# Patient Record
Sex: FEMALE | Race: BLACK OR AFRICAN AMERICAN | Employment: UNEMPLOYED | URBAN - METROPOLITAN AREA
[De-identification: names, ages, dates, MRNs, and addresses within clinical notes are randomized per-mention and may not be internally consistent; named-entity substitution may affect disease eponyms.]

---

## 2019-11-05 ENCOUNTER — HOME HEALTH ADMISSION (OUTPATIENT)
Dept: HOME HEALTH SERVICES | Facility: HOME HEALTH | Age: 84
End: 2019-11-05
Payer: MEDICARE

## 2019-11-06 ENCOUNTER — HOME CARE VISIT (OUTPATIENT)
Dept: SCHEDULING | Facility: HOME HEALTH | Age: 84
End: 2019-11-06
Payer: MEDICARE

## 2019-11-06 PROCEDURE — G0151 HHCP-SERV OF PT,EA 15 MIN: HCPCS

## 2019-11-06 PROCEDURE — 400013 HH SOC

## 2019-11-07 VITALS
HEART RATE: 70 BPM | OXYGEN SATURATION: 97 % | RESPIRATION RATE: 18 BRPM | TEMPERATURE: 98 F | SYSTOLIC BLOOD PRESSURE: 154 MMHG | DIASTOLIC BLOOD PRESSURE: 70 MMHG

## 2019-11-12 ENCOUNTER — HOME CARE VISIT (OUTPATIENT)
Dept: SCHEDULING | Facility: HOME HEALTH | Age: 84
End: 2019-11-12
Payer: MEDICARE

## 2019-11-12 PROCEDURE — G0151 HHCP-SERV OF PT,EA 15 MIN: HCPCS

## 2019-11-14 ENCOUNTER — HOME CARE VISIT (OUTPATIENT)
Dept: SCHEDULING | Facility: HOME HEALTH | Age: 84
End: 2019-11-14
Payer: MEDICARE

## 2019-11-14 PROCEDURE — G0151 HHCP-SERV OF PT,EA 15 MIN: HCPCS

## 2019-11-16 VITALS
DIASTOLIC BLOOD PRESSURE: 68 MMHG | OXYGEN SATURATION: 97 % | SYSTOLIC BLOOD PRESSURE: 142 MMHG | HEART RATE: 68 BPM | RESPIRATION RATE: 18 BRPM | TEMPERATURE: 97.8 F

## 2019-11-19 ENCOUNTER — HOME CARE VISIT (OUTPATIENT)
Dept: SCHEDULING | Facility: HOME HEALTH | Age: 84
End: 2019-11-19
Payer: MEDICARE

## 2019-11-19 VITALS
DIASTOLIC BLOOD PRESSURE: 78 MMHG | HEART RATE: 70 BPM | SYSTOLIC BLOOD PRESSURE: 138 MMHG | RESPIRATION RATE: 17 BRPM | TEMPERATURE: 97.8 F | OXYGEN SATURATION: 89 %

## 2019-11-19 PROCEDURE — G0151 HHCP-SERV OF PT,EA 15 MIN: HCPCS

## 2019-11-21 ENCOUNTER — HOME CARE VISIT (OUTPATIENT)
Dept: SCHEDULING | Facility: HOME HEALTH | Age: 84
End: 2019-11-21
Payer: MEDICARE

## 2019-11-21 VITALS
RESPIRATION RATE: 18 BRPM | TEMPERATURE: 98.2 F | OXYGEN SATURATION: 97 % | SYSTOLIC BLOOD PRESSURE: 140 MMHG | DIASTOLIC BLOOD PRESSURE: 70 MMHG | HEART RATE: 66 BPM

## 2019-11-21 PROCEDURE — G0151 HHCP-SERV OF PT,EA 15 MIN: HCPCS

## 2019-11-24 ENCOUNTER — HOME CARE VISIT (OUTPATIENT)
Dept: SCHEDULING | Facility: HOME HEALTH | Age: 84
End: 2019-11-24
Payer: MEDICARE

## 2019-11-24 PROCEDURE — G0151 HHCP-SERV OF PT,EA 15 MIN: HCPCS

## 2019-11-26 ENCOUNTER — HOME CARE VISIT (OUTPATIENT)
Dept: SCHEDULING | Facility: HOME HEALTH | Age: 84
End: 2019-11-26
Payer: MEDICARE

## 2019-11-26 VITALS — RESPIRATION RATE: 17 BRPM | HEART RATE: 82 BPM | TEMPERATURE: 97.7 F | OXYGEN SATURATION: 97 %

## 2019-11-26 VITALS
DIASTOLIC BLOOD PRESSURE: 76 MMHG | SYSTOLIC BLOOD PRESSURE: 140 MMHG | RESPIRATION RATE: 16 BRPM | HEART RATE: 89 BPM | TEMPERATURE: 98.4 F | OXYGEN SATURATION: 98 %

## 2019-11-26 PROCEDURE — G0151 HHCP-SERV OF PT,EA 15 MIN: HCPCS

## 2019-12-02 ENCOUNTER — HOME CARE VISIT (OUTPATIENT)
Dept: SCHEDULING | Facility: HOME HEALTH | Age: 84
End: 2019-12-02
Payer: MEDICARE

## 2019-12-02 VITALS
RESPIRATION RATE: 18 BRPM | SYSTOLIC BLOOD PRESSURE: 140 MMHG | TEMPERATURE: 98 F | DIASTOLIC BLOOD PRESSURE: 68 MMHG | HEART RATE: 83 BPM | OXYGEN SATURATION: 97 %

## 2019-12-02 PROCEDURE — G0151 HHCP-SERV OF PT,EA 15 MIN: HCPCS

## 2019-12-04 ENCOUNTER — HOME CARE VISIT (OUTPATIENT)
Dept: SCHEDULING | Facility: HOME HEALTH | Age: 84
End: 2019-12-04
Payer: MEDICARE

## 2019-12-04 VITALS
TEMPERATURE: 98.3 F | SYSTOLIC BLOOD PRESSURE: 136 MMHG | RESPIRATION RATE: 18 BRPM | HEART RATE: 80 BPM | DIASTOLIC BLOOD PRESSURE: 70 MMHG | OXYGEN SATURATION: 97 %

## 2019-12-04 PROCEDURE — G0151 HHCP-SERV OF PT,EA 15 MIN: HCPCS

## 2019-12-10 ENCOUNTER — HOME CARE VISIT (OUTPATIENT)
Dept: SCHEDULING | Facility: HOME HEALTH | Age: 84
End: 2019-12-10
Payer: MEDICARE

## 2019-12-10 VITALS
SYSTOLIC BLOOD PRESSURE: 122 MMHG | OXYGEN SATURATION: 97 % | RESPIRATION RATE: 18 BRPM | HEART RATE: 80 BPM | DIASTOLIC BLOOD PRESSURE: 70 MMHG | TEMPERATURE: 97.8 F

## 2019-12-10 PROCEDURE — G0151 HHCP-SERV OF PT,EA 15 MIN: HCPCS

## 2019-12-11 ENCOUNTER — HOME CARE VISIT (OUTPATIENT)
Dept: SCHEDULING | Facility: HOME HEALTH | Age: 84
End: 2019-12-11
Payer: MEDICARE

## 2019-12-11 PROCEDURE — G0151 HHCP-SERV OF PT,EA 15 MIN: HCPCS

## 2019-12-17 ENCOUNTER — HOME CARE VISIT (OUTPATIENT)
Dept: SCHEDULING | Facility: HOME HEALTH | Age: 84
End: 2019-12-17
Payer: MEDICARE

## 2019-12-17 VITALS
HEART RATE: 68 BPM | SYSTOLIC BLOOD PRESSURE: 136 MMHG | OXYGEN SATURATION: 97 % | RESPIRATION RATE: 18 BRPM | DIASTOLIC BLOOD PRESSURE: 74 MMHG | TEMPERATURE: 98.3 F

## 2019-12-17 PROCEDURE — G0151 HHCP-SERV OF PT,EA 15 MIN: HCPCS

## 2019-12-19 ENCOUNTER — HOME CARE VISIT (OUTPATIENT)
Dept: SCHEDULING | Facility: HOME HEALTH | Age: 84
End: 2019-12-19
Payer: MEDICARE

## 2019-12-19 PROCEDURE — G0151 HHCP-SERV OF PT,EA 15 MIN: HCPCS

## 2019-12-21 VITALS
TEMPERATURE: 98.1 F | SYSTOLIC BLOOD PRESSURE: 140 MMHG | RESPIRATION RATE: 16 BRPM | HEART RATE: 83 BPM | OXYGEN SATURATION: 98 % | DIASTOLIC BLOOD PRESSURE: 70 MMHG

## 2019-12-22 ENCOUNTER — HOME CARE VISIT (OUTPATIENT)
Dept: SCHEDULING | Facility: HOME HEALTH | Age: 84
End: 2019-12-22
Payer: MEDICARE

## 2019-12-22 VITALS
OXYGEN SATURATION: 98 % | TEMPERATURE: 97.9 F | DIASTOLIC BLOOD PRESSURE: 78 MMHG | RESPIRATION RATE: 18 BRPM | SYSTOLIC BLOOD PRESSURE: 138 MMHG | HEART RATE: 68 BPM

## 2019-12-22 PROCEDURE — G0151 HHCP-SERV OF PT,EA 15 MIN: HCPCS

## 2019-12-24 ENCOUNTER — HOME CARE VISIT (OUTPATIENT)
Dept: SCHEDULING | Facility: HOME HEALTH | Age: 84
End: 2019-12-24
Payer: MEDICARE

## 2019-12-24 PROCEDURE — G0151 HHCP-SERV OF PT,EA 15 MIN: HCPCS

## 2019-12-26 VITALS
RESPIRATION RATE: 17 BRPM | SYSTOLIC BLOOD PRESSURE: 138 MMHG | OXYGEN SATURATION: 97 % | HEART RATE: 82 BPM | TEMPERATURE: 98.6 F | DIASTOLIC BLOOD PRESSURE: 70 MMHG

## 2022-01-01 ENCOUNTER — ANESTHESIA EVENT (OUTPATIENT)
Dept: SURGERY | Age: 87
DRG: 853 | End: 2022-01-01
Payer: MEDICARE

## 2022-01-01 ENCOUNTER — HOSPICE ADMISSION (OUTPATIENT)
Dept: HOSPICE | Facility: HOSPICE | Age: 87
End: 2022-01-01

## 2022-01-01 ENCOUNTER — APPOINTMENT (OUTPATIENT)
Dept: NON INVASIVE DIAGNOSTICS | Age: 87
DRG: 871 | End: 2022-01-01
Attending: HOSPITALIST
Payer: MEDICARE

## 2022-01-01 ENCOUNTER — HOSPICE ADMISSION (OUTPATIENT)
Dept: HOSPICE | Facility: HOSPICE | Age: 87
End: 2022-01-01
Payer: MEDICARE

## 2022-01-01 ENCOUNTER — ANESTHESIA (OUTPATIENT)
Dept: SURGERY | Age: 87
DRG: 853 | End: 2022-01-01
Payer: MEDICARE

## 2022-01-01 ENCOUNTER — HOSPITAL ENCOUNTER (INPATIENT)
Age: 87
LOS: 14 days | Discharge: NURSING, REHAB OR LONG-TERM CARE | DRG: 853 | End: 2022-08-16
Attending: EMERGENCY MEDICINE | Admitting: STUDENT IN AN ORGANIZED HEALTH CARE EDUCATION/TRAINING PROGRAM
Payer: MEDICARE

## 2022-01-01 ENCOUNTER — TELEPHONE (OUTPATIENT)
Dept: INFECTIOUS DISEASES | Age: 87
End: 2022-01-01

## 2022-01-01 ENCOUNTER — HOSPITAL ENCOUNTER (INPATIENT)
Age: 87
LOS: 23 days | Discharge: NURSING, REHAB OR LONG-TERM CARE | DRG: 871 | End: 2022-06-15
Attending: STUDENT IN AN ORGANIZED HEALTH CARE EDUCATION/TRAINING PROGRAM | Admitting: HOSPITALIST
Payer: MEDICARE

## 2022-01-01 ENCOUNTER — APPOINTMENT (OUTPATIENT)
Dept: CT IMAGING | Age: 87
DRG: 871 | End: 2022-01-01
Attending: INTERNAL MEDICINE
Payer: MEDICARE

## 2022-01-01 ENCOUNTER — HOSPITAL ENCOUNTER (OUTPATIENT)
Dept: LAB | Age: 87
Discharge: HOME | End: 2022-08-26

## 2022-01-01 ENCOUNTER — APPOINTMENT (OUTPATIENT)
Dept: GENERAL RADIOLOGY | Age: 87
DRG: 871 | End: 2022-01-01
Attending: NURSE PRACTITIONER
Payer: MEDICARE

## 2022-01-01 ENCOUNTER — APPOINTMENT (OUTPATIENT)
Dept: ULTRASOUND IMAGING | Age: 87
DRG: 871 | End: 2022-01-01
Attending: NURSE PRACTITIONER
Payer: MEDICARE

## 2022-01-01 ENCOUNTER — HOSPITAL ENCOUNTER (INPATIENT)
Age: 87
LOS: 1 days | End: 2022-09-28
Attending: FAMILY MEDICINE | Admitting: FAMILY MEDICINE

## 2022-01-01 ENCOUNTER — APPOINTMENT (OUTPATIENT)
Dept: INTERVENTIONAL RADIOLOGY/VASCULAR | Age: 87
DRG: 871 | End: 2022-01-01
Attending: INTERNAL MEDICINE
Payer: MEDICARE

## 2022-01-01 ENCOUNTER — HOSPITAL ENCOUNTER (OUTPATIENT)
Age: 87
Setting detail: OBSERVATION
Discharge: HOSPICE | End: 2022-09-27
Attending: STUDENT IN AN ORGANIZED HEALTH CARE EDUCATION/TRAINING PROGRAM | Admitting: INTERNAL MEDICINE
Payer: MEDICARE

## 2022-01-01 ENCOUNTER — HOSPITAL ENCOUNTER (OUTPATIENT)
Dept: LAB | Age: 87
Discharge: HOME | End: 2022-09-14

## 2022-01-01 ENCOUNTER — APPOINTMENT (OUTPATIENT)
Dept: GENERAL RADIOLOGY | Age: 87
DRG: 871 | End: 2022-01-01
Attending: EMERGENCY MEDICINE
Payer: MEDICARE

## 2022-01-01 ENCOUNTER — HOSPITAL ENCOUNTER (INPATIENT)
Age: 87
LOS: 6 days | Discharge: HOSPICE | DRG: 871 | End: 2022-09-22
Attending: EMERGENCY MEDICINE | Admitting: INTERNAL MEDICINE
Payer: MEDICARE

## 2022-01-01 ENCOUNTER — APPOINTMENT (OUTPATIENT)
Dept: CT IMAGING | Age: 87
DRG: 871 | End: 2022-01-01
Attending: NURSE PRACTITIONER
Payer: MEDICARE

## 2022-01-01 ENCOUNTER — APPOINTMENT (OUTPATIENT)
Dept: NON INVASIVE DIAGNOSTICS | Age: 87
DRG: 871 | End: 2022-01-01
Attending: INTERNAL MEDICINE
Payer: MEDICARE

## 2022-01-01 ENCOUNTER — APPOINTMENT (OUTPATIENT)
Dept: ULTRASOUND IMAGING | Age: 87
DRG: 871 | End: 2022-01-01
Attending: INTERNAL MEDICINE
Payer: MEDICARE

## 2022-01-01 ENCOUNTER — APPOINTMENT (OUTPATIENT)
Dept: CT IMAGING | Age: 87
DRG: 871 | End: 2022-01-01
Attending: STUDENT IN AN ORGANIZED HEALTH CARE EDUCATION/TRAINING PROGRAM
Payer: MEDICARE

## 2022-01-01 ENCOUNTER — APPOINTMENT (OUTPATIENT)
Dept: ULTRASOUND IMAGING | Age: 87
DRG: 871 | End: 2022-01-01
Attending: HOSPITALIST
Payer: MEDICARE

## 2022-01-01 ENCOUNTER — APPOINTMENT (OUTPATIENT)
Dept: GENERAL RADIOLOGY | Age: 87
DRG: 871 | End: 2022-01-01
Attending: STUDENT IN AN ORGANIZED HEALTH CARE EDUCATION/TRAINING PROGRAM
Payer: MEDICARE

## 2022-01-01 ENCOUNTER — APPOINTMENT (OUTPATIENT)
Dept: CT IMAGING | Age: 87
DRG: 871 | End: 2022-01-01
Attending: EMERGENCY MEDICINE
Payer: MEDICARE

## 2022-01-01 ENCOUNTER — APPOINTMENT (OUTPATIENT)
Dept: GENERAL RADIOLOGY | Age: 87
DRG: 871 | End: 2022-01-01
Attending: HOSPITALIST
Payer: MEDICARE

## 2022-01-01 VITALS
DIASTOLIC BLOOD PRESSURE: 52 MMHG | WEIGHT: 150.3 LBS | SYSTOLIC BLOOD PRESSURE: 114 MMHG | RESPIRATION RATE: 18 BRPM | TEMPERATURE: 97.8 F | HEIGHT: 64 IN | BODY MASS INDEX: 25.66 KG/M2 | HEART RATE: 72 BPM | OXYGEN SATURATION: 100 %

## 2022-01-01 VITALS
RESPIRATION RATE: 15 BRPM | WEIGHT: 182.1 LBS | TEMPERATURE: 98 F | OXYGEN SATURATION: 100 % | SYSTOLIC BLOOD PRESSURE: 168 MMHG | DIASTOLIC BLOOD PRESSURE: 62 MMHG | HEART RATE: 73 BPM | HEIGHT: 66 IN | BODY MASS INDEX: 29.27 KG/M2

## 2022-01-01 VITALS
TEMPERATURE: 97.8 F | DIASTOLIC BLOOD PRESSURE: 57 MMHG | BODY MASS INDEX: 21.51 KG/M2 | SYSTOLIC BLOOD PRESSURE: 114 MMHG | RESPIRATION RATE: 15 BRPM | HEIGHT: 64 IN | WEIGHT: 126 LBS | HEART RATE: 90 BPM | OXYGEN SATURATION: 100 %

## 2022-01-01 VITALS
SYSTOLIC BLOOD PRESSURE: 68 MMHG | RESPIRATION RATE: 20 BRPM | OXYGEN SATURATION: 95 % | HEART RATE: 68 BPM | TEMPERATURE: 98.6 F | DIASTOLIC BLOOD PRESSURE: 32 MMHG

## 2022-01-01 VITALS — TEMPERATURE: 93.3 F | RESPIRATION RATE: 8 BRPM

## 2022-01-01 DIAGNOSIS — A41.9 SEPSIS WITHOUT ACUTE ORGAN DYSFUNCTION, DUE TO UNSPECIFIED ORGANISM (HCC): Primary | ICD-10-CM

## 2022-01-01 DIAGNOSIS — L98.423 SKIN ULCER OF SACRUM WITH NECROSIS OF MUSCLE (HCC): ICD-10-CM

## 2022-01-01 DIAGNOSIS — Z99.2 ESRD (END STAGE RENAL DISEASE) ON DIALYSIS (HCC): ICD-10-CM

## 2022-01-01 DIAGNOSIS — R52 NONVERBAL SIGNS OF PAIN: Primary | ICD-10-CM

## 2022-01-01 DIAGNOSIS — R00.1 SINUS BRADYCARDIA: ICD-10-CM

## 2022-01-01 DIAGNOSIS — B96.4 BACTEREMIA DUE TO PROTEUS SPECIES: ICD-10-CM

## 2022-01-01 DIAGNOSIS — R78.81 BACTEREMIA DUE TO PROTEUS SPECIES: ICD-10-CM

## 2022-01-01 DIAGNOSIS — R78.81 ENTEROCOCCAL BACTEREMIA: ICD-10-CM

## 2022-01-01 DIAGNOSIS — R45.1 RESTLESSNESS: ICD-10-CM

## 2022-01-01 DIAGNOSIS — G93.41 ACUTE METABOLIC ENCEPHALOPATHY: ICD-10-CM

## 2022-01-01 DIAGNOSIS — N18.6 END STAGE RENAL DISEASE (HCC): ICD-10-CM

## 2022-01-01 DIAGNOSIS — R65.20 SEVERE SEPSIS (HCC): ICD-10-CM

## 2022-01-01 DIAGNOSIS — M46.28 SACRAL OSTEOMYELITIS (HCC): ICD-10-CM

## 2022-01-01 DIAGNOSIS — E11.10 DIABETIC KETOACIDOSIS WITHOUT COMA ASSOCIATED WITH TYPE 2 DIABETES MELLITUS (HCC): ICD-10-CM

## 2022-01-01 DIAGNOSIS — B95.2 ENTEROCOCCAL BACTEREMIA: ICD-10-CM

## 2022-01-01 DIAGNOSIS — Z16.12 ESBL (EXTENDED SPECTRUM BETA-LACTAMASE) PRODUCING BACTERIA INFECTION: ICD-10-CM

## 2022-01-01 DIAGNOSIS — E87.6 HYPOKALEMIA: ICD-10-CM

## 2022-01-01 DIAGNOSIS — N18.6 ESRD (END STAGE RENAL DISEASE) ON DIALYSIS (HCC): ICD-10-CM

## 2022-01-01 DIAGNOSIS — A49.8 KLEBSIELLA PNEUMONIAE INFECTION: ICD-10-CM

## 2022-01-01 DIAGNOSIS — A41.9 SEVERE SEPSIS (HCC): ICD-10-CM

## 2022-01-01 DIAGNOSIS — Z71.89 ADVANCED CARE PLANNING/COUNSELING DISCUSSION: ICD-10-CM

## 2022-01-01 DIAGNOSIS — A41.9 SEPSIS, DUE TO UNSPECIFIED ORGANISM, UNSPECIFIED WHETHER ACUTE ORGAN DYSFUNCTION PRESENT (HCC): Primary | ICD-10-CM

## 2022-01-01 DIAGNOSIS — A41.9 POLYMICROBIAL SEPTICEMIA (HCC): ICD-10-CM

## 2022-01-01 DIAGNOSIS — N17.9 AKI (ACUTE KIDNEY INJURY) (HCC): ICD-10-CM

## 2022-01-01 DIAGNOSIS — E11.65 POORLY CONTROLLED DIABETES MELLITUS (HCC): ICD-10-CM

## 2022-01-01 DIAGNOSIS — A49.9 ESBL (EXTENDED SPECTRUM BETA-LACTAMASE) PRODUCING BACTERIA INFECTION: ICD-10-CM

## 2022-01-01 DIAGNOSIS — Z51.5 HOSPICE CARE: ICD-10-CM

## 2022-01-01 DIAGNOSIS — I05.9 ENDOCARDITIS OF MITRAL VALVE: ICD-10-CM

## 2022-01-01 DIAGNOSIS — R53.81 DEBILITY: ICD-10-CM

## 2022-01-01 DIAGNOSIS — A48.8 SERRATIA MARCESCENS INFECTION: ICD-10-CM

## 2022-01-01 DIAGNOSIS — L08.9 WOUND INFECTION: ICD-10-CM

## 2022-01-01 DIAGNOSIS — Z51.5 PALLIATIVE CARE BY SPECIALIST: ICD-10-CM

## 2022-01-01 DIAGNOSIS — E11.11 DIABETIC KETOACIDOSIS WITH COMA ASSOCIATED WITH TYPE 2 DIABETES MELLITUS (HCC): Primary | ICD-10-CM

## 2022-01-01 DIAGNOSIS — R63.0 POOR APPETITE: ICD-10-CM

## 2022-01-01 DIAGNOSIS — T14.8XXA WOUND INFECTION: ICD-10-CM

## 2022-01-01 LAB
ABO + RH BLD: NORMAL
ACC. NO. FROM MICRO ORDER, ACCP: ABNORMAL
ACINETOBACTER CALCOACETICUS-BAUMANII COMPLEX, ACBCX: NOT DETECTED
ADMINISTERED INITIALS, ADMINIT: NORMAL
ALBUMIN SERPL-MCNC: 1.2 G/DL (ref 3.5–5)
ALBUMIN SERPL-MCNC: 1.4 G/DL (ref 3.5–5)
ALBUMIN SERPL-MCNC: 1.5 G/DL (ref 3.5–5)
ALBUMIN SERPL-MCNC: 1.6 G/DL (ref 3.5–5)
ALBUMIN SERPL-MCNC: 1.6 G/DL (ref 3.5–5)
ALBUMIN SERPL-MCNC: 1.7 G/DL (ref 3.5–5)
ALBUMIN SERPL-MCNC: 1.7 G/DL (ref 3.5–5)
ALBUMIN SERPL-MCNC: 1.8 G/DL (ref 3.5–5)
ALBUMIN SERPL-MCNC: 1.8 G/DL (ref 3.5–5)
ALBUMIN SERPL-MCNC: 1.9 G/DL (ref 3.5–5)
ALBUMIN SERPL-MCNC: 2 G/DL (ref 3.5–5)
ALBUMIN SERPL-MCNC: 2 G/DL (ref 3.5–5)
ALBUMIN SERPL-MCNC: 2.1 G/DL (ref 3.5–5)
ALBUMIN SERPL-MCNC: 2.1 G/DL (ref 3.5–5)
ALBUMIN SERPL-MCNC: 2.4 G/DL (ref 3.5–5)
ALBUMIN SERPL-MCNC: 3.3 G/DL (ref 3.5–5)
ALBUMIN/GLOB SERPL: 0.2 {RATIO} (ref 1.1–2.2)
ALBUMIN/GLOB SERPL: 0.2 {RATIO} (ref 1.1–2.2)
ALBUMIN/GLOB SERPL: 0.3 {RATIO} (ref 1.1–2.2)
ALBUMIN/GLOB SERPL: 0.5 {RATIO} (ref 1.1–2.2)
ALBUMIN/GLOB SERPL: 0.6 {RATIO} (ref 1.1–2.2)
ALBUMIN/GLOB SERPL: 0.8 {RATIO} (ref 1.1–2.2)
ALBUMIN/GLOB SERPL: 0.8 {RATIO} (ref 1.1–2.2)
ALP SERPL-CCNC: 102 U/L (ref 45–117)
ALP SERPL-CCNC: 103 U/L (ref 45–117)
ALP SERPL-CCNC: 109 U/L (ref 45–117)
ALP SERPL-CCNC: 133 U/L (ref 45–117)
ALP SERPL-CCNC: 136 U/L (ref 45–117)
ALP SERPL-CCNC: 136 U/L (ref 45–117)
ALP SERPL-CCNC: 150 U/L (ref 45–117)
ALP SERPL-CCNC: 235 U/L (ref 45–117)
ALP SERPL-CCNC: 50 U/L (ref 45–117)
ALP SERPL-CCNC: 57 U/L (ref 45–117)
ALP SERPL-CCNC: 70 U/L (ref 45–117)
ALP SERPL-CCNC: 85 U/L (ref 45–117)
ALP SERPL-CCNC: 88 U/L (ref 45–117)
ALP SERPL-CCNC: 99 U/L (ref 45–117)
ALT SERPL-CCNC: 107 U/L (ref 12–78)
ALT SERPL-CCNC: 121 U/L (ref 12–78)
ALT SERPL-CCNC: 134 U/L (ref 12–78)
ALT SERPL-CCNC: 150 U/L (ref 12–78)
ALT SERPL-CCNC: 16 U/L (ref 12–78)
ALT SERPL-CCNC: 176 U/L (ref 12–78)
ALT SERPL-CCNC: 19 U/L (ref 12–78)
ALT SERPL-CCNC: 201 U/L (ref 12–78)
ALT SERPL-CCNC: 226 U/L (ref 12–78)
ALT SERPL-CCNC: 23 U/L (ref 12–78)
ALT SERPL-CCNC: 230 U/L (ref 12–78)
ALT SERPL-CCNC: 252 U/L (ref 12–78)
ALT SERPL-CCNC: 27 U/L (ref 12–78)
ALT SERPL-CCNC: 34 U/L (ref 12–78)
ANION GAP SERPL CALC-SCNC: 10 MMOL/L (ref 5–15)
ANION GAP SERPL CALC-SCNC: 11 MMOL/L (ref 5–15)
ANION GAP SERPL CALC-SCNC: 12 MMOL/L (ref 5–15)
ANION GAP SERPL CALC-SCNC: 14 MMOL/L (ref 5–15)
ANION GAP SERPL CALC-SCNC: 15 MMOL/L (ref 5–15)
ANION GAP SERPL CALC-SCNC: 16 MMOL/L (ref 5–15)
ANION GAP SERPL CALC-SCNC: 17 MMOL/L (ref 5–15)
ANION GAP SERPL CALC-SCNC: 19 MMOL/L (ref 5–15)
ANION GAP SERPL CALC-SCNC: 22 MMOL/L (ref 5–15)
ANION GAP SERPL CALC-SCNC: 30 MMOL/L (ref 5–15)
ANION GAP SERPL CALC-SCNC: 4 MMOL/L (ref 5–15)
ANION GAP SERPL CALC-SCNC: 4 MMOL/L (ref 5–15)
ANION GAP SERPL CALC-SCNC: 5 MMOL/L (ref 5–15)
ANION GAP SERPL CALC-SCNC: 6 MMOL/L (ref 5–15)
ANION GAP SERPL CALC-SCNC: 6 MMOL/L (ref 5–15)
ANION GAP SERPL CALC-SCNC: 7 MMOL/L (ref 5–15)
ANION GAP SERPL CALC-SCNC: 8 MMOL/L (ref 5–15)
ANION GAP SERPL CALC-SCNC: 9 MMOL/L (ref 5–15)
APPEARANCE UR: ABNORMAL
APTT PPP: 30.1 SEC (ref 22.1–31)
ARTERIAL PATENCY WRIST A: ABNORMAL
ARTERIAL PATENCY WRIST A: POSITIVE
ARTERIAL PATENCY WRIST A: POSITIVE
AST SERPL-CCNC: 133 U/L (ref 15–37)
AST SERPL-CCNC: 179 U/L (ref 15–37)
AST SERPL-CCNC: 33 U/L (ref 15–37)
AST SERPL-CCNC: 447 U/L (ref 15–37)
AST SERPL-CCNC: 47 U/L (ref 15–37)
AST SERPL-CCNC: 49 U/L (ref 15–37)
AST SERPL-CCNC: 55 U/L (ref 15–37)
AST SERPL-CCNC: 592 U/L (ref 15–37)
AST SERPL-CCNC: 595 U/L (ref 15–37)
AST SERPL-CCNC: 601 U/L (ref 15–37)
AST SERPL-CCNC: 62 U/L (ref 15–37)
AST SERPL-CCNC: 648 U/L (ref 15–37)
AST SERPL-CCNC: 75 U/L (ref 15–37)
AST SERPL-CCNC: 86 U/L (ref 15–37)
ATRIAL RATE: 100 BPM
ATRIAL RATE: 119 BPM
ATRIAL RATE: 125 BPM
ATRIAL RATE: 22 BPM
ATRIAL RATE: 52 BPM
ATRIAL RATE: 77 BPM
ATRIAL RATE: 85 BPM
B PERT DNA SPEC QL NAA+PROBE: NOT DETECTED
B-OH-BUTYR SERPL-SCNC: 3.01 MMOL/L
BACTERIA SPEC CULT: ABNORMAL
BACTERIA SPEC CULT: NORMAL
BACTERIA URNS QL MICRO: ABNORMAL /HPF
BACTEROIDES FRAGILIS, BFRA: NOT DETECTED
BASE DEFICIT BLD-SCNC: 1.9 MMOL/L
BASE DEFICIT BLD-SCNC: 13.4 MMOL/L
BASE DEFICIT BLD-SCNC: 18.6 MMOL/L
BASE DEFICIT BLD-SCNC: 2.8 MMOL/L
BASE DEFICIT BLD-SCNC: 2.9 MMOL/L
BASE DEFICIT BLD-SCNC: 2.9 MMOL/L
BASE DEFICIT BLD-SCNC: 23.1 MMOL/L
BASE DEFICIT BLD-SCNC: 3 MMOL/L
BASE DEFICIT BLD-SCNC: 3.1 MMOL/L
BASE DEFICIT BLD-SCNC: 3.3 MMOL/L
BASE DEFICIT BLD-SCNC: 3.7 MMOL/L
BASE DEFICIT BLD-SCNC: 3.7 MMOL/L
BASE DEFICIT BLD-SCNC: 4.9 MMOL/L
BASE DEFICIT BLD-SCNC: 5.9 MMOL/L
BASE DEFICIT BLDA-SCNC: 14 MMOL/L
BASOPHILS # BLD: 0 K/UL (ref 0–0.1)
BASOPHILS # BLD: 0.1 K/UL (ref 0–0.1)
BASOPHILS NFR BLD: 0 % (ref 0–1)
BASOPHILS NFR BLD: 1 % (ref 0–1)
BDY SITE: ABNORMAL
BILIRUB DIRECT SERPL-MCNC: 0.2 MG/DL (ref 0–0.2)
BILIRUB SERPL-MCNC: 0.3 MG/DL (ref 0.2–1)
BILIRUB SERPL-MCNC: 0.4 MG/DL (ref 0.2–1)
BILIRUB SERPL-MCNC: 0.5 MG/DL (ref 0.2–1)
BILIRUB SERPL-MCNC: 0.6 MG/DL (ref 0.2–1)
BILIRUB SERPL-MCNC: 0.7 MG/DL (ref 0.2–1)
BILIRUB SERPL-MCNC: 0.7 MG/DL (ref 0.2–1)
BILIRUB UR QL: NEGATIVE
BIOFIRE COMMENT, BCIDPF: ABNORMAL
BLD PROD TYP BPU: NORMAL
BLOOD GROUP ANTIBODIES SERPL: NORMAL
BNP SERPL-MCNC: 1588 PG/ML
BORDETELLA PARAPERTUSSIS PCR, BORPAR: NOT DETECTED
BPU ID: NORMAL
BUN SERPL-MCNC: 114 MG/DL (ref 6–20)
BUN SERPL-MCNC: 12 MG/DL (ref 6–20)
BUN SERPL-MCNC: 14 MG/DL (ref 6–20)
BUN SERPL-MCNC: 14 MG/DL (ref 6–20)
BUN SERPL-MCNC: 15 MG/DL (ref 6–20)
BUN SERPL-MCNC: 17 MG/DL (ref 6–20)
BUN SERPL-MCNC: 18 MG/DL (ref 6–20)
BUN SERPL-MCNC: 21 MG/DL (ref 6–20)
BUN SERPL-MCNC: 23 MG/DL (ref 6–20)
BUN SERPL-MCNC: 23 MG/DL (ref 6–20)
BUN SERPL-MCNC: 26 MG/DL (ref 6–20)
BUN SERPL-MCNC: 28 MG/DL (ref 6–20)
BUN SERPL-MCNC: 28 MG/DL (ref 6–20)
BUN SERPL-MCNC: 29 MG/DL (ref 6–20)
BUN SERPL-MCNC: 29 MG/DL (ref 6–20)
BUN SERPL-MCNC: 30 MG/DL (ref 6–20)
BUN SERPL-MCNC: 31 MG/DL (ref 6–20)
BUN SERPL-MCNC: 40 MG/DL (ref 6–20)
BUN SERPL-MCNC: 44 MG/DL (ref 6–20)
BUN SERPL-MCNC: 45 MG/DL (ref 6–20)
BUN SERPL-MCNC: 48 MG/DL (ref 6–20)
BUN SERPL-MCNC: 51 MG/DL (ref 6–20)
BUN SERPL-MCNC: 51 MG/DL (ref 6–20)
BUN SERPL-MCNC: 57 MG/DL (ref 6–20)
BUN SERPL-MCNC: 58 MG/DL (ref 6–20)
BUN SERPL-MCNC: 58 MG/DL (ref 6–20)
BUN SERPL-MCNC: 71 MG/DL (ref 6–20)
BUN SERPL-MCNC: 72 MG/DL (ref 6–20)
BUN SERPL-MCNC: 73 MG/DL (ref 6–20)
BUN SERPL-MCNC: 82 MG/DL (ref 6–20)
BUN SERPL-MCNC: 87 MG/DL (ref 6–20)
BUN SERPL-MCNC: 9 MG/DL (ref 6–20)
BUN SERPL-MCNC: 90 MG/DL (ref 6–20)
BUN SERPL-MCNC: 92 MG/DL (ref 6–20)
BUN SERPL-MCNC: 96 MG/DL (ref 6–20)
BUN SERPL-MCNC: 96 MG/DL (ref 6–20)
BUN SERPL-MCNC: 97 MG/DL (ref 6–20)
BUN/CREAT SERPL: 10 (ref 12–20)
BUN/CREAT SERPL: 10 (ref 12–20)
BUN/CREAT SERPL: 11 (ref 12–20)
BUN/CREAT SERPL: 12 (ref 12–20)
BUN/CREAT SERPL: 13 (ref 12–20)
BUN/CREAT SERPL: 14 (ref 12–20)
BUN/CREAT SERPL: 14 (ref 12–20)
BUN/CREAT SERPL: 15 (ref 12–20)
BUN/CREAT SERPL: 16 (ref 12–20)
BUN/CREAT SERPL: 16 (ref 12–20)
BUN/CREAT SERPL: 17 (ref 12–20)
BUN/CREAT SERPL: 18 (ref 12–20)
BUN/CREAT SERPL: 19 (ref 12–20)
BUN/CREAT SERPL: 3 (ref 12–20)
BUN/CREAT SERPL: 3 (ref 12–20)
BUN/CREAT SERPL: 4 (ref 12–20)
BUN/CREAT SERPL: 4 (ref 12–20)
BUN/CREAT SERPL: 6 (ref 12–20)
BUN/CREAT SERPL: 7 (ref 12–20)
BUN/CREAT SERPL: 8 (ref 12–20)
BUN/CREAT SERPL: 9 (ref 12–20)
C GLABRATA DNA VAG QL NAA+PROBE: NOT DETECTED
C PNEUM DNA SPEC QL NAA+PROBE: NOT DETECTED
CA-I BLD-MCNC: 0.81 MMOL/L (ref 1.12–1.32)
CA-I BLD-MCNC: 0.82 MMOL/L (ref 1.12–1.32)
CA-I BLD-MCNC: 0.83 MMOL/L (ref 1.12–1.32)
CA-I BLD-MCNC: 0.84 MMOL/L (ref 1.12–1.32)
CA-I BLD-MCNC: 0.84 MMOL/L (ref 1.12–1.32)
CA-I BLD-MCNC: 0.85 MMOL/L (ref 1.12–1.32)
CA-I BLD-MCNC: 0.87 MMOL/L (ref 1.12–1.32)
CA-I BLD-MCNC: 0.87 MMOL/L (ref 1.12–1.32)
CA-I BLD-MCNC: 0.88 MMOL/L (ref 1.12–1.32)
CA-I BLD-MCNC: 0.88 MMOL/L (ref 1.12–1.32)
CA-I BLD-MCNC: 0.9 MMOL/L (ref 1.12–1.32)
CA-I BLD-MCNC: 0.95 MMOL/L (ref 1.12–1.32)
CA-I BLD-SCNC: 0.89 MMOL/L (ref 1.12–1.32)
CA-I BLD-SCNC: 1.02 MMOL/L (ref 1.12–1.32)
CALCIUM SERPL-MCNC: 5.3 MG/DL (ref 8.5–10.1)
CALCIUM SERPL-MCNC: 6.5 MG/DL (ref 8.5–10.1)
CALCIUM SERPL-MCNC: 6.6 MG/DL (ref 8.5–10.1)
CALCIUM SERPL-MCNC: 6.6 MG/DL (ref 8.5–10.1)
CALCIUM SERPL-MCNC: 6.7 MG/DL (ref 8.5–10.1)
CALCIUM SERPL-MCNC: 6.8 MG/DL (ref 8.5–10.1)
CALCIUM SERPL-MCNC: 6.8 MG/DL (ref 8.5–10.1)
CALCIUM SERPL-MCNC: 6.9 MG/DL (ref 8.5–10.1)
CALCIUM SERPL-MCNC: 7 MG/DL (ref 8.5–10.1)
CALCIUM SERPL-MCNC: 7.1 MG/DL (ref 8.5–10.1)
CALCIUM SERPL-MCNC: 7.2 MG/DL (ref 8.5–10.1)
CALCIUM SERPL-MCNC: 7.3 MG/DL (ref 8.5–10.1)
CALCIUM SERPL-MCNC: 7.4 MG/DL (ref 8.5–10.1)
CALCIUM SERPL-MCNC: 7.5 MG/DL (ref 8.5–10.1)
CALCIUM SERPL-MCNC: 7.7 MG/DL (ref 8.5–10.1)
CALCIUM SERPL-MCNC: 7.7 MG/DL (ref 8.5–10.1)
CALCIUM SERPL-MCNC: 7.8 MG/DL (ref 8.5–10.1)
CALCIUM SERPL-MCNC: 7.8 MG/DL (ref 8.5–10.1)
CALCIUM SERPL-MCNC: 7.9 MG/DL (ref 8.5–10.1)
CALCIUM SERPL-MCNC: 8 MG/DL (ref 8.5–10.1)
CALCIUM SERPL-MCNC: 8.1 MG/DL (ref 8.5–10.1)
CALCIUM SERPL-MCNC: 8.4 MG/DL (ref 8.5–10.1)
CALCIUM SERPL-MCNC: 8.6 MG/DL (ref 8.5–10.1)
CALCULATED P AXIS, ECG09: 110 DEGREES
CALCULATED P AXIS, ECG09: 21 DEGREES
CALCULATED P AXIS, ECG09: 59 DEGREES
CALCULATED P AXIS, ECG09: 63 DEGREES
CALCULATED R AXIS, ECG10: -2 DEGREES
CALCULATED R AXIS, ECG10: 26 DEGREES
CALCULATED R AXIS, ECG10: 29 DEGREES
CALCULATED R AXIS, ECG10: 33 DEGREES
CALCULATED R AXIS, ECG10: 44 DEGREES
CALCULATED R AXIS, ECG10: 54 DEGREES
CALCULATED R AXIS, ECG10: 60 DEGREES
CALCULATED T AXIS, ECG11: -166 DEGREES
CALCULATED T AXIS, ECG11: 0 DEGREES
CALCULATED T AXIS, ECG11: 130 DEGREES
CALCULATED T AXIS, ECG11: 23 DEGREES
CALCULATED T AXIS, ECG11: 25 DEGREES
CALCULATED T AXIS, ECG11: 61 DEGREES
CALCULATED T AXIS, ECG11: 67 DEGREES
CANDIDA ALBICANS: NOT DETECTED
CANDIDA AURIS, CAAU: NOT DETECTED
CANDIDA KRUSEI, CKRP: NOT DETECTED
CANDIDA PARAPSILOSIS, CPAUP: NOT DETECTED
CANDIDA TROPICALIS, CTROP: NOT DETECTED
CC UR VC: ABNORMAL
CHLORIDE BLD-SCNC: 107 MMOL/L (ref 100–108)
CHLORIDE BLD-SCNC: 109 MMOL/L (ref 100–108)
CHLORIDE BLD-SCNC: 111 MMOL/L (ref 100–108)
CHLORIDE BLD-SCNC: 112 MMOL/L (ref 100–108)
CHLORIDE BLD-SCNC: 113 MMOL/L (ref 100–108)
CHLORIDE BLD-SCNC: 115 MMOL/L (ref 100–108)
CHLORIDE BLD-SCNC: 116 MMOL/L (ref 100–108)
CHLORIDE BLD-SCNC: 117 MMOL/L (ref 100–108)
CHLORIDE BLD-SCNC: 120 MMOL/L (ref 100–108)
CHLORIDE SERPL-SCNC: 100 MMOL/L (ref 97–108)
CHLORIDE SERPL-SCNC: 100 MMOL/L (ref 97–108)
CHLORIDE SERPL-SCNC: 101 MMOL/L (ref 97–108)
CHLORIDE SERPL-SCNC: 101 MMOL/L (ref 97–108)
CHLORIDE SERPL-SCNC: 102 MMOL/L (ref 97–108)
CHLORIDE SERPL-SCNC: 103 MMOL/L (ref 97–108)
CHLORIDE SERPL-SCNC: 104 MMOL/L (ref 97–108)
CHLORIDE SERPL-SCNC: 105 MMOL/L (ref 97–108)
CHLORIDE SERPL-SCNC: 106 MMOL/L (ref 97–108)
CHLORIDE SERPL-SCNC: 106 MMOL/L (ref 97–108)
CHLORIDE SERPL-SCNC: 107 MMOL/L (ref 97–108)
CHLORIDE SERPL-SCNC: 109 MMOL/L (ref 97–108)
CHLORIDE SERPL-SCNC: 109 MMOL/L (ref 97–108)
CHLORIDE SERPL-SCNC: 110 MMOL/L (ref 97–108)
CHLORIDE SERPL-SCNC: 112 MMOL/L (ref 97–108)
CHLORIDE SERPL-SCNC: 113 MMOL/L (ref 97–108)
CHLORIDE SERPL-SCNC: 114 MMOL/L (ref 97–108)
CHLORIDE SERPL-SCNC: 117 MMOL/L (ref 97–108)
CHLORIDE SERPL-SCNC: 119 MMOL/L (ref 97–108)
CHLORIDE SERPL-SCNC: 94 MMOL/L (ref 97–108)
CHLORIDE SERPL-SCNC: 98 MMOL/L (ref 97–108)
CHLORIDE SERPL-SCNC: 99 MMOL/L (ref 97–108)
CK SERPL-CCNC: 1824 U/L (ref 26–192)
CK SERPL-CCNC: 555 U/L (ref 26–192)
CK SERPL-CCNC: 9410 U/L (ref 26–192)
CK SERPL-CCNC: ABNORMAL U/L (ref 26–192)
CO2 BLD-SCNC: 11 MMOL/L (ref 19–24)
CO2 BLD-SCNC: 15 MMOL/L (ref 19–24)
CO2 BLD-SCNC: 21 MMOL/L (ref 19–24)
CO2 BLD-SCNC: 22 MMOL/L (ref 19–24)
CO2 BLD-SCNC: 22 MMOL/L (ref 19–24)
CO2 BLD-SCNC: 23 MMOL/L (ref 19–24)
CO2 BLD-SCNC: 24 MMOL/L (ref 19–24)
CO2 BLD-SCNC: 8 MMOL/L (ref 19–24)
CO2 SERPL-SCNC: 16 MMOL/L (ref 21–32)
CO2 SERPL-SCNC: 17 MMOL/L (ref 21–32)
CO2 SERPL-SCNC: 18 MMOL/L (ref 21–32)
CO2 SERPL-SCNC: 19 MMOL/L (ref 21–32)
CO2 SERPL-SCNC: 19 MMOL/L (ref 21–32)
CO2 SERPL-SCNC: 20 MMOL/L (ref 21–32)
CO2 SERPL-SCNC: 21 MMOL/L (ref 21–32)
CO2 SERPL-SCNC: 22 MMOL/L (ref 21–32)
CO2 SERPL-SCNC: 23 MMOL/L (ref 21–32)
CO2 SERPL-SCNC: 24 MMOL/L (ref 21–32)
CO2 SERPL-SCNC: 24 MMOL/L (ref 21–32)
CO2 SERPL-SCNC: 25 MMOL/L (ref 21–32)
CO2 SERPL-SCNC: 25 MMOL/L (ref 21–32)
CO2 SERPL-SCNC: 26 MMOL/L (ref 21–32)
CO2 SERPL-SCNC: 26 MMOL/L (ref 21–32)
CO2 SERPL-SCNC: 27 MMOL/L (ref 21–32)
CO2 SERPL-SCNC: 28 MMOL/L (ref 21–32)
CO2 SERPL-SCNC: 29 MMOL/L (ref 21–32)
CO2 SERPL-SCNC: 29 MMOL/L (ref 21–32)
CO2 SERPL-SCNC: 30 MMOL/L (ref 21–32)
CO2 SERPL-SCNC: 31 MMOL/L (ref 21–32)
CO2 SERPL-SCNC: 33 MMOL/L (ref 21–32)
CO2 SERPL-SCNC: 33 MMOL/L (ref 21–32)
CO2 SERPL-SCNC: 35 MMOL/L (ref 21–32)
CO2 SERPL-SCNC: 9 MMOL/L (ref 21–32)
COLOR UR: ABNORMAL
COMMENT, HOLDF: NORMAL
COVID-19 RAPID TEST, COVR: NOT DETECTED
CREAT SERPL-MCNC: 1.25 MG/DL (ref 0.55–1.02)
CREAT SERPL-MCNC: 1.41 MG/DL (ref 0.55–1.02)
CREAT SERPL-MCNC: 1.43 MG/DL (ref 0.55–1.02)
CREAT SERPL-MCNC: 1.8 MG/DL (ref 0.55–1.02)
CREAT SERPL-MCNC: 1.81 MG/DL (ref 0.55–1.02)
CREAT SERPL-MCNC: 1.95 MG/DL (ref 0.55–1.02)
CREAT SERPL-MCNC: 2.11 MG/DL (ref 0.55–1.02)
CREAT SERPL-MCNC: 2.12 MG/DL (ref 0.55–1.02)
CREAT SERPL-MCNC: 2.31 MG/DL (ref 0.55–1.02)
CREAT SERPL-MCNC: 2.34 MG/DL (ref 0.55–1.02)
CREAT SERPL-MCNC: 2.41 MG/DL (ref 0.55–1.02)
CREAT SERPL-MCNC: 2.58 MG/DL (ref 0.55–1.02)
CREAT SERPL-MCNC: 2.64 MG/DL (ref 0.55–1.02)
CREAT SERPL-MCNC: 2.81 MG/DL (ref 0.55–1.02)
CREAT SERPL-MCNC: 3.23 MG/DL (ref 0.55–1.02)
CREAT SERPL-MCNC: 3.25 MG/DL (ref 0.55–1.02)
CREAT SERPL-MCNC: 3.49 MG/DL (ref 0.55–1.02)
CREAT SERPL-MCNC: 3.89 MG/DL (ref 0.55–1.02)
CREAT SERPL-MCNC: 4.04 MG/DL (ref 0.55–1.02)
CREAT SERPL-MCNC: 4.1 MG/DL (ref 0.55–1.02)
CREAT SERPL-MCNC: 4.11 MG/DL (ref 0.55–1.02)
CREAT SERPL-MCNC: 4.94 MG/DL (ref 0.55–1.02)
CREAT SERPL-MCNC: 4.95 MG/DL (ref 0.55–1.02)
CREAT SERPL-MCNC: 4.96 MG/DL (ref 0.55–1.02)
CREAT SERPL-MCNC: 4.99 MG/DL (ref 0.55–1.02)
CREAT SERPL-MCNC: 5 MG/DL (ref 0.55–1.02)
CREAT SERPL-MCNC: 5.07 MG/DL (ref 0.55–1.02)
CREAT SERPL-MCNC: 5.16 MG/DL (ref 0.55–1.02)
CREAT SERPL-MCNC: 5.19 MG/DL (ref 0.55–1.02)
CREAT SERPL-MCNC: 5.34 MG/DL (ref 0.55–1.02)
CREAT SERPL-MCNC: 5.69 MG/DL (ref 0.55–1.02)
CREAT SERPL-MCNC: 5.69 MG/DL (ref 0.55–1.02)
CREAT SERPL-MCNC: 5.84 MG/DL (ref 0.55–1.02)
CREAT SERPL-MCNC: 5.97 MG/DL (ref 0.55–1.02)
CREAT SERPL-MCNC: 6.3 MG/DL (ref 0.55–1.02)
CREAT SERPL-MCNC: 6.77 MG/DL (ref 0.55–1.02)
CREAT SERPL-MCNC: 6.86 MG/DL (ref 0.55–1.02)
CREAT SERPL-MCNC: 7.45 MG/DL (ref 0.55–1.02)
CREAT SERPL-MCNC: 8.87 MG/DL (ref 0.55–1.02)
CREAT UR-MCNC: 3.1 MG/DL (ref 0.6–1.3)
CREAT UR-MCNC: 3.5 MG/DL (ref 0.6–1.3)
CREAT UR-MCNC: 3.7 MG/DL (ref 0.6–1.3)
CREAT UR-MCNC: 3.9 MG/DL (ref 0.6–1.3)
CREAT UR-MCNC: 4 MG/DL (ref 0.6–1.3)
CREAT UR-MCNC: 4.1 MG/DL (ref 0.6–1.3)
CREAT UR-MCNC: 4.2 MG/DL (ref 0.6–1.3)
CREAT UR-MCNC: 4.3 MG/DL (ref 0.6–1.3)
CREAT UR-MCNC: 4.6 MG/DL (ref 0.6–1.3)
CREAT UR-MCNC: 4.7 MG/DL (ref 0.6–1.3)
CREAT UR-MCNC: 5.1 MG/DL (ref 0.6–1.3)
CREAT UR-MCNC: 5.4 MG/DL (ref 0.6–1.3)
CREAT UR-MCNC: 59.9 MG/DL
CROSSMATCH RESULT,%XM: NORMAL
CRP SERPL HS-MCNC: >9.5 MG/L
CRP SERPL-MCNC: 11.9 MG/DL (ref 0–0.6)
CRP SERPL-MCNC: 15.2 MG/DL (ref 0–0.6)
CRYPTO NEOFORMANS/GATTII, CRYNEG: NOT DETECTED
CTX-M (ESBL RESISTANT GENE), CTX: DETECTED
CTX-M (ESBL RESISTANT GENE), CTX: DETECTED
CTX-M (ESBL RESISTANT GENE), CTX: NOT DETECTED
D50 ADMINISTERED, D50ADM: 0 ML
D50 ADMINISTERED, D50ADM: 13 ML
D50 ADMINISTERED, D50ADM: 15 ML
D50 ORDER, D50ORD: 0 ML
D50 ORDER, D50ORD: 13 ML
D50 ORDER, D50ORD: 15 ML
DATE LAST DOSE: ABNORMAL
DATE LAST DOSE: ABNORMAL
DIAGNOSIS, 93000: NORMAL
DIFFERENTIAL METHOD BLD: ABNORMAL
ECHO AO ASC DIAM: 2.1 CM
ECHO AO ASCENDING AORTA INDEX: 1.06 CM/M2
ECHO AV AREA PEAK VELOCITY: 1.4 CM2
ECHO AV AREA PEAK VELOCITY: 2.4 CM2
ECHO AV AREA VTI: 1.5 CM2
ECHO AV AREA VTI: 2.6 CM2
ECHO AV AREA/BSA PEAK VELOCITY: 0.7 CM2/M2
ECHO AV AREA/BSA PEAK VELOCITY: 1.5 CM2/M2
ECHO AV AREA/BSA VTI: 0.8 CM2/M2
ECHO AV AREA/BSA VTI: 1.6 CM2/M2
ECHO AV MEAN GRADIENT: 10 MMHG
ECHO AV MEAN GRADIENT: 7 MMHG
ECHO AV MEAN VELOCITY: 1.2 M/S
ECHO AV MEAN VELOCITY: 1.5 M/S
ECHO AV PEAK GRADIENT: 15 MMHG
ECHO AV PEAK GRADIENT: 17 MMHG
ECHO AV PEAK VELOCITY: 2 M/S
ECHO AV PEAK VELOCITY: 2.1 M/S
ECHO AV VELOCITY RATIO: 0.65
ECHO AV VELOCITY RATIO: 0.71
ECHO AV VTI: 35.9 CM
ECHO AV VTI: 43.4 CM
ECHO EST RA PRESSURE: 15 MMHG
ECHO LA DIAMETER INDEX: 1.82 CM/M2
ECHO LA DIAMETER INDEX: 1.93 CM/M2
ECHO LA DIAMETER: 3.1 CM
ECHO LA DIAMETER: 3.6 CM
ECHO LV E' LATERAL VELOCITY: 6 CM/S
ECHO LV E' SEPTAL VELOCITY: 5 CM/S
ECHO LV FRACTIONAL SHORTENING: 25 % (ref 28–44)
ECHO LV FRACTIONAL SHORTENING: 42 % (ref 28–44)
ECHO LV INTERNAL DIMENSION DIASTOLE INDEX: 1.82 CM/M2
ECHO LV INTERNAL DIMENSION DIASTOLE INDEX: 2.36 CM/M2
ECHO LV INTERNAL DIMENSION DIASTOLIC: 3.6 CM (ref 3.9–5.3)
ECHO LV INTERNAL DIMENSION DIASTOLIC: 3.8 CM (ref 3.9–5.3)
ECHO LV INTERNAL DIMENSION SYSTOLIC INDEX: 1.36 CM/M2
ECHO LV INTERNAL DIMENSION SYSTOLIC INDEX: 1.37 CM/M2
ECHO LV INTERNAL DIMENSION SYSTOLIC: 2.2 CM
ECHO LV INTERNAL DIMENSION SYSTOLIC: 2.7 CM
ECHO LV IVSD: 0.9 CM (ref 0.6–0.9)
ECHO LV IVSD: 1.7 CM (ref 0.6–0.9)
ECHO LV MASS 2D: 109 G (ref 67–162)
ECHO LV MASS 2D: 190.3 G (ref 67–162)
ECHO LV MASS INDEX 2D: 67.7 G/M2 (ref 43–95)
ECHO LV MASS INDEX 2D: 96.1 G/M2 (ref 43–95)
ECHO LV POSTERIOR WALL DIASTOLIC: 1 CM (ref 0.6–0.9)
ECHO LV POSTERIOR WALL DIASTOLIC: 1.2 CM (ref 0.6–0.9)
ECHO LV RELATIVE WALL THICKNESS RATIO: 0.53
ECHO LV RELATIVE WALL THICKNESS RATIO: 0.67
ECHO LVOT AREA: 2 CM2
ECHO LVOT AREA: 3.5 CM2
ECHO LVOT AV VTI INDEX: 0.7
ECHO LVOT AV VTI INDEX: 0.8
ECHO LVOT DIAM: 1.6 CM
ECHO LVOT DIAM: 2.1 CM
ECHO LVOT MEAN GRADIENT: 4 MMHG
ECHO LVOT MEAN GRADIENT: 4 MMHG
ECHO LVOT PEAK GRADIENT: 7 MMHG
ECHO LVOT PEAK GRADIENT: 9 MMHG
ECHO LVOT PEAK VELOCITY: 1.3 M/S
ECHO LVOT PEAK VELOCITY: 1.5 M/S
ECHO LVOT STROKE VOLUME INDEX: 30.8 ML/M2
ECHO LVOT STROKE VOLUME INDEX: 61.5 ML/M2
ECHO LVOT SV: 60.9 ML
ECHO LVOT SV: 99 ML
ECHO LVOT VTI: 28.6 CM
ECHO LVOT VTI: 30.3 CM
ECHO MV A VELOCITY: 1.21 M/S
ECHO MV AREA VTI: 3.3 CM2
ECHO MV E DECELERATION TIME (DT): 264.4 MS
ECHO MV E VELOCITY: 0.83 M/S
ECHO MV E/A RATIO: 0.69
ECHO MV E/E' LATERAL: 13.83
ECHO MV E/E' RATIO (AVERAGED): 15.22
ECHO MV E/E' SEPTAL: 16.6
ECHO MV LVOT VTI INDEX: 1.05
ECHO MV MAX VELOCITY: 1.5 M/S
ECHO MV MEAN GRADIENT: 2 MMHG
ECHO MV MEAN VELOCITY: 0.7 M/S
ECHO MV PEAK GRADIENT: 9 MMHG
ECHO MV VTI: 29.9 CM
ECHO PULMONARY ARTERY END DIASTOLIC PRESSURE: 7 MMHG
ECHO PV MAX VELOCITY: 1.2 M/S
ECHO PV MAX VELOCITY: 1.3 M/S
ECHO PV PEAK GRADIENT: 6 MMHG
ECHO PV PEAK GRADIENT: 7 MMHG
ECHO PV REGURGITANT MAX VELOCITY: 1.3 M/S
ECHO RIGHT VENTRICULAR SYSTOLIC PRESSURE (RVSP): 64 MMHG
ECHO RV INTERNAL DIMENSION: 4.2 CM
ECHO RV TAPSE: 2.1 CM (ref 1.7–?)
ECHO TV REGURGITANT MAX VELOCITY: 3 M/S
ECHO TV REGURGITANT MAX VELOCITY: 3.09 M/S
ECHO TV REGURGITANT MAX VELOCITY: 3.5 M/S
ECHO TV REGURGITANT PEAK GRADIENT: 36 MMHG
ECHO TV REGURGITANT PEAK GRADIENT: 38 MMHG
ECHO TV REGURGITANT PEAK GRADIENT: 49 MMHG
ENTEROBACTER CLOACAE COMPLEX, ECCP: NOT DETECTED
ENTEROBACTERALES SP. , ENBLS: DETECTED
ENTEROCOCCUS FAECALIS, ENFA: NOT DETECTED
ENTEROCOCCUS FAECIUM, ENFAM: NOT DETECTED
EOSINOPHIL # BLD: 0 K/UL (ref 0–0.4)
EOSINOPHIL # BLD: 0.1 K/UL (ref 0–0.4)
EOSINOPHIL # BLD: 0.2 K/UL (ref 0–0.4)
EOSINOPHIL # BLD: 0.2 K/UL (ref 0–0.4)
EOSINOPHIL NFR BLD: 0 % (ref 0–7)
EOSINOPHIL NFR BLD: 1 % (ref 0–7)
EOSINOPHIL NFR BLD: 2 % (ref 0–7)
EPITH CASTS URNS QL MICRO: ABNORMAL /LPF
ERYTHROCYTE [DISTWIDTH] IN BLOOD BY AUTOMATED COUNT: 15.8 % (ref 11.5–14.5)
ERYTHROCYTE [DISTWIDTH] IN BLOOD BY AUTOMATED COUNT: 15.8 % (ref 11.5–14.5)
ERYTHROCYTE [DISTWIDTH] IN BLOOD BY AUTOMATED COUNT: 15.9 % (ref 11.5–14.5)
ERYTHROCYTE [DISTWIDTH] IN BLOOD BY AUTOMATED COUNT: 16 % (ref 11.5–14.5)
ERYTHROCYTE [DISTWIDTH] IN BLOOD BY AUTOMATED COUNT: 16.1 % (ref 11.5–14.5)
ERYTHROCYTE [DISTWIDTH] IN BLOOD BY AUTOMATED COUNT: 16.1 % (ref 11.5–14.5)
ERYTHROCYTE [DISTWIDTH] IN BLOOD BY AUTOMATED COUNT: 16.3 % (ref 11.5–14.5)
ERYTHROCYTE [DISTWIDTH] IN BLOOD BY AUTOMATED COUNT: 16.4 % (ref 11.5–14.5)
ERYTHROCYTE [DISTWIDTH] IN BLOOD BY AUTOMATED COUNT: 16.4 % (ref 11.5–14.5)
ERYTHROCYTE [DISTWIDTH] IN BLOOD BY AUTOMATED COUNT: 16.6 % (ref 11.5–14.5)
ERYTHROCYTE [DISTWIDTH] IN BLOOD BY AUTOMATED COUNT: 16.7 % (ref 11.5–14.5)
ERYTHROCYTE [DISTWIDTH] IN BLOOD BY AUTOMATED COUNT: 16.8 % (ref 11.5–14.5)
ERYTHROCYTE [DISTWIDTH] IN BLOOD BY AUTOMATED COUNT: 17 % (ref 11.5–14.5)
ERYTHROCYTE [DISTWIDTH] IN BLOOD BY AUTOMATED COUNT: 17 % (ref 11.5–14.5)
ERYTHROCYTE [DISTWIDTH] IN BLOOD BY AUTOMATED COUNT: 17.1 % (ref 11.5–14.5)
ERYTHROCYTE [DISTWIDTH] IN BLOOD BY AUTOMATED COUNT: 17.2 % (ref 11.5–14.5)
ERYTHROCYTE [DISTWIDTH] IN BLOOD BY AUTOMATED COUNT: 17.2 % (ref 11.5–14.5)
ERYTHROCYTE [DISTWIDTH] IN BLOOD BY AUTOMATED COUNT: 17.5 % (ref 11.5–14.5)
ERYTHROCYTE [DISTWIDTH] IN BLOOD BY AUTOMATED COUNT: 18.1 % (ref 11.5–14.5)
ERYTHROCYTE [DISTWIDTH] IN BLOOD BY AUTOMATED COUNT: 18.3 % (ref 11.5–14.5)
ERYTHROCYTE [DISTWIDTH] IN BLOOD BY AUTOMATED COUNT: 21.6 % (ref 11.5–14.5)
ERYTHROCYTE [DISTWIDTH] IN BLOOD BY AUTOMATED COUNT: 21.9 % (ref 11.5–14.5)
ERYTHROCYTE [SEDIMENTATION RATE] IN BLOOD: 106 MM/HR (ref 0–30)
ERYTHROCYTE [SEDIMENTATION RATE] IN BLOOD: >140 MM/HR (ref 0–30)
ESCHERICHIA COLI: NOT DETECTED
EST. AVERAGE GLUCOSE BLD GHB EST-MCNC: 344 MG/DL
EST. AVERAGE GLUCOSE BLD GHB EST-MCNC: ABNORMAL MG/DL
FIBRINOGEN PPP-MCNC: 208 MG/DL (ref 200–475)
FLUAV H1 2009 PAND RNA SPEC QL NAA+PROBE: NOT DETECTED
FLUAV H1 RNA SPEC QL NAA+PROBE: NOT DETECTED
FLUAV H3 RNA SPEC QL NAA+PROBE: NOT DETECTED
FLUAV RNA SPEC QL NAA+PROBE: NOT DETECTED
FLUAV RNA SPEC QL NAA+PROBE: NOT DETECTED
FLUAV SUBTYP SPEC NAA+PROBE: NOT DETECTED
FLUBV RNA SPEC QL NAA+PROBE: NOT DETECTED
GAS FLOW.O2 O2 DELIVERY SYS: 4 L/MIN
GAS FLOW.O2 O2 DELIVERY SYS: ABNORMAL L/MIN
GAS FLOW.O2 O2 DELIVERY SYS: ABNORMAL L/MIN
GGT SERPL-CCNC: 28 U/L (ref 5–55)
GLOBULIN SER CALC-MCNC: 2.3 G/DL (ref 2–4)
GLOBULIN SER CALC-MCNC: 3.2 G/DL (ref 2–4)
GLOBULIN SER CALC-MCNC: 3.2 G/DL (ref 2–4)
GLOBULIN SER CALC-MCNC: 3.3 G/DL (ref 2–4)
GLOBULIN SER CALC-MCNC: 3.3 G/DL (ref 2–4)
GLOBULIN SER CALC-MCNC: 3.4 G/DL (ref 2–4)
GLOBULIN SER CALC-MCNC: 3.5 G/DL (ref 2–4)
GLOBULIN SER CALC-MCNC: 3.6 G/DL (ref 2–4)
GLOBULIN SER CALC-MCNC: 3.6 G/DL (ref 2–4)
GLOBULIN SER CALC-MCNC: 4.3 G/DL (ref 2–4)
GLOBULIN SER CALC-MCNC: 5.1 G/DL (ref 2–4)
GLOBULIN SER CALC-MCNC: 5.5 G/DL (ref 2–4)
GLOBULIN SER CALC-MCNC: 5.7 G/DL (ref 2–4)
GLSCOM COMMENTS: NORMAL
GLUCOSE BLD STRIP.AUTO-MCNC: 100 MG/DL (ref 65–117)
GLUCOSE BLD STRIP.AUTO-MCNC: 101 MG/DL (ref 65–117)
GLUCOSE BLD STRIP.AUTO-MCNC: 102 MG/DL (ref 74–106)
GLUCOSE BLD STRIP.AUTO-MCNC: 103 MG/DL (ref 65–117)
GLUCOSE BLD STRIP.AUTO-MCNC: 104 MG/DL (ref 65–117)
GLUCOSE BLD STRIP.AUTO-MCNC: 105 MG/DL (ref 65–117)
GLUCOSE BLD STRIP.AUTO-MCNC: 107 MG/DL (ref 65–117)
GLUCOSE BLD STRIP.AUTO-MCNC: 109 MG/DL (ref 65–117)
GLUCOSE BLD STRIP.AUTO-MCNC: 110 MG/DL (ref 65–117)
GLUCOSE BLD STRIP.AUTO-MCNC: 112 MG/DL (ref 65–117)
GLUCOSE BLD STRIP.AUTO-MCNC: 113 MG/DL (ref 65–117)
GLUCOSE BLD STRIP.AUTO-MCNC: 114 MG/DL (ref 65–117)
GLUCOSE BLD STRIP.AUTO-MCNC: 115 MG/DL (ref 65–117)
GLUCOSE BLD STRIP.AUTO-MCNC: 116 MG/DL (ref 65–117)
GLUCOSE BLD STRIP.AUTO-MCNC: 117 MG/DL (ref 65–117)
GLUCOSE BLD STRIP.AUTO-MCNC: 118 MG/DL (ref 65–117)
GLUCOSE BLD STRIP.AUTO-MCNC: 119 MG/DL (ref 65–117)
GLUCOSE BLD STRIP.AUTO-MCNC: 120 MG/DL (ref 65–117)
GLUCOSE BLD STRIP.AUTO-MCNC: 120 MG/DL (ref 65–117)
GLUCOSE BLD STRIP.AUTO-MCNC: 121 MG/DL (ref 65–117)
GLUCOSE BLD STRIP.AUTO-MCNC: 122 MG/DL (ref 65–117)
GLUCOSE BLD STRIP.AUTO-MCNC: 123 MG/DL (ref 65–117)
GLUCOSE BLD STRIP.AUTO-MCNC: 123 MG/DL (ref 65–117)
GLUCOSE BLD STRIP.AUTO-MCNC: 124 MG/DL (ref 65–117)
GLUCOSE BLD STRIP.AUTO-MCNC: 127 MG/DL (ref 65–117)
GLUCOSE BLD STRIP.AUTO-MCNC: 127 MG/DL (ref 65–117)
GLUCOSE BLD STRIP.AUTO-MCNC: 128 MG/DL (ref 65–117)
GLUCOSE BLD STRIP.AUTO-MCNC: 128 MG/DL (ref 65–117)
GLUCOSE BLD STRIP.AUTO-MCNC: 130 MG/DL (ref 65–117)
GLUCOSE BLD STRIP.AUTO-MCNC: 132 MG/DL (ref 65–117)
GLUCOSE BLD STRIP.AUTO-MCNC: 132 MG/DL (ref 65–117)
GLUCOSE BLD STRIP.AUTO-MCNC: 133 MG/DL (ref 65–117)
GLUCOSE BLD STRIP.AUTO-MCNC: 133 MG/DL (ref 65–117)
GLUCOSE BLD STRIP.AUTO-MCNC: 134 MG/DL (ref 65–117)
GLUCOSE BLD STRIP.AUTO-MCNC: 134 MG/DL (ref 65–117)
GLUCOSE BLD STRIP.AUTO-MCNC: 136 MG/DL (ref 65–117)
GLUCOSE BLD STRIP.AUTO-MCNC: 136 MG/DL (ref 65–117)
GLUCOSE BLD STRIP.AUTO-MCNC: 137 MG/DL (ref 65–117)
GLUCOSE BLD STRIP.AUTO-MCNC: 138 MG/DL (ref 65–117)
GLUCOSE BLD STRIP.AUTO-MCNC: 139 MG/DL (ref 65–117)
GLUCOSE BLD STRIP.AUTO-MCNC: 140 MG/DL (ref 65–117)
GLUCOSE BLD STRIP.AUTO-MCNC: 142 MG/DL (ref 65–117)
GLUCOSE BLD STRIP.AUTO-MCNC: 143 MG/DL (ref 65–117)
GLUCOSE BLD STRIP.AUTO-MCNC: 144 MG/DL (ref 74–106)
GLUCOSE BLD STRIP.AUTO-MCNC: 147 MG/DL (ref 65–117)
GLUCOSE BLD STRIP.AUTO-MCNC: 149 MG/DL (ref 65–117)
GLUCOSE BLD STRIP.AUTO-MCNC: 149 MG/DL (ref 65–117)
GLUCOSE BLD STRIP.AUTO-MCNC: 150 MG/DL (ref 65–117)
GLUCOSE BLD STRIP.AUTO-MCNC: 152 MG/DL (ref 65–117)
GLUCOSE BLD STRIP.AUTO-MCNC: 154 MG/DL (ref 65–117)
GLUCOSE BLD STRIP.AUTO-MCNC: 155 MG/DL (ref 65–117)
GLUCOSE BLD STRIP.AUTO-MCNC: 156 MG/DL (ref 65–117)
GLUCOSE BLD STRIP.AUTO-MCNC: 156 MG/DL (ref 65–117)
GLUCOSE BLD STRIP.AUTO-MCNC: 157 MG/DL (ref 65–117)
GLUCOSE BLD STRIP.AUTO-MCNC: 158 MG/DL (ref 65–117)
GLUCOSE BLD STRIP.AUTO-MCNC: 159 MG/DL (ref 65–117)
GLUCOSE BLD STRIP.AUTO-MCNC: 159 MG/DL (ref 65–117)
GLUCOSE BLD STRIP.AUTO-MCNC: 161 MG/DL (ref 65–117)
GLUCOSE BLD STRIP.AUTO-MCNC: 161 MG/DL (ref 65–117)
GLUCOSE BLD STRIP.AUTO-MCNC: 163 MG/DL (ref 65–117)
GLUCOSE BLD STRIP.AUTO-MCNC: 163 MG/DL (ref 65–117)
GLUCOSE BLD STRIP.AUTO-MCNC: 165 MG/DL (ref 65–117)
GLUCOSE BLD STRIP.AUTO-MCNC: 166 MG/DL (ref 65–117)
GLUCOSE BLD STRIP.AUTO-MCNC: 167 MG/DL (ref 65–117)
GLUCOSE BLD STRIP.AUTO-MCNC: 168 MG/DL (ref 65–117)
GLUCOSE BLD STRIP.AUTO-MCNC: 172 MG/DL (ref 65–117)
GLUCOSE BLD STRIP.AUTO-MCNC: 173 MG/DL (ref 65–117)
GLUCOSE BLD STRIP.AUTO-MCNC: 174 MG/DL (ref 65–117)
GLUCOSE BLD STRIP.AUTO-MCNC: 175 MG/DL (ref 65–117)
GLUCOSE BLD STRIP.AUTO-MCNC: 176 MG/DL (ref 65–117)
GLUCOSE BLD STRIP.AUTO-MCNC: 177 MG/DL (ref 65–117)
GLUCOSE BLD STRIP.AUTO-MCNC: 180 MG/DL (ref 65–117)
GLUCOSE BLD STRIP.AUTO-MCNC: 181 MG/DL (ref 65–117)
GLUCOSE BLD STRIP.AUTO-MCNC: 181 MG/DL (ref 65–117)
GLUCOSE BLD STRIP.AUTO-MCNC: 183 MG/DL (ref 65–117)
GLUCOSE BLD STRIP.AUTO-MCNC: 183 MG/DL (ref 65–117)
GLUCOSE BLD STRIP.AUTO-MCNC: 184 MG/DL (ref 65–117)
GLUCOSE BLD STRIP.AUTO-MCNC: 185 MG/DL (ref 65–117)
GLUCOSE BLD STRIP.AUTO-MCNC: 185 MG/DL (ref 65–117)
GLUCOSE BLD STRIP.AUTO-MCNC: 186 MG/DL (ref 65–117)
GLUCOSE BLD STRIP.AUTO-MCNC: 189 MG/DL (ref 65–117)
GLUCOSE BLD STRIP.AUTO-MCNC: 190 MG/DL (ref 65–117)
GLUCOSE BLD STRIP.AUTO-MCNC: 192 MG/DL (ref 65–117)
GLUCOSE BLD STRIP.AUTO-MCNC: 194 MG/DL (ref 65–117)
GLUCOSE BLD STRIP.AUTO-MCNC: 197 MG/DL (ref 65–117)
GLUCOSE BLD STRIP.AUTO-MCNC: 197 MG/DL (ref 65–117)
GLUCOSE BLD STRIP.AUTO-MCNC: 200 MG/DL (ref 65–117)
GLUCOSE BLD STRIP.AUTO-MCNC: 202 MG/DL (ref 65–117)
GLUCOSE BLD STRIP.AUTO-MCNC: 203 MG/DL (ref 65–117)
GLUCOSE BLD STRIP.AUTO-MCNC: 206 MG/DL (ref 65–117)
GLUCOSE BLD STRIP.AUTO-MCNC: 206 MG/DL (ref 65–117)
GLUCOSE BLD STRIP.AUTO-MCNC: 208 MG/DL (ref 65–117)
GLUCOSE BLD STRIP.AUTO-MCNC: 209 MG/DL (ref 65–117)
GLUCOSE BLD STRIP.AUTO-MCNC: 209 MG/DL (ref 65–117)
GLUCOSE BLD STRIP.AUTO-MCNC: 210 MG/DL (ref 65–117)
GLUCOSE BLD STRIP.AUTO-MCNC: 211 MG/DL (ref 65–117)
GLUCOSE BLD STRIP.AUTO-MCNC: 211 MG/DL (ref 65–117)
GLUCOSE BLD STRIP.AUTO-MCNC: 212 MG/DL (ref 74–106)
GLUCOSE BLD STRIP.AUTO-MCNC: 214 MG/DL (ref 65–117)
GLUCOSE BLD STRIP.AUTO-MCNC: 217 MG/DL (ref 65–117)
GLUCOSE BLD STRIP.AUTO-MCNC: 217 MG/DL (ref 65–117)
GLUCOSE BLD STRIP.AUTO-MCNC: 218 MG/DL (ref 65–117)
GLUCOSE BLD STRIP.AUTO-MCNC: 219 MG/DL (ref 65–117)
GLUCOSE BLD STRIP.AUTO-MCNC: 219 MG/DL (ref 65–117)
GLUCOSE BLD STRIP.AUTO-MCNC: 220 MG/DL (ref 65–117)
GLUCOSE BLD STRIP.AUTO-MCNC: 222 MG/DL (ref 65–117)
GLUCOSE BLD STRIP.AUTO-MCNC: 223 MG/DL (ref 65–117)
GLUCOSE BLD STRIP.AUTO-MCNC: 225 MG/DL (ref 65–117)
GLUCOSE BLD STRIP.AUTO-MCNC: 228 MG/DL (ref 65–117)
GLUCOSE BLD STRIP.AUTO-MCNC: 229 MG/DL (ref 65–117)
GLUCOSE BLD STRIP.AUTO-MCNC: 238 MG/DL (ref 65–117)
GLUCOSE BLD STRIP.AUTO-MCNC: 250 MG/DL (ref 65–117)
GLUCOSE BLD STRIP.AUTO-MCNC: 251 MG/DL (ref 65–117)
GLUCOSE BLD STRIP.AUTO-MCNC: 252 MG/DL (ref 65–117)
GLUCOSE BLD STRIP.AUTO-MCNC: 265 MG/DL (ref 65–117)
GLUCOSE BLD STRIP.AUTO-MCNC: 267 MG/DL (ref 65–117)
GLUCOSE BLD STRIP.AUTO-MCNC: 269 MG/DL (ref 65–117)
GLUCOSE BLD STRIP.AUTO-MCNC: 284 MG/DL (ref 65–117)
GLUCOSE BLD STRIP.AUTO-MCNC: 291 MG/DL (ref 65–117)
GLUCOSE BLD STRIP.AUTO-MCNC: 349 MG/DL (ref 65–117)
GLUCOSE BLD STRIP.AUTO-MCNC: 409 MG/DL (ref 65–117)
GLUCOSE BLD STRIP.AUTO-MCNC: 441 MG/DL (ref 65–117)
GLUCOSE BLD STRIP.AUTO-MCNC: 491 MG/DL (ref 65–117)
GLUCOSE BLD STRIP.AUTO-MCNC: 548 MG/DL (ref 65–117)
GLUCOSE BLD STRIP.AUTO-MCNC: 576 MG/DL (ref 65–117)
GLUCOSE BLD STRIP.AUTO-MCNC: 58 MG/DL (ref 65–117)
GLUCOSE BLD STRIP.AUTO-MCNC: 58 MG/DL (ref 65–117)
GLUCOSE BLD STRIP.AUTO-MCNC: 62 MG/DL (ref 65–117)
GLUCOSE BLD STRIP.AUTO-MCNC: 63 MG/DL (ref 74–106)
GLUCOSE BLD STRIP.AUTO-MCNC: 66 MG/DL (ref 65–117)
GLUCOSE BLD STRIP.AUTO-MCNC: 67 MG/DL (ref 65–117)
GLUCOSE BLD STRIP.AUTO-MCNC: 68 MG/DL (ref 74–106)
GLUCOSE BLD STRIP.AUTO-MCNC: 69 MG/DL (ref 65–117)
GLUCOSE BLD STRIP.AUTO-MCNC: 72 MG/DL (ref 74–106)
GLUCOSE BLD STRIP.AUTO-MCNC: 77 MG/DL (ref 65–117)
GLUCOSE BLD STRIP.AUTO-MCNC: 77 MG/DL (ref 65–117)
GLUCOSE BLD STRIP.AUTO-MCNC: 80 MG/DL (ref 74–106)
GLUCOSE BLD STRIP.AUTO-MCNC: 80 MG/DL (ref 74–106)
GLUCOSE BLD STRIP.AUTO-MCNC: 81 MG/DL (ref 65–117)
GLUCOSE BLD STRIP.AUTO-MCNC: 82 MG/DL (ref 65–117)
GLUCOSE BLD STRIP.AUTO-MCNC: 85 MG/DL (ref 65–117)
GLUCOSE BLD STRIP.AUTO-MCNC: 87 MG/DL (ref 65–117)
GLUCOSE BLD STRIP.AUTO-MCNC: 92 MG/DL (ref 65–117)
GLUCOSE BLD STRIP.AUTO-MCNC: 92 MG/DL (ref 65–117)
GLUCOSE BLD STRIP.AUTO-MCNC: 92 MG/DL (ref 74–106)
GLUCOSE BLD STRIP.AUTO-MCNC: 93 MG/DL (ref 65–117)
GLUCOSE BLD STRIP.AUTO-MCNC: 93 MG/DL (ref 65–117)
GLUCOSE BLD STRIP.AUTO-MCNC: 95 MG/DL (ref 65–117)
GLUCOSE BLD STRIP.AUTO-MCNC: 96 MG/DL (ref 65–117)
GLUCOSE BLD STRIP.AUTO-MCNC: 97 MG/DL (ref 65–117)
GLUCOSE BLD STRIP.AUTO-MCNC: >600 MG/DL (ref 65–117)
GLUCOSE BLD STRIP.AUTO-MCNC: >700 MG/DL (ref 74–106)
GLUCOSE BLD STRIP.AUTO-MCNC: NORMAL MG/DL (ref 65–117)
GLUCOSE BLD-MCNC: 187 MG/DL (ref 65–100)
GLUCOSE BLD-MCNC: >700 MG/DL (ref 65–100)
GLUCOSE SERPL-MCNC: 102 MG/DL (ref 65–100)
GLUCOSE SERPL-MCNC: 110 MG/DL (ref 65–100)
GLUCOSE SERPL-MCNC: 112 MG/DL (ref 65–100)
GLUCOSE SERPL-MCNC: 117 MG/DL (ref 65–100)
GLUCOSE SERPL-MCNC: 120 MG/DL (ref 65–100)
GLUCOSE SERPL-MCNC: 123 MG/DL (ref 65–100)
GLUCOSE SERPL-MCNC: 1259 MG/DL (ref 65–100)
GLUCOSE SERPL-MCNC: 126 MG/DL (ref 65–100)
GLUCOSE SERPL-MCNC: 126 MG/DL (ref 65–100)
GLUCOSE SERPL-MCNC: 129 MG/DL (ref 65–100)
GLUCOSE SERPL-MCNC: 138 MG/DL (ref 65–100)
GLUCOSE SERPL-MCNC: 148 MG/DL (ref 65–100)
GLUCOSE SERPL-MCNC: 151 MG/DL (ref 65–100)
GLUCOSE SERPL-MCNC: 156 MG/DL (ref 65–100)
GLUCOSE SERPL-MCNC: 173 MG/DL (ref 65–100)
GLUCOSE SERPL-MCNC: 174 MG/DL (ref 65–100)
GLUCOSE SERPL-MCNC: 179 MG/DL (ref 65–100)
GLUCOSE SERPL-MCNC: 187 MG/DL (ref 65–100)
GLUCOSE SERPL-MCNC: 205 MG/DL (ref 65–100)
GLUCOSE SERPL-MCNC: 206 MG/DL (ref 65–100)
GLUCOSE SERPL-MCNC: 221 MG/DL (ref 65–100)
GLUCOSE SERPL-MCNC: 228 MG/DL (ref 65–100)
GLUCOSE SERPL-MCNC: 232 MG/DL (ref 65–100)
GLUCOSE SERPL-MCNC: 234 MG/DL (ref 65–100)
GLUCOSE SERPL-MCNC: 248 MG/DL (ref 65–100)
GLUCOSE SERPL-MCNC: 250 MG/DL (ref 65–100)
GLUCOSE SERPL-MCNC: 262 MG/DL (ref 65–100)
GLUCOSE SERPL-MCNC: 302 MG/DL (ref 65–100)
GLUCOSE SERPL-MCNC: 315 MG/DL (ref 65–100)
GLUCOSE SERPL-MCNC: 574 MG/DL (ref 65–100)
GLUCOSE SERPL-MCNC: 71 MG/DL (ref 65–100)
GLUCOSE SERPL-MCNC: 732 MG/DL (ref 65–100)
GLUCOSE SERPL-MCNC: 78 MG/DL (ref 65–100)
GLUCOSE SERPL-MCNC: 89 MG/DL (ref 65–100)
GLUCOSE SERPL-MCNC: 951 MG/DL (ref 65–100)
GLUCOSE UR STRIP.AUTO-MCNC: >1000 MG/DL
GLUCOSE, GLC: 102 MG/DL
GLUCOSE, GLC: 154 MG/DL
GLUCOSE, GLC: 200 MG/DL
GLUCOSE, GLC: 238 MG/DL
GLUCOSE, GLC: 349 MG/DL
GLUCOSE, GLC: 409 MG/DL
GLUCOSE, GLC: 441 MG/DL
GLUCOSE, GLC: 491 MG/DL
GLUCOSE, GLC: 548 MG/DL
GLUCOSE, GLC: 576 MG/DL
GLUCOSE, GLC: 601 MG/DL
GLUCOSE, GLC: 63 MG/DL
GLUCOSE, GLC: 68 MG/DL
GLUCOSE, GLC: 72 MG/DL
GLUCOSE, GLC: 80 MG/DL
GLUCOSE, GLC: 80 MG/DL
GLUCOSE, GLC: 92 MG/DL
GRAM STN SPEC: NORMAL
HADV DNA SPEC QL NAA+PROBE: NOT DETECTED
HAEMOPHILUS INFLUENZAE, HMI: NOT DETECTED
HBA1C MFR BLD: 13.6 % (ref 4–5.6)
HBA1C MFR BLD: >14 % (ref 4–5.6)
HBV CORE AB SERPL QL IA: NEGATIVE
HBV SURFACE AB SER QL: NONREACTIVE
HBV SURFACE AB SER QL: NONREACTIVE
HBV SURFACE AB SER-ACNC: <3.1 MIU/ML
HBV SURFACE AB SER-ACNC: <3.1 MIU/ML
HBV SURFACE AG SER QL: <0.1 INDEX
HBV SURFACE AG SER QL: <0.1 INDEX
HBV SURFACE AG SER QL: NEGATIVE
HBV SURFACE AG SER QL: NEGATIVE
HCO3 BLD-SCNC: 19.7 MMOL/L (ref 22–26)
HCO3 BLD-SCNC: 19.7 MMOL/L (ref 22–26)
HCO3 BLDA-SCNC: 11 MMOL/L
HCO3 BLDA-SCNC: 13 MMOL/L (ref 22–26)
HCO3 BLDA-SCNC: 15 MMOL/L
HCO3 BLDA-SCNC: 20 MMOL/L
HCO3 BLDA-SCNC: 21 MMOL/L
HCO3 BLDA-SCNC: 22 MMOL/L
HCO3 BLDA-SCNC: 23 MMOL/L
HCO3 BLDA-SCNC: 24 MMOL/L
HCO3 BLDA-SCNC: 24 MMOL/L
HCO3 BLDA-SCNC: 7 MMOL/L
HCOV 229E RNA SPEC QL NAA+PROBE: NOT DETECTED
HCOV HKU1 RNA SPEC QL NAA+PROBE: NOT DETECTED
HCOV NL63 RNA SPEC QL NAA+PROBE: NOT DETECTED
HCOV OC43 RNA SPEC QL NAA+PROBE: NOT DETECTED
HCT VFR BLD AUTO: 20.8 % (ref 35–47)
HCT VFR BLD AUTO: 24.1 % (ref 35–47)
HCT VFR BLD AUTO: 25 % (ref 35–47)
HCT VFR BLD AUTO: 25.4 % (ref 35–47)
HCT VFR BLD AUTO: 26.9 % (ref 35–47)
HCT VFR BLD AUTO: 27.1 % (ref 35–47)
HCT VFR BLD AUTO: 27.1 % (ref 35–47)
HCT VFR BLD AUTO: 27.3 % (ref 35–47)
HCT VFR BLD AUTO: 27.4 % (ref 35–47)
HCT VFR BLD AUTO: 27.5 % (ref 35–47)
HCT VFR BLD AUTO: 27.5 % (ref 35–47)
HCT VFR BLD AUTO: 28 % (ref 35–47)
HCT VFR BLD AUTO: 28.4 % (ref 35–47)
HCT VFR BLD AUTO: 28.5 % (ref 35–47)
HCT VFR BLD AUTO: 28.6 % (ref 35–47)
HCT VFR BLD AUTO: 28.8 % (ref 35–47)
HCT VFR BLD AUTO: 28.8 % (ref 35–47)
HCT VFR BLD AUTO: 29.1 % (ref 35–47)
HCT VFR BLD AUTO: 29.2 % (ref 35–47)
HCT VFR BLD AUTO: 29.6 % (ref 35–47)
HCT VFR BLD AUTO: 29.6 % (ref 35–47)
HCT VFR BLD AUTO: 30 % (ref 35–47)
HCT VFR BLD AUTO: 30.4 % (ref 35–47)
HCT VFR BLD AUTO: 30.7 % (ref 35–47)
HCT VFR BLD AUTO: 30.7 % (ref 35–47)
HCT VFR BLD AUTO: 30.8 % (ref 35–47)
HCT VFR BLD AUTO: 31.6 % (ref 35–47)
HCT VFR BLD AUTO: 32.9 % (ref 35–47)
HCT VFR BLD AUTO: 33.3 % (ref 35–47)
HCT VFR BLD AUTO: 33.5 % (ref 35–47)
HCT VFR BLD AUTO: 34 % (ref 35–47)
HCT VFR BLD AUTO: 42.4 % (ref 35–47)
HEMOCCULT STL QL: POSITIVE
HGB BLD-MCNC: 10 G/DL (ref 11.5–16)
HGB BLD-MCNC: 10.1 G/DL (ref 11.5–16)
HGB BLD-MCNC: 10.3 G/DL (ref 11.5–16)
HGB BLD-MCNC: 10.5 G/DL (ref 11.5–16)
HGB BLD-MCNC: 12 G/DL (ref 11.5–16)
HGB BLD-MCNC: 6.7 G/DL (ref 11.5–16)
HGB BLD-MCNC: 7.5 G/DL (ref 11.5–16)
HGB BLD-MCNC: 7.7 G/DL (ref 11.5–16)
HGB BLD-MCNC: 8 G/DL (ref 11.5–16)
HGB BLD-MCNC: 8.2 G/DL (ref 11.5–16)
HGB BLD-MCNC: 8.5 G/DL (ref 11.5–16)
HGB BLD-MCNC: 8.6 G/DL (ref 11.5–16)
HGB BLD-MCNC: 8.8 G/DL (ref 11.5–16)
HGB BLD-MCNC: 9 G/DL (ref 11.5–16)
HGB BLD-MCNC: 9.1 G/DL (ref 11.5–16)
HGB BLD-MCNC: 9.1 G/DL (ref 11.5–16)
HGB BLD-MCNC: 9.2 G/DL (ref 11.5–16)
HGB BLD-MCNC: 9.3 G/DL (ref 11.5–16)
HGB BLD-MCNC: 9.3 G/DL (ref 11.5–16)
HGB BLD-MCNC: 9.4 G/DL (ref 11.5–16)
HGB BLD-MCNC: 9.5 G/DL (ref 11.5–16)
HGB BLD-MCNC: 9.6 G/DL (ref 11.5–16)
HGB BLD-MCNC: 9.6 G/DL (ref 11.5–16)
HGB BLD-MCNC: 9.7 G/DL (ref 11.5–16)
HGB BLD-MCNC: 9.8 G/DL (ref 11.5–16)
HGB UR QL STRIP: NEGATIVE
HIGH TARGET, HITG: 250 MG/DL
HISTORY CHECKED?,CKHIST: NORMAL
HMPV RNA SPEC QL NAA+PROBE: NOT DETECTED
HPIV1 RNA SPEC QL NAA+PROBE: NOT DETECTED
HPIV2 RNA SPEC QL NAA+PROBE: NOT DETECTED
HPIV3 RNA SPEC QL NAA+PROBE: NOT DETECTED
HPIV4 RNA SPEC QL NAA+PROBE: NOT DETECTED
IMM GRANULOCYTES # BLD AUTO: 0 K/UL (ref 0–0.04)
IMM GRANULOCYTES # BLD AUTO: 0.1 K/UL (ref 0–0.04)
IMM GRANULOCYTES # BLD AUTO: 0.2 K/UL (ref 0–0.04)
IMM GRANULOCYTES # BLD AUTO: 0.2 K/UL (ref 0–0.04)
IMM GRANULOCYTES # BLD AUTO: 0.3 K/UL (ref 0–0.04)
IMM GRANULOCYTES NFR BLD AUTO: 0 % (ref 0–0.5)
IMM GRANULOCYTES NFR BLD AUTO: 1 % (ref 0–0.5)
IMM GRANULOCYTES NFR BLD AUTO: 2 % (ref 0–0.5)
IMP (CARBAPENEMASE RESISTANT GENE), IMPC: NOT DETECTED
INR PPP: 1.1 (ref 0.9–1.1)
INR PPP: 1.2 (ref 0.9–1.1)
INSULIN ADMINSTERED, INSADM: 0 UNITS/HOUR
INSULIN ADMINSTERED, INSADM: 0.3 UNITS/HOUR
INSULIN ADMINSTERED, INSADM: 1.9 UNITS/HOUR
INSULIN ADMINSTERED, INSADM: 10.8 UNITS/HOUR
INSULIN ADMINSTERED, INSADM: 14.1 UNITS/HOUR
INSULIN ADMINSTERED, INSADM: 16.2 UNITS/HOUR
INSULIN ADMINSTERED, INSADM: 21 UNITS/HOUR
INSULIN ADMINSTERED, INSADM: 21.6 UNITS/HOUR
INSULIN ADMINSTERED, INSADM: 26.7 UNITS/HOUR
INSULIN ADMINSTERED, INSADM: 27.1 UNITS/HOUR
INSULIN ADMINSTERED, INSADM: 3.8 UNITS/HOUR
INSULIN ADMINSTERED, INSADM: 32.5 UNITS/HOUR
INSULIN ADMINSTERED, INSADM: 37.9 UNITS/HOUR
INSULIN ADMINSTERED, INSADM: 43.3 UNITS/HOUR
INSULIN ADMINSTERED, INSADM: 43.4 UNITS/HOUR
INSULIN ADMINSTERED, INSADM: 45 UNITS/HOUR
INSULIN ADMINSTERED, INSADM: 48.7 UNITS/HOUR
INSULIN ADMINSTERED, INSADM: 51.6 UNITS/HOUR
INSULIN ORDER, INSORD: 0 UNITS/HOUR
INSULIN ORDER, INSORD: 0.3 UNITS/HOUR
INSULIN ORDER, INSORD: 1.9 UNITS/HOUR
INSULIN ORDER, INSORD: 10.8 UNITS/HOUR
INSULIN ORDER, INSORD: 14.1 UNITS/HOUR
INSULIN ORDER, INSORD: 16.2 UNITS/HOUR
INSULIN ORDER, INSORD: 21 UNITS/HOUR
INSULIN ORDER, INSORD: 21.6 UNITS/HOUR
INSULIN ORDER, INSORD: 26.7 UNITS/HOUR
INSULIN ORDER, INSORD: 27.1 UNITS/HOUR
INSULIN ORDER, INSORD: 3.8 UNITS/HOUR
INSULIN ORDER, INSORD: 32.5 UNITS/HOUR
INSULIN ORDER, INSORD: 37.9 UNITS/HOUR
INSULIN ORDER, INSORD: 43.3 UNITS/HOUR
INSULIN ORDER, INSORD: 43.4 UNITS/HOUR
INSULIN ORDER, INSORD: 48.7 UNITS/HOUR
INSULIN ORDER, INSORD: 48.9 UNITS/HOUR
INSULIN ORDER, INSORD: 49.5 UNITS/HOUR
INSULIN ORDER, INSORD: 51.6 UNITS/HOUR
INSULIN ORDER, INSORD: 51.7 UNITS/HOUR
INSULIN ORDER, INSORD: 53.7 UNITS/HOUR
KETONES UR QL STRIP.AUTO: ABNORMAL MG/DL
KLEBSIELLA AEROGENES, KLAE: NOT DETECTED
KLEBSIELLA OXYTOCA: NOT DETECTED
KLEBSIELLA PNEUMONIAE GROUP, KPPG: DETECTED
KLEBSIELLA PNEUMONIAE GROUP, KPPG: NOT DETECTED
KLEBSIELLA PNEUMONIAE GROUP, KPPG: NOT DETECTED
KPC (CARBAPENEM RESISTANCE GENE): NOT DETECTED
LACTATE BLD-SCNC: 1.53 MMOL/L (ref 0.4–2)
LACTATE BLD-SCNC: 1.55 MMOL/L (ref 0.4–2)
LACTATE BLD-SCNC: 2.33 MMOL/L (ref 0.4–2)
LACTATE BLD-SCNC: 2.56 MMOL/L (ref 0.4–2)
LACTATE BLD-SCNC: 2.96 MMOL/L (ref 0.4–2)
LACTATE BLD-SCNC: 3.37 MMOL/L (ref 0.4–2)
LACTATE BLD-SCNC: 3.68 MMOL/L (ref 0.4–2)
LACTATE BLD-SCNC: 3.72 MMOL/L (ref 0.4–2)
LACTATE BLD-SCNC: 3.87 MMOL/L (ref 0.4–2)
LACTATE BLD-SCNC: 3.98 MMOL/L (ref 0.4–2)
LACTATE BLD-SCNC: 4.26 MMOL/L (ref 0.4–2)
LACTATE BLD-SCNC: 4.56 MMOL/L (ref 0.4–2)
LACTATE BLD-SCNC: 5.12 MMOL/L (ref 0.4–2)
LACTATE BLD-SCNC: 9.12 MMOL/L (ref 0.4–2)
LACTATE SERPL-SCNC: 10.4 MMOL/L (ref 0.4–2)
LEUKOCYTE ESTERASE UR QL STRIP.AUTO: ABNORMAL
LIPASE SERPL-CCNC: 1107 U/L (ref 73–393)
LIPASE SERPL-CCNC: 1387 U/L (ref 73–393)
LIPASE SERPL-CCNC: 1482 U/L (ref 73–393)
LIPASE SERPL-CCNC: 1602 U/L (ref 73–393)
LIPASE SERPL-CCNC: 1958 U/L (ref 73–393)
LIPASE SERPL-CCNC: 2499 U/L (ref 73–393)
LIPASE SERPL-CCNC: >3000 U/L (ref 73–393)
LISTERIA MONOCYTOGENES, LMONP: NOT DETECTED
LOW TARGET, LOT: 150 MG/DL
LYMPHOCYTES # BLD: 0 K/UL (ref 0.8–3.5)
LYMPHOCYTES # BLD: 0.2 K/UL (ref 0.8–3.5)
LYMPHOCYTES # BLD: 0.3 K/UL (ref 0.8–3.5)
LYMPHOCYTES # BLD: 0.3 K/UL (ref 0.8–3.5)
LYMPHOCYTES # BLD: 0.6 K/UL (ref 0.8–3.5)
LYMPHOCYTES # BLD: 0.7 K/UL (ref 0.8–3.5)
LYMPHOCYTES # BLD: 0.7 K/UL (ref 0.8–3.5)
LYMPHOCYTES # BLD: 0.8 K/UL (ref 0.8–3.5)
LYMPHOCYTES # BLD: 0.9 K/UL (ref 0.8–3.5)
LYMPHOCYTES # BLD: 1 K/UL (ref 0.8–3.5)
LYMPHOCYTES # BLD: 1.1 K/UL (ref 0.8–3.5)
LYMPHOCYTES # BLD: 1.3 K/UL (ref 0.8–3.5)
LYMPHOCYTES # BLD: 1.4 K/UL (ref 0.8–3.5)
LYMPHOCYTES # BLD: 1.5 K/UL (ref 0.8–3.5)
LYMPHOCYTES # BLD: 1.9 K/UL (ref 0.8–3.5)
LYMPHOCYTES NFR BLD: 0 % (ref 12–49)
LYMPHOCYTES NFR BLD: 11 % (ref 12–49)
LYMPHOCYTES NFR BLD: 15 % (ref 12–49)
LYMPHOCYTES NFR BLD: 18 % (ref 12–49)
LYMPHOCYTES NFR BLD: 2 % (ref 12–49)
LYMPHOCYTES NFR BLD: 4 % (ref 12–49)
LYMPHOCYTES NFR BLD: 5 % (ref 12–49)
LYMPHOCYTES NFR BLD: 6 % (ref 12–49)
LYMPHOCYTES NFR BLD: 7 % (ref 12–49)
M PNEUMO DNA SPEC QL NAA+PROBE: NOT DETECTED
MAGNESIUM SERPL-MCNC: 1.8 MG/DL (ref 1.6–2.4)
MAGNESIUM SERPL-MCNC: 1.9 MG/DL (ref 1.6–2.4)
MAGNESIUM SERPL-MCNC: 2 MG/DL (ref 1.6–2.4)
MAGNESIUM SERPL-MCNC: 2 MG/DL (ref 1.6–2.4)
MAGNESIUM SERPL-MCNC: 2.4 MG/DL (ref 1.6–2.4)
MAGNESIUM SERPL-MCNC: 2.5 MG/DL (ref 1.6–2.4)
MAGNESIUM SERPL-MCNC: 2.5 MG/DL (ref 1.6–2.4)
MAGNESIUM SERPL-MCNC: 2.6 MG/DL (ref 1.6–2.4)
MAGNESIUM SERPL-MCNC: 2.7 MG/DL (ref 1.6–2.4)
MAGNESIUM SERPL-MCNC: 2.8 MG/DL (ref 1.6–2.4)
MAGNESIUM SERPL-MCNC: 2.8 MG/DL (ref 1.6–2.4)
MAGNESIUM SERPL-MCNC: 3 MG/DL (ref 1.6–2.4)
MAGNESIUM SERPL-MCNC: 3 MG/DL (ref 1.6–2.4)
MAGNESIUM SERPL-MCNC: 3.3 MG/DL (ref 1.6–2.4)
MAGNESIUM SERPL-MCNC: 3.4 MG/DL (ref 1.6–2.4)
MAGNESIUM SERPL-MCNC: 3.7 MG/DL (ref 1.6–2.4)
MAGNESIUM SERPL-MCNC: 4.8 MG/DL (ref 1.6–2.4)
MCH RBC QN AUTO: 29.4 PG (ref 26–34)
MCH RBC QN AUTO: 29.4 PG (ref 26–34)
MCH RBC QN AUTO: 29.6 PG (ref 26–34)
MCH RBC QN AUTO: 29.7 PG (ref 26–34)
MCH RBC QN AUTO: 29.8 PG (ref 26–34)
MCH RBC QN AUTO: 30.1 PG (ref 26–34)
MCH RBC QN AUTO: 31 PG (ref 26–34)
MCH RBC QN AUTO: 31.3 PG (ref 26–34)
MCH RBC QN AUTO: 31.5 PG (ref 26–34)
MCH RBC QN AUTO: 31.6 PG (ref 26–34)
MCH RBC QN AUTO: 31.8 PG (ref 26–34)
MCH RBC QN AUTO: 31.9 PG (ref 26–34)
MCH RBC QN AUTO: 31.9 PG (ref 26–34)
MCH RBC QN AUTO: 32 PG (ref 26–34)
MCH RBC QN AUTO: 32 PG (ref 26–34)
MCH RBC QN AUTO: 32.1 PG (ref 26–34)
MCH RBC QN AUTO: 32.2 PG (ref 26–34)
MCH RBC QN AUTO: 32.2 PG (ref 26–34)
MCH RBC QN AUTO: 32.3 PG (ref 26–34)
MCH RBC QN AUTO: 32.4 PG (ref 26–34)
MCH RBC QN AUTO: 32.5 PG (ref 26–34)
MCH RBC QN AUTO: 32.5 PG (ref 26–34)
MCH RBC QN AUTO: 32.7 PG (ref 26–34)
MCH RBC QN AUTO: 32.8 PG (ref 26–34)
MCHC RBC AUTO-ENTMCNC: 28.3 G/DL (ref 30–36.5)
MCHC RBC AUTO-ENTMCNC: 30.3 G/DL (ref 30–36.5)
MCHC RBC AUTO-ENTMCNC: 30.3 G/DL (ref 30–36.5)
MCHC RBC AUTO-ENTMCNC: 30.6 G/DL (ref 30–36.5)
MCHC RBC AUTO-ENTMCNC: 30.9 G/DL (ref 30–36.5)
MCHC RBC AUTO-ENTMCNC: 31 G/DL (ref 30–36.5)
MCHC RBC AUTO-ENTMCNC: 31 G/DL (ref 30–36.5)
MCHC RBC AUTO-ENTMCNC: 31.1 G/DL (ref 30–36.5)
MCHC RBC AUTO-ENTMCNC: 31.3 G/DL (ref 30–36.5)
MCHC RBC AUTO-ENTMCNC: 31.4 G/DL (ref 30–36.5)
MCHC RBC AUTO-ENTMCNC: 31.5 G/DL (ref 30–36.5)
MCHC RBC AUTO-ENTMCNC: 32 G/DL (ref 30–36.5)
MCHC RBC AUTO-ENTMCNC: 32.2 G/DL (ref 30–36.5)
MCHC RBC AUTO-ENTMCNC: 32.2 G/DL (ref 30–36.5)
MCHC RBC AUTO-ENTMCNC: 32.3 G/DL (ref 30–36.5)
MCHC RBC AUTO-ENTMCNC: 32.5 G/DL (ref 30–36.5)
MCHC RBC AUTO-ENTMCNC: 32.5 G/DL (ref 30–36.5)
MCHC RBC AUTO-ENTMCNC: 32.6 G/DL (ref 30–36.5)
MCHC RBC AUTO-ENTMCNC: 32.7 G/DL (ref 30–36.5)
MCHC RBC AUTO-ENTMCNC: 32.8 G/DL (ref 30–36.5)
MCHC RBC AUTO-ENTMCNC: 32.9 G/DL (ref 30–36.5)
MCHC RBC AUTO-ENTMCNC: 33 G/DL (ref 30–36.5)
MCHC RBC AUTO-ENTMCNC: 33 G/DL (ref 30–36.5)
MCHC RBC AUTO-ENTMCNC: 33.2 G/DL (ref 30–36.5)
MCHC RBC AUTO-ENTMCNC: 33.3 G/DL (ref 30–36.5)
MCHC RBC AUTO-ENTMCNC: 33.5 G/DL (ref 30–36.5)
MCHC RBC AUTO-ENTMCNC: 33.7 G/DL (ref 30–36.5)
MCHC RBC AUTO-ENTMCNC: 33.8 G/DL (ref 30–36.5)
MCHC RBC AUTO-ENTMCNC: 35 G/DL (ref 30–36.5)
MCR-1 (COLISTIN RESISTANT GENE), MCR: NOT DETECTED
MCV RBC AUTO: 100.4 FL (ref 80–99)
MCV RBC AUTO: 100.7 FL (ref 80–99)
MCV RBC AUTO: 101.5 FL (ref 80–99)
MCV RBC AUTO: 101.5 FL (ref 80–99)
MCV RBC AUTO: 102 FL (ref 80–99)
MCV RBC AUTO: 102.4 FL (ref 80–99)
MCV RBC AUTO: 114 FL (ref 80–99)
MCV RBC AUTO: 92.9 FL (ref 80–99)
MCV RBC AUTO: 94.6 FL (ref 80–99)
MCV RBC AUTO: 94.7 FL (ref 80–99)
MCV RBC AUTO: 94.9 FL (ref 80–99)
MCV RBC AUTO: 95 FL (ref 80–99)
MCV RBC AUTO: 95.2 FL (ref 80–99)
MCV RBC AUTO: 95.5 FL (ref 80–99)
MCV RBC AUTO: 95.5 FL (ref 80–99)
MCV RBC AUTO: 95.6 FL (ref 80–99)
MCV RBC AUTO: 95.8 FL (ref 80–99)
MCV RBC AUTO: 96 FL (ref 80–99)
MCV RBC AUTO: 96.1 FL (ref 80–99)
MCV RBC AUTO: 96.2 FL (ref 80–99)
MCV RBC AUTO: 96.2 FL (ref 80–99)
MCV RBC AUTO: 96.6 FL (ref 80–99)
MCV RBC AUTO: 96.6 FL (ref 80–99)
MCV RBC AUTO: 97 FL (ref 80–99)
MCV RBC AUTO: 97.1 FL (ref 80–99)
MCV RBC AUTO: 97.2 FL (ref 80–99)
MCV RBC AUTO: 97.8 FL (ref 80–99)
MCV RBC AUTO: 97.9 FL (ref 80–99)
MCV RBC AUTO: 97.9 FL (ref 80–99)
MCV RBC AUTO: 98.3 FL (ref 80–99)
MCV RBC AUTO: 99.4 FL (ref 80–99)
METAMYELOCYTES NFR BLD MANUAL: 1 %
METAMYELOCYTES NFR BLD MANUAL: 3 %
MINUTES UNTIL NEXT BG, NBG: 15 MIN
MINUTES UNTIL NEXT BG, NBG: 15 MIN
MINUTES UNTIL NEXT BG, NBG: 60 MIN
MONOCYTES # BLD: 0 K/UL (ref 0–1)
MONOCYTES # BLD: 0.1 K/UL (ref 0–1)
MONOCYTES # BLD: 0.3 K/UL (ref 0–1)
MONOCYTES # BLD: 0.5 K/UL (ref 0–1)
MONOCYTES # BLD: 0.6 K/UL (ref 0–1)
MONOCYTES # BLD: 0.7 K/UL (ref 0–1)
MONOCYTES # BLD: 0.8 K/UL (ref 0–1)
MONOCYTES # BLD: 0.9 K/UL (ref 0–1)
MONOCYTES # BLD: 0.9 K/UL (ref 0–1)
MONOCYTES # BLD: 1 K/UL (ref 0–1)
MONOCYTES # BLD: 1 K/UL (ref 0–1)
MONOCYTES # BLD: 1.2 K/UL (ref 0–1)
MONOCYTES # BLD: 1.3 K/UL (ref 0–1)
MONOCYTES # BLD: 1.5 K/UL (ref 0–1)
MONOCYTES # BLD: 1.6 K/UL (ref 0–1)
MONOCYTES # BLD: 1.6 K/UL (ref 0–1)
MONOCYTES # BLD: 1.9 K/UL (ref 0–1)
MONOCYTES NFR BLD: 0 % (ref 5–13)
MONOCYTES NFR BLD: 1 % (ref 5–13)
MONOCYTES NFR BLD: 10 % (ref 5–13)
MONOCYTES NFR BLD: 10 % (ref 5–13)
MONOCYTES NFR BLD: 12 % (ref 5–13)
MONOCYTES NFR BLD: 16 % (ref 5–13)
MONOCYTES NFR BLD: 2 % (ref 5–13)
MONOCYTES NFR BLD: 3 % (ref 5–13)
MONOCYTES NFR BLD: 4 % (ref 5–13)
MONOCYTES NFR BLD: 5 % (ref 5–13)
MONOCYTES NFR BLD: 6 % (ref 5–13)
MONOCYTES NFR BLD: 6 % (ref 5–13)
MONOCYTES NFR BLD: 7 % (ref 5–13)
MONOCYTES NFR BLD: 8 % (ref 5–13)
MONOCYTES NFR BLD: 8 % (ref 5–13)
MONOCYTES NFR BLD: 9 % (ref 5–13)
MONOCYTES NFR BLD: 9 % (ref 5–13)
MULTIPLIER, MUL: 0
MULTIPLIER, MUL: 0
MULTIPLIER, MUL: 0.01
MULTIPLIER, MUL: 0.02
MULTIPLIER, MUL: 0.02
MULTIPLIER, MUL: 0.03
MULTIPLIER, MUL: 0.04
MULTIPLIER, MUL: 0.05
MULTIPLIER, MUL: 0.05
MULTIPLIER, MUL: 0.06
MULTIPLIER, MUL: 0.07
MULTIPLIER, MUL: 0.08
MULTIPLIER, MUL: 0.09
MULTIPLIER, MUL: 0.1
MULTIPLIER, MUL: 0.1
MULTIPLIER, MUL: 0.11
MULTIPLIER, MUL: 0.12
MULTIPLIER, MUL: 0.12
MULTIPLIER, MUL: 0.13
MULTIPLIER, MUL: 0.14
MULTIPLIER, MUL: 0.15
MYELOCYTES NFR BLD MANUAL: 1 %
MYELOCYTES NFR BLD MANUAL: 1 %
NDM (CARBAPENEMASE RESISTANT GENE), NDM: NOT DETECTED
NEISSERIA MENINGITIDIS, NMNI: NOT DETECTED
NEUTS BAND NFR BLD MANUAL: 1 %
NEUTS BAND NFR BLD MANUAL: 1 %
NEUTS BAND NFR BLD MANUAL: 10 %
NEUTS BAND NFR BLD MANUAL: 2 %
NEUTS BAND NFR BLD MANUAL: 3 %
NEUTS BAND NFR BLD MANUAL: 4 %
NEUTS BAND NFR BLD MANUAL: 4 %
NEUTS BAND NFR BLD MANUAL: 5 %
NEUTS BAND NFR BLD MANUAL: 6 %
NEUTS SEG # BLD: 10 K/UL (ref 1.8–8)
NEUTS SEG # BLD: 10.9 K/UL (ref 1.8–8)
NEUTS SEG # BLD: 11.3 K/UL (ref 1.8–8)
NEUTS SEG # BLD: 11.6 K/UL (ref 1.8–8)
NEUTS SEG # BLD: 11.6 K/UL (ref 1.8–8)
NEUTS SEG # BLD: 11.7 K/UL (ref 1.8–8)
NEUTS SEG # BLD: 12 K/UL (ref 1.8–8)
NEUTS SEG # BLD: 12.5 K/UL (ref 1.8–8)
NEUTS SEG # BLD: 12.7 K/UL (ref 1.8–8)
NEUTS SEG # BLD: 13.5 K/UL (ref 1.8–8)
NEUTS SEG # BLD: 14.8 K/UL (ref 1.8–8)
NEUTS SEG # BLD: 15.1 K/UL (ref 1.8–8)
NEUTS SEG # BLD: 15.3 K/UL (ref 1.8–8)
NEUTS SEG # BLD: 16.7 K/UL (ref 1.8–8)
NEUTS SEG # BLD: 7.3 K/UL (ref 1.8–8)
NEUTS SEG # BLD: 7.3 K/UL (ref 1.8–8)
NEUTS SEG # BLD: 7.7 K/UL (ref 1.8–8)
NEUTS SEG # BLD: 7.7 K/UL (ref 1.8–8)
NEUTS SEG # BLD: 9 K/UL (ref 1.8–8)
NEUTS SEG # BLD: 9.4 K/UL (ref 1.8–8)
NEUTS SEG # BLD: 9.5 K/UL (ref 1.8–8)
NEUTS SEG NFR BLD: 71 % (ref 32–75)
NEUTS SEG NFR BLD: 74 % (ref 32–75)
NEUTS SEG NFR BLD: 75 % (ref 32–75)
NEUTS SEG NFR BLD: 75 % (ref 32–75)
NEUTS SEG NFR BLD: 79 % (ref 32–75)
NEUTS SEG NFR BLD: 79 % (ref 32–75)
NEUTS SEG NFR BLD: 80 % (ref 32–75)
NEUTS SEG NFR BLD: 81 % (ref 32–75)
NEUTS SEG NFR BLD: 82 % (ref 32–75)
NEUTS SEG NFR BLD: 84 % (ref 32–75)
NEUTS SEG NFR BLD: 85 % (ref 32–75)
NEUTS SEG NFR BLD: 86 % (ref 32–75)
NEUTS SEG NFR BLD: 87 % (ref 32–75)
NEUTS SEG NFR BLD: 88 % (ref 32–75)
NEUTS SEG NFR BLD: 91 % (ref 32–75)
NEUTS SEG NFR BLD: 92 % (ref 32–75)
NEUTS SEG NFR BLD: 93 % (ref 32–75)
NEUTS SEG NFR BLD: 93 % (ref 32–75)
NEUTS SEG NFR BLD: 94 % (ref 32–75)
NITRITE UR QL STRIP.AUTO: NEGATIVE
NRBC # BLD: 0 K/UL (ref 0–0.01)
NRBC # BLD: 0.02 K/UL (ref 0–0.01)
NRBC # BLD: 0.03 K/UL (ref 0–0.01)
NRBC # BLD: 0.05 K/UL (ref 0–0.01)
NRBC # BLD: 0.06 K/UL (ref 0–0.01)
NRBC # BLD: 0.06 K/UL (ref 0–0.01)
NRBC # BLD: 0.07 K/UL (ref 0–0.01)
NRBC # BLD: 0.1 K/UL (ref 0–0.01)
NRBC # BLD: 0.11 K/UL (ref 0–0.01)
NRBC # BLD: 0.14 K/UL (ref 0–0.01)
NRBC # BLD: 0.22 K/UL (ref 0–0.01)
NRBC # BLD: 0.22 K/UL (ref 0–0.01)
NRBC # BLD: 0.25 K/UL (ref 0–0.01)
NRBC BLD-RTO: 0 PER 100 WBC
NRBC BLD-RTO: 0.2 PER 100 WBC
NRBC BLD-RTO: 0.2 PER 100 WBC
NRBC BLD-RTO: 0.3 PER 100 WBC
NRBC BLD-RTO: 0.4 PER 100 WBC
NRBC BLD-RTO: 0.4 PER 100 WBC
NRBC BLD-RTO: 0.5 PER 100 WBC
NRBC BLD-RTO: 0.6 PER 100 WBC
NRBC BLD-RTO: 0.7 PER 100 WBC
NRBC BLD-RTO: 0.8 PER 100 WBC
NRBC BLD-RTO: 1.1 PER 100 WBC
NRBC BLD-RTO: 1.7 PER 100 WBC
NRBC BLD-RTO: 1.9 PER 100 WBC
NRBC BLD-RTO: 2 PER 100 WBC
NRBC BLD-RTO: 2.4 PER 100 WBC
O2/TOTAL GAS SETTING VFR VENT: 4 %
O2/TOTAL GAS SETTING VFR VENT: 4 %
ORDER INITIALS, ORDINIT: NORMAL
OXA-48-LIKE (CARBAPENEMASE RESISTANT GENE), OXA48: NOT DETECTED
P-R INTERVAL, ECG05: 110 MS
P-R INTERVAL, ECG05: 156 MS
P-R INTERVAL, ECG05: 176 MS
P-R INTERVAL, ECG05: 98 MS
PCO2 BLD: 28 MMHG (ref 35–45)
PCO2 BLD: 30.1 MMHG (ref 35–45)
PCO2 BLDA: 34 MMHG (ref 35–45)
PCO2 BLDV: 30.6 MMHG (ref 41–51)
PCO2 BLDV: 36.1 MMHG (ref 41–51)
PCO2 BLDV: 38.2 MMHG (ref 41–51)
PCO2 BLDV: 40.3 MMHG (ref 41–51)
PCO2 BLDV: 40.9 MMHG (ref 41–51)
PCO2 BLDV: 41.4 MMHG (ref 41–51)
PCO2 BLDV: 42.3 MMHG (ref 41–51)
PCO2 BLDV: 42.9 MMHG (ref 41–51)
PCO2 BLDV: 43.7 MMHG (ref 41–51)
PCO2 BLDV: 44.5 MMHG (ref 41–51)
PCO2 BLDV: 46.4 MMHG (ref 41–51)
PCO2 BLDV: 46.7 MMHG (ref 41–51)
PH BLD: 7.42 [PH] (ref 7.35–7.45)
PH BLD: 7.46 [PH] (ref 7.35–7.45)
PH BLDA: 7.2 [PH] (ref 7.35–7.45)
PH BLDV: 6.99 [PH] (ref 7.32–7.42)
PH BLDV: 7.07 [PH] (ref 7.32–7.42)
PH BLDV: 7.16 [PH] (ref 7.32–7.42)
PH BLDV: 7.3 [PH] (ref 7.32–7.42)
PH BLDV: 7.31 [PH] (ref 7.32–7.42)
PH BLDV: 7.33 [PH] (ref 7.32–7.42)
PH BLDV: 7.35 [PH] (ref 7.32–7.42)
PH BLDV: 7.35 [PH] (ref 7.32–7.42)
PH BLDV: 7.37 [PH] (ref 7.32–7.42)
PH UR STRIP: 5 [PH] (ref 5–8)
PHOSPHATE SERPL-MCNC: 12.2 MG/DL (ref 2.6–4.7)
PHOSPHATE SERPL-MCNC: 2.2 MG/DL (ref 2.6–4.7)
PHOSPHATE SERPL-MCNC: 2.3 MG/DL (ref 2.6–4.7)
PHOSPHATE SERPL-MCNC: 2.6 MG/DL (ref 2.6–4.7)
PHOSPHATE SERPL-MCNC: 2.9 MG/DL (ref 2.6–4.7)
PHOSPHATE SERPL-MCNC: 3.1 MG/DL (ref 2.6–4.7)
PHOSPHATE SERPL-MCNC: 3.2 MG/DL (ref 2.6–4.7)
PHOSPHATE SERPL-MCNC: 3.2 MG/DL (ref 2.6–4.7)
PHOSPHATE SERPL-MCNC: 3.3 MG/DL (ref 2.6–4.7)
PHOSPHATE SERPL-MCNC: 3.3 MG/DL (ref 2.6–4.7)
PHOSPHATE SERPL-MCNC: 3.9 MG/DL (ref 2.6–4.7)
PHOSPHATE SERPL-MCNC: 4.3 MG/DL (ref 2.6–4.7)
PHOSPHATE SERPL-MCNC: 4.4 MG/DL (ref 2.6–4.7)
PHOSPHATE SERPL-MCNC: 6.1 MG/DL (ref 2.6–4.7)
PHOSPHATE SERPL-MCNC: 7.5 MG/DL (ref 2.6–4.7)
PLATELET # BLD AUTO: 109 K/UL (ref 150–400)
PLATELET # BLD AUTO: 111 K/UL (ref 150–400)
PLATELET # BLD AUTO: 125 K/UL (ref 150–400)
PLATELET # BLD AUTO: 142 K/UL (ref 150–400)
PLATELET # BLD AUTO: 145 K/UL (ref 150–400)
PLATELET # BLD AUTO: 153 K/UL (ref 150–400)
PLATELET # BLD AUTO: 161 K/UL (ref 150–400)
PLATELET # BLD AUTO: 164 K/UL (ref 150–400)
PLATELET # BLD AUTO: 173 K/UL (ref 150–400)
PLATELET # BLD AUTO: 174 K/UL (ref 150–400)
PLATELET # BLD AUTO: 180 K/UL (ref 150–400)
PLATELET # BLD AUTO: 199 K/UL (ref 150–400)
PLATELET # BLD AUTO: 216 K/UL (ref 150–400)
PLATELET # BLD AUTO: 233 K/UL (ref 150–400)
PLATELET # BLD AUTO: 242 K/UL (ref 150–400)
PLATELET # BLD AUTO: 243 K/UL (ref 150–400)
PLATELET # BLD AUTO: 253 K/UL (ref 150–400)
PLATELET # BLD AUTO: 253 K/UL (ref 150–400)
PLATELET # BLD AUTO: 268 K/UL (ref 150–400)
PLATELET # BLD AUTO: 62 K/UL (ref 150–400)
PLATELET # BLD AUTO: 63 K/UL (ref 150–400)
PLATELET # BLD AUTO: 68 K/UL (ref 150–400)
PLATELET # BLD AUTO: 69 K/UL (ref 150–400)
PLATELET # BLD AUTO: 72 K/UL (ref 150–400)
PLATELET # BLD AUTO: 72 K/UL (ref 150–400)
PLATELET # BLD AUTO: 73 K/UL (ref 150–400)
PLATELET # BLD AUTO: 75 K/UL (ref 150–400)
PLATELET # BLD AUTO: 76 K/UL (ref 150–400)
PLATELET # BLD AUTO: 78 K/UL (ref 150–400)
PLATELET # BLD AUTO: 88 K/UL (ref 150–400)
PLATELET # BLD AUTO: 93 K/UL (ref 150–400)
PMV BLD AUTO: 10.4 FL (ref 8.9–12.9)
PMV BLD AUTO: 10.5 FL (ref 8.9–12.9)
PMV BLD AUTO: 10.5 FL (ref 8.9–12.9)
PMV BLD AUTO: 10.6 FL (ref 8.9–12.9)
PMV BLD AUTO: 10.7 FL (ref 8.9–12.9)
PMV BLD AUTO: 10.8 FL (ref 8.9–12.9)
PMV BLD AUTO: 10.9 FL (ref 8.9–12.9)
PMV BLD AUTO: 10.9 FL (ref 8.9–12.9)
PMV BLD AUTO: 11.3 FL (ref 8.9–12.9)
PMV BLD AUTO: 11.5 FL (ref 8.9–12.9)
PMV BLD AUTO: 11.8 FL (ref 8.9–12.9)
PMV BLD AUTO: 11.9 FL (ref 8.9–12.9)
PMV BLD AUTO: 11.9 FL (ref 8.9–12.9)
PMV BLD AUTO: 12.1 FL (ref 8.9–12.9)
PMV BLD AUTO: 12.3 FL (ref 8.9–12.9)
PMV BLD AUTO: 12.5 FL (ref 8.9–12.9)
PMV BLD AUTO: 12.6 FL (ref 8.9–12.9)
PMV BLD AUTO: ABNORMAL FL (ref 8.9–12.9)
PO2 BLD: 52 MMHG (ref 80–100)
PO2 BLD: 73 MMHG (ref 80–100)
PO2 BLDA: 107 MMHG (ref 80–100)
PO2 BLDV: 33 MMHG (ref 25–40)
PO2 BLDV: 34 MMHG (ref 25–40)
PO2 BLDV: 37 MMHG (ref 25–40)
PO2 BLDV: 38 MMHG (ref 25–40)
PO2 BLDV: 38 MMHG (ref 25–40)
PO2 BLDV: 41 MMHG (ref 25–40)
PO2 BLDV: 44 MMHG (ref 25–40)
PO2 BLDV: 46 MMHG (ref 25–40)
PO2 BLDV: 47 MMHG (ref 25–40)
PO2 BLDV: 47 MMHG (ref 25–40)
POTASSIUM BLD-SCNC: 3.5 MMOL/L (ref 3.5–5.5)
POTASSIUM BLD-SCNC: 3.5 MMOL/L (ref 3.5–5.5)
POTASSIUM BLD-SCNC: 3.6 MMOL/L (ref 3.5–5.5)
POTASSIUM BLD-SCNC: 3.7 MMOL/L (ref 3.5–5.5)
POTASSIUM BLD-SCNC: 3.9 MMOL/L (ref 3.5–5.5)
POTASSIUM BLD-SCNC: 4 MMOL/L (ref 3.5–5.5)
POTASSIUM BLD-SCNC: 4.3 MMOL/L (ref 3.5–5.5)
POTASSIUM BLD-SCNC: 4.5 MMOL/L (ref 3.5–5.5)
POTASSIUM BLD-SCNC: 6.9 MMOL/L (ref 3.5–5.5)
POTASSIUM SERPL-SCNC: 2.9 MMOL/L (ref 3.5–5.1)
POTASSIUM SERPL-SCNC: 3.2 MMOL/L (ref 3.5–5.1)
POTASSIUM SERPL-SCNC: 3.3 MMOL/L (ref 3.5–5.1)
POTASSIUM SERPL-SCNC: 3.4 MMOL/L (ref 3.5–5.1)
POTASSIUM SERPL-SCNC: 3.5 MMOL/L (ref 3.5–5.1)
POTASSIUM SERPL-SCNC: 3.6 MMOL/L (ref 3.5–5.1)
POTASSIUM SERPL-SCNC: 3.7 MMOL/L (ref 3.5–5.1)
POTASSIUM SERPL-SCNC: 3.8 MMOL/L (ref 3.5–5.1)
POTASSIUM SERPL-SCNC: 3.9 MMOL/L (ref 3.5–5.1)
POTASSIUM SERPL-SCNC: 4 MMOL/L (ref 3.5–5.1)
POTASSIUM SERPL-SCNC: 4 MMOL/L (ref 3.5–5.1)
POTASSIUM SERPL-SCNC: 4.1 MMOL/L (ref 3.5–5.1)
POTASSIUM SERPL-SCNC: 4.2 MMOL/L (ref 3.5–5.1)
POTASSIUM SERPL-SCNC: 4.3 MMOL/L (ref 3.5–5.1)
POTASSIUM SERPL-SCNC: 4.4 MMOL/L (ref 3.5–5.1)
POTASSIUM SERPL-SCNC: 4.6 MMOL/L (ref 3.5–5.1)
POTASSIUM SERPL-SCNC: 4.7 MMOL/L (ref 3.5–5.1)
POTASSIUM SERPL-SCNC: 4.8 MMOL/L (ref 3.5–5.1)
POTASSIUM SERPL-SCNC: 5.8 MMOL/L (ref 3.5–5.1)
POTASSIUM SERPL-SCNC: 6.8 MMOL/L (ref 3.5–5.1)
PROCALCITONIN SERPL-MCNC: 1.18 NG/ML
PROCALCITONIN SERPL-MCNC: 1.29 NG/ML
PROCALCITONIN SERPL-MCNC: 4.45 NG/ML
PROCALCITONIN SERPL-MCNC: 5.61 NG/ML
PROCALCITONIN SERPL-MCNC: 52.27 NG/ML
PROT SERPL-MCNC: 3.7 G/DL (ref 6.4–8.2)
PROT SERPL-MCNC: 4.8 G/DL (ref 6.4–8.2)
PROT SERPL-MCNC: 4.9 G/DL (ref 6.4–8.2)
PROT SERPL-MCNC: 5 G/DL (ref 6.4–8.2)
PROT SERPL-MCNC: 5.1 G/DL (ref 6.4–8.2)
PROT SERPL-MCNC: 5.3 G/DL (ref 6.4–8.2)
PROT SERPL-MCNC: 5.3 G/DL (ref 6.4–8.2)
PROT SERPL-MCNC: 5.4 G/DL (ref 6.4–8.2)
PROT SERPL-MCNC: 5.6 G/DL (ref 6.4–8.2)
PROT SERPL-MCNC: 6.3 G/DL (ref 6.4–8.2)
PROT SERPL-MCNC: 7 G/DL (ref 6.4–8.2)
PROT SERPL-MCNC: 7.1 G/DL (ref 6.4–8.2)
PROT SERPL-MCNC: 7.6 G/DL (ref 6.4–8.2)
PROT UR STRIP-MCNC: NEGATIVE MG/DL
PROT UR-MCNC: 242 MG/DL (ref 0–11.9)
PROT/CREAT UR-RTO: 4
PROTEUS, PRP: DETECTED
PROTEUS, PRP: DETECTED
PROTEUS, PRP: NOT DETECTED
PROTHROMBIN TIME: 11.3 SEC (ref 9–11.1)
PROTHROMBIN TIME: 12.7 SEC (ref 9–11.1)
PSEUDOMONAS AERUGINOSA: NOT DETECTED
Q-T INTERVAL, ECG07: 322 MS
Q-T INTERVAL, ECG07: 406 MS
Q-T INTERVAL, ECG07: 448 MS
Q-T INTERVAL, ECG07: 458 MS
Q-T INTERVAL, ECG07: 498 MS
Q-T INTERVAL, ECG07: 552 MS
Q-T INTERVAL, ECG07: 728 MS
QRS DURATION, ECG06: 120 MS
QRS DURATION, ECG06: 122 MS
QRS DURATION, ECG06: 124 MS
QRS DURATION, ECG06: 128 MS
QRS DURATION, ECG06: 132 MS
QRS DURATION, ECG06: 156 MS
QRS DURATION, ECG06: 160 MS
QTC CALCULATION (BEZET), ECG08: 440 MS
QTC CALCULATION (BEZET), ECG08: 445 MS
QTC CALCULATION (BEZET), ECG08: 486 MS
QTC CALCULATION (BEZET), ECG08: 513 MS
QTC CALCULATION (BEZET), ECG08: 536 MS
QTC CALCULATION (BEZET), ECG08: 563 MS
QTC CALCULATION (BEZET), ECG08: 577 MS
RBC # BLD AUTO: 2.04 M/UL (ref 3.8–5.2)
RBC # BLD AUTO: 2.48 M/UL (ref 3.8–5.2)
RBC # BLD AUTO: 2.49 M/UL (ref 3.8–5.2)
RBC # BLD AUTO: 2.52 M/UL (ref 3.8–5.2)
RBC # BLD AUTO: 2.67 M/UL (ref 3.8–5.2)
RBC # BLD AUTO: 2.79 M/UL (ref 3.8–5.2)
RBC # BLD AUTO: 2.79 M/UL (ref 3.8–5.2)
RBC # BLD AUTO: 2.8 M/UL (ref 3.8–5.2)
RBC # BLD AUTO: 2.81 M/UL (ref 3.8–5.2)
RBC # BLD AUTO: 2.86 M/UL (ref 3.8–5.2)
RBC # BLD AUTO: 2.95 M/UL (ref 3.8–5.2)
RBC # BLD AUTO: 2.95 M/UL (ref 3.8–5.2)
RBC # BLD AUTO: 2.96 M/UL (ref 3.8–5.2)
RBC # BLD AUTO: 2.98 M/UL (ref 3.8–5.2)
RBC # BLD AUTO: 2.98 M/UL (ref 3.8–5.2)
RBC # BLD AUTO: 2.99 M/UL (ref 3.8–5.2)
RBC # BLD AUTO: 3.04 M/UL (ref 3.8–5.2)
RBC # BLD AUTO: 3.05 M/UL (ref 3.8–5.2)
RBC # BLD AUTO: 3.06 M/UL (ref 3.8–5.2)
RBC # BLD AUTO: 3.09 M/UL (ref 3.8–5.2)
RBC # BLD AUTO: 3.14 M/UL (ref 3.8–5.2)
RBC # BLD AUTO: 3.14 M/UL (ref 3.8–5.2)
RBC # BLD AUTO: 3.16 M/UL (ref 3.8–5.2)
RBC # BLD AUTO: 3.28 M/UL (ref 3.8–5.2)
RBC # BLD AUTO: 3.3 M/UL (ref 3.8–5.2)
RBC # BLD AUTO: 3.42 M/UL (ref 3.8–5.2)
RBC # BLD AUTO: 3.54 M/UL (ref 3.8–5.2)
RBC # BLD AUTO: 3.57 M/UL (ref 3.8–5.2)
RBC # BLD AUTO: 3.72 M/UL (ref 3.8–5.2)
RBC #/AREA URNS HPF: ABNORMAL /HPF (ref 0–5)
RBC MORPH BLD: ABNORMAL
REPORTED DOSE,DOSE: ABNORMAL UNITS
REPORTED DOSE,DOSE: ABNORMAL UNITS
REPORTED DOSE/TIME,TMG: ABNORMAL
REPORTED DOSE/TIME,TMG: ABNORMAL
RESISTANT GENE SPACE, REGENE: ABNORMAL
RSV RNA SPEC QL NAA+PROBE: NOT DETECTED
RV+EV RNA SPEC QL NAA+PROBE: NOT DETECTED
SALMONELLA, SALMO: NOT DETECTED
SAMPLES BEING HELD,HOLD: NORMAL
SAO2 % BLD: 88 % (ref 92–97)
SAO2 % BLD: 95.5 % (ref 92–97)
SAO2 % BLD: 97 % (ref 92–97)
SAO2% DEVICE SAO2% SENSOR NAME: ABNORMAL
SARS-COV-2 PCR, COVPCR: NOT DETECTED
SARS-COV-2, COV2: NOT DETECTED
SARS-COV-2, COV2: NOT DETECTED
SERRATIA MARCESCENS: DETECTED
SERRATIA MARCESCENS: NOT DETECTED
SERRATIA MARCESCENS: NOT DETECTED
SERVICE CMNT-IMP: 0
SERVICE CMNT-IMP: 0
SERVICE CMNT-IMP: ABNORMAL
SERVICE CMNT-IMP: NORMAL
SODIUM BLD-SCNC: 130 MMOL/L (ref 136–145)
SODIUM BLD-SCNC: 142 MMOL/L (ref 136–145)
SODIUM BLD-SCNC: 143 MMOL/L (ref 136–145)
SODIUM BLD-SCNC: 144 MMOL/L (ref 136–145)
SODIUM BLD-SCNC: 145 MMOL/L (ref 136–145)
SODIUM BLD-SCNC: 148 MMOL/L (ref 136–145)
SODIUM BLD-SCNC: 148 MMOL/L (ref 136–145)
SODIUM BLD-SCNC: 149 MMOL/L (ref 136–145)
SODIUM SERPL-SCNC: 132 MMOL/L (ref 136–145)
SODIUM SERPL-SCNC: 133 MMOL/L (ref 136–145)
SODIUM SERPL-SCNC: 134 MMOL/L (ref 136–145)
SODIUM SERPL-SCNC: 136 MMOL/L (ref 136–145)
SODIUM SERPL-SCNC: 137 MMOL/L (ref 136–145)
SODIUM SERPL-SCNC: 138 MMOL/L (ref 136–145)
SODIUM SERPL-SCNC: 139 MMOL/L (ref 136–145)
SODIUM SERPL-SCNC: 140 MMOL/L (ref 136–145)
SODIUM SERPL-SCNC: 141 MMOL/L (ref 136–145)
SODIUM SERPL-SCNC: 141 MMOL/L (ref 136–145)
SODIUM SERPL-SCNC: 142 MMOL/L (ref 136–145)
SODIUM SERPL-SCNC: 142 MMOL/L (ref 136–145)
SODIUM SERPL-SCNC: 143 MMOL/L (ref 136–145)
SODIUM SERPL-SCNC: 145 MMOL/L (ref 136–145)
SODIUM SERPL-SCNC: 146 MMOL/L (ref 136–145)
SODIUM SERPL-SCNC: 147 MMOL/L (ref 136–145)
SODIUM SERPL-SCNC: 148 MMOL/L (ref 136–145)
SODIUM SERPL-SCNC: 150 MMOL/L (ref 136–145)
SODIUM UR-SCNC: 37 MMOL/L
SOURCE, COVRS: NORMAL
SP GR UR REFRACTOMETRY: 1.02
SPECIMEN EXP DATE BLD: NORMAL
SPECIMEN SITE: ABNORMAL
SPECIMEN TYPE: ABNORMAL
SPECIMEN TYPE: ABNORMAL
STAPH EPIDERMIDIS, STEP: NOT DETECTED
STAPH LUGDUNENSIS, STALUG: NOT DETECTED
STAPHYLOCOCCUS AUREUS: NOT DETECTED
STAPHYLOCOCCUS, STAPP: NOT DETECTED
STATUS OF UNIT,%ST: NORMAL
STENO MALTOPHILIA, STMA: NOT DETECTED
STREPTOCOCCUS , STPSP: NOT DETECTED
STREPTOCOCCUS AGALACTIAE (GROUP B): NOT DETECTED
STREPTOCOCCUS PNEUMONIAE , SPNP: NOT DETECTED
STREPTOCOCCUS PYOGENES (GROUP A), SPYOP: NOT DETECTED
THERAPEUTIC RANGE,PTTT: NORMAL SECS (ref 58–77)
TRIGL SERPL-MCNC: 95 MG/DL (ref ?–150)
TROPONIN-HIGH SENSITIVITY: 22 NG/L (ref 0–51)
TROPONIN-HIGH SENSITIVITY: 31 NG/L (ref 0–51)
TROPONIN-HIGH SENSITIVITY: 319 NG/L (ref 0–51)
TROPONIN-HIGH SENSITIVITY: 343 NG/L (ref 0–51)
TROPONIN-HIGH SENSITIVITY: 377 NG/L (ref 0–51)
TROPONIN-HIGH SENSITIVITY: 87 NG/L (ref 0–51)
UNIT DIVISION, %UDIV: 0
UR CULT HOLD, URHOLD: NORMAL
UROBILINOGEN UR QL STRIP.AUTO: 0.2 EU/DL (ref 0.2–1)
VANCOMYCIN SERPL-MCNC: 14.1 UG/ML
VANCOMYCIN SERPL-MCNC: 16.7 UG/ML
VANCOMYCIN SERPL-MCNC: 18.3 UG/ML
VANCOMYCIN SERPL-MCNC: 27.5 UG/ML
VANCOMYCIN TROUGH SERPL-MCNC: 21.5 UG/ML (ref 5–10)
VANCOMYCIN TROUGH SERPL-MCNC: 23 UG/ML (ref 5–10)
VENTRICULAR RATE, ECG03: 100 BPM
VENTRICULAR RATE, ECG03: 105 BPM
VENTRICULAR RATE, ECG03: 137 BPM
VENTRICULAR RATE, ECG03: 22 BPM
VENTRICULAR RATE, ECG03: 52 BPM
VENTRICULAR RATE, ECG03: 57 BPM
VENTRICULAR RATE, ECG03: 77 BPM
VIM (CARBAPENEMASE RESISTANT GENE), VIM: NOT DETECTED
WBC # BLD AUTO: 10.5 K/UL (ref 3.6–11)
WBC # BLD AUTO: 10.7 K/UL (ref 3.6–11)
WBC # BLD AUTO: 10.9 K/UL (ref 3.6–11)
WBC # BLD AUTO: 11.9 K/UL (ref 3.6–11)
WBC # BLD AUTO: 12.2 K/UL (ref 3.6–11)
WBC # BLD AUTO: 12.5 K/UL (ref 3.6–11)
WBC # BLD AUTO: 12.5 K/UL (ref 3.6–11)
WBC # BLD AUTO: 12.6 K/UL (ref 3.6–11)
WBC # BLD AUTO: 12.7 K/UL (ref 3.6–11)
WBC # BLD AUTO: 12.8 K/UL (ref 3.6–11)
WBC # BLD AUTO: 13 K/UL (ref 3.6–11)
WBC # BLD AUTO: 13 K/UL (ref 3.6–11)
WBC # BLD AUTO: 13.2 K/UL (ref 3.6–11)
WBC # BLD AUTO: 13.3 K/UL (ref 3.6–11)
WBC # BLD AUTO: 14.2 K/UL (ref 3.6–11)
WBC # BLD AUTO: 15.5 K/UL (ref 3.6–11)
WBC # BLD AUTO: 15.9 K/UL (ref 3.6–11)
WBC # BLD AUTO: 16.1 K/UL (ref 3.6–11)
WBC # BLD AUTO: 16.2 K/UL (ref 3.6–11)
WBC # BLD AUTO: 18.3 K/UL (ref 3.6–11)
WBC # BLD AUTO: 18.9 K/UL (ref 3.6–11)
WBC # BLD AUTO: 6 K/UL (ref 3.6–11)
WBC # BLD AUTO: 6.2 K/UL (ref 3.6–11)
WBC # BLD AUTO: 6.2 K/UL (ref 3.6–11)
WBC # BLD AUTO: 6.7 K/UL (ref 3.6–11)
WBC # BLD AUTO: 7.8 K/UL (ref 3.6–11)
WBC # BLD AUTO: 9.1 K/UL (ref 3.6–11)
WBC # BLD AUTO: 9.4 K/UL (ref 3.6–11)
WBC # BLD AUTO: 9.5 K/UL (ref 3.6–11)
WBC # BLD AUTO: 9.7 K/UL (ref 3.6–11)
WBC # BLD AUTO: 9.9 K/UL (ref 3.6–11)
WBC MORPH BLD: ABNORMAL
WBC URNS QL MICRO: ABNORMAL /HPF (ref 0–4)
YEAST URNS QL MICRO: PRESENT

## 2022-01-01 PROCEDURE — 94760 N-INVAS EAR/PLS OXIMETRY 1: CPT

## 2022-01-01 PROCEDURE — 65270000046 HC RM TELEMETRY

## 2022-01-01 PROCEDURE — 74011250637 HC RX REV CODE- 250/637: Performed by: INTERNAL MEDICINE

## 2022-01-01 PROCEDURE — 74011636637 HC RX REV CODE- 636/637: Performed by: INTERNAL MEDICINE

## 2022-01-01 PROCEDURE — 90935 HEMODIALYSIS ONE EVALUATION: CPT

## 2022-01-01 PROCEDURE — 74011000258 HC RX REV CODE- 258: Performed by: HOSPITALIST

## 2022-01-01 PROCEDURE — 74011250637 HC RX REV CODE- 250/637: Performed by: STUDENT IN AN ORGANIZED HEALTH CARE EDUCATION/TRAINING PROGRAM

## 2022-01-01 PROCEDURE — 74011000258 HC RX REV CODE- 258: Performed by: STUDENT IN AN ORGANIZED HEALTH CARE EDUCATION/TRAINING PROGRAM

## 2022-01-01 PROCEDURE — 74011636637 HC RX REV CODE- 636/637: Performed by: STUDENT IN AN ORGANIZED HEALTH CARE EDUCATION/TRAINING PROGRAM

## 2022-01-01 PROCEDURE — 36415 COLL VENOUS BLD VENIPUNCTURE: CPT

## 2022-01-01 PROCEDURE — 77030003028 HC SUT VCRL J&J -A: Performed by: SURGERY

## 2022-01-01 PROCEDURE — 74011250636 HC RX REV CODE- 250/636: Performed by: INTERNAL MEDICINE

## 2022-01-01 PROCEDURE — 84100 ASSAY OF PHOSPHORUS: CPT

## 2022-01-01 PROCEDURE — 97530 THERAPEUTIC ACTIVITIES: CPT | Performed by: OCCUPATIONAL THERAPIST

## 2022-01-01 PROCEDURE — 80202 ASSAY OF VANCOMYCIN: CPT

## 2022-01-01 PROCEDURE — 81001 URINALYSIS AUTO W/SCOPE: CPT

## 2022-01-01 PROCEDURE — 87186 SC STD MICRODIL/AGAR DIL: CPT

## 2022-01-01 PROCEDURE — 74011000250 HC RX REV CODE- 250: Performed by: INTERNAL MEDICINE

## 2022-01-01 PROCEDURE — 99232 SBSQ HOSP IP/OBS MODERATE 35: CPT | Performed by: NURSE PRACTITIONER

## 2022-01-01 PROCEDURE — 82977 ASSAY OF GGT: CPT

## 2022-01-01 PROCEDURE — 82962 GLUCOSE BLOOD TEST: CPT

## 2022-01-01 PROCEDURE — 2709999900 HC NON-CHARGEABLE SUPPLY

## 2022-01-01 PROCEDURE — 74011636637 HC RX REV CODE- 636/637: Performed by: HOSPITALIST

## 2022-01-01 PROCEDURE — 74011250637 HC RX REV CODE- 250/637: Performed by: HOSPITALIST

## 2022-01-01 PROCEDURE — 87086 URINE CULTURE/COLONY COUNT: CPT

## 2022-01-01 PROCEDURE — 83690 ASSAY OF LIPASE: CPT

## 2022-01-01 PROCEDURE — 85025 COMPLETE CBC W/AUTO DIFF WBC: CPT

## 2022-01-01 PROCEDURE — 85730 THROMBOPLASTIN TIME PARTIAL: CPT

## 2022-01-01 PROCEDURE — 85652 RBC SED RATE AUTOMATED: CPT

## 2022-01-01 PROCEDURE — 02H633Z INSERTION OF INFUSION DEVICE INTO RIGHT ATRIUM, PERCUTANEOUS APPROACH: ICD-10-PCS | Performed by: STUDENT IN AN ORGANIZED HEALTH CARE EDUCATION/TRAINING PROGRAM

## 2022-01-01 PROCEDURE — 74011000250 HC RX REV CODE- 250: Performed by: STUDENT IN AN ORGANIZED HEALTH CARE EDUCATION/TRAINING PROGRAM

## 2022-01-01 PROCEDURE — 93306 TTE W/DOPPLER COMPLETE: CPT

## 2022-01-01 PROCEDURE — B246ZZ4 ULTRASONOGRAPHY OF RIGHT AND LEFT HEART, TRANSESOPHAGEAL: ICD-10-PCS | Performed by: INTERNAL MEDICINE

## 2022-01-01 PROCEDURE — 74011000258 HC RX REV CODE- 258: Performed by: INTERNAL MEDICINE

## 2022-01-01 PROCEDURE — 97110 THERAPEUTIC EXERCISES: CPT | Performed by: OCCUPATIONAL THERAPIST

## 2022-01-01 PROCEDURE — 97530 THERAPEUTIC ACTIVITIES: CPT

## 2022-01-01 PROCEDURE — 74011000250 HC RX REV CODE- 250: Performed by: HOSPITALIST

## 2022-01-01 PROCEDURE — 84484 ASSAY OF TROPONIN QUANT: CPT

## 2022-01-01 PROCEDURE — 74011000250 HC RX REV CODE- 250: Performed by: FAMILY MEDICINE

## 2022-01-01 PROCEDURE — 77010033678 HC OXYGEN DAILY

## 2022-01-01 PROCEDURE — 76210000016 HC OR PH I REC 1 TO 1.5 HR: Performed by: SURGERY

## 2022-01-01 PROCEDURE — C9113 INJ PANTOPRAZOLE SODIUM, VIA: HCPCS | Performed by: INTERNAL MEDICINE

## 2022-01-01 PROCEDURE — 90945 DIALYSIS ONE EVALUATION: CPT

## 2022-01-01 PROCEDURE — 83735 ASSAY OF MAGNESIUM: CPT

## 2022-01-01 PROCEDURE — 80053 COMPREHEN METABOLIC PANEL: CPT

## 2022-01-01 PROCEDURE — 87636 SARSCOV2 & INF A&B AMP PRB: CPT

## 2022-01-01 PROCEDURE — C1752 CATH,HEMODIALYSIS,SHORT-TERM: HCPCS

## 2022-01-01 PROCEDURE — 74011250636 HC RX REV CODE- 250/636: Performed by: STUDENT IN AN ORGANIZED HEALTH CARE EDUCATION/TRAINING PROGRAM

## 2022-01-01 PROCEDURE — 77030008771 HC TU NG SALEM SUMP -A: Performed by: ANESTHESIOLOGY

## 2022-01-01 PROCEDURE — 74011250636 HC RX REV CODE- 250/636: Performed by: NURSE ANESTHETIST, CERTIFIED REGISTERED

## 2022-01-01 PROCEDURE — 74011250636 HC RX REV CODE- 250/636: Performed by: NURSE PRACTITIONER

## 2022-01-01 PROCEDURE — 82010 KETONE BODYS QUAN: CPT

## 2022-01-01 PROCEDURE — 65610000006 HC RM INTENSIVE CARE

## 2022-01-01 PROCEDURE — 99233 SBSQ HOSP IP/OBS HIGH 50: CPT | Performed by: CLINICAL NURSE SPECIALIST

## 2022-01-01 PROCEDURE — 92610 EVALUATE SWALLOWING FUNCTION: CPT

## 2022-01-01 PROCEDURE — 96365 THER/PROPH/DIAG IV INF INIT: CPT

## 2022-01-01 PROCEDURE — 76010000131 HC OR TIME 2 TO 2.5 HR: Performed by: SURGERY

## 2022-01-01 PROCEDURE — 77030040718 HC DRSG HYDRGEL SKINTEGRITY MDII -A

## 2022-01-01 PROCEDURE — 77030041070 HC DRSG ALG MDII -A

## 2022-01-01 PROCEDURE — G0378 HOSPITAL OBSERVATION PER HR: HCPCS

## 2022-01-01 PROCEDURE — 85027 COMPLETE CBC AUTOMATED: CPT

## 2022-01-01 PROCEDURE — 74011250637 HC RX REV CODE- 250/637: Performed by: SURGERY

## 2022-01-01 PROCEDURE — 0656 HSPC GENERAL INPATIENT

## 2022-01-01 PROCEDURE — 74011000250 HC RX REV CODE- 250: Performed by: RADIOLOGY

## 2022-01-01 PROCEDURE — 74177 CT ABD & PELVIS W/CONTRAST: CPT

## 2022-01-01 PROCEDURE — 87340 HEPATITIS B SURFACE AG IA: CPT

## 2022-01-01 PROCEDURE — 74011250636 HC RX REV CODE- 250/636: Performed by: HOSPITALIST

## 2022-01-01 PROCEDURE — 85384 FIBRINOGEN ACTIVITY: CPT

## 2022-01-01 PROCEDURE — 99285 EMERGENCY DEPT VISIT HI MDM: CPT

## 2022-01-01 PROCEDURE — 51798 US URINE CAPACITY MEASURE: CPT

## 2022-01-01 PROCEDURE — 99000 SPECIMEN HANDLING OFFICE-LAB: CPT

## 2022-01-01 PROCEDURE — 82330 ASSAY OF CALCIUM: CPT

## 2022-01-01 PROCEDURE — 80069 RENAL FUNCTION PANEL: CPT

## 2022-01-01 PROCEDURE — P9045 ALBUMIN (HUMAN), 5%, 250 ML: HCPCS

## 2022-01-01 PROCEDURE — 99221 1ST HOSP IP/OBS SF/LOW 40: CPT | Performed by: SURGERY

## 2022-01-01 PROCEDURE — 75810000455 HC PLCMT CENT VENOUS CATH LVL 2 5182

## 2022-01-01 PROCEDURE — 87040 BLOOD CULTURE FOR BACTERIA: CPT

## 2022-01-01 PROCEDURE — 77030031139 HC SUT VCRL2 J&J -A

## 2022-01-01 PROCEDURE — 87077 CULTURE AEROBIC IDENTIFY: CPT

## 2022-01-01 PROCEDURE — 87176 TISSUE HOMOGENIZATION CULTR: CPT

## 2022-01-01 PROCEDURE — 5A1D70Z PERFORMANCE OF URINARY FILTRATION, INTERMITTENT, LESS THAN 6 HOURS PER DAY: ICD-10-PCS | Performed by: ORTHOPAEDIC SURGERY

## 2022-01-01 PROCEDURE — 77030002996 HC SUT SLK J&J -A

## 2022-01-01 PROCEDURE — 80048 BASIC METABOLIC PNL TOTAL CA: CPT

## 2022-01-01 PROCEDURE — 86140 C-REACTIVE PROTEIN: CPT

## 2022-01-01 PROCEDURE — 82550 ASSAY OF CK (CPK): CPT

## 2022-01-01 PROCEDURE — 82248 BILIRUBIN DIRECT: CPT

## 2022-01-01 PROCEDURE — 96375 TX/PRO/DX INJ NEW DRUG ADDON: CPT

## 2022-01-01 PROCEDURE — 77030031139 HC SUT VCRL2 J&J -A: Performed by: SURGERY

## 2022-01-01 PROCEDURE — 11043 DBRDMT MUSC&/FSCA 1ST 20/<: CPT | Performed by: SURGERY

## 2022-01-01 PROCEDURE — 76060000035 HC ANESTHESIA 2 TO 2.5 HR: Performed by: SURGERY

## 2022-01-01 PROCEDURE — 93970 EXTREMITY STUDY: CPT

## 2022-01-01 PROCEDURE — 82272 OCCULT BLD FECES 1-3 TESTS: CPT

## 2022-01-01 PROCEDURE — 74011000250 HC RX REV CODE- 250: Performed by: NURSE PRACTITIONER

## 2022-01-01 PROCEDURE — 84450 TRANSFERASE (AST) (SGOT): CPT

## 2022-01-01 PROCEDURE — 86706 HEP B SURFACE ANTIBODY: CPT

## 2022-01-01 PROCEDURE — 97535 SELF CARE MNGMENT TRAINING: CPT

## 2022-01-01 PROCEDURE — 93005 ELECTROCARDIOGRAM TRACING: CPT

## 2022-01-01 PROCEDURE — 87150 DNA/RNA AMPLIFIED PROBE: CPT

## 2022-01-01 PROCEDURE — 65270000029 HC RM PRIVATE

## 2022-01-01 PROCEDURE — 82947 ASSAY GLUCOSE BLOOD QUANT: CPT

## 2022-01-01 PROCEDURE — 84145 PROCALCITONIN (PCT): CPT

## 2022-01-01 PROCEDURE — 97161 PT EVAL LOW COMPLEX 20 MIN: CPT

## 2022-01-01 PROCEDURE — 99231 SBSQ HOSP IP/OBS SF/LOW 25: CPT

## 2022-01-01 PROCEDURE — 74011000250 HC RX REV CODE- 250: Performed by: NURSE ANESTHETIST, CERTIFIED REGISTERED

## 2022-01-01 PROCEDURE — 99356 PR PROLONGED SVC I/P OR OBS SETTING 1ST HOUR: CPT | Performed by: PHYSICAL MEDICINE & REHABILITATION

## 2022-01-01 PROCEDURE — 77030002933 HC SUT MCRYL J&J -A: Performed by: SURGERY

## 2022-01-01 PROCEDURE — 74011250636 HC RX REV CODE- 250/636

## 2022-01-01 PROCEDURE — C1892 INTRO/SHEATH,FIXED,PEEL-AWAY: HCPCS

## 2022-01-01 PROCEDURE — P9047 ALBUMIN (HUMAN), 25%, 50ML: HCPCS | Performed by: INTERNAL MEDICINE

## 2022-01-01 PROCEDURE — 02H633Z INSERTION OF INFUSION DEVICE INTO RIGHT ATRIUM, PERCUTANEOUS APPROACH: ICD-10-PCS | Performed by: RADIOLOGY

## 2022-01-01 PROCEDURE — 77030008608 HC TRCR ENDOSC SMTH AMR -B: Performed by: SURGERY

## 2022-01-01 PROCEDURE — 99232 SBSQ HOSP IP/OBS MODERATE 35: CPT | Performed by: CLINICAL NURSE SPECIALIST

## 2022-01-01 PROCEDURE — 87635 SARS-COV-2 COVID-19 AMP PRB: CPT

## 2022-01-01 PROCEDURE — 74011000258 HC RX REV CODE- 258: Performed by: EMERGENCY MEDICINE

## 2022-01-01 PROCEDURE — 74011250636 HC RX REV CODE- 250/636: Performed by: EMERGENCY MEDICINE

## 2022-01-01 PROCEDURE — 82803 BLOOD GASES ANY COMBINATION: CPT

## 2022-01-01 PROCEDURE — 86141 C-REACTIVE PROTEIN HS: CPT

## 2022-01-01 PROCEDURE — 74011000250 HC RX REV CODE- 250

## 2022-01-01 PROCEDURE — 0D1N4Z4 BYPASS SIGMOID COLON TO CUTANEOUS, PERCUTANEOUS ENDOSCOPIC APPROACH: ICD-10-PCS | Performed by: SURGERY

## 2022-01-01 PROCEDURE — 76770 US EXAM ABDO BACK WALL COMP: CPT

## 2022-01-01 PROCEDURE — 92526 ORAL FUNCTION THERAPY: CPT | Performed by: SPEECH-LANGUAGE PATHOLOGIST

## 2022-01-01 PROCEDURE — 71045 X-RAY EXAM CHEST 1 VIEW: CPT

## 2022-01-01 PROCEDURE — 94640 AIRWAY INHALATION TREATMENT: CPT

## 2022-01-01 PROCEDURE — 74011000250 HC RX REV CODE- 250: Performed by: SURGERY

## 2022-01-01 PROCEDURE — 77030013079 HC BLNKT BAIR HGGR 3M -A: Performed by: ANESTHESIOLOGY

## 2022-01-01 PROCEDURE — 77001 FLUOROGUIDE FOR VEIN DEVICE: CPT

## 2022-01-01 PROCEDURE — 87205 SMEAR GRAM STAIN: CPT

## 2022-01-01 PROCEDURE — 77030038554 HC DRSG WND THERAHONEY MDII -Z

## 2022-01-01 PROCEDURE — G0299 HHS/HOSPICE OF RN EA 15 MIN: HCPCS

## 2022-01-01 PROCEDURE — 0KDN0ZZ EXTRACTION OF RIGHT HIP MUSCLE, OPEN APPROACH: ICD-10-PCS | Performed by: SURGERY

## 2022-01-01 PROCEDURE — 77030040393 HC DRSG OPTIFOAM GENT MDII -B

## 2022-01-01 PROCEDURE — 99233 SBSQ HOSP IP/OBS HIGH 50: CPT | Performed by: INTERNAL MEDICINE

## 2022-01-01 PROCEDURE — 0202U NFCT DS 22 TRGT SARS-COV-2: CPT

## 2022-01-01 PROCEDURE — 92526 ORAL FUNCTION THERAPY: CPT

## 2022-01-01 PROCEDURE — 86923 COMPATIBILITY TEST ELECTRIC: CPT

## 2022-01-01 PROCEDURE — 96366 THER/PROPH/DIAG IV INF ADDON: CPT

## 2022-01-01 PROCEDURE — 85610 PROTHROMBIN TIME: CPT

## 2022-01-01 PROCEDURE — 74011000636 HC RX REV CODE- 636: Performed by: INTERNAL MEDICINE

## 2022-01-01 PROCEDURE — 76937 US GUIDE VASCULAR ACCESS: CPT

## 2022-01-01 PROCEDURE — 93312 ECHO TRANSESOPHAGEAL: CPT

## 2022-01-01 PROCEDURE — 74176 CT ABD & PELVIS W/O CONTRAST: CPT

## 2022-01-01 PROCEDURE — 99222 1ST HOSP IP/OBS MODERATE 55: CPT | Performed by: STUDENT IN AN ORGANIZED HEALTH CARE EDUCATION/TRAINING PROGRAM

## 2022-01-01 PROCEDURE — 44188 LAP COLOSTOMY: CPT | Performed by: SURGERY

## 2022-01-01 PROCEDURE — 83605 ASSAY OF LACTIC ACID: CPT

## 2022-01-01 PROCEDURE — 5A1D90Z PERFORMANCE OF URINARY FILTRATION, CONTINUOUS, GREATER THAN 18 HOURS PER DAY: ICD-10-PCS | Performed by: INTERNAL MEDICINE

## 2022-01-01 PROCEDURE — 99223 1ST HOSP IP/OBS HIGH 75: CPT | Performed by: FAMILY MEDICINE

## 2022-01-01 PROCEDURE — 74011000636 HC RX REV CODE- 636: Performed by: EMERGENCY MEDICINE

## 2022-01-01 PROCEDURE — C1750 CATH, HEMODIALYSIS,LONG-TERM: HCPCS

## 2022-01-01 PROCEDURE — 77030036732 HC RELD STPLR VASC J&J -F: Performed by: SURGERY

## 2022-01-01 PROCEDURE — 99223 1ST HOSP IP/OBS HIGH 75: CPT | Performed by: INTERNAL MEDICINE

## 2022-01-01 PROCEDURE — 74011250636 HC RX REV CODE- 250/636: Performed by: FAMILY MEDICINE

## 2022-01-01 PROCEDURE — 74011250637 HC RX REV CODE- 250/637: Performed by: NURSE PRACTITIONER

## 2022-01-01 PROCEDURE — 70450 CT HEAD/BRAIN W/O DYE: CPT

## 2022-01-01 PROCEDURE — 77030019908 HC STETH ESOPH SIMS -A: Performed by: ANESTHESIOLOGY

## 2022-01-01 PROCEDURE — 77030041076 HC DRSG AG OPTICELL MDII -A

## 2022-01-01 PROCEDURE — 74011000258 HC RX REV CODE- 258

## 2022-01-01 PROCEDURE — 83036 HEMOGLOBIN GLYCOSYLATED A1C: CPT

## 2022-01-01 PROCEDURE — 99233 SBSQ HOSP IP/OBS HIGH 50: CPT | Performed by: STUDENT IN AN ORGANIZED HEALTH CARE EDUCATION/TRAINING PROGRAM

## 2022-01-01 PROCEDURE — 96374 THER/PROPH/DIAG INJ IV PUSH: CPT

## 2022-01-01 PROCEDURE — 82247 BILIRUBIN TOTAL: CPT

## 2022-01-01 PROCEDURE — P9047 ALBUMIN (HUMAN), 25%, 50ML: HCPCS | Performed by: HOSPITALIST

## 2022-01-01 PROCEDURE — 36600 WITHDRAWAL OF ARTERIAL BLOOD: CPT

## 2022-01-01 PROCEDURE — 11046 DBRDMT MUSC&/FSCA EA ADDL: CPT | Performed by: SURGERY

## 2022-01-01 PROCEDURE — P9016 RBC LEUKOCYTES REDUCED: HCPCS

## 2022-01-01 PROCEDURE — 99222 1ST HOSP IP/OBS MODERATE 55: CPT | Performed by: NURSE PRACTITIONER

## 2022-01-01 PROCEDURE — 77030008684 HC TU ET CUF COVD -B: Performed by: ANESTHESIOLOGY

## 2022-01-01 PROCEDURE — 99233 SBSQ HOSP IP/OBS HIGH 50: CPT | Performed by: PHYSICAL MEDICINE & REHABILITATION

## 2022-01-01 PROCEDURE — 99152 MOD SED SAME PHYS/QHP 5/>YRS: CPT

## 2022-01-01 PROCEDURE — 86900 BLOOD TYPING SEROLOGIC ABO: CPT

## 2022-01-01 PROCEDURE — 0KDP0ZZ EXTRACTION OF LEFT HIP MUSCLE, OPEN APPROACH: ICD-10-PCS | Performed by: SURGERY

## 2022-01-01 PROCEDURE — 97110 THERAPEUTIC EXERCISES: CPT

## 2022-01-01 PROCEDURE — 71250 CT THORAX DX C-: CPT

## 2022-01-01 PROCEDURE — P9045 ALBUMIN (HUMAN), 5%, 250 ML: HCPCS | Performed by: NURSE ANESTHETIST, CERTIFIED REGISTERED

## 2022-01-01 PROCEDURE — 84156 ASSAY OF PROTEIN URINE: CPT

## 2022-01-01 PROCEDURE — 74011250636 HC RX REV CODE- 250/636: Performed by: RADIOLOGY

## 2022-01-01 PROCEDURE — 84460 ALANINE AMINO (ALT) (SGPT): CPT

## 2022-01-01 PROCEDURE — 02HV33Z INSERTION OF INFUSION DEVICE INTO SUPERIOR VENA CAVA, PERCUTANEOUS APPROACH: ICD-10-PCS | Performed by: STUDENT IN AN ORGANIZED HEALTH CARE EDUCATION/TRAINING PROGRAM

## 2022-01-01 PROCEDURE — 2709999900 HC NON-CHARGEABLE SUPPLY: Performed by: SURGERY

## 2022-01-01 PROCEDURE — 77030010507 HC ADH SKN DERMBND J&J -B

## 2022-01-01 PROCEDURE — 5A1D70Z PERFORMANCE OF URINARY FILTRATION, INTERMITTENT, LESS THAN 6 HOURS PER DAY: ICD-10-PCS | Performed by: INTERNAL MEDICINE

## 2022-01-01 PROCEDURE — 84478 ASSAY OF TRIGLYCERIDES: CPT

## 2022-01-01 PROCEDURE — 77030040392 HC DRSG OPTIFOAM MDII -A

## 2022-01-01 PROCEDURE — 77030026438 HC STYL ET INTUB CARD -A: Performed by: ANESTHESIOLOGY

## 2022-01-01 PROCEDURE — 85018 HEMOGLOBIN: CPT

## 2022-01-01 PROCEDURE — 96368 THER/DIAG CONCURRENT INF: CPT

## 2022-01-01 PROCEDURE — 84075 ASSAY ALKALINE PHOSPHATASE: CPT

## 2022-01-01 PROCEDURE — 96361 HYDRATE IV INFUSION ADD-ON: CPT

## 2022-01-01 PROCEDURE — 36430 TRANSFUSION BLD/BLD COMPNT: CPT

## 2022-01-01 PROCEDURE — 84300 ASSAY OF URINE SODIUM: CPT

## 2022-01-01 PROCEDURE — 99231 SBSQ HOSP IP/OBS SF/LOW 25: CPT | Performed by: NURSE PRACTITIONER

## 2022-01-01 PROCEDURE — 99221 1ST HOSP IP/OBS SF/LOW 40: CPT | Performed by: NURSE PRACTITIONER

## 2022-01-01 PROCEDURE — 84132 ASSAY OF SERUM POTASSIUM: CPT

## 2022-01-01 PROCEDURE — 87075 CULTR BACTERIA EXCEPT BLOOD: CPT

## 2022-01-01 PROCEDURE — 97167 OT EVAL HIGH COMPLEX 60 MIN: CPT

## 2022-01-01 PROCEDURE — 0JH63XZ INSERTION OF TUNNELED VASCULAR ACCESS DEVICE INTO CHEST SUBCUTANEOUS TISSUE AND FASCIA, PERCUTANEOUS APPROACH: ICD-10-PCS | Performed by: STUDENT IN AN ORGANIZED HEALTH CARE EDUCATION/TRAINING PROGRAM

## 2022-01-01 PROCEDURE — 83880 ASSAY OF NATRIURETIC PEPTIDE: CPT

## 2022-01-01 PROCEDURE — 30233N1 TRANSFUSION OF NONAUTOLOGOUS RED BLOOD CELLS INTO PERIPHERAL VEIN, PERCUTANEOUS APPROACH: ICD-10-PCS | Performed by: INTERNAL MEDICINE

## 2022-01-01 PROCEDURE — 86704 HEP B CORE ANTIBODY TOTAL: CPT

## 2022-01-01 PROCEDURE — 76705 ECHO EXAM OF ABDOMEN: CPT

## 2022-01-01 PROCEDURE — 77030011808 HC STPLR ENDOSCOPIC J&J -D: Performed by: SURGERY

## 2022-01-01 PROCEDURE — 74011250636 HC RX REV CODE- 250/636: Performed by: ANESTHESIOLOGY

## 2022-01-01 RX ORDER — CALCIUM GLUCONATE 94 MG/ML
INJECTION, SOLUTION INTRAVENOUS
Status: COMPLETED
Start: 2022-01-01 | End: 2022-01-01

## 2022-01-01 RX ORDER — PANTOPRAZOLE SODIUM 20 MG/1
40 TABLET, DELAYED RELEASE ORAL DAILY
Qty: 60 TABLET | Refills: 0 | Status: SHIPPED
Start: 2022-01-01 | End: 2022-01-01

## 2022-01-01 RX ORDER — DEXTROSE MONOHYDRATE AND SODIUM CHLORIDE 5; .45 G/100ML; G/100ML
150 INJECTION, SOLUTION INTRAVENOUS CONTINUOUS
Status: DISCONTINUED | OUTPATIENT
Start: 2022-01-01 | End: 2022-01-01

## 2022-01-01 RX ORDER — ROCURONIUM BROMIDE 10 MG/ML
INJECTION, SOLUTION INTRAVENOUS AS NEEDED
Status: DISCONTINUED | OUTPATIENT
Start: 2022-01-01 | End: 2022-01-01 | Stop reason: HOSPADM

## 2022-01-01 RX ORDER — VANCOMYCIN HYDROCHLORIDE 1 G/20ML
1 INJECTION, POWDER, LYOPHILIZED, FOR SOLUTION INTRAVENOUS
Qty: 12 EACH | Refills: 1 | Status: SHIPPED
Start: 2022-01-01 | End: 2022-01-01

## 2022-01-01 RX ORDER — INSULIN LISPRO 100 [IU]/ML
INJECTION, SOLUTION INTRAVENOUS; SUBCUTANEOUS EVERY 6 HOURS
Status: DISCONTINUED | OUTPATIENT
Start: 2022-01-01 | End: 2022-01-01 | Stop reason: HOSPADM

## 2022-01-01 RX ORDER — HEPARIN SODIUM 5000 [USP'U]/ML
5000 INJECTION, SOLUTION INTRAVENOUS; SUBCUTANEOUS EVERY 8 HOURS
Status: DISCONTINUED | OUTPATIENT
Start: 2022-01-01 | End: 2022-01-01 | Stop reason: HOSPADM

## 2022-01-01 RX ORDER — POLYETHYLENE GLYCOL 3350 17 G/17G
17 POWDER, FOR SOLUTION ORAL DAILY PRN
Status: DISCONTINUED | OUTPATIENT
Start: 2022-01-01 | End: 2022-01-01 | Stop reason: HOSPADM

## 2022-01-01 RX ORDER — CLONIDINE HYDROCHLORIDE 0.1 MG/1
0.1 TABLET ORAL 2 TIMES DAILY
Status: DISCONTINUED | OUTPATIENT
Start: 2022-01-01 | End: 2022-01-01

## 2022-01-01 RX ORDER — HYDROCORTISONE SODIUM SUCCINATE 100 MG/2ML
25 INJECTION, POWDER, FOR SOLUTION INTRAMUSCULAR; INTRAVENOUS DAILY
Status: DISCONTINUED | OUTPATIENT
Start: 2022-01-01 | End: 2022-01-01

## 2022-01-01 RX ORDER — MIDAZOLAM HYDROCHLORIDE 1 MG/ML
.5-2 INJECTION, SOLUTION INTRAMUSCULAR; INTRAVENOUS
Status: DISCONTINUED | OUTPATIENT
Start: 2022-01-01 | End: 2022-01-01

## 2022-01-01 RX ORDER — METOPROLOL TARTRATE 5 MG/5ML
2.5 INJECTION INTRAVENOUS
Status: DISCONTINUED | OUTPATIENT
Start: 2022-01-01 | End: 2022-01-01 | Stop reason: HOSPADM

## 2022-01-01 RX ORDER — NOREPINEPHRINE BITARTRATE/D5W 8 MG/250ML
.5-16 PLASTIC BAG, INJECTION (ML) INTRAVENOUS
Status: DISCONTINUED | OUTPATIENT
Start: 2022-01-01 | End: 2022-01-01

## 2022-01-01 RX ORDER — BUMETANIDE 0.25 MG/ML
2 INJECTION INTRAMUSCULAR; INTRAVENOUS 2 TIMES DAILY
Status: DISCONTINUED | OUTPATIENT
Start: 2022-01-01 | End: 2022-01-01

## 2022-01-01 RX ORDER — ALBUMIN HUMAN 250 G/1000ML
12.5 SOLUTION INTRAVENOUS ONCE
Status: COMPLETED | OUTPATIENT
Start: 2022-01-01 | End: 2022-01-01

## 2022-01-01 RX ORDER — POTASSIUM CHLORIDE 750 MG/1
40 TABLET, FILM COATED, EXTENDED RELEASE ORAL ONCE
Status: COMPLETED | OUTPATIENT
Start: 2022-01-01 | End: 2022-01-01

## 2022-01-01 RX ORDER — POTASSIUM CHLORIDE 7.45 MG/ML
10 INJECTION INTRAVENOUS
Status: COMPLETED | OUTPATIENT
Start: 2022-01-01 | End: 2022-01-01

## 2022-01-01 RX ORDER — MAGNESIUM SULFATE 100 %
4 CRYSTALS MISCELLANEOUS AS NEEDED
Status: DISCONTINUED | OUTPATIENT
Start: 2022-01-01 | End: 2022-01-01

## 2022-01-01 RX ORDER — ALBUMIN HUMAN 50 G/1000ML
SOLUTION INTRAVENOUS AS NEEDED
Status: DISCONTINUED | OUTPATIENT
Start: 2022-01-01 | End: 2022-01-01 | Stop reason: HOSPADM

## 2022-01-01 RX ORDER — POTASSIUM CHLORIDE 750 MG/1
20 TABLET, FILM COATED, EXTENDED RELEASE ORAL EVERY 6 HOURS
Status: COMPLETED | OUTPATIENT
Start: 2022-01-01 | End: 2022-01-01

## 2022-01-01 RX ORDER — FACIAL-BODY WIPES
10 EACH TOPICAL DAILY PRN
Status: DISCONTINUED | OUTPATIENT
Start: 2022-01-01 | End: 2022-01-01 | Stop reason: HOSPADM

## 2022-01-01 RX ORDER — MAGNESIUM SULFATE 100 %
4 CRYSTALS MISCELLANEOUS AS NEEDED
Status: DISCONTINUED | OUTPATIENT
Start: 2022-01-01 | End: 2022-01-01 | Stop reason: SDUPTHER

## 2022-01-01 RX ORDER — HEPARIN 100 UNIT/ML
500 SYRINGE INTRAVENOUS ONCE
Status: COMPLETED | OUTPATIENT
Start: 2022-01-01 | End: 2022-01-01

## 2022-01-01 RX ORDER — VANCOMYCIN/0.9 % SOD CHLORIDE 1.5G/250ML
1500 PLASTIC BAG, INJECTION (ML) INTRAVENOUS EVERY 24 HOURS
Status: DISCONTINUED | OUTPATIENT
Start: 2022-01-01 | End: 2022-01-01

## 2022-01-01 RX ORDER — SODIUM CHLORIDE 0.9 % (FLUSH) 0.9 %
5-40 SYRINGE (ML) INJECTION AS NEEDED
Status: DISCONTINUED | OUTPATIENT
Start: 2022-01-01 | End: 2022-01-01 | Stop reason: HOSPADM

## 2022-01-01 RX ORDER — SODIUM CHLORIDE, SODIUM LACTATE, POTASSIUM CHLORIDE, CALCIUM CHLORIDE 600; 310; 30; 20 MG/100ML; MG/100ML; MG/100ML; MG/100ML
75 INJECTION, SOLUTION INTRAVENOUS CONTINUOUS
Status: DISCONTINUED | OUTPATIENT
Start: 2022-01-01 | End: 2022-01-01 | Stop reason: HOSPADM

## 2022-01-01 RX ORDER — MIDODRINE HYDROCHLORIDE 5 MG/1
10 TABLET ORAL
Status: ACTIVE | OUTPATIENT
Start: 2022-01-01 | End: 2022-01-01

## 2022-01-01 RX ORDER — SODIUM CHLORIDE 9 MG/ML
50 INJECTION, SOLUTION INTRAVENOUS CONTINUOUS
Status: DISCONTINUED | OUTPATIENT
Start: 2022-01-01 | End: 2022-01-01 | Stop reason: HOSPADM

## 2022-01-01 RX ORDER — AMIODARONE HYDROCHLORIDE 200 MG/1
200 TABLET ORAL 2 TIMES DAILY
Qty: 60 TABLET | Refills: 1 | Status: SHIPPED
Start: 2022-01-01 | End: 2022-01-01

## 2022-01-01 RX ORDER — INSULIN LISPRO 100 [IU]/ML
INJECTION, SOLUTION INTRAVENOUS; SUBCUTANEOUS EVERY 6 HOURS
Status: DISCONTINUED | OUTPATIENT
Start: 2022-01-01 | End: 2022-01-01

## 2022-01-01 RX ORDER — MAGNESIUM SULFATE 100 %
4 CRYSTALS MISCELLANEOUS AS NEEDED
Status: DISCONTINUED | OUTPATIENT
Start: 2022-01-01 | End: 2022-01-01 | Stop reason: HOSPADM

## 2022-01-01 RX ORDER — SUCCINYLCHOLINE CHLORIDE 20 MG/ML
INJECTION INTRAMUSCULAR; INTRAVENOUS AS NEEDED
Status: DISCONTINUED | OUTPATIENT
Start: 2022-01-01 | End: 2022-01-01 | Stop reason: HOSPADM

## 2022-01-01 RX ORDER — ATROPINE SULFATE 1 MG/ML
1 INJECTION, SOLUTION INTRAVENOUS ONCE
Status: DISCONTINUED | OUTPATIENT
Start: 2022-01-01 | End: 2022-01-01 | Stop reason: SDUPTHER

## 2022-01-01 RX ORDER — MIDAZOLAM HYDROCHLORIDE 1 MG/ML
1 INJECTION, SOLUTION INTRAMUSCULAR; INTRAVENOUS AS NEEDED
Status: DISCONTINUED | OUTPATIENT
Start: 2022-01-01 | End: 2022-01-01 | Stop reason: HOSPADM

## 2022-01-01 RX ORDER — ETOMIDATE 2 MG/ML
INJECTION INTRAVENOUS AS NEEDED
Status: DISCONTINUED | OUTPATIENT
Start: 2022-01-01 | End: 2022-01-01 | Stop reason: HOSPADM

## 2022-01-01 RX ORDER — FLUCONAZOLE 2 MG/ML
200 INJECTION, SOLUTION INTRAVENOUS EVERY 24 HOURS
Status: DISCONTINUED | OUTPATIENT
Start: 2022-01-01 | End: 2022-01-01

## 2022-01-01 RX ORDER — AMIODARONE HYDROCHLORIDE 200 MG/1
200 TABLET ORAL DAILY
Qty: 60 TABLET | Refills: 1 | Status: ON HOLD
Start: 2022-01-01 | End: 2022-01-01 | Stop reason: SDUPTHER

## 2022-01-01 RX ORDER — VANCOMYCIN 2 GRAM/500 ML IN 0.9 % SODIUM CHLORIDE INTRAVENOUS
2000
Status: COMPLETED | OUTPATIENT
Start: 2022-01-01 | End: 2022-01-01

## 2022-01-01 RX ORDER — ONDANSETRON 4 MG/1
4 TABLET, ORALLY DISINTEGRATING ORAL
Status: DISCONTINUED | OUTPATIENT
Start: 2022-01-01 | End: 2022-01-01 | Stop reason: HOSPADM

## 2022-01-01 RX ORDER — ACETAMINOPHEN 325 MG/1
650 TABLET ORAL
Status: DISCONTINUED | OUTPATIENT
Start: 2022-01-01 | End: 2022-01-01 | Stop reason: HOSPADM

## 2022-01-01 RX ORDER — ATROPINE SULFATE 0.1 MG/ML
1 INJECTION INTRAVENOUS
Status: COMPLETED | OUTPATIENT
Start: 2022-01-01 | End: 2022-01-01

## 2022-01-01 RX ORDER — ALBUMIN HUMAN 50 G/1000ML
25 SOLUTION INTRAVENOUS ONCE
Status: COMPLETED | OUTPATIENT
Start: 2022-01-01 | End: 2022-01-01

## 2022-01-01 RX ORDER — HYDROMORPHONE HYDROCHLORIDE 1 MG/ML
0.5 INJECTION, SOLUTION INTRAMUSCULAR; INTRAVENOUS; SUBCUTANEOUS
Status: DISCONTINUED | OUTPATIENT
Start: 2022-01-01 | End: 2022-01-01

## 2022-01-01 RX ORDER — ASPIRIN 81 MG/1
81 TABLET ORAL DAILY
Status: DISCONTINUED | OUTPATIENT
Start: 2022-01-01 | End: 2022-01-01 | Stop reason: HOSPADM

## 2022-01-01 RX ORDER — DEXTROSE MONOHYDRATE 100 MG/ML
0-250 INJECTION, SOLUTION INTRAVENOUS AS NEEDED
Status: DISCONTINUED | OUTPATIENT
Start: 2022-01-01 | End: 2022-01-01

## 2022-01-01 RX ORDER — FENTANYL CITRATE 50 UG/ML
12.5-5 INJECTION, SOLUTION INTRAMUSCULAR; INTRAVENOUS
Status: DISCONTINUED | OUTPATIENT
Start: 2022-01-01 | End: 2022-01-01

## 2022-01-01 RX ORDER — OXYCODONE AND ACETAMINOPHEN 5; 325 MG/1; MG/1
1 TABLET ORAL AS NEEDED
Status: DISCONTINUED | OUTPATIENT
Start: 2022-01-01 | End: 2022-01-01 | Stop reason: HOSPADM

## 2022-01-01 RX ORDER — HYDRALAZINE HYDROCHLORIDE 20 MG/ML
20 INJECTION INTRAMUSCULAR; INTRAVENOUS
Status: DISCONTINUED | OUTPATIENT
Start: 2022-01-01 | End: 2022-01-01 | Stop reason: HOSPADM

## 2022-01-01 RX ORDER — ATORVASTATIN CALCIUM 20 MG/1
40 TABLET, FILM COATED ORAL DAILY
Qty: 30 TABLET | Refills: 1 | Status: SHIPPED
Start: 2022-01-01 | End: 2022-01-01

## 2022-01-01 RX ORDER — ALBUMIN HUMAN 250 G/1000ML
25 SOLUTION INTRAVENOUS
Status: DISCONTINUED | OUTPATIENT
Start: 2022-01-01 | End: 2022-01-01 | Stop reason: HOSPADM

## 2022-01-01 RX ORDER — NOREPINEPHRINE BITARTRATE/D5W 8 MG/250ML
PLASTIC BAG, INJECTION (ML) INTRAVENOUS
Status: DISCONTINUED
Start: 2022-01-01 | End: 2022-01-01

## 2022-01-01 RX ORDER — ATORVASTATIN CALCIUM 40 MG/1
40 TABLET, FILM COATED ORAL DAILY
Status: DISCONTINUED | OUTPATIENT
Start: 2022-01-01 | End: 2022-01-01 | Stop reason: HOSPADM

## 2022-01-01 RX ORDER — INSULIN LISPRO 100 [IU]/ML
INJECTION, SOLUTION INTRAVENOUS; SUBCUTANEOUS
Status: DISCONTINUED | OUTPATIENT
Start: 2022-01-01 | End: 2022-01-01 | Stop reason: HOSPADM

## 2022-01-01 RX ORDER — CLONIDINE HYDROCHLORIDE 0.1 MG/1
0.1 TABLET ORAL 2 TIMES DAILY
Qty: 15 TABLET | Refills: 0 | Status: SHIPPED
Start: 2022-01-01 | End: 2022-01-01

## 2022-01-01 RX ORDER — HYDROCORTISONE SODIUM SUCCINATE 100 MG/2ML
50 INJECTION, POWDER, FOR SOLUTION INTRAMUSCULAR; INTRAVENOUS EVERY 12 HOURS
Status: DISCONTINUED | OUTPATIENT
Start: 2022-01-01 | End: 2022-01-01

## 2022-01-01 RX ORDER — SODIUM CHLORIDE 0.9 % (FLUSH) 0.9 %
5-40 SYRINGE (ML) INJECTION EVERY 8 HOURS
Status: DISCONTINUED | OUTPATIENT
Start: 2022-01-01 | End: 2022-01-01 | Stop reason: HOSPADM

## 2022-01-01 RX ORDER — ATORVASTATIN CALCIUM 40 MG/1
40 TABLET, FILM COATED ORAL DAILY
Status: DISCONTINUED | OUTPATIENT
Start: 2022-01-01 | End: 2022-01-01

## 2022-01-01 RX ORDER — MORPHINE SULFATE 2 MG/ML
2 INJECTION, SOLUTION INTRAMUSCULAR; INTRAVENOUS
Status: DISCONTINUED | OUTPATIENT
Start: 2022-01-01 | End: 2022-01-01 | Stop reason: HOSPADM

## 2022-01-01 RX ORDER — LIDOCAINE HYDROCHLORIDE 20 MG/ML
10 INJECTION, SOLUTION INFILTRATION; PERINEURAL ONCE
Status: COMPLETED | OUTPATIENT
Start: 2022-01-01 | End: 2022-01-01

## 2022-01-01 RX ORDER — ACETAMINOPHEN 650 MG/1
650 SUPPOSITORY RECTAL
Status: DISCONTINUED | OUTPATIENT
Start: 2022-01-01 | End: 2022-01-01 | Stop reason: HOSPADM

## 2022-01-01 RX ORDER — DOPAMINE HYDROCHLORIDE 320 MG/100ML
0-20 INJECTION, SOLUTION INTRAVENOUS
Status: DISCONTINUED | OUTPATIENT
Start: 2022-01-01 | End: 2022-01-01

## 2022-01-01 RX ORDER — CEFDINIR 300 MG/1
300 CAPSULE ORAL 2 TIMES DAILY
Qty: 10 CAPSULE | Refills: 0 | Status: SHIPPED
Start: 2022-01-01 | End: 2022-01-01

## 2022-01-01 RX ORDER — DOPAMINE HYDROCHLORIDE 320 MG/100ML
INJECTION, SOLUTION INTRAVENOUS
Status: COMPLETED
Start: 2022-01-01 | End: 2022-01-01

## 2022-01-01 RX ORDER — ATROPINE SULFATE 0.1 MG/ML
INJECTION INTRAVENOUS
Status: COMPLETED
Start: 2022-01-01 | End: 2022-01-01

## 2022-01-01 RX ORDER — LIDOCAINE HYDROCHLORIDE 20 MG/ML
10 INJECTION, SOLUTION EPIDURAL; INFILTRATION; INTRACAUDAL; PERINEURAL ONCE
Status: COMPLETED | OUTPATIENT
Start: 2022-01-01 | End: 2022-01-01

## 2022-01-01 RX ORDER — MORPHINE SULFATE 2 MG/ML
1 INJECTION, SOLUTION INTRAMUSCULAR; INTRAVENOUS
Status: DISCONTINUED | OUTPATIENT
Start: 2022-01-01 | End: 2022-01-01 | Stop reason: HOSPADM

## 2022-01-01 RX ORDER — FAMOTIDINE 20 MG/1
20 TABLET, FILM COATED ORAL EVERY EVENING
Status: DISCONTINUED | OUTPATIENT
Start: 2022-01-01 | End: 2022-01-01

## 2022-01-01 RX ORDER — HEPARIN SODIUM 200 [USP'U]/100ML
400 INJECTION, SOLUTION INTRAVENOUS ONCE
Status: COMPLETED | OUTPATIENT
Start: 2022-01-01 | End: 2022-01-01

## 2022-01-01 RX ORDER — AMIODARONE HYDROCHLORIDE 200 MG/1
200 TABLET ORAL DAILY
Status: DISCONTINUED | OUTPATIENT
Start: 2022-01-01 | End: 2022-01-01

## 2022-01-01 RX ORDER — METRONIDAZOLE 250 MG/1
500 TABLET ORAL EVERY 12 HOURS
Status: COMPLETED | OUTPATIENT
Start: 2022-01-01 | End: 2022-01-01

## 2022-01-01 RX ORDER — AMOXICILLIN 250 MG
2 CAPSULE ORAL
Status: DISCONTINUED | OUTPATIENT
Start: 2022-01-01 | End: 2022-01-01 | Stop reason: HOSPADM

## 2022-01-01 RX ORDER — MIDODRINE HYDROCHLORIDE 5 MG/1
10 TABLET ORAL
Status: DISCONTINUED | OUTPATIENT
Start: 2022-01-01 | End: 2022-01-01 | Stop reason: HOSPADM

## 2022-01-01 RX ORDER — POTASSIUM CHLORIDE 20 MEQ/1
20 TABLET, EXTENDED RELEASE ORAL
Status: COMPLETED | OUTPATIENT
Start: 2022-01-01 | End: 2022-01-01

## 2022-01-01 RX ORDER — NOREPINEPHRINE BITARTRATE/D5W 8 MG/250ML
.5-5 PLASTIC BAG, INJECTION (ML) INTRAVENOUS
Status: DISCONTINUED | OUTPATIENT
Start: 2022-01-01 | End: 2022-01-01

## 2022-01-01 RX ORDER — ASPIRIN 81 MG/1
81 TABLET ORAL DAILY
Status: DISCONTINUED | OUTPATIENT
Start: 2022-01-01 | End: 2022-01-01

## 2022-01-01 RX ORDER — MIDAZOLAM HYDROCHLORIDE 1 MG/ML
5 INJECTION, SOLUTION INTRAMUSCULAR; INTRAVENOUS
Status: DISCONTINUED | OUTPATIENT
Start: 2022-01-01 | End: 2022-01-01

## 2022-01-01 RX ORDER — BUMETANIDE 0.25 MG/ML
2 INJECTION INTRAMUSCULAR; INTRAVENOUS 2 TIMES DAILY
Status: COMPLETED | OUTPATIENT
Start: 2022-01-01 | End: 2022-01-01

## 2022-01-01 RX ORDER — ONDANSETRON 2 MG/ML
4 INJECTION INTRAMUSCULAR; INTRAVENOUS
Status: DISCONTINUED | OUTPATIENT
Start: 2022-01-01 | End: 2022-01-01 | Stop reason: HOSPADM

## 2022-01-01 RX ORDER — FAMOTIDINE 20 MG/1
20 TABLET, FILM COATED ORAL DAILY
Status: DISCONTINUED | OUTPATIENT
Start: 2022-01-01 | End: 2022-01-01 | Stop reason: HOSPADM

## 2022-01-01 RX ORDER — AMIODARONE HYDROCHLORIDE 200 MG/1
200 TABLET ORAL 2 TIMES DAILY
Status: DISCONTINUED | OUTPATIENT
Start: 2022-01-01 | End: 2022-01-01

## 2022-01-01 RX ORDER — MORPHINE SULFATE 20 MG/ML
10 SOLUTION ORAL
Status: DISCONTINUED | OUTPATIENT
Start: 2022-01-01 | End: 2022-01-01 | Stop reason: HOSPADM

## 2022-01-01 RX ORDER — INSULIN GLARGINE 100 [IU]/ML
6 INJECTION, SOLUTION SUBCUTANEOUS
Status: DISCONTINUED | OUTPATIENT
Start: 2022-01-01 | End: 2022-01-01 | Stop reason: HOSPADM

## 2022-01-01 RX ORDER — LIDOCAINE HYDROCHLORIDE 20 MG/ML
15 SOLUTION OROPHARYNGEAL AS NEEDED
Status: DISCONTINUED | OUTPATIENT
Start: 2022-01-01 | End: 2022-01-01

## 2022-01-01 RX ORDER — CLONIDINE HYDROCHLORIDE 0.1 MG/1
0.1 TABLET ORAL 2 TIMES DAILY
Qty: 15 TABLET | Refills: 0 | Status: SHIPPED
Start: 2022-01-01 | End: 2022-01-01 | Stop reason: SDUPTHER

## 2022-01-01 RX ORDER — CLINDAMYCIN PHOSPHATE 600 MG/50ML
600 INJECTION INTRAVENOUS ONCE
Status: COMPLETED | OUTPATIENT
Start: 2022-01-01 | End: 2022-01-01

## 2022-01-01 RX ORDER — SODIUM CHLORIDE 450 MG/100ML
50 INJECTION, SOLUTION INTRAVENOUS CONTINUOUS
Status: DISPENSED | OUTPATIENT
Start: 2022-01-01 | End: 2022-01-01

## 2022-01-01 RX ORDER — KETOROLAC TROMETHAMINE 30 MG/ML
30 INJECTION, SOLUTION INTRAMUSCULAR; INTRAVENOUS
Status: DISCONTINUED | OUTPATIENT
Start: 2022-01-01 | End: 2022-01-01 | Stop reason: HOSPADM

## 2022-01-01 RX ORDER — BALSAM PERU/CASTOR OIL
OINTMENT (GRAM) TOPICAL 2 TIMES DAILY
Status: DISCONTINUED | OUTPATIENT
Start: 2022-01-01 | End: 2022-01-01 | Stop reason: HOSPADM

## 2022-01-01 RX ORDER — FLUCONAZOLE 2 MG/ML
400 INJECTION, SOLUTION INTRAVENOUS ONCE
Status: COMPLETED | OUTPATIENT
Start: 2022-01-01 | End: 2022-01-01

## 2022-01-01 RX ORDER — LIDOCAINE HYDROCHLORIDE 10 MG/ML
0.1 INJECTION, SOLUTION EPIDURAL; INFILTRATION; INTRACAUDAL; PERINEURAL AS NEEDED
Status: DISCONTINUED | OUTPATIENT
Start: 2022-01-01 | End: 2022-01-01 | Stop reason: HOSPADM

## 2022-01-01 RX ORDER — VANCOMYCIN 2 GRAM/500 ML IN 0.9 % SODIUM CHLORIDE INTRAVENOUS
2000 ONCE
Status: COMPLETED | OUTPATIENT
Start: 2022-01-01 | End: 2022-01-01

## 2022-01-01 RX ORDER — HYDROMORPHONE HYDROCHLORIDE 1 MG/ML
1 INJECTION, SOLUTION INTRAMUSCULAR; INTRAVENOUS; SUBCUTANEOUS
Status: DISCONTINUED | OUTPATIENT
Start: 2022-01-01 | End: 2022-01-01 | Stop reason: HOSPADM

## 2022-01-01 RX ORDER — FENTANYL CITRATE 50 UG/ML
25-100 INJECTION, SOLUTION INTRAMUSCULAR; INTRAVENOUS
Status: DISCONTINUED | OUTPATIENT
Start: 2022-01-01 | End: 2022-01-01

## 2022-01-01 RX ORDER — FAMOTIDINE 20 MG/1
TABLET, FILM COATED ORAL
COMMUNITY
Start: 2022-01-01 | End: 2022-01-01

## 2022-01-01 RX ORDER — AMLODIPINE BESYLATE 5 MG/1
10 TABLET ORAL DAILY
Status: DISCONTINUED | OUTPATIENT
Start: 2022-01-01 | End: 2022-01-01 | Stop reason: HOSPADM

## 2022-01-01 RX ORDER — LORAZEPAM 2 MG/ML
1 CONCENTRATE ORAL
Status: DISCONTINUED | OUTPATIENT
Start: 2022-01-01 | End: 2022-01-01 | Stop reason: HOSPADM

## 2022-01-01 RX ORDER — MIDODRINE HYDROCHLORIDE 5 MG/1
10 TABLET ORAL ONCE
Status: COMPLETED | OUTPATIENT
Start: 2022-01-01 | End: 2022-01-01

## 2022-01-01 RX ORDER — GLYCOPYRROLATE 0.2 MG/ML
0.2 INJECTION INTRAMUSCULAR; INTRAVENOUS
Status: DISCONTINUED | OUTPATIENT
Start: 2022-01-01 | End: 2022-01-01 | Stop reason: HOSPADM

## 2022-01-01 RX ORDER — MELATONIN
1000 DAILY
Status: DISCONTINUED | OUTPATIENT
Start: 2022-01-01 | End: 2022-01-01

## 2022-01-01 RX ORDER — VANCOMYCIN/0.9 % SOD CHLORIDE 1.5G/250ML
1500 PLASTIC BAG, INJECTION (ML) INTRAVENOUS ONCE
Status: COMPLETED | OUTPATIENT
Start: 2022-01-01 | End: 2022-01-01

## 2022-01-01 RX ORDER — INSULIN LISPRO 100 [IU]/ML
INJECTION, SOLUTION INTRAVENOUS; SUBCUTANEOUS
Status: DISCONTINUED | OUTPATIENT
Start: 2022-01-01 | End: 2022-01-01

## 2022-01-01 RX ORDER — HYDROCORTISONE SODIUM SUCCINATE 100 MG/2ML
50 INJECTION, POWDER, FOR SOLUTION INTRAMUSCULAR; INTRAVENOUS DAILY
Status: DISCONTINUED | OUTPATIENT
Start: 2022-01-01 | End: 2022-01-01

## 2022-01-01 RX ORDER — MIDODRINE HYDROCHLORIDE 5 MG/1
10 TABLET ORAL
Status: DISCONTINUED | OUTPATIENT
Start: 2022-01-01 | End: 2022-01-01

## 2022-01-01 RX ORDER — MIDAZOLAM HYDROCHLORIDE 1 MG/ML
2 INJECTION, SOLUTION INTRAMUSCULAR; INTRAVENOUS
Status: DISCONTINUED | OUTPATIENT
Start: 2022-01-01 | End: 2022-01-01 | Stop reason: HOSPADM

## 2022-01-01 RX ORDER — POTASSIUM CHLORIDE 750 MG/1
40 TABLET, FILM COATED, EXTENDED RELEASE ORAL
Status: COMPLETED | OUTPATIENT
Start: 2022-01-01 | End: 2022-01-01

## 2022-01-01 RX ORDER — CLONIDINE HYDROCHLORIDE 0.1 MG/1
0.1 TABLET ORAL 3 TIMES DAILY
Qty: 15 TABLET | Refills: 0 | Status: SHIPPED
Start: 2022-01-01 | End: 2022-01-01 | Stop reason: SDUPTHER

## 2022-01-01 RX ORDER — HEPARIN 100 UNIT/ML
300 SYRINGE INTRAVENOUS ONCE
Status: COMPLETED | OUTPATIENT
Start: 2022-01-01 | End: 2022-01-01

## 2022-01-01 RX ORDER — DEXTROSE MONOHYDRATE 100 MG/ML
0-250 INJECTION, SOLUTION INTRAVENOUS AS NEEDED
Status: DISCONTINUED | OUTPATIENT
Start: 2022-01-01 | End: 2022-01-01 | Stop reason: HOSPADM

## 2022-01-01 RX ORDER — SCOLOPAMINE TRANSDERMAL SYSTEM 1 MG/1
1 PATCH, EXTENDED RELEASE TRANSDERMAL
Status: DISCONTINUED | OUTPATIENT
Start: 2022-01-01 | End: 2022-01-01 | Stop reason: HOSPADM

## 2022-01-01 RX ORDER — BUPIVACAINE HYDROCHLORIDE 5 MG/ML
INJECTION, SOLUTION EPIDURAL; INTRACAUDAL AS NEEDED
Status: DISCONTINUED | OUTPATIENT
Start: 2022-01-01 | End: 2022-01-01 | Stop reason: HOSPADM

## 2022-01-01 RX ORDER — FUROSEMIDE 20 MG/1
TABLET ORAL
COMMUNITY
Start: 2022-01-01 | End: 2022-01-01

## 2022-01-01 RX ORDER — DEXAMETHASONE SODIUM PHOSPHATE 4 MG/ML
INJECTION, SOLUTION INTRA-ARTICULAR; INTRALESIONAL; INTRAMUSCULAR; INTRAVENOUS; SOFT TISSUE AS NEEDED
Status: DISCONTINUED | OUTPATIENT
Start: 2022-01-01 | End: 2022-01-01 | Stop reason: HOSPADM

## 2022-01-01 RX ORDER — ALBUTEROL SULFATE 0.83 MG/ML
10 SOLUTION RESPIRATORY (INHALATION)
Status: COMPLETED | OUTPATIENT
Start: 2022-01-01 | End: 2022-01-01

## 2022-01-01 RX ORDER — ACETAMINOPHEN 650 MG/1
650 SUPPOSITORY RECTAL
Status: DISCONTINUED | OUTPATIENT
Start: 2022-01-01 | End: 2022-01-01

## 2022-01-01 RX ORDER — AMIODARONE HYDROCHLORIDE 200 MG/1
400 TABLET ORAL DAILY
Status: DISCONTINUED | OUTPATIENT
Start: 2022-01-01 | End: 2022-01-01

## 2022-01-01 RX ORDER — MIDAZOLAM HYDROCHLORIDE 1 MG/ML
0.5 INJECTION, SOLUTION INTRAMUSCULAR; INTRAVENOUS
Status: DISCONTINUED | OUTPATIENT
Start: 2022-01-01 | End: 2022-01-01 | Stop reason: HOSPADM

## 2022-01-01 RX ORDER — CALCIUM GLUCONATE 20 MG/ML
1 INJECTION, SOLUTION INTRAVENOUS ONCE
Status: COMPLETED | OUTPATIENT
Start: 2022-01-01 | End: 2022-01-01

## 2022-01-01 RX ORDER — AMIODARONE HYDROCHLORIDE 200 MG/1
400 TABLET ORAL 2 TIMES DAILY
Status: DISCONTINUED | OUTPATIENT
Start: 2022-01-01 | End: 2022-01-01

## 2022-01-01 RX ORDER — HYDROCORTISONE SODIUM SUCCINATE 100 MG/2ML
50 INJECTION, POWDER, FOR SOLUTION INTRAMUSCULAR; INTRAVENOUS EVERY 6 HOURS
Status: DISCONTINUED | OUTPATIENT
Start: 2022-01-01 | End: 2022-01-01

## 2022-01-01 RX ORDER — CLONIDINE HYDROCHLORIDE 0.1 MG/1
0.1 TABLET ORAL 3 TIMES DAILY
Status: DISCONTINUED | OUTPATIENT
Start: 2022-01-01 | End: 2022-01-01

## 2022-01-01 RX ORDER — ONDANSETRON 2 MG/ML
INJECTION INTRAMUSCULAR; INTRAVENOUS AS NEEDED
Status: DISCONTINUED | OUTPATIENT
Start: 2022-01-01 | End: 2022-01-01 | Stop reason: HOSPADM

## 2022-01-01 RX ORDER — NOREPINEPHRINE BIT/0.9 % NACL 8 MG/250ML
.5-5 INFUSION BOTTLE (ML) INTRAVENOUS
Status: DISCONTINUED | OUTPATIENT
Start: 2022-01-01 | End: 2022-01-01

## 2022-01-01 RX ORDER — VANCOMYCIN 1.75 GRAM/500 ML IN 0.9 % SODIUM CHLORIDE INTRAVENOUS
1750
Status: DISCONTINUED | OUTPATIENT
Start: 2022-01-01 | End: 2022-01-01 | Stop reason: DRUGHIGH

## 2022-01-01 RX ORDER — ALBUMIN HUMAN 50 G/1000ML
SOLUTION INTRAVENOUS
Status: COMPLETED
Start: 2022-01-01 | End: 2022-01-01

## 2022-01-01 RX ORDER — BALSAM PERU/CASTOR OIL
OINTMENT (GRAM) TOPICAL 2 TIMES DAILY
Status: DISCONTINUED | OUTPATIENT
Start: 2022-01-01 | End: 2022-01-01

## 2022-01-01 RX ORDER — SODIUM BICARBONATE 1 MEQ/ML
SYRINGE (ML) INTRAVENOUS
Status: COMPLETED
Start: 2022-01-01 | End: 2022-01-01

## 2022-01-01 RX ORDER — SODIUM CHLORIDE 9 MG/ML
125 INJECTION, SOLUTION INTRAVENOUS CONTINUOUS
Status: DISCONTINUED | OUTPATIENT
Start: 2022-01-01 | End: 2022-01-01

## 2022-01-01 RX ORDER — ATROPINE SULFATE 0.1 MG/ML
1 INJECTION INTRAVENOUS ONCE
Status: COMPLETED | OUTPATIENT
Start: 2022-01-01 | End: 2022-01-01

## 2022-01-01 RX ORDER — AMIODARONE HYDROCHLORIDE 200 MG/1
400 TABLET ORAL DAILY
Status: DISCONTINUED | OUTPATIENT
Start: 2022-01-01 | End: 2022-01-01 | Stop reason: HOSPADM

## 2022-01-01 RX ORDER — MORPHINE SULFATE 2 MG/ML
1 INJECTION, SOLUTION INTRAMUSCULAR; INTRAVENOUS
Status: DISCONTINUED | OUTPATIENT
Start: 2022-01-01 | End: 2022-01-01

## 2022-01-01 RX ORDER — FENTANYL CITRATE 50 UG/ML
25 INJECTION, SOLUTION INTRAMUSCULAR; INTRAVENOUS ONCE
Status: COMPLETED | OUTPATIENT
Start: 2022-01-01 | End: 2022-01-01

## 2022-01-01 RX ORDER — FENTANYL CITRATE 50 UG/ML
25 INJECTION, SOLUTION INTRAMUSCULAR; INTRAVENOUS
Status: DISCONTINUED | OUTPATIENT
Start: 2022-01-01 | End: 2022-01-01 | Stop reason: HOSPADM

## 2022-01-01 RX ORDER — SODIUM CHLORIDE 9 MG/ML
250 INJECTION, SOLUTION INTRAVENOUS AS NEEDED
Status: DISCONTINUED | OUTPATIENT
Start: 2022-01-01 | End: 2022-01-01 | Stop reason: HOSPADM

## 2022-01-01 RX ORDER — ONDANSETRON 2 MG/ML
4 INJECTION INTRAMUSCULAR; INTRAVENOUS AS NEEDED
Status: DISCONTINUED | OUTPATIENT
Start: 2022-01-01 | End: 2022-01-01 | Stop reason: HOSPADM

## 2022-01-01 RX ORDER — SODIUM CHLORIDE 9 MG/ML
INJECTION, SOLUTION INTRAVENOUS
Status: DISCONTINUED | OUTPATIENT
Start: 2022-01-01 | End: 2022-01-01 | Stop reason: HOSPADM

## 2022-01-01 RX ORDER — CLONIDINE HYDROCHLORIDE 0.1 MG/1
0.1 TABLET ORAL 2 TIMES DAILY
Status: DISCONTINUED | OUTPATIENT
Start: 2022-01-01 | End: 2022-01-01 | Stop reason: HOSPADM

## 2022-01-01 RX ORDER — PROCHLORPERAZINE EDISYLATE 5 MG/ML
10 INJECTION INTRAMUSCULAR; INTRAVENOUS
Status: DISCONTINUED | OUTPATIENT
Start: 2022-01-01 | End: 2022-01-01 | Stop reason: HOSPADM

## 2022-01-01 RX ORDER — DEXTROSE MONOHYDRATE 100 MG/ML
0-250 INJECTION, SOLUTION INTRAVENOUS AS NEEDED
Status: DISCONTINUED | OUTPATIENT
Start: 2022-01-01 | End: 2022-01-01 | Stop reason: SDUPTHER

## 2022-01-01 RX ORDER — DEXTROSE MONOHYDRATE AND SODIUM CHLORIDE 5; .225 G/100ML; G/100ML
125 INJECTION, SOLUTION INTRAVENOUS CONTINUOUS
Status: DISCONTINUED | OUTPATIENT
Start: 2022-01-01 | End: 2022-01-01

## 2022-01-01 RX ORDER — MIDODRINE HYDROCHLORIDE 10 MG/1
10 TABLET ORAL
Qty: 12 TABLET | Refills: 0 | Status: SHIPPED
Start: 2022-01-01 | End: 2022-01-01

## 2022-01-01 RX ORDER — METRONIDAZOLE 500 MG/1
500 TABLET ORAL EVERY 12 HOURS
Qty: 10 TABLET | Refills: 0 | Status: SHIPPED
Start: 2022-01-01 | End: 2022-01-01

## 2022-01-01 RX ORDER — SODIUM CHLORIDE 450 MG/100ML
150 INJECTION, SOLUTION INTRAVENOUS CONTINUOUS
Status: DISCONTINUED | OUTPATIENT
Start: 2022-01-01 | End: 2022-01-01

## 2022-01-01 RX ORDER — AMIODARONE HYDROCHLORIDE 200 MG/1
200 TABLET ORAL 2 TIMES DAILY
Status: DISCONTINUED | OUTPATIENT
Start: 2022-01-01 | End: 2022-01-01 | Stop reason: HOSPADM

## 2022-01-01 RX ORDER — HEPARIN SODIUM 1000 [USP'U]/ML
10000 INJECTION, SOLUTION INTRAVENOUS; SUBCUTANEOUS ONCE
Status: COMPLETED | OUTPATIENT
Start: 2022-01-01 | End: 2022-01-01

## 2022-01-01 RX ORDER — PANTOPRAZOLE SODIUM 40 MG/1
40 TABLET, DELAYED RELEASE ORAL DAILY
Status: DISCONTINUED | OUTPATIENT
Start: 2022-01-01 | End: 2022-01-01

## 2022-01-01 RX ORDER — DEXTROSE MONOHYDRATE 50 MG/ML
50 INJECTION, SOLUTION INTRAVENOUS CONTINUOUS
Status: DISCONTINUED | OUTPATIENT
Start: 2022-01-01 | End: 2022-01-01

## 2022-01-01 RX ORDER — CALCIUM GLUCONATE 20 MG/ML
2 INJECTION, SOLUTION INTRAVENOUS ONCE
Status: COMPLETED | OUTPATIENT
Start: 2022-01-01 | End: 2022-01-01

## 2022-01-01 RX ORDER — DEXTROSE 50 % IN WATER (D50W) INTRAVENOUS SYRINGE
12.5-25 AS NEEDED
Status: DISCONTINUED | OUTPATIENT
Start: 2022-01-01 | End: 2022-01-01

## 2022-01-01 RX ORDER — SODIUM BICARBONATE 1 MEQ/ML
100 SYRINGE (ML) INTRAVENOUS ONCE
Status: COMPLETED | OUTPATIENT
Start: 2022-01-01 | End: 2022-01-01

## 2022-01-01 RX ORDER — PANTOPRAZOLE SODIUM 40 MG/1
40 TABLET, DELAYED RELEASE ORAL DAILY
Status: DISCONTINUED | OUTPATIENT
Start: 2022-01-01 | End: 2022-01-01 | Stop reason: HOSPADM

## 2022-01-01 RX ORDER — ACETAMINOPHEN 325 MG/1
650 TABLET ORAL
Status: DISCONTINUED | OUTPATIENT
Start: 2022-01-01 | End: 2022-01-01

## 2022-01-01 RX ORDER — POTASSIUM CHLORIDE 750 MG/1
20 TABLET, FILM COATED, EXTENDED RELEASE ORAL 2 TIMES DAILY
Status: COMPLETED | OUTPATIENT
Start: 2022-01-01 | End: 2022-01-01

## 2022-01-01 RX ORDER — AMIODARONE HYDROCHLORIDE 200 MG/1
200 TABLET ORAL DAILY
Status: DISCONTINUED | OUTPATIENT
Start: 2022-01-01 | End: 2022-01-01 | Stop reason: HOSPADM

## 2022-01-01 RX ORDER — LIDOCAINE HYDROCHLORIDE 20 MG/ML
INJECTION, SOLUTION EPIDURAL; INFILTRATION; INTRACAUDAL; PERINEURAL AS NEEDED
Status: DISCONTINUED | OUTPATIENT
Start: 2022-01-01 | End: 2022-01-01 | Stop reason: HOSPADM

## 2022-01-01 RX ORDER — FENTANYL CITRATE 50 UG/ML
INJECTION, SOLUTION INTRAMUSCULAR; INTRAVENOUS AS NEEDED
Status: DISCONTINUED | OUTPATIENT
Start: 2022-01-01 | End: 2022-01-01 | Stop reason: HOSPADM

## 2022-01-01 RX ORDER — HYDROCORTISONE SODIUM SUCCINATE 100 MG/2ML
50 INJECTION, POWDER, FOR SOLUTION INTRAMUSCULAR; INTRAVENOUS EVERY 8 HOURS
Status: DISCONTINUED | OUTPATIENT
Start: 2022-01-01 | End: 2022-01-01

## 2022-01-01 RX ORDER — HYDROMORPHONE HYDROCHLORIDE 1 MG/ML
0.2 INJECTION, SOLUTION INTRAMUSCULAR; INTRAVENOUS; SUBCUTANEOUS
Status: DISCONTINUED | OUTPATIENT
Start: 2022-01-01 | End: 2022-01-01 | Stop reason: HOSPADM

## 2022-01-01 RX ORDER — FENTANYL CITRATE 50 UG/ML
50 INJECTION, SOLUTION INTRAMUSCULAR; INTRAVENOUS AS NEEDED
Status: DISCONTINUED | OUTPATIENT
Start: 2022-01-01 | End: 2022-01-01 | Stop reason: HOSPADM

## 2022-01-01 RX ADMIN — HEPARIN SODIUM 1100 UNITS: 1000 INJECTION INTRAVENOUS; SUBCUTANEOUS at 13:22

## 2022-01-01 RX ADMIN — AMIODARONE HYDROCHLORIDE 400 MG: 200 TABLET ORAL at 08:49

## 2022-01-01 RX ADMIN — HEPARIN SODIUM 5000 UNITS: 5000 INJECTION INTRAVENOUS; SUBCUTANEOUS at 16:18

## 2022-01-01 RX ADMIN — CEFEPIME HYDROCHLORIDE 1 G: 1 INJECTION, POWDER, FOR SOLUTION INTRAMUSCULAR; INTRAVENOUS at 17:28

## 2022-01-01 RX ADMIN — MIDODRINE HYDROCHLORIDE 10 MG: 5 TABLET ORAL at 22:16

## 2022-01-01 RX ADMIN — CEFEPIME 1 G: 1 INJECTION, POWDER, FOR SOLUTION INTRAMUSCULAR; INTRAVENOUS at 11:01

## 2022-01-01 RX ADMIN — SODIUM CHLORIDE, PRESERVATIVE FREE 10 ML: 5 INJECTION INTRAVENOUS at 06:00

## 2022-01-01 RX ADMIN — CALCIUM CHLORIDE, MAGNESIUM CHLORIDE, DEXTROSE MONOHYDRATE, LACTIC ACID, SODIUM CHLORIDE, SODIUM BICARBONATE AND POTASSIUM CHLORIDE: 3.68; 3.05; 22; 5.4; 6.46; 3.09; .314 INJECTION INTRAVENOUS at 07:25

## 2022-01-01 RX ADMIN — AMIODARONE HYDROCHLORIDE 200 MG: 200 TABLET ORAL at 09:29

## 2022-01-01 RX ADMIN — Medication 10 UNITS: at 17:24

## 2022-01-01 RX ADMIN — FLUCONAZOLE 200 MG: 200 INJECTION, SOLUTION INTRAVENOUS at 19:42

## 2022-01-01 RX ADMIN — Medication 3 UNITS: at 23:27

## 2022-01-01 RX ADMIN — SODIUM CHLORIDE, PRESERVATIVE FREE 10 ML: 5 INJECTION INTRAVENOUS at 22:23

## 2022-01-01 RX ADMIN — SODIUM CHLORIDE, PRESERVATIVE FREE 10 ML: 5 INJECTION INTRAVENOUS at 05:33

## 2022-01-01 RX ADMIN — HEPARIN SODIUM 5000 UNITS: 5000 INJECTION INTRAVENOUS; SUBCUTANEOUS at 11:32

## 2022-01-01 RX ADMIN — VANCOMYCIN HYDROCHLORIDE 750 MG: 750 INJECTION, POWDER, LYOPHILIZED, FOR SOLUTION INTRAVENOUS at 13:58

## 2022-01-01 RX ADMIN — AMIODARONE HYDROCHLORIDE 200 MG: 200 TABLET ORAL at 18:03

## 2022-01-01 RX ADMIN — Medication 1 AMPULE: at 21:00

## 2022-01-01 RX ADMIN — ATORVASTATIN CALCIUM 40 MG: 40 TABLET, FILM COATED ORAL at 09:13

## 2022-01-01 RX ADMIN — HEPARIN SODIUM 5000 UNITS: 5000 INJECTION INTRAVENOUS; SUBCUTANEOUS at 00:24

## 2022-01-01 RX ADMIN — SODIUM CHLORIDE, PRESERVATIVE FREE 10 ML: 5 INJECTION INTRAVENOUS at 06:26

## 2022-01-01 RX ADMIN — SODIUM CHLORIDE, PRESERVATIVE FREE 10 ML: 5 INJECTION INTRAVENOUS at 02:48

## 2022-01-01 RX ADMIN — AMIODARONE HYDROCHLORIDE 400 MG: 200 TABLET ORAL at 09:47

## 2022-01-01 RX ADMIN — VANCOMYCIN HYDROCHLORIDE 1000 MG: 1 INJECTION, POWDER, LYOPHILIZED, FOR SOLUTION INTRAVENOUS at 20:19

## 2022-01-01 RX ADMIN — MIDODRINE HYDROCHLORIDE 10 MG: 5 TABLET ORAL at 22:08

## 2022-01-01 RX ADMIN — SODIUM CHLORIDE 1000 ML: 9 INJECTION, SOLUTION INTRAVENOUS at 08:33

## 2022-01-01 RX ADMIN — CLONIDINE HYDROCHLORIDE 0.1 MG: 0.1 TABLET ORAL at 17:35

## 2022-01-01 RX ADMIN — Medication 16 UNITS: at 18:17

## 2022-01-01 RX ADMIN — CASTOR OIL AND BALSAM, PERU: 788; 87 OINTMENT TOPICAL at 08:52

## 2022-01-01 RX ADMIN — SODIUM CHLORIDE, PRESERVATIVE FREE 20 MG: 5 INJECTION INTRAVENOUS at 13:17

## 2022-01-01 RX ADMIN — MEROPENEM 500 MG: 500 INJECTION, POWDER, FOR SOLUTION INTRAVENOUS at 18:02

## 2022-01-01 RX ADMIN — SODIUM CHLORIDE, PRESERVATIVE FREE 10 ML: 5 INJECTION INTRAVENOUS at 06:28

## 2022-01-01 RX ADMIN — SODIUM CHLORIDE, PRESERVATIVE FREE 20 MG: 5 INJECTION INTRAVENOUS at 16:16

## 2022-01-01 RX ADMIN — CASTOR OIL AND BALSAM, PERU: 788; 87 OINTMENT TOPICAL at 21:14

## 2022-01-01 RX ADMIN — Medication 1 AMPULE: at 22:20

## 2022-01-01 RX ADMIN — ALBUMIN (HUMAN) 12.5 G: 2.5 SOLUTION INTRAVENOUS at 13:09

## 2022-01-01 RX ADMIN — HEPARIN SODIUM 1900 UNITS: 1000 INJECTION INTRAVENOUS; SUBCUTANEOUS at 11:41

## 2022-01-01 RX ADMIN — METRONIDAZOLE 500 MG: 250 TABLET ORAL at 22:16

## 2022-01-01 RX ADMIN — BUMETANIDE 2 MG: 0.25 INJECTION INTRAMUSCULAR; INTRAVENOUS at 18:05

## 2022-01-01 RX ADMIN — Medication 1 AMPULE: at 10:16

## 2022-01-01 RX ADMIN — HEPARIN SODIUM 5000 UNITS: 5000 INJECTION INTRAVENOUS; SUBCUTANEOUS at 21:58

## 2022-01-01 RX ADMIN — MIDODRINE HYDROCHLORIDE 10 MG: 5 TABLET ORAL at 12:19

## 2022-01-01 RX ADMIN — ATORVASTATIN CALCIUM 40 MG: 40 TABLET, FILM COATED ORAL at 08:49

## 2022-01-01 RX ADMIN — SODIUM CHLORIDE, PRESERVATIVE FREE 10 ML: 5 INJECTION INTRAVENOUS at 12:07

## 2022-01-01 RX ADMIN — VASOPRESSIN 0.04 UNITS/MIN: 20 INJECTION PARENTERAL at 14:17

## 2022-01-01 RX ADMIN — ALTEPLASE 2 MG: 2.2 INJECTION, POWDER, LYOPHILIZED, FOR SOLUTION INTRAVENOUS at 10:09

## 2022-01-01 RX ADMIN — INSULIN GLARGINE 6 UNITS: 100 INJECTION, SOLUTION SUBCUTANEOUS at 23:30

## 2022-01-01 RX ADMIN — SODIUM CHLORIDE, PRESERVATIVE FREE 20 MG: 5 INJECTION INTRAVENOUS at 12:49

## 2022-01-01 RX ADMIN — CEFEPIME HYDROCHLORIDE 1 G: 1 INJECTION, POWDER, FOR SOLUTION INTRAMUSCULAR; INTRAVENOUS at 17:11

## 2022-01-01 RX ADMIN — Medication 16 UNITS: at 10:26

## 2022-01-01 RX ADMIN — Medication 1 AMPULE: at 14:36

## 2022-01-01 RX ADMIN — HYDROCORTISONE SODIUM SUCCINATE 50 MG: 100 INJECTION, POWDER, FOR SOLUTION INTRAMUSCULAR; INTRAVENOUS at 06:26

## 2022-01-01 RX ADMIN — VANCOMYCIN HYDROCHLORIDE 1500 MG: 10 INJECTION, POWDER, LYOPHILIZED, FOR SOLUTION INTRAVENOUS at 14:42

## 2022-01-01 RX ADMIN — FAMOTIDINE 20 MG: 20 TABLET ORAL at 09:11

## 2022-01-01 RX ADMIN — HYDROCORTISONE SODIUM SUCCINATE 50 MG: 100 INJECTION, POWDER, FOR SOLUTION INTRAMUSCULAR; INTRAVENOUS at 09:47

## 2022-01-01 RX ADMIN — INSULIN GLARGINE 6 UNITS: 100 INJECTION, SOLUTION SUBCUTANEOUS at 23:02

## 2022-01-01 RX ADMIN — NOREPINEPHRINE BITARTRATE 50 MCG/MIN: 1 INJECTION, SOLUTION, CONCENTRATE INTRAVENOUS at 20:24

## 2022-01-01 RX ADMIN — Medication 20 UNITS: at 09:52

## 2022-01-01 RX ADMIN — SODIUM CHLORIDE, PRESERVATIVE FREE 10 ML: 5 INJECTION INTRAVENOUS at 13:58

## 2022-01-01 RX ADMIN — METRONIDAZOLE 500 MG: 250 TABLET ORAL at 22:39

## 2022-01-01 RX ADMIN — ACETAMINOPHEN 650 MG: 325 TABLET ORAL at 00:32

## 2022-01-01 RX ADMIN — CASTOR OIL AND BALSAM, PERU: 788; 87 OINTMENT TOPICAL at 08:50

## 2022-01-01 RX ADMIN — SODIUM CHLORIDE, PRESERVATIVE FREE 300 UNITS: 5 INJECTION INTRAVENOUS at 15:30

## 2022-01-01 RX ADMIN — CEFEPIME 2 G: 2 INJECTION, POWDER, FOR SOLUTION INTRAVENOUS at 14:05

## 2022-01-01 RX ADMIN — AMIODARONE HYDROCHLORIDE 150 MG: 1.5 INJECTION, SOLUTION INTRAVENOUS at 20:21

## 2022-01-01 RX ADMIN — HYDROCORTISONE SODIUM SUCCINATE 50 MG: 100 INJECTION, POWDER, FOR SOLUTION INTRAMUSCULAR; INTRAVENOUS at 17:23

## 2022-01-01 RX ADMIN — ACETAMINOPHEN 650 MG: 325 TABLET ORAL at 11:17

## 2022-01-01 RX ADMIN — HEPARIN SODIUM 1400 UNITS: 1000 INJECTION INTRAVENOUS; SUBCUTANEOUS at 23:47

## 2022-01-01 RX ADMIN — Medication 1 AMPULE: at 22:13

## 2022-01-01 RX ADMIN — ASPIRIN 81 MG: 81 TABLET, COATED ORAL at 09:13

## 2022-01-01 RX ADMIN — CASTOR OIL AND BALSAM, PERU: 788; 87 OINTMENT TOPICAL at 10:01

## 2022-01-01 RX ADMIN — Medication 2 UNITS: at 13:01

## 2022-01-01 RX ADMIN — HEPARIN SODIUM 1100 UNITS: 1000 INJECTION INTRAVENOUS; SUBCUTANEOUS at 12:25

## 2022-01-01 RX ADMIN — HEPARIN SODIUM 1900 UNITS: 1000 INJECTION INTRAVENOUS; SUBCUTANEOUS at 11:03

## 2022-01-01 RX ADMIN — Medication 100 MEQ: at 11:15

## 2022-01-01 RX ADMIN — MIDODRINE HYDROCHLORIDE 10 MG: 5 TABLET ORAL at 21:18

## 2022-01-01 RX ADMIN — AMIODARONE HYDROCHLORIDE 400 MG: 200 TABLET ORAL at 09:56

## 2022-01-01 RX ADMIN — DEXTROSE AND SODIUM CHLORIDE 125 ML/HR: 5; 200 INJECTION, SOLUTION INTRAVENOUS at 06:29

## 2022-01-01 RX ADMIN — IOPAMIDOL 100 ML: 755 INJECTION, SOLUTION INTRAVENOUS at 10:21

## 2022-01-01 RX ADMIN — Medication 3 UNITS: at 17:30

## 2022-01-01 RX ADMIN — CALCIUM CHLORIDE, MAGNESIUM CHLORIDE, DEXTROSE MONOHYDRATE, LACTIC ACID, SODIUM CHLORIDE, SODIUM BICARBONATE AND POTASSIUM CHLORIDE: 3.68; 3.05; 22; 5.4; 6.46; 3.09; .314 INJECTION INTRAVENOUS at 01:58

## 2022-01-01 RX ADMIN — SODIUM CHLORIDE, PRESERVATIVE FREE 10 ML: 5 INJECTION INTRAVENOUS at 22:09

## 2022-01-01 RX ADMIN — HEPARIN SODIUM 5000 UNITS: 5000 INJECTION INTRAVENOUS; SUBCUTANEOUS at 15:29

## 2022-01-01 RX ADMIN — Medication 1 AMPULE: at 22:11

## 2022-01-01 RX ADMIN — SODIUM CHLORIDE, PRESERVATIVE FREE 10 ML: 5 INJECTION INTRAVENOUS at 05:15

## 2022-01-01 RX ADMIN — SODIUM CHLORIDE, PRESERVATIVE FREE 10 ML: 5 INJECTION INTRAVENOUS at 23:26

## 2022-01-01 RX ADMIN — AMIODARONE HYDROCHLORIDE 400 MG: 200 TABLET ORAL at 13:40

## 2022-01-01 RX ADMIN — FAMOTIDINE 20 MG: 20 TABLET ORAL at 10:05

## 2022-01-01 RX ADMIN — HEPARIN SODIUM 1400 UNITS: 1000 INJECTION INTRAVENOUS; SUBCUTANEOUS at 12:25

## 2022-01-01 RX ADMIN — Medication 2 UNITS: at 08:33

## 2022-01-01 RX ADMIN — SODIUM CHLORIDE 1000 ML: 9 INJECTION, SOLUTION INTRAVENOUS at 08:55

## 2022-01-01 RX ADMIN — CLONIDINE HYDROCHLORIDE 0.1 MG: 0.1 TABLET ORAL at 08:49

## 2022-01-01 RX ADMIN — AMIODARONE HYDROCHLORIDE 400 MG: 200 TABLET ORAL at 14:38

## 2022-01-01 RX ADMIN — ONDANSETRON 4 MG: 2 INJECTION INTRAMUSCULAR; INTRAVENOUS at 05:27

## 2022-01-01 RX ADMIN — Medication 0.5 MG/MIN: at 19:00

## 2022-01-01 RX ADMIN — DEXTROSE AND SODIUM CHLORIDE 150 ML/HR: 5; 450 INJECTION, SOLUTION INTRAVENOUS at 01:58

## 2022-01-01 RX ADMIN — HEPARIN SODIUM 5000 UNITS: 5000 INJECTION INTRAVENOUS; SUBCUTANEOUS at 10:10

## 2022-01-01 RX ADMIN — SODIUM CHLORIDE, PRESERVATIVE FREE 10 ML: 5 INJECTION INTRAVENOUS at 13:02

## 2022-01-01 RX ADMIN — MIDODRINE HYDROCHLORIDE 10 MG: 5 TABLET ORAL at 09:01

## 2022-01-01 RX ADMIN — SODIUM CHLORIDE, PRESERVATIVE FREE 20 MG: 5 INJECTION INTRAVENOUS at 12:23

## 2022-01-01 RX ADMIN — HYDROCORTISONE SODIUM SUCCINATE 50 MG: 100 INJECTION, POWDER, FOR SOLUTION INTRAMUSCULAR; INTRAVENOUS at 11:40

## 2022-01-01 RX ADMIN — AMIODARONE HYDROCHLORIDE 400 MG: 200 TABLET ORAL at 10:46

## 2022-01-01 RX ADMIN — INSULIN GLARGINE 6 UNITS: 100 INJECTION, SOLUTION SUBCUTANEOUS at 21:55

## 2022-01-01 RX ADMIN — HYDROCORTISONE SODIUM SUCCINATE 50 MG: 100 INJECTION, POWDER, FOR SOLUTION INTRAMUSCULAR; INTRAVENOUS at 05:38

## 2022-01-01 RX ADMIN — MORPHINE SULFATE 2 MG: 2 INJECTION, SOLUTION INTRAMUSCULAR; INTRAVENOUS at 11:37

## 2022-01-01 RX ADMIN — HEPARIN SODIUM 1900 UNITS: 1000 INJECTION INTRAVENOUS; SUBCUTANEOUS at 17:58

## 2022-01-01 RX ADMIN — SODIUM CHLORIDE 1000 ML: 9 INJECTION, SOLUTION INTRAVENOUS at 13:24

## 2022-01-01 RX ADMIN — FENTANYL CITRATE 25 MCG: 50 INJECTION, SOLUTION INTRAMUSCULAR; INTRAVENOUS at 01:57

## 2022-01-01 RX ADMIN — PHENYLEPHRINE HYDROCHLORIDE 40 MCG/MIN: 10 INJECTION INTRAVENOUS at 18:55

## 2022-01-01 RX ADMIN — SODIUM CHLORIDE, PRESERVATIVE FREE 10 ML: 5 INJECTION INTRAVENOUS at 00:51

## 2022-01-01 RX ADMIN — DOPAMINE HYDROCHLORIDE IN DEXTROSE 5 MCG/KG/MIN: 3.2 INJECTION, SOLUTION INTRAVENOUS at 08:40

## 2022-01-01 RX ADMIN — HEPARIN SODIUM 5000 UNITS: 5000 INJECTION INTRAVENOUS; SUBCUTANEOUS at 20:27

## 2022-01-01 RX ADMIN — SODIUM CHLORIDE 43.3 UNITS/HR: 9 INJECTION, SOLUTION INTRAVENOUS at 16:30

## 2022-01-01 RX ADMIN — HEPARIN SODIUM 1900 UNITS: 1000 INJECTION INTRAVENOUS; SUBCUTANEOUS at 13:51

## 2022-01-01 RX ADMIN — SODIUM CHLORIDE, PRESERVATIVE FREE 10 ML: 5 INJECTION INTRAVENOUS at 07:05

## 2022-01-01 RX ADMIN — Medication 5 UNITS: at 22:42

## 2022-01-01 RX ADMIN — SODIUM CHLORIDE, PRESERVATIVE FREE 10 ML: 5 INJECTION INTRAVENOUS at 14:00

## 2022-01-01 RX ADMIN — AMIODARONE HYDROCHLORIDE 200 MG: 200 TABLET ORAL at 13:01

## 2022-01-01 RX ADMIN — FAMOTIDINE 20 MG: 20 TABLET ORAL at 08:49

## 2022-01-01 RX ADMIN — AMIODARONE HYDROCHLORIDE 200 MG: 200 TABLET ORAL at 08:42

## 2022-01-01 RX ADMIN — FAMOTIDINE 20 MG: 20 TABLET ORAL at 08:36

## 2022-01-01 RX ADMIN — SODIUM CHLORIDE, POTASSIUM CHLORIDE, SODIUM LACTATE AND CALCIUM CHLORIDE 250 ML: 600; 310; 30; 20 INJECTION, SOLUTION INTRAVENOUS at 20:15

## 2022-01-01 RX ADMIN — CASTOR OIL AND BALSAM, PERU: 788; 87 OINTMENT TOPICAL at 20:07

## 2022-01-01 RX ADMIN — INSULIN GLARGINE 6 UNITS: 100 INJECTION, SOLUTION SUBCUTANEOUS at 22:39

## 2022-01-01 RX ADMIN — PHENYLEPHRINE HYDROCHLORIDE 100 MCG: 10 INJECTION INTRAVENOUS at 12:39

## 2022-01-01 RX ADMIN — DAKIN'S SOLUTION 0.125% (QUARTER STRENGTH): 0.12 SOLUTION at 08:44

## 2022-01-01 RX ADMIN — PIPERACILLIN AND TAZOBACTAM 3.38 G: 3; .375 INJECTION, POWDER, LYOPHILIZED, FOR SOLUTION INTRAVENOUS at 09:53

## 2022-01-01 RX ADMIN — Medication 1 AMPULE: at 20:08

## 2022-01-01 RX ADMIN — POTASSIUM CHLORIDE 20 MEQ: 750 TABLET, FILM COATED, EXTENDED RELEASE ORAL at 11:15

## 2022-01-01 RX ADMIN — CASTOR OIL AND BALSAM, PERU: 788; 87 OINTMENT TOPICAL at 21:00

## 2022-01-01 RX ADMIN — HYDROMORPHONE HYDROCHLORIDE 0.5 MG: 1 INJECTION, SOLUTION INTRAMUSCULAR; INTRAVENOUS; SUBCUTANEOUS at 14:53

## 2022-01-01 RX ADMIN — ASPIRIN 81 MG: 81 TABLET, COATED ORAL at 09:01

## 2022-01-01 RX ADMIN — Medication 2 UNITS: at 12:07

## 2022-01-01 RX ADMIN — BUMETANIDE 2 MG: 0.25 INJECTION INTRAMUSCULAR; INTRAVENOUS at 11:24

## 2022-01-01 RX ADMIN — HYDRALAZINE HYDROCHLORIDE 20 MG: 20 INJECTION INTRAMUSCULAR; INTRAVENOUS at 17:36

## 2022-01-01 RX ADMIN — HEPARIN SODIUM 5000 UNITS: 5000 INJECTION INTRAVENOUS; SUBCUTANEOUS at 10:04

## 2022-01-01 RX ADMIN — SODIUM CHLORIDE, PRESERVATIVE FREE 10 ML: 5 INJECTION INTRAVENOUS at 23:22

## 2022-01-01 RX ADMIN — PANTOPRAZOLE SODIUM 40 MG: 40 TABLET, DELAYED RELEASE ORAL at 09:00

## 2022-01-01 RX ADMIN — ACETAMINOPHEN 650 MG: 325 TABLET ORAL at 16:20

## 2022-01-01 RX ADMIN — Medication 1 AMPULE: at 10:57

## 2022-01-01 RX ADMIN — Medication 1000 UNITS: at 09:13

## 2022-01-01 RX ADMIN — CLONIDINE HYDROCHLORIDE 0.1 MG: 0.1 TABLET ORAL at 09:11

## 2022-01-01 RX ADMIN — SODIUM CHLORIDE, PRESERVATIVE FREE 10 ML: 5 INJECTION INTRAVENOUS at 06:02

## 2022-01-01 RX ADMIN — CASTOR OIL AND BALSAM, PERU: 788; 87 OINTMENT TOPICAL at 11:24

## 2022-01-01 RX ADMIN — Medication 1 AMPULE: at 13:41

## 2022-01-01 RX ADMIN — Medication 1 AMPULE: at 20:27

## 2022-01-01 RX ADMIN — NOREPINEPHRINE BITARTRATE 40 MCG/MIN: 1 INJECTION, SOLUTION, CONCENTRATE INTRAVENOUS at 14:15

## 2022-01-01 RX ADMIN — CALCIUM CHLORIDE, MAGNESIUM CHLORIDE, DEXTROSE MONOHYDRATE, LACTIC ACID, SODIUM CHLORIDE, SODIUM BICARBONATE AND POTASSIUM CHLORIDE: 3.68; 3.05; 22; 5.4; 6.46; 3.09; .314 INJECTION INTRAVENOUS at 15:45

## 2022-01-01 RX ADMIN — VASOPRESSIN 0.04 UNITS/MIN: 20 INJECTION PARENTERAL at 06:12

## 2022-01-01 RX ADMIN — ATROPINE SULFATE 1 MG: 0.1 INJECTION, SOLUTION ENDOTRACHEAL; INTRAMUSCULAR; INTRAVENOUS; SUBCUTANEOUS at 08:40

## 2022-01-01 RX ADMIN — AMIODARONE HYDROCHLORIDE 200 MG: 200 TABLET ORAL at 17:04

## 2022-01-01 RX ADMIN — CALCIUM CHLORIDE, MAGNESIUM CHLORIDE, DEXTROSE MONOHYDRATE, LACTIC ACID, SODIUM CHLORIDE, SODIUM BICARBONATE AND POTASSIUM CHLORIDE: 3.68; 3.05; 22; 5.4; 6.46; 3.09; .314 INJECTION INTRAVENOUS at 12:30

## 2022-01-01 RX ADMIN — ASPIRIN 81 MG: 81 TABLET, COATED ORAL at 09:32

## 2022-01-01 RX ADMIN — SODIUM CHLORIDE, PRESERVATIVE FREE 40 MG: 5 INJECTION INTRAVENOUS at 22:11

## 2022-01-01 RX ADMIN — ACETAMINOPHEN 650 MG: 325 TABLET ORAL at 18:38

## 2022-01-01 RX ADMIN — CALCIUM GLUCONATE 2 G: 20 INJECTION, SOLUTION INTRAVENOUS at 02:51

## 2022-01-01 RX ADMIN — Medication 2 UNITS: at 06:14

## 2022-01-01 RX ADMIN — HEPARIN SODIUM 5000 UNITS: 5000 INJECTION INTRAVENOUS; SUBCUTANEOUS at 16:20

## 2022-01-01 RX ADMIN — SODIUM CHLORIDE, PRESERVATIVE FREE 10 ML: 5 INJECTION INTRAVENOUS at 22:10

## 2022-01-01 RX ADMIN — PIPERACILLIN AND TAZOBACTAM 3.38 G: 3; .375 INJECTION, POWDER, LYOPHILIZED, FOR SOLUTION INTRAVENOUS at 21:24

## 2022-01-01 RX ADMIN — SODIUM CHLORIDE 50 ML/HR: 9 INJECTION, SOLUTION INTRAVENOUS at 11:38

## 2022-01-01 RX ADMIN — CALCIUM CHLORIDE, MAGNESIUM CHLORIDE, DEXTROSE MONOHYDRATE, LACTIC ACID, SODIUM CHLORIDE, SODIUM BICARBONATE AND POTASSIUM CHLORIDE: 3.68; 3.05; 22; 5.4; 6.46; 3.09; .314 INJECTION INTRAVENOUS at 16:37

## 2022-01-01 RX ADMIN — PIPERACILLIN AND TAZOBACTAM 3.38 G: 3; .375 INJECTION, POWDER, LYOPHILIZED, FOR SOLUTION INTRAVENOUS at 09:01

## 2022-01-01 RX ADMIN — SODIUM CHLORIDE, PRESERVATIVE FREE 10 ML: 5 INJECTION INTRAVENOUS at 06:25

## 2022-01-01 RX ADMIN — HYDROCORTISONE SODIUM SUCCINATE 50 MG: 100 INJECTION, POWDER, FOR SOLUTION INTRAMUSCULAR; INTRAVENOUS at 23:11

## 2022-01-01 RX ADMIN — SODIUM BICARBONATE 50 MEQ: 84 INJECTION, SOLUTION INTRAVENOUS at 08:08

## 2022-01-01 RX ADMIN — PIPERACILLIN AND TAZOBACTAM 3.38 G: 3; .375 INJECTION, POWDER, LYOPHILIZED, FOR SOLUTION INTRAVENOUS at 02:05

## 2022-01-01 RX ADMIN — VANCOMYCIN HYDROCHLORIDE 1500 MG: 10 INJECTION, POWDER, LYOPHILIZED, FOR SOLUTION INTRAVENOUS at 18:38

## 2022-01-01 RX ADMIN — SODIUM CHLORIDE, PRESERVATIVE FREE 10 ML: 5 INJECTION INTRAVENOUS at 23:34

## 2022-01-01 RX ADMIN — ATORVASTATIN CALCIUM 40 MG: 40 TABLET, FILM COATED ORAL at 09:30

## 2022-01-01 RX ADMIN — POTASSIUM CHLORIDE 40 MEQ: 750 TABLET, FILM COATED, EXTENDED RELEASE ORAL at 08:49

## 2022-01-01 RX ADMIN — INSULIN GLARGINE 6 UNITS: 100 INJECTION, SOLUTION SUBCUTANEOUS at 21:39

## 2022-01-01 RX ADMIN — HEPARIN SODIUM 5000 UNITS: 5000 INJECTION INTRAVENOUS; SUBCUTANEOUS at 16:54

## 2022-01-01 RX ADMIN — SUCCINYLCHOLINE CHLORIDE 140 MG: 20 INJECTION, SOLUTION INTRAMUSCULAR; INTRAVENOUS at 11:55

## 2022-01-01 RX ADMIN — CASTOR OIL AND BALSAM, PERU: 788; 87 OINTMENT TOPICAL at 21:24

## 2022-01-01 RX ADMIN — ALTEPLASE 2 MG: 2.2 INJECTION, POWDER, LYOPHILIZED, FOR SOLUTION INTRAVENOUS at 10:10

## 2022-01-01 RX ADMIN — SODIUM CHLORIDE 100 ML/HR: 4.5 INJECTION, SOLUTION INTRAVENOUS at 05:04

## 2022-01-01 RX ADMIN — PIPERACILLIN AND TAZOBACTAM 3.38 G: 3; .375 INJECTION, POWDER, LYOPHILIZED, FOR SOLUTION INTRAVENOUS at 09:10

## 2022-01-01 RX ADMIN — ATORVASTATIN CALCIUM 40 MG: 40 TABLET, FILM COATED ORAL at 09:01

## 2022-01-01 RX ADMIN — SODIUM CHLORIDE, PRESERVATIVE FREE 10 ML: 5 INJECTION INTRAVENOUS at 15:44

## 2022-01-01 RX ADMIN — CEFTRIAXONE SODIUM 2 G: 2 INJECTION, POWDER, FOR SOLUTION INTRAMUSCULAR; INTRAVENOUS at 11:16

## 2022-01-01 RX ADMIN — BUMETANIDE 2 MG: 0.25 INJECTION INTRAMUSCULAR; INTRAVENOUS at 09:32

## 2022-01-01 RX ADMIN — ATROPINE SULFATE 1 MG: 0.1 INJECTION, SOLUTION ENDOTRACHEAL; INTRAMUSCULAR; INTRAVENOUS; SUBCUTANEOUS at 08:08

## 2022-01-01 RX ADMIN — CEFEPIME 1 G: 1 INJECTION, POWDER, FOR SOLUTION INTRAMUSCULAR; INTRAVENOUS at 13:44

## 2022-01-01 RX ADMIN — PANTOPRAZOLE SODIUM 40 MG: 40 TABLET, DELAYED RELEASE ORAL at 08:16

## 2022-01-01 RX ADMIN — ATORVASTATIN CALCIUM 40 MG: 40 TABLET, FILM COATED ORAL at 09:53

## 2022-01-01 RX ADMIN — SUGAMMADEX 200 MG: 100 INJECTION, SOLUTION INTRAVENOUS at 13:52

## 2022-01-01 RX ADMIN — SODIUM CHLORIDE 500 ML: 9 INJECTION, SOLUTION INTRAVENOUS at 09:28

## 2022-01-01 RX ADMIN — CASTOR OIL AND BALSAM, PERU: 788; 87 OINTMENT TOPICAL at 20:42

## 2022-01-01 RX ADMIN — HEPARIN SODIUM 5000 UNITS: 5000 INJECTION INTRAVENOUS; SUBCUTANEOUS at 08:10

## 2022-01-01 RX ADMIN — CALCIUM CHLORIDE, MAGNESIUM CHLORIDE, DEXTROSE MONOHYDRATE, LACTIC ACID, SODIUM CHLORIDE, SODIUM BICARBONATE AND POTASSIUM CHLORIDE: 3.68; 3.05; 22; 5.4; 6.46; 3.09; .314 INJECTION INTRAVENOUS at 12:28

## 2022-01-01 RX ADMIN — SODIUM CHLORIDE, PRESERVATIVE FREE 10 ML: 5 INJECTION INTRAVENOUS at 14:33

## 2022-01-01 RX ADMIN — PIPERACILLIN AND TAZOBACTAM 3.38 G: 3; .375 INJECTION, POWDER, LYOPHILIZED, FOR SOLUTION INTRAVENOUS at 22:13

## 2022-01-01 RX ADMIN — ALBUMIN (HUMAN) 25 G: 12.5 INJECTION, SOLUTION INTRAVENOUS at 17:34

## 2022-01-01 RX ADMIN — BUMETANIDE 2 MG: 0.25 INJECTION INTRAMUSCULAR; INTRAVENOUS at 17:27

## 2022-01-01 RX ADMIN — SODIUM CHLORIDE, PRESERVATIVE FREE 10 ML: 5 INJECTION INTRAVENOUS at 23:11

## 2022-01-01 RX ADMIN — CALCIUM CHLORIDE, MAGNESIUM CHLORIDE, DEXTROSE MONOHYDRATE, LACTIC ACID, SODIUM CHLORIDE, SODIUM BICARBONATE AND POTASSIUM CHLORIDE: 3.68; 3.05; 22; 5.4; 6.46; 3.09; .314 INJECTION INTRAVENOUS at 07:52

## 2022-01-01 RX ADMIN — CEFEPIME HYDROCHLORIDE 1 G: 1 INJECTION, POWDER, FOR SOLUTION INTRAMUSCULAR; INTRAVENOUS at 17:56

## 2022-01-01 RX ADMIN — ETOMIDATE 14 MCG: 2 INJECTION, SOLUTION INTRAVENOUS at 11:53

## 2022-01-01 RX ADMIN — CLONIDINE HYDROCHLORIDE 0.1 MG: 0.1 TABLET ORAL at 11:19

## 2022-01-01 RX ADMIN — HEPARIN SODIUM 1900 UNITS: 1000 INJECTION INTRAVENOUS; SUBCUTANEOUS at 18:06

## 2022-01-01 RX ADMIN — FAMOTIDINE 20 MG: 20 TABLET ORAL at 10:09

## 2022-01-01 RX ADMIN — SODIUM CHLORIDE, PRESERVATIVE FREE 10 ML: 5 INJECTION INTRAVENOUS at 05:32

## 2022-01-01 RX ADMIN — ONDANSETRON 4 MG: 2 INJECTION INTRAMUSCULAR; INTRAVENOUS at 10:06

## 2022-01-01 RX ADMIN — MORPHINE SULFATE 10 MG: 10 SOLUTION ORAL at 05:46

## 2022-01-01 RX ADMIN — VANCOMYCIN HYDROCHLORIDE 2000 MG: 10 INJECTION, POWDER, LYOPHILIZED, FOR SOLUTION INTRAVENOUS at 09:21

## 2022-01-01 RX ADMIN — MIDODRINE HYDROCHLORIDE 10 MG: 5 TABLET ORAL at 12:30

## 2022-01-01 RX ADMIN — EPOETIN ALFA-EPBX 12000 UNITS: 10000 INJECTION, SOLUTION INTRAVENOUS; SUBCUTANEOUS at 20:39

## 2022-01-01 RX ADMIN — BUMETANIDE 2 MG: 0.25 INJECTION INTRAMUSCULAR; INTRAVENOUS at 11:50

## 2022-01-01 RX ADMIN — FAMOTIDINE 20 MG: 20 TABLET ORAL at 08:43

## 2022-01-01 RX ADMIN — SODIUM CHLORIDE, PRESERVATIVE FREE 20 MG: 5 INJECTION INTRAVENOUS at 13:26

## 2022-01-01 RX ADMIN — Medication 14 UNITS: at 09:10

## 2022-01-01 RX ADMIN — HEPARIN SODIUM 5000 UNITS: 5000 INJECTION INTRAVENOUS; SUBCUTANEOUS at 00:53

## 2022-01-01 RX ADMIN — SODIUM CHLORIDE, PRESERVATIVE FREE 10 ML: 5 INJECTION INTRAVENOUS at 13:45

## 2022-01-01 RX ADMIN — AMIODARONE HYDROCHLORIDE 200 MG: 200 TABLET ORAL at 09:01

## 2022-01-01 RX ADMIN — Medication 2 UNITS: at 02:09

## 2022-01-01 RX ADMIN — CEFEPIME 2 G: 2 INJECTION, POWDER, FOR SOLUTION INTRAVENOUS at 14:31

## 2022-01-01 RX ADMIN — FAMOTIDINE 20 MG: 20 TABLET ORAL at 13:01

## 2022-01-01 RX ADMIN — SODIUM CHLORIDE, PRESERVATIVE FREE 10 ML: 5 INJECTION INTRAVENOUS at 15:59

## 2022-01-01 RX ADMIN — SODIUM CHLORIDE, PRESERVATIVE FREE 10 ML: 5 INJECTION INTRAVENOUS at 21:31

## 2022-01-01 RX ADMIN — PHENYLEPHRINE HYDROCHLORIDE 100 MCG: 10 INJECTION INTRAVENOUS at 13:44

## 2022-01-01 RX ADMIN — METRONIDAZOLE 500 MG: 250 TABLET ORAL at 09:29

## 2022-01-01 RX ADMIN — SODIUM CHLORIDE, PRESERVATIVE FREE 10 ML: 5 INJECTION INTRAVENOUS at 21:19

## 2022-01-01 RX ADMIN — POTASSIUM CHLORIDE 10 MEQ: 7.46 INJECTION, SOLUTION INTRAVENOUS at 21:26

## 2022-01-01 RX ADMIN — AMIODARONE HYDROCHLORIDE 1 MG/MIN: 50 INJECTION, SOLUTION INTRAVENOUS at 20:58

## 2022-01-01 RX ADMIN — CEFEPIME HYDROCHLORIDE 1 G: 1 INJECTION, POWDER, FOR SOLUTION INTRAMUSCULAR; INTRAVENOUS at 18:20

## 2022-01-01 RX ADMIN — PANTOPRAZOLE SODIUM 40 MG: 40 TABLET, DELAYED RELEASE ORAL at 09:13

## 2022-01-01 RX ADMIN — POTASSIUM CHLORIDE 20 MEQ: 750 TABLET, FILM COATED, EXTENDED RELEASE ORAL at 02:00

## 2022-01-01 RX ADMIN — ASPIRIN 81 MG: 81 TABLET, COATED ORAL at 08:49

## 2022-01-01 RX ADMIN — SODIUM CHLORIDE, PRESERVATIVE FREE 10 ML: 5 INJECTION INTRAVENOUS at 21:15

## 2022-01-01 RX ADMIN — ASPIRIN 81 MG: 81 TABLET, COATED ORAL at 08:58

## 2022-01-01 RX ADMIN — Medication 3 UNITS: at 09:13

## 2022-01-01 RX ADMIN — CEFEPIME HYDROCHLORIDE 1 G: 1 INJECTION, POWDER, FOR SOLUTION INTRAMUSCULAR; INTRAVENOUS at 17:25

## 2022-01-01 RX ADMIN — SODIUM CHLORIDE 1000 ML: 9 INJECTION, SOLUTION INTRAVENOUS at 08:10

## 2022-01-01 RX ADMIN — PIPERACILLIN AND TAZOBACTAM 3.38 G: 3; .375 INJECTION, POWDER, LYOPHILIZED, FOR SOLUTION INTRAVENOUS at 22:49

## 2022-01-01 RX ADMIN — HYDROCORTISONE SODIUM SUCCINATE 50 MG: 100 INJECTION, POWDER, FOR SOLUTION INTRAMUSCULAR; INTRAVENOUS at 08:29

## 2022-01-01 RX ADMIN — FAMOTIDINE 20 MG: 20 TABLET ORAL at 09:02

## 2022-01-01 RX ADMIN — Medication 1 AMPULE: at 08:49

## 2022-01-01 RX ADMIN — Medication 10 UNITS: at 11:08

## 2022-01-01 RX ADMIN — SODIUM CHLORIDE 125 ML/HR: 9 INJECTION, SOLUTION INTRAVENOUS at 18:03

## 2022-01-01 RX ADMIN — SODIUM CHLORIDE, PRESERVATIVE FREE 10 ML: 5 INJECTION INTRAVENOUS at 17:12

## 2022-01-01 RX ADMIN — ATORVASTATIN CALCIUM 40 MG: 40 TABLET, FILM COATED ORAL at 12:45

## 2022-01-01 RX ADMIN — SODIUM CHLORIDE, PRESERVATIVE FREE 10 ML: 5 INJECTION INTRAVENOUS at 13:08

## 2022-01-01 RX ADMIN — LIDOCAINE HYDROCHLORIDE 100 MG: 20 INJECTION, SOLUTION EPIDURAL; INFILTRATION; INTRACAUDAL; PERINEURAL at 11:53

## 2022-01-01 RX ADMIN — POTASSIUM CHLORIDE 20 MEQ: 750 TABLET, FILM COATED, EXTENDED RELEASE ORAL at 06:02

## 2022-01-01 RX ADMIN — SODIUM CHLORIDE 1000 ML: 9 INJECTION, SOLUTION INTRAVENOUS at 21:23

## 2022-01-01 RX ADMIN — PANTOPRAZOLE SODIUM 40 MG: 40 TABLET, DELAYED RELEASE ORAL at 08:42

## 2022-01-01 RX ADMIN — SODIUM CHLORIDE, PRESERVATIVE FREE 10 ML: 5 INJECTION INTRAVENOUS at 05:54

## 2022-01-01 RX ADMIN — PHENYLEPHRINE HYDROCHLORIDE 40 MCG/MIN: 10 INJECTION INTRAVENOUS at 04:51

## 2022-01-01 RX ADMIN — AMIODARONE HYDROCHLORIDE 400 MG: 200 TABLET ORAL at 09:40

## 2022-01-01 RX ADMIN — METRONIDAZOLE 500 MG: 250 TABLET ORAL at 21:55

## 2022-01-01 RX ADMIN — Medication 20 UNITS: at 18:21

## 2022-01-01 RX ADMIN — CASTOR OIL AND BALSAM, PERU: 788; 87 OINTMENT TOPICAL at 09:32

## 2022-01-01 RX ADMIN — SODIUM CHLORIDE, PRESERVATIVE FREE 10 ML: 5 INJECTION INTRAVENOUS at 14:44

## 2022-01-01 RX ADMIN — SODIUM CHLORIDE, PRESERVATIVE FREE 10 ML: 5 INJECTION INTRAVENOUS at 23:05

## 2022-01-01 RX ADMIN — NOREPINEPHRINE BITARTRATE 40 MCG/MIN: 1 SOLUTION INTRAVENOUS at 14:17

## 2022-01-01 RX ADMIN — ASPIRIN 81 MG: 81 TABLET, COATED ORAL at 08:42

## 2022-01-01 RX ADMIN — PHENYLEPHRINE HYDROCHLORIDE 100 MCG: 10 INJECTION INTRAVENOUS at 12:31

## 2022-01-01 RX ADMIN — HEPARIN SODIUM 1100 UNITS: 1000 INJECTION INTRAVENOUS; SUBCUTANEOUS at 11:24

## 2022-01-01 RX ADMIN — AMIODARONE HYDROCHLORIDE 200 MG: 200 TABLET ORAL at 12:45

## 2022-01-01 RX ADMIN — CALCIUM CHLORIDE, MAGNESIUM CHLORIDE, DEXTROSE MONOHYDRATE, LACTIC ACID, SODIUM CHLORIDE, SODIUM BICARBONATE AND POTASSIUM CHLORIDE: 3.68; 3.05; 22; 5.4; 6.46; 3.09; .314 INJECTION INTRAVENOUS at 01:20

## 2022-01-01 RX ADMIN — SODIUM CHLORIDE, PRESERVATIVE FREE 10 ML: 5 INJECTION INTRAVENOUS at 06:01

## 2022-01-01 RX ADMIN — FAMOTIDINE 20 MG: 20 TABLET ORAL at 14:37

## 2022-01-01 RX ADMIN — MIDODRINE HYDROCHLORIDE 10 MG: 5 TABLET ORAL at 17:13

## 2022-01-01 RX ADMIN — FAMOTIDINE 20 MG: 20 TABLET ORAL at 08:58

## 2022-01-01 RX ADMIN — CLONIDINE HYDROCHLORIDE 0.1 MG: 0.1 TABLET ORAL at 22:39

## 2022-01-01 RX ADMIN — Medication 5 UNITS: at 10:02

## 2022-01-01 RX ADMIN — SODIUM CHLORIDE, PRESERVATIVE FREE 10 ML: 5 INJECTION INTRAVENOUS at 21:47

## 2022-01-01 RX ADMIN — Medication 2 UNITS: at 17:23

## 2022-01-01 RX ADMIN — HYDROCORTISONE SODIUM SUCCINATE 50 MG: 100 INJECTION, POWDER, FOR SOLUTION INTRAMUSCULAR; INTRAVENOUS at 06:16

## 2022-01-01 RX ADMIN — Medication 1 AMPULE: at 21:59

## 2022-01-01 RX ADMIN — HYDROCORTISONE SODIUM SUCCINATE 50 MG: 100 INJECTION, POWDER, FOR SOLUTION INTRAMUSCULAR; INTRAVENOUS at 15:22

## 2022-01-01 RX ADMIN — SODIUM CHLORIDE, PRESERVATIVE FREE 10 ML: 5 INJECTION INTRAVENOUS at 21:50

## 2022-01-01 RX ADMIN — CASTOR OIL AND BALSAM, PERU: 788; 87 OINTMENT TOPICAL at 22:13

## 2022-01-01 RX ADMIN — MEROPENEM 1 G: 1 INJECTION, POWDER, FOR SOLUTION INTRAVENOUS at 11:17

## 2022-01-01 RX ADMIN — VANCOMYCIN HYDROCHLORIDE 750 MG: 750 INJECTION, POWDER, LYOPHILIZED, FOR SOLUTION INTRAVENOUS at 14:41

## 2022-01-01 RX ADMIN — SODIUM CHLORIDE 500 ML: 900 INJECTION, SOLUTION INTRAVENOUS at 15:48

## 2022-01-01 RX ADMIN — Medication 1 AMPULE: at 21:49

## 2022-01-01 RX ADMIN — LIDOCAINE HYDROCHLORIDE 200 MG: 20 INJECTION, SOLUTION EPIDURAL; INFILTRATION; INTRACAUDAL; PERINEURAL at 14:30

## 2022-01-01 RX ADMIN — FAMOTIDINE 20 MG: 20 TABLET ORAL at 10:10

## 2022-01-01 RX ADMIN — HEPARIN SODIUM IN SODIUM CHLORIDE 800 UNITS: 200 INJECTION INTRAVENOUS at 14:42

## 2022-01-01 RX ADMIN — INSULIN GLARGINE 6 UNITS: 100 INJECTION, SOLUTION SUBCUTANEOUS at 22:22

## 2022-01-01 RX ADMIN — SODIUM CHLORIDE 100 ML/HR: 4.5 INJECTION, SOLUTION INTRAVENOUS at 18:12

## 2022-01-01 RX ADMIN — MIDODRINE HYDROCHLORIDE 10 MG: 5 TABLET ORAL at 12:27

## 2022-01-01 RX ADMIN — POTASSIUM PHOSPHATE, MONOBASIC AND POTASSIUM PHOSPHATE, DIBASIC: 224; 236 INJECTION, SOLUTION, CONCENTRATE INTRAVENOUS at 09:04

## 2022-01-01 RX ADMIN — CASTOR OIL AND BALSAM, PERU: 788; 87 OINTMENT TOPICAL at 22:27

## 2022-01-01 RX ADMIN — CASTOR OIL AND BALSAM, PERU: 788; 87 OINTMENT TOPICAL at 20:00

## 2022-01-01 RX ADMIN — SODIUM CHLORIDE, PRESERVATIVE FREE 10 ML: 5 INJECTION INTRAVENOUS at 16:08

## 2022-01-01 RX ADMIN — Medication 3 UNITS: at 06:03

## 2022-01-01 RX ADMIN — PIPERACILLIN AND TAZOBACTAM 3.38 G: 3; .375 INJECTION, POWDER, LYOPHILIZED, FOR SOLUTION INTRAVENOUS at 09:23

## 2022-01-01 RX ADMIN — MIDODRINE HYDROCHLORIDE 10 MG: 5 TABLET ORAL at 09:29

## 2022-01-01 RX ADMIN — ACETAMINOPHEN 650 MG: 325 TABLET ORAL at 12:05

## 2022-01-01 RX ADMIN — HEPARIN SODIUM 5000 UNITS: 5000 INJECTION INTRAVENOUS; SUBCUTANEOUS at 08:42

## 2022-01-01 RX ADMIN — Medication 5 UNITS: at 17:11

## 2022-01-01 RX ADMIN — Medication 20 UNITS: at 09:14

## 2022-01-01 RX ADMIN — MEROPENEM 1 G: 1 INJECTION, POWDER, FOR SOLUTION INTRAVENOUS at 12:10

## 2022-01-01 RX ADMIN — HEPARIN SODIUM 5000 UNITS: 5000 INJECTION INTRAVENOUS; SUBCUTANEOUS at 12:45

## 2022-01-01 RX ADMIN — NOREPINEPHRINE BITARTRATE 20 MCG/MIN: 1 INJECTION, SOLUTION, CONCENTRATE INTRAVENOUS at 08:58

## 2022-01-01 RX ADMIN — HYDROCORTISONE SODIUM SUCCINATE 50 MG: 100 INJECTION, POWDER, FOR SOLUTION INTRAMUSCULAR; INTRAVENOUS at 21:23

## 2022-01-01 RX ADMIN — HEPARIN SODIUM IN SODIUM CHLORIDE 800 UNITS: 200 INJECTION INTRAVENOUS at 15:42

## 2022-01-01 RX ADMIN — SODIUM CHLORIDE, PRESERVATIVE FREE 40 MG: 5 INJECTION INTRAVENOUS at 09:39

## 2022-01-01 RX ADMIN — Medication 0.5 MG/MIN: at 18:22

## 2022-01-01 RX ADMIN — SODIUM CHLORIDE, PRESERVATIVE FREE 10 ML: 5 INJECTION INTRAVENOUS at 23:02

## 2022-01-01 RX ADMIN — SODIUM CHLORIDE, PRESERVATIVE FREE 10 ML: 5 INJECTION INTRAVENOUS at 00:45

## 2022-01-01 RX ADMIN — Medication 3 UNITS: at 12:12

## 2022-01-01 RX ADMIN — SODIUM CHLORIDE 100 ML/HR: 4.5 INJECTION, SOLUTION INTRAVENOUS at 09:11

## 2022-01-01 RX ADMIN — Medication 2 UNITS: at 09:11

## 2022-01-01 RX ADMIN — Medication 1 AMPULE: at 22:10

## 2022-01-01 RX ADMIN — CALCIUM CHLORIDE, MAGNESIUM CHLORIDE, DEXTROSE MONOHYDRATE, LACTIC ACID, SODIUM CHLORIDE, SODIUM BICARBONATE AND POTASSIUM CHLORIDE: 3.68; 3.05; 22; 5.4; 6.46; 3.09; .314 INJECTION INTRAVENOUS at 12:26

## 2022-01-01 RX ADMIN — CEFEPIME 1 G: 1 INJECTION, POWDER, FOR SOLUTION INTRAMUSCULAR; INTRAVENOUS at 11:36

## 2022-01-01 RX ADMIN — SODIUM CHLORIDE, PRESERVATIVE FREE 20 MG: 5 INJECTION INTRAVENOUS at 13:05

## 2022-01-01 RX ADMIN — AMIODARONE HYDROCHLORIDE 400 MG: 200 TABLET ORAL at 10:10

## 2022-01-01 RX ADMIN — SODIUM CHLORIDE 48.7 UNITS/HR: 9 INJECTION, SOLUTION INTRAVENOUS at 18:54

## 2022-01-01 RX ADMIN — CEFEPIME HYDROCHLORIDE 1 G: 1 INJECTION, POWDER, FOR SOLUTION INTRAMUSCULAR; INTRAVENOUS at 18:58

## 2022-01-01 RX ADMIN — HEPARIN SODIUM 1400 UNITS: 1000 INJECTION INTRAVENOUS; SUBCUTANEOUS at 13:22

## 2022-01-01 RX ADMIN — PHENYLEPHRINE HYDROCHLORIDE 100 MCG: 10 INJECTION INTRAVENOUS at 13:18

## 2022-01-01 RX ADMIN — Medication 20 UNITS: at 17:56

## 2022-01-01 RX ADMIN — Medication 1 AMPULE: at 08:04

## 2022-01-01 RX ADMIN — SODIUM CHLORIDE, PRESERVATIVE FREE 20 MG: 5 INJECTION INTRAVENOUS at 12:05

## 2022-01-01 RX ADMIN — Medication 1 AMPULE: at 20:00

## 2022-01-01 RX ADMIN — ATROPINE SULFATE 1 MG: 0.1 INJECTION INTRAVENOUS at 08:40

## 2022-01-01 RX ADMIN — SODIUM CHLORIDE 45 UNITS/HR: 9 INJECTION, SOLUTION INTRAVENOUS at 20:59

## 2022-01-01 RX ADMIN — PANTOPRAZOLE SODIUM 40 MG: 40 TABLET, DELAYED RELEASE ORAL at 09:32

## 2022-01-01 RX ADMIN — AMIODARONE HYDROCHLORIDE 200 MG: 200 TABLET ORAL at 17:33

## 2022-01-01 RX ADMIN — HEPARIN SODIUM 5000 UNITS: 5000 INJECTION INTRAVENOUS; SUBCUTANEOUS at 16:19

## 2022-01-01 RX ADMIN — SODIUM CHLORIDE, PRESERVATIVE FREE 10 ML: 5 INJECTION INTRAVENOUS at 06:04

## 2022-01-01 RX ADMIN — HEPARIN SODIUM 5000 UNITS: 5000 INJECTION INTRAVENOUS; SUBCUTANEOUS at 05:50

## 2022-01-01 RX ADMIN — CALCIUM CHLORIDE, MAGNESIUM CHLORIDE, DEXTROSE MONOHYDRATE, LACTIC ACID, SODIUM CHLORIDE, SODIUM BICARBONATE AND POTASSIUM CHLORIDE: 3.68; 3.05; 22; 5.4; 6.46; 3.09; .314 INJECTION INTRAVENOUS at 12:03

## 2022-01-01 RX ADMIN — HEPARIN SODIUM 5000 UNITS: 5000 INJECTION INTRAVENOUS; SUBCUTANEOUS at 17:33

## 2022-01-01 RX ADMIN — FAMOTIDINE 20 MG: 20 TABLET ORAL at 09:12

## 2022-01-01 RX ADMIN — Medication 1 UNITS: at 22:57

## 2022-01-01 RX ADMIN — AMIODARONE HYDROCHLORIDE 400 MG: 200 TABLET ORAL at 17:35

## 2022-01-01 RX ADMIN — CASTOR OIL AND BALSAM, PERU: 788; 87 OINTMENT TOPICAL at 22:21

## 2022-01-01 RX ADMIN — Medication 1 AMPULE: at 20:43

## 2022-01-01 RX ADMIN — CASTOR OIL AND BALSAM, PERU: 788; 87 OINTMENT TOPICAL at 08:49

## 2022-01-01 RX ADMIN — GLYCOPYRROLATE 0.2 MG: 0.2 INJECTION, SOLUTION INTRAMUSCULAR; INTRAVENOUS at 20:07

## 2022-01-01 RX ADMIN — MIDODRINE HYDROCHLORIDE 10 MG: 5 TABLET ORAL at 23:01

## 2022-01-01 RX ADMIN — Medication 1 AMPULE: at 09:56

## 2022-01-01 RX ADMIN — HEPARIN SODIUM 5000 UNITS: 5000 INJECTION INTRAVENOUS; SUBCUTANEOUS at 09:30

## 2022-01-01 RX ADMIN — ASPIRIN 81 MG: 81 TABLET, COATED ORAL at 09:29

## 2022-01-01 RX ADMIN — Medication 3 UNITS: at 16:53

## 2022-01-01 RX ADMIN — Medication 1 AMPULE: at 21:12

## 2022-01-01 RX ADMIN — CASTOR OIL AND BALSAM, PERU: 788; 87 OINTMENT TOPICAL at 10:16

## 2022-01-01 RX ADMIN — CASTOR OIL AND BALSAM, PERU: 788; 87 OINTMENT TOPICAL at 10:50

## 2022-01-01 RX ADMIN — BUMETANIDE 2 MG: 0.25 INJECTION INTRAMUSCULAR; INTRAVENOUS at 18:57

## 2022-01-01 RX ADMIN — MEROPENEM 1 G: 1 INJECTION, POWDER, FOR SOLUTION INTRAVENOUS at 12:50

## 2022-01-01 RX ADMIN — AMIODARONE HYDROCHLORIDE 400 MG: 200 TABLET ORAL at 09:29

## 2022-01-01 RX ADMIN — SODIUM CHLORIDE, PRESERVATIVE FREE 10 ML: 5 INJECTION INTRAVENOUS at 06:10

## 2022-01-01 RX ADMIN — CASTOR OIL AND BALSAM, PERU: 788; 87 OINTMENT TOPICAL at 21:32

## 2022-01-01 RX ADMIN — CALCIUM CHLORIDE, MAGNESIUM CHLORIDE, DEXTROSE MONOHYDRATE, LACTIC ACID, SODIUM CHLORIDE, SODIUM BICARBONATE AND POTASSIUM CHLORIDE: 3.68; 3.05; 22; 5.4; 6.46; 3.09; .314 INJECTION INTRAVENOUS at 01:23

## 2022-01-01 RX ADMIN — FAMOTIDINE 20 MG: 20 TABLET ORAL at 12:45

## 2022-01-01 RX ADMIN — SODIUM CHLORIDE, PRESERVATIVE FREE 10 ML: 5 INJECTION INTRAVENOUS at 21:32

## 2022-01-01 RX ADMIN — CEFEPIME 1 G: 1 INJECTION, POWDER, FOR SOLUTION INTRAMUSCULAR; INTRAVENOUS at 15:46

## 2022-01-01 RX ADMIN — Medication 5 UNITS: at 22:11

## 2022-01-01 RX ADMIN — CALCIUM CHLORIDE, MAGNESIUM CHLORIDE, DEXTROSE MONOHYDRATE, LACTIC ACID, SODIUM CHLORIDE, SODIUM BICARBONATE AND POTASSIUM CHLORIDE: 3.68; 3.05; 22; 5.4; 6.46; 3.09; .314 INJECTION INTRAVENOUS at 06:32

## 2022-01-01 RX ADMIN — HYDROCORTISONE SODIUM SUCCINATE 25 MG: 100 INJECTION, POWDER, FOR SOLUTION INTRAMUSCULAR; INTRAVENOUS at 14:38

## 2022-01-01 RX ADMIN — METOPROLOL TARTRATE 2.5 MG: 5 INJECTION INTRAVENOUS at 11:38

## 2022-01-01 RX ADMIN — Medication 1 AMPULE: at 08:43

## 2022-01-01 RX ADMIN — SODIUM CHLORIDE, PRESERVATIVE FREE 20 MG: 5 INJECTION INTRAVENOUS at 12:07

## 2022-01-01 RX ADMIN — DEXTROSE MONOHYDRATE 50 ML/HR: 50 INJECTION, SOLUTION INTRAVENOUS at 10:33

## 2022-01-01 RX ADMIN — PIPERACILLIN AND TAZOBACTAM 3.38 G: 3; .375 INJECTION, POWDER, LYOPHILIZED, FOR SOLUTION INTRAVENOUS at 21:50

## 2022-01-01 RX ADMIN — ASPIRIN 81 MG: 81 TABLET, COATED ORAL at 08:16

## 2022-01-01 RX ADMIN — SODIUM CHLORIDE, PRESERVATIVE FREE 10 ML: 5 INJECTION INTRAVENOUS at 05:04

## 2022-01-01 RX ADMIN — ATORVASTATIN CALCIUM 40 MG: 40 TABLET, FILM COATED ORAL at 08:43

## 2022-01-01 RX ADMIN — Medication 2 UNITS: at 13:07

## 2022-01-01 RX ADMIN — CEFEPIME HYDROCHLORIDE 1 G: 1 INJECTION, POWDER, FOR SOLUTION INTRAMUSCULAR; INTRAVENOUS at 16:29

## 2022-01-01 RX ADMIN — Medication 2 UNITS: at 23:48

## 2022-01-01 RX ADMIN — FAMOTIDINE 20 MG: 20 TABLET ORAL at 09:45

## 2022-01-01 RX ADMIN — SODIUM CHLORIDE, PRESERVATIVE FREE 10 ML: 5 INJECTION INTRAVENOUS at 05:59

## 2022-01-01 RX ADMIN — ATORVASTATIN CALCIUM 40 MG: 40 TABLET, FILM COATED ORAL at 08:58

## 2022-01-01 RX ADMIN — HEPARIN SODIUM 5000 UNITS: 5000 INJECTION INTRAVENOUS; SUBCUTANEOUS at 23:41

## 2022-01-01 RX ADMIN — HEPARIN SODIUM 5000 UNITS: 5000 INJECTION INTRAVENOUS; SUBCUTANEOUS at 23:29

## 2022-01-01 RX ADMIN — HEPARIN SODIUM 5000 UNITS: 5000 INJECTION INTRAVENOUS; SUBCUTANEOUS at 01:23

## 2022-01-01 RX ADMIN — HYDROCORTISONE SODIUM SUCCINATE 50 MG: 100 INJECTION, POWDER, FOR SOLUTION INTRAMUSCULAR; INTRAVENOUS at 17:27

## 2022-01-01 RX ADMIN — MIDAZOLAM HYDROCHLORIDE 2 MG: 1 INJECTION, SOLUTION INTRAMUSCULAR; INTRAVENOUS at 15:30

## 2022-01-01 RX ADMIN — ATORVASTATIN CALCIUM 40 MG: 40 TABLET, FILM COATED ORAL at 10:04

## 2022-01-01 RX ADMIN — POTASSIUM CHLORIDE 20 MEQ: 750 TABLET, EXTENDED RELEASE ORAL at 17:32

## 2022-01-01 RX ADMIN — CASTOR OIL AND BALSAM, PERU: 788; 87 OINTMENT TOPICAL at 09:00

## 2022-01-01 RX ADMIN — SODIUM CHLORIDE, PRESERVATIVE FREE 10 ML: 5 INJECTION INTRAVENOUS at 21:11

## 2022-01-01 RX ADMIN — HYDROCORTISONE SODIUM SUCCINATE 50 MG: 100 INJECTION, POWDER, FOR SOLUTION INTRAMUSCULAR; INTRAVENOUS at 05:53

## 2022-01-01 RX ADMIN — MEROPENEM 500 MG: 500 INJECTION, POWDER, FOR SOLUTION INTRAVENOUS at 17:34

## 2022-01-01 RX ADMIN — PIPERACILLIN AND TAZOBACTAM 3.38 G: 3; .375 INJECTION, POWDER, LYOPHILIZED, FOR SOLUTION INTRAVENOUS at 17:05

## 2022-01-01 RX ADMIN — SODIUM CHLORIDE, PRESERVATIVE FREE 10 ML: 5 INJECTION INTRAVENOUS at 21:59

## 2022-01-01 RX ADMIN — Medication 2 UNITS: at 16:19

## 2022-01-01 RX ADMIN — BUMETANIDE 2 MG: 0.25 INJECTION INTRAMUSCULAR; INTRAVENOUS at 08:29

## 2022-01-01 RX ADMIN — METRONIDAZOLE 500 MG: 250 TABLET ORAL at 08:49

## 2022-01-01 RX ADMIN — CASTOR OIL AND BALSAM, PERU: 788; 87 OINTMENT TOPICAL at 08:38

## 2022-01-01 RX ADMIN — Medication 1 AMPULE: at 08:52

## 2022-01-01 RX ADMIN — AMIODARONE HYDROCHLORIDE 400 MG: 200 TABLET ORAL at 08:22

## 2022-01-01 RX ADMIN — SODIUM CHLORIDE, PRESERVATIVE FREE 10 ML: 5 INJECTION INTRAVENOUS at 06:15

## 2022-01-01 RX ADMIN — SODIUM CHLORIDE, PRESERVATIVE FREE 20 MG: 5 INJECTION INTRAVENOUS at 12:14

## 2022-01-01 RX ADMIN — NOREPINEPHRINE BITARTRATE 45 MCG/MIN: 1 SOLUTION INTRAVENOUS at 02:08

## 2022-01-01 RX ADMIN — Medication 20 UNITS: at 17:27

## 2022-01-01 RX ADMIN — FLUCONAZOLE 400 MG: 400 INJECTION, SOLUTION INTRAVENOUS at 21:18

## 2022-01-01 RX ADMIN — AMIODARONE HYDROCHLORIDE 400 MG: 200 TABLET ORAL at 10:09

## 2022-01-01 RX ADMIN — HEPARIN SODIUM 5000 UNITS: 5000 INJECTION INTRAVENOUS; SUBCUTANEOUS at 09:29

## 2022-01-01 RX ADMIN — CALCIUM CHLORIDE, MAGNESIUM CHLORIDE, DEXTROSE MONOHYDRATE, LACTIC ACID, SODIUM CHLORIDE, SODIUM BICARBONATE AND POTASSIUM CHLORIDE: 3.68; 3.05; 22; 5.4; 6.46; 3.09; .314 INJECTION INTRAVENOUS at 02:59

## 2022-01-01 RX ADMIN — DEXAMETHASONE SODIUM PHOSPHATE 4 MG: 4 INJECTION, SOLUTION INTRAMUSCULAR; INTRAVENOUS at 12:04

## 2022-01-01 RX ADMIN — ATORVASTATIN CALCIUM 40 MG: 40 TABLET, FILM COATED ORAL at 09:15

## 2022-01-01 RX ADMIN — MORPHINE SULFATE 10 MG: 10 SOLUTION ORAL at 11:09

## 2022-01-01 RX ADMIN — SODIUM CHLORIDE, PRESERVATIVE FREE 10 ML: 5 INJECTION INTRAVENOUS at 05:50

## 2022-01-01 RX ADMIN — CASTOR OIL AND BALSAM, PERU: 788; 87 OINTMENT TOPICAL at 12:08

## 2022-01-01 RX ADMIN — CASTOR OIL AND BALSAM, PERU: 788; 87 OINTMENT TOPICAL at 21:19

## 2022-01-01 RX ADMIN — SODIUM CHLORIDE, PRESERVATIVE FREE 20 MG: 5 INJECTION INTRAVENOUS at 12:02

## 2022-01-01 RX ADMIN — Medication 1 AMPULE: at 09:45

## 2022-01-01 RX ADMIN — HEPARIN SODIUM 5000 UNITS: 5000 INJECTION INTRAVENOUS; SUBCUTANEOUS at 23:19

## 2022-01-01 RX ADMIN — SODIUM CHLORIDE, PRESERVATIVE FREE 40 MG: 5 INJECTION INTRAVENOUS at 22:26

## 2022-01-01 RX ADMIN — HEPARIN SODIUM 1900 UNITS: 1000 INJECTION INTRAVENOUS; SUBCUTANEOUS at 18:09

## 2022-01-01 RX ADMIN — ROCURONIUM BROMIDE 10 MG: 10 INJECTION INTRAVENOUS at 12:21

## 2022-01-01 RX ADMIN — FENTANYL CITRATE 25 MCG: 50 INJECTION, SOLUTION INTRAMUSCULAR; INTRAVENOUS at 11:53

## 2022-01-01 RX ADMIN — MIDODRINE HYDROCHLORIDE 10 MG: 5 TABLET ORAL at 18:03

## 2022-01-01 RX ADMIN — SODIUM CHLORIDE, PRESERVATIVE FREE 10 ML: 5 INJECTION INTRAVENOUS at 17:58

## 2022-01-01 RX ADMIN — Medication 2 UNITS: at 12:27

## 2022-01-01 RX ADMIN — Medication 8 UNITS: at 21:49

## 2022-01-01 RX ADMIN — VANCOMYCIN HYDROCHLORIDE 1500 MG: 10 INJECTION, POWDER, LYOPHILIZED, FOR SOLUTION INTRAVENOUS at 15:40

## 2022-01-01 RX ADMIN — EPOETIN ALFA-EPBX 12000 UNITS: 10000 INJECTION, SOLUTION INTRAVENOUS; SUBCUTANEOUS at 20:26

## 2022-01-01 RX ADMIN — Medication 14 UNITS: at 08:36

## 2022-01-01 RX ADMIN — SODIUM CHLORIDE, POTASSIUM CHLORIDE, SODIUM LACTATE AND CALCIUM CHLORIDE 500 ML: 600; 310; 30; 20 INJECTION, SOLUTION INTRAVENOUS at 23:47

## 2022-01-01 RX ADMIN — HEPARIN SODIUM 1100 UNITS: 1000 INJECTION INTRAVENOUS; SUBCUTANEOUS at 23:47

## 2022-01-01 RX ADMIN — NOREPINEPHRINE BITARTRATE 4 MCG/MIN: 1 SOLUTION INTRAVENOUS at 16:30

## 2022-01-01 RX ADMIN — SODIUM CHLORIDE, PRESERVATIVE FREE 10 ML: 5 INJECTION INTRAVENOUS at 05:24

## 2022-01-01 RX ADMIN — Medication 8 UNITS: at 09:00

## 2022-01-01 RX ADMIN — CEFEPIME 1 G: 1 INJECTION, POWDER, FOR SOLUTION INTRAMUSCULAR; INTRAVENOUS at 15:38

## 2022-01-01 RX ADMIN — MIDODRINE HYDROCHLORIDE 10 MG: 5 TABLET ORAL at 11:15

## 2022-01-01 RX ADMIN — PHENYLEPHRINE HYDROCHLORIDE 100 MCG: 10 INJECTION INTRAVENOUS at 13:41

## 2022-01-01 RX ADMIN — Medication 0.5 MG/MIN: at 05:24

## 2022-01-01 RX ADMIN — SODIUM CHLORIDE, PRESERVATIVE FREE 10 ML: 5 INJECTION INTRAVENOUS at 21:26

## 2022-01-01 RX ADMIN — Medication 16 UNITS: at 09:19

## 2022-01-01 RX ADMIN — LIDOCAINE HYDROCHLORIDE 15 ML: 20 SOLUTION ORAL at 08:41

## 2022-01-01 RX ADMIN — SODIUM CHLORIDE, PRESERVATIVE FREE 20 MG: 5 INJECTION INTRAVENOUS at 13:23

## 2022-01-01 RX ADMIN — Medication 3 UNITS: at 22:41

## 2022-01-01 RX ADMIN — Medication 2 UNITS: at 13:17

## 2022-01-01 RX ADMIN — HEPARIN SODIUM 5000 UNITS: 5000 INJECTION INTRAVENOUS; SUBCUTANEOUS at 11:08

## 2022-01-01 RX ADMIN — HEPARIN SODIUM 5000 UNITS: 5000 INJECTION INTRAVENOUS; SUBCUTANEOUS at 17:21

## 2022-01-01 RX ADMIN — CASTOR OIL AND BALSAM, PERU: 788; 87 OINTMENT TOPICAL at 09:46

## 2022-01-01 RX ADMIN — Medication 9 UNITS: at 08:44

## 2022-01-01 RX ADMIN — Medication 1 AMPULE: at 08:27

## 2022-01-01 RX ADMIN — SODIUM CHLORIDE, PRESERVATIVE FREE 10 ML: 5 INJECTION INTRAVENOUS at 13:51

## 2022-01-01 RX ADMIN — Medication 16 UNITS: at 17:08

## 2022-01-01 RX ADMIN — AMIODARONE HYDROCHLORIDE 200 MG: 200 TABLET ORAL at 08:43

## 2022-01-01 RX ADMIN — SODIUM CHLORIDE, PRESERVATIVE FREE 10 ML: 5 INJECTION INTRAVENOUS at 07:03

## 2022-01-01 RX ADMIN — MIDODRINE HYDROCHLORIDE 10 MG: 5 TABLET ORAL at 09:31

## 2022-01-01 RX ADMIN — SODIUM CHLORIDE, PRESERVATIVE FREE 10 ML: 5 INJECTION INTRAVENOUS at 22:00

## 2022-01-01 RX ADMIN — CASTOR OIL AND BALSAM, PERU: 788; 87 OINTMENT TOPICAL at 20:39

## 2022-01-01 RX ADMIN — SODIUM CHLORIDE, PRESERVATIVE FREE 10 ML: 5 INJECTION INTRAVENOUS at 17:34

## 2022-01-01 RX ADMIN — PANTOPRAZOLE SODIUM 40 MG: 40 TABLET, DELAYED RELEASE ORAL at 09:53

## 2022-01-01 RX ADMIN — SODIUM CHLORIDE, PRESERVATIVE FREE 10 ML: 5 INJECTION INTRAVENOUS at 00:33

## 2022-01-01 RX ADMIN — HYDROCORTISONE SODIUM SUCCINATE 50 MG: 100 INJECTION, POWDER, FOR SOLUTION INTRAMUSCULAR; INTRAVENOUS at 10:36

## 2022-01-01 RX ADMIN — METOPROLOL TARTRATE 2.5 MG: 5 INJECTION INTRAVENOUS at 09:53

## 2022-01-01 RX ADMIN — CEFEPIME HYDROCHLORIDE 1 G: 1 INJECTION, POWDER, FOR SOLUTION INTRAMUSCULAR; INTRAVENOUS at 20:15

## 2022-01-01 RX ADMIN — SODIUM CHLORIDE, PRESERVATIVE FREE 20 MG: 5 INJECTION INTRAVENOUS at 12:43

## 2022-01-01 RX ADMIN — Medication 1 AMPULE: at 21:38

## 2022-01-01 RX ADMIN — EPOETIN ALFA-EPBX 12000 UNITS: 10000 INJECTION, SOLUTION INTRAVENOUS; SUBCUTANEOUS at 22:51

## 2022-01-01 RX ADMIN — CLONIDINE HYDROCHLORIDE 0.1 MG: 0.1 TABLET ORAL at 09:30

## 2022-01-01 RX ADMIN — PIPERACILLIN AND TAZOBACTAM 3.38 G: 3; .375 INJECTION, POWDER, FOR SOLUTION INTRAVENOUS at 11:15

## 2022-01-01 RX ADMIN — ASPIRIN 81 MG: 81 TABLET, COATED ORAL at 10:04

## 2022-01-01 RX ADMIN — FAMOTIDINE 20 MG: 20 TABLET ORAL at 09:29

## 2022-01-01 RX ADMIN — CASTOR OIL AND BALSAM, PERU: 788; 87 OINTMENT TOPICAL at 17:11

## 2022-01-01 RX ADMIN — SODIUM CHLORIDE, PRESERVATIVE FREE 10 ML: 5 INJECTION INTRAVENOUS at 06:14

## 2022-01-01 RX ADMIN — Medication 0.5 MG/MIN: at 16:26

## 2022-01-01 RX ADMIN — SODIUM CHLORIDE, PRESERVATIVE FREE 10 ML: 5 INJECTION INTRAVENOUS at 04:57

## 2022-01-01 RX ADMIN — SODIUM CHLORIDE, PRESERVATIVE FREE 10 ML: 5 INJECTION INTRAVENOUS at 13:00

## 2022-01-01 RX ADMIN — SODIUM CHLORIDE, PRESERVATIVE FREE 10 ML: 5 INJECTION INTRAVENOUS at 05:53

## 2022-01-01 RX ADMIN — HEPARIN SODIUM 1400 UNITS: 1000 INJECTION INTRAVENOUS; SUBCUTANEOUS at 11:23

## 2022-01-01 RX ADMIN — PHENYLEPHRINE HYDROCHLORIDE 30 MCG/MIN: 10 INJECTION INTRAVENOUS at 15:55

## 2022-01-01 RX ADMIN — CASTOR OIL AND BALSAM, PERU: 788; 87 OINTMENT TOPICAL at 12:51

## 2022-01-01 RX ADMIN — SODIUM CHLORIDE, PRESERVATIVE FREE 10 ML: 5 INJECTION INTRAVENOUS at 14:40

## 2022-01-01 RX ADMIN — HEPARIN SODIUM 5000 UNITS: 5000 INJECTION INTRAVENOUS; SUBCUTANEOUS at 08:50

## 2022-01-01 RX ADMIN — HYDROCORTISONE SODIUM SUCCINATE 50 MG: 100 INJECTION, POWDER, FOR SOLUTION INTRAMUSCULAR; INTRAVENOUS at 17:30

## 2022-01-01 RX ADMIN — Medication 8 UNITS: at 12:48

## 2022-01-01 RX ADMIN — METOPROLOL TARTRATE 2.5 MG: 5 INJECTION INTRAVENOUS at 23:58

## 2022-01-01 RX ADMIN — CASTOR OIL AND BALSAM, PERU: 788; 87 OINTMENT TOPICAL at 11:25

## 2022-01-01 RX ADMIN — HEPARIN SODIUM 5000 UNITS: 5000 INJECTION INTRAVENOUS; SUBCUTANEOUS at 23:30

## 2022-01-01 RX ADMIN — Medication 8 UNITS: at 10:48

## 2022-01-01 RX ADMIN — HEPARIN SODIUM 1600 UNITS: 1000 INJECTION INTRAVENOUS; SUBCUTANEOUS at 11:54

## 2022-01-01 RX ADMIN — Medication 16 UNITS: at 18:06

## 2022-01-01 RX ADMIN — CALCIUM CHLORIDE, MAGNESIUM CHLORIDE, DEXTROSE MONOHYDRATE, LACTIC ACID, SODIUM CHLORIDE, SODIUM BICARBONATE AND POTASSIUM CHLORIDE: 3.68; 3.05; 22; 5.4; 6.46; 3.09; .314 INJECTION INTRAVENOUS at 21:10

## 2022-01-01 RX ADMIN — Medication 1 AMPULE: at 21:18

## 2022-01-01 RX ADMIN — Medication 2 UNITS: at 08:48

## 2022-01-01 RX ADMIN — CALCIUM CHLORIDE, MAGNESIUM CHLORIDE, DEXTROSE MONOHYDRATE, LACTIC ACID, SODIUM CHLORIDE, SODIUM BICARBONATE AND POTASSIUM CHLORIDE: 3.68; 3.05; 22; 5.4; 6.46; 3.09; .314 INJECTION INTRAVENOUS at 07:54

## 2022-01-01 RX ADMIN — FAMOTIDINE 20 MG: 20 TABLET ORAL at 08:09

## 2022-01-01 RX ADMIN — HYDROCORTISONE SODIUM SUCCINATE 50 MG: 100 INJECTION, POWDER, FOR SOLUTION INTRAMUSCULAR; INTRAVENOUS at 23:52

## 2022-01-01 RX ADMIN — HEPARIN SODIUM 5000 UNITS: 5000 INJECTION INTRAVENOUS; SUBCUTANEOUS at 16:14

## 2022-01-01 RX ADMIN — SODIUM CHLORIDE: 0.9 INJECTION, SOLUTION INTRAVENOUS at 11:48

## 2022-01-01 RX ADMIN — HEPARIN SODIUM 5000 UNITS: 5000 INJECTION INTRAVENOUS; SUBCUTANEOUS at 23:08

## 2022-01-01 RX ADMIN — SODIUM CHLORIDE, PRESERVATIVE FREE 40 MG: 5 INJECTION INTRAVENOUS at 22:41

## 2022-01-01 RX ADMIN — POTASSIUM CHLORIDE 10 MEQ: 7.46 INJECTION, SOLUTION INTRAVENOUS at 22:11

## 2022-01-01 RX ADMIN — SODIUM CHLORIDE 26.7 UNITS/HR: 9 INJECTION, SOLUTION INTRAVENOUS at 01:18

## 2022-01-01 RX ADMIN — VASOPRESSIN 0.04 UNITS/MIN: 20 INJECTION PARENTERAL at 23:19

## 2022-01-01 RX ADMIN — MIDAZOLAM HYDROCHLORIDE 2 MG: 1 INJECTION, SOLUTION INTRAMUSCULAR; INTRAVENOUS at 14:53

## 2022-01-01 RX ADMIN — SODIUM CHLORIDE, PRESERVATIVE FREE 10 ML: 5 INJECTION INTRAVENOUS at 07:22

## 2022-01-01 RX ADMIN — MIDODRINE HYDROCHLORIDE 10 MG: 5 TABLET ORAL at 09:53

## 2022-01-01 RX ADMIN — AMIODARONE HYDROCHLORIDE 200 MG: 200 TABLET ORAL at 17:13

## 2022-01-01 RX ADMIN — HEPARIN SODIUM 5000 UNITS: 5000 INJECTION INTRAVENOUS; SUBCUTANEOUS at 08:16

## 2022-01-01 RX ADMIN — HYDROCORTISONE SODIUM SUCCINATE 50 MG: 100 INJECTION, POWDER, FOR SOLUTION INTRAMUSCULAR; INTRAVENOUS at 23:48

## 2022-01-01 RX ADMIN — DAKIN'S SOLUTION 0.125% (QUARTER STRENGTH): 0.12 SOLUTION at 09:05

## 2022-01-01 RX ADMIN — SODIUM CHLORIDE, PRESERVATIVE FREE 10 ML: 5 INJECTION INTRAVENOUS at 22:28

## 2022-01-01 RX ADMIN — AMIODARONE HYDROCHLORIDE 200 MG: 200 TABLET ORAL at 09:32

## 2022-01-01 RX ADMIN — Medication 1 AMPULE: at 21:21

## 2022-01-01 RX ADMIN — CASTOR OIL AND BALSAM, PERU: 788; 87 OINTMENT TOPICAL at 21:23

## 2022-01-01 RX ADMIN — ASPIRIN 81 MG: 81 TABLET, COATED ORAL at 08:15

## 2022-01-01 RX ADMIN — ASPIRIN 81 MG: 81 TABLET, COATED ORAL at 09:11

## 2022-01-01 RX ADMIN — PIPERACILLIN AND TAZOBACTAM 3.38 G: 3; .375 INJECTION, POWDER, FOR SOLUTION INTRAVENOUS at 23:01

## 2022-01-01 RX ADMIN — SODIUM CHLORIDE, PRESERVATIVE FREE 40 MG: 5 INJECTION INTRAVENOUS at 13:01

## 2022-01-01 RX ADMIN — Medication 5 UNITS: at 09:13

## 2022-01-01 RX ADMIN — ATORVASTATIN CALCIUM 40 MG: 40 TABLET, FILM COATED ORAL at 08:16

## 2022-01-01 RX ADMIN — HYDROCORTISONE SODIUM SUCCINATE 50 MG: 100 INJECTION, POWDER, FOR SOLUTION INTRAMUSCULAR; INTRAVENOUS at 20:00

## 2022-01-01 RX ADMIN — Medication 1 AMPULE: at 09:00

## 2022-01-01 RX ADMIN — DEXTROSE MONOHYDRATE 75 ML: 100 INJECTION, SOLUTION INTRAVENOUS at 06:37

## 2022-01-01 RX ADMIN — Medication 1 AMPULE: at 08:38

## 2022-01-01 RX ADMIN — HYDROCORTISONE SODIUM SUCCINATE 50 MG: 100 INJECTION, POWDER, FOR SOLUTION INTRAMUSCULAR; INTRAVENOUS at 13:50

## 2022-01-01 RX ADMIN — ATORVASTATIN CALCIUM 40 MG: 40 TABLET, FILM COATED ORAL at 09:11

## 2022-01-01 RX ADMIN — SODIUM BICARBONATE 100 MEQ: 84 INJECTION INTRAVENOUS at 11:15

## 2022-01-01 RX ADMIN — SODIUM CHLORIDE, PRESERVATIVE FREE 10 ML: 5 INJECTION INTRAVENOUS at 04:30

## 2022-01-01 RX ADMIN — HEPARIN SODIUM 5000 UNITS: 5000 INJECTION INTRAVENOUS; SUBCUTANEOUS at 17:11

## 2022-01-01 RX ADMIN — SODIUM CHLORIDE, PRESERVATIVE FREE 10 ML: 5 INJECTION INTRAVENOUS at 21:38

## 2022-01-01 RX ADMIN — CALCIUM CHLORIDE, MAGNESIUM CHLORIDE, DEXTROSE MONOHYDRATE, LACTIC ACID, SODIUM CHLORIDE, SODIUM BICARBONATE AND POTASSIUM CHLORIDE: 3.68; 3.05; 22; 5.4; 6.46; 3.09; .314 INJECTION INTRAVENOUS at 02:58

## 2022-01-01 RX ADMIN — SODIUM CHLORIDE, PRESERVATIVE FREE 300 UNITS: 5 INJECTION INTRAVENOUS at 14:46

## 2022-01-01 RX ADMIN — HEPARIN SODIUM 5000 UNITS: 5000 INJECTION INTRAVENOUS; SUBCUTANEOUS at 09:02

## 2022-01-01 RX ADMIN — SODIUM CHLORIDE, PRESERVATIVE FREE 10 ML: 5 INJECTION INTRAVENOUS at 17:23

## 2022-01-01 RX ADMIN — SODIUM CHLORIDE, PRESERVATIVE FREE 40 MG: 5 INJECTION INTRAVENOUS at 21:50

## 2022-01-01 RX ADMIN — ALBUTEROL SULFATE 10 MG: 2.5 SOLUTION RESPIRATORY (INHALATION) at 08:52

## 2022-01-01 RX ADMIN — SODIUM CHLORIDE, PRESERVATIVE FREE 40 MG: 5 INJECTION INTRAVENOUS at 09:44

## 2022-01-01 RX ADMIN — HEPARIN SODIUM 5000 UNITS: 5000 INJECTION INTRAVENOUS; SUBCUTANEOUS at 18:43

## 2022-01-01 RX ADMIN — CASTOR OIL AND BALSAM, PERU: 788; 87 OINTMENT TOPICAL at 09:15

## 2022-01-01 RX ADMIN — Medication 2 UNITS: at 10:10

## 2022-01-01 RX ADMIN — Medication 2 UNITS: at 16:18

## 2022-01-01 RX ADMIN — SODIUM CHLORIDE, PRESERVATIVE FREE 10 ML: 5 INJECTION INTRAVENOUS at 23:31

## 2022-01-01 RX ADMIN — METRONIDAZOLE 500 MG: 250 TABLET ORAL at 21:39

## 2022-01-01 RX ADMIN — MIDAZOLAM HYDROCHLORIDE 2 MG: 1 INJECTION, SOLUTION INTRAMUSCULAR; INTRAVENOUS at 20:07

## 2022-01-01 RX ADMIN — AMIODARONE HYDROCHLORIDE 200 MG: 200 TABLET ORAL at 09:13

## 2022-01-01 RX ADMIN — ATORVASTATIN CALCIUM 40 MG: 40 TABLET, FILM COATED ORAL at 09:32

## 2022-01-01 RX ADMIN — Medication 1 AMPULE: at 13:01

## 2022-01-01 RX ADMIN — FENTANYL CITRATE 25 MCG: 50 INJECTION, SOLUTION INTRAMUSCULAR; INTRAVENOUS at 14:59

## 2022-01-01 RX ADMIN — SODIUM CHLORIDE, PRESERVATIVE FREE 10 ML: 5 INJECTION INTRAVENOUS at 14:35

## 2022-01-01 RX ADMIN — FENTANYL CITRATE 50 MCG: 50 INJECTION, SOLUTION INTRAMUSCULAR; INTRAVENOUS at 14:34

## 2022-01-01 RX ADMIN — Medication 2 UNITS: at 09:12

## 2022-01-01 RX ADMIN — Medication 1 AMPULE: at 22:47

## 2022-01-01 RX ADMIN — DAKIN'S SOLUTION 0.125% (QUARTER STRENGTH): 0.12 SOLUTION at 08:17

## 2022-01-01 RX ADMIN — HYDROCORTISONE SODIUM SUCCINATE 50 MG: 100 INJECTION, POWDER, FOR SOLUTION INTRAMUSCULAR; INTRAVENOUS at 13:17

## 2022-01-01 RX ADMIN — Medication 14 UNITS: at 11:19

## 2022-01-01 RX ADMIN — ALBUMIN HUMAN 25 G: 50 SOLUTION INTRAVENOUS at 17:34

## 2022-01-01 RX ADMIN — CASTOR OIL AND BALSAM, PERU: 788; 87 OINTMENT TOPICAL at 20:27

## 2022-01-01 RX ADMIN — Medication 3 UNITS: at 11:50

## 2022-01-01 RX ADMIN — SODIUM CHLORIDE, PRESERVATIVE FREE 10 ML: 5 INJECTION INTRAVENOUS at 23:12

## 2022-01-01 RX ADMIN — MIDODRINE HYDROCHLORIDE 10 MG: 5 TABLET ORAL at 13:38

## 2022-01-01 RX ADMIN — MEROPENEM 500 MG: 500 INJECTION, POWDER, FOR SOLUTION INTRAVENOUS at 16:44

## 2022-01-01 RX ADMIN — ROCURONIUM BROMIDE 20 MG: 10 INJECTION INTRAVENOUS at 13:03

## 2022-01-01 RX ADMIN — CALCIUM CHLORIDE, MAGNESIUM CHLORIDE, DEXTROSE MONOHYDRATE, LACTIC ACID, SODIUM CHLORIDE, SODIUM BICARBONATE AND POTASSIUM CHLORIDE: 3.68; 3.05; 22; 5.4; 6.46; 3.09; .314 INJECTION INTRAVENOUS at 20:52

## 2022-01-01 RX ADMIN — HYDROCORTISONE SODIUM SUCCINATE 50 MG: 100 INJECTION, POWDER, FOR SOLUTION INTRAMUSCULAR; INTRAVENOUS at 12:02

## 2022-01-01 RX ADMIN — ATORVASTATIN CALCIUM 40 MG: 40 TABLET, FILM COATED ORAL at 08:42

## 2022-01-01 RX ADMIN — PIPERACILLIN AND TAZOBACTAM 3.38 G: 3; .375 INJECTION, POWDER, LYOPHILIZED, FOR SOLUTION INTRAVENOUS at 02:11

## 2022-01-01 RX ADMIN — PIPERACILLIN AND TAZOBACTAM 3.38 G: 3; .375 INJECTION, POWDER, LYOPHILIZED, FOR SOLUTION INTRAVENOUS at 12:05

## 2022-01-01 RX ADMIN — LIDOCAINE HYDROCHLORIDE 200 MG: 20 INJECTION, SOLUTION INFILTRATION; PERINEURAL at 14:46

## 2022-01-01 RX ADMIN — Medication 8 UNITS: at 20:26

## 2022-01-01 RX ADMIN — SODIUM CHLORIDE 4.5 G: 900 INJECTION INTRAVENOUS at 09:58

## 2022-01-01 RX ADMIN — ATORVASTATIN CALCIUM 40 MG: 40 TABLET, FILM COATED ORAL at 09:29

## 2022-01-01 RX ADMIN — Medication 2 UNITS: at 23:52

## 2022-01-01 RX ADMIN — AMIODARONE HYDROCHLORIDE 200 MG: 200 TABLET ORAL at 17:21

## 2022-01-01 RX ADMIN — SODIUM CHLORIDE, PRESERVATIVE FREE 10 ML: 5 INJECTION INTRAVENOUS at 21:00

## 2022-01-01 RX ADMIN — Medication 1 AMPULE: at 21:41

## 2022-01-01 RX ADMIN — FAMOTIDINE 20 MG: 20 TABLET ORAL at 09:30

## 2022-01-01 RX ADMIN — SODIUM CHLORIDE 1000 ML: 9 INJECTION, SOLUTION INTRAVENOUS at 10:53

## 2022-01-01 RX ADMIN — ATORVASTATIN CALCIUM 40 MG: 40 TABLET, FILM COATED ORAL at 09:02

## 2022-01-01 RX ADMIN — VANCOMYCIN HYDROCHLORIDE 1000 MG: 1 INJECTION, POWDER, LYOPHILIZED, FOR SOLUTION INTRAVENOUS at 14:56

## 2022-01-01 RX ADMIN — PIPERACILLIN AND TAZOBACTAM 3.38 G: 3; .375 INJECTION, POWDER, FOR SOLUTION INTRAVENOUS at 21:40

## 2022-01-01 RX ADMIN — POTASSIUM CHLORIDE 20 MEQ: 750 TABLET, EXTENDED RELEASE ORAL at 12:04

## 2022-01-01 RX ADMIN — Medication 2 UNITS: at 17:51

## 2022-01-01 RX ADMIN — VANCOMYCIN HYDROCHLORIDE 1000 MG: 1 INJECTION, POWDER, LYOPHILIZED, FOR SOLUTION INTRAVENOUS at 14:18

## 2022-01-01 RX ADMIN — INSULIN GLARGINE 6 UNITS: 100 INJECTION, SOLUTION SUBCUTANEOUS at 22:08

## 2022-01-01 RX ADMIN — POTASSIUM CHLORIDE 20 MEQ: 20 TABLET, EXTENDED RELEASE ORAL at 07:19

## 2022-01-01 RX ADMIN — SODIUM CHLORIDE, PRESERVATIVE FREE 10 ML: 5 INJECTION INTRAVENOUS at 13:27

## 2022-01-01 RX ADMIN — HYDROCORTISONE SODIUM SUCCINATE 50 MG: 100 INJECTION, POWDER, FOR SOLUTION INTRAMUSCULAR; INTRAVENOUS at 05:21

## 2022-01-01 RX ADMIN — CASTOR OIL AND BALSAM, PERU: 788; 87 OINTMENT TOPICAL at 08:23

## 2022-01-01 RX ADMIN — ASPIRIN 81 MG: 81 TABLET, COATED ORAL at 08:10

## 2022-01-01 RX ADMIN — CEFEPIME HYDROCHLORIDE 1 G: 1 INJECTION, POWDER, FOR SOLUTION INTRAMUSCULAR; INTRAVENOUS at 16:05

## 2022-01-01 RX ADMIN — Medication 14 UNITS: at 18:57

## 2022-01-01 RX ADMIN — METRONIDAZOLE 500 MG: 250 TABLET ORAL at 08:16

## 2022-01-01 RX ADMIN — CALCIUM CHLORIDE, MAGNESIUM CHLORIDE, DEXTROSE MONOHYDRATE, LACTIC ACID, SODIUM CHLORIDE, SODIUM BICARBONATE AND POTASSIUM CHLORIDE: 3.68; 3.05; 22; 5.4; 6.46; 3.09; .314 INJECTION INTRAVENOUS at 10:38

## 2022-01-01 RX ADMIN — CASTOR OIL AND BALSAM, PERU: 788; 87 OINTMENT TOPICAL at 08:44

## 2022-01-01 RX ADMIN — PIPERACILLIN AND TAZOBACTAM 3.38 G: 3; .375 INJECTION, POWDER, LYOPHILIZED, FOR SOLUTION INTRAVENOUS at 17:23

## 2022-01-01 RX ADMIN — INSULIN GLARGINE 6 UNITS: 100 INJECTION, SOLUTION SUBCUTANEOUS at 21:18

## 2022-01-01 RX ADMIN — SODIUM CHLORIDE, PRESERVATIVE FREE 10 ML: 5 INJECTION INTRAVENOUS at 01:34

## 2022-01-01 RX ADMIN — METRONIDAZOLE 500 MG: 250 TABLET ORAL at 10:09

## 2022-01-01 RX ADMIN — BUMETANIDE 2 MG: 0.25 INJECTION INTRAMUSCULAR; INTRAVENOUS at 17:11

## 2022-01-01 RX ADMIN — Medication 1 AMPULE: at 08:22

## 2022-01-01 RX ADMIN — SODIUM CHLORIDE, PRESERVATIVE FREE 10 ML: 5 INJECTION INTRAVENOUS at 05:51

## 2022-01-01 RX ADMIN — HEPARIN SODIUM 5000 UNITS: 5000 INJECTION INTRAVENOUS; SUBCUTANEOUS at 01:13

## 2022-01-01 RX ADMIN — SODIUM CHLORIDE, PRESERVATIVE FREE 10 ML: 5 INJECTION INTRAVENOUS at 22:16

## 2022-01-01 RX ADMIN — MORPHINE SULFATE 1 MG: 2 INJECTION, SOLUTION INTRAMUSCULAR; INTRAVENOUS at 20:52

## 2022-01-01 RX ADMIN — SODIUM CHLORIDE, PRESERVATIVE FREE 10 ML: 5 INJECTION INTRAVENOUS at 05:36

## 2022-01-01 RX ADMIN — Medication 1 AMPULE: at 22:12

## 2022-01-01 RX ADMIN — CEFEPIME 1 G: 1 INJECTION, POWDER, FOR SOLUTION INTRAMUSCULAR; INTRAVENOUS at 14:42

## 2022-01-01 RX ADMIN — HEPARIN SODIUM 5000 UNITS: 5000 INJECTION INTRAVENOUS; SUBCUTANEOUS at 00:38

## 2022-01-01 RX ADMIN — COLLAGENASE SANTYL: 250 OINTMENT TOPICAL at 12:30

## 2022-01-01 RX ADMIN — VANCOMYCIN HYDROCHLORIDE 1000 MG: 1 INJECTION, POWDER, LYOPHILIZED, FOR SOLUTION INTRAVENOUS at 15:58

## 2022-01-01 RX ADMIN — VANCOMYCIN HYDROCHLORIDE 1000 MG: 1 INJECTION, POWDER, LYOPHILIZED, FOR SOLUTION INTRAVENOUS at 16:50

## 2022-01-01 RX ADMIN — FENTANYL CITRATE 25 MCG: 50 INJECTION, SOLUTION INTRAMUSCULAR; INTRAVENOUS at 14:52

## 2022-01-01 RX ADMIN — GLUCAGON HYDROCHLORIDE 1 MG: 1 INJECTION, POWDER, FOR SOLUTION INTRAMUSCULAR; INTRAVENOUS; SUBCUTANEOUS at 10:30

## 2022-01-01 RX ADMIN — CASTOR OIL AND BALSAM, PERU: 788; 87 OINTMENT TOPICAL at 22:11

## 2022-01-01 RX ADMIN — CALCIUM GLUCONATE 1 G: 98 INJECTION, SOLUTION INTRAVENOUS at 08:08

## 2022-01-01 RX ADMIN — SODIUM CHLORIDE, PRESERVATIVE FREE 20 MG: 5 INJECTION INTRAVENOUS at 15:22

## 2022-01-01 RX ADMIN — AMIODARONE HYDROCHLORIDE 200 MG: 200 TABLET ORAL at 08:16

## 2022-01-01 RX ADMIN — SODIUM CHLORIDE, PRESERVATIVE FREE 10 ML: 5 INJECTION INTRAVENOUS at 06:57

## 2022-01-01 RX ADMIN — SODIUM CHLORIDE, PRESERVATIVE FREE 10 ML: 5 INJECTION INTRAVENOUS at 13:20

## 2022-01-01 RX ADMIN — Medication 1 AMPULE: at 22:27

## 2022-01-01 RX ADMIN — AMIODARONE HYDROCHLORIDE 400 MG: 200 TABLET ORAL at 09:45

## 2022-01-01 RX ADMIN — ATORVASTATIN CALCIUM 40 MG: 40 TABLET, FILM COATED ORAL at 08:10

## 2022-01-01 RX ADMIN — HYDROMORPHONE HYDROCHLORIDE 1 MG: 1 INJECTION, SOLUTION INTRAMUSCULAR; INTRAVENOUS; SUBCUTANEOUS at 20:07

## 2022-01-01 RX ADMIN — AMIODARONE HYDROCHLORIDE 400 MG: 200 TABLET ORAL at 18:11

## 2022-01-01 RX ADMIN — MIDODRINE HYDROCHLORIDE 10 MG: 5 TABLET ORAL at 17:03

## 2022-01-01 RX ADMIN — ONDANSETRON 4 MG: 2 INJECTION INTRAMUSCULAR; INTRAVENOUS at 01:17

## 2022-01-01 RX ADMIN — FENTANYL CITRATE 50 MCG: 50 INJECTION, SOLUTION INTRAMUSCULAR; INTRAVENOUS at 14:30

## 2022-01-01 RX ADMIN — SODIUM CHLORIDE, PRESERVATIVE FREE 40 MG: 5 INJECTION INTRAVENOUS at 22:10

## 2022-01-01 RX ADMIN — SODIUM CHLORIDE, PRESERVATIVE FREE 10 ML: 5 INJECTION INTRAVENOUS at 14:32

## 2022-01-01 RX ADMIN — MIDODRINE HYDROCHLORIDE 10 MG: 5 TABLET ORAL at 17:33

## 2022-01-01 RX ADMIN — HYDROCORTISONE SODIUM SUCCINATE 50 MG: 100 INJECTION, POWDER, FOR SOLUTION INTRAMUSCULAR; INTRAVENOUS at 11:50

## 2022-01-01 RX ADMIN — CALCIUM CHLORIDE, MAGNESIUM CHLORIDE, DEXTROSE MONOHYDRATE, LACTIC ACID, SODIUM CHLORIDE, SODIUM BICARBONATE AND POTASSIUM CHLORIDE: 3.68; 3.05; 22; 5.4; 6.46; 3.09; .314 INJECTION INTRAVENOUS at 10:37

## 2022-01-01 RX ADMIN — SODIUM CHLORIDE, PRESERVATIVE FREE 40 MG: 5 INJECTION INTRAVENOUS at 10:10

## 2022-01-01 RX ADMIN — CEFEPIME HYDROCHLORIDE 2 G: 2 INJECTION, POWDER, FOR SOLUTION INTRAVENOUS at 18:43

## 2022-01-01 RX ADMIN — METRONIDAZOLE 500 MG: 250 TABLET ORAL at 20:24

## 2022-01-01 RX ADMIN — SODIUM CHLORIDE 27.1 UNITS/HR: 9 INJECTION, SOLUTION INTRAVENOUS at 13:30

## 2022-01-01 RX ADMIN — HEPARIN SODIUM 5000 UNITS: 5000 INJECTION INTRAVENOUS; SUBCUTANEOUS at 23:31

## 2022-01-01 RX ADMIN — FAMOTIDINE 20 MG: 20 TABLET ORAL at 10:46

## 2022-01-01 RX ADMIN — Medication 1 UNITS: at 22:13

## 2022-01-01 RX ADMIN — CALCIUM CHLORIDE, MAGNESIUM CHLORIDE, DEXTROSE MONOHYDRATE, LACTIC ACID, SODIUM CHLORIDE, SODIUM BICARBONATE AND POTASSIUM CHLORIDE: 3.68; 3.05; 22; 5.4; 6.46; 3.09; .314 INJECTION INTRAVENOUS at 15:46

## 2022-01-01 RX ADMIN — NOREPINEPHRINE BITARTRATE 26 MCG/MIN: 1 INJECTION, SOLUTION, CONCENTRATE INTRAVENOUS at 13:57

## 2022-01-01 RX ADMIN — DAKIN'S SOLUTION 0.125% (QUARTER STRENGTH): 0.12 SOLUTION at 13:47

## 2022-01-01 RX ADMIN — CEFAZOLIN SODIUM 2 G: 1 INJECTION, POWDER, FOR SOLUTION INTRAMUSCULAR; INTRAVENOUS at 14:06

## 2022-01-01 RX ADMIN — SODIUM CHLORIDE, PRESERVATIVE FREE 10 ML: 5 INJECTION INTRAVENOUS at 05:55

## 2022-01-01 RX ADMIN — SODIUM CHLORIDE, PRESERVATIVE FREE 10 ML: 5 INJECTION INTRAVENOUS at 14:29

## 2022-01-01 RX ADMIN — Medication 5 UNITS: at 13:02

## 2022-01-01 RX ADMIN — PIPERACILLIN AND TAZOBACTAM 3.38 G: 3; .375 INJECTION, POWDER, LYOPHILIZED, FOR SOLUTION INTRAVENOUS at 03:00

## 2022-01-01 RX ADMIN — CASTOR OIL AND BALSAM, PERU: 788; 87 OINTMENT TOPICAL at 21:15

## 2022-01-01 RX ADMIN — Medication 1 AMPULE: at 21:24

## 2022-01-01 RX ADMIN — Medication 1 AMPULE: at 21:32

## 2022-01-01 RX ADMIN — Medication 2 UNITS: at 11:43

## 2022-01-01 RX ADMIN — CLONIDINE HYDROCHLORIDE 0.1 MG: 0.1 TABLET ORAL at 18:55

## 2022-01-01 RX ADMIN — Medication 3 UNITS: at 17:38

## 2022-01-01 RX ADMIN — Medication 1 UNITS: at 22:22

## 2022-01-01 RX ADMIN — Medication 0.5 MG/MIN: at 02:36

## 2022-01-01 RX ADMIN — SODIUM CHLORIDE, PRESERVATIVE FREE 10 ML: 5 INJECTION INTRAVENOUS at 05:00

## 2022-01-01 RX ADMIN — CASTOR OIL AND BALSAM, PERU: 788; 87 OINTMENT TOPICAL at 21:59

## 2022-01-01 RX ADMIN — HEPARIN SODIUM 5000 UNITS: 5000 INJECTION INTRAVENOUS; SUBCUTANEOUS at 23:01

## 2022-01-01 RX ADMIN — Medication 1 AMPULE: at 20:28

## 2022-01-01 RX ADMIN — HEPARIN SODIUM 5000 UNITS: 5000 INJECTION INTRAVENOUS; SUBCUTANEOUS at 13:11

## 2022-01-01 RX ADMIN — AMIODARONE HYDROCHLORIDE 200 MG: 200 TABLET ORAL at 16:46

## 2022-01-01 RX ADMIN — SODIUM CHLORIDE, PRESERVATIVE FREE 10 ML: 5 INJECTION INTRAVENOUS at 23:01

## 2022-01-01 RX ADMIN — MIDODRINE HYDROCHLORIDE 10 MG: 5 TABLET ORAL at 11:46

## 2022-01-01 RX ADMIN — HYDROCORTISONE SODIUM SUCCINATE 50 MG: 100 INJECTION, POWDER, FOR SOLUTION INTRAMUSCULAR; INTRAVENOUS at 17:55

## 2022-01-01 RX ADMIN — CASTOR OIL AND BALSAM, PERU: 788; 87 OINTMENT TOPICAL at 22:46

## 2022-01-01 RX ADMIN — CLONIDINE HYDROCHLORIDE 0.1 MG: 0.1 TABLET ORAL at 23:01

## 2022-01-01 RX ADMIN — AMIODARONE HYDROCHLORIDE 200 MG: 200 TABLET ORAL at 08:10

## 2022-01-01 RX ADMIN — SODIUM CHLORIDE 21.6 UNITS/HR: 9 INJECTION, SOLUTION INTRAVENOUS at 11:17

## 2022-01-01 RX ADMIN — CALCIUM CHLORIDE, MAGNESIUM CHLORIDE, DEXTROSE MONOHYDRATE, LACTIC ACID, SODIUM CHLORIDE, SODIUM BICARBONATE AND POTASSIUM CHLORIDE: 3.68; 3.05; 22; 5.4; 6.46; 3.09; .314 INJECTION INTRAVENOUS at 07:34

## 2022-01-01 RX ADMIN — AMIODARONE HYDROCHLORIDE 200 MG: 200 TABLET ORAL at 09:52

## 2022-01-01 RX ADMIN — CASTOR OIL AND BALSAM, PERU: 788; 87 OINTMENT TOPICAL at 13:02

## 2022-01-01 RX ADMIN — DEXTROSE MONOHYDRATE 65 ML: 100 INJECTION, SOLUTION INTRAVENOUS at 07:14

## 2022-01-01 RX ADMIN — HEPARIN SODIUM 1600 UNITS: 1000 INJECTION INTRAVENOUS; SUBCUTANEOUS at 11:52

## 2022-01-01 RX ADMIN — ALBUMIN (HUMAN) 25 G: 0.25 INJECTION, SOLUTION INTRAVENOUS at 17:02

## 2022-01-01 RX ADMIN — HYDROCORTISONE SODIUM SUCCINATE 50 MG: 100 INJECTION, POWDER, FOR SOLUTION INTRAMUSCULAR; INTRAVENOUS at 06:58

## 2022-01-01 RX ADMIN — PIPERACILLIN AND TAZOBACTAM 3.38 G: 3; .375 INJECTION, POWDER, LYOPHILIZED, FOR SOLUTION INTRAVENOUS at 09:14

## 2022-01-01 RX ADMIN — MIDODRINE HYDROCHLORIDE 10 MG: 5 TABLET ORAL at 08:42

## 2022-01-01 RX ADMIN — Medication 1 AMPULE: at 10:15

## 2022-01-01 RX ADMIN — MIDAZOLAM HYDROCHLORIDE 1 MG: 1 INJECTION, SOLUTION INTRAMUSCULAR; INTRAVENOUS at 08:43

## 2022-01-01 RX ADMIN — SODIUM CHLORIDE, PRESERVATIVE FREE 10 ML: 5 INJECTION INTRAVENOUS at 12:21

## 2022-01-01 RX ADMIN — MEROPENEM 1 G: 1 INJECTION, POWDER, FOR SOLUTION INTRAVENOUS at 10:15

## 2022-01-01 RX ADMIN — SODIUM CHLORIDE, PRESERVATIVE FREE 10 ML: 5 INJECTION INTRAVENOUS at 14:07

## 2022-01-01 RX ADMIN — FAMOTIDINE 20 MG: 20 TABLET ORAL at 08:16

## 2022-01-01 RX ADMIN — CASTOR OIL AND BALSAM, PERU: 788; 87 OINTMENT TOPICAL at 08:27

## 2022-01-01 RX ADMIN — HYDROMORPHONE HYDROCHLORIDE 1 MG: 1 INJECTION, SOLUTION INTRAMUSCULAR; INTRAVENOUS; SUBCUTANEOUS at 18:41

## 2022-01-01 RX ADMIN — COLLAGENASE SANTYL: 250 OINTMENT TOPICAL at 08:44

## 2022-01-01 RX ADMIN — Medication 2 UNITS: at 22:12

## 2022-01-01 RX ADMIN — ACETAMINOPHEN 650 MG: 325 TABLET ORAL at 15:30

## 2022-01-01 RX ADMIN — Medication 8 UNITS: at 20:38

## 2022-01-01 RX ADMIN — Medication 1 AMPULE: at 09:14

## 2022-01-01 RX ADMIN — Medication 1 AMPULE: at 09:32

## 2022-01-01 RX ADMIN — MIDODRINE HYDROCHLORIDE 10 MG: 5 TABLET ORAL at 08:12

## 2022-01-01 RX ADMIN — HEPARIN SODIUM 5000 UNITS: 5000 INJECTION INTRAVENOUS; SUBCUTANEOUS at 00:05

## 2022-01-01 RX ADMIN — CASTOR OIL AND BALSAM, PERU: 788; 87 OINTMENT TOPICAL at 21:47

## 2022-01-01 RX ADMIN — SODIUM CHLORIDE, PRESERVATIVE FREE 10 ML: 5 INJECTION INTRAVENOUS at 12:23

## 2022-01-01 RX ADMIN — ASPIRIN 81 MG: 81 TABLET, COATED ORAL at 09:53

## 2022-01-01 RX ADMIN — DAKIN'S SOLUTION 0.125% (QUARTER STRENGTH): 0.12 SOLUTION at 12:30

## 2022-01-01 RX ADMIN — Medication 10 UNITS: at 15:37

## 2022-01-01 RX ADMIN — SODIUM CHLORIDE, PRESERVATIVE FREE 10 ML: 5 INJECTION INTRAVENOUS at 13:17

## 2022-01-01 RX ADMIN — SODIUM CHLORIDE, PRESERVATIVE FREE 10 ML: 5 INJECTION INTRAVENOUS at 13:06

## 2022-01-01 RX ADMIN — Medication 5 UNITS: at 08:58

## 2022-01-01 RX ADMIN — Medication 14 UNITS: at 08:52

## 2022-01-01 RX ADMIN — ATORVASTATIN CALCIUM 40 MG: 40 TABLET, FILM COATED ORAL at 10:09

## 2022-01-01 RX ADMIN — SODIUM CHLORIDE, PRESERVATIVE FREE 10 ML: 5 INJECTION INTRAVENOUS at 07:00

## 2022-01-01 RX ADMIN — CLONIDINE HYDROCHLORIDE 0.1 MG: 0.1 TABLET ORAL at 10:09

## 2022-01-01 RX ADMIN — HYDROMORPHONE HYDROCHLORIDE 1 MG: 1 INJECTION, SOLUTION INTRAMUSCULAR; INTRAVENOUS; SUBCUTANEOUS at 15:30

## 2022-01-01 RX ADMIN — AMIODARONE HYDROCHLORIDE 400 MG: 200 TABLET ORAL at 08:37

## 2022-01-01 RX ADMIN — CLONIDINE HYDROCHLORIDE 0.1 MG: 0.1 TABLET ORAL at 22:08

## 2022-01-01 RX ADMIN — Medication 14 UNITS: at 17:12

## 2022-01-01 RX ADMIN — MEROPENEM 500 MG: 500 INJECTION, POWDER, FOR SOLUTION INTRAVENOUS at 17:22

## 2022-01-01 RX ADMIN — SODIUM CHLORIDE, PRESERVATIVE FREE 10 ML: 5 INJECTION INTRAVENOUS at 10:28

## 2022-01-01 RX ADMIN — ACETAMINOPHEN 650 MG: 325 TABLET ORAL at 12:36

## 2022-01-01 RX ADMIN — AMIODARONE HYDROCHLORIDE 200 MG: 200 TABLET ORAL at 10:04

## 2022-01-01 RX ADMIN — Medication 3 UNITS: at 11:19

## 2022-01-01 RX ADMIN — PIPERACILLIN AND TAZOBACTAM 3.38 G: 3; .375 INJECTION, POWDER, LYOPHILIZED, FOR SOLUTION INTRAVENOUS at 23:10

## 2022-01-01 RX ADMIN — CALCIUM CHLORIDE, MAGNESIUM CHLORIDE, DEXTROSE MONOHYDRATE, LACTIC ACID, SODIUM CHLORIDE, SODIUM BICARBONATE AND POTASSIUM CHLORIDE: 3.68; 3.05; 22; 5.4; 6.46; 3.09; .314 INJECTION INTRAVENOUS at 01:52

## 2022-01-01 RX ADMIN — Medication 1 AMPULE: at 13:00

## 2022-01-01 RX ADMIN — FAMOTIDINE 20 MG: 20 TABLET ORAL at 12:48

## 2022-01-01 RX ADMIN — MIDODRINE HYDROCHLORIDE 10 MG: 5 TABLET ORAL at 23:08

## 2022-01-01 RX ADMIN — FENTANYL CITRATE 25 MCG: 50 INJECTION, SOLUTION INTRAMUSCULAR; INTRAVENOUS at 13:02

## 2022-01-01 RX ADMIN — HEPARIN SODIUM 5000 UNITS: 5000 INJECTION INTRAVENOUS; SUBCUTANEOUS at 04:59

## 2022-01-01 RX ADMIN — CEFEPIME HYDROCHLORIDE 1 G: 1 INJECTION, POWDER, FOR SOLUTION INTRAMUSCULAR; INTRAVENOUS at 17:51

## 2022-01-01 RX ADMIN — METRONIDAZOLE 500 MG: 250 TABLET ORAL at 22:08

## 2022-01-01 RX ADMIN — Medication 1 AMPULE: at 11:16

## 2022-01-01 RX ADMIN — CLINDAMYCIN IN 5 PERCENT DEXTROSE 600 MG: 12 INJECTION, SOLUTION INTRAVENOUS at 21:31

## 2022-01-01 RX ADMIN — IOPAMIDOL 100 ML: 755 INJECTION, SOLUTION INTRAVENOUS at 15:15

## 2022-01-01 RX ADMIN — Medication 3 UNITS: at 23:46

## 2022-01-01 RX ADMIN — SODIUM CHLORIDE, PRESERVATIVE FREE 10 ML: 5 INJECTION INTRAVENOUS at 22:04

## 2022-01-01 RX ADMIN — Medication 1 AMPULE: at 09:57

## 2022-01-01 RX ADMIN — SODIUM CHLORIDE, PRESERVATIVE FREE 20 MG: 5 INJECTION INTRAVENOUS at 12:17

## 2022-01-01 RX ADMIN — FENTANYL CITRATE 25 MCG: 50 INJECTION, SOLUTION INTRAMUSCULAR; INTRAVENOUS at 14:47

## 2022-01-01 RX ADMIN — LIDOCAINE HYDROCHLORIDE 200 MG: 20 INJECTION, SOLUTION INFILTRATION; PERINEURAL at 15:30

## 2022-01-01 RX ADMIN — Medication 8 UNITS: at 09:40

## 2022-01-01 RX ADMIN — SODIUM CHLORIDE, PRESERVATIVE FREE 10 ML: 5 INJECTION INTRAVENOUS at 06:56

## 2022-01-01 RX ADMIN — SODIUM CHLORIDE, PRESERVATIVE FREE 10 ML: 5 INJECTION INTRAVENOUS at 05:40

## 2022-01-01 RX ADMIN — Medication 2 UNITS: at 17:32

## 2022-01-01 RX ADMIN — SODIUM CHLORIDE, PRESERVATIVE FREE 10 ML: 5 INJECTION INTRAVENOUS at 15:38

## 2022-01-01 RX ADMIN — DOPAMINE HYDROCHLORIDE 5 MCG/KG/MIN: 320 INJECTION, SOLUTION INTRAVENOUS at 08:40

## 2022-01-01 RX ADMIN — HEPARIN SODIUM 3200 UNITS: 1000 INJECTION, SOLUTION INTRAVENOUS; SUBCUTANEOUS at 14:30

## 2022-01-01 RX ADMIN — Medication 14 UNITS: at 18:29

## 2022-01-01 RX ADMIN — CASTOR OIL AND BALSAM, PERU: 788; 87 OINTMENT TOPICAL at 12:09

## 2022-01-01 RX ADMIN — SODIUM CHLORIDE 150 ML/HR: 4.5 INJECTION, SOLUTION INTRAVENOUS at 19:09

## 2022-01-01 RX ADMIN — AMIODARONE HYDROCHLORIDE 400 MG: 200 TABLET ORAL at 09:12

## 2022-01-01 RX ADMIN — CASTOR OIL AND BALSAM, PERU: 788; 87 OINTMENT TOPICAL at 17:04

## 2022-01-01 RX ADMIN — CASTOR OIL AND BALSAM, PERU: 788; 87 OINTMENT TOPICAL at 09:33

## 2022-01-01 RX ADMIN — CALCIUM CHLORIDE, MAGNESIUM CHLORIDE, DEXTROSE MONOHYDRATE, LACTIC ACID, SODIUM CHLORIDE, SODIUM BICARBONATE AND POTASSIUM CHLORIDE: 3.68; 3.05; 22; 5.4; 6.46; 3.09; .314 INJECTION INTRAVENOUS at 21:08

## 2022-01-01 RX ADMIN — VASOPRESSIN 0.04 UNITS/MIN: 20 INJECTION PARENTERAL at 20:59

## 2022-01-01 RX ADMIN — CASTOR OIL AND BALSAM, PERU: 788; 87 OINTMENT TOPICAL at 16:06

## 2022-01-01 RX ADMIN — FLUCONAZOLE 200 MG: 200 INJECTION, SOLUTION INTRAVENOUS at 20:05

## 2022-01-01 RX ADMIN — AMIODARONE HYDROCHLORIDE 400 MG: 200 TABLET ORAL at 12:48

## 2022-01-01 RX ADMIN — Medication 3 UNITS: at 18:57

## 2022-01-01 RX ADMIN — SODIUM CHLORIDE, PRESERVATIVE FREE 10 ML: 5 INJECTION INTRAVENOUS at 21:20

## 2022-01-01 RX ADMIN — MIDODRINE HYDROCHLORIDE 10 MG: 5 TABLET ORAL at 07:02

## 2022-01-01 RX ADMIN — SODIUM CHLORIDE, PRESERVATIVE FREE 10 ML: 5 INJECTION INTRAVENOUS at 22:13

## 2022-01-01 RX ADMIN — Medication 16 UNITS: at 17:48

## 2022-01-01 RX ADMIN — AMIODARONE HYDROCHLORIDE 200 MG: 200 TABLET ORAL at 09:02

## 2022-01-01 RX ADMIN — ASPIRIN 81 MG: 81 TABLET, COATED ORAL at 08:43

## 2022-01-01 RX ADMIN — HYDROCORTISONE SODIUM SUCCINATE 50 MG: 100 INJECTION, POWDER, FOR SOLUTION INTRAMUSCULAR; INTRAVENOUS at 11:32

## 2022-01-01 RX ADMIN — CALCIUM GLUCONATE 1000 MG: 20 INJECTION, SOLUTION INTRAVENOUS at 15:50

## 2022-01-01 RX ADMIN — HYDROCORTISONE SODIUM SUCCINATE 50 MG: 100 INJECTION, POWDER, FOR SOLUTION INTRAMUSCULAR; INTRAVENOUS at 00:35

## 2022-01-01 RX ADMIN — AMIODARONE HYDROCHLORIDE 200 MG: 200 TABLET ORAL at 08:58

## 2022-01-01 RX ADMIN — MEROPENEM 500 MG: 500 INJECTION, POWDER, FOR SOLUTION INTRAVENOUS at 17:52

## 2022-01-01 RX ADMIN — FAMOTIDINE 20 MG: 20 TABLET ORAL at 13:40

## 2022-01-01 RX ADMIN — MEROPENEM 500 MG: 500 INJECTION, POWDER, FOR SOLUTION INTRAVENOUS at 17:13

## 2022-01-01 RX ADMIN — COLLAGENASE SANTYL: 250 OINTMENT TOPICAL at 08:17

## 2022-01-01 RX ADMIN — ASPIRIN 81 MG: 81 TABLET, COATED ORAL at 12:45

## 2022-01-01 RX ADMIN — Medication 8 UNITS: at 22:13

## 2022-01-01 RX ADMIN — Medication 2 UNITS: at 23:01

## 2022-01-01 RX ADMIN — NOREPINEPHRINE BITARTRATE 50 MCG/MIN: 1 INJECTION, SOLUTION, CONCENTRATE INTRAVENOUS at 23:10

## 2022-01-01 RX ADMIN — HEPARIN SODIUM 5000 UNITS: 5000 INJECTION INTRAVENOUS; SUBCUTANEOUS at 17:18

## 2022-01-01 RX ADMIN — MIDODRINE HYDROCHLORIDE 10 MG: 5 TABLET ORAL at 17:21

## 2022-01-01 RX ADMIN — SODIUM CHLORIDE, PRESERVATIVE FREE 10 ML: 5 INJECTION INTRAVENOUS at 05:39

## 2022-01-01 RX ADMIN — CEFEPIME 1 G: 1 INJECTION, POWDER, FOR SOLUTION INTRAMUSCULAR; INTRAVENOUS at 10:10

## 2022-01-01 RX ADMIN — CALCIUM CHLORIDE, MAGNESIUM CHLORIDE, DEXTROSE MONOHYDRATE, LACTIC ACID, SODIUM CHLORIDE, SODIUM BICARBONATE AND POTASSIUM CHLORIDE: 3.68; 3.05; 22; 5.4; 6.46; 3.09; .314 INJECTION INTRAVENOUS at 21:09

## 2022-01-01 RX ADMIN — MEROPENEM 1 G: 1 INJECTION, POWDER, FOR SOLUTION INTRAVENOUS at 10:26

## 2022-01-01 RX ADMIN — FLUCONAZOLE 200 MG: 200 INJECTION, SOLUTION INTRAVENOUS at 20:45

## 2022-01-01 RX ADMIN — SODIUM CHLORIDE, PRESERVATIVE FREE 10 ML: 5 INJECTION INTRAVENOUS at 21:24

## 2022-01-01 RX ADMIN — HEPARIN SODIUM 5000 UNITS: 5000 INJECTION INTRAVENOUS; SUBCUTANEOUS at 20:07

## 2022-01-01 RX ADMIN — Medication 20 UNITS: at 09:31

## 2022-01-01 RX ADMIN — HEPARIN SODIUM 5000 UNITS: 5000 INJECTION INTRAVENOUS; SUBCUTANEOUS at 09:11

## 2022-01-01 RX ADMIN — HEPARIN SODIUM IN SODIUM CHLORIDE 200 UNITS: 200 INJECTION INTRAVENOUS at 14:56

## 2022-01-01 RX ADMIN — Medication 0.5 MG/MIN: at 07:07

## 2022-01-01 RX ADMIN — BUMETANIDE 2 MG: 0.25 INJECTION INTRAMUSCULAR; INTRAVENOUS at 09:14

## 2022-01-01 RX ADMIN — MIDODRINE HYDROCHLORIDE 10 MG: 5 TABLET ORAL at 16:43

## 2022-01-01 RX ADMIN — CALCIUM CHLORIDE, MAGNESIUM CHLORIDE, DEXTROSE MONOHYDRATE, LACTIC ACID, SODIUM CHLORIDE, SODIUM BICARBONATE AND POTASSIUM CHLORIDE: 3.68; 3.05; 22; 5.4; 6.46; 3.09; .314 INJECTION INTRAVENOUS at 03:00

## 2022-01-01 RX ADMIN — MIDAZOLAM HYDROCHLORIDE 2 MG: 1 INJECTION, SOLUTION INTRAMUSCULAR; INTRAVENOUS at 18:41

## 2022-01-01 RX ADMIN — ALBUMIN (HUMAN) 12.5 G: 0.25 INJECTION, SOLUTION INTRAVENOUS at 21:29

## 2022-01-01 RX ADMIN — METOPROLOL TARTRATE 2.5 MG: 5 INJECTION INTRAVENOUS at 05:47

## 2022-01-01 RX ADMIN — SODIUM CHLORIDE, PRESERVATIVE FREE 10 ML: 5 INJECTION INTRAVENOUS at 21:04

## 2022-01-01 RX ADMIN — HYDROCORTISONE SODIUM SUCCINATE 50 MG: 100 INJECTION, POWDER, FOR SOLUTION INTRAMUSCULAR; INTRAVENOUS at 10:27

## 2022-01-01 RX ADMIN — MORPHINE SULFATE 10 MG: 10 SOLUTION ORAL at 12:03

## 2022-01-01 RX ADMIN — CEFEPIME HYDROCHLORIDE 1 G: 1 INJECTION, POWDER, FOR SOLUTION INTRAMUSCULAR; INTRAVENOUS at 18:09

## 2022-01-01 RX ADMIN — Medication 3 AMPULE: at 11:38

## 2022-01-01 RX ADMIN — CASTOR OIL AND BALSAM, PERU: 788; 87 OINTMENT TOPICAL at 21:48

## 2022-01-01 RX ADMIN — ASPIRIN 81 MG: 81 TABLET, COATED ORAL at 09:02

## 2022-01-01 RX ADMIN — SODIUM CHLORIDE, PRESERVATIVE FREE 10 ML: 5 INJECTION INTRAVENOUS at 06:03

## 2022-01-01 RX ADMIN — Medication 1 AMPULE: at 10:00

## 2022-01-01 RX ADMIN — PIPERACILLIN AND TAZOBACTAM 3.38 G: 3; .375 INJECTION, POWDER, LYOPHILIZED, FOR SOLUTION INTRAVENOUS at 09:30

## 2022-01-01 RX ADMIN — SODIUM CHLORIDE, PRESERVATIVE FREE 10 ML: 5 INJECTION INTRAVENOUS at 13:24

## 2022-01-01 RX ADMIN — SODIUM CHLORIDE, PRESERVATIVE FREE 10 ML: 5 INJECTION INTRAVENOUS at 21:58

## 2022-01-01 RX ADMIN — Medication 1 AMPULE: at 12:05

## 2022-01-01 RX ADMIN — SODIUM CHLORIDE, PRESERVATIVE FREE 10 ML: 5 INJECTION INTRAVENOUS at 05:27

## 2022-01-01 RX ADMIN — VASOPRESSIN 0.03 UNITS/MIN: 20 INJECTION PARENTERAL at 13:31

## 2022-01-01 RX ADMIN — SODIUM CHLORIDE, PRESERVATIVE FREE 10 ML: 5 INJECTION INTRAVENOUS at 18:21

## 2022-01-01 RX ADMIN — MEROPENEM 500 MG: 500 INJECTION, POWDER, FOR SOLUTION INTRAVENOUS at 17:33

## 2022-01-01 RX ADMIN — METRONIDAZOLE 500 MG: 250 TABLET ORAL at 09:11

## 2022-01-01 RX ADMIN — HYDROCORTISONE SODIUM SUCCINATE 50 MG: 100 INJECTION, POWDER, FOR SOLUTION INTRAMUSCULAR; INTRAVENOUS at 17:12

## 2022-01-01 RX ADMIN — Medication 1 AMPULE: at 12:59

## 2022-01-01 RX ADMIN — HYDROCORTISONE SODIUM SUCCINATE 50 MG: 100 INJECTION, POWDER, FOR SOLUTION INTRAMUSCULAR; INTRAVENOUS at 23:26

## 2022-01-01 RX ADMIN — SODIUM CHLORIDE, PRESERVATIVE FREE 10 ML: 5 INJECTION INTRAVENOUS at 05:48

## 2022-01-01 RX ADMIN — SODIUM CHLORIDE, PRESERVATIVE FREE 10 ML: 5 INJECTION INTRAVENOUS at 06:16

## 2022-01-01 RX ADMIN — SODIUM CHLORIDE, PRESERVATIVE FREE 10 ML: 5 INJECTION INTRAVENOUS at 22:45

## 2022-01-01 RX ADMIN — Medication 3 UNITS: at 09:19

## 2022-01-01 RX ADMIN — CASTOR OIL AND BALSAM, PERU: 788; 87 OINTMENT TOPICAL at 21:36

## 2022-01-01 RX ADMIN — SODIUM CHLORIDE, PRESERVATIVE FREE 10 ML: 5 INJECTION INTRAVENOUS at 00:12

## 2022-01-01 RX ADMIN — HEPARIN SODIUM 5000 UNITS: 5000 INJECTION INTRAVENOUS; SUBCUTANEOUS at 23:17

## 2022-01-01 RX ADMIN — PIPERACILLIN AND TAZOBACTAM 3.38 G: 3; .375 INJECTION, POWDER, LYOPHILIZED, FOR SOLUTION INTRAVENOUS at 17:30

## 2022-01-01 RX ADMIN — SODIUM CHLORIDE, PRESERVATIVE FREE 40 MG: 5 INJECTION INTRAVENOUS at 12:00

## 2022-01-01 RX ADMIN — AMIODARONE HYDROCHLORIDE 200 MG: 200 TABLET ORAL at 08:14

## 2022-01-01 RX ADMIN — AMLODIPINE BESYLATE 10 MG: 5 TABLET ORAL at 13:01

## 2022-01-01 RX ADMIN — CLONIDINE HYDROCHLORIDE 0.1 MG: 0.1 TABLET ORAL at 19:34

## 2022-01-01 RX ADMIN — SODIUM CHLORIDE 45 UNITS/HR: 9 INJECTION, SOLUTION INTRAVENOUS at 23:02

## 2022-01-01 RX ADMIN — Medication 14 UNITS: at 17:30

## 2022-01-01 RX ADMIN — Medication 2 UNITS: at 08:35

## 2022-01-01 RX ADMIN — SODIUM CHLORIDE 10.8 UNITS/HR: 9 INJECTION, SOLUTION INTRAVENOUS at 08:54

## 2022-01-01 RX ADMIN — SODIUM CHLORIDE, PRESERVATIVE FREE 10 ML: 5 INJECTION INTRAVENOUS at 13:49

## 2022-01-01 RX ADMIN — CALCIUM CHLORIDE, MAGNESIUM CHLORIDE, DEXTROSE MONOHYDRATE, LACTIC ACID, SODIUM CHLORIDE, SODIUM BICARBONATE AND POTASSIUM CHLORIDE: 3.68; 3.05; 22; 5.4; 6.46; 3.09; .314 INJECTION INTRAVENOUS at 16:18

## 2022-01-01 RX ADMIN — HEPARIN SODIUM 1900 UNITS: 1000 INJECTION INTRAVENOUS; SUBCUTANEOUS at 11:02

## 2022-01-01 RX ADMIN — HYDROCORTISONE SODIUM SUCCINATE 25 MG: 100 INJECTION, POWDER, FOR SOLUTION INTRAMUSCULAR; INTRAVENOUS at 08:37

## 2022-01-01 RX ADMIN — Medication 16 UNITS: at 09:57

## 2022-01-01 RX ADMIN — SODIUM CHLORIDE, PRESERVATIVE FREE 10 ML: 5 INJECTION INTRAVENOUS at 13:46

## 2022-01-01 RX ADMIN — CASTOR OIL AND BALSAM, PERU: 788; 87 OINTMENT TOPICAL at 20:28

## 2022-01-01 RX ADMIN — POTASSIUM CHLORIDE 40 MEQ: 750 TABLET, EXTENDED RELEASE ORAL at 18:21

## 2022-01-01 RX ADMIN — CALCIUM GLUCONATE 2 G: 20 INJECTION, SOLUTION INTRAVENOUS at 06:29

## 2022-01-01 RX ADMIN — CASTOR OIL AND BALSAM, PERU: 788; 87 OINTMENT TOPICAL at 08:05

## 2022-01-01 RX ADMIN — METRONIDAZOLE 500 MG: 250 TABLET ORAL at 08:43

## 2022-01-01 RX ADMIN — EPOETIN ALFA-EPBX 12000 UNITS: 10000 INJECTION, SOLUTION INTRAVENOUS; SUBCUTANEOUS at 22:12

## 2022-01-01 RX ADMIN — DEXTROSE MONOHYDRATE 125 ML: 100 INJECTION, SOLUTION INTRAVENOUS at 06:00

## 2022-01-01 RX ADMIN — SODIUM CHLORIDE, PRESERVATIVE FREE 10 ML: 5 INJECTION INTRAVENOUS at 16:12

## 2022-01-01 RX ADMIN — BENZOCAINE, BUTAMBEN, AND TETRACAINE HYDROCHLORIDE 1 SPRAY: .028; .004; .004 AEROSOL, SPRAY TOPICAL at 08:41

## 2022-01-01 RX ADMIN — SODIUM CHLORIDE 1000 ML: 9 INJECTION, SOLUTION INTRAVENOUS at 17:05

## 2022-01-01 RX ADMIN — CASTOR OIL AND BALSAM, PERU: 788; 87 OINTMENT TOPICAL at 21:22

## 2022-01-01 RX ADMIN — INSULIN GLARGINE 6 UNITS: 100 INJECTION, SOLUTION SUBCUTANEOUS at 22:33

## 2022-01-01 RX ADMIN — VANCOMYCIN HYDROCHLORIDE 2000 MG: 10 INJECTION, POWDER, LYOPHILIZED, FOR SOLUTION INTRAVENOUS at 16:28

## 2022-01-01 RX ADMIN — SODIUM CHLORIDE, PRESERVATIVE FREE 10 ML: 5 INJECTION INTRAVENOUS at 22:44

## 2022-01-01 RX ADMIN — NOREPINEPHRINE BITARTRATE 50 MCG/MIN: 1 INJECTION, SOLUTION, CONCENTRATE INTRAVENOUS at 17:18

## 2022-01-01 RX ADMIN — Medication 8 UNITS: at 08:37

## 2022-01-01 RX ADMIN — FAMOTIDINE 20 MG: 20 TABLET ORAL at 09:40

## 2022-01-01 RX ADMIN — SODIUM CHLORIDE, PRESERVATIVE FREE 10 ML: 5 INJECTION INTRAVENOUS at 22:14

## 2022-01-01 RX ADMIN — HEPARIN SODIUM 5000 UNITS: 5000 INJECTION INTRAVENOUS; SUBCUTANEOUS at 17:35

## 2022-01-01 RX ADMIN — Medication 3 UNITS: at 08:15

## 2022-01-01 RX ADMIN — SODIUM CHLORIDE, PRESERVATIVE FREE 10 ML: 5 INJECTION INTRAVENOUS at 07:02

## 2022-01-01 RX ADMIN — CASTOR OIL AND BALSAM, PERU: 788; 87 OINTMENT TOPICAL at 22:16

## 2022-01-01 RX ADMIN — CEFEPIME HYDROCHLORIDE 1 G: 1 INJECTION, POWDER, FOR SOLUTION INTRAMUSCULAR; INTRAVENOUS at 17:02

## 2022-01-01 RX ADMIN — HYDROCORTISONE SODIUM SUCCINATE 50 MG: 100 INJECTION, POWDER, FOR SOLUTION INTRAMUSCULAR; INTRAVENOUS at 22:13

## 2022-01-01 RX ADMIN — HYDROCORTISONE SODIUM SUCCINATE 50 MG: 100 INJECTION, POWDER, FOR SOLUTION INTRAMUSCULAR; INTRAVENOUS at 11:07

## 2022-01-01 RX ADMIN — SODIUM CHLORIDE, PRESERVATIVE FREE 10 ML: 5 INJECTION INTRAVENOUS at 17:26

## 2022-01-01 RX ADMIN — SODIUM CHLORIDE, PRESERVATIVE FREE 10 ML: 5 INJECTION INTRAVENOUS at 14:59

## 2022-01-01 RX ADMIN — ONDANSETRON HYDROCHLORIDE 4 MG: 2 INJECTION, SOLUTION INTRAMUSCULAR; INTRAVENOUS at 13:52

## 2022-01-01 RX ADMIN — HEPARIN SODIUM 5000 UNITS: 5000 INJECTION INTRAVENOUS; SUBCUTANEOUS at 09:12

## 2022-01-01 RX ADMIN — FENTANYL CITRATE 25 MCG: 50 INJECTION, SOLUTION INTRAMUSCULAR; INTRAVENOUS at 08:43

## 2022-01-01 RX ADMIN — Medication 50 MEQ: at 08:08

## 2022-01-01 RX ADMIN — HYDROCORTISONE SODIUM SUCCINATE 50 MG: 100 INJECTION, POWDER, FOR SOLUTION INTRAMUSCULAR; INTRAVENOUS at 09:31

## 2022-01-01 RX ADMIN — ASPIRIN 81 MG: 81 TABLET, COATED ORAL at 10:09

## 2022-01-01 RX ADMIN — METRONIDAZOLE 500 MG: 250 TABLET ORAL at 21:18

## 2022-01-01 RX ADMIN — SODIUM CHLORIDE, PRESERVATIVE FREE 10 ML: 5 INJECTION INTRAVENOUS at 13:47

## 2022-01-01 RX ADMIN — HYDROCORTISONE SODIUM SUCCINATE 50 MG: 100 INJECTION, POWDER, FOR SOLUTION INTRAMUSCULAR; INTRAVENOUS at 06:14

## 2022-01-01 RX ADMIN — SODIUM CHLORIDE 1000 ML: 9 INJECTION, SOLUTION INTRAVENOUS at 22:21

## 2022-01-01 RX ADMIN — METRONIDAZOLE 500 MG: 250 TABLET ORAL at 09:30

## 2022-01-01 RX ADMIN — HYDROCORTISONE SODIUM SUCCINATE 50 MG: 100 INJECTION, POWDER, FOR SOLUTION INTRAMUSCULAR; INTRAVENOUS at 12:49

## 2022-01-01 RX ADMIN — EPOETIN ALFA-EPBX 12000 UNITS: 10000 INJECTION, SOLUTION INTRAVENOUS; SUBCUTANEOUS at 21:48

## 2022-01-01 RX ADMIN — MIDODRINE HYDROCHLORIDE 10 MG: 5 TABLET ORAL at 14:41

## 2022-05-23 PROBLEM — E11.10 DKA (DIABETIC KETOACIDOSIS) (HCC): Status: ACTIVE | Noted: 2022-01-01

## 2022-05-23 NOTE — ED PROVIDER NOTES
EMERGENCY DEPARTMENT HISTORY AND PHYSICAL EXAM          Date: 5/23/2022  Patient Name: Glenys Cheadle  Attending of Record: Juliana Rascon MD    History of Presenting Illness     No chief complaint on file. History Provided By: EMS and Patient's granddaughter    HPI: Glenys Cheadle is a 80 y.o. female, with history of hypertension and diabetes presents ED with altered mental status. Per EMS, the patient was last known well yesterday at noon, she was found today with profound altered mental status and confusion. EMS found her initial pulses to be in the 30s to 40s, she had thready central pulses and they started transcutaneous pacing with some improvement in her heart rate, intermittent capture. Family state that for the past few days she has had polydipsia and polyuria, she normally manages her own insulin, she is unsure if the patient has been able to administer her own insulin in the past few days. PCP: Kevin Ferrer MD    There are no other complaints, changes, or physical findings at this time.      Current Facility-Administered Medications   Medication Dose Route Frequency Provider Last Rate Last Admin    insulin regular (NOVOLIN R, HUMULIN R) 100 Units in 0.9% sodium chloride 100 mL infusion  0-50 Units/hr IntraVENous TITRATE Isidro Spear MD 43.3 mL/hr at 05/23/22 1630 43.3 Units/hr at 05/23/22 1630    DOPamine (INTROPIN) 800 mg in dextrose 5% 250 mL infusion  0-20 mcg/kg/min IntraVENous TITRATE Isidro Spear MD   Stopped at 05/23/22 1337    NOREPINephrine (LEVOPHED) 8 mg in 5% dextrose 250mL (32 mcg/mL) infusion  0.5-50 mcg/min IntraVENous TITRATE Amparo Fuentes MD 93.8 mL/hr at 05/23/22 1445 50 mcg/min at 05/23/22 1445    sodium chloride (NS) flush 5-40 mL  5-40 mL IntraVENous Q8H Amparo Fuentes MD   10 mL at 05/23/22 1712    sodium chloride (NS) flush 5-40 mL  5-40 mL IntraVENous PRN Amparo Fuentes MD        acetaminophen (TYLENOL) tablet 650 mg  650 mg Oral Q6H PRN Garrison Chavez MD        Or   Karina Reeves acetaminophen (TYLENOL) suppository 650 mg  650 mg Rectal Q6H PRN Garrison Chavez MD        polyethylene glycol (MIRALAX) packet 17 g  17 g Oral DAILY PRN Garrison Chavez MD        ondansetron (ZOFRAN ODT) tablet 4 mg  4 mg Oral Q8H PRN Garrison Chavez MD        Or    ondansetron [facility]) injection 4 mg  4 mg IntraVENous Q6H PRN Garrison Chavez MD        heparin (porcine) injection 5,000 Units  5,000 Units SubCUTAneous Q8H Garrison Chavez MD   5,000 Units at 05/23/22 1311    vasopressin (VASOSTRICT) 20 Units in 0.9% sodium chloride 100 mL infusion  0-0.04 Units/min IntraVENous CONTINUOUS Garrison Chavez MD 12 mL/hr at 05/23/22 1417 0.04 Units/min at 05/23/22 1417    balsam peru-castor oiL (VENELEX) ointment   Topical BID Garrison Chavez MD   Given at 05/23/22 1711    alcohol 62% (NOZIN) nasal  1 Ampule  1 Ampule Topical Q12H Garrison Chavez MD   1 Ampule at 05/23/22 1300    piperacillin-tazobactam (ZOSYN) 3.375 g in 0.9% sodium chloride (MBP/ADV) 100 mL MBP  3.375 g IntraVENous Q12H Garrison Chavez MD        insulin lispro (HUMALOG) injection   SubCUTAneous Davon Riley MD        glucose chewable tablet 16 g  4 Tablet Oral PRN Garrison Chavez MD        dextrose 10% infusion 0-250 mL  0-250 mL IntraVENous PRN Garrison Chavez MD        glucagon (GLUCAGEN) injection 1 mg  1 mg IntraMUSCular PRN Sueanne Rouge, MD        balsam peru-castor oiL (VENELEX) ointment   Topical BID Garrison Chavez MD   Given at 05/23/22 1711    famotidine (PF) (PEPCID) 20 mg in 0.9% sodium chloride 10 mL injection  20 mg IntraVENous Q24H Garrison Chavez MD   20 mg at 05/23/22 1616    Vancomycin - pharmacy to dose   Other Rx Dosing/Monitoring Garrison Chavez MD        PHENYLephrine (FAHAD-SYNEPHRINE) 30 mg in 0.9% sodium chloride 250 mL infusion   mcg/min IntraVENous TITRATE Garrison Chavez MD 15 mL/hr at 05/23/22 1555 30 mcg/min at 05/23/22 1555       Past History     Past Medical History:  Past Medical History:   Diagnosis Date    Diabetes [facility])        Past Surgical History:  History reviewed. No pertinent surgical history. Family History:  History reviewed. No pertinent family history. Social History:  Social History     Tobacco Use    Smoking status: Never Smoker    Smokeless tobacco: Never Used   Substance Use Topics    Alcohol use: Not Currently    Drug use: Not Currently       Allergies:  No Known Allergies      Review of Systems   Review of Systems   Unable to perform ROS: Mental status change       Physical Exam   Physical Exam  Constitutional:       Appearance: She is well-developed. She is toxic-appearing. Comments: Responsive to painful stimuli, fruity odor from breath   HENT:      Head: Normocephalic and atraumatic. Mouth/Throat:      Mouth: Mucous membranes are dry. Eyes:      Pupils: Pupils are equal, round, and reactive to light. Cardiovascular:      Rate and Rhythm: Regular rhythm. Bradycardia present. Heart sounds: Normal heart sounds. No murmur heard. Pulmonary:      Effort: No tachypnea or accessory muscle usage. Breath sounds: Normal breath sounds. Comments: Tachypneic  Chest:      Chest wall: No deformity. Abdominal:      Palpations: Abdomen is soft. Tenderness: There is no abdominal tenderness. There is no guarding or rebound. Musculoskeletal:      Cervical back: Neck supple. Comments: Trace bilateral lower extremity edema and venous stasis changes   Skin:     General: Skin is warm and dry.    Neurological:      Comments: Responsive to painful stimuli, groans incoherently, follows commands in left upper extremity         Diagnostic Study Results     Labs -     Recent Results (from the past 12 hour(s))   GLUCOSE, POC    Collection Time: 05/23/22  7:50 AM   Result Value Ref Range    Glucose (POC) >600 (HH) 65 - 117 mg/dL    Performed by Will MCLEOD)    EKG, 12 LEAD, INITIAL    Collection Time: 05/23/22  7:52 AM   Result Value Ref Range    Ventricular Rate 22 BPM    Atrial Rate 22 BPM    P-R Interval 156 ms    QRS Duration 160 ms    Q-T Interval 728 ms    QTC Calculation (Bezet) 440 ms    Calculated P Axis 59 degrees    Calculated R Axis -2 degrees    Calculated T Axis 0 degrees    Diagnosis       Marked sinus bradycardia  Right bundle branch block    No previous ECGs available  Confirmed by Ashlyn García ([id number]) on 5/23/2022 1:53:12 PM     BLOOD GAS,CHEM8,LACTIC ACID POC    Collection Time: 05/23/22  7:58 AM   Result Value Ref Range    Calcium, ionized (POC) 0.95 (L) 1.12 - 1.32 mmol/L    BICARBONATE 7 mmol/L    Base deficit (POC) 23.1 mmol/L    Sample source VENOUS BLOOD      CO2, POC 8 (LL) 19 - 24 MMOL/L    Sodium,  (L) 136 - 145 MMOL/L    Potassium, POC 6.9 (HH) 3.5 - 5.5 MMOL/L    Chloride,  100 - 108 MMOL/L    Glucose, POC >700 (HH) 74 - 106 MG/DL    Creatinine, POC 5.4 (H) 0.6 - 1.3 MG/DL    Lactic Acid (POC) 4.26 (HH) 0.40 - 2.00 mmol/L    Critical value read back MIN VENDITTI     pH, venous (POC) 6.99 (LL) 7.32 - 7.42      pCO2, venous (POC) 30.6 (L) 41 - 51 MMHG    pO2, venous (POC) 33 25 - 40 mmHg   HEMOGLOBIN A1C WITH EAG    Collection Time: 05/23/22  8:04 AM   Result Value Ref Range    Hemoglobin A1c 13.6 (H) 4.0 - 5.6 %    Est. average glucose 344 mg/dL   PHOSPHORUS    Collection Time: 05/23/22  8:04 AM   Result Value Ref Range    Phosphorus 12.2 (H) 2.6 - 4.7 MG/DL   CBC WITH AUTOMATED DIFF    Collection Time: 05/23/22  8:05 AM   Result Value Ref Range    WBC 10.9 3.6 - 11.0 K/uL    RBC 3.72 (L) 3.80 - 5.20 M/uL    HGB 12.0 11.5 - 16.0 g/dL    HCT 42.4 35.0 - 47.0 %    .0 (H) 80.0 - 99.0 FL    MCH 32.3 26.0 - 34.0 PG    MCHC 28.3 (L) 30.0 - 36.5 g/dL    RDW 16.7 (H) 11.5 - 14.5 %    PLATELET 590 (L) 429 - 400 K/uL    NRBC 0.5 (H) 0  WBC    ABSOLUTE NRBC 0.05 (H) 0.00 - 0.01 K/uL    NEUTROPHILS 87 (H) 32 - 75 %    LYMPHOCYTES 7 (L) 12 - 49 %    MONOCYTES 5 5 - 13 % EOSINOPHILS 0 0 - 7 %    BASOPHILS 0 0 - 1 %    IMMATURE GRANULOCYTES 1 (H) 0.0 - 0.5 %    ABS. NEUTROPHILS 9.5 (H) 1.8 - 8.0 K/UL    ABS. LYMPHOCYTES 0.8 0.8 - 3.5 K/UL    ABS. MONOCYTES 0.5 0.0 - 1.0 K/UL    ABS. EOSINOPHILS 0.0 0.0 - 0.4 K/UL    ABS. BASOPHILS 0.0 0.0 - 0.1 K/UL    ABS. IMM. GRANS. 0.1 (H) 0.00 - 0.04 K/UL    DF AUTOMATED      RBC COMMENTS MACROCYTOSIS  2+        RBC COMMENTS LORNA CELLS  PRESENT        RBC COMMENTS ANISOCYTOSIS  1+       PROTHROMBIN TIME + INR    Collection Time: 05/23/22  8:05 AM   Result Value Ref Range    INR 1.1 0.9 - 1.1      Prothrombin time 11.3 (H) 9.0 - 87.2 sec   METABOLIC PANEL, COMPREHENSIVE    Collection Time: 05/23/22  8:05 AM   Result Value Ref Range    Sodium 133 (L) 136 - 145 mmol/L    Potassium 6.8 (HH) 3.5 - 5.1 mmol/L    Chloride 94 (L) 97 - 108 mmol/L    CO2 9 (LL) 21 - 32 mmol/L    Anion gap 30 (H) 5 - 15 mmol/L    Glucose 1,259 (HH) 65 - 100 mg/dL     (H) 6 - 20 MG/DL    Creatinine 5.97 (H) 0.55 - 1.02 MG/DL    BUN/Creatinine ratio 19 12 - 20      GFR est AA 8 (L) >60 ml/min/1.73m2    GFR est non-AA 7 (L) >60 ml/min/1.73m2    Calcium 8.4 (L) 8.5 - 10.1 MG/DL    Bilirubin, total 0.6 0.2 - 1.0 MG/DL    ALT (SGPT) 34 12 - 78 U/L    AST (SGOT) 75 (H) 15 - 37 U/L    Alk.  phosphatase 88 45 - 117 U/L    Protein, total 7.6 6.4 - 8.2 g/dL    Albumin 3.3 (L) 3.5 - 5.0 g/dL    Globulin 4.3 (H) 2.0 - 4.0 g/dL    A-G Ratio 0.8 (L) 1.1 - 2.2     NT-PRO BNP    Collection Time: 05/23/22  8:05 AM   Result Value Ref Range    NT pro-BNP 1,588 (H) <450 PG/ML   TROPONIN-HIGH SENSITIVITY    Collection Time: 05/23/22  8:05 AM   Result Value Ref Range    Troponin-High Sensitivity 31 0 - 51 ng/L   LIPASE    Collection Time: 05/23/22  8:05 AM   Result Value Ref Range    Lipase >3,000 (H) 73 - 393 U/L   MAGNESIUM    Collection Time: 05/23/22  8:05 AM   Result Value Ref Range    Magnesium 4.8 (H) 1.6 - 2.4 mg/dL   SAMPLES BEING HELD    Collection Time: 05/23/22  8:05 AM Result Value Ref Range    SAMPLES BEING HELD GOLD,RED     COMMENT        Add-on orders for these samples will be processed based on acceptable specimen integrity and analyte stability, which may vary by analyte. GLUCOSE, POC    Collection Time: 05/23/22  8:52 AM   Result Value Ref Range    Glucose (POC) >600 (HH) 65 - 117 mg/dL    Performed by Yani MCLEOD)    GLUCOSTABILIZER    Collection Time: 05/23/22  8:54 AM   Result Value Ref Range    Glucose 601 mg/dL    Insulin order 10.8 units/hour    Insulin adminstered 10.8 units/hour    Multiplier 0.020     Low target 150 mg/dL    High target 250 mg/dL    D50 order 0.0 ml    D50 administered 0.00 ml    Minutes until next BG 60 min    Order initials CYH     Administered initials CYH     GLSCOM Comments     URINALYSIS W/ RFLX MICROSCOPIC    Collection Time: 05/23/22  9:33 AM   Result Value Ref Range    Color YELLOW/STRAW      Appearance CLOUDY (A) CLEAR      Specific gravity 1.024      pH (UA) 5.0 5.0 - 8.0      Protein Negative NEG mg/dL    Glucose >1,000 (A) NEG mg/dL    Ketone TRACE (A) NEG mg/dL    Bilirubin Negative NEG      Blood Negative NEG      Urobilinogen 0.2 0.2 - 1.0 EU/dL    Nitrites Negative NEG      Leukocyte Esterase SMALL (A) NEG      WBC 5-10 0 - 4 /hpf    RBC 5-10 0 - 5 /hpf    Epithelial cells MANY (A) FEW /lpf    Bacteria 1+ (A) NEG /hpf    Yeast PRESENT (A) NEG     URINE CULTURE HOLD SAMPLE    Collection Time: 05/23/22  9:33 AM    Specimen: Serum   Result Value Ref Range    Urine culture hold        Urine on hold in Microbiology dept for 2 days. If unpreserved urine is submitted, it cannot be used for addtional testing after 24 hours, recollection will be required.    GLUCOSE, POC    Collection Time: 05/23/22 10:03 AM   Result Value Ref Range    Glucose (POC) >600 (HH) 65 - 117 mg/dL    Performed by Yani Mar (MARCIA)    GLUCOSTABILIZER    Collection Time: 05/23/22 10:04 AM   Result Value Ref Range    Glucose 601 mg/dL    Insulin order 16.2 units/hour    Insulin adminstered 16.2 units/hour    Multiplier 0.030     Low target 150 mg/dL    High target 250 mg/dL    D50 order 0.0 ml    D50 administered 0.00 ml    Minutes until next BG 60 min    Order initials CYH     Administered initials PD     GLSCOM Comments     GLUCOSE, POC    Collection Time: 05/23/22 10:59 AM   Result Value Ref Range    Glucose (POC) >600 (HH) 65 - 117 mg/dL    Performed by Malathi Love RN    BLOOD GAS,CHEM8,LACTIC ACID POC    Collection Time: 05/23/22 11:05 AM   Result Value Ref Range    Calcium, ionized (POC) 0.81 (L) 1.12 - 1.32 mmol/L    BICARBONATE 11 mmol/L    Base deficit (POC) 18.6 mmol/L    Sample source VENOUS BLOOD      CO2, POC 11 (L) 19 - 24 MMOL/L    Sodium,  136 - 145 MMOL/L    Potassium, POC 4.5 3.5 - 5.5 MMOL/L    Chloride,  (H) 100 - 108 MMOL/L    Glucose, POC >700 (HH) 74 - 106 MG/DL    Creatinine, POC 4.7 (H) 0.6 - 1.3 MG/DL    Lactic Acid (POC) 3.72 (HH) 0.40 - 2.00 mmol/L    pH, venous (POC) 7.07 (LL) 7.32 - 7.42      pCO2, venous (POC) 36.1 (L) 41 - 51 MMHG    pO2, venous (POC) 46 (H) 25 - 40 mmHg   GLUCOSTABILIZER    Collection Time: 05/23/22 11:14 AM   Result Value Ref Range    Glucose 601 mg/dL    Insulin order 21.6 units/hour    Insulin adminstered 21.6 units/hour    Multiplier 0.040     Low target 150 mg/dL    High target 250 mg/dL    D50 order 0.0 ml    D50 administered 0.00 ml    Minutes until next BG 60 min    Order initials lw     Administered initials lw     GLSCOM Comments     MAGNESIUM    Collection Time: 05/23/22 12:01 PM   Result Value Ref Range    Magnesium 3.7 (H) 1.6 - 2.4 mg/dL   METABOLIC PANEL, BASIC    Collection Time: 05/23/22 12:01 PM   Result Value Ref Range    Sodium 148 (H) 136 - 145 mmol/L    Potassium 4.3 3.5 - 5.1 mmol/L    Chloride 109 (H) 97 - 108 mmol/L    CO2 17 (L) 21 - 32 mmol/L    Anion gap 22 (H) 5 - 15 mmol/L    Glucose 951 (HH) 65 - 100 mg/dL    BUN 96 (H) 6 - 20 MG/DL    Creatinine 4.96 (H) 0.55 - 1.02 MG/DL    BUN/Creatinine ratio 19 12 - 20      GFR est AA 10 (L) >60 ml/min/1.73m2    GFR est non-AA 8 (L) >60 ml/min/1.73m2    Calcium 6.7 (L) 8.5 - 10.1 MG/DL   EKG, 12 LEAD, SUBSEQUENT    Collection Time: 05/23/22 12:17 PM   Result Value Ref Range    Ventricular Rate 105 BPM    Atrial Rate 85 BPM    QRS Duration 132 ms    Q-T Interval 406 ms    QTC Calculation (Bezet) 536 ms    Calculated R Axis 26 degrees    Calculated T Axis 67 degrees    Diagnosis       Atrial fibrillation with rapid ventricular response with premature   ventricular or aberrantly conducted complexes  Right bundle branch block      Abnormal ECG    Confirmed by Soheila Giles ([id number]) on 5/23/2022 1:53:48 PM     GLUCOSE, POC    Collection Time: 05/23/22 12:27 PM   Result Value Ref Range    Glucose (POC) >600 (HH) 65 - 117 mg/dL    Performed by Alex Abdi RN    BLOOD GAS,CHEM8,LACTIC ACID POC    Collection Time: 05/23/22 12:39 PM   Result Value Ref Range    Calcium, ionized (POC) 0.84 (L) 1.12 - 1.32 mmol/L    BICARBONATE 15 mmol/L    Base deficit (POC) 13.4 mmol/L    Sample source VENOUS BLOOD      CO2, POC 15 (L) 19 - 24 MMOL/L    Sodium,  136 - 145 MMOL/L    Potassium, POC 4.0 3.5 - 5.5 MMOL/L    Chloride,  (H) 100 - 108 MMOL/L    Glucose, POC >700 (HH) 74 - 106 MG/DL    Creatinine, POC 5.1 (H) 0.6 - 1.3 MG/DL    Lactic Acid (POC) 3.87 (HH) 0.40 - 2.00 mmol/L    Critical value read back [facility]     pH, venous (POC) 7.16 (LL) 7.32 - 7.42      pCO2, venous (POC) 40.3 (L) 41 - 51 MMHG    pO2, venous (POC) 41 (H) 25 - 40 mmHg   GLUCOSTABILIZER    Collection Time: 05/23/22 12:41 PM   Result Value Ref Range    Glucose 601 mg/dL    Insulin order 27.1 units/hour    Insulin adminstered 27.1 units/hour    Multiplier 0.050     Low target 150 mg/dL    High target 250 mg/dL    D50 order 0.0 ml    D50 administered 0.00 ml    Minutes until next BG 60 min    Order initials lw     Administered initials lw     GLSCOM Comments GLUCOSE, POC    Collection Time: 05/23/22  2:15 PM   Result Value Ref Range    Glucose (POC) >700 (HH) 65 - 100 mg/dL    Performed by Ayan Petit RN    GLUCOSTABILIZER    Collection Time: 05/23/22  2:18 PM   Result Value Ref Range    Glucose 601 mg/dL    Insulin order 32.5 units/hour    Insulin adminstered 32.5 units/hour    Multiplier 0.060     Low target 150 mg/dL    High target 250 mg/dL    D50 order 0.0 ml    D50 administered 0.00 ml    Minutes until next BG 60 min    Order initials lw     Administered initials lw     GLSCOM Comments     BLOOD GAS, ARTERIAL    Collection Time: 05/23/22  2:59 PM   Result Value Ref Range    pH 7.20 (LL) 7.35 - 7.45      PCO2 34 (L) 35 - 45 mmHg    PO2 107 (H) 80 - 100 mmHg    O2 SAT 97 92 - 97 %    BICARBONATE 13 (L) 22 - 26 mmol/L    BASE DEFICIT 14.0 mmol/L    O2 METHOD NASAL CANNULA      O2 FLOW RATE 4.00 L/min    Sample source ARTERIAL      SITE RIGHT BRACHIAL      VICKIE'S TEST NOT APPLICABLE      Critical value read back Called to siddharth minor rn on 05/23/2022 at 15:03    GLUCOSE, POC    Collection Time: 05/23/22  3:26 PM   Result Value Ref Range    Glucose (POC) >600 (HH) 65 - 117 mg/dL    Performed by Ayan Petit RN    GLUCOSTABILIZER    Collection Time: 05/23/22  3:28 PM   Result Value Ref Range    Glucose 601 mg/dL    Insulin order 37.9 units/hour    Insulin adminstered 37.9 units/hour    Multiplier 0.070     Low target 150 mg/dL    High target 250 mg/dL    D50 order 0.0 ml    D50 administered 0.00 ml    Minutes until next BG 60 min    Order initials ER     Administered initials ER     GLSCOM Comments     GLUCOSE, POC    Collection Time: 05/23/22  4:27 PM   Result Value Ref Range    Glucose (POC) >600 (HH) 65 - 117 mg/dL    Performed by Todd Nance RN    GLUCOSTABILIZER    Collection Time: 05/23/22  4:28 PM   Result Value Ref Range    Glucose 601 mg/dL    Insulin order 43.3 units/hour    Insulin adminstered 43.3 units/hour    Multiplier 0.080     Low target 150 mg/dL    High target 250 mg/dL    D50 order 0.0 ml    D50 administered 0.00 ml    Minutes until next BG 60 min    Order initials lw     Administered initials lw     GLSCOM Comments         Radiologic Studies -   CT HEAD WO CONT   Final Result   No acute findings, atrophy. XR CHEST PORT   Final Result   Mild pulmonary edema      XR CHEST PORT    (Results Pending)     CT Results  (Last 48 hours)               05/23/22 1023  CT HEAD WO CONT Final result    Impression:  No acute findings, atrophy. Narrative:  EXAM: CT HEAD WO CONT       INDICATION: AMS       COMPARISON: None. CONTRAST: None. TECHNIQUE: Unenhanced CT of the head was performed using 5 mm images. Brain and   bone windows were generated. Coronal and sagittal reformats. CT dose reduction   was achieved through use of a standardized protocol tailored for this   examination and automatic exposure control for dose modulation. FINDINGS:   No extra-axial fluid collection, hemorrhage or shift. Atrophy mostly posterior   fossa. CXR Results  (Last 48 hours)               05/23/22 0916  XR CHEST PORT Final result    Impression:  Mild pulmonary edema       Narrative:  EXAM: XR CHEST PORT       INDICATION: Acute mental status change       COMPARISON: None. FINDINGS: A portable AP radiograph of the chest was obtained at 0945 hours   hours. The right IJ line terminates at the caval atrial junction. The patient is   on a cardiac monitor. There is mild pulmonary edema. The heart is mildly   enlarged. Medical Decision Making   I am the first provider for this patient. I reviewed the vital signs, available nursing notes, past medical history, past surgical history, family history and social history. Vital Signs-Reviewed the patient's vital signs.   Patient Vitals for the past 12 hrs:   Temp Pulse Resp BP SpO2   05/23/22 1600 (!) 93.9 °F (34.4 °C) 74 28 (!) 86/71 97 %   05/23/22 1555 -- -- -- (!) 81/49 --   05/23/22 1500 -- 82 26 (!) 104/56 97 %   05/23/22 1430 -- 82 24 (!) 91/56 96 %   05/23/22 1425 -- 82 18 (!) 92/42 98 %   05/23/22 1400 -- 92 24 (!) 74/44 92 %   05/23/22 1330 (!) 91.6 °F (33.1 °C) (!) 108 26 (!) 87/57 96 %   05/23/22 1315 -- (!) 104 22 (!) 94/51 94 %   05/23/22 1300 -- 90 28 (!) 89/54 93 %   05/23/22 1200 (!) 88.7 °F (31.5 °C) 100 18 96/72 (!) 89 %   05/23/22 1147 -- -- -- (!) 201/180 --   05/23/22 1122 -- (!) 110 -- 107/62 --   05/23/22 1110 -- 81 22 -- 96 %   05/23/22 1109 -- (!) 102 19 -- 97 %   05/23/22 1100 -- -- -- (!) 77/52 --   05/23/22 1045 -- 92 12 (!) 70/53 98 %   05/23/22 1000 -- 75 -- 94/69 99 %   05/23/22 0955 -- 78 -- 95/66 100 %   05/23/22 0918 -- 74 21 92/77 95 %   05/23/22 0908 -- (!) 57 -- (!) 85/41 99 %   05/23/22 0900 (!) 86.5 °F (30.3 °C) 68 24 (!) 89/46 98 %   05/23/22 0848 -- (!) 50 -- (!) 54/42 --   05/23/22 0840 -- (!) 44 -- (!) 49/38 --   05/23/22 0838 -- 60 -- (!) 49/38 --   05/23/22 0828 -- 77 -- (!) 41/29 99 %   05/23/22 0818 -- 66 -- 114/82 97 %   05/23/22 0808 -- 61 -- (!) 46/28 99 %       ECG Interpretation: Sinus bradycardia, rate 22, right bundle branch block, no ST changes, peaked T-waves    Records Reviewed: Nursing Notes and Old Medical Records    Provider Notes (Medical Decision Making):   DDx: DKA, electrolyte abnormalities, hyperkalemia, beta-blocker overdose, sepsis, UTI, pneumonia  79-year-old female with history of hypertension and diabetes who presents to the ED with altered mental status, EMS found her to be significantly bradycardic, thready central pulses, started performing transcutaneous pacing. On arrival, blood pressures are 60s over 40s, she is altered, able to follow command and left upper extremity, she blinks her eyes and moans to painful stimuli, protecting her airway, lungs clear to auscultation, she is profoundly bradycardic with heart rates in the 20s to 40s when the transcutaneous pacer was discontinued.   We attempted to restart transcutaneous pacing but had difficulty with capture despite escalating voltage to greater than 140 mA, concerned that this is likely due to profound metabolic abnormalities. Blood glucose read high, high concern for DKA, point-of-care lactate, Chem-8, VBG were notable for pH 6.99, potassium 6.9, we gave her 1 g calcium gluconate, 1 amp of bicarb, and started her on albuterol nebulizers. She was given total of 3 L normal saline for fluid resuscitation as she appears profoundly dehydrated. Placed on lita hugger for hypothermia. Gave patient insulin bolus and started drip. Obtaining broad labs, cultures, empirically treat for sepsis with vancomycin and Zosyn. Chest x-ray to evaluate for pneumonia, head CT to further evaluate in the setting of her altered mental status. Cardiology consulted for the profound bradycardia. Patient will need ICU admission. ED Course and Progress Notes:   Initial assessment performed. The patients presenting problems have been discussed, and they are in agreement with the care plan formulated and outlined with them. I have encouraged them to ask questions as they arise throughout their visit. ED Course as of 05/23/22 1715   Mon May 23, 2022   2355 Cardiology has been at bedside, started dopamine infusion, given another 1 mg of atropine due to developing worsening bradycardia again, thready pulses. I also spoke with the intensivist, they will come evaluate, recommending additional 2amps of bicarb.  [AA]   1007 Patient admitted to ICU [AA]      ED Course User Index  [AA] Usman Vásquez MD       Critical Care:  CRITICAL CARE NOTE :    9:59 AM  IMPENDING DETERIORATION -Cardiovascular, Metabolic and Renal  ASSOCIATED RISK FACTORS - Hypotension, Shock, Dysrhythmia, Metabolic changes and Dehydration  MANAGEMENT- Bedside Assessment and Supervision of Care  INTERPRETATION -  Xrays, CT Scan, Blood Gases, ECG and Blood Pressure  INTERVENTIONS - hemodynamic mngmt, transcutaneous pacing, vascular control and Metobolic interventions  CASE REVIEW - Hospitalist/Intensivist, Medical Sub-Specialist and Family  TREATMENT RESPONSE -Improved  PERFORMED BY - Resident and Physician    NOTES   :    I, Lakisha Saavedra, have spent 75 minutes of critical care time involved in lab review, consultations with specialist, family decision- making, bedside attention and documentation. This time excludes time spent in any separate billed procedures. During this entire length of time I was immediately available to the patient. Procedure Note - Central Line Placement:   9:20 AM  Performed by: Lakisha Saavedra MD    Immediately prior to the procedure, the patient was reevaluated and found suitable for the planned procedure and any planned medications. Immediately prior to the procedure a time out was called to verify the correct patient, procedure, equipment, staff, and marking as appropriate. Area was cleansed with Chlorprep and anesthetized with 2 mLs of 1% lidocaine. Prepped and draped in sterile fashion. Landmarks identified. 18G needle with triple lumen catheter was inserted into pt's Right Internal Jugular Vein with ultrasound guidance. Line sutured in place; sterile dressing applied. Position: Trendelenburg  Number of attempts: 2 (initial attempt into left IJ unsuccessful, successfully placed on right)  Estimated blood loss: mild  Ultrasound guidance was used for real-time guidance in which the vessel was patent. The procedure took 16-30 minutes, and pt tolerated well. Diagnosis     Clinical Impression:   1. Diabetic ketoacidosis with coma associated with type 2 diabetes mellitus ([address]    2. Sinus bradycardia    3. GLORIA (acute kidney injury) ([address]    4. Acute metabolic encephalopathy        Disposition:  Admit to ICU        Resident Signature:    Lakisha Saavedra MD  Emergency Medicine Resident, PGY-2

## 2022-05-23 NOTE — DIABETES MGMT
[facility] [facility]    CLINICAL NURSE SPECIALIST CONSULT     Initial Presentation   Glenys Cheadle is a 80 y.o. female admitted from the ER today 5/23/22 in DKA with profound bradycardia, after being found down by granddaughter. Granddaughter states that patient is independent in her activities of daily living, and has a routine that includes her diabetes self-care. He grandmother had reported that she wasn't feeling well and the granddaughter had made arrangements for a home visit. When she went into her room this morning to remind her of the visit, she found her down on the floor. EMS called. Of note, granddaughter states that she (herself) had been hospitalized for a month and returned home at the end of April. She surmises that her grandmother may not have been taking her insulin because there was more insulin in the refrigerator than there should have been. LAB:  WBC 10.9. AST 75. Lipase >3000. Troponin 31. NT pro-BNP 1588. Urinary glucose & ketone +. CT Head: No extra-axial fluid collection, hemorrhage or shift. Atrophy mostly posterior fossa. CXR: Mild pulmonary edema    HX:   Past Medical History:   Diagnosis Date    Diabetes ([facility]       INITIAL DX:   DKA (diabetic ketoacidosis) ([address] [E11.10]     Current Treatment     TX: IVF. Insulin infusion. Pressor support. Pepcid. Heparin. ABx. Consulted by Provider for advanced diabetes nursing assessment and care for:   [x] Transitioning off Vasquez Gist   [x] Inpatient management strategy  [] Home management assessment  [] Survival skill education    Hospital Course   Clinical progress has been complicated by need for ICU level of care. Diabetes History   Type 2 diabetes treated with insulin therapy  Admission BG 1259 with AG 30    Diabetes-related Medical History: Deferred    Diabetes Medication History  Key Antihyperglycemic Medications             glipiZIDE (GLUCOTROL) 10 mg tablet Take 10 mg by mouth daily. insulin lispro protamine/insulin lispro (HUMALOG MIX 75/25) 100 unit/mL (75-25) injection 48 Units by SubCUTAneous route daily. take 48 units in the morning    insulin lispro protamine/insulin lispro (HUMALOG MIX 75/25) 100 unit/mL (75-25) injection 32 Units by SubCUTAneous route every evening. take 32 units subcutaneously at night. Diabetes self-management practices: Per granddaughter  Eating pattern   [x] Not eating a carbohydrate-controlled mealplan  [x] Breakfast Cheerios with milk & banana. Coffee (may have had Cheese toast earlier)  [x] Sankc  Chips a Hoy cookies (while she is watching TV  [x] Dinner  With granddaughter   Physical activity pattern - Uses Rollator to move about the house   [x] Not employing a physical activity program to control BG  Monitoring pattern - Tracks on a tablet by bedside & takes to her provider   [x] Testing BGs   [x] Breakfast   Taking medications pattern  [x] Consistent administration  [x] Affordable  Overall evaluation:    [x] Last A1c located 9% (3/9/2021) [facility]    Subjective   Unconscious     Objective   Physical exam  General Obese female who is ill-appearing  Neuro  Responds to pain per nursing  Vital Signs   Visit Vitals  BP (!) 92/42   Pulse 82   Temp (!) 91.6 °F (33.1 °C)   Resp 18   Ht 5' 6\" (1.676 m)   Wt 89.2 kg (196 lb 10.4 oz)   SpO2 98%   BMI 31.74 kg/m²     Laboratory  Recent Labs     05/23/22  1201 05/23/22  0805   * 1,259*   AGAP 22* 30*   WBC  --  10.9   CREA 4.96* 5.97*   GFRNA 8* 7*   AST  --  75*   ALT  --  34     Factors impacting BG management  Factor Dose Comments   Nutrition:  NPO     Drugs:  Vasopressor load   Vaso & levo infusions   Affects insulin delivery   Infection Zosyn Q12 hrs Hypothermic. WBC normal   Other:   Kidney function  Liver function   GLORIA  AST 75      Blood glucose pattern      Significant diabetes-related events over the past 24-72 hours  Admitted in profound DKA with electrolyte disturbances.  BG 1259 with AG 30  On Glucostabilizer; receiving 27-32 units of insulin/hour since 12 noon. BG remains above 700    Assessment and Plan   Nursing Diagnosis Risk for unstable blood glucose pattern   Nursing Intervention Domain 6429 Decision-making Support   Nursing Interventions Examined current inpatient diabetes/blood glucose control   Explored factors facilitating and impeding inpatient management     Evaluation   This overweight AA female was admitted in DKA associated with bradycardia. Receiving significant amounts of insulin/hour via Glucostabilizer. Fluid resuscitation also needed in presence of elevated NT pro-BNP. Recommendations     [x] Glucostabilizer until BG <250mg/dl and AG closed X2    [x] Additional fluid (NS or LR) bolus    Billing Code(s)   [x] 98721 IP subsequent hospital care - 35 minutes     Before making these care recommendations, I personally reviewed the hospitalization record, including notes, laboratory & diagnostic data and current medications, and examined the patient at the bedside (circumstances permitting) before making care recommendations. More than fifty (50) percent of the time was spent in patient counseling and/or care coordination.   Total minutes: Pilar Snell, CNS  Diabetes Clinical Nurse Specialist  [facility]  Access via [address] [place] [address]

## 2022-05-23 NOTE — CONSULTS
NEPHROLOGY CONSULT NOTE     Patient: Franky Goodwin MRN: [id number]  PCP: Jose Watts MD   :     1934  Age:   80 y.o. Sex:  female      Referring physician: David Viera MD  Reason for consultation: 80 y.o. female with DKA (diabetic ketoacidosis) ([facility] [L04.00] complicated by GLORIA   Admission Date: 2022  7:44 AM  LOS: 0 days     DISCUSSION / PLAN :   Acute renal failure  - sCR on admission 5.97 mg/dl, now 5.1 mg/dl, unknown baseline  - per patient grand-daughterGanga patient does not have any history of renal disease  - Suspect secondary to ATN in the setting of shock, hypotension, DKA   - Hyperkalemic on presentation, 6.8, now 3.7 on recent check  - Decreased urinary output, 60-70 ml since 1030 this am-- given 1L bolus 1- hour ago, increase mIVF to 150 ml/hr- 1/2NS  - Continue to watch urinary output closely, maintain ramos  - Will recheck BMP at midnight, If no improvement in renal function or urinary output, will initiate CRRT  - Will place Evan now, line consent obtained from grand-daughter via phone and updated POC, for the potential of patient needing dialysis, all questions and concerns answered. - Will check renal ultrasound, urine chemistries, lytes  - Renally dose all medication, avoid nephrotoxins  - Trend renal indices     Septic Shock  UTI/Pneumonia  Hypotension  Hypothermic  - On empiric antibiotics, IVF, bear hugger  - on pressor support, continue, maintain MAP > 65  - on dopamine per card recs  - Avoid further episodes of hypotension   - Intubated on vent support    DKA  AGMA  Acute metabolic encephalopathy  - presenting bicarb 9, blood glucose > 1200, PH 6.9  - on insulin drip, mIVF-- slowly improving  - per [address] team    Hypertension, hx of  HLD         Subjective:   HPI: Franky Goodwin is a 80 y.o.  female who has a PMHx significant PMHx significant for DM, hypertension, HLD who was admitted to the hospital for DKA, ARF, acidosis.  Presented to the ED this morning after being found down at home by grand-daughter after patient endorsed feeling unwell for the past 4-days. On arrival to the ED patient was found to be hypotensive and bradycardic. She was fluid resuscitated and started on pressor support. Her lab studies revealed DKA, worsening renal failure,and hyperkalemia. She was admitted to the ICU for further treatment. - Serum studies on arrival- sCR 5.97 mg/dl, , potassium 6.8, bicarb 9, ph 6.9, glucose >12K, PO4 12.2, AG 30, she was subsequently started on insulin drip, given temp meds for hyperkalemia, and started on mIVF. - CT of the head showed no acute finding, atrophy, CXR with mild pulmonary edema  - Has received a total of 5L of IVF since arrival to ICU  - Urinary output has been 60-70 ml since 1030 this am, mIVF just changed to 1/2NS at 150 ml/hr, renal function unchanged, potassium and bicarb improving  - Cardiology consulted for hypotension and bradycardia, started dopamine, no need for pacemaker at this time   - Patient does not see an outpatient nephrologist, per chart review has baseline CKD3 (contributing history of DM, HTN, HLD)  - All information obtained from chart review, primary RN and patients grand-daughter Perez Hendrix- [phone])      Past Medical Hx:   Past Medical History:   Diagnosis Date    Diabetes [facility])         Past Surgical Hx:   History reviewed. No pertinent surgical history. Medications:  Prior to Admission medications    Medication Sig Start Date End Date Taking? Authorizing Provider   atorvastatin (LIPITOR) 80 mg tablet Take 80 mg by mouth daily. Provider, Historical   ramipril (ALTACE) 10 mg capsule Take 10 mg by mouth two (2) times a day. Provider, Historical   cloNIDine HCl (CATAPRES) 0.3 mg tablet Take 0.3 mg by mouth two (2) times a day. Provider, Historical   terazosin (HYTRIN) 10 mg capsule Take 10 mg by mouth daily.  take once a day at bedtime    Provider, Historical   glipiZIDE (GLUCOTROL) 10 mg tablet Take 10 mg by mouth daily. Provider, Historical   atenolol (TENORMIN) 50 mg tablet Take 50 mg by mouth daily. Provider, Historical   amLODIPine (NORVASC) 10 mg tablet Take 10 mg by mouth daily. Provider, Historical   aspirin delayed-release 81 mg tablet Take 81 mg by mouth daily. Provider, Historical   cholecalciferol (VITAMIN D3) (1000 Units /25 mcg) tablet Take 1,000 Units by mouth daily. Provider, Historical   insulin lispro protamine/insulin lispro (HUMALOG MIX 75/25) 100 unit/mL (75-25) injection 48 Units by SubCUTAneous route daily. take 48 units in the morning    Provider, Historical   insulin lispro protamine/insulin lispro (HUMALOG MIX 75/25) 100 unit/mL (75-25) injection 32 Units by SubCUTAneous route every evening. take 32 units subcutaneously at night. Provider, Historical       No Known Allergies    Social Hx:  reports that she has never smoked. She has never used smokeless tobacco. She reports previous alcohol use. She reports previous drug use. History reviewed. No pertinent family history. Review of Systems:  A twelve point review of system was performed today. Pertinent positives and negatives are mentioned in the HPI. The reminder of the ROS is negative and noncontributory. Objective:    Vitals:    Vitals:    05/23/22 1600 05/23/22 1700 05/23/22 1800 05/23/22 1846   BP: (!) 86/71 (!) 70/56 (!) 95/56 (!) 87/50   Pulse: 74 68 79    Resp: 28 28 (!) 32    Temp: (!) 93.9 °F (34.4 °C)      SpO2: 97% 94% 94%    Weight:       Height:         I&O's:  No intake/output data recorded. Visit Vitals  BP (!) 87/50   Pulse 79   Temp (!) 93.9 °F (34.4 °C)   Resp (!) 32   Ht 5' 6\" (1.676 m)   Wt 89.2 kg (196 lb 10.4 oz)   SpO2 94%   BMI 31.74 kg/m²       Physical Exam:  General: frail appearing  HEENT: Eyes are PERRL. Conjunctiva without pallor ,erythema.   Neck: Supple  Lungs : on vent support  CVS: RRR, minimal LE edema  Abdomen: Soft, Non tender, No hepatosplenomegaly, bowel sounds present  Extremities: No cyanosis, No clubbing  Skin: sacral decub  Neurologic: intubated   Psych: unable to assess    Laboratory Results:    Lab Results   Component Value Date    BUN 97 (H) 05/23/2022     (H) 05/23/2022    K 3.7 05/23/2022     (H) 05/23/2022    CO2 18 (L) 05/23/2022       Lab Results   Component Value Date    BUN 97 (H) 05/23/2022    BUN 96 (H) 05/23/2022     (H) 05/23/2022    K 3.7 05/23/2022    K 4.3 05/23/2022    K 6.8 (HH) 05/23/2022       Lab Results   Component Value Date    WBC 9.9 05/23/2022    RBC 3.28 (L) 05/23/2022    HGB 10.5 (L) 05/23/2022    HCT 33.3 (L) 05/23/2022    .5 (H) 05/23/2022    MCH 32.0 05/23/2022    RDW 15.9 (H) 05/23/2022     (L) 05/23/2022       Lab Results   Component Value Date    PHOS 7.5 (H) 05/23/2022       Urine dipstick:   Lab Results   Component Value Date/Time    Color YELLOW/STRAW 05/23/2022 09:33 AM    Appearance CLOUDY (A) 05/23/2022 09:33 AM    Specific gravity 1.024 05/23/2022 09:33 AM    pH (UA) 5.0 05/23/2022 09:33 AM    Protein Negative 05/23/2022 09:33 AM    Glucose >1,000 (A) 05/23/2022 09:33 AM    Ketone TRACE (A) 05/23/2022 09:33 AM    Bilirubin Negative 05/23/2022 09:33 AM    Urobilinogen 0.2 05/23/2022 09:33 AM    Nitrites Negative 05/23/2022 09:33 AM    Leukocyte Esterase SMALL (A) 05/23/2022 09:33 AM    Epithelial cells MANY (A) 05/23/2022 09:33 AM    Bacteria 1+ (A) 05/23/2022 09:33 AM    WBC 5-10 05/23/2022 09:33 AM    RBC 5-10 05/23/2022 09:33 AM       I have reviewed the following: All pertinent labs, microbiology data, radiology imaging for my assessment     ECG- Rev: Yes / No  Xray/CT/US/MRI REV: Yes/ No    Care Plan discussed with:  Patient grand-daughter, primary RN, nephrology attending Dr. Guzman Roberts reviewed. Medications list Personally Reviewed   [x]      Yes     []               No      Thank you for allowing us to participate in the care of this patient.    We will follow patient. Please dont hesitate to call with any questions    Michele Roberts NP  5/23/2022    [facility]  [address], [address]  Phone - [phone]  Fax - [phone]  [email or web address]

## 2022-05-23 NOTE — PROGRESS NOTES
0024- Pt being transferred from ED to CCU 2520. Verbal SBAR report given to Rosalee Jamison RN (receiving nurse) by Isaiah Rascon (ED nurse). AT approximately 1010- Arrived to ED. Patient is lethargic; not responding to verbal command or noxious stimuli at this time. Dopamine gtt infusing at 10mcg/kg/min; insulin gtt at 16.2 units/hr; Levophed at 25mcg/min. Transported patient from ED to CT, with cardiac monitoring. Pt tolerated scan well. 1030- Pt arrived to CCU 2520; tolerated transport well. Patient bathed & dual skin assessment complete. There is red/purple, un-blanchable discoloration to skin at the sacrum and bilateral buttocks; moisture related skin breakdown also noted. 1059- Glucometer blood sugar result \"HI\". Repeat glucose on epoc >700. Dr. Shahida Parks notified. Insulin gtt increased to 21.6 units/hr per glucostabilizer; ok per MD.     1115- 100 mEq sodium bicarb given per MD order. 1320- Rhythm change on cardiac monitor; EKG completed. Dr. Shahida Parks notified of results. New orders placed to D/C dopamine & start vasopressin to maintain MAP >65.   1337- Dopamine stopped per MD order/vasopressin gtt started. 1415- BP 87/57; new order placed to increase vasopressin to 0.04 units/min. 1555- Levophed gtt currently at 50mcg/min, vasopressin at 0.04 units/min. BP 81/49; Phenylephrine gtt started at 30mcg/min per MD order. 1600- BP 86/71 MAP 78. Urine output has been consistently low throughout shift. Nephrology consult placed per MD request.     0846- Nephrology NP at bedside. New order placed for jolene catheter placement. 1900- Bedside change of shift given to Coy Mc RN (oncoming nurse).

## 2022-05-23 NOTE — H&P
[facility] INITIAL ASSESSMENT. Name: America Duval   : 1934   MRN: [id number]   Date: 2022        No chief complaint on file. HPI:     Mr. Mikael Cee is 79yo AAF with h/o HTN, IDDM and HLD  Who lives with her grand daughter but still function and walks with walker. She was brought to ER today after being found unresponsive. I obtained all the history from patient's grand daughter as patient is confused and disoriented. Reportedly she was c/o feeling sick and lethergic for the last about 4 days and her grand daughter arranged appointment with PCP. Today she found her minimally responsive and called EMS. On presentation she was hypotensive and bradycardiac with HR in 30s and required vasopressors. Getting admitted to ICU for further care and monitoring. Prbolem list:     -septic shock.  -UTI. -? Pneumonia. -Symptomatic bradycardia. -Severe DKA. -Acute metabolic encephalopathy.  -Hyperkalemia.  -Acute renal failure. -H/o HTN.  -HLD. Assessment/Plan:     Septic shock.  -Sec to UTI/pneumonia. -Empiric vanc and zosyn. -F/u UCx and blood cultures. UTI/Pneumonia. -Empiric abx as above. DKA. -Unclear if patient was using insulin in the last few d  -Insulin drip per DKA protocol.  -Hold insulin gtt of K is less than 3.3  -Aggressive electrolytes replacement. -Monitor glucose Q1hr and BMP Q4hrs.  -Start D5 1/2 NS at once glucose drops below 200mg/dl.  -Transition to sub cut insulin once Ag<12 and bicarb >15. Acute renal failure.  -Likely ATN from shock.  -Closely monitor renal functions and UO. -Avoid nephrotoxins.  -May consult nephrology if does not start making urine in next couple of hrs after initial fluid resuscitation. Acute metabolic encephalopathy.  -Closely monitor mental status.  -manage metabolic issues as above. Symptomatic bradycardia.  -Likely precipitated by severe acidemia, also on BB at home. DVT prophylaxis. [address] SUP. Pepcid.   Code status. Full code. I had detailed discussion with patient's grand daughter (tejal, Katheryn [place]), explained that patient has poor prognosis overall. She wants to keep her full code and continue aggressive care. Consult palliative care. I personally spent 120 minutes of critical care time. This is time spent at this critically ill patient's bedside actively involved in patient care as well as the coordination of care and discussions with the patient's family. This does not include any procedural time which has been billed separately. Review of Systems:     Review of systems not obtained due to patient factors. Objective:   Vital Signs:  Visit Vitals  BP (!) 201/180   Pulse (!) 110   Temp (!) 86.5 °F (30.3 °C)   Resp 22   Ht 5' 6\" (1.676 m)   Wt 89.2 kg (196 lb 10.4 oz)   SpO2 96%   BMI 31.74 kg/m²    O2 Flow Rate (L/min): 4 l/min O2 Device: Nasal cannula Temp (24hrs), Av.5 °F (30.3 °C), Min:86.5 °F (30.3 °C), Max:86.5 °F (30.3 °C)           Intake/Output:     Intake/Output Summary (Last 24 hours) at 2022 1150  Last data filed at 2022 0957  Gross per 24 hour   Intake 4000 ml   Output --   Net 4000 ml       Physical Exam:  Physical Exam  Constitutional:       Comments: Altered mental status. HENT:      Head: Normocephalic and atraumatic. Mouth/Throat:      Mouth: Mucous membranes are moist.   Eyes:      Conjunctiva/sclera: Conjunctivae normal.   Cardiovascular:      Rate and Rhythm: Normal rate and regular rhythm. Pulmonary:      Effort: Pulmonary effort is normal.      Breath sounds: No stridor. No rhonchi. Abdominal:      General: Abdomen is flat. Palpations: Abdomen is soft. Musculoskeletal:      Cervical back: Neck supple. Skin:     General: Skin is warm. Past Medical History:      has a past medical history of Diabetes ([address] Past Surgical History:      has no past surgical history on file.     Home Medications:     Prior to Admission medications    Medication Sig Start Date End Date Taking? Authorizing Provider   atorvastatin (LIPITOR) 80 mg tablet Take 80 mg by mouth daily. Provider, Historical   ramipril (ALTACE) 10 mg capsule Take 10 mg by mouth two (2) times a day. Provider, Historical   cloNIDine HCl (CATAPRES) 0.3 mg tablet Take 0.3 mg by mouth two (2) times a day. Provider, Historical   terazosin (HYTRIN) 10 mg capsule Take 10 mg by mouth daily. take once a day at bedtime    Provider, Historical   glipiZIDE (GLUCOTROL) 10 mg tablet Take 10 mg by mouth daily. Provider, Historical   atenolol (TENORMIN) 50 mg tablet Take 50 mg by mouth daily. Provider, Historical   amLODIPine (NORVASC) 10 mg tablet Take 10 mg by mouth daily. Provider, Historical   aspirin delayed-release 81 mg tablet Take 81 mg by mouth daily. Provider, Historical   cholecalciferol (VITAMIN D3) (1000 Units /25 mcg) tablet Take 1,000 Units by mouth daily. Provider, Historical   insulin lispro protamine/insulin lispro (HUMALOG MIX 75/25) 100 unit/mL (75-25) injection 48 Units by SubCUTAneous route daily. take 48 units in the morning    Provider, Historical   insulin lispro protamine/insulin lispro (HUMALOG MIX 75/25) 100 unit/mL (75-25) injection 32 Units by SubCUTAneous route every evening. take 32 units subcutaneously at night. Provider, Historical       Allergies/Social/Family History:     No Known Allergies   Social History     Tobacco Use    Smoking status: Never Smoker    Smokeless tobacco: Never Used   Substance Use Topics    Alcohol use: Not Currently      History reviewed. No pertinent family history. LABS AND  DATA:   Personally reviewed      Peak airway pressure:      Minute ventilation:        CRITICAL CARE CONSULTANT NOTE  I had a face to face encounter with the patient, reviewed and interpreted patient data including clinical events, labs, images, vital signs, I/O's, and examined patient.   I have discussed the case and the plan and management of the patient's care with the consulting services, the bedside nurses and the respiratory therapist.      NOTE OF PERSONAL INVOLVEMENT IN CARE   This patient has a high probability of imminent, clinically significant deterioration, which requires the highest level of preparedness to intervene urgently. I participated in the decision-making and personally managed or directed the management of the following life and organ supporting interventions that required my frequent assessment to treat or prevent imminent deterioration.         Tenzin Garcia MD  Pulmonary and critical care  5/23/2022

## 2022-05-23 NOTE — REMOTE MONITORING
Spoke with primary RN Martha regarding 3 and 6 hour Sepsis bundle    Thank you,    Jing Jack 227 RN,Annamarie Bauer BSN, [address]  Callback # [phone]

## 2022-05-23 NOTE — CONSULTS
EP/ ARRHYTHMIA CONSULT requested due to profound sinus bradycardia    Patient ID:  Patient: Becka Gutierrez  MRN: [id number]  Age: 80 y.o.  : 1934    Date of  Admission: 2022  7:44 AM   PCP:  Eliu Butler MD    Assessment: 1. Profound sinus bradycardia 20-30's in the setting of DKA, hyperkalemia. 2. Hypotension likely due to dehydration/volume depletion, metabolic acidosis, bradycardia. 3. No evidence of acute ischemia or infarct by presenting ECG. 4. RBBB. 5. GLORIA atop CKD stage 3 at baseline. 6. Diabetes mellitus type 2 on chronic insulin. 7. Metabolic encephalopathy. 8. Full code. Plan:     1. Recommend dopamine for chronotropy/pressor. This was started just before the time of this note. Hold on transcutaneous pacing with HR's >60 bpm.  I don't think a temporary pacemaker is needed now. 2. She already has levophed going. May be able to use dopamine alone. 3. Agree with correcting electrolyte and acid-base disarray. 4. Agree with volume resuscitation. Gee in place (anuric). 5. Check echo. Supportive care. Discussed with ER MD, nursing. [x]       High complexity decision making was performed in this patient at high risk for decompensation with multiple organ involvement. Becka Gutierrez is a 80 y.o. female with a history of progressive weakness, fatigue, increasing urination and thirst.  Found with altered mental status by family. Brought in by EMS, transcutaneous pacing. Labs showed pH 6.99,  K 6.9--ER has treated this, reassessment pending. She cannot relay a history. Granddaughter present in the room. No past medical history on file. No past surgical history on file. Social History     Tobacco Use    Smoking status: Not on file    Smokeless tobacco: Not on file   Substance Use Topics    Alcohol use: Not on file        No family history on file.      No Known Allergies       Current Facility-Administered Medications Medication Dose Route Frequency    sodium bicarbonate 8.4 % (1 mEq/mL) injection        insulin regular (NOVOLIN R, HUMULIN R) 100 Units in 0.9% sodium chloride 100 mL infusion  0-50 Units/hr IntraVENous TITRATE    glucose chewable tablet 16 g  4 Tablet Oral PRN    dextrose 10% infusion 0-250 mL  0-250 mL IntraVENous PRN    glucagon (GLUCAGEN) injection 1 mg  1 mg IntraMUSCular PRN    DOPamine (INTROPIN) 800 mg in dextrose 5% 250 mL infusion  0-20 mcg/kg/min IntraVENous TITRATE    vancomycin (VANCOCIN) 2000 mg in  ml infusion  2,000 mg IntraVENous NOW    piperacillin-tazobactam (ZOSYN) 4.5 g in 0.9% sodium chloride (MBP/ADV) 100 mL MBP  4.5 g IntraVENous NOW    NOREPINephrine (LEVOPHED) 8 mg in 5% dextrose 250mL (32 mcg/mL) infusion  0.5-16 mcg/min IntraVENous TITRATE     Current Outpatient Medications   Medication Sig    atorvastatin (LIPITOR) 80 mg tablet Take 80 mg by mouth daily.  ramipril (ALTACE) 10 mg capsule Take 10 mg by mouth two (2) times a day.  cloNIDine HCl (CATAPRES) 0.3 mg tablet Take 0.3 mg by mouth two (2) times a day.  terazosin (HYTRIN) 10 mg capsule Take 10 mg by mouth daily. take once a day at bedtime    glipiZIDE (GLUCOTROL) 10 mg tablet Take 10 mg by mouth daily.  atenolol (TENORMIN) 50 mg tablet Take 50 mg by mouth daily.  amLODIPine (NORVASC) 10 mg tablet Take 10 mg by mouth daily.  aspirin delayed-release 81 mg tablet Take 81 mg by mouth daily.  cholecalciferol (VITAMIN D3) (1000 Units /25 mcg) tablet Take 1,000 Units by mouth daily.  insulin lispro protamine/insulin lispro (HUMALOG MIX 75/25) 100 unit/mL (75-25) injection 48 Units by SubCUTAneous route daily. take 48 units in the morning    insulin lispro protamine/insulin lispro (HUMALOG MIX 75/25) 100 unit/mL (75-25) injection 32 Units by SubCUTAneous route every evening. take 32 units subcutaneously at night. Review of Symptoms:  See HPI. She cannot give a history.        Objective: Physical Exam:  No data recorded. Patient Vitals for the past 8 hrs:   Pulse   05/23/22 0840 (!) 44   05/23/22 0808 61    No data found. Patient Vitals for the past 8 hrs:   BP   05/23/22 0840 (!) 49/38   05/23/22 0808 (!) 46/28          Intake/Output Summary (Last 24 hours) at 5/23/2022 0900  Last data filed at 5/23/2022 0840  Gross per 24 hour   Intake 2000 ml   Output --   Net 2000 ml     Latest BP 89/46 with HR 87 bpm.    Nondiaphoretic, lethargic elderly female. Supple, no palpable thyromegaly. No scleral icterus, mucous membranes DRY, conjuctivae pink, no xanthelasma. Unlabored but shallow respirations, clear to auscultation bilaterally anteriorly, symmetric air movement. Irregular bradycardic rhythm, no murmur, pericardial rub, knock, or gallop. No JVD or peripheral edema. Palpable femoral pulses. Abdomen, soft, nontender, nondistended. Extremities without cyanosis or clubbing.  +venostasis changes. Muscle tone and bulk normal for age. Skin cool and dry. No rashes or ulcers. Neuro grossly nonfocal.  No tremor. CARDIOGRAPHICS and STUDIES, I reviewed:    Telemetry:  Sinus bradycardia with PAC's currently. ECG:  Profound sinus bradycardia with normal MT interval, RBBB, QTc normal accounting for profound bradycardia. No acute ischemia or infarct. Echo:  PENDING. Labs:  No results for input(s): CPK, CKMB, CKNDX, TROIQ in the last 72 hours. No lab exists for component: CPKMB  No results found for: CHOL, CHOLX, CHLST, CHOLV, HDL, HDLP, LDL, LDLC, DLDLP, TGLX, TRIGL, TRIGP, CHHD, CHHDX  Recent Labs     05/23/22 0805   INR 1.1   PTP 11.3*      Recent Labs     05/23/22 0805   WBC 10.9   HGB 12.0   HCT 42.4   *     No results for input(s): AP, TBIL, TP, ALB, GLOB, GGT, AML, LPSE in the last 72 hours. No lab exists for component: SGOT, GPT, AMYP, HLPSE  No components found for: GLPOC  No results for input(s): PH, PCO2, PO2 in the last 72 hours.         Andrade Pérez, MD  5/23/2022

## 2022-05-23 NOTE — ED NOTES
TRANSITION OF CARE - SBAR OUT    Patient is being transferred to [facility] Critical Care 1, Room# [id number] [id number]. Report GIVEN TO Sina Dubois RN on Keri Flannery for routine progression of care. Report is consisted of the following information SBAR, Kardex and Recent Results. Patient transferred to receiving unit by: Graciela Santillan (RN or Tech Name)     Called outstanding consults: Yes  Collected routine labs: Yes     All current orders reviewed with accepting nurse: Yes    The following personal items will be sent with the patient during transfer to the floor:   All valuables:      Visual Aid: None                   CARDIAC MONITORING ORDERED: Yes     The following CURRENT information were reported to the receiving RN:    CODE STATUS: Full Code    NIH SCORE:    VICENTE SCREENING:      NEURO ASSESSMENT: Neuro  Neurologic State: Unresponsive (05/23/22 0947)  Cognition: No command following (05/23/22 0947)      RESTRAINTS IN USE: No      IS DOCUMENTATION COMPLETE: Yes      Vital Signs  Level of Consciousness: (!) Responds to Pain (2) (05/23/22 0900)  Temp: (!) 86.5 °F (30.3 °C) (05/23/22 0900)  Temp Source: Oral (05/23/22 0900)  Pulse (Heart Rate): 78 (05/23/22 0955)  Heart Rate Source: Monitor (05/23/22 0900)  Cardiac Rhythm: Sinus Kenton (05/23/22 0947)  Resp Rate: 21 (05/23/22 0918)  BP: 95/66 (05/23/22 0955)  MAP (Monitor): 76 (05/23/22 0955)  MAP (Calculated): 76 (05/23/22 0955)  BP 1 Location: Left arm (05/23/22 0900)  BP 1 Method: Automatic (05/23/22 0900)  BP Patient Position: Supine (05/23/22 0900)  MEWS Score: 5 (05/23/22 0900)         OXYGEN: Oxygen Therapy  O2 Device: Nasal cannula (05/23/22 0947)  O2 Flow Rate (L/min): 4 l/min (05/23/22 0900)    MORTEZA FALL RISK: Cleave Castillo Fall Risk  Mobility: Unable to ambulate or transfer (05/23/22 0946)  Mentation: Comatose/unresponsive (05/23/22 0946)  Medication: No anticonvulsants, sedatives(tranquilizers), psychotropics or hypnotics, hypoglycemics, narcotics, sleep aids, antihypertensives, laxatives, or diuretics (05/23/22 0946)  Elimination: Needs assistance with toileting (05/23/22 0946)  Prior Fall History: Unknown (05/23/22 0946)  Total Score: 1 (05/23/22 0946)  Standard Fall Precautions: Yes (05/23/22 0946)      WOUNDS: No      URINARY CATHETER: ramos    LINE ACCESS:   Triple Lumen 05/23/22 Right Internal jugular (Active)   Central Line Being Utilized Yes 05/23/22 0940   Site Assessment Clean, dry, & intact 05/23/22 0940   Infiltration Assessment 0 05/23/22 0940   Dressing Status Clean, dry, & intact 05/23/22 0940   Positive Blood Return (Medial Site) Yes 05/23/22 0940   Positive Blood Return (Lateral Site) Yes 05/23/22 0940   Positive Blood Return (Site #3) Yes 05/23/22 0940       Peripheral IV 35/51/01 Right Basilic (Active)       Peripheral IV 05/23/22 Left Antecubital (Active)   Site Assessment Clean, dry, & intact 05/23/22 0800   Phlebitis Assessment 0 05/23/22 0800   Infiltration Assessment 0 05/23/22 0800   Dressing Status Clean, dry, & intact 05/23/22 0800   Dressing Type Tape;Transparent 05/23/22 0800        Opportunity for questions and clarification were provided.   Rob Groves RN

## 2022-05-23 NOTE — ED NOTES
8670 - central line placement verified by Katelin Barragan MD;;    Moved NOEPI drip / DOPAMINE drop / NS carries fluids / vancomycin drip to central pain line;;

## 2022-05-23 NOTE — ED NOTES
Patient arrives via EMS from home. Patient arrives bradycardic and responsive to only painful stimuli. MD to bedside patient placed on monitor x4 and being externally paced. 7536- Dr. Jasmeet Olmstead to bedside at this time for evaluation. 3837- Resident and MD to bedside at this time to place central line.

## 2022-05-24 NOTE — PROGRESS NOTES
Bedside shift change report given to [facility] EDWARD (oncoming nurse) by LEX Chacko (offgoing nurse). Report included the following information SBAR.     0800 - Full assessment complete. Echo done at bedside. Insulin gtt restarted per glucostabilizer, BG 80.    0915 - Dr. Hubert Severe at bedside, given orders to do a factor of -100 until 1500.     0925 - Given orders from Dr. Justice Penn to titrate an gtt off first.     1030 - IDR's complete. Given orders to keep insulin gtt held, administer NPH and then stop dextrose in 2 hours. 1209 - Reassessment complete. Pt returned from CT. CRRT restarted. EKG obtained for bradycardia. 1600 - Reassessment complete. Q12h labs sent. 1630 - Failed swallow screen. Speech therapy consulted. 36 - Dr. Hanane Madrid aware of irregular HR, 2nd AV block with PAC's. Also aware of bradycardic episode this afternoon, HR 50's. Bedside shift change report given to Lee Palacio and Blaire JOSEPH (oncoming nurse) by Alissa Velez (offgoing nurse). Report included the following information SBAR.

## 2022-05-24 NOTE — DIALYSIS
CRRT / [phone]    Orders   Mode: CVVH NEW START @ 0300   Blood Flow Rate: 180 ml/min   Prismasol Dose: 25 ml/kg/hr x 89.2kg = 2230 rounded to 2300 ml/hr  PBP: 1150 ml/hr  Replacement 1: 580 ml/hr  Replacement 2: 570 ml/hr   Prismasol Concentrate: 4K / 2.5Ca   Blood Warmer Temp: 37*C   Net Fluid Removal: 0 ml/hr     Metrics   BP: 105/58   HR: 56   Access Pressure: -80   Filter Pressure: 146   Return Pressure: 66   TMP: 31   Pressure Drop: 40     Access   Type & Location: R femoral temp CVC, dressing C/D/I with bio-patch dated 05/24/22 - ok to use per intensivist NP. No signs of redness, drainage, or infection visualized. Each catheter limb disinfected for 60 seconds per limb with alcohol swabs. Caps removed, dialysis CVC hub scrubbed with Prevantics for 15 seconds, followed by a 5 second dry time per Hospital P&P. +asp/+flush x 2 ports. Labs   HBsAg (Antigen) / date: Unknown                 05/24/22                   HBsAb (Antibody) / date: Unknown  05/24/22   Source: Epic     Safety:   Time Out Done:   (Time) 0255   Consent obtained/signed: Verified   Education: Access/Site Care   Primary Nurse Rpt Pre: MARCIA Leong   Primary Nurse Rpt Post: MARCIA Leong     Comments / Plan:   Braulio Chaneying RN at bedside to initiate CRRT per MD orders. Patient, code status, labs, and orders verified. Consents on chart. Time out completed. HF-1000 filter set-up, primed w/ 1L NS, tested and running well. New auto-effluent cartridge set up as well. Lines visible and connections secure with blood warmer to return line at 37*C. Education, pre and post to primary RN.

## 2022-05-24 NOTE — PROGRESS NOTES
Nephrology Progress Note  [facility]     [facility]. [facility] [employer] [facility] [employer]  Phone - [phone]   Patient: Keri Flannery    YOB: 1934        Date- 5/24/2022   Admit Date: 5/23/2022  CC: Follow up for shine, hyperkalemia         IMPRESSION & PLAN:     hyperkalemia   shine   DKA   Metabolic encephalopathy   Hypernatremia   Bradycardia   Shock- sepsis   UTI   acidosis    PLAN-   Continue CRRT   Give NACL bolus - 500 ml   Factor positive for next 6 hours- 100 ml/hr   Check labs this evening   Continue vasopressor support   We will try to get old cr level   Hold ramipril, clonidine, atenolol, norvasc   D/w ICU nurse     Subjective: Interval History:   -  She is anuric  k better  Na and cr better  She is on 3 vasopressor support  Not following any command    Objective:   Vitals:    05/24/22 0815 05/24/22 0825 05/24/22 0830 05/24/22 0900   BP: (!) 140/98 (!) 140/62 (!) 144/60 (!) 149/70   Pulse: 86 84 84 89   Resp:  29 28 24   Temp:       SpO2:  98% 97% 99%   Weight:       Height:          05/23 0701 - 05/24 0700  In: 8738.9 [I.V.:8738.9]  Out: 746.6 [Urine:105]  Last 3 Recorded Weights in this Encounter    05/23/22 0836 05/23/22 0957 05/24/22 0727   Weight: 89.2 kg (196 lb 10.4 oz) 89.2 kg (196 lb 10.4 oz) 88.9 kg (196 lb)      Physical exam:    GEN: NAD, confused  NECK- central line,  no mass  RESP: No wheezing, Clear b/l  CVS: S1,S2  RRR  NEURO: Can't access due to patient's current condition   EXT: + Edema   PSYCH: Can't access due to patient's current condition       Chart reviewed. Pertinent Notes reviewed.      Data Review :  Recent Labs     05/24/22  0800 05/24/22  0321 05/24/22  0024 05/23/22  1952 05/23/22  1626    150* 148*   < > 147*   K 3.7 3.9 3.9   < > 3.7   * 117* 114*   < > 110*   CO2 22 21 20*   < > 18*   BUN 72* 87* 90*   < > 97*   CREA 4.11* 4.95* 5.34*   < > 5.19*   GLU 89 151* 315*   < > 732*   CA 6.6* 6.5* 6.6*   < > 6.8*   MG 2.8* 3. 0* 3.0*   < > 3.4*   PHOS 3.1 4.4  --   --  7.5*    < > = values in this interval not displayed. Recent Labs     05/24/22  0321 05/23/22  1626 05/23/22  0805   WBC 6.0 9.9 10.9   HGB 10.0* 10.5* 12.0   HCT 30.8* 33.3* 42.4   PLT 78* 111* 145*     No results for input(s): FE, TIBC, PSAT, FERR in the last 72 hours.    Medication list  reviewed  Current Facility-Administered Medications   Medication Dose Route Frequency    bicarbonate dialysis (PRISMASOL) BG K 4/Ca 2.5 5000 ml solution   Extracorporeal DIALYSIS CONTINUOUS    dextrose 5 % - 0.2% NaCl infusion  125 mL/hr IntraVENous CONTINUOUS    sodium chloride 0.9 % bolus infusion 500 mL  500 mL IntraVENous ONCE    insulin regular (NOVOLIN R, HUMULIN R) 100 Units in 0.9% sodium chloride 100 mL infusion  0-45 Units/hr IntraVENous TITRATE    sodium chloride (NS) flush 5-40 mL  5-40 mL IntraVENous Q8H    sodium chloride (NS) flush 5-40 mL  5-40 mL IntraVENous PRN    acetaminophen (TYLENOL) tablet 650 mg  650 mg Oral Q6H PRN    Or    acetaminophen (TYLENOL) suppository 650 mg  650 mg Rectal Q6H PRN    polyethylene glycol (MIRALAX) packet 17 g  17 g Oral DAILY PRN    ondansetron (ZOFRAN ODT) tablet 4 mg  4 mg Oral Q8H PRN    Or    ondansetron (ZOFRAN) injection 4 mg  4 mg IntraVENous Q6H PRN    heparin (porcine) injection 5,000 Units  5,000 Units SubCUTAneous Q8H    vasopressin (VASOSTRICT) 20 Units in 0.9% sodium chloride 100 mL infusion  0-0.04 Units/min IntraVENous CONTINUOUS    balsam peru-castor oiL (VENELEX) ointment   Topical BID    alcohol 62% (NOZIN) nasal  1 Ampule  1 Ampule Topical Q12H    piperacillin-tazobactam (ZOSYN) 3.375 g in 0.9% sodium chloride (MBP/ADV) 100 mL MBP  3.375 g IntraVENous Q12H    insulin lispro (HUMALOG) injection   SubCUTAneous TIDAC    glucose chewable tablet 16 g  4 Tablet Oral PRN    dextrose 10% infusion 0-250 mL  0-250 mL IntraVENous PRN    glucagon (GLUCAGEN) injection 1 mg  1 mg IntraMUSCular PRN    balsam peru-castor oiL (VENELEX) ointment   Topical BID    famotidine (PF) (PEPCID) 20 mg in 0.9% sodium chloride 10 mL injection  20 mg IntraVENous Q24H    Vancomycin - pharmacy to dose   Other Rx Dosing/Monitoring    PHENYLephrine (FAHAD-SYNEPHRINE) 30 mg in 0.9% sodium chloride 250 mL infusion   mcg/min IntraVENous TITRATE    NOREPINephrine (LEVOPHED) 32,000 mcg in 0.9% sodium chloride 250 mL (128 mcg/mL) infusion  0.5-50 mcg/min IntraVENous TITRATE          Roney Quezada, [address] [facility]  [facility] / [facility], [address]  [place], [address]  Phone - [phone]               Fax - [phone]

## 2022-05-24 NOTE — DIALYSIS
CRRT / [phone]    Orders   Mode: CVVH restarted @ 1210   Blood Flow Rate: 180 ml/min   Prismasol Dose: 25 ml/kg/hr x 89 kg = 2225, Rounded to 2200 ml/hr (PBP: 1100 ml/hr, Replacement: 1100 ml/hr)   Prismasol Concentrate: 4K/2.5Ca   Blood Warmer Temp: 37*C   Net Fluid Removal: 0 ml/hr (+100 ml/hr)     Metrics   BP: 135/82   HR: 58   Access Pressure: -49   Filter Pressure: 138   Return Pressure: 57   TMP: 33   Pressure Drop: 39     Access   Type & Location: R femoral CVC   Comments: Dressing CDI dated 5/24/22. +aspiration/+flush x2 ports                                       Labs   HBsAg (Antigen) / date: Negative 5/24/22         HBsAb (Antibody) / date: Susceptible 5/24/22   Source: [facility]     Safety:   Time Out Done:   (Time) 1130   Consent obtained/signed: Verified   Education: CVC infection prevention & control. Primary Nurse Rpt Pre: SABIHA Dennis RN   Primary Nurse Rpt Post: SABIHA Dennis RN     Comments / Plan:      Filter changed secondary to CT scan. All possible blood (186 ml) returned by dialysis RN. Consents, patient, code status, labs and orders verified. Old set discarded in red bio-hazard bag. New [id number] filter set-up, primed, tested and running well at this time. R femoral CVC, dressing CDI with bio-patch dated 5/24/22. No signs of redness, drainage, or infection visualized. Each catheter limb disinfected for 60 seconds per limb with alcohol swabs. Caps removed, dialysis CVC hub scrubbed with Prevantics for 15 seconds, followed by a 5 second dry time per Hospital P&P. +aspiration/+flush x 2 ports with no resistance. Lines visible and connections secure with blood warmer to return line at 37*C. Education & pre/post report given to primary RN.

## 2022-05-24 NOTE — CONSULTS
Palliative Medicine Consult  M[place]uel[place]: [phone] ([phone])    Patient Name: Jacobo Saldana  YOB: 1934    Date of Initial Consult: 5/23/22  Reason for Consult: Care Decisions  Requesting Provider: Angelique Ochoa MD  Primary Care Physician: Chris Patterson MD     SUMMARY:   Jacobo Saldana is a 80 y.o. with a past history of HTN, IDDM, and HLD, who was admitted on 5/23/2022 from home with a diagnosis of septic shock. She had been feeling sick and lethargic for about 4 days, and her granddaughter had called their PCP for an appointment, but when she doing her minimally responsive on 5/23 she called EMD.  On presentation to the ED she was found to be hypotensive and bradycardic- and was admitted to the ICU     Functional baseline:  Ms Bebe Landers is very independent at baseline. She moves around the house with a rollator, prefers to stay inside and never goes out. She makes simple meals for herself for breakfast and lunch, and her granddaughter cooks dinner. She does her own ADLs- bathing in the sink and dresses herself. Her granddaughter sets up a pill box, but she takes her own meds, and doses her own insulin. She spends a lot of time in her lift-recliner, getting up to get food or use the restroom. Unfortunately her granddaughter was away for a month as she herself was hospitalized. She had her sister checking in with her grandmother but as her sister was not used to her grandmothers medical issue, or routine, I wonder if this is when she developed her DTI's. Granddaughter was not aware of them as her grandmother bathes herself.     She also shared with me that she checked her grandmothers room last night, and her insulin was not being stored in the fridge, so there is a question of whether it has lost its effectiveness    5/24:  More awake today, interacting with family, but when not being engaged directly she falls asleep     PALLIATIVE DIAGNOSES:   1. Goals of care  2. Lethargy  3. Advanced Care Planning  4. Palliative     PLAN:   1. Met with Mrs Wilbert Silva- although she wasn't very interactive with me, along with her son and granddaughter Ana Laura Davis. Her sisters had just left for the day  2. They do understand LNOK, but Ms Wilbert Silva son shared with me that Ana Laura Davis is \"in charge\" of all medical decisions. I let them know that it would be important for Ms Wilbert Silva to put this in writing, in order to make that clear for future admissions- but in the meantime, as long as her son (only living child), her sisters, and Jena's siblings all defer to her, then she can be decision maker for this admission. 3. Will keep checking back to see if she can do an AMD with me  4. I did not discuss CODE status again, as Ana Laura Davis was so relieved that her grandmother made it through the last 24 hours- and she is actually doing much better than yesterday. Even so, I will be following closely to re-address if it becomes necessary  5. I showed her the patients DTI's on her heels- as she didn't know what to look for, and educated her some on what to expect in the future. She could use a sit down with the wound care nurses this admission for some education. I did try to set her up with some realistic expectations if these wounds opened up, but also a few tips to encourage her grandmother to move around and not allow pressure to always be in one spot  6. Initial consult note routed to primary continuity provider and/or primary health care team members  7. Communicated plan of care with: Palliative [facility] Team     GOALS OF CARE / TREATMENT PREFERENCES:     GOALS OF CARE:  Patient/Health Care Proxy Stated Goals: Prolong life    TREATMENT PREFERENCES:   Code Status: Full Code    Advance Care Planning:  [] The [facility] Interdisciplinary Team has updated the ACP Navigator with Health Care Decision Maker and Patient Capacity      No flowsheet data found.     Medical Interventions: Full interventions       Other:    As far as possible, the palliative care team has discussed with patient / health care proxy about goals of care / treatment preferences for patient. HISTORY:     History obtained from: chart, granddaughter    CHIEF COMPLAINT: none    HPI/SUBJECTIVE:    The patient is:   [] Verbal and participatory  [x] Non-participatory due to: awake but fell asleep shortly after my arrival    Clinical Pain Assessment (nonverbal scale for severity on nonverbal patients):   Clinical Pain Assessment  Severity: 0     Activity (Movement): Lying quietly, normal position    Duration: for how long has pt been experiencing pain (e.g., 2 days, 1 month, years)  Frequency: how often pain is an issue (e.g., several times per day, once every few days, constant)     FUNCTIONAL ASSESSMENT:     Palliative Performance Scale (PPS):  PPS: 30       PSYCHOSOCIAL/SPIRITUAL SCREENING:     Palliative IDT has assessed this patient for cultural preferences / practices and a referral made as appropriate to needs (Cultural Services, Patient Advocacy, Ethics, etc.)    Any spiritual / Zoroastrianism concerns:  [] Yes /  [x] No   If \"Yes\" to discuss with pastoral care during IDT     Does caregiver feel burdened by caring for their loved one:   [] Yes /  [x] No /  [] No Caregiver Present    If \"Yes\" to discuss with social work during IDT    Anticipatory grief assessment:   [x] Normal  / [] Maladaptive     If \"Maladaptive\" to discuss with social work during IDT    ESAS Anxiety: Anxiety: 0    ESAS Depression: Depression: 0        REVIEW OF SYSTEMS:     Positive and pertinent negative findings in ROS are noted above in HPI. The following systems were [x] reviewed / [] unable to be reviewed as noted in HPI  Other findings are noted below. Systems: constitutional, ears/nose/mouth/throat, respiratory, gastrointestinal, genitourinary, musculoskeletal, integumentary, neurologic, psychiatric, endocrine.  Positive findings noted below.  Modified ESAS Completed by: provider   Fatigue: 6     Depression: 0 Pain: 0   Anxiety: 0 Nausea: 0   Anorexia: 0 Dyspnea: 0           Stool Occurrence(s): 1        PHYSICAL EXAM:     From RN flowsheet:  Wt Readings from Last 3 Encounters:   05/24/22 196 lb (88.9 kg)     Blood pressure (!) 122/92, pulse 84, temperature 98.6 °F (37 °C), resp. rate 18, height 5' 6\" (1.676 m), weight 196 lb (88.9 kg), SpO2 100 %. Pain Scale 1: Adult Nonverbal Pain Scale                    Last bowel movement, if known:     Constitutional: elderly woman, doesn't appear particularly sick  Eyes: pupils equal, anicteric  ENMT: no nasal discharge, moist mucous membranes  Cardiovascular: regular rhythm, distal pulses intact  Respiratory: breathing not labored, symmetric  Gastrointestinal: soft non-tender, +bowel sounds  Musculoskeletal: no deformity, no tenderness to palpation  Skin: warm, dry  Neurologic: following commands, moving all extremities  Psychiatric: full affect, no hallucinations  Other:       HISTORY:     Active Problems:    DKA (diabetic ketoacidosis) ([address] (5/23/2022)      Past Medical History:   Diagnosis Date    Diabetes ([address]       History reviewed. No pertinent surgical history. History reviewed. No pertinent family history. History reviewed, no pertinent family history.   Social History     Tobacco Use    Smoking status: Never Smoker    Smokeless tobacco: Never Used   Substance Use Topics    Alcohol use: Not Currently     No Known Allergies   Current Facility-Administered Medications   Medication Dose Route Frequency    bicarbonate dialysis (PRISMASOL) BG K 4/Ca 2.5 5000 ml solution   Extracorporeal DIALYSIS CONTINUOUS    dextrose 5 % - 0.2% NaCl infusion  125 mL/hr IntraVENous CONTINUOUS    hydrocortisone Sod Succ (PF) (SOLU-CORTEF) injection 50 mg  50 mg IntraVENous Q6H    insulin NPH (NOVOLIN N, HUMULIN N) injection 10 Units  10 Units SubCUTAneous BID    dextrose 10% infusion 0-250 mL  0-250 mL IntraVENous PRN    insulin lispro (HUMALOG) injection   SubCUTAneous Q6H    glucose chewable tablet 16 g  4 Tablet Oral PRN    glucagon (GLUCAGEN) injection 1 mg  1 mg IntraMUSCular PRN    dextrose 10% infusion 0-250 mL  0-250 mL IntraVENous PRN    vancomycin (VANCOCIN) 1500 mg in  ml infusion  1,500 mg IntraVENous Q24H    piperacillin-tazobactam (ZOSYN) 3.375 g in 0.9% sodium chloride (MBP/ADV) 100 mL MBP  3.375 g IntraVENous Q8H    insulin regular (NOVOLIN R, HUMULIN R) 100 Units in 0.9% sodium chloride 100 mL infusion  0-45 Units/hr IntraVENous TITRATE    sodium chloride (NS) flush 5-40 mL  5-40 mL IntraVENous Q8H    sodium chloride (NS) flush 5-40 mL  5-40 mL IntraVENous PRN    acetaminophen (TYLENOL) tablet 650 mg  650 mg Oral Q6H PRN    Or    acetaminophen (TYLENOL) suppository 650 mg  650 mg Rectal Q6H PRN    polyethylene glycol (MIRALAX) packet 17 g  17 g Oral DAILY PRN    ondansetron (ZOFRAN ODT) tablet 4 mg  4 mg Oral Q8H PRN    Or    ondansetron (ZOFRAN) injection 4 mg  4 mg IntraVENous Q6H PRN    heparin (porcine) injection 5,000 Units  5,000 Units SubCUTAneous Q8H    vasopressin (VASOSTRICT) 20 Units in 0.9% sodium chloride 100 mL infusion  0-0.04 Units/min IntraVENous CONTINUOUS    balsam peru-castor oiL (VENELEX) ointment   Topical BID    alcohol 62% (NOZIN) nasal  1 Ampule  1 Ampule Topical Q12H    glucose chewable tablet 16 g  4 Tablet Oral PRN    glucagon (GLUCAGEN) injection 1 mg  1 mg IntraMUSCular PRN    balsam peru-castor oiL (VENELEX) ointment   Topical BID    famotidine (PF) (PEPCID) 20 mg in 0.9% sodium chloride 10 mL injection  20 mg IntraVENous Q24H    Vancomycin - pharmacy to dose   Other Rx Dosing/Monitoring    PHENYLephrine (FAHAD-SYNEPHRINE) 30 mg in 0.9% sodium chloride 250 mL infusion   mcg/min IntraVENous TITRATE    NOREPINephrine (LEVOPHED) 32,000 mcg in 0.9% sodium chloride 250 mL (128 mcg/mL) infusion  0.5-50 mcg/min IntraVENous TITRATE LAB AND IMAGING FINDINGS:     Lab Results   Component Value Date/Time    WBC 6.0 05/24/2022 03:21 AM    HGB 10.0 (L) 05/24/2022 03:21 AM    PLATELET 78 (L) 10/14/1455 03:21 AM     Lab Results   Component Value Date/Time    Sodium 145 05/24/2022 08:00 AM    Potassium 3.7 05/24/2022 08:00 AM    Chloride 113 (H) 05/24/2022 08:00 AM    CO2 22 05/24/2022 08:00 AM    BUN 72 (H) 05/24/2022 08:00 AM    Creatinine 4.11 (H) 05/24/2022 08:00 AM    Calcium 6.6 (L) 05/24/2022 08:00 AM    Magnesium 2.8 (H) 05/24/2022 08:00 AM    Phosphorus 3.1 05/24/2022 08:00 AM      Lab Results   Component Value Date/Time    Alk. phosphatase 57 05/24/2022 03:21 AM    Protein, total 5.6 (L) 05/23/2022 04:26 PM    Albumin 2.1 (L) 05/24/2022 03:21 AM    Globulin 3.2 05/23/2022 04:26 PM     Lab Results   Component Value Date/Time    INR 1.1 05/23/2022 08:05 AM    Prothrombin time 11.3 (H) 05/23/2022 08:05 AM      No results found for: IRON, FE, TIBC, IBCT, PSAT, FERR   Lab Results   Component Value Date/Time    pH 7.20 (LL) 05/23/2022 02:59 PM    PCO2 34 (L) 05/23/2022 02:59 PM    PO2 107 (H) 05/23/2022 02:59 PM     No components found for: [place]   Lab Results   Component Value Date/Time    CK 16,814 ([place]) 05/24/2022 03:21 AM                Total time: 50m  Counseling / coordination time, spent as noted above: 30m  > 50% counseling / coordination?: y    Prolonged service was provided for  []30 min   []75 min in face to face time in the presence of the patient, spent as noted above. Time Start:   Time End:   Note: this can only be billed with 87434 (initial) or 11684 (follow up). If multiple start / stop times, list each separately.

## 2022-05-24 NOTE — WOUND CARE
Wound care Nurse Consult: consult placed for POA wounds to sacrum/buttocks and left heel. Patient is a confused 79 y/o AAF admitted 5/23/22 for DKA and possibly sepsis from ? UTI & ?PNE. She is on x3 vasopressors and CRRT through a right groin access. Gee catheter in place  Very large soft to runny brown BM this am  Past Medical History:   Diagnosis Date    Diabetes [address] [address] [facility]      History reviewed. No pertinent surgical history. Hgb A1c=13.6 on 5/23/22  Glucose on admission =1,259    Patient is taken care of by [facility] at home and had been feeling ill 4 days prior to admission. She was found to be unresponsive and EMS called. Sacrum/buttocks: DTPI's to both buttocks and sacrum, sacrum opening up. Perianal skin denuded/raw    Left heel DTPI:       WOUND POA CONDITIONS    Wound Sacrum red/purple/non-blanchable area with skin breakdown and bleeding (Active)   Wound Image     05/24/22 0354   Wound Etiology Deep Tissue/Injury 05/24/22 1056   Dressing Status New dressing applied 05/24/22 1056   Cleansed Soap and water;Cleansed with saline 05/24/22 1056   Dressing/Treatment Pharmaceutical agent (see MAR); Alginate; Foam 05/24/22 1056   Wound Assessment Purple/maroon;Pink/red 05/24/22 1056   Drainage Amount Scant 05/24/22 1056   Drainage Description Serosanguinous 05/24/22 1056   Wound Odor None 05/24/22 1056   Kaur-Wound/Incision Assessment Denuded 05/24/22 1056   Edges Undefined edges 05/24/22 1056   Wound Thickness Description Full thickness 05/24/22 1056   Number of days: 1       Wound Heel Left DTI 05/23/22 (Active)   Wound Image   05/24/22 0451   Wound Etiology Deep Tissue/Injury 05/24/22 1056   Dressing Status Other (Comment) 05/24/22 0800   Cleansed Soap and water 05/24/22 0800   Dressing/Treatment Pharmaceutical agent (see MAR); Open to air 05/24/22 1056   Wound Length (cm) 3 cm 05/24/22 1056   Wound Width (cm) 3 cm 05/24/22 1056   Wound Surface Area (cm^2) 9 cm^2 05/24/22 1056   Wound Assessment Purple/maroon 05/24/22 1056   Drainage Amount None 05/24/22 1056   Wound Odor None 05/24/22 1056   Tavo-Wound/Incision Assessment Intact 05/24/22 1056   Edges Undefined edges 05/24/22 1056   Wound Thickness Description Full thickness 05/24/22 1056   Number of days: 1       Recommend:    Left heel and R&L buttocks: apply Venelex ointment to clean dry skin. Float heels    Sacrum: BID, cleanse open skin with NS moist 4x4. Apply No Sting skin barrier wipes to tavo-wound skin where foam dressing needs to stick, cover open wound with a square of Alginate (maxorb) and secure with a foam dressing. Specialty bed[facility] will follow patient while inpatient for these significant wounds as the evolve.

## 2022-05-24 NOTE — PROGRESS NOTES
1900: Received report from Cierra Arellano. Dr. Kristin Joe changed max insulin dose to 45 units/hr, insulin gtt was decreased to 45 units/hr per new orders see MAR.     2000: Discussed neuro status with Rk Callejas NP.     2100: Warming blanket cut off and removed. 0140: Spoke with Maite Valerio NP and Rk Callejas NP about critically high CK.     0203: Alysa at bedside for temporary HD catheter placement, time out performed. 0230: Dialysis nurse at beside to set up CRRT     0300: CRRT initiated. 2923: Insulin gtt stopped per protocol, see MAR.    0720: Report given to oncoming shift. Yulissa KENNEDY.

## 2022-05-24 NOTE — PROGRESS NOTES
Critical Care Progress Note  Neris Masterson MD          Date of Service:  2022  NAME:  Keri Flannery  :  1934  MRN:  [id number]      Subjective/Hospital course:      : Patient remains on 3 vasopressors. Mental status is the same as yesterday, minimally responsive. Protecting her air. Problem list:     -septic shock.  -UTI. -? Pneumonia. -Symptomatic bradycardia. -Severe DKA. -Acute metabolic encephalopathy.  -Hyperkalemia.  -Acute renal failure. -H/o HTN.  -HLD. Assessment/Plan:     Septic shock.  -Sec to UTI/pneumonia. -Empiric vanc and zosyn. -F/u UCx and blood cultures.     UTI/Pneumonia. -Empiric abx as above.     DKA. -Unclear if patient was using insulin in the last few d  -Insulin drip per DKA protocol.  -Hold insulin gtt of K is less than 3.3  -Aggressive electrolytes replacement. -Monitor glucose Q1hr and BMP Q4hrs.  -Start D5 1/2 NS at once glucose drops below 200mg/dl.  -Transition to sub cut insulin once Ag<12 and bicarb >15.     Acute renal failure.  -Likely ATN from shock.  -Closely monitor renal functions and UO. -Avoid nephrotoxins. -started on CRRT overnight. No factor for now.     Acute metabolic encephalopathy.  -Closely monitor mental status.  -manage metabolic issues as above.     Symptomatic bradycardia.  -Likely precipitated by severe acidemia, also on BB at home. Now improved, off of dopamine.      DVT prophylaxis. [address] SUP. Pepcid. Code status. Full code. I personally spent 80 minutes of critical care time. This is time spent at this critically ill patient's bedside actively involved in patient care as well as the coordination of care and discussions with the patient's family. This does not include any procedural time which has been billed separately. Review of Systems:   Review of systems not obtained due to patient factors.        Vital Signs:     Patient Vitals for the past 4 hrs:   BP Temp Pulse Resp SpO2 Height Weight 05/24/22 0930 99/87 -- 100 20 99 % -- --   05/24/22 0915 (!) 150/71 -- 92 25 100 % -- --   05/24/22 0900 (!) 149/70 -- 89 24 99 % -- --   05/24/22 0830 (!) 144/60 -- 84 28 97 % -- --   05/24/22 0825 (!) 140/62 -- 84 29 98 % -- --   05/24/22 0815 (!) 140/98 -- 86 -- -- -- --   05/24/22 0803 (!) 148/118 -- 85 16 100 % -- --   05/24/22 0800 -- -- 85 16 100 % -- --   05/24/22 0727 (!) 147/70 -- -- -- -- 5' 6\" (1.676 m) 88.9 kg (196 lb)   05/24/22 0708 (!) 147/70 98 °F (36.7 °C) 85 17 100 % -- --        Intake/Output Summary (Last 24 hours) at 5/24/2022 1020  Last data filed at 5/24/2022 0900  Gross per 24 hour   Intake 8426.47 ml   Output 1226.6 ml   Net 7199.87 ml        Physical Examination:    Physical Exam  Constitutional:       Comments: Altered mental status. HENT:      Head: Normocephalic and atraumatic. Mouth/Throat:      Mouth: Mucous membranes are moist.   Eyes:      Extraocular Movements: Extraocular movements intact. Conjunctiva/sclera: Conjunctivae normal.   Cardiovascular:      Rate and Rhythm: Normal rate and regular rhythm. Pulmonary:      Effort: Pulmonary effort is normal. No respiratory distress. Breath sounds: Normal breath sounds. Abdominal:      General: Abdomen is flat. There is no distension. Palpations: Abdomen is soft. Tenderness: There is no abdominal tenderness. There is no guarding. Musculoskeletal:      Cervical back: Normal range of motion and neck supple. Neurological:      Comments: Altered mental status. Labs:   Labs and imaging reviewed.       Medications:     Current Facility-Administered Medications   Medication Dose Route Frequency    bicarbonate dialysis (PRISMASOL) BG K 4/Ca 2.5 5000 ml solution   Extracorporeal DIALYSIS CONTINUOUS    dextrose 5 % - 0.2% NaCl infusion  125 mL/hr IntraVENous CONTINUOUS    hydrocortisone Sod Succ (PF) (SOLU-CORTEF) injection 50 mg  50 mg IntraVENous Q6H    insulin NPH (NOVOLIN N, HUMULIN N) injection 10 Units  10 Units SubCUTAneous BID    insulin regular (NOVOLIN R, HUMULIN R) 100 Units in 0.9% sodium chloride 100 mL infusion  0-45 Units/hr IntraVENous TITRATE    sodium chloride (NS) flush 5-40 mL  5-40 mL IntraVENous Q8H    sodium chloride (NS) flush 5-40 mL  5-40 mL IntraVENous PRN    acetaminophen (TYLENOL) tablet 650 mg  650 mg Oral Q6H PRN    Or    acetaminophen (TYLENOL) suppository 650 mg  650 mg Rectal Q6H PRN    polyethylene glycol (MIRALAX) packet 17 g  17 g Oral DAILY PRN    ondansetron (ZOFRAN ODT) tablet 4 mg  4 mg Oral Q8H PRN    Or    ondansetron (ZOFRAN) injection 4 mg  4 mg IntraVENous Q6H PRN    heparin (porcine) injection 5,000 Units  5,000 Units SubCUTAneous Q8H    vasopressin (VASOSTRICT) 20 Units in 0.9% sodium chloride 100 mL infusion  0-0.04 Units/min IntraVENous CONTINUOUS    balsam peru-castor oiL (VENELEX) ointment   Topical BID    alcohol 62% (NOZIN) nasal  1 Ampule  1 Ampule Topical Q12H    piperacillin-tazobactam (ZOSYN) 3.375 g in 0.9% sodium chloride (MBP/ADV) 100 mL MBP  3.375 g IntraVENous Q12H    insulin lispro (HUMALOG) injection   SubCUTAneous TIDAC    glucose chewable tablet 16 g  4 Tablet Oral PRN    dextrose 10% infusion 0-250 mL  0-250 mL IntraVENous PRN    glucagon (GLUCAGEN) injection 1 mg  1 mg IntraMUSCular PRN    balsam peru-castor oiL (VENELEX) ointment   Topical BID    famotidine (PF) (PEPCID) 20 mg in 0.9% sodium chloride 10 mL injection  20 mg IntraVENous Q24H    Vancomycin - pharmacy to dose   Other Rx Dosing/Monitoring    PHENYLephrine (FAHAD-SYNEPHRINE) 30 mg in 0.9% sodium chloride 250 mL infusion   mcg/min IntraVENous TITRATE    NOREPINephrine (LEVOPHED) 32,000 mcg in 0.9% sodium chloride 250 mL (128 mcg/mL) infusion  0.5-50 mcg/min IntraVENous TITRATE     ______________________________________________________________________  EXPECTED LENGTH OF STAY: 4d 19h  ACTUAL LENGTH OF STAY:          [address] An[address] Toro MD   Pulmonary/[facility] Critical [facility]  [phone]  5/24/2022

## 2022-05-24 NOTE — PROGRESS NOTES
Pharmacy Antimicrobial Kinetic Dosing    Indication for Antimicrobials: sepsis of unknown etiology     Current Regimen of Each Antimicrobial:  Vancomycin 2000 mg IV once (Start Date ; Day # 2)  Zosyn 3.375 g IV Q12H (Start Date ; Day #2)    Previous Antimicrobial Therapy:    Goal Level: AUC: 400-600 mg/hr/Liter/day    Date Dose & Interval Measured (mcg/mL) Predicted AUC/DUY                       Date & time of next level: pending    Dosing calculator used: CRRT protocol    Significant Positive Cultures:   NA    Conditions for Dosing Consideration: None    Labs:  Recent Labs     22  0800 22  0321 22  0024   CREA 4.11* 4.95* 5.34*   BUN 72* 87* 90*   PCT 1.29  --   --      Recent Labs     22  0321 22  1626 22  0805   WBC 6.0 9.9 10.9     Temp (24hrs), Av.4 °F (36.9 °C), Min:93.9 °F (34.4 °C), Max:100.4 °F (38 °C)    Creatinine Clearance (mL/min):   CrCl (Ideal Body Weight): 8.9   If actual weight < IBW: CrCl (Actual Body Weight) 13.3    Impression/Plan:   Vanc 2000 mg IV once then 1500 mg IV Q24H  Vanc trough after load, goal 15 mcg/mL  Zosyn adjusted to q8h for CRRT  Antimicrobial stop date TBD     Pharmacy will follow daily and adjust medications as appropriate for renal function and/or serum levels.     Thank you,  Joy Dominguez PHARMD    Vancomycin Dosing Document    Documents located on pharmacy Teams site: Clinical Practice -> Antimicrobial Stewardship -> Antibiotics_Vancomycin     Aminoglycoside Dosing Document    Documents located on pharmacy Teams site: Clinical Practice -> Antimicrobial Stewardship -> Antibiotics_Aminoglycosides

## 2022-05-24 NOTE — DIABETES MGMT
[facility] [facility]    CLINICAL NURSE SPECIALIST CONSULT     Initial Presentation   Rusty Oglesby is a 80 y.o. female admitted from the ER today 5/23/22 in DKA with profound bradycardia, after being found down by granddaughter. Granddaughter states that patient is independent in her activities of daily living, and has a routine that includes her diabetes self-care. He grandmother had reported that she wasn't feeling well and the granddaughter had made arrangements for a home visit. When she went into her room this morning to remind her of the visit, she found her down on the floor. EMS called. Of note, granddaughter states that she (herself) had been hospitalized for a month and returned home at the end of April. She surmises that her grandmother may not have been taking her insulin because there was more insulin in the refrigerator than there should have been. LAB:  WBC 10.9. AST 75. Lipase >3000. Troponin 31. NT pro-BNP 1588. Urinary glucose & ketone +. CT Head: No extra-axial fluid collection, hemorrhage or shift. Atrophy mostly posterior fossa. CXR: Mild pulmonary edema    HX:   Past Medical History:   Diagnosis Date    Diabetes ([facility] [address]       INITIAL DX:   DKA (diabetic ketoacidosis) ([facility] [address] [E11.10]     Current Treatment     TX: IVF. Insulin infusion. Pressor support. Pepcid. Heparin. ABx. Consulted by Provider for advanced diabetes nursing assessment and care for:   [x] Transitioning off Austine Ou   [x] Inpatient management strategy  [] Home management assessment  [] Survival skill education    Hospital Course   Clinical progress has been complicated by need for ICU level of care. 5/24/22 Alert but babbling; Precedex+. O2NC. Remains on three (3) pressors. CRRT+. Scheduled for CT chest & ABd wo contrast today. Plans to add steroids. Discussion of transition off Austine Ou made during IPR.     Diabetes History   Type 2 diabetes treated with insulin therapy  Admission BG 1259 with AG 30    Diabetes-related Medical History: Deferred    Diabetes Medication History  Key Antihyperglycemic Medications             glipiZIDE (GLUCOTROL) 10 mg tablet Take 10 mg by mouth daily. insulin lispro protamine/insulin lispro (HUMALOG MIX 75/25) 100 unit/mL (75-25) injection 48 Units by SubCUTAneous route daily. take 48 units in the morning    insulin lispro protamine/insulin lispro (HUMALOG MIX 75/25) 100 unit/mL (75-25) injection 32 Units by SubCUTAneous route every evening. take 32 units subcutaneously at night. Diabetes self-management practices: Per granddaughter  Eating pattern   [x] Not eating a carbohydrate-controlled mealplan  [x] Breakfast Cheerios with milk & banana.  Coffee (may have had Cheese toast earlier)  [x] Sankc  Chips a Hoy cookies (while she is watching TV  [x] Dinner  With granddaughter   Physical activity pattern - Uses Rollator to move about the house   [x] Not employing a physical activity program to control BG  Monitoring pattern - Tracks on a tablet by bedside & takes to her provider   [x] Testing BGs   [x] Breakfast   Taking medications pattern  [x] Consistent administration  [x] Affordable  Overall evaluation:    [x] Last A1c located 9% (3/9/2021) @[facility]    Subjective   Babbling     Objective   Physical exam  General Obese female who is in no acute distress  Neuro  Alert  Vital Signs   Visit Vitals  BP (!) 140/94   Pulse 83   Temp 98 °F (36.7 °C)   Resp 23   Ht 5' 6\" (1.676 m)   Wt 88.9 kg (196 lb)   SpO2 100%   BMI 31.64 kg/m²     Laboratory  Recent Labs     05/24/22  0800 05/24/22  0321 05/24/22  0024 05/23/22  1952 05/23/22  1626 05/23/22  1201 05/23/22  0805   GLU 89 151* 315*   < > 732*   < > 1,259*   AGAP 10 12 14   < > 19*   < > 30*   WBC  --  6.0  --   --  9.9  --  10.9   CREA 4.11* 4.95* 5.34*   < > 5.19*   < > 5.97*   GFRNA 10* 8* 8*   < > 8*   < > 7*   AST  --  595*  --   --  601*  --  75*   ALT  --  230*  --   --  252*  -- 34    < > = values in this interval not displayed. Factors impacting BG management  Factor Dose Comments   Nutrition:  NPO     Drugs:  Vasopressor load   Vaso, an & levo infusions   Affects insulin delivery   Infection Zosyn Q12 hrs Normothermic. WBC normal   Other:   Kidney function  Liver function   GLORIA  Enzymes elevated      Blood glucose pattern      Significant diabetes-related events over the past 24-72 hours  Admitted in profound DKA with electrolyte disturbances. BG 1259 with AG 30  On Glucostabilizer; received 27-32 units of insulin/hour initially  BG finally under 250mg/dl at 1am today after 5L of fluid in ICU  Significant GLORIA -On CRRT  Steroids to begin today    Assessment and Plan   Nursing Diagnosis Risk for unstable blood glucose pattern   Nursing Intervention Domain 5250 Decision-making Support   Nursing Interventions Examined current inpatient diabetes/blood glucose control   Explored factors facilitating and impeding inpatient management     Evaluation   This overweight AA female was admitted in DKA associated with bradycardia. Received significant amounts of insulin/hour via Glucostabilizer. Fluid resuscitation also needed in presence of elevated NT pro-BNP. BGs down under 250mg/dl at 1am this morning. AG closed X2 this morning at 8am. Plan to add steroids today discussed during IPR and transitioning off GS. In light of kidney failure and very little insulin used since 5am, recommended adding NPH insulin to override steroid and corrective insulin to determine true basal needs.     Recommendations     [x] NPH insulin 10 units twice daily    [x] Stop insulin & dextrose infusions in two (2) hours    [x] Use HIGH sensitivity corrective insulin     Billing Code(s)   [x] 51082 IP subsequent hospital care - 35 minutes     Before making these care recommendations, I personally reviewed the hospitalization record, including notes, laboratory & diagnostic data and current medications, and examined the patient at the bedside (circumstances permitting) before making care recommendations. More than fifty (50) percent of the time was spent in patient counseling and/or care coordination.   Total minutes: Pilar Snell, CNS  Diabetes Clinical Nurse Specialist  [facility]  Access via [employer]

## 2022-05-24 NOTE — PROGRESS NOTES
EP/ ARRHYTHMIA    Patient ID:  Patient: Kelsea Marquez  MRN: [id number]  Age: 80 y.o.  : 1934    Date of  Admission: 2022  7:44 AM   PCP:  Jose Cameron MD    Assessment: 1. Profound sinus bradycardia 20-30's in the setting of DKA, hyperkalemia, and OP beta-blocker. This has RESOLVED. 2. Paroxysmal atrial fibrillation. 3. 2nd degree AV block with single dropped PAC's. 4. Hypotension likely due to dehydration/volume depletion, metabolic acidosis, bradycardia. Improved with volume resuscitation, pressor. 5. No evidence of acute ischemia or infarct by presenting ECG. Echo with normal LV and RV systolic function. 6. RBBB. 7. GLORIA atop CKD stage 3 at baseline. Rhabdomyolysis. 8. Diabetes mellitus type 2 on chronic insulin complicated by DKA. 9. Metabolic encephalopathy. 10. Full code. Plan:     1. No longer needs dopamine. 2. Continues with pressor support for BP. 3. Agree with correcting electrolyte and acid-base disarray. She is on CVVH. 4. Echo final read pending--preliminarily, hyperdynamic LV and RV systolic function, no significant valve disease, no pericardial effusion. Discussed with nursing. [x]       High complexity decision making was performed in this patient at high risk for decompensation with multiple organ involvement. Kelsea Marquez is a 80 y.o. female with a history of progressive weakness, fatigue, increasing urination and thirst.  Found with altered mental status by family. Brought in by EMS, transcutaneous pacing. Labs showed pH 6.99,  K 6.9--ER has treated this, reassessment pending. She cannot relay a history. Granddaughter present in the room.     No Known Allergies       Current Facility-Administered Medications   Medication Dose Route Frequency    bicarbonate dialysis (PRISMASOL) BG K 4/Ca 2.5 5000 ml solution   Extracorporeal DIALYSIS CONTINUOUS    dextrose 5 % - 0.2% NaCl infusion  125 mL/hr IntraVENous CONTINUOUS  hydrocortisone Sod Succ (PF) (SOLU-CORTEF) injection 50 mg  50 mg IntraVENous Q6H    insulin NPH (NOVOLIN N, HUMULIN N) injection 10 Units  10 Units SubCUTAneous BID    dextrose 10% infusion 0-250 mL  0-250 mL IntraVENous PRN    insulin lispro (HUMALOG) injection   SubCUTAneous Q6H    glucose chewable tablet 16 g  4 Tablet Oral PRN    glucagon (GLUCAGEN) injection 1 mg  1 mg IntraMUSCular PRN    dextrose 10% infusion 0-250 mL  0-250 mL IntraVENous PRN    vancomycin (VANCOCIN) 1500 mg in  ml infusion  1,500 mg IntraVENous Q24H    piperacillin-tazobactam (ZOSYN) 3.375 g in 0.9% sodium chloride (MBP/ADV) 100 mL MBP  3.375 g IntraVENous Q8H    insulin regular (NOVOLIN R, HUMULIN R) 100 Units in 0.9% sodium chloride 100 mL infusion  0-45 Units/hr IntraVENous TITRATE    sodium chloride (NS) flush 5-40 mL  5-40 mL IntraVENous Q8H    sodium chloride (NS) flush 5-40 mL  5-40 mL IntraVENous PRN    acetaminophen (TYLENOL) tablet 650 mg  650 mg Oral Q6H PRN    Or    acetaminophen (TYLENOL) suppository 650 mg  650 mg Rectal Q6H PRN    polyethylene glycol (MIRALAX) packet 17 g  17 g Oral DAILY PRN    ondansetron (ZOFRAN ODT) tablet 4 mg  4 mg Oral Q8H PRN    Or    ondansetron (ZOFRAN) injection 4 mg  4 mg IntraVENous Q6H PRN    heparin (porcine) injection 5,000 Units  5,000 Units SubCUTAneous Q8H    vasopressin (VASOSTRICT) 20 Units in 0.9% sodium chloride 100 mL infusion  0-0.04 Units/min IntraVENous CONTINUOUS    balsam peru-castor oiL (VENELEX) ointment   Topical BID    alcohol 62% (NOZIN) nasal  1 Ampule  1 Ampule Topical Q12H    glucose chewable tablet 16 g  4 Tablet Oral PRN    glucagon (GLUCAGEN) injection 1 mg  1 mg IntraMUSCular PRN    balsam peru-castor oiL (VENELEX) ointment   Topical BID    famotidine (PF) (PEPCID) 20 mg in 0.9% sodium chloride 10 mL injection  20 mg IntraVENous Q24H    Vancomycin - pharmacy to dose   Other Rx Dosing/Monitoring    PHENYLephrine (FAHAD-SYNEPHRINE) 30 mg in 0.9% sodium chloride 250 mL infusion   mcg/min IntraVENous TITRATE    NOREPINephrine (LEVOPHED) 32,000 mcg in 0.9% sodium chloride 250 mL (128 mcg/mL) infusion  0.5-50 mcg/min IntraVENous TITRATE       Review of Symptoms:  See HPI. She cannot give a history. Objective:      Physical Exam:  Temp (24hrs), Av °F (37.2 °C), Min:98 °F (36.7 °C), Max:100.4 °F (38 °C)    Patient Vitals for the past 8 hrs:   Pulse   22 1715 71   22 1700 84   22 1645 87   22 1630 86   22 1615 65   22 1600 68   22 1500 84   22 1451 87   22 1416 84   22 1400 82   22 1346 85   22 1301 82   22 1209 (!) 58   22 1203 (!) 55   22 1130 84   22 1100 83   22 1000 85    Patient Vitals for the past 8 hrs:   Resp   22 1715 27   22 1700 26   22 1645 27   22 1630 27   22 1615 29   22 1600 30   22 1500 18   22 1451 26   22 1416 24   22 1400 24   22 1346 25   22 1301 26   22 1203 23   22 1130 17   22 1100 23   22 1000 13    Patient Vitals for the past 8 hrs:   BP   22 1715 (!) 145/65   22 1700 137/77   22 1645 (!) 161/74   22 1630 (!) 159/66   22 1615 (!) 162/61   22 1600 (!) 150/58   22 1500 (!) 122/92   22 1451 (!) 152/58   22 1416 133/63   22 1400 (!) 156/98   22 1346 (!) 150/67   22 1301 (!) 147/92   22 1209 135/82   22 1203 135/82   22 1130 135/80   22 1100 (!) 140/94   22 1000 (!) 123/92          Intake/Output Summary (Last 24 hours) at 2022 1735  Last data filed at 2022 1700  Gross per 24 hour   Intake 5955.11 ml   Output 1947.6 ml   Net 4007.51 ml     Latest BP 89/46 with HR 87 bpm.    Nondiaphoretic, lethargic elderly female. Supple, no palpable thyromegaly.   No scleral icterus, mucous membranes DRY, conjuctivae pink, no xanthelasma. Unlabored but shallow respirations, clear to auscultation bilaterally anteriorly, symmetric air movement. Irregular bradycardic rhythm, no murmur, pericardial rub, knock, or gallop. No JVD or peripheral edema. Palpable femoral pulses. Abdomen, soft, nontender, nondistended. Extremities without cyanosis or clubbing.  +venostasis changes. Muscle tone and bulk normal for age. Skin cool and dry. No rashes or ulcers. Neuro grossly nonfocal.  No tremor. CARDIOGRAPHICS and STUDIES, I reviewed:    Telemetry:  Sinus bradycardia with PAC's currently. ECG:  Profound sinus bradycardia with normal OR interval, RBBB, QTc normal accounting for profound bradycardia. No acute ischemia or infarct. Echo:  PENDING. Labs:  Recent Labs     05/24/22  0321 05/24/22  0024   CPK 16,814* 13,607*     No results found for: CHOL, CHOLX, CHLST, CHOLV, HDL, HDLP, LDL, LDLC, DLDLP, Eliza Clack, CHHD, CHHDX  Recent Labs     05/23/22  0805   INR 1.1   PTP 11.3*      Recent Labs     05/24/22  1552 05/24/22  0800 05/24/22  0321 05/23/22  1952 05/23/22  1626     142 145 150*   < > 147*   K 4.2  4.2 3.7 3.9   < > 3.7   *  110* 113* 117*   < > 110*   CO2 23 23 22 21   < > 18*   BUN 51*  51* 72* 87*   < > 97*   CREA 3.23*  3.25* 4.11* 4.95*   < > 5.19*   *  206* 89 151*   < > 732*   PHOS 2.9  2.9 3.1 4.4   < > 7.5*   CA 6.8*  6.7* 6.6* 6.5*   < > 6.8*   ALB 2.1*  --  2.1*  --  2.4*   WBC 13.0*  --  6.0  --  9.9   HGB 10.0*  --  10.0*  --  10.5*   HCT 30.7*  --  30.8*  --  33.3*   PLT 88*  --  78*  --  111*    < > = values in this interval not displayed. Recent Labs     05/24/22  1552 05/24/22  0321 05/23/22  1626 05/23/22  0805 05/23/22  0805   AP  --  57 70  --  88   TP  --   --  5.6*  --  7.6   ALB 2.1* 2.1* 2.4*   < > 3.3*   GLOB  --   --  3.2  --  4.3*   LPSE  --   --   --   --  >3,000*    < > = values in this interval not displayed.      No components found for: [address] [facility]     05/23/22  1459   PH 7.20*   PCO2 34*   PO2 107*           Estefany Nguyen MD  5/24/2022

## 2022-05-24 NOTE — PROCEDURES
Procedure Note - Temporary Non-Tunneled HD Catheter (Evan Catheter):   Performed by ALMITA Macias  Diagnosis: GLORIA, Rhabdo, DKA, metabolic acidosis  Insertion Date: 05/24/22  Time:2:20 AM   Obtained Consent? yes; informed from Nephro NP  Procedure Location:  ICU. Immediately prior to the procedure, the patient was reevaluated and found suitable for the planned procedure and any planned medications. Immediately prior to the procedure a time out was called to verify the correct patient, procedure, equipment, staff, and marking as appropriate. Central line Bundle:  Full sterile barrier precautions used. 7-Step Sterility Protocol followed. (cap, mask sterile gown, sterile gloves, large sterile sheet, hand hygiene, 2% chlorhexidine for cutaneous antisepsis)  5 mL 1% Lidocaine placed at insertion site. Patient positioned in Trendelenburg?no   The site was prepped with ChloraPrep and Sterile draping. Catheter inserted into a new site. Using Seldinger technique a Hemodialysis Catheter was placed in the Right, Femoral Vein via direct cannulation with 01 number of attempts for Dialysis. Ultrasound Guidance was utilized. There was good dark, non-pulsatile blood return in all ports. Femoral Site? yes. If Yes, reason femoral site was chosen: CVC in Rt IJ, unable to pass CVC in left IJ earlier today  Catheter secured. Biopatch/CHG bio-occlusive dressing in place? yes. The following complications were encountered: None. A follow-up chest x-ray was not ordered post procedure. The procedure was tolerated well.       ALMITA Macias   Critical Care Medicine  [facility]

## 2022-05-24 NOTE — PROGRESS NOTES
Overnight Nephrology Cross Cover  6p-6a    Reviewed patient recent BMP, renal function worsening with a CK of 13,607  Minimal urinary output, 30 ml since 7 pm last night despite fluid resuscitation  Will order CRRT, consent for line placement already obtained earlier last night   Spoke with patients grand-daughter again to update her on POC (Jessa ). She consents to the plan of dialysis  risks vs benefits discussed again, all questions and concerns answered.    Discussed with primary RN who will get [facility] NP to place line  Spoke with Erma Smith Acute RN and made them aware  Will continue to follow     Edith Wang, [facility]

## 2022-05-25 NOTE — PROGRESS NOTES
1900: Bedside shift change report given to Katarina (oncoming nurse) by Daljit Perea (offgoing nurse). Report included the following information SBAR, Kardex, Intake/Output, MAR and Recent Results. 2000: Shift assessment complete. Patient alert and oriented to self, following commands. Disoriented to place, time, and situation. See Flowsheets for more details. 2205: Q6 renal function labs drawn. 0000: Reassessment complete. Patient bathed. See Flowsheets for more details. 0015: Paroxysmal a fib with 3.5-4 second pauses prior to conversion back to sinus rhythm. Patient frequently converting back and forth. 0030: Specialty bed arrived. Patient to be moved onto it with next CVVH filter change. 0230: Patient had 6 second pause. Jarett Wood NP aware. Orders received for EKG to attempt to catch another pause and 1 x calcium gluconate. 0300: CVVH paused. All possible blood returned. Both ports of jolene catheter flushed using aseptic technique and had brisk blood return. Patient moved to specialty bed and heel boots applied. 0400: Reassessment complete. Labs drawn. Patient still having frequent pauses and remains asymptomatic. Jarett Wood NP aware. See Flowsheets for more details. 0405: CVVH restarted by dialysis RN. 0440: Platelets and CBC discussed with Jarett Wood NP. Per NP, give scheduled heparin. 3658: Critical lab results: calcium 5.3, CK 21,940. Jarett Wood NP notified. Orders received for ionized calcium and lipase. 4810: Ionized calcium resulted. Jarett Wood NP notified. Orders received for calcium gluconate to be run over 2 hours. 0586: Confirmed with lab that lipase can be run with held sample from earlier labs. 0700: Bedside shift change report given to Daljit Perea (oncoming nurse) by Katarina (offgoing nurse). Report included the following information SBAR, Kardex, Intake/Output, MAR and Recent Results.

## 2022-05-25 NOTE — PROGRESS NOTES
Critical Care Progress Note  Lloyd Mooney MD          Date of Service:  2022  NAME:  Josh Monk  :  1934  MRN:  [id number]      Subjective/Hospital course:      : Patient remains on 3 vasopressors. Mental status is the same as yesterday, minimally responsive. Protecting her air.   : Patient is more awake than yesteday. She is still on 10mcg/min of levophed and vasopressin. On CRRT with no factor. Problem list:     -septic shock.  -UTI. -? Pneumonia. -Symptomatic bradycardia. -Severe DKA. -Acute metabolic encephalopathy.  -Hyperkalemia.  -Acute renal failure. -H/o HTN.  -HLD. Assessment/Plan:     Septic shock.  -Sec to UTI/pneumonia. -Empiric vanc and zosyn. Can DC vanc. -GNR positive in blood and urine cultures.       UTI/Pneumonia. -Empiric abx as above.     DKA. -Unclear if patient was using insulin in the last few d  -Insulin drip per DKA protocol.  -Hold insulin gtt of K is less than 3.3  -Aggressive electrolytes replacement. -Monitor glucose Q1hr and BMP Q4hrs.  -Start D5 1/2 NS at once glucose drops below 200mg/dl.  -Transitioned to sub cut insulin.     Acute renal failure.  -Likely ATN from shock.  -Closely monitor renal functions and UO. -Avoid nephrotoxins. -started on CRRT overnight. No factor for now.     Acute metabolic encephalopathy.  -Closely monitor mental status.  -manage metabolic issues as above.     Symptomatic bradycardia.  -Likely precipitated by severe acidemia, also on BB at home. Again had long pauses overnight. Will discuss with cardiology.      DVT prophylaxis. [facility]. SUP. Pepcid. Code status. Full code. I personally spent 60 minutes of critical care time. This is time spent at this critically ill patient's bedside actively involved in patient care as well as the coordination of care and discussions with the patient's family. This does not include any procedural time which has been billed separately.       Review of Systems: Review of systems not obtained due to patient factors. Vital Signs:     Patient Vitals for the past 4 hrs:   BP Temp Pulse Resp SpO2   05/25/22 0930 (!) 148/66 -- 66 26 97 %   05/25/22 0915 (!) 146/55 -- 69 23 93 %   05/25/22 0900 (!) 152/64 -- 82 22 100 %   05/25/22 0815 (!) 145/67 -- 67 23 95 %   05/25/22 0800 135/75 -- 62 21 96 %   05/25/22 0744 -- -- -- -- 97 %   05/25/22 0730 116/89 -- 70 13 97 %   05/25/22 0715 (!) 160/72 99 °F (37.2 °C) 62 26 96 %   05/25/22 0700 (!) 147/99 -- 67 21 94 %   05/25/22 0600 (!) 143/55 -- 69 18 95 %        Intake/Output Summary (Last 24 hours) at 5/25/2022 0950  Last data filed at 5/25/2022 0900  Gross per 24 hour   Intake 2394.82 ml   Output 1631.8 ml   Net 763.02 ml        Physical Examination:    Physical Exam  Constitutional:       General: She is not in acute distress. Appearance: She is ill-appearing. Comments: Altered mental status. HENT:      Head: Normocephalic and atraumatic. Mouth/Throat:      Mouth: Mucous membranes are moist.   Eyes:      Extraocular Movements: Extraocular movements intact. Conjunctiva/sclera: Conjunctivae normal.   Cardiovascular:      Rate and Rhythm: Normal rate and regular rhythm. Pulmonary:      Effort: Pulmonary effort is normal. No respiratory distress. Breath sounds: Normal breath sounds. Abdominal:      General: Abdomen is flat. There is no distension. Palpations: Abdomen is soft. Tenderness: There is no abdominal tenderness. There is no guarding. Musculoskeletal:      Cervical back: Normal range of motion and neck supple. Neurological:      Mental Status: She is alert. Comments: Alert and awake. Labs:   Labs and imaging reviewed.       Medications:     Current Facility-Administered Medications   Medication Dose Route Frequency    0.45% sodium chloride infusion  100 mL/hr IntraVENous CONTINUOUS    potassium phosphate 20 mmol in 0.9% sodium chloride 250 mL infusion IntraVENous ONCE    insulin NPH (NOVOLIN N, HUMULIN N) injection 14 Units  14 Units SubCUTAneous BID    bicarbonate dialysis (PRISMASOL) BG K 4/Ca 2.5 5000 ml solution   Extracorporeal DIALYSIS CONTINUOUS    hydrocortisone Sod Succ (PF) (SOLU-CORTEF) injection 50 mg  50 mg IntraVENous Q6H    insulin lispro (HUMALOG) injection   SubCUTAneous Q6H    glucose chewable tablet 16 g  4 Tablet Oral PRN    glucagon (GLUCAGEN) injection 1 mg  1 mg IntraMUSCular PRN    dextrose 10% infusion 0-250 mL  0-250 mL IntraVENous PRN    vancomycin (VANCOCIN) 1500 mg in  ml infusion  1,500 mg IntraVENous Q24H    piperacillin-tazobactam (ZOSYN) 3.375 g in 0.9% sodium chloride (MBP/ADV) 100 mL MBP  3.375 g IntraVENous Q8H    sodium chloride (NS) flush 5-40 mL  5-40 mL IntraVENous Q8H    sodium chloride (NS) flush 5-40 mL  5-40 mL IntraVENous PRN    acetaminophen (TYLENOL) tablet 650 mg  650 mg Oral Q6H PRN    Or    acetaminophen (TYLENOL) suppository 650 mg  650 mg Rectal Q6H PRN    polyethylene glycol (MIRALAX) packet 17 g  17 g Oral DAILY PRN    ondansetron (ZOFRAN ODT) tablet 4 mg  4 mg Oral Q8H PRN    Or    ondansetron (ZOFRAN) injection 4 mg  4 mg IntraVENous Q6H PRN    heparin (porcine) injection 5,000 Units  5,000 Units SubCUTAneous Q8H    vasopressin (VASOSTRICT) 20 Units in 0.9% sodium chloride 100 mL infusion  0-0.04 Units/min IntraVENous CONTINUOUS    balsam peru-castor oiL (VENELEX) ointment   Topical BID    alcohol 62% (NOZIN) nasal  1 Ampule  1 Ampule Topical Q12H    balsam peru-castor oiL (VENELEX) ointment   Topical BID    famotidine (PF) (PEPCID) 20 mg in 0.9% sodium chloride 10 mL injection  20 mg IntraVENous Q24H    Vancomycin - pharmacy to dose   Other Rx Dosing/Monitoring    NOREPINephrine (LEVOPHED) 32,000 mcg in 0.9% sodium chloride 250 mL (128 mcg/mL) infusion  0.5-50 mcg/min IntraVENous TITRATE ______________________________________________________________________  Ingrid Borden LENGTH OF STAY: 4d 19h  ACTUAL LENGTH OF STAY:          Harriet Huff MD   Pulmonary/[facility]  [address]  [phone]  5/25/2022

## 2022-05-25 NOTE — PROGRESS NOTES
Palliative Medicine Consult  [place]: [phone] ([phone])    Patient Name: Jones Root  YOB: 1934    Date of Initial Consult: 5/23/22  Reason for Consult: Care Decisions  Requesting Provider: Karlie Garcia MD  Primary Care Physician: Bibiana Toribio MD     SUMMARY:   Jones Root is a 80 y.o. with a past history of HTN, IDDM, and HLD, who was admitted on 5/23/2022 from home with a diagnosis of septic shock. She had been feeling sick and lethargic for about 4 days, and her granddaughter had called their PCP for an appointment, but when she doing her minimally responsive on 5/23 she called EMD.  On presentation to the ED she was found to be hypotensive and bradycardic- and was admitted to the ICU     Functional baseline:  Ms Brii Santiago is very independent at baseline. She moves around the house with a rollator, prefers to stay inside and never goes out. She makes simple meals for herself for breakfast and lunch, and her granddaughter cooks dinner. She does her own ADLs- bathing in the sink and dresses herself. Her granddaughter sets up a pill box, but she takes her own meds, and doses her own insulin. She spends a lot of time in her lift-recliner, getting up to get food or use the restroom. Unfortunately her granddaughter was away for a month as she herself was hospitalized. She had her sister checking in with her grandmother but as her sister was not used to her grandmothers medical issue, or routine, I wonder if this is when she developed her DTI's. Granddaughter was not aware of them as her grandmother bathes herself.     She also shared with me that she checked her grandmothers room last night, and her insulin was not being stored in the fridge, so there is a question of whether it has lost its effectiveness    5/24:  More awake today, interacting with family, but when not being engaged directly she falls asleep  5/25:  More interactive with me today, but when not directly engaging she stops paying attention, but she stayed awake this entire visit     PALLIATIVE DIAGNOSES:   1. Goals of care  2. Lethargy  3. Advanced Care Planning  4. Palliative     PLAN:   1. Met with Mrs Arlene Gonzalez and her granddaughter again today- Ms Arlene Gonzalez is more awake and alert, and talked to me some, but didn't actually participate in the conversation unless asked a direct question  2. I talked with Mary Griffiths about her grandmother, and what we are seeing in her labs and the cardiac monitor. EP has been consulted, so more to come on that front  3. I encouraged her to think more about the DNR order- her grandmother had a very impressive pause last night, and she is quite frail, and still has a lot going on so is not \"out of the flores\" at all. Mary Griffiths knows that invasive interventions and procedures are not guaranteed given her age, frailty and comorbidity  4. Mary Griffiths is working through all of this, she told me she is thinking about it, and is also making some plans to get extra help at home because she knows that her grandmother will no longer be independent if she is able to leave the hospital  5. Will continue to follow for support, possibly to do an AMD if she is able, and will have our  follow for caregiver support as well  6. Initial consult note routed to primary continuity provider and/or primary health care team members  7.  Communicated plan of care with: Palliative [facility] Team     GOALS OF CARE / TREATMENT PREFERENCES:     GOALS OF CARE:  Patient/Health Care Proxy Stated Goals: Prolong life    TREATMENT PREFERENCES:   Code Status: Full Code    Advance Care Planning:  [] The [facility] Interdisciplinary Team has updated the ACP Navigator with Health Care Decision Maker and Patient Capacity      Advance Care Planning 5/25/2022   Confirm Advance Directive None       Medical Interventions: Full interventions       Other:    As far as possible, the palliative care team has discussed with patient / health care proxy about goals of care / treatment preferences for patient. HISTORY:     History obtained from: chart, granddaughter    CHIEF COMPLAINT: none    HPI/SUBJECTIVE:    The patient is:   [] Verbal and participatory  [x] Non-participatory due to: limited interaction, but better than yesterday    Clinical Pain Assessment (nonverbal scale for severity on nonverbal patients):   Clinical Pain Assessment  Severity: 0     Activity (Movement): Lying quietly, normal position    Duration: for how long has pt been experiencing pain (e.g., 2 days, 1 month, years)  Frequency: how often pain is an issue (e.g., several times per day, once every few days, constant)     FUNCTIONAL ASSESSMENT:     Palliative Performance Scale (PPS):  PPS: 30       PSYCHOSOCIAL/SPIRITUAL SCREENING:     Palliative IDT has assessed this patient for cultural preferences / practices and a referral made as appropriate to needs (Cultural Services, Patient Advocacy, Ethics, etc.)    Any spiritual / Advent concerns:  [] Yes /  [x] No   If \"Yes\" to discuss with pastoral care during IDT     Does caregiver feel burdened by caring for their loved one:   [] Yes /  [x] No /  [] No Caregiver Present    If \"Yes\" to discuss with social work during IDT    Anticipatory grief assessment:   [x] Normal  / [] Maladaptive     If \"Maladaptive\" to discuss with social work during IDT    ESAS Anxiety: Anxiety: 0    ESAS Depression: Depression: 0        REVIEW OF SYSTEMS:     Positive and pertinent negative findings in ROS are noted above in HPI. The following systems were [x] reviewed / [] unable to be reviewed as noted in HPI  Other findings are noted below. Systems: constitutional, ears/nose/mouth/throat, respiratory, gastrointestinal, genitourinary, musculoskeletal, integumentary, neurologic, psychiatric, endocrine. Positive findings noted below.   Modified ESAS Completed by: provider   Fatigue: 6     Depression: 0 Pain: 0   Anxiety: 0 Nausea: 0   Anorexia: 0 Dyspnea: 0           Stool Occurrence(s): 1        PHYSICAL EXAM:     From RN flowsheet:  Wt Readings from Last 3 Encounters:   05/24/22 196 lb (88.9 kg)     Blood pressure (!) 129/48, pulse 65, temperature 98.5 °F (36.9 °C), resp. rate 27, height 5' 6\" (1.676 m), weight 196 lb (88.9 kg), SpO2 97 %. Pain Scale 1: Numeric (0 - 10)  Pain Intensity 1: 0                 Last bowel movement, if known:     Constitutional: elderly woman, doesn't appear particularly sick, more interactive today  Eyes: pupils equal, anicteric  ENMT: no nasal discharge, moist mucous membranes  Cardiovascular: regular rhythm, distal pulses intact  Respiratory: breathing not labored, symmetric  Gastrointestinal: soft non-tender, +bowel sounds  Musculoskeletal: no deformity, no tenderness to palpation  Skin: warm, dry  Neurologic: following commands, moving all extremities  Other:       HISTORY:     Active Problems:    DKA (diabetic ketoacidosis) ([facility] (5/23/2022)      Goals of care, counseling/discussion ()      Lethargy ()      Advanced care planning/counseling discussion ()      Palliative care by specialist ()      Past Medical History:   Diagnosis Date    Diabetes ([facility]       History reviewed. No pertinent surgical history. History reviewed. No pertinent family history. History reviewed, no pertinent family history.   Social History     Tobacco Use    Smoking status: Never Smoker    Smokeless tobacco: Never Used   Substance Use Topics    Alcohol use: Not Currently     No Known Allergies   Current Facility-Administered Medications   Medication Dose Route Frequency    0.45% sodium chloride infusion  100 mL/hr IntraVENous CONTINUOUS    potassium phosphate 20 mmol in 0.9% sodium chloride 250 mL infusion   IntraVENous ONCE    insulin NPH (NOVOLIN N, HUMULIN N) injection 14 Units  14 Units SubCUTAneous BID    [START ON 5/26/2022] Vancomycin - trough before 1400 dose  1 Each Other ONCE    bicarbonate dialysis (PRISMASOL) BG K 4/Ca 2.5 5000 ml solution   Extracorporeal DIALYSIS CONTINUOUS    hydrocortisone Sod Succ (PF) (SOLU-CORTEF) injection 50 mg  50 mg IntraVENous Q6H    insulin lispro (HUMALOG) injection   SubCUTAneous Q6H    glucose chewable tablet 16 g  4 Tablet Oral PRN    glucagon (GLUCAGEN) injection 1 mg  1 mg IntraMUSCular PRN    dextrose 10% infusion 0-250 mL  0-250 mL IntraVENous PRN    vancomycin (VANCOCIN) 1500 mg in  ml infusion  1,500 mg IntraVENous Q24H    piperacillin-tazobactam (ZOSYN) 3.375 g in 0.9% sodium chloride (MBP/ADV) 100 mL MBP  3.375 g IntraVENous Q8H    sodium chloride (NS) flush 5-40 mL  5-40 mL IntraVENous Q8H    sodium chloride (NS) flush 5-40 mL  5-40 mL IntraVENous PRN    acetaminophen (TYLENOL) tablet 650 mg  650 mg Oral Q6H PRN    Or    acetaminophen (TYLENOL) suppository 650 mg  650 mg Rectal Q6H PRN    polyethylene glycol (MIRALAX) packet 17 g  17 g Oral DAILY PRN    ondansetron (ZOFRAN ODT) tablet 4 mg  4 mg Oral Q8H PRN    Or    ondansetron (ZOFRAN) injection 4 mg  4 mg IntraVENous Q6H PRN    heparin (porcine) injection 5,000 Units  5,000 Units SubCUTAneous Q8H    vasopressin (VASOSTRICT) 20 Units in 0.9% sodium chloride 100 mL infusion  0-0.04 Units/min IntraVENous CONTINUOUS    balsam peru-castor oiL (VENELEX) ointment   Topical BID    alcohol 62% (NOZIN) nasal  1 Ampule  1 Ampule Topical Q12H    balsam peru-castor oiL (VENELEX) ointment   Topical BID    famotidine (PF) (PEPCID) 20 mg in 0.9% sodium chloride 10 mL injection  20 mg IntraVENous Q24H    Vancomycin - pharmacy to dose   Other Rx Dosing/Monitoring    NOREPINephrine (LEVOPHED) 32,000 mcg in 0.9% sodium chloride 250 mL (128 mcg/mL) infusion  0.5-50 mcg/min IntraVENous TITRATE          LAB AND IMAGING FINDINGS:     Lab Results   Component Value Date/Time    WBC 11.9 (H) 05/25/2022 01:11 PM    HGB 9.1 (L) 05/25/2022 01:11 PM    PLATELET 72 (L) 83/99/2457 01:11 PM     Lab Results Component Value Date/Time    Sodium 138 05/25/2022 01:11 PM    Potassium 4.8 05/25/2022 01:11 PM    Chloride 107 05/25/2022 01:11 PM    CO2 22 05/25/2022 01:11 PM    BUN 30 (H) 05/25/2022 01:11 PM    Creatinine 2.31 (H) 05/25/2022 01:11 PM    Calcium 7.7 (L) 05/25/2022 01:11 PM    Magnesium 2.7 (H) 05/25/2022 01:11 PM    Phosphorus 3.9 05/25/2022 01:11 PM      Lab Results   Component Value Date/Time    Alk. phosphatase 50 05/25/2022 04:08 AM    Protein, total 3.7 (L) 05/25/2022 04:08 AM    Albumin 2.0 (L) 05/25/2022 01:11 PM    Globulin 2.3 05/25/2022 04:08 AM     Lab Results   Component Value Date/Time    INR 1.1 05/23/2022 08:05 AM    Prothrombin time 11.3 (H) 05/23/2022 08:05 AM      No results found for: IRON, FE, TIBC, IBCT, PSAT, FERR   Lab Results   Component Value Date/Time    pH 7.20 (LL) 05/23/2022 02:59 PM    PCO2 34 (L) 05/23/2022 02:59 PM    PO2 107 (H) 05/23/2022 02:59 PM     No components found for: [place]   Lab Results   Component Value Date/Time    CK 21,940 ([place]) 05/25/2022 04:08 AM                Total time: 25m  Counseling / coordination time, spent as noted above: 20m  > 50% counseling / coordination?: y    Prolonged service was provided for  []30 min   []75 min in face to face time in the presence of the patient, spent as noted above. Time Start:   Time End:   Note: this can only be billed with 57041 (initial) or 65223 (follow up). If multiple start / stop times, list each separately.

## 2022-05-25 NOTE — PROGRESS NOTES
Problem: Dysphagia (Adult)  Goal: *Acute Goals and Plan of Care (Insert Text)  Description: 5/25/2022  Speech path goals  1. Patient will tolerate full liquids with no overt s/s of aspiration. 2. Patient will tolerate diet upgrade with no overt s/s of aspiration. Outcome: Not Met   SPEECH LANGUAGE PATHOLOGY BEDSIDE SWALLOW EVALUATION  Patient: Brenda Bonilla (75 y.o. female)  Date: 5/25/2022  Primary Diagnosis: DKA (diabetic ketoacidosis) ([facility] [E11.10]        Precautions: aspiration. ASSESSMENT :  Based on the objective data described below, the patient presents with mild to mod oropharyngeal dysphagia. She accepted the spoon and straw well. Slow, discoordinated with manipulation and propulsion of purees noted. Good oral clearance. Pharyngeal phase is characterized by mild swallow delay and occasionally two swallows. Cannot rule out pharyngeal residue. No overt s/s of aspiration. Due to her confusion she is at risk to aspirate. She is on [facility] with good sats and RR. OX2. Her tongue is white but does not look like thrush. Please check this. Very few teeth which are in poor condition. No dentures. She is normally independent with basic needs but was found down. Now on CVVH. Full code. Patient will benefit from skilled intervention to address the above impairments. Patients rehabilitation potential is considered to be Fair     PLAN :  Recommendations and Planned Interventions:  Full liquids due to confusion   Will upgrade as possible. Frequency/Duration: Patient will be followed by speech-language pathology 3 times a week to address goals. Discharge Recommendations: To Be Determined     SUBJECTIVE:   Patient stated some hospital ... [facility]. OBJECTIVE:     Past Medical History:   Diagnosis Date    [facility]    History reviewed. No pertinent surgical history.   Prior Level of Function/Home Situation: lived with her daughter but independent      Diet prior to admission: reg/thins  Current Diet:  NPO   Cognitive and Communication Status:  Neurologic State: Alert  Orientation Level: Oriented to person,Oriented to place,Disoriented to situation,Disoriented to time  Cognition: Follows commands  Perception: Appears intact  Perseveration: No perseveration noted     Oral Assessment:  Oral Assessment  Labial: No impairment  Dentition: Natural;Limited;Poor (just a few teeth.)  Lingual: No impairment (coated white)  Mandible: No impairment  P.O. Trials:  Patient Position: upright in bed  Vocal quality prior to P.O.: No impairment  Consistency Presented: Thin liquid;Puree  How Presented: Self-fed/presented;Straw     Bolus Acceptance: No impairment  Bolus Formation/Control: Impaired  Type of Impairment: Delayed  Propulsion: Delayed (# of seconds); Discoordination  Oral Residue: None  Initiation of Swallow: Delayed (# of seconds)  Laryngeal Elevation: Functional  Aspiration Signs/Symptoms: None                Oral Phase Severity: Mild-moderate  Pharyngeal Phase Severity : Mild    NOMS:   The NOMS functional outcome measure was used to quantify this patient's level of swallowing impairment. Based on the NOMS, the patient was determined to be at level 4 for swallow function       NOMS Swallowing Levels:  Level 1 (CN): NPO  Level 2 (CM): NPO but takes consistency in therapy  Level 3 (CL): Takes less than 50% of nutrition p.o. and continues with nonoral feedings; and/or safe with mod cues; and/or max diet restriction  Level 4 (CK): Safe swallow but needs mod cues; and/or mod diet restriction; and/or still requires some nonoral feeding/supplements  Level 5 (CJ): Safe swallow with min diet restriction; and/or needs min cues  Level 6 (CI): Independent with p.o.; rare cues; usually self cues; may need to avoid some foods or needs extra time  Level 7 ([address] [place] [address]): Independent for all p.o.  MAG. (2003). National Outcomes Measurement System (NOMS): Adult Speech-Language Pathology User's Guide.        Pain:  Pain Scale 1: Numeric (0 - 10)  Pain Intensity 1: 0       After treatment:   Patient left in no apparent distress in bed    COMMUNICATION/EDUCATION:   Patient was educated regarding her deficit(s) of dysphagia but she will not comprehend or retain. The patient's plan of care including recommendations, planned interventions, and recommended diet changes were discussed with: Registered nurse. Patient is unable to participate in goal setting and plan of care.     Thank you for this referral.  MERE Maloney  Time Calculation: 15 mins

## 2022-05-25 NOTE — DIALYSIS
[facility] / [phone]         Orders   Mode: CVVH restarted @ 0405   Blood Flow Rate: 180 ml/min   Prismasol Dose: 25 ml/kg/hr x 89 kg = 2225ml/hr, Rounded to 2200 ml/hr (PBP: 1100 ml/hr, Replacement: 1100 ml/hr)   Prismasol Concentrate: 4K/2.5Ca   Blood Warmer Temp: 37*C   Net Fluid Removal: 0 ml/hr           Metrics   BP: 94   HR: 141/71   Access Pressure: -65   Filter Pressure: 165   Return Pressure: 83   TMP: 44   Pressure Drop: 41          Access   Type & Location: R femoral CVC   Comments: Dressing CDI dated 5/24/22. +aspiration/+flush x2 ports                                            Labs   HBsAg (Antigen) / date: Negative 5/24/22         HBsAb (Antibody) / date: Susceptible 5/24/22   Source: [facility]          Safety:   Time Out Done:   (Time) 0400   Consent obtained/signed: Verified   Education: CVC infection prevention & control. Primary Nurse Rpt Pre: HARDIK Rascon RN   Primary Nurse Rpt Post: HARDIK Rascon RN      Comments / Plan:   Filter changed secondary to transfer to specialty bed. All possible blood (186 ml) returned by primary RN. Consents, patient, code status, labs and orders verified. Old set discarded in red bio-hazard bag. New [id number] filter set-up, primed w/1L NS, tested & running well at this time. R femoral CVC, dressing CDI with bio-patch dated 5/24/22. No signs of redness, drainage, or infection visualized. Each catheter limb disinfected for 60 seconds per limb with alcohol swabs. Caps removed, dialysis CVC hub scrubbed with Prevantics for 15 seconds, followed by a 5 second dry time per Hospital P&P. +aspiration/+flush x 2 ports with no resistance. Lines visible and connections secure with blood warmer to return line at 37*C. Education & pre/post report given to primary RN.     Education with primary RN regarding keeping access visible/uncovered at all times.

## 2022-05-25 NOTE — PROGRESS NOTES
Spiritual Care Assessment/Progress Note  [facility]      NAME: Michelle Davis      MRN: [id number]  AGE: 80 y.o. SEX: female  Anglican Affiliation: [place]   Language: English     5/25/2022     Total Time (in minutes): 12     Spiritual Assessment begun in [facility] CRITICAL CARE 3 through conversation with:         []Patient        [] Family    [] Friend(s)        Reason for Consult: Palliative Care, Initial/Spiritual Assessment     Spiritual beliefs: (Please include comment if needed)     [] Identifies with a greta tradition:         [] Supported by a greta community:            [] Claims no spiritual orientation:           [] Seeking spiritual identity:                [] Adheres to an individual form of spirituality:           [x] Not able to assess:                           Identified resources for coping:      [] Prayer                               [] Music                  [] Guided Imagery     [] Family/friends                 [] Pet visits     [] Devotional reading                         [x] Unknown     [] Other:                                          Interventions offered during this visit: (See comments for more details)    Patient Interventions: Initial/Spiritual assessment, Critical care           Plan of Care:     [x] Support spiritual and/or cultural needs    [] Support AMD and/or advance care planning process      [] Support grieving process   [] Coordinate Rites and/or Rituals    [] Coordination with community clergy   [] No spiritual needs identified at this time   [] Detailed Plan of Care below (See Comments)  [] Make referral to Music Therapy  [] Make referral to Pet Therapy     [] Make referral to Addiction services  [] Make referral to [facility]  [] Make referral to Spiritual Care Partner  [] No future visits requested        [x] Contact Spiritual Care for further referrals     Comments:  Reviewed chart prior to visit on CCU for spiritual assessment.   No family/friends present. Patient appeared to be sleeping soundly. Unable to assess spiritual needs or concerns at this time.    available upon referral by nurse or by family request.       CATHIE Alvarez, [address], Staff   [facility] Paging Service  [phone])

## 2022-05-25 NOTE — PROGRESS NOTES
EP/ ARRHYTHMIA    Patient ID:  Patient: Franky Goodwin  MRN: [id number]  Age: 80 y.o.  : 1934    Date of  Admission: 2022  7:44 AM   PCP:  Jose Watts MD    Assessment: 1. Profound sinus bradycardia 20-30's in the setting of DKA, hyperkalemia, and OP beta-blocker. This has resolved. 2. Paroxysmal atrial fibrillation with post-conversion pauses up to 6 seconds. 3. 2nd degree AV block with single dropped PAC's, RBBB with normal CT in sinus rhythm. 4. Hypotension likely due to dehydration/volume depletion, metabolic acidosis, bradycardia. Improved with volume resuscitation, pressor. 5. No evidence of acute ischemia or infarct by presenting ECG. Echo with normal LV and RV systolic function. 6. GLORIA atop CKD stage 3 at baseline. Rhabdomyolysis. 7. Diabetes mellitus type 2 on chronic insulin complicated by DKA. 8. Elevated lipase possibly due to DKA rather than acute pancreatitis. 9. Metabolic encephalopathy, improving. 10. Anemia. 11. Thrombocytopenia. 12. Full code. Plan:     1. I'd tolerate the post-conversion pauses while she's in bed. No risk of injury. 2. She's on DVT prophylaxis at this time. I'd avoid FULL anticoagulation if possible given her anemia and thrombocytopenia as long as Afib episodes are brief, self-limited. 3. Agree with correcting electrolyte and acid-base disarray. She is on CVVH. Discussed with nursing. [x]       High complexity decision making was performed in this patient at high risk for decompensation with multiple organ involvement. Franky Goodwin is a 80 y.o. female with a history of progressive weakness, fatigue, increasing urination and thirst.  Found with altered mental status by family. Brought in by EMS, transcutaneous pacing. Labs showed pH 6.99,  K 6.9--ER has treated this, reassessment pending. She could not relay a history. Granddaughter present in the room at the time. Admitted to ICU.   Started on pressors, CVVH. Adjustments made to her regimen thereafter. Currently denies chest pain, dizziness, palpitations. She is not SOB at rest.    Echo:  Left Ventricle Normal left ventricular systolic function with a visually estimated EF of 55 - 60%. Left ventricle size is normal. Increased wall thickness. Normal wall motion. Indeterminate diastolic function. Left Atrium Left atrium size is normal.   Right Ventricle Right ventricle is mildly dilated. Normal systolic function. Right Atrium Right atrium size is normal.   Aortic Valve Valve structure is normal. Calcified cusp. Trace regurgitation. No stenosis. Mitral Valve Thickened leaflet. Trace regurgitation. No stenosis noted. Tricuspid Valve Valve structure is normal. Mild to moderate regurgitation. No stenosis noted. Pulmonic Valve Valve structure is normal. Trace regurgitation. No stenosis noted. Aorta Normal sized aorta. Pericardium Trivial pericardial effusion present. No indication of cardiac tamponade.      No Known Allergies       Current Facility-Administered Medications   Medication Dose Route Frequency    0.45% sodium chloride infusion  100 mL/hr IntraVENous CONTINUOUS    insulin NPH (NOVOLIN N, HUMULIN N) injection 14 Units  14 Units SubCUTAneous BID    [START ON 5/26/2022] Vancomycin - trough before 1400 dose  1 Each Other ONCE    bicarbonate dialysis (PRISMASOL) BG K 4/Ca 2.5 5000 ml solution   Extracorporeal DIALYSIS CONTINUOUS    hydrocortisone Sod Succ (PF) (SOLU-CORTEF) injection 50 mg  50 mg IntraVENous Q6H    insulin lispro (HUMALOG) injection   SubCUTAneous Q6H    glucose chewable tablet 16 g  4 Tablet Oral PRN    glucagon (GLUCAGEN) injection 1 mg  1 mg IntraMUSCular PRN    dextrose 10% infusion 0-250 mL  0-250 mL IntraVENous PRN    vancomycin (VANCOCIN) 1500 mg in  ml infusion  1,500 mg IntraVENous Q24H    piperacillin-tazobactam (ZOSYN) 3.375 g in 0.9% sodium chloride (MBP/ADV) 100 mL MBP  3.375 g IntraVENous Q8H    sodium chloride (NS) flush 5-40 mL  5-40 mL IntraVENous Q8H    sodium chloride (NS) flush 5-40 mL  5-40 mL IntraVENous PRN    acetaminophen (TYLENOL) tablet 650 mg  650 mg Oral Q6H PRN    Or    acetaminophen (TYLENOL) suppository 650 mg  650 mg Rectal Q6H PRN    polyethylene glycol (MIRALAX) packet 17 g  17 g Oral DAILY PRN    ondansetron (ZOFRAN ODT) tablet 4 mg  4 mg Oral Q8H PRN    Or    ondansetron (ZOFRAN) injection 4 mg  4 mg IntraVENous Q6H PRN    heparin (porcine) injection 5,000 Units  5,000 Units SubCUTAneous Q8H    vasopressin (VASOSTRICT) 20 Units in 0.9% sodium chloride 100 mL infusion  0-0.04 Units/min IntraVENous CONTINUOUS    balsam peru-castor oiL (VENELEX) ointment   Topical BID    alcohol 62% (NOZIN) nasal  1 Ampule  1 Ampule Topical Q12H    balsam peru-castor oiL (VENELEX) ointment   Topical BID    famotidine (PF) (PEPCID) 20 mg in 0.9% sodium chloride 10 mL injection  20 mg IntraVENous Q24H    Vancomycin - pharmacy to dose   Other Rx Dosing/Monitoring    NOREPINephrine (LEVOPHED) 32,000 mcg in 0.9% sodium chloride 250 mL (128 mcg/mL) infusion  0.5-50 mcg/min IntraVENous TITRATE       Review of Symptoms:  See HPI. She cannot give a history.        Objective:      Physical Exam:  Temp (24hrs), Av.4 °F (36.9 °C), Min:97.4 °F (36.3 °C), Max:99 °F (37.2 °C)    Patient Vitals for the past 8 hrs:   Pulse   22 1800 (!) 59   22 1700 62   22 1601 67   22 1500 65   22 1400 65   22 1330 65   22 1300 61   22 1215 93   22 1200 91   22 1130 (!) 104   22 1100 95   22 1015 65    Patient Vitals for the past 8 hrs:   Resp   22 1800 20   22 1700 23   22 1601 25   22 1500 29   22 1400 27   22 1330 19   22 1300 23   22 1215 28   22 1200 23   22 1130 24   22 1100 27   22 1015 26    Patient Vitals for the past 8 hrs:   BP   22 1800 121/61   05/25/22 1700 117/64   05/25/22 1601 (!) 119/55   05/25/22 1500 (!) 124/57   05/25/22 1400 (!) 129/48   05/25/22 1330 133/63   05/25/22 1300 (!) 97/42   05/25/22 1215 (!) 133/57   05/25/22 1200 (!) 81/50   05/25/22 1130 110/62   05/25/22 1100 (!) 107/50   05/25/22 1015 (!) 130/51          Intake/Output Summary (Last 24 hours) at 5/25/2022 1806  Last data filed at 5/25/2022 1700  Gross per 24 hour   Intake 1684.43 ml   Output 1651.8 ml   Net 32.63 ml       Nondiaphoretic, elderly female. No scleral icterus, mucous membranes moist, conjuctivae pink, no xanthelasma. Unlabored but shallow respirations, clear to auscultation bilaterally anteriorly, symmetric air movement. REGULAR rhythm, no murmur, pericardial rub, knock, or gallop. No JVD or peripheral edema. Abdomen, soft, nontender, nondistended. Extremities without cyanosis or clubbing.  +venostasis changes. Muscle tone and bulk normal for age. Skin warm and dry. No rashes or ulcers. Neuro grossly nonfocal.  No tremor. CARDIOGRAPHICS and STUDIES, I reviewed:    Telemetry:  Sinus rhythm 60's currently. Earlier has paroxysmal Afib and a post-conversion pause of 6 seconds at 3:21 am.    ECG in ER:  Profound sinus bradycardia with normal MA interval, RBBB, QTc normal accounting for profound bradycardia. No acute ischemia or infarct. Follow-up ECG 5/25 shows sinus rhythm with normal MA, RBBB with normal axis. There are documented post-conversion pauses from Afib.        Labs:  Recent Labs     05/25/22  1311 05/25/22  0408 05/24/22  1552   CPK 22,325* 21,940* 15,690*     No results found for: CHOL, CHOLX, CHLST, CHOLV, HDL, HDLP, LDL, LDLC, DLDLP, Prema Jenny, CHHD, CHHDX  Recent Labs     05/23/22  0805   INR 1.1   PTP 11.3*      Recent Labs     05/25/22  1311 05/25/22  0408 05/24/22  2211 05/24/22  1552 05/24/22  1552    146* 140   < > 141  142   K 4.8 3.5 4.7   < > 4.2  4.2    119* 109*   < > 110*  110*   CO2 22 16* 22   < > 23 23   BUN 30* 28* 40*   < > 51*  51*   CREA 2.31* 1.81* 2.81*   < > 3.23*  3.25*   * 173* 221*   < > 205*  206*   PHOS 3.9 2.3* 3.2   < > 2.9  2.9   CA 7.7* 5.3* 6.9*   < > 6.8*  6.7*   ALB 2.0* 1.4* 2.0*   < > 2.1*   WBC 11.9* 13.3*  --   --  13.0*   HGB 9.1* 9.4*  --   --  10.0*   HCT 28.0* 29.1*  --   --  30.7*   PLT 72* 73*  --   --  88*    < > = values in this interval not displayed. Recent Labs     05/25/22  1311 05/25/22  0408 05/24/22 2211 05/24/22  1552 05/24/22  0321 05/23/22  1626 05/23/22  1626 05/23/22  0805 05/23/22  0805   AP  --  50  --   --  57  --  70   < > 88   TP  --  3.7*  --   --   --   --  5.6*  --  7.6   ALB 2.0* 1.4* 2.0*   < > 2.1*   < > 2.4*   < > 3.3*   GLOB  --  2.3  --   --   --   --  3.2  --  4.3*   LPSE  --  1,958*  --   --   --   --   --   --  >3,000*    < > = values in this interval not displayed.      No components found for: [employer]     05/23/22  1459   PH 7.20*   PCO2 34*   PO2 107*           Estefany Nguyen MD  5/25/2022

## 2022-05-25 NOTE — PROGRESS NOTES
Physician Progress Note      Avinash Mcarthur  CSN #:                  [id number]  :                       1934  ADMIT DATE:       2022 7:44 AM  DISCH DATE:  RESPONDING  PROVIDER #:        Araceli Salgado MD          QUERY TEXT:    Patient admitted with Sepsis. Per Wound care , noted to also have Deep tissue injury pressure ulcers. If possible, please document in progress notes and discharge summary the location, present on admission status and stage of the pressure ulcer: The medical record reflects the following:  Risk Factors: 79 y/o female admitted for septic shock. found minimally responsive by family member, on arrival to ED found hypotensive and bradycardia. Hx:   HTN, IDDM, and HLD    Clinical Indicators:   WC PN  Wound POA Conditions  Wound Sacrum red/purple/non-blanchable area with skin breakdown and bleeding (Active)  Wound Etiology Deep Tissue/Injury 22 1056    Wound Heel Left DTI 22 (Active)  Wound Etiology Deep Tissue/Injury 22 1056    Treatment:    Left heel and R&L buttocks: apply Venelex ointment to clean dry skin. Float heels    Sacrum: BID, cleanse open skin with NS moist 4x4. Apply No Sting skin barrier wipes to tavo-wound skin where foam dressing needs to stick, cover open wound with a square of Alginate (maxorb) and secure with a foam dressing.     Specialty bed: Envella air fluidized bed rental from [facility]    Stage 1:  Non-blanchable erythema of intact skin  Stage 2:  Abrasion, Blister, Partial-thickness skin loss, with exposed dermis  Stage 3:  Full-thickness skin loss with damage or necrosis of subcutaneous tissue  Stage 4:  Full-thickness skin & soft tissue loss through to underlying muscle, tendon or bone  Unstageable: Obscured full-thickness skin & tissue loss  Options provided:  -- Deep tissue injury pressure ulcer of Sacrum and left Heel present on admission  -- Deep tissue injury pressure ulcer of Sacrum and left Heel not present on admission  -- Other - I will add my own diagnosis  -- Disagree - Not applicable / Not valid  -- Disagree - Clinically unable to determine / Unknown  -- Refer to Clinical Documentation Reviewer    PROVIDER RESPONSE TEXT:    This patient has a deep tissue injury pressure ulcer of the Sacrum and left Heel which was present on admission.     Query created by: Amparo Coates on 5/24/2022 3:40 PM      Electronically signed by:  Suraj King MD 5/25/2022 7:22 AM

## 2022-05-25 NOTE — PROGRESS NOTES
Nephrology Progress Note  Pb Zamora     www. [facility]  Phone - [phone]   Patient: Perry Albarado    YOB: 1934        Date- 5/25/2022   Admit Date: 5/23/2022  CC: Follow up for gloria, hyperkalemia         IMPRESSION & PLAN:    GLORIA   Hyperkalemia - resolved   DKA   Metabolic encephalopathy   Hypernatremia   Bradycardia   Shock- sepsis   UTI   acidosis    PLAN-   Continue CRRT   Factor positive for next 6 hours- 100 ml/hr   Restart ivf- HYPOTONIC IVF   Continue vasopressor support   Follow ck level   Hold ramipril, clonidine, atenolol, norvasc   She will need new jolene cath in IJ- she has femoral jolene since admission.  D/w ICU nurse     Subjective: Interval History:   -  She is anuric  k low  Phos low  Off ivf  Ck high > 21k  Na increased  Now more acidotic- recent lactic acid normal  Off insulin gtt      Objective:   Vitals:    05/25/22 0715 05/25/22 0730 05/25/22 0744 05/25/22 0800   BP: (!) 160/72 116/89  135/75   Pulse: 62 70  62   Resp: 26 13  21   Temp: 99 °F (37.2 °C)      SpO2: 96% 97% 97% 96%   Weight:       Height:          05/24 0701 - 05/25 0700  In: 6082.4 [I.V.:6082.4]  Out: 2003.8 [Urine:95]  Last 3 Recorded Weights in this Encounter    05/23/22 0836 05/23/22 0957 05/24/22 0727   Weight: 89.2 kg (196 lb 10.4 oz) 89.2 kg (196 lb 10.4 oz) 88.9 kg (196 lb)      Physical exam:    GEN:NAD, confused  NECK- central line,  no mass  RESP: No wheezing,clear b/l  CVS: S1,S2  RRR  NEURO: Can't access due to patient's current condition   EXT: + Edema   ; HERNANDEZ +  PSYCH: Can't access due to patient's current condition       Chart reviewed. Pertinent Notes reviewed.      Data Review :  Recent Labs     05/25/22  0408 05/24/22  2211 05/24/22  1552 05/24/22  0800 05/24/22  0800   * 140 141  142   < > 145   K 3.5 4.7 4.2  4.2   < > 3.7   * 109* 110*  110*   < > 113*   CO2 16* 22 23  23   < > 22   BUN 28* 40* 51*  51* < > 72*   CREA 1.81* 2.81* 3.23*  3.25*   < > 4.11*   * 221* 205*  206*   < > 89   CA 5.3* 6.9* 6.8*  6.7*   < > 6.6*   MG 1.9  --  2.6*  --  2.8*   PHOS 2.3* 3.2 2.9  2.9   < > 3.1    < > = values in this interval not displayed. Recent Labs     05/25/22  0408 05/24/22  1552 05/24/22  0321   WBC 13.3* 13.0* 6.0   HGB 9.4* 10.0* 10.0*   HCT 29.1* 30.7* 30.8*   PLT 73* 88* 78*     No results for input(s): FE, TIBC, PSAT, FERR in the last 72 hours.    Medication list  reviewed  Current Facility-Administered Medications   Medication Dose Route Frequency    calcium gluconate 2 g/100 mL sodium chloride (ISO-OSM)  2 g IntraVENous ONCE    0.45% sodium chloride infusion  100 mL/hr IntraVENous CONTINUOUS    potassium phosphate 20 mmol in 0.9% sodium chloride 250 mL infusion   IntraVENous ONCE    bicarbonate dialysis (PRISMASOL) BG K 4/Ca 2.5 5000 ml solution   Extracorporeal DIALYSIS CONTINUOUS    hydrocortisone Sod Succ (PF) (SOLU-CORTEF) injection 50 mg  50 mg IntraVENous Q6H    insulin NPH (NOVOLIN N, HUMULIN N) injection 10 Units  10 Units SubCUTAneous BID    insulin lispro (HUMALOG) injection   SubCUTAneous Q6H    glucose chewable tablet 16 g  4 Tablet Oral PRN    glucagon (GLUCAGEN) injection 1 mg  1 mg IntraMUSCular PRN    dextrose 10% infusion 0-250 mL  0-250 mL IntraVENous PRN    vancomycin (VANCOCIN) 1500 mg in  ml infusion  1,500 mg IntraVENous Q24H    piperacillin-tazobactam (ZOSYN) 3.375 g in 0.9% sodium chloride (MBP/ADV) 100 mL MBP  3.375 g IntraVENous Q8H    sodium chloride (NS) flush 5-40 mL  5-40 mL IntraVENous Q8H    sodium chloride (NS) flush 5-40 mL  5-40 mL IntraVENous PRN    acetaminophen (TYLENOL) tablet 650 mg  650 mg Oral Q6H PRN    Or    acetaminophen (TYLENOL) suppository 650 mg  650 mg Rectal Q6H PRN    polyethylene glycol (MIRALAX) packet 17 g  17 g Oral DAILY PRN    ondansetron (ZOFRAN ODT) tablet 4 mg  4 mg Oral Q8H PRN    Or    ondansetron (ZOFRAN) injection 4 mg  4 mg IntraVENous Q6H PRN    heparin (porcine) injection 5,000 Units  5,000 Units SubCUTAneous Q8H    vasopressin (VASOSTRICT) 20 Units in 0.9% sodium chloride 100 mL infusion  0-0.04 Units/min IntraVENous CONTINUOUS    balsam peru-castor oiL (VENELEX) ointment   Topical BID    alcohol 62% (NOZIN) nasal  1 Ampule  1 Ampule Topical Q12H    balsam peru-castor oiL (VENELEX) ointment   Topical BID    famotidine (PF) (PEPCID) 20 mg in 0.9% sodium chloride 10 mL injection  20 mg IntraVENous Q24H    Vancomycin - pharmacy to dose   Other Rx Dosing/Monitoring    NOREPINephrine (LEVOPHED) 32,000 mcg in 0.9% sodium chloride 250 mL (128 mcg/mL) infusion  0.5-50 mcg/min IntraVENous TITRATE          Elfida Plough, [address] [facility]  [facility] / [facility] [address]  [place], [address]  Phone - [phone]               Fax - [phone]

## 2022-05-25 NOTE — PROGRESS NOTES
Problem: Breathing Pattern - Ineffective  Goal: *Absence of hypoxia  Outcome: Progressing Towards Goal     Problem: Patient Education: Go to Patient Education Activity  Goal: Patient/Family Education  Outcome: Progressing Towards Goal     Problem: Pressure Injury - Risk of  Goal: *Prevention of pressure injury  Description: Document Markos Scale and appropriate interventions in the flowsheet. Outcome: Progressing Towards Goal  Note: Pressure Injury Interventions:  Sensory Interventions: Assess changes in LOC,Assess need for specialty bed,Check visual cues for pain,Discuss PT/OT consult with provider,Float heels,Keep linens dry and wrinkle-free,Maintain/enhance activity level,Minimize linen layers,Pressure redistribution bed/mattress (bed type),Turn and reposition approx. every two hours (pillows and wedges if needed)    Moisture Interventions: Absorbent underpads,Apply protective barrier, creams and emollients,Assess need for specialty bed,Check for incontinence Q2 hours and as needed,Internal/External urinary devices,Maintain skin hydration (lotion/cream),Minimize layers,Moisture barrier,Offer toileting Q_hr    Activity Interventions: Assess need for specialty bed,Pressure redistribution bed/mattress(bed type),PT/OT evaluation    Mobility Interventions: Assess need for specialty bed,Float heels,HOB 30 degrees or less,Pressure redistribution bed/mattress (bed type),PT/OT evaluation,Turn and reposition approx.  every two hours(pillow and wedges)    Nutrition Interventions: Document food/fluid/supplement intake,Discuss nutritional consult with provider    Friction and Shear Interventions: Apply protective barrier, creams and emollients,Feet elevated on foot rest,HOB 30 degrees or less,Lift sheet,Lift team/patient mobility team,Minimize layers                Problem: Patient Education: Go to Patient Education Activity  Goal: Patient/Family Education  Outcome: Progressing Towards Goal     Problem: Diabetes Self-Management  Goal: *Disease process and treatment process  Description: Define diabetes and identify own type of diabetes; list 3 options for treating diabetes. Outcome: Progressing Towards Goal  Goal: *Incorporating nutritional management into lifestyle  Description: Describe effect of type, amount and timing of food on blood glucose; list 3 methods for planning meals. Outcome: Progressing Towards Goal  Goal: *Incorporating physical activity into lifestyle  Description: State effect of exercise on blood glucose levels. Outcome: Progressing Towards Goal  Goal: *Developing strategies to promote health/change behavior  Description: Define the ABC's of diabetes; identify appropriate screenings, schedule and personal plan for screenings. Outcome: Progressing Towards Goal  Goal: *Using medications safely  Description: State effect of diabetes medications on diabetes; name diabetes medication taking, action and side effects. Outcome: Progressing Towards Goal  Goal: *Monitoring blood glucose, interpreting and using results  Description: Identify recommended blood glucose targets  and personal targets. Outcome: Progressing Towards Goal  Goal: *Prevention, detection, treatment of acute complications  Description: List symptoms of hyper- and hypoglycemia; describe how to treat low blood sugar and actions for lowering  high blood glucose level. Outcome: Progressing Towards Goal  Goal: *Prevention, detection and treatment of chronic complications  Description: Define the natural course of diabetes and describe the relationship of blood glucose levels to long term complications of diabetes.   Outcome: Progressing Towards Goal  Goal: *Developing strategies to address psychosocial issues  Description: Describe feelings about living with diabetes; identify support needed and support network  Outcome: Progressing Towards Goal     Problem: Patient Education: Go to Patient Education Activity  Goal: Patient/Family Education  Outcome: Progressing Towards Goal     Problem: Patient Education: Go to Patient Education Activity  Goal: Patient/Family Education  Outcome: Progressing Towards Goal     Problem: DKA: Day 2  Goal: Activity/Safety  Outcome: Progressing Towards Goal  Goal: Consults, if ordered  Outcome: Progressing Towards Goal  Goal: Diagnostic Test/Procedures  Outcome: Progressing Towards Goal  Goal: Nutrition/Diet  Outcome: Progressing Towards Goal  Goal: Discharge Planning  Outcome: Progressing Towards Goal  Goal: Medications  Outcome: Progressing Towards Goal  Goal: Respiratory  Outcome: Progressing Towards Goal  Goal: Treatments/Interventions/Procedures  Outcome: Progressing Towards Goal  Goal: Psychosocial  Outcome: Progressing Towards Goal  Goal: *Acidosis resolved  Outcome: Progressing Towards Goal  Goal: *Tolerating diet  Outcome: Progressing Towards Goal  Goal: *Demonstrates progressive activity  Outcome: Progressing Towards Goal  Goal: *Blood glucose 80 to 180 mg/dl  Outcome: Progressing Towards Goal     Problem: Hypotension  Goal: *Blood pressure within specified parameters  Outcome: Progressing Towards Goal  Goal: *Fluid volume balance  Outcome: Progressing Towards Goal  Goal: *Labs within defined limits  Outcome: Progressing Towards Goal     Problem: Patient Education: Go to Patient Education Activity  Goal: Patient/Family Education  Outcome: Progressing Towards Goal     Problem: Delirium Treatment  Goal: *Level of consciousness restored to baseline  Outcome: Progressing Towards Goal  Goal: *Level of environmental perceptions restored to baseline  Outcome: Progressing Towards Goal  Goal: *Sensory perception restored to baseline  Outcome: Progressing Towards Goal  Goal: *Emotional stability restored to baseline  Outcome: Progressing Towards Goal  Goal: *Functional assessment restored to baseline  Outcome: Progressing Towards Goal  Goal: *Absence of falls  Outcome: Progressing Towards Goal  Goal: *Will remain free of delirium, CAM Score negative  Outcome: Progressing Towards Goal  Goal: *Cognitive status will be restored to baseline  Outcome: Progressing Towards Goal  Goal: Interventions  Outcome: Progressing Towards Goal     Problem: Patient Education: Go to Patient Education Activity  Goal: Patient/Family Education  Outcome: Progressing Towards Goal     Problem: Aspiration - Risk of  Goal: *Absence of aspiration  Outcome: Progressing Towards Goal     Problem: Patient Education: Go to Patient Education Activity  Goal: Patient/Family Education  Outcome: Progressing Towards Goal

## 2022-05-25 NOTE — INTERDISCIPLINARY ROUNDS
Interdisciplinary team rounds were held 5/25/2022 with the following team members:Care Management, Diabetes Treatment Specialist, Nursing, Nutrition, Pharmacy, Physician and Clinical Coordinator. Plan of care discussed. See clinical pathway and/or care plan for interventions and desired outcomes. Goals of the Day: Continue to wean Levophed as BP improves.

## 2022-05-25 NOTE — DIABETES MGMT
[address] [facility]    CLINICAL NURSE SPECIALIST CONSULT     Initial Presentation   Becka Gutierrez is a 80 y.o. female admitted from the ER today 5/23/22 in DKA with profound bradycardia, after being found down by granddaughter. Granddaughter states that patient is independent in her activities of daily living, and has a routine that includes her diabetes self-care. He grandmother had reported that she wasn't feeling well and the granddaughter had made arrangements for a home visit. When she went into her room this morning to remind her of the visit, she found her down on the floor. EMS called. Of note, granddaughter states that she (herself) had been hospitalized for a month and returned home at the end of April. She surmises that her grandmother may not have been taking her insulin because there was more insulin in the refrigerator than there should have been. LAB:  WBC 10.9. AST 75. Lipase >3000. Troponin 31. NT pro-BNP 1588. Urinary glucose & ketone +. CT Head: No extra-axial fluid collection, hemorrhage or shift. Atrophy mostly posterior fossa. CXR: Mild pulmonary edema    HX:   Past Medical History:   Diagnosis Date    Diabetes ([facility]       INITIAL DX:   DKA (diabetic ketoacidosis) ([facility] [E11.10]     Current Treatment     TX: Insulin. Pressor support. Pepcid. Heparin. ABx. Steroids    Consulted by Provider for advanced diabetes nursing assessment and care for:   [x] Transitioning off Corky Bunting   [x] Inpatient management strategy  [] Home management assessment  [] Survival skill education    Hospital Course   Clinical progress has been complicated by need for ICU level of care. 5/24/22 Alert but babbling; Precedex+. O2NC. Remains on three (3) pressors. CRRT+. Scheduled for CT chest & ABd wo contrast today. Plans to add steroids. Discussion of transition off Corky Bunting made during IPR.  5/25/22 Alert. Conversing in understandable language. O2NC. Afib.  Remains on two (2) pressors. Skin per wound care/nursing. CRRT+    Diabetes History   Type 2 diabetes treated with insulin therapy  Admission BG 1259 with AG 30    Diabetes-related Medical History: Deferred    Diabetes Medication History  Key Antihyperglycemic Medications             glipiZIDE (GLUCOTROL) 10 mg tablet Take 10 mg by mouth daily. insulin lispro protamine/insulin lispro (HUMALOG MIX 75/25) 100 unit/mL (75-25) injection 48 Units by SubCUTAneous route daily. take 48 units in the morning    insulin lispro protamine/insulin lispro (HUMALOG MIX 75/25) 100 unit/mL (75-25) injection 32 Units by SubCUTAneous route every evening. take 32 units subcutaneously at night. Diabetes self-management practices: Per granddaughter  Eating pattern   [x] Not eating a carbohydrate-controlled mealplan  [x] Breakfast Cheerios with milk & banana. Coffee (may have had Cheese toast earlier)  [x] Sankc  Chips a Hoy cookies (while she is watching TV  [x] Dinner  With granddaughter   Physical activity pattern - Uses Rollator to move about the house   [x] Not employing a physical activity program to control BG  Monitoring pattern - Tracks on a tablet by bedside & takes to her provider   [x] Testing BGs   [x] Breakfast   Taking medications pattern  [x] Consistent administration  [x] Affordable  Overall evaluation:    [x] Last A1c located 9% (3/9/2021) [facility]    Subjective   \"My daughter . \"     Objective   Physical exam  General Obese female who is in no acute distress  Neuro  Alert and talking  Vital Signs   Visit Vitals  BP (!) 145/67   Pulse 67   Temp 99 °F (37.2 °C)   Resp 23   Ht 5' 6\" (1.676 m)   Wt 88.9 kg (196 lb)   SpO2 95%   BMI 31.64 kg/m²     Laboratory  Recent Labs     22  0408 22  2211 22  1552 22  0800 22  0321 22  1952 22  1626   * 221* 205*  206*   < > 151*   < > 732*   AGAP 11 9 8  9   < > 12   < > 19*   WBC 13.3*  --  13.0*  --  6.0   < > 9.9   CREA 1.81* 2.81* 3.23*  3.25*   < > 4.95*   < > 5.19*   GFRNA 26* 16* 14*  13*   < > 8*   < > 8*   *  --   --   --  595*  --  601*   *  --   --   --  230*  --  252*    < > = values in this interval not displayed. Factors impacting BG management  Factor Dose Comments   Nutrition:  NPO     Drugs:  Vasopressor load  Steroids   Vaso & levo infusions  HC 50mg Q6 hrs   Affects insulin delivery  Impairs insulin action   Infection Zosyn Q8 hrs Low grade fever. WBC elevated   Other:   Kidney function  Liver function   GLORIA  Enzymes elevated   CRRT+     Blood glucose pattern      Significant diabetes-related events over the past 24-72 hours  Admitted in profound DKA with electrolyte disturbances. BG 1259 with AG 30  On Glucostabilizer; received 27-32 units of insulin/hour initially  BG finally under 250mg/dl at 1am today after 5L of fluid in ICU  Significant GLORIA - on CRRT  Steroids started 5/24/22 - BGs higher than target    Assessment and Plan   Nursing Diagnosis Risk for unstable blood glucose pattern   Nursing Intervention Domain 5250 Decision-making Support   Nursing Interventions Examined current inpatient diabetes/blood glucose control   Explored factors facilitating and impeding inpatient management     Evaluation   This overweight AA female was admitted in DKA associated with bradycardia. Received significant amounts of insulin/hour via Glucostabilizer. Fluid resuscitation also needed in presence of elevated NT pro-BNP. BGs down under 250mg/dl at 1am 5/24/22, and AG closed at 8am. Transitioned off GS, and steroids added 5/24/22. BGs sloan into the low 200s. NPH insulin started to override steroids in presence of kidney dysfunction.       Recommend increasing NPH insulin     Recommendations     [x] Increase NPH insulin 14 units twice daily    [x] Use HIGH sensitivity corrective insulin     Billing Code(s)   [x] 16092 IP subsequent hospital care - 35 minutes     Before making these care recommendations, I personally reviewed the hospitalization record, including notes, laboratory & diagnostic data and current medications, and examined the patient at the bedside (circumstances permitting) before making care recommendations. More than fifty (50) percent of the time was spent in patient counseling and/or care coordination.   Total minutes: Pilar Snell CNS  Diabetes Clinical Nurse Specialist  [facility]  Access via [address] [place] [address]

## 2022-05-25 NOTE — PROGRESS NOTES
Transition of Care Plan:    RUR:16    ARSLAN: TBD  Palliative is following  Ulises is interested in applying for Medicaid in case she needs LTC or Caregivers at home. - Referral sent to [employer] to screen     Disposition: Plan Home   -Therapy to screen her when appropriate to help with disposition. Follow up appointments: PCP; Michell Hathaway. DME needed: tbd  Transportation at Discharge: [place] or GD Spouse will transport if she is able to go in car. Keys or means to access home:     Ulises   IM Medicare Letter: to be delivered prior to DC. 1st IM Letter given 22  Is patient a BCPI-A Bundle:        no   If yes, was Bundle Letter given?:    Is patient a  and connected with the [facility]? No   If yes, was [employer] transfer form completed and [facility] notified? Caregiver Contact: galeSantanamahamedbro Tyreseelor: [phone]  GD Spouse: Aziza Hu: [phone]  Discharge Caregiver contacted prior to discharge? Yes reviewed plan with Ulises over the phone. Care Conference needed?: to be determined. Reason for Admission:   Pt was admitted on 22 d/t DKA                     RUR Score:     16             PCP: First and Last name:   Christi Acevedo MD     Name of Practice[place] [id number]   Are you a current patient: Yes/No: Yes   Approximate date of last visit:     Can you participate in a virtual visit if needed: Yes    Do you (patient/family) have any concerns for transition/discharge? No                 Plan for utilizing home health:   tbd    Current Advanced Directive/Advance Care Plan:  Full Code      Healthcare Decision Maker: : Debby Bachelor: [phone]  GD Spouse: Aziza Hu: [phone]    Eval:   Pt is alert and oriented. Lives with Granddaughter, Han Gerardo and  Her spouse in two story home with ramp to enter. Han Gerardo recently got out of the hospital after a 30 day hospitalization.   Spouse and Sister tried to take care of her during that time. DME: Rollator, WC, Lift Chair Recliner, shower chair. Recently Pt has needed assistance with sponge bath, dressing and wearing diapers d/t accidents. No HH or rehab experience. [facility]  Pt has prescription drug coverage. CM will continue to monitor discharge plan. Care Management Interventions  PCP Verified by CM: Yes  Palliative Care Criteria Met (RRAT>21 & CHF Dx)?: No  Mode of Transport at Discharge:  Other (see comment)  Transition of Care Consult (CM Consult): Discharge Planning  MyChart Signup: No  Discharge Durable Medical Equipment: No  Physical Therapy Consult: No  Occupational Therapy Consult: No  Speech Therapy Consult: Yes  Support Systems: Other Family Member(s)  Confirm Follow Up Transport: Family  Freedom of Choice List was Provided with Basic Dialogue that Supports the Patient's Individualized Plan of Care/Goals, Treatment Preferences and Shares the Quality Data Associated with the Providers?: No  Discharge Location  Patient Expects to be Discharged to[de-identified] Home with family assistance     Amador Limon 211

## 2022-05-25 NOTE — PROGRESS NOTES
Physician Progress Note      Kosta Plunkett  CSN #:                  [id number]  :                       1934  ADMIT DATE:       2022 7:44 AM  DISCH DATE:  RESPONDING  PROVIDER #:        Marc Dunaway MD          QUERY TEXT:    Pt admitted with Sepsis. Noted documentation of Rhabdomyolysis on 22 CV PN by ordered Cardiology consultant. If possible, please document in progress notes and discharge summary:    The medical record reflects the following:  Risk Factors: 81 y/o female admitted for septic shock. found minimally responsive by family member, on arrival to ED found hypotensive and bradycardia. Hx: HTN, IDDM, and HLD  ? Clinical Indicators:  22 CV PN (GUALBERTO Rooney) : GLORIA atop CKD stage 3 at baseline. Rhabdomyolysis    2022 15:52 CK: 15,690 ([facility])  2022 04:08 CK: 21,940 ([facility])      Treatment: [facility] ICU, intensivist consult, Cardiology consult, Nephrology consult, CRRT, Labs, CK levels, IVF, Palliative care consult  Options provided:  -- Rhabdomyolysis confirmed present on admission  -- Rhabdomyolysis confirmed not present on admission  -- Rhabdomyolysis ruled out  -- Other - I will add my own diagnosis  -- Disagree - Not applicable / Not valid  -- Disagree - Clinically unable to determine / Unknown  -- Refer to Clinical Documentation Reviewer    PROVIDER RESPONSE TEXT:    The diagnosis of Rhabdomyolysis was confirmed as present on admission.     Query created by: Arden Mayer on 2022 8:59 AM      Electronically signed by:  Marc Dunaway MD 2022 9:50 AM

## 2022-05-25 NOTE — PROGRESS NOTES
Comprehensive Nutrition Assessment    Type and Reason for Visit: Initial,Positive nutrition screen    Nutrition Recommendations/Plan:   1. Continue diet per SLP  2. RD to add Nepro TID     Malnutrition Assessment:  Malnutrition Status: At risk for malnutrition (specify) (05/25/22 1338)      Nutrition Assessment:  Pt admitted with DKA. PMH: HTN, DM. Chart reviewed, case discussed during CCU rounds. Pt lying in bed awake and alert. No family in the room to speak with. Pt is on CVVH. Levo at 12. SLP cleared her for full liquids this morning. Prior to this pt was in shock (on multiple pressors) and inappropriate for PO.  LFT's elevated and lipase 1958, no pancreatitis on imaging and no c/o abdominal pain. K 3.5 today (was 4.7 yesterday). Phos 2.3, being repleted. Pt denies wt loss. When asked about recent poor appetite she reports 'not really'. Full liquid lunch tray in the room, pt wants to be set up to eat. RN working on this for her. Offered pt supplements given high protein needs, she reports milkshake may 'mess up my stomach'. Asked her if she has any issues with dairy/lactose, she stated 'I don't know'. As Nepro is appropriate for lactose intolerance and no dairy allergy is listed will add TID for her to try. Palliative is following. Wt Readings from Last 10 Encounters:   05/24/22 88.9 kg (196 lb)          Nutrition Related Findings:    Meds: pepcid, solucortef, humalog, insulin NPH, zosyn, Kphos, vancomycin, [email or web address]; Drips: levo. Edema: +2 pitting-BLE.     BM 5/25   Wound Type: Deep tissue injury (sacrum, heels)    Current Nutrition Intake & Therapies:  Average Meal Intake: 1-25%  Average Supplement Intake: None ordered  ADULT DIET Full Liquid; 3 carb choices (45 gm/meal)  ADULT ORAL NUTRITION SUPPLEMENT Breakfast, Lunch, Dinner; Renal Supplement    Anthropometric Measures:  Height: 5' 6\" (167.6 cm)  Ideal Body Weight (IBW): 130 lbs (59 kg)     Current Body Wt:  88.9 kg (195 lb 15.8 oz), 150.8 % IBW. Bed scale  Current BMI (kg/m2): 31.6                          BMI Category: Obese class 1 (BMI 30.0-34. 9)    Estimated Daily Nutrient Needs:  Energy Requirements Based On: Formula  Weight Used for Energy Requirements: Current  Energy (kcal/day): MSJ 1750 (1336 x 1.3)  Weight Used for Protein Requirements: Current  Protein (g/day): 89g (1gPro/kg)  Method Used for Fluid Requirements: Standard renal  Fluid (ml/day): 1200mL    Nutrition Diagnosis:   · Inadequate protein-energy intake related to other (specify) (poor appetite) as evidenced by intake 0-25%      Nutrition Interventions:   Food and/or Nutrient Delivery: Continue current diet,Start oral nutrition supplement  Nutrition Education/Counseling: No recommendations at this time  Coordination of Nutrition Care: Continue to monitor while inpatient,Interdisciplinary rounds       Goals:     Goals: PO intake 50% or greater,by next RD assessment,within 2 days (in 2-4 days)       Nutrition Monitoring and Evaluation:   Behavioral-Environmental Outcomes: None identified  Food/Nutrient Intake Outcomes: Food and nutrient intake,Diet advancement/tolerance,Supplement intake  Physical Signs/Symptoms Outcomes: Biochemical data,Nutrition focused physical findings,Skin,Weight,Fluid status or edema,Hemodynamic status    Discharge Planning:     Too soon to determine    Kathy Eller RD, [facility]  Contact: ext [phone]

## 2022-05-25 NOTE — PROGRESS NOTES
Bedside shift change report given to [facility] R (oncoming nurse) by Joelyn Cranker (offgoing nurse). Report included the following information SBAR.     0800 - Full assessment complete. [facility]-ER - Dr. Nicolasa Baker, nephrology, aware of labs overnight. Will order maintenance IVF and factor of -100 until 1500. OK with Q12h lab checks. 0900 - Dr. Ai Vickers assessed pt. Aware of frequent pauses, RN to notify cardiology. MD also aware of BLE swelling, L > R. Legs tender to touch. Will obtain [address] at bedside. Cardiology paged. 1000 - Updated Dr. Jeromy Rivera over the phone regarding pauses, a fib. Cardiology to see patient this afternoon. 1015 - IDR's completed. MD aware of elevated lipase, pt not complaining of any abdominal pain. Will continue to monitor. OK to eat if pt is cleared by speech. 1200 - Reassessment complete. Changes documented. 1230 - Pt more drowsy this afternoon, arousable to voice, and increased vasopressor use. Notified Dr. Ai Vickers. Given orders for stat ABG. Will draw Q12h labs early. 1330 - Pt more alert and fed lunch. 18 - Dr. Ai Vickers aware of ABG results. Given orders to keep NPO for now and repeat ABG in 2 hours. 1430 - Updated patient's granddaughter at bedside. 200 - Dr. Ai Vickers aware of all lab results, including CK 22,325 and Ca 7.7. Given orders for 1g calcium gluconate. 1555 - RT at bedside to obtain ABG and ionized calcium    1600 - Reassessment complete. No urine output in the last 6 hours, bladder scan showed only 12 mL.     1625- Notified Dr. Ai Vickers of ABG and ionized calcium results. Bedside shift change report given to Joelyn Cranker and Blaire JOSEPH (oncoming nurse) by Jose Cameron (offgoing nurse). Report included the following information SBAR.

## 2022-05-26 NOTE — PROGRESS NOTES
0700- Shift report received from BIMAL Joseph RN and HARDIK Burgos RN Report included the following information SBAR, Kardex, ED Summary, Procedure Summary, Intake/Output, MAR, and Recent Results    0800- Shift assessment complete, pt resting comfortably in bed. Alert and oriented to self, place, and situation but disoriented to time. Currently Normal Sinus Rhythm with stable blood pressure with Levophed infusing at 6 mcg/min. Remains on CRRT 4k with no factor. R Fem Evan intact. R triple lumen IJ intact. 1/2 NS infusing at 100 mL/hr. See MAR and Flowsheets for more details. 1015- Speech therapist at bedside for swallow eval. Cleared for puree/thin liquid. 1030- Interdisciplinary rounds held at bedside, new orders noted to D/C vancomycin. 1055- Spoke to Dr. Micah Fisher regarding dialysis, new orders noted for Austen Mcleod to stop CRRT today. Will consult IR for new Evan catheter placement. Verbal orders to remove R groin Evan catheter once new Evan is placed and confirmed. Will begin HD treatment tomorrow. 1200- Reassessment complete, pt resting comfortably in bed. No changes noted from previous assessment, see Flowsheets for more details. Puree diet tolerated well. 1420- IR at bedside for evan catheter placement. 1430- [facility] at bedside disconnecting CRRT.     1600- Reassessment complete, pt resting comfortably in bed. See MAR and Flowsheets for more details. 1800- R fem Evan removed, pressure held for 7 minutes. Will continue to closely monitor.      1900- Shift report given to Nova Mejia RN Report included the following information SBAR, Kardex, ED Summary, Procedure Summary, Intake/Output, MAR, and Recent Results

## 2022-05-26 NOTE — PROGRESS NOTES
Critical Care Progress Note  Neris Masterson MD          Date of Service:  2022  NAME:  Keri Flannery  :  1934  MRN:  [id number]      Subjective/Hospital course:      : Patient remains on 3 vasopressors. Mental status is the same as yesterday, minimally responsive. Protecting her air.   : Patient is more awake than yesteday. She is still on 10mcg/min of levophed and vasopressin. On CRRT with no factor. : Patient is still on 6mcg/min of levophed. Confused and disoriented. On CRRT with no factor. Problem list:     -septic shock.  -UTI. -? Pneumonia. -Symptomatic bradycardia. -Severe DKA. -Acute metabolic encephalopathy.  -Hyperkalemia.  -Acute renal failure. -H/o HTN.  -HLD. Assessment/Plan:     Septic shock.  -Sec to UTI/pneumonia. -Empiric vanc and zosyn. Can DC vanc. -GNR positive in blood and urine cultures. Will DC vanc.       UTI/Pneumonia. -Empiric abx as above.     DKA. -Likely patient was not taking insulin at home as her A1c is >14  -Transitioned to sub cut insulin.     Elevated lipase.  -Non contrast CT is neg for changes of acute pancreatitis. -Lipase improving.  -Restart oral feeding if ok with speech therapy. Acute renal failure.  -Likely ATN from shock.  -Closely monitor renal functions and UO. -Avoid nephrotoxins.  -No CRRT.     Acute metabolic encephalopathy.  -Closely monitor mental status.  -manage metabolic issues as above.     Symptomatic bradycardia.  -Likely precipitated by severe acidemia, also on BB at home.   -Again had long pauses on , EP following.       DVT prophylaxis. [address] SUP. Pepcid. Code status. Full code. I personally spent 60 minutes of critical care time. This is time spent at this critically ill patient's bedside actively involved in patient care as well as the coordination of care and discussions with the patient's family.   This does not include any procedural time which has been billed separately. Review of Systems:   Review of systems not obtained due to patient factors. Vital Signs:     Patient Vitals for the past 4 hrs:   BP Temp Pulse Resp SpO2 Weight   05/26/22 1000 (!) 120/53 -- 62 29 98 % --   05/26/22 0900 118/63 -- 68 26 96 % --   05/26/22 0800 117/60 97.9 °F (36.6 °C) 66 23 96 % --   05/26/22 0700 131/64 -- 64 27 97 % --   05/26/22 0631 -- -- -- -- -- 82.6 kg (182 lb 1.6 oz)        Intake/Output Summary (Last 24 hours) at 5/26/2022 1002  Last data filed at 5/26/2022 0800  Gross per 24 hour   Intake 3055.92 ml   Output 2899 ml   Net 156.92 ml        Physical Examination:    Physical Exam  Constitutional:       General: She is not in acute distress. Appearance: She is ill-appearing. Comments: Altered mental status. HENT:      Head: Normocephalic and atraumatic. Mouth/Throat:      Mouth: Mucous membranes are moist.   Eyes:      Extraocular Movements: Extraocular movements intact. Conjunctiva/sclera: Conjunctivae normal.   Cardiovascular:      Rate and Rhythm: Normal rate and regular rhythm. Pulmonary:      Effort: Pulmonary effort is normal. No respiratory distress. Breath sounds: Normal breath sounds. Abdominal:      General: Abdomen is flat. There is no distension. Palpations: Abdomen is soft. Tenderness: There is no abdominal tenderness. There is no guarding. Musculoskeletal:      Cervical back: Normal range of motion and neck supple. Neurological:      Mental Status: She is alert. Comments: Alert and awake. Labs:   Labs and imaging reviewed.       Medications:     Current Facility-Administered Medications   Medication Dose Route Frequency    0.45% sodium chloride infusion  100 mL/hr IntraVENous CONTINUOUS    insulin NPH (NOVOLIN N, HUMULIN N) injection 14 Units  14 Units SubCUTAneous BID    Vancomycin - trough before 1400 dose  1 Each Other ONCE    bicarbonate dialysis (PRISMASOL) BG K 4/Ca 2.5 5000 ml solution Extracorporeal DIALYSIS CONTINUOUS    hydrocortisone Sod Succ (PF) (SOLU-CORTEF) injection 50 mg  50 mg IntraVENous Q6H    insulin lispro (HUMALOG) injection   SubCUTAneous Q6H    glucose chewable tablet 16 g  4 Tablet Oral PRN    glucagon (GLUCAGEN) injection 1 mg  1 mg IntraMUSCular PRN    dextrose 10% infusion 0-250 mL  0-250 mL IntraVENous PRN    vancomycin (VANCOCIN) 1500 mg in  ml infusion  1,500 mg IntraVENous Q24H    piperacillin-tazobactam (ZOSYN) 3.375 g in 0.9% sodium chloride (MBP/ADV) 100 mL MBP  3.375 g IntraVENous Q8H    sodium chloride (NS) flush 5-40 mL  5-40 mL IntraVENous Q8H    sodium chloride (NS) flush 5-40 mL  5-40 mL IntraVENous PRN    acetaminophen (TYLENOL) tablet 650 mg  650 mg Oral Q6H PRN    Or    acetaminophen (TYLENOL) suppository 650 mg  650 mg Rectal Q6H PRN    polyethylene glycol (MIRALAX) packet 17 g  17 g Oral DAILY PRN    ondansetron (ZOFRAN ODT) tablet 4 mg  4 mg Oral Q8H PRN    Or    ondansetron (ZOFRAN) injection 4 mg  4 mg IntraVENous Q6H PRN    [Held by provider] heparin (porcine) injection 5,000 Units  5,000 Units SubCUTAneous Q8H    vasopressin (VASOSTRICT) 20 Units in 0.9% sodium chloride 100 mL infusion  0-0.04 Units/min IntraVENous CONTINUOUS    balsam peru-castor oiL (VENELEX) ointment   Topical BID    alcohol 62% (NOZIN) nasal  1 Ampule  1 Ampule Topical Q12H    balsam peru-castor oiL (VENELEX) ointment   Topical BID    famotidine (PF) (PEPCID) 20 mg in 0.9% sodium chloride 10 mL injection  20 mg IntraVENous Q24H    Vancomycin - pharmacy to dose   Other Rx Dosing/Monitoring    NOREPINephrine (LEVOPHED) 32,000 mcg in 0.9% sodium chloride 250 mL (128 mcg/mL) infusion  0.5-50 mcg/min IntraVENous TITRATE     ______________________________________________________________________  EXPECTED LENGTH OF STAY: 4d 19h  ACTUAL LENGTH OF STAY:          3                 Clare Chaney MD   Pulmonary/CCM  [facility]  [phone]  5/26/2022

## 2022-05-26 NOTE — PROGRESS NOTES
EP/ ARRHYTHMIA    Patient ID:  Patient: Kevin Rubio  MRN: [id number]  Age: 80 y.o.  : 1934    Date of  Admission: 2022  7:44 AM   PCP:  Av Richmond MD    Assessment: 1. Profound sinus bradycardia 20-30's in the setting of DKA, hyperkalemia, and OP beta-blocker. This has resolved. 2. Paroxysmal atrial fibrillation with post-conversion pauses up to 6 seconds. 3. 2nd degree AV block with single dropped PAC's, RBBB with normal WV in sinus rhythm. 4. Hypotension likely due to dehydration/volume depletion, metabolic acidosis, bradycardia. Improved with volume resuscitation, pressor. 5. No evidence of acute ischemia or infarct by presenting ECG. Echo with normal LV and RV systolic function. 6. GLORIA atop CKD stage 3 at baseline. Rhabdomyolysis. 7. Diabetes mellitus type 2 on chronic insulin complicated by DKA. 8. Elevated lipase possibly due to DKA rather than acute pancreatitis. 9. Metabolic encephalopathy, improving. 10. Anemia. 11. Thrombocytopenia. 12. Full code. Plan:     1. I'd tolerate the post-conversion pauses while she's in bed. No risk of injury. 2. She's on DVT prophylaxis at this time. I'd avoid FULL anticoagulation if possible given her anemia and thrombocytopenia as long as Afib episodes are brief, self-limited. 3. Agree with correcting electrolyte and acid-base disarray. She was on CVVH, will change to HD. 4. Weaning levophed. Discussed with nursing. [x]       High complexity decision making was performed in this patient at high risk for decompensation with multiple organ involvement. Kevin Rubio is a 80 y.o. female with a history of progressive weakness, fatigue, increasing urination and thirst.  Found with altered mental status by family. Brought in by EMS, transcutaneous pacing. Labs showed pH 6.99,  K 6.9--ER has treated this, reassessment pending. She could not relay a history.   Granddaughter present in the room at the time. Admitted to ICU. Started on pressors, CVVH. Adjustments made to her regimen thereafter. Currently denies chest pain, dizziness, palpitations. She is not SOB at rest.    Echo:  Left Ventricle Normal left ventricular systolic function with a visually estimated EF of 55 - 60%. Left ventricle size is normal. Increased wall thickness. Normal wall motion. Indeterminate diastolic function. Left Atrium Left atrium size is normal.   Right Ventricle Right ventricle is mildly dilated. Normal systolic function. Right Atrium Right atrium size is normal.   Aortic Valve Valve structure is normal. Calcified cusp. Trace regurgitation. No stenosis. Mitral Valve Thickened leaflet. Trace regurgitation. No stenosis noted. Tricuspid Valve Valve structure is normal. Mild to moderate regurgitation. No stenosis noted. Pulmonic Valve Valve structure is normal. Trace regurgitation. No stenosis noted. Aorta Normal sized aorta. Pericardium Trivial pericardial effusion present. No indication of cardiac tamponade.      No Known Allergies       Current Facility-Administered Medications   Medication Dose Route Frequency    0.45% sodium chloride infusion  50 mL/hr IntraVENous CONTINUOUS    insulin NPH (NOVOLIN N, HUMULIN N) injection 14 Units  14 Units SubCUTAneous BID    hydrocortisone Sod Succ (PF) (SOLU-CORTEF) injection 50 mg  50 mg IntraVENous Q6H    insulin lispro (HUMALOG) injection   SubCUTAneous Q6H    glucose chewable tablet 16 g  4 Tablet Oral PRN    glucagon (GLUCAGEN) injection 1 mg  1 mg IntraMUSCular PRN    dextrose 10% infusion 0-250 mL  0-250 mL IntraVENous PRN    piperacillin-tazobactam (ZOSYN) 3.375 g in 0.9% sodium chloride (MBP/ADV) 100 mL MBP  3.375 g IntraVENous Q8H    sodium chloride (NS) flush 5-40 mL  5-40 mL IntraVENous Q8H    sodium chloride (NS) flush 5-40 mL  5-40 mL IntraVENous PRN    acetaminophen (TYLENOL) tablet 650 mg  650 mg Oral Q6H PRN    Or    acetaminophen (TYLENOL) suppository 650 mg  650 mg Rectal Q6H PRN    polyethylene glycol (MIRALAX) packet 17 g  17 g Oral DAILY PRN    ondansetron (ZOFRAN ODT) tablet 4 mg  4 mg Oral Q8H PRN    Or    ondansetron (ZOFRAN) injection 4 mg  4 mg IntraVENous Q6H PRN    [Held by provider] heparin (porcine) injection 5,000 Units  5,000 Units SubCUTAneous Q8H    vasopressin (VASOSTRICT) 20 Units in 0.9% sodium chloride 100 mL infusion  0-0.04 Units/min IntraVENous CONTINUOUS    balsam peru-castor oiL (VENELEX) ointment   Topical BID    alcohol 62% (NOZIN) nasal  1 Ampule  1 Ampule Topical Q12H    balsam peru-castor oiL (VENELEX) ointment   Topical BID    famotidine (PF) (PEPCID) 20 mg in 0.9% sodium chloride 10 mL injection  20 mg IntraVENous Q24H    NOREPINephrine (LEVOPHED) 32,000 mcg in 0.9% sodium chloride 250 mL (128 mcg/mL) infusion  0.5-50 mcg/min IntraVENous TITRATE       Review of Symptoms:  See HPI. She cannot give a history.        Objective:      Physical Exam:  Temp (24hrs), Av.9 °F (36.6 °C), Min:97.6 °F (36.4 °C), Max:98.3 °F (36.8 °C)    Patient Vitals for the past 8 hrs:   Pulse   22 1630 77   22 1600 71   22 1530 79   22 1515 69   22 1500 68   22 1446 68   22 1430 74   22 1400 70   22 1330 67   22 1300 67   22 1230 69   22 1200 75   22 1100 60   22 1000 62    Patient Vitals for the past 8 hrs:   Resp   22 1630 25   22 1600 26   22 1530 26   22 1515 28   22 1500 24   22 1430 27   22 1400 24   22 1330 25   22 1300 21   22 1230 27   22 1200 26   22 1100 19   22 1000 29    Patient Vitals for the past 8 hrs:   BP   22 1630 (!) 144/65   22 1600 (!) 154/129   22 1530 133/68   22 1515 (!) 141/74   22 1500 130/65   22 1446 (!) 130/50   22 1430 138/61   22 1400 (!) 126/51   22 1330 (!) 123/59   22 1300 (!) 135/53   05/26/22 1230 (!) 147/52   05/26/22 1200 134/64   05/26/22 1100 120/60   05/26/22 1000 (!) 120/53          Intake/Output Summary (Last 24 hours) at 5/26/2022 1718  Last data filed at 5/26/2022 1700  Gross per 24 hour   Intake 2643.65 ml   Output 2555 ml   Net 88.65 ml       Nondiaphoretic, elderly female. No scleral icterus, mucous membranes moist, conjuctivae pink, no xanthelasma. Unlabored but shallow respirations, clear to auscultation bilaterally anteriorly, symmetric air movement. REGULAR rhythm, no murmur, pericardial rub, knock, or gallop. No JVD or peripheral edema. Abdomen, soft, nontender, nondistended. Extremities without cyanosis or clubbing.  +venostasis changes. Muscle tone and bulk normal for age. Skin warm and dry. No rashes or ulcers. Neuro grossly nonfocal.  No tremor. CARDIOGRAPHICS and STUDIES, I reviewed:    Telemetry:  Sinus rhythm 60's currently. Earlier has paroxysmal Afib and a post-conversion pause of 6 seconds at 3:21 am.    ECG in ER:  Profound sinus bradycardia with normal HI interval, RBBB, QTc normal accounting for profound bradycardia. No acute ischemia or infarct. Follow-up ECG 5/25 shows sinus rhythm with normal HI, RBBB with normal axis. There are documented post-conversion pauses from Afib. Labs:  Recent Labs     05/26/22  0206 05/25/22  1311 05/25/22  0408   CPK 21,025* 22,325* 21,940*     Lab Results   Component Value Date/Time    Triglyceride 95 05/26/2022 08:30 AM     No results for input(s): INR, PTP, APTT, INREXT, INREXT in the last 72 hours.    Recent Labs     05/26/22  0206 05/25/22  1311 05/25/22  0408    138 146*   K 4.2 4.8 3.5    107 119*   CO2 23 22 16*   BUN 23* 30* 28*   CREA 1.95* 2.31* 1.81*   * 187* 173*   PHOS 2.6 3.9 2.3*   CA 7.5* 7.7* 5.3*   ALB 1.9* 2.0* 1.4*   WBC 9.1 11.9* 13.3*   HGB 8.8* 9.1* 9.4*   HCT 27.3* 28.0* 29.1*   PLT 68* 72* 73*     Recent Labs     05/26/22  0206 05/25/22  1313 05/25/22  0408 05/24/22  1552 05/24/22  0321   *  --  50  --  57   TP 5.3*  --  3.7*  --   --    ALB 1.9* 2.0* 1.4*   < > 2.1*   GLOB 3.4  --  2.3  --   --    LPSE 1,107*  --  1,958*  --   --     < > = values in this interval not displayed. No components found for: GLPOC  No results for input(s): PH, PCO2, PO2 in the last 72 hours.         Miki Bailey MD  5/26/2022

## 2022-05-26 NOTE — WOUND CARE
Wound care Nurse spoke with Palliative care NP, Duane Clarity, this am and asked that I have a discussion with patients ramonita about patient's current skin condition.  nurse introduced self and services to patient's Kalie Bryant, and main caretaker, Julio Joseph. Julio Joseph explained that she has had some health issues lately and was in the hospital. Her Uncle was watching out for her grandmother who was pretty independent with her ADL's and spent the majority of the day in her left recliner chair.  nurse explained that when an elderly person gets very sick it does not take long for \"sores\" to occur in places that have pressure - like her Grandmothers buttocks/sacrum and heel. Unfortunately it does not take long to get these sores but takes a tremendous amount of care and time to try and heal these sores. The \"sores\" will begin to look worse before they start to get any better and because of her age and lack of mobility that healing the buttock/sacrum wounds may not happen and patients incontinence will be a hindrance to healing. Julio Joseph questions were: how can we get a bed like this at home and will nursing come to the house for the wounds? Explained that a \"specialty bed\" like she is on is for hospital use and getting a hospital bed at home may be doable, but a specialty bed at home is a process and not measly accomplished in the home. But we can start the process here. Berto  nurse will make visits but would not be available for total care and ADL's. [facility] will teach wound care to her and arrange supplies, but will not be available to change dressing daily.  nurse explained that she may never regain her level of function prior to this admission and to be ready for that. I do not believe Julio Joseph is grasping the level of care her Grandmother will need when she is ready from discharge from hospital. I did not show her the pictures of her Grandmothers buttocks/sacrum but eventually I will need to.     Anuradha Herrmann Hernán Chavira

## 2022-05-26 NOTE — PROGRESS NOTES
Problem: Dysphagia (Adult)  Goal: *Acute Goals and Plan of Care (Insert Text)  Description: 5/25/2022  Speech path goals  1. Patient will tolerate full liquids with no overt s/s of aspiration. 2. Patient will tolerate diet upgrade with no overt s/s of aspiration. Upgraded to Puree/Thin Liquid 5/26/2022  Outcome: Progressing Towards Goal     SPEECH LANGUAGE PATHOLOGY DYSPHAGIA TREATMENT  Patient: Bossman Garg (16 y.o. female)  Date: 5/26/2022  Diagnosis: DKA (diabetic ketoacidosis) ([facility] [place] [address] [E11.10] <principal problem not specified>       Precautions:       ASSESSMENT:  Patient continues to be followed by SLP and initial visit on 5/25 revealed mild-moderate oropharyngeal dysphagia and Full Liquid diet was recommended. Patient undergoing HD at time of SLP visit on this date; RN cleared visit. Patient continues with delayed oral manipulation/mastication, leading to suspected delayed propulsion and suspected delayed swallow initiation. Mild oral residue observed following solid trial. No overt signs of aspiration observed with any consistency tried. At this juncture, recommend Puree/Thin Liquids with 1:1 Assistance and general aspiration precautions. Suspect oral phase to mirror mentation. Pharyngeal phase assessed to be grossly functional, especially given patient's advanced age. RN educated re: SLP recommendations. PLAN:  Recommendations and Planned Interventions:  -- Puree/Thin Liquids  -- Medication Crushed in Puree  -- 1:1 Assistance with PO  -- Sitting Upright for all PO  -- Small Bites/Sips  -- Strict Oral Care    Patient continues to benefit from skilled intervention to address the above impairments. Continue treatment per established plan of care. Discharge Recommendations: To Be Determined     SUBJECTIVE:   Patient stated i'm good\" when asked if she wanted more water. Suspect patient's intake to be limited.     OBJECTIVE:   Cognitive and Communication Status:  Neurologic State: Alert,Confused  Orientation Level: Oriented to person,Oriented to place,Disoriented to time (Assessed via yes/no questions)  Cognition: Decreased attention/concentration,Follows commands  Perception: Appears intact  Perseveration: No perseveration noted  Safety/Judgement: Decreased awareness of need for assistance    Dysphagia Treatment:  Oral Assessment:  Oral Assessment  Labial: No impairment  Dentition: Limited;Natural    P.O. Trials:  Patient Position: Upright in bed  Vocal quality prior to P.O.: No impairment  Consistency Presented: Ice chips; Thin liquid;Puree; Solid  How Presented: Other (comment); Self-fed/presented;Straw;Successive swallows;Spoon (Benefited from hand-over-hand assistance)     Bolus Acceptance: No impairment  Bolus Formation/Control: Impaired  Type of Impairment: Delayed;Mastication  Propulsion: Delayed (# of seconds)  Oral Residue: Less than 10% of bolus; Lingual (With solid trial)  Initiation of Swallow: Delayed (# of seconds)  Laryngeal Elevation: Functional  Aspiration Signs/Symptoms: None                Oral Phase Severity: Mild-moderate  Pharyngeal Phase Severity : Other (comment) (WFL)    Pain:  Pain Scale 1: Numeric (0 - 10)  Pain Intensity 1: 0       After treatment:   Patient left in no apparent distress in bed, Call bell within reach and Nursing notified    COMMUNICATION/EDUCATION:   Patient was educated regarding role of SLP. She demonstrated guarded understanding given acute confusion. The patient's plan of care including recommendations, planned interventions, and recommended diet changes were discussed with: Registered nurse.      MERE Pinto  Time Calculation: 14 mins

## 2022-05-26 NOTE — DIABETES MGMT
[facility]    CLINICAL NURSE SPECIALIST CONSULT     Initial Presentation   Jacobo Saldana is a 80 y.o. female admitted from the ER today 5/23/22 in DKA with profound bradycardia, after being found down by granddaughter. Granddaughter states that patient is independent in her activities of daily living, and has a routine that includes her diabetes self-care. He grandmother had reported that she wasn't feeling well and the granddaughter had made arrangements for a home visit. When she went into her room this morning to remind her of the visit, she found her down on the floor. EMS called. Of note, granddaughter states that she (herself) had been hospitalized for a month and returned home at the end of April. She surmises that her grandmother may not have been taking her insulin because there was more insulin in the refrigerator than there should have been. LAB:  WBC 10.9. AST 75. Lipase >3000. Troponin 31. NT pro-BNP 1588. Urinary glucose & ketone +. CT Head: No extra-axial fluid collection, hemorrhage or shift. Atrophy mostly posterior fossa. CXR: Mild pulmonary edema    HX:   Past Medical History:   Diagnosis Date    Diabetes ([facility] [address]       INITIAL DX:   DKA (diabetic ketoacidosis) ([facility] [address] [E11.10]     Current Treatment     TX: Insulin. Pressor support. Pepcid. Heparin. ABx. Steroids    Consulted by Provider for advanced diabetes nursing assessment and care for:   [x] Transitioning off Juluis Salon   [x] Inpatient management strategy  [] Home management assessment  [] Survival skill education    Hospital Course   Clinical progress has been complicated by need for ICU level of care. 5/24/22 Alert but babbling; Precedex+. O2NC. Remains on three (3) pressors. CRRT+. Scheduled for CT chest & ABd wo contrast today. Plans to add steroids. Discussion of transition off Juluis Salon made during IPR.  5/25/22 Alert. Conversing in understandable language. O2NC. Afib.  Remains on two (2) pressors. Skin per wound care/nursing. CRRT+  5/26/22 Alert & oriented to person. Off O2. NPO except for supplements. CRRT+. Diabetes History   Type 2 diabetes treated with insulin therapy  Admission BG 1259 with AG 30    Diabetes-related Medical History: Deferred    Diabetes Medication History  Key Antihyperglycemic Medications             glipiZIDE (GLUCOTROL) 10 mg tablet Take 10 mg by mouth daily. insulin lispro protamine/insulin lispro (HUMALOG MIX 75/25) 100 unit/mL (75-25) injection 48 Units by SubCUTAneous route daily. take 48 units in the morning    insulin lispro protamine/insulin lispro (HUMALOG MIX 75/25) 100 unit/mL (75-25) injection 32 Units by SubCUTAneous route every evening. take 32 units subcutaneously at night. Diabetes self-management practices: Per granddaughter  Eating pattern   [x] Not eating a carbohydrate-controlled mealplan  [x] Breakfast Cheerios with milk & banana. Coffee (may have had Cheese toast earlier)  [x] Sankc  Chips a Hoy cookies (while she is watching TV  [x] Dinner  With granddaughter   Physical activity pattern - Uses Rollator to move about the house   [x] Not employing a physical activity program to control BG  Monitoring pattern - Tracks on a tablet by bedside & takes to her provider   [x] Testing BGs   [x] Breakfast   Taking medications pattern  [x] Consistent administration  [x] Affordable  Overall evaluation:    [x] [facility]    Subjective   \"Good morning. \"     Objective   Physical exam  General Obese female in no acute distress  Neuro  Alert and talking  Vital Signs   Visit Vitals  /63   Pulse 68   Temp 97.9 °F (36.6 °C)   Resp 26   Ht 5' 6\" (1.676 m)   Wt 82.6 kg (182 lb 1.6 oz)   SpO2 96%   BMI 29.39 kg/m²     Laboratory  Recent Labs     05/26/22  0206 05/25/22  1311 05/25/22  0408 05/24/22  0800 05/24/22  0321   * 187* 173*   < > 151*   AGAP 9 9 11   < > 12   WBC 9.1 11.9* 13.3*   < > 6.0   CREA 1.95* 2.31* 1.81*   < > 4.95*   GFRNA 24* 20* 26*   < > 8*   *  --  592*  --  595*   *  --  176*  --  230*    < > = values in this interval not displayed. Factors impacting BG management  Factor Dose Comments   Nutrition:  NPO except for supplements     Drugs:  Vasopressor load  Steroids   Levo infusion  HC 50mg Q6 hrs   Affects insulin delivery  Impairs insulin action   Infection Zosyn Q8 hrs Afebrile. WBC normalized   Other:   Kidney function  Liver function   GLORIA  Enzymes remain elevated   CRRT+     Blood glucose pattern      Significant diabetes-related events over the past 24-72 hours  Admitted in profound DKA with electrolyte disturbances. BG 1259 with AG 30  On Glucostabilizer; received 27-32 units of insulin/hour initially  BG finally under 250mg/dl at 1am today after 5L of fluid in ICU  Significant GLORIA - on CRRT  Steroids started 5/24/22 - BGs higher than target  NPH dose adjustment with BGs in 120-150s    Assessment and Plan   Nursing Diagnosis Risk for unstable blood glucose pattern   Nursing Intervention Domain 5250 Decision-making Support   Nursing Interventions Examined current inpatient diabetes/blood glucose control   Explored factors facilitating and impeding inpatient management     Evaluation   This overweight AA female was admitted in DKA associated with bradycardia. Received significant amounts of insulin/hour via Glucostabilizer. Fluid resuscitation also needed in presence of elevated NT pro-BNP. BGs down under 250mg/dl at 1am 5/24/22, and AG closed at 8am. Transitioned off GS, and steroids added 5/24/22. BGs lsoan into the low 200s. NPH insulin started to override steroids in presence of kidney dysfunction with small increase in dose. BGs now in target.       Recommendations     [x] Continue NPH insulin 14 units twice daily    [x] Use HIGH sensitivity corrective insulin     Billing Code(s)   [x] 63003 IP subsequent hospital care - 25 minutes     Before making these care recommendations, I personally reviewed the hospitalization record, including notes, laboratory & diagnostic data and current medications, and examined the patient at the bedside (circumstances permitting) before making care recommendations. More than fifty (50) percent of the time was spent in patient counseling and/or care coordination.   Total minutes: [address], CNS  Diabetes Clinical Nurse Specialist  [facility]  Access via [employer]

## 2022-05-26 NOTE — DIALYSIS
CRRT / [phone]           Orders   Mode: CVVH restarted @ 0010   Blood Flow Rate: 180 ml/min    Prismasol Dose: 25 ml/kg/hr  PBP: 1,100 ml/hr  Replacement: 1,100 ml/hr   Prismasol Concentrate: 4K / 2.5Ca   Blood Warmer Temp: 37*C   Net Fluid Removal: 0 ml/hr            Metrics   BP: 115/51   HR: 67   Access Pressure: -52   Filter Pressure: 158   Return Pressure: 83   TMP: 38   Pressure Drop: 39            Access   Type & Location: Right femoral temp. Lucy Fees C/D/I, dated 5/24/22. Each catheter limb disinfected for 60 seconds per limb with alcohol swabs and hubs scrubbed with Prevantics for 15 seconds, followed by 5 second dry time per Hospital P&P. +asp/+flush x 2 ports.            Labs   HBsAg (Antigen) / date: Negative 5/24/22   HBsAb (Antibody) / date: Susceptible 5/24/22   Source: Epic            Safety:   Time Out Done:    0000   Consent obtained/signed: Verified   Education: CVC care / Infection prevention   Primary Nurse Rpt: BIMAL Delong RN      Comments / Plan:       Patient, code status, labs, and orders verified. Time out complete. In at bedside to assess & change filter due to visible clotting in filter, returned all possible blood 186 mls. Old set discarded in biohazard bin. New HF-1000 set-up, primed 1L NS, tested & running well at this time. Lines visible and connections secure with thermax blood warmer set at 37*C. Education & pre/post report to primary RN.

## 2022-05-26 NOTE — PROGRESS NOTES
Called pt.'s granddaughter, Ethel Magallanes, to obtain consent for Evan placement per Dr. Jaden ledesma.

## 2022-05-26 NOTE — PROGRESS NOTES
Nephrology Progress Note  Pb Zamora     [email or web address]. [email or web address]  Phone - [phone]   Patient: Claribel Mendoza    YOB: 1934        Date- 5/26/2022   Admit Date: 5/23/2022  CC: Follow up for GLORIA , hyperkalemia         IMPRESSION & PLAN:    gloria - ATN +/- progression of ckd   ckd 3 b- cr 1.3 in march 2021   Hyperkalemia - resolved   DKA   Metabolic encephalopathy   Hypernatremia   Bradycardia   Shock- sepsis   UTI   acidosis    PLAN-   STOP  CRRT    Lasix 100 mg iv now   LOWER ivf rate to 50 cc/hr   Plan for hd tomorrow   Continue vasopressor support   Follow ck level   Hold ramipril, clonidine, atenolol, norvasc   She will need new jolene cath in IJ- she has femoral jolene since admission.  D/w ICU nurse     Subjective: Interval History:   -  She is anuric  On CRRT  She Is more awake  Acidosis improved  She is on minimal vasopressor support      Objective:   Vitals:    05/26/22 0700 05/26/22 0800 05/26/22 0900 05/26/22 1000   BP: 131/64 117/60 118/63 (!) 120/53   Pulse: 64 66 68 62   Resp: 27 23 26 29   Temp:  97.9 °F (36.6 °C)     SpO2: 97% 96% 96% 98%   Weight:       Height:          05/25 0701 - 05/26 0700  In: 2967.1 [P.O.:60; I.V.:2907.1]  Out: 2932 [Urine:20]  Last 3 Recorded Weights in this Encounter    05/23/22 0957 05/24/22 0727 05/26/22 0631   Weight: 89.2 kg (196 lb 10.4 oz) 88.9 kg (196 lb) 82.6 kg (182 lb 1.6 oz)      Physical exam:    GEN:  NAD  NECK:  Supple, no thyromegaly  RESP: Clear  b/l, no  wheezing,   CVS: RRR,S1,S2   NEURO: non focal  EXT: + Edema   ; HERNANDEZ +  PSYCH: Can't access due to patient's current condition       Chart reviewed. Pertinent Notes reviewed.      Data Review :  Recent Labs     05/26/22  0206 05/25/22  1311 05/25/22  0408    138 146*   K 4.2 4.8 3.5    107 119*   CO2 23 22 16*   BUN 23* 30* 28*   CREA 1.95* 2.31* 1.81*   * 187* 173*   CA 7.5* 7.7* 5.3*   MG 2.5* 2.7* 1.9   PHOS 2.6 3.9 2.3*     Recent Labs     05/26/22  0206 05/25/22  1311 05/25/22  0408   WBC 9.1 11.9* 13.3*   HGB 8.8* 9.1* 9.4*   HCT 27.3* 28.0* 29.1*   PLT 68* 72* 73*     No results for input(s): FE, TIBC, PSAT, FERR in the last 72 hours.    Medication list  reviewed  Current Facility-Administered Medications   Medication Dose Route Frequency    0.45% sodium chloride infusion  50 mL/hr IntraVENous CONTINUOUS    insulin NPH (NOVOLIN N, HUMULIN N) injection 14 Units  14 Units SubCUTAneous BID    hydrocortisone Sod Succ (PF) (SOLU-CORTEF) injection 50 mg  50 mg IntraVENous Q6H    insulin lispro (HUMALOG) injection   SubCUTAneous Q6H    glucose chewable tablet 16 g  4 Tablet Oral PRN    glucagon (GLUCAGEN) injection 1 mg  1 mg IntraMUSCular PRN    dextrose 10% infusion 0-250 mL  0-250 mL IntraVENous PRN    piperacillin-tazobactam (ZOSYN) 3.375 g in 0.9% sodium chloride (MBP/ADV) 100 mL MBP  3.375 g IntraVENous Q8H    sodium chloride (NS) flush 5-40 mL  5-40 mL IntraVENous Q8H    sodium chloride (NS) flush 5-40 mL  5-40 mL IntraVENous PRN    acetaminophen (TYLENOL) tablet 650 mg  650 mg Oral Q6H PRN    Or    acetaminophen (TYLENOL) suppository 650 mg  650 mg Rectal Q6H PRN    polyethylene glycol (MIRALAX) packet 17 g  17 g Oral DAILY PRN    ondansetron (ZOFRAN ODT) tablet 4 mg  4 mg Oral Q8H PRN    Or    ondansetron (ZOFRAN) injection 4 mg  4 mg IntraVENous Q6H PRN    [Held by provider] heparin (porcine) injection 5,000 Units  5,000 Units SubCUTAneous Q8H    vasopressin (VASOSTRICT) 20 Units in 0.9% sodium chloride 100 mL infusion  0-0.04 Units/min IntraVENous CONTINUOUS    balsam peru-castor oiL (VENELEX) ointment   Topical BID    alcohol 62% (NOZIN) nasal  1 Ampule  1 Ampule Topical Q12H    balsam peru-castor oiL (VENELEX) ointment   Topical BID    famotidine (PF) (PEPCID) 20 mg in 0.9% sodium chloride 10 mL injection  20 mg IntraVENous Q24H    NOREPINephrine (LEVOPHED) 32,000 mcg in 0.9% sodium chloride 250 mL (128 mcg/mL) infusion  0.5-50 mcg/min IntraVENous TITRATE          Elfida Plough, [id number] [facility]  [facility] / [facility]  Andrzej OrtizScotland Memorial Hospitalcarl [address]  [place], [address]  Phone - [phone]               Fax - [phone]

## 2022-05-26 NOTE — PROGRESS NOTES
Fluconazole added  CVVH stopped (documented in dialysis note; RN confirmed it is staying off)  Will adjust fluconazole to 400 mg x 1 dose, then 200 mg IV daily for CrCl < 50 ml/min    LISA CristobalD

## 2022-05-26 NOTE — DIALYSIS
CRRT / [phone]    Orders   Mode: CVVH STOPPED     Metrics   BP: 130/50   HR: 68     Access   Type & Location: R femoral CVC   Comments: Dressing CDI dated 5/24/22. Labs   HBsAg (Antigen) / date: Negative 5/24/22   HBsAb (Antibody) / date: Susceptible 5/24/22   Source: [facility]     Safety:   Time Out Done:   (Time) 1420   Consent obtained/signed: Verified   Education: CVC infection prevention & control. Primary Nurse Rpt Pre: LISBET Maria RN   Primary Nurse Rpt Post: LISBET Maria RN     Comments / Plan:      CRRT discontinued per MD orders, pt transitioning to HD tomorrow. New LIJ non-tunneled CVC being placed by IR team. All possible blood (186 ml) returned by dialysis RN. Both lines flushed with normal saline & capped per policy & procedure. Old [id number] filter & effluent cartridge discarded in red biohazard bag. Machine externally disinfected per policy & procedure and returned to supply closet. Pre/post report given to primary RN.

## 2022-05-26 NOTE — PROGRESS NOTES
Problem: Breathing Pattern - Ineffective  Goal: *Absence of hypoxia  Outcome: Progressing Towards Goal     Problem: Patient Education: Go to Patient Education Activity  Goal: Patient/Family Education  Outcome: Progressing Towards Goal     Problem: Pressure Injury - Risk of  Goal: *Prevention of pressure injury  Description: Document Markos Scale and appropriate interventions in the flowsheet. Outcome: Progressing Towards Goal  Note: Pressure Injury Interventions:  Sensory Interventions: Assess changes in LOC,Assess need for specialty bed,Avoid rigorous massage over bony prominences,Check visual cues for pain,Discuss PT/OT consult with provider,Float heels,Keep linens dry and wrinkle-free,Maintain/enhance activity level,Minimize linen layers,Monitor skin under medical devices,Pressure redistribution bed/mattress (bed type),Turn and reposition approx. every two hours (pillows and wedges if needed)    Moisture Interventions: Absorbent underpads,Apply protective barrier, creams and emollients,Assess need for specialty bed,Check for incontinence Q2 hours and as needed,Internal/External fecal devices,Maintain skin hydration (lotion/cream),Minimize layers,Moisture barrier,Offer toileting Q_hr    Activity Interventions: Assess need for specialty bed,Pressure redistribution bed/mattress(bed type),PT/OT evaluation    Mobility Interventions: Assess need for specialty bed,Float heels,HOB 30 degrees or less,Pressure redistribution bed/mattress (bed type),PT/OT evaluation,Turn and reposition approx.  every two hours(pillow and wedges)    Nutrition Interventions: Document food/fluid/supplement intake,Discuss nutritional consult with provider    Friction and Shear Interventions: Apply protective barrier, creams and emollients,Feet elevated on foot rest,Foam dressings/transparent film/skin sealants,HOB 30 degrees or less,Lift sheet,Lift team/patient mobility team,Minimize layers                Problem: Patient Education: Go to Patient Education Activity  Goal: Patient/Family Education  Outcome: Progressing Towards Goal     Problem: Diabetes Self-Management  Goal: *Disease process and treatment process  Description: Define diabetes and identify own type of diabetes; list 3 options for treating diabetes. Outcome: Progressing Towards Goal  Goal: *Using medications safely  Description: State effect of diabetes medications on diabetes; name diabetes medication taking, action and side effects. Outcome: Progressing Towards Goal  Goal: *Monitoring blood glucose, interpreting and using results  Description: Identify recommended blood glucose targets  and personal targets. Outcome: Progressing Towards Goal  Goal: *Prevention, detection, treatment of acute complications  Description: List symptoms of hyper- and hypoglycemia; describe how to treat low blood sugar and actions for lowering  high blood glucose level. Outcome: Progressing Towards Goal  Goal: *Prevention, detection and treatment of chronic complications  Description: Define the natural course of diabetes and describe the relationship of blood glucose levels to long term complications of diabetes.   Outcome: Progressing Towards Goal  Goal: *Developing strategies to address psychosocial issues  Description: Describe feelings about living with diabetes; identify support needed and support network  Outcome: Progressing Towards Goal     Problem: Patient Education: Go to Patient Education Activity  Goal: Patient/Family Education  Outcome: Progressing Towards Goal     Problem: Patient Education: Go to Patient Education Activity  Goal: Patient/Family Education  Outcome: Progressing Towards Goal     Problem: DKA: Day 1  Goal: Off Pathway (Use only if patient is Off Pathway)  Outcome: Progressing Towards Goal     Problem: DKA: Day 2  Goal: Off Pathway (Use only if patient is Off Pathway)  Outcome: Progressing Towards Goal     Problem: DKA: Day 3  Goal: Off Pathway (Use only if patient is Off Pathway)  Outcome: Progressing Towards Goal     Problem: Hypotension  Goal: *Blood pressure within specified parameters  Outcome: Progressing Towards Goal  Goal: *Fluid volume balance  Outcome: Progressing Towards Goal  Goal: *Labs within defined limits  Outcome: Progressing Towards Goal     Problem: Patient Education: Go to Patient Education Activity  Goal: Patient/Family Education  Outcome: Progressing Towards Goal     Problem: Delirium Treatment  Goal: *Level of consciousness restored to baseline  Outcome: Progressing Towards Goal  Goal: *Level of environmental perceptions restored to baseline  Outcome: Progressing Towards Goal  Goal: *Sensory perception restored to baseline  Outcome: Progressing Towards Goal  Goal: *Emotional stability restored to baseline  Outcome: Progressing Towards Goal  Goal: *Functional assessment restored to baseline  Outcome: Progressing Towards Goal  Goal: *Absence of falls  Outcome: Progressing Towards Goal  Goal: *Will remain free of delirium, CAM Score negative  Outcome: Progressing Towards Goal  Goal: *Cognitive status will be restored to baseline  Outcome: Progressing Towards Goal  Goal: Interventions  Outcome: Progressing Towards Goal     Problem: Patient Education: Go to Patient Education Activity  Goal: Patient/Family Education  Outcome: Progressing Towards Goal     Problem: Infection - Risk of, Central Venous Catheter-Associated Bloodstream Infection  Goal: *Absence of infection signs and symptoms  Outcome: Progressing Towards Goal     Problem: Patient Education: Go to Patient Education Activity  Goal: Patient/Family Education  Outcome: Progressing Towards Goal     Problem: Infection - Risk of, Urinary Catheter-Associated Urinary Tract Infection  Goal: *Absence of infection signs and symptoms  Outcome: Progressing Towards Goal     Problem: Patient Education: Go to Patient Education Activity  Goal: Patient/Family Education  Outcome: Progressing Towards Goal     Problem: Falls - Risk of  Goal: *Absence of Falls  Description: Document Robina Bakeret Fall Risk and appropriate interventions in the flowsheet.   Outcome: Progressing Towards Goal  Note: Fall Risk Interventions:       Mentation Interventions: Adequate sleep, hydration, pain control,Bed/chair exit alarm,Door open when patient unattended,Increase mobility,More frequent rounding,Reorient patient,Room close to nurse's station,Toileting rounds,Update white board    Medication Interventions: Assess postural VS orthostatic hypotension,Bed/chair exit alarm,Evaluate medications/consider consulting pharmacy,Patient to call before getting OOB,Teach patient to arise slowly    Elimination Interventions: Bed/chair exit alarm,Call light in reach,Patient to call for help with toileting needs,Stay With Me (per policy),Toilet paper/wipes in reach,Toileting schedule/hourly rounds    History of Falls Interventions: Bed/chair exit alarm,Door open when patient unattended,Investigate reason for fall,Room close to nurse's station         Problem: Patient Education: Go to Patient Education Activity  Goal: Patient/Family Education  Outcome: Progressing Towards Goal

## 2022-05-27 NOTE — PROGRESS NOTES
Critical Care Progress Note  Sanju Rivero MD          Date of Service:  2022  NAME:  Vani Lincoln  :  1934  MRN:  [id number]      Subjective/Hospital course:      : Patient remains on 3 vasopressors. Mental status is the same as yesterday, minimally responsive. Protecting her air.   : Patient is more awake than yesteday. She is still on 10mcg/min of levophed and vasopressin. On CRRT with no factor. : Patient is still on 6mcg/min of levophed. Confused and disoriented. On CRRT with no factor. : Patient looks significantly better than yesterday. She is alert and oriented. On HD, off pressors. No acute events overnight. Problem list:     -septic shock.  -UTI. -? Pneumonia. -Symptomatic bradycardia. -Severe DKA. -Acute metabolic encephalopathy.  -Hyperkalemia.  -Acute renal failure. -H/o HTN.  -HLD. Assessment/Plan:     Septic shock.  -Sec to UTI/pneumonia.  -Continue zosyn. F/u S/S on cultures. -GNR positive in blood and urine cultures. vanc was DCed on .       UTI/Pneumonia.  -Abx as above.     DKA. -Likely patient was not taking insulin at home as her A1c is >14  -Transitioned to sub cut insulin.     Elevated lipase.  -Non contrast CT is neg for changes of acute pancreatitis. -Lipase improving.  -Restart oral feeding if ok with speech therapy. Acute renal failure.  -Likely ATN from shock.  -Closely monitor renal functions and UO. -Avoid nephrotoxins. -CRRT stopped on , today is first HD .     Acute metabolic encephalopathy.  -Improving. Closely monitor mental status.  -manage metabolic issues as above.     Symptomatic bradycardia.  -Likely precipitated by severe acidemia, also on BB at home.   -Again had long pauses on , EP following.       DVT prophylaxis. [facility]. SUP. Pepcid. Code status. Full code. I personally spent --- minutes of critical care time.   This is time spent at this critically ill patient's bedside actively involved in patient care as well as the coordination of care and discussions with the patient's family. This does not include any procedural time which has been billed separately. Review of Systems:   Review of systems not obtained due to patient factors. Vital Signs:     Patient Vitals for the past 4 hrs:   BP Temp Pulse Resp SpO2   05/27/22 0800 138/73 98.4 °F (36.9 °C) 97 21 97 %   05/27/22 0700 (!) 146/80 -- 69 22 96 %   05/27/22 0600 125/79 -- 98 23 97 %   05/27/22 0500 (!) 145/73 -- 73 20 96 %        Intake/Output Summary (Last 24 hours) at 5/27/2022 0850  Last data filed at 5/27/2022 0800  Gross per 24 hour   Intake 1694.41 ml   Output 667 ml   Net 1027.41 ml        Physical Examination:    Physical Exam  Constitutional:       General: She is not in acute distress. Appearance: She is ill-appearing. Comments: Altered mental status. HENT:      Head: Normocephalic and atraumatic. Mouth/Throat:      Mouth: Mucous membranes are moist.   Eyes:      Extraocular Movements: Extraocular movements intact. Conjunctiva/sclera: Conjunctivae normal.   Cardiovascular:      Rate and Rhythm: Normal rate and regular rhythm. Pulmonary:      Effort: Pulmonary effort is normal. No respiratory distress. Breath sounds: Normal breath sounds. Abdominal:      General: Abdomen is flat. There is no distension. Palpations: Abdomen is soft. Tenderness: There is no abdominal tenderness. There is no guarding. Musculoskeletal:      Cervical back: Normal range of motion and neck supple. Neurological:      Mental Status: She is alert. Comments: Alert and awake. Labs:   Labs and imaging reviewed.       Medications:     Current Facility-Administered Medications   Medication Dose Route Frequency    albumin human 25% (BUMINATE) solution 25 g  25 g IntraVENous DIALYSIS PRN    fluconazole (DIFLUCAN) 200mg/100 mL IVPB (premix)  200 mg IntraVENous Q24H    0.45% sodium chloride infusion  50 mL/hr IntraVENous CONTINUOUS    insulin NPH (NOVOLIN N, HUMULIN N) injection 14 Units  14 Units SubCUTAneous BID    hydrocortisone Sod Succ (PF) (SOLU-CORTEF) injection 50 mg  50 mg IntraVENous Q6H    insulin lispro (HUMALOG) injection   SubCUTAneous Q6H    glucose chewable tablet 16 g  4 Tablet Oral PRN    glucagon (GLUCAGEN) injection 1 mg  1 mg IntraMUSCular PRN    dextrose 10% infusion 0-250 mL  0-250 mL IntraVENous PRN    piperacillin-tazobactam (ZOSYN) 3.375 g in 0.9% sodium chloride (MBP/ADV) 100 mL MBP  3.375 g IntraVENous Q8H    sodium chloride (NS) flush 5-40 mL  5-40 mL IntraVENous Q8H    sodium chloride (NS) flush 5-40 mL  5-40 mL IntraVENous PRN    acetaminophen (TYLENOL) tablet 650 mg  650 mg Oral Q6H PRN    Or    acetaminophen (TYLENOL) suppository 650 mg  650 mg Rectal Q6H PRN    polyethylene glycol (MIRALAX) packet 17 g  17 g Oral DAILY PRN    ondansetron (ZOFRAN ODT) tablet 4 mg  4 mg Oral Q8H PRN    Or    ondansetron (ZOFRAN) injection 4 mg  4 mg IntraVENous Q6H PRN    [Held by provider] heparin (porcine) injection 5,000 Units  5,000 Units SubCUTAneous Q8H    balsam peru-castor oiL (VENELEX) ointment   Topical BID    alcohol 62% (NOZIN) nasal  1 Ampule  1 Ampule Topical Q12H    balsam peru-castor oiL (VENELEX) ointment   Topical BID    famotidine (PF) (PEPCID) 20 mg in 0.9% sodium chloride 10 mL injection  20 mg IntraVENous Q24H     ______________________________________________________________________  EXPECTED LENGTH OF STAY: 4d 19h  ACTUAL LENGTH OF STAY:          Lan Blackwood 68, MD   Pulmonary/CCM  [address]  [phone]  5/27/2022

## 2022-05-27 NOTE — PROGRESS NOTES
EP/ ARRHYTHMIA    Patient ID:  Patient: Keri Flannery  MRN: [id number]  Age: 80 y.o.  : 1934    Date of  Admission: 2022  7:44 AM   PCP:  Gladis Elias MD    Assessment: 1. Profound sinus bradycardia 20-30's in the setting of DKA, hyperkalemia, and OP beta-blocker. This has resolved. 2. Paroxysmal atrial fibrillation with post-conversion pauses up to 6 seconds. 3. 2nd degree AV block with single dropped PAC's, RBBB with normal DC in sinus rhythm. 4. Hypotension likely due to dehydration/volume depletion, metabolic acidosis, bradycardia. Improved with volume resuscitation, pressor. 5. No evidence of acute ischemia or infarct by presenting ECG. Echo with normal LV and RV systolic function. 6. GLORIA atop CKD stage 3 at baseline. Rhabdomyolysis. 7. Diabetes mellitus type 2 on chronic insulin complicated by DKA. 8. Elevated lipase possibly due to DKA rather than acute pancreatitis. 9. Metabolic encephalopathy, improving. 10. Anemia. 11. Thrombocytopenia. 12. Full code. Plan:     1. I'd tolerate the post-conversion pauses while she's in bed. No risk of injury. 2. She's on DVT prophylaxis at this time. I'd avoid FULL anticoagulation if possible given her anemia and thrombocytopenia as long as Afib episodes are brief, self-limited. 3. Agree with correcting electrolyte and acid-base disarray. She was on CVVH, will change to HD. 4. Off pressor. Discussed with nursing. [x]       High complexity decision making was performed in this patient at high risk for decompensation with multiple organ involvement. Keri Flannery is a 80 y.o. female with a history of progressive weakness, fatigue, increasing urination and thirst.  Found with altered mental status by family. Brought in by EMS, transcutaneous pacing. Labs showed pH 6.99,  K 6.9--ER has treated this, reassessment pending. She could not relay a history.   Granddaughter present in the room at the time.    Admitted to ICU. Started on pressors, CVVH. Adjustments made to her regimen thereafter. Currently denies chest pain, dizziness, palpitations. She is not SOB at rest.    Echo:  Left Ventricle Normal left ventricular systolic function with a visually estimated EF of 55 - 60%. Left ventricle size is normal. Increased wall thickness. Normal wall motion. Indeterminate diastolic function. Left Atrium Left atrium size is normal.   Right Ventricle Right ventricle is mildly dilated. Normal systolic function. Right Atrium Right atrium size is normal.   Aortic Valve Valve structure is normal. Calcified cusp. Trace regurgitation. No stenosis. Mitral Valve Thickened leaflet. Trace regurgitation. No stenosis noted. Tricuspid Valve Valve structure is normal. Mild to moderate regurgitation. No stenosis noted. Pulmonic Valve Valve structure is normal. Trace regurgitation. No stenosis noted. Aorta Normal sized aorta. Pericardium Trivial pericardial effusion present. No indication of cardiac tamponade.      No Known Allergies       Current Facility-Administered Medications   Medication Dose Route Frequency    albumin human 25% (BUMINATE) solution 25 g  25 g IntraVENous DIALYSIS PRN    insulin NPH (NOVOLIN N, HUMULIN N) injection 20 Units  20 Units SubCUTAneous BID    metoprolol (LOPRESSOR) injection 2.5 mg  2.5 mg IntraVENous Q6H PRN    bumetanide (BUMEX) injection 2 mg  2 mg IntraVENous BID    insulin lispro (HUMALOG) injection   SubCUTAneous AC&HS    piperacillin-tazobactam (ZOSYN) 3.375 g in 0.9% sodium chloride (MBP/ADV) 100 mL MBP  3.375 g IntraVENous Q12H    fluconazole (DIFLUCAN) 200mg/100 mL IVPB (premix)  200 mg IntraVENous Q24H    hydrocortisone Sod Succ (PF) (SOLU-CORTEF) injection 50 mg  50 mg IntraVENous Q6H    glucose chewable tablet 16 g  4 Tablet Oral PRN    glucagon (GLUCAGEN) injection 1 mg  1 mg IntraMUSCular PRN    dextrose 10% infusion 0-250 mL  0-250 mL IntraVENous PRN    sodium chloride (NS) flush 5-40 mL  5-40 mL IntraVENous Q8H    sodium chloride (NS) flush 5-40 mL  5-40 mL IntraVENous PRN    acetaminophen (TYLENOL) tablet 650 mg  650 mg Oral Q6H PRN    Or    acetaminophen (TYLENOL) suppository 650 mg  650 mg Rectal Q6H PRN    polyethylene glycol (MIRALAX) packet 17 g  17 g Oral DAILY PRN    ondansetron (ZOFRAN ODT) tablet 4 mg  4 mg Oral Q8H PRN    Or    ondansetron (ZOFRAN) injection 4 mg  4 mg IntraVENous Q6H PRN    [Held by provider] heparin (porcine) injection 5,000 Units  5,000 Units SubCUTAneous Q8H    balsam peru-castor oiL (VENELEX) ointment   Topical BID    alcohol 62% (NOZIN) nasal  1 Ampule  1 Ampule Topical Q12H    balsam peru-castor oiL (VENELEX) ointment   Topical BID    famotidine (PF) (PEPCID) 20 mg in 0.9% sodium chloride 10 mL injection  20 mg IntraVENous Q24H       Review of Symptoms:  See HPI. She cannot give a history.        Objective:      Physical Exam:  Temp (24hrs), Av.1 °F (36.7 °C), Min:97.8 °F (36.6 °C), Max:98.4 °F (36.9 °C)    Patient Vitals for the past 8 hrs:   Pulse   22 1727 78   22 1700 85   22 1600 82   22 1500 67   22 1400 72   22 1300 74   22 1200 74   22 1150 80   22 1145 68   22 1130 62   22 1115 64   22 1100 69    Patient Vitals for the past 8 hrs:   Resp   22 1700 20   22 1600 20   22 1500 19   22 1400 20   22 1300 25   22 1200 23   22 1145 21   22 1130 20   22 1115 15   22 1100 21    Patient Vitals for the past 8 hrs:   BP   22 1727 (!) 145/55   22 1700 129/69   22 1600 109/83   22 1500 (!) 129/52   22 1400 (!) 128/93   22 1300 (!) 147/131   22 1200 (!) 151/60   22 1150 (!) 156/68   22 1145 (!) 156/68   22 1130 (!) 140/73   22 1115 135/67   22 1100 (!) 166/78          Intake/Output Summary (Last 24 hours) at 5/27/2022 1852  Last data filed at 5/27/2022 1600  Gross per 24 hour   Intake 850.18 ml   Output 2007 ml   Net -1156.82 ml       Nondiaphoretic, elderly female. No scleral icterus, mucous membranes moist, conjuctivae pink, no xanthelasma. Unlabored but shallow respirations, clear to auscultation bilaterally anteriorly, symmetric air movement. REGULAR rhythm, no murmur, pericardial rub, knock, or gallop. No JVD or peripheral edema. Abdomen, soft, nontender, nondistended. Extremities without cyanosis or clubbing.  +venostasis changes. Muscle tone and bulk normal for age. Skin warm and dry. No rashes or ulcers. Neuro grossly nonfocal.  No tremor. CARDIOGRAPHICS and STUDIES, I reviewed:    Telemetry:  Sinus rhythm 60's currently. Earlier has paroxysmal Afib and a post-conversion pause of 6 seconds at 3:21 am.    ECG in ER:  Profound sinus bradycardia with normal TX interval, RBBB, QTc normal accounting for profound bradycardia. No acute ischemia or infarct. Follow-up ECG 5/25 shows sinus rhythm with normal TX, RBBB with normal axis. There are documented post-conversion pauses from Afib. Labs:  Recent Labs     05/27/22  0308 05/26/22  0206 05/25/22  1311   CPK 9,410* 21,025* 22,325*     Lab Results   Component Value Date/Time    Triglyceride 95 05/26/2022 08:30 AM     No results for input(s): INR, PTP, APTT, INREXT, INREXT in the last 72 hours.    Recent Labs     05/27/22  0308 05/26/22  0206 05/25/22  1311    139 138   K 4.4 4.2 4.8    107 107   CO2 23 23 22   BUN 31* 23* 30*   CREA 2.64* 1.95* 2.31*   * 126* 187*   PHOS 3.3 2.6 3.9   CA 7.2* 7.5* 7.7*   ALB 1.8* 1.9* 2.0*   WBC 12.5* 9.1 11.9*   HGB 9.0* 8.8* 9.1*   HCT 27.5* 27.3* 28.0*   PLT 69* 68* 72*     Recent Labs     05/27/22  0308 05/26/22  0206 05/25/22  1311 05/25/22  0408 05/25/22  0408    136*  --   --  50   TP 5.0* 5.3*  --   --  3.7*   ALB 1.8* 1.9* 2.0*   < > 1.4*   GLOB 3.2 3.4  --   --  2.3   LPSE  -- 1,107*  --   --  1,958*    < > = values in this interval not displayed. No components found for: GLPOC  No results for input(s): PH, PCO2, PO2 in the last 72 hours.         Araceli Hamilton MD  5/27/2022

## 2022-05-27 NOTE — PROGRESS NOTES
Nephrology Progress Note  [facility]     [email or web address]  Phone - [phone]   Patient: Kim Allen    YOB: 1934        Date- 5/27/2022   Admit Date: 5/23/2022  CC: Follow up for GLORIA , hyperkalemia         IMPRESSION & PLAN:    GLORIA sec to  ATN  V/s progression of CKD   CKD stage 3 b- cr 1.3 in march 2021   Hyperkalemia - resolved   DKA   Metabolic encephalopathy   Hypernatremia   Bradycardia   Shock- sepsis   UTI   acidosis    PLAN-   CRRT stopped on 5/26/2022   Started on conventional HD today, plan for titration up to 2-1/2 L.  Will reassess for HD needs for tomorrow.  We will start on Bumex 2 mg IV twice daily.  Continue vasopressor support   D/w ICU  team     Subjective: Interval History:   -  She is anuric  -Seen on hemodialysis today. -Much better blood pressure, off pressors. Objective:   Vitals:    05/27/22 1015 05/27/22 1030 05/27/22 1045 05/27/22 1100   BP: 136/88 (!) 148/75 (!) 156/78 (!) 166/78   Pulse: (!) 104 67 70 69   Resp: 22 21 19 21   Temp:       SpO2: 96% 97% 92% 98%   Weight:       Height:          05/26 0701 - 05/27 0700  In: 1797.3 [P.O.:130; I.V.:1667.3]  Out: 802   Last 3 Recorded Weights in this Encounter    05/23/22 0957 05/24/22 0727 05/26/22 0631   Weight: 89.2 kg (196 lb 10.4 oz) 88.9 kg (196 lb) 82.6 kg (182 lb 1.6 oz)      Physical exam:    GEN:  NAD  NECK:  Supple, no thyromegaly  RESP: Clear  b/l, no  wheezing,   CVS: RRR,S1,S2   NEURO: non focal  EXT: + Edema   ; HERNANDEZ +  PSYCH: Can't access due to patient's current condition       Chart reviewed. Pertinent Notes reviewed.      Data Review :  Recent Labs     05/27/22  0308 05/26/22  0206 05/25/22  1311    139 138   K 4.4 4.2 4.8    107 107   CO2 23 23 22   BUN 31* 23* 30*   CREA 2.64* 1.95* 2.31*   * 126* 187*   CA 7.2* 7.5* 7.7*   MG 2.8* 2.5* 2.7*   PHOS 3.3 2.6 3.9     Recent Labs     05/27/22  0308 05/26/22  0206 05/25/22  1311   WBC 12.5* 9.1 11.9*   HGB 9.0* 8.8* 9.1*   HCT 27.5* 27.3* 28.0*   PLT 69* 68* 72*     No results for input(s): FE, TIBC, PSAT, FERR in the last 72 hours.    Medication list  reviewed  Current Facility-Administered Medications   Medication Dose Route Frequency    albumin human 25% (BUMINATE) solution 25 g  25 g IntraVENous DIALYSIS PRN    insulin NPH (NOVOLIN N, HUMULIN N) injection 20 Units  20 Units SubCUTAneous BID    metoprolol (LOPRESSOR) injection 2.5 mg  2.5 mg IntraVENous Q6H PRN    fluconazole (DIFLUCAN) 200mg/100 mL IVPB (premix)  200 mg IntraVENous Q24H    hydrocortisone Sod Succ (PF) (SOLU-CORTEF) injection 50 mg  50 mg IntraVENous Q6H    insulin lispro (HUMALOG) injection   SubCUTAneous Q6H    glucose chewable tablet 16 g  4 Tablet Oral PRN    glucagon (GLUCAGEN) injection 1 mg  1 mg IntraMUSCular PRN    dextrose 10% infusion 0-250 mL  0-250 mL IntraVENous PRN    piperacillin-tazobactam (ZOSYN) 3.375 g in 0.9% sodium chloride (MBP/ADV) 100 mL MBP  3.375 g IntraVENous Q8H    sodium chloride (NS) flush 5-40 mL  5-40 mL IntraVENous Q8H    sodium chloride (NS) flush 5-40 mL  5-40 mL IntraVENous PRN    acetaminophen (TYLENOL) tablet 650 mg  650 mg Oral Q6H PRN    Or    acetaminophen (TYLENOL) suppository 650 mg  650 mg Rectal Q6H PRN    polyethylene glycol (MIRALAX) packet 17 g  17 g Oral DAILY PRN    ondansetron (ZOFRAN ODT) tablet 4 mg  4 mg Oral Q8H PRN    Or    ondansetron (ZOFRAN) injection 4 mg  4 mg IntraVENous Q6H PRN    [Held by provider] heparin (porcine) injection 5,000 Units  5,000 Units SubCUTAneous Q8H    balsam peru-castor oiL (VENELEX) ointment   Topical BID    alcohol 62% (NOZIN) nasal  1 Ampule  1 Ampule Topical Q12H    balsam peru-castor oiL (VENELEX) ointment   Topical BID    famotidine (PF) (PEPCID) 20 mg in 0.9% sodium chloride 10 mL injection  20 mg IntraVENous Q24H          Flora Lopez, [id number] [facility] Alyssa Joseph [address], [address]areth [address]  [place], [address]  Phone - [phone]               Fax - [phone]

## 2022-05-27 NOTE — PROGRESS NOTES
2000  Bedside and Verbal shift change report given to Betsy Huntley (oncoming nurse) by Doug Montano RN (offgoing nurse). Report included the following information SBAR, Kardex, ED Summary, Intake/Output, MAR, Recent Results and Cardiac Rhythm SR w/PAC and PVC. Patient able to communicate needs. .  Denies pain at this time. Assessment initiated. 2130  Assessment as per flow. Patient resting comfortably/VSS. 0040  No change in assessment other than patient had a very large dark green mucous BM. Dressing to sacrum changed. Still no urine output. 0430  VSS. Labs pending. 4286  Labs per flow. UOP for the night 5 ml. COvered BG of 251 with three units of Lispro.

## 2022-05-27 NOTE — PROGRESS NOTES
Palliative Medicine  [phone]    Checked by a few times, but no family at bedside and Mrs Vanessa Yan was sleeping well  She needs an AMD, but I do not want to do one without family present, and I am not 100% sure she is with it enough to do one yet    Will check back next week to complete one    Saniya Check, NP

## 2022-05-27 NOTE — DIABETES MGMT
[facility]    CLINICAL NURSE SPECIALIST CONSULT     Initial Presentation   Micaela Arora is a 80 y.o. female admitted from the ER today 5/23/22 in DKA with profound bradycardia, after being found down by granddaughter. Granddaughter states that patient is independent in her activities of daily living, and has a routine that includes her diabetes self-care. He grandmother had reported that she wasn't feeling well and the granddaughter had made arrangements for a home visit. When she went into her room this morning to remind her of the visit, she found her down on the floor. EMS called. Of note, granddaughter states that she (herself) had been hospitalized for a month and returned home at the end of April. She surmises that her grandmother may not have been taking her insulin because there was more insulin in the refrigerator than there should have been. LAB:  WBC 10.9. AST 75. Lipase >3000. Troponin 31. NT pro-BNP 1588. Urinary glucose & ketone +. CT Head: No extra-axial fluid collection, hemorrhage or shift. Atrophy mostly posterior fossa. CXR: Mild pulmonary edema    HX:   Past Medical History:   Diagnosis Date    Diabetes ([facility]       INITIAL DX:   DKA (diabetic ketoacidosis) ([facility] [E11.10]     Current Treatment     TX: Insulin. Pepcid. ABx. Steroids    Consulted by Provider for advanced diabetes nursing assessment and care for:   [x] Transitioning off Towana Constable   [x] Inpatient management strategy  [] Home management assessment  [] Survival skill education    Hospital Course   Clinical progress has been complicated by need for ICU level of care. 5/24/22 Alert but babbling; Precedex+. O2NC. Remains on three (3) pressors. CRRT+. Scheduled for CT chest & ABd wo contrast today. Plans to add steroids. Discussion of transition off Towana Constable made during IPR.  5/25/22 Alert. Conversing in understandable language. O2NC. Afib. Remains on two (2) pressors.  Skin per wound care/nursing. CRRT+  5/26/22 Alert & oriented to person. Off O2. NPO except for supplements. CRRT+. 5/27/22 Alert & oriented. Answering questions clearly. Dialysis now+. Diabetes History   Type 2 diabetes treated with insulin therapy  Admission BG 1259 with AG 30    Diabetes-related Medical History: Deferred    Diabetes Medication History  Key Antihyperglycemic Medications             glipiZIDE (GLUCOTROL) 10 mg tablet Take 10 mg by mouth daily. insulin lispro protamine/insulin lispro (HUMALOG MIX 75/25) 100 unit/mL (75-25) injection 48 Units by SubCUTAneous route daily. take 48 units in the morning    insulin lispro protamine/insulin lispro (HUMALOG MIX 75/25) 100 unit/mL (75-25) injection 32 Units by SubCUTAneous route every evening. take 32 units subcutaneously at night. Diabetes self-management practices: Per granddaughter  Eating pattern   [x] Not eating a carbohydrate-controlled mealplan  [x] Breakfast Cheerios with milk & banana. Coffee (may have had Cheese toast earlier)  [x] Sankc  Chips a Hoy cookies (while she is watching TV  [x] Dinner  With granddaughter   Physical activity pattern - Uses [employer] to move about the house   [x] Not employing a physical activity program to control BG  Monitoring pattern - Tracks on a tablet by bedside & takes to her provider   [x] Testing BGs   [x] Breakfast   Taking medications pattern  [x] Consistent administration  [x] Affordable  Overall evaluation:    [x] Last A1c located 9% (3/9/2021) @Pending sale to [facility]    Subjective   \"Yes, I slept last night. \"     Objective   Physical exam  General Obese female in no acute distress  Neuro  Alert and talking  Vital Signs   Visit Vitals  BP (!) 151/88   Pulse (!) 104   Temp 98.4 °F (36.9 °C)   Resp 22   Ht 5' 6\" (1.676 m)   Wt 82.6 kg (182 lb 1.6 oz)   SpO2 95%   BMI 29.39 kg/m²     Laboratory  Recent Labs     05/27/22  0308 05/26/22  0830 05/26/22  0206 05/25/22  1311 05/25/22  0408 05/25/22  0408   *  -- 126* 187*   < > 173*   AGAP 10  --  9 9   < > 11   TRIGL  --  95  --   --   --   --    WBC 12.5*  --  9.1 11.9*   < > 13.3*   CREA 2.64*  --  1.95* 2.31*   < > 1.81*   GFRNA 17*  --  24* 20*   < > 26*   *  --  648*  --   --  592*   *  --  226*  --   --  176*    < > = values in this interval not displayed. Factors impacting BG management  Factor Dose Comments   Nutrition:  Dysphagia meals with supplements    Began mid-day 5/26/22   Drugs:  Steroids   HC 50mg Q6 hrs   Impairs insulin action   Infection Diflucan Q24 hrs  Zosyn Q8 hrs Afebrile. WBCs sloan   Other:   Kidney function  Liver function   GLORIA  Enzymes remain elevated   Dialysis+     Blood glucose pattern      Significant diabetes-related events over the past 24-72 hours  Admitted in profound DKA with electrolyte disturbances. BG 1259 with AG 30  On Glucostabilizer; received 27-32 units of insulin/hour initially  BG finally under 250mg/dl at 1am 5/24/22 after 5L of fluid in ICU  Significant GLORIA - Requiring dialysis  Steroids started 5/24/22 - BGs higher than target  NPH dose adjustment with BGs in 120-150s until she began eating 5/26/22    Assessment and Plan   Nursing Diagnosis Risk for unstable blood glucose pattern   Nursing Intervention Domain 8795 Decision-making Support   Nursing Interventions Examined current inpatient diabetes/blood glucose control   Explored factors facilitating and impeding inpatient management     Evaluation   This overweight AA female was admitted in DKA associated with bradycardia. Received significant amounts of insulin/hour via Glucostabilizer. Fluid resuscitation also needed in presence of elevated NT pro-BNP. BGs down under 250mg/dl at 1am 5/24/22, and AG closed at 8am. Transitioned off GS, and steroids added 5/24/22. BGs sloan into the low 200s. NPH insulin started to override steroids in presence of kidney dysfunction with small increase in dose. BGs in target until she began eating yesterday 5/26/22.     This woman uses combo insulin at home, which is not available in-house so we can tailor dosing to changing circumstances. Current insulin dose is at half her usual basal insulin dose, but her kidney function has deteriorated in light of her presenting situation, e.g., DKA. Recommend increasing basal dose incrementally to establish basal dose in light of GLORIA/CKD. Recommendations     [x] Increase NPH insulin 20 units twice daily (approx 65% of basal dose)    [x] Use HIGH sensitivity corrective insulin     Billing Code(s)   [x] 28154 IP subsequent hospital care - 25 minutes     Before making these care recommendations, I personally reviewed the hospitalization record, including notes, laboratory & diagnostic data and current medications, and examined the patient at the bedside (circumstances permitting) before making care recommendations. More than fifty (50) percent of the time was spent in patient counseling and/or care coordination.   Total minutes: [address], CNS  Diabetes Clinical Nurse Specialist  [facility]  Access via [employer]

## 2022-05-27 NOTE — PROGRESS NOTES
Comprehensive Nutrition Assessment    Type and Reason for Visit: Reassess    Nutrition Recommendations/Plan:   1. Continue diet and supplements as ordered     Malnutrition Assessment:  Malnutrition Status: At risk for malnutrition (specify) (05/25/22 1338)        Nutrition Assessment:  Chart reviewed, case discussed during CCU rounds. Pt sleeping soundly on HD at time of attempted visit, did not wake her. No family in the room to speak with or trays to observe. PO intake appears to be improving per RN flowsheet's. Diet upgraded by SLP.  K 4.4 and phos 3.3. BG remains in the 200's, diabetes team is following and making recommendations. BM noted yesterday. Patient Vitals for the past 168 hrs:   % Diet Eaten   05/26/22 1812 26 - 50%   05/26/22 1330 1 - 25%   05/25/22 1348 26 - 50%     Patient Vitals for the past 168 hrs:   Supplement intake %   05/26/22 1330 51 - 75%          Nutrition Related Findings:    Meds: pepcid, solucortef, humalog, insulin NPH, zosyn, Kphos, vancomycin. Edema: +1 pitting-BLE. BM 5/26 Wound Type: Deep tissue injury (sacrum, heels)    Current Nutrition Intake & Therapies:  Average Meal Intake: 26-50%  Average Supplement Intake: 51-75%  ADULT ORAL NUTRITION SUPPLEMENT Breakfast, Lunch, Dinner; Renal Supplement  ADULT DIET Dysphagia - Pureed; 3 carb choices (45 gm/meal)    Anthropometric Measures:  Height: 5' 6\" (167.6 cm)  Ideal Body Weight (IBW): 130 lbs (59 kg)     Current Body Wt:  82.6 kg (182 lb 1.6 oz), 150.8 % IBW. Bed scale  Current BMI (kg/m2): 29.4                          BMI Category: Overweight (BMI 25.0-29. 9)    Estimated Daily Nutrient Needs:  Energy Requirements Based On: Formula  Weight Used for Energy Requirements: Current  Energy (kcal/day): MSJ 1750 (1336 x 1.3)  Weight Used for Protein Requirements: Current  Protein (g/day): 89g (1gPro/kg)  Method Used for Fluid Requirements: Standard renal  Fluid (ml/day): 1200mL    Nutrition Diagnosis:   · Inadequate protein-energy intake related to other (specify) (poor appetite) as evidenced by intake 0-25%  Previous dx resolving.      Nutrition Interventions:   Food and/or Nutrient Delivery: Continue current diet,Continue oral nutrition supplement  Nutrition Education/Counseling: No recommendations at this time  Coordination of Nutrition Care: Continue to monitor while inpatient,Interdisciplinary rounds       Goals:  Previous Goal Met: Progressing toward goal(s)  Goals: PO intake 75% or greater,by next RD assessment (in 3-5 days)       Nutrition Monitoring and Evaluation:   Behavioral-Environmental Outcomes: None identified  Food/Nutrient Intake Outcomes: Food and nutrient intake,Supplement intake  Physical Signs/Symptoms Outcomes: Biochemical data,Nutrition focused physical findings,Skin,Weight,Fluid status or edema,Hemodynamic status    Discharge Planning:    Continue oral nutrition supplement,Continue current diet    Bernadette Camarena RD, CNSC  Contact: ext [phone]

## 2022-05-27 NOTE — PROGRESS NOTES
0700- Shift report received from MARCIA Gonzalez Report included the following information SBAR, Kardex, ED Summary, Procedure Summary, Intake/Output, MAR, and Recent Results. 80- [facility] at bedside for HD treatment. 0800- Shift assessment complete, pt resting comfortably in bed. Alert and oriented to self and place but disoriented to time and situation. Currently Normal Sinus with ectopy. R IJ intact, normal saline KVO infusing. See Flowsheets for more details. 4570- Pt appears to be having more frequent PAC's, Dr. Bajwa How aware. New orders noted for 2.5 mg Metoprolol. 6966- PRN Metoprolol given. 1030- Interdisciplinary rounds held at bedside, new orders noted. 1200- Reassessment complete, pt resting comfortably in bed, see Flowsheets for more details. 1230- No urine output over the last 24 hrs. Repeated bladder scanned pt, no urine noted on scan. 1600- Reassessment complete, pt resting comfortably in bed. See Flowsheets for more details. Granddaughter at bedside, updated on pt condition.      1900- Shift report given to MARCIA Gonzalez Report included the following information SBAR, Kardex, ED Summary, Procedure Summary, Intake/Output, MAR, and Recent Results

## 2022-05-27 NOTE — PROGRESS NOTES
Speech pathology note  Reviewed chart and discussed case with RN who reported good tolerance of puree/thin liquid diet but limited intake. Patient currently on dialysis and remains confused, therefore is unlikely to be appropriate for diet liberalization. SLP will follow up next week as mental status improves. Thank you.

## 2022-05-28 NOTE — PROGRESS NOTES
Report recd from offgoing RN. Skin assessment completed during handoff. 1133 Afib noted on monitor rate from 108-127 's. Will monitor pat. Per report PRN Metop available. 1146 2.5 mg Metop given SIVP for afib rate 120's, Post IV push pat .    1209 Pat in SR w multifocal PVC's. No other changes in pat exam.      1810 Pat currently in afib rate of 112-121 . Will monitor pat.

## 2022-05-28 NOTE — PROGRESS NOTES
[address]  YOB: 1934          Assessment & Plan:   HD dependent GLORIA due to ATN  CKD 3b  DKA  Sepsis  UTI    Rec:  No acute indication HD  HD Monday and MWF  Monitor for recovery. Minimal UOP. Will stop Bumex       Subjective:   CC: f/u GLORIA  HPI: HD yest daphne well. Minimal UOP. Comfortable on RA.    ROS: no sob/n/v  Current Facility-Administered Medications   Medication Dose Route Frequency    albumin human 25% (BUMINATE) solution 25 g  25 g IntraVENous DIALYSIS PRN    insulin NPH (NOVOLIN N, HUMULIN N) injection 20 Units  20 Units SubCUTAneous BID    metoprolol (LOPRESSOR) injection 2.5 mg  2.5 mg IntraVENous Q6H PRN    bumetanide (BUMEX) injection 2 mg  2 mg IntraVENous BID    insulin lispro (HUMALOG) injection   SubCUTAneous AC&HS    piperacillin-tazobactam (ZOSYN) 3.375 g in 0.9% sodium chloride (MBP/ADV) 100 mL MBP  3.375 g IntraVENous Q12H    fluconazole (DIFLUCAN) 200mg/100 mL IVPB (premix)  200 mg IntraVENous Q24H    hydrocortisone Sod Succ (PF) (SOLU-CORTEF) injection 50 mg  50 mg IntraVENous Q6H    glucose chewable tablet 16 g  4 Tablet Oral PRN    glucagon (GLUCAGEN) injection 1 mg  1 mg IntraMUSCular PRN    dextrose 10% infusion 0-250 mL  0-250 mL IntraVENous PRN    sodium chloride (NS) flush 5-40 mL  5-40 mL IntraVENous Q8H    sodium chloride (NS) flush 5-40 mL  5-40 mL IntraVENous PRN    acetaminophen (TYLENOL) tablet 650 mg  650 mg Oral Q6H PRN    Or    acetaminophen (TYLENOL) suppository 650 mg  650 mg Rectal Q6H PRN    polyethylene glycol (MIRALAX) packet 17 g  17 g Oral DAILY PRN    ondansetron (ZOFRAN ODT) tablet 4 mg  4 mg Oral Q8H PRN    Or    ondansetron (ZOFRAN) injection 4 mg  4 mg IntraVENous Q6H PRN    [Held by provider] heparin (porcine) injection 5,000 Units  5,000 Units SubCUTAneous Q8H    balsam peru-castor oiL (VENELEX) ointment   Topical BID    alcohol 62% (NOZIN) nasal  1 Ampule  1 Ampule Topical Q12H    balsam peru-castor oiL (VENELEX) ointment   Topical BID    famotidine (PF) (PEPCID) 20 mg in 0.9% sodium chloride 10 mL injection  20 mg IntraVENous Q24H          Objective:     Vitals:  Blood pressure (!) 155/49, pulse 74, temperature 97.9 °F (36.6 °C), resp. rate 21, height 5' 6\" (1.676 m), weight 82.6 kg (182 lb 1.6 oz), SpO2 95 %. Temp (24hrs), Av °F (36.7 °C), Min:97.8 °F (36.6 °C), Max:98.5 °F (36.9 °C)      Intake and Output:  No intake/output data recorded.  1901 -  0700  In: 800 [P.O.:50; I.V.:750]  Out:  [Urine:3]    Physical Exam:               GENERAL ASSESSMENT: NAD  NECK: RIJ HD cath   CHEST: CTA  HEART: S1S2  ABDOMEN: Soft,NT  EXTREMITY: min EDEMA        ECG/rhythm:    Data Review      No results for input(s): TNIPOC in the last 72 hours. No lab exists for component: ITNL   Recent Labs     22  0308 22  0206 22  1311   CPK 9,410* 21,025* 22,325*     Recent Labs     22  0449 22  0308 22  0206 22  1311 22  1311   NA  --  138 139  --  138   K  --  4.4 4.2  --  4.8   CL  --  105 107  --  107   CO2  --  23 23  --  22   BUN  --  31* 23*  --  30*   CREA  --  2.64* 1.95*  --  2.31*   GLU  --  232* 126*  --  187*   PHOS 2.9 3.3 2.6   < > 3.9   MG 2.4 2.8* 2.5*   < > 2.7*   CA  --  7.2* 7.5*  --  7.7*   ALB  --  1.8* 1.9*  --  2.0*   WBC 9.4 12.5* 9.1   < > 11.9*   HGB 9.4* 9.0* 8.8*   < > 9.1*   HCT 28.5* 27.5* 27.3*   < > 28.0*   PLT 62* 69* 68*   < > 72*    < > = values in this interval not displayed. No results for input(s): INR, PTP, APTT, INREXT in the last 72 hours.   Needs: urine analysis, urine sodium, protein and creatinine  Lab Results   Component Value Date/Time    Sodium,urine random 37 2022 07:45 PM    Creatinine, urine 59.90 2022 07:45 PM         : Javier Marina MD  2022        [facility]:  [email or web address]  Leonel Brown office:  [address] [place] [address], [address] [place] [address],[address]  [place], [address] [facility]  Phone: [phone]  Fax :     [phone]    [place] office:  [address] [facility]  [place]  Phone - [phone]  Fax - [phone]

## 2022-05-28 NOTE — PROGRESS NOTES
Pt went in to Afib with HR in 110's at start of shift. Metoprolol was pulled and before it could be administered, pt had converted back to SR 70's. Pt rested comfortably the rest of the night.

## 2022-05-28 NOTE — PROGRESS NOTES
Critical Care Progress Note  Santino Majano MD          Date of Service:  2022  NAME:  Javier Daly  :  1934  MRN:  [id number]      Subjective/Hospital course:      : Patient remains on 3 vasopressors. Mental status is the same as yesterday, minimally responsive. Protecting her air.   : Patient is more awake than yesteday. She is still on 10mcg/min of levophed and vasopressin. On CRRT with no factor. : Patient is still on 6mcg/min of levophed. Confused and disoriented. On CRRT with no factor. : Patient looks significantly better than yesterday. She is alert and oriented. On HD, off pressors. No acute events overnight.   : Patient reports feeling better than yesterday. She is still confused and disoriented but following commands and appropriate responses. Problem list:     -septic shock.  -UTI. -? Pneumonia. -Symptomatic bradycardia. -Severe DKA. -Acute metabolic encephalopathy.  -Hyperkalemia.  -Acute renal failure. -H/o HTN.  -HLD. Assessment/Plan:     Septic shock.  -Sec to UTI/pneumonia.  -Continue zosyn. F/u S/S on cultures. -GNR positive in blood and urine cultures. vanc was DCed on .       UTI/Pneumonia.  -Abx as above.     DKA. -Likely patient was not taking insulin at home as her A1c is >14  -Transitioned to sub cut insulin.     Elevated lipase.  -Non contrast CT is neg for changes of acute pancreatitis. -Lipase improving.  -Restart oral feeding if ok with speech therapy. Acute renal failure.  -Likely ATN from shock.  -Closely monitor renal functions and UO. -Avoid nephrotoxins. -CRRT stopped on ,  first HD .     Acute metabolic encephalopathy.  -Improving. Closely monitor mental status.  -manage metabolic issues as above.     Symptomatic bradycardia.  -Likely precipitated by severe acidemia, also on BB at home.   -Again had long pauses on , EP following.       DVT prophylaxis. [address]. SUP. Pepcid. Code status.  Full code.      Transfer out of ICU. I personally spent --- minutes of critical care time. This is time spent at this critically ill patient's bedside actively involved in patient care as well as the coordination of care and discussions with the patient's family. This does not include any procedural time which has been billed separately. Review of Systems:   Review of systems not obtained due to patient factors. Vital Signs:     Patient Vitals for the past 4 hrs:   BP Temp Pulse Resp SpO2   05/28/22 1300 (!) 154/106 -- 76 16 93 %   05/28/22 1246 -- 98 °F (36.7 °C) -- -- --   05/28/22 1152 (!) 151/137 -- (!) 116 18 95 %        Intake/Output Summary (Last 24 hours) at 5/28/2022 1539  Last data filed at 5/28/2022 0955  Gross per 24 hour   Intake 150 ml   Output 3 ml   Net 147 ml        Physical Examination:    Physical Exam  Constitutional:       General: She is not in acute distress. Appearance: She is ill-appearing. Comments: Altered mental status. HENT:      Head: Normocephalic and atraumatic. Mouth/Throat:      Mouth: Mucous membranes are moist.   Eyes:      Extraocular Movements: Extraocular movements intact. Conjunctiva/sclera: Conjunctivae normal.   Cardiovascular:      Rate and Rhythm: Normal rate and regular rhythm. Pulmonary:      Effort: Pulmonary effort is normal. No respiratory distress. Breath sounds: Normal breath sounds. Abdominal:      General: Abdomen is flat. There is no distension. Palpations: Abdomen is soft. Tenderness: There is no abdominal tenderness. There is no guarding. Musculoskeletal:      Cervical back: Normal range of motion and neck supple. Neurological:      Mental Status: She is alert. Comments: Alert and awake. Labs:   Labs and imaging reviewed.       Medications:     Current Facility-Administered Medications   Medication Dose Route Frequency    albumin human 25% (BUMINATE) solution 25 g  25 g IntraVENous DIALYSIS PRN    insulin NPH (NOVOLIN N, HUMULIN N) injection 20 Units  20 Units SubCUTAneous BID    metoprolol (LOPRESSOR) injection 2.5 mg  2.5 mg IntraVENous Q6H PRN    insulin lispro (HUMALOG) injection   SubCUTAneous AC&HS    piperacillin-tazobactam (ZOSYN) 3.375 g in 0.9% sodium chloride (MBP/ADV) 100 mL MBP  3.375 g IntraVENous Q12H    fluconazole (DIFLUCAN) 200mg/100 mL IVPB (premix)  200 mg IntraVENous Q24H    hydrocortisone Sod Succ (PF) (SOLU-CORTEF) injection 50 mg  50 mg IntraVENous Q6H    glucose chewable tablet 16 g  4 Tablet Oral PRN    glucagon (GLUCAGEN) injection 1 mg  1 mg IntraMUSCular PRN    dextrose 10% infusion 0-250 mL  0-250 mL IntraVENous PRN    sodium chloride (NS) flush 5-40 mL  5-40 mL IntraVENous Q8H    sodium chloride (NS) flush 5-40 mL  5-40 mL IntraVENous PRN    acetaminophen (TYLENOL) tablet 650 mg  650 mg Oral Q6H PRN    Or    acetaminophen (TYLENOL) suppository 650 mg  650 mg Rectal Q6H PRN    polyethylene glycol (MIRALAX) packet 17 g  17 g Oral DAILY PRN    ondansetron (ZOFRAN ODT) tablet 4 mg  4 mg Oral Q8H PRN    Or    ondansetron (ZOFRAN) injection 4 mg  4 mg IntraVENous Q6H PRN    [Held by provider] heparin (porcine) injection 5,000 Units  5,000 Units SubCUTAneous Q8H    balsam peru-castor oiL (VENELEX) ointment   Topical BID    alcohol 62% (NOZIN) nasal  1 Ampule  1 Ampule Topical Q12H    balsam peru-castor oiL (VENELEX) ointment   Topical BID    famotidine (PF) (PEPCID) 20 mg in 0.9% sodium chloride 10 mL injection  20 mg IntraVENous Q24H     ______________________________________________________________________  EXPECTED LENGTH OF STAY: 4d 19h  ACTUAL LENGTH OF STAY:          Andrzej B 1711, MD   Pulmonary/CCM  [address]  [phone]  5/28/2022

## 2022-05-28 NOTE — PROGRESS NOTES
Hospitalist Progress Note    NAME: Anisha Hadley   :  1934   MRN:  [id number]     Admit date: 2022    Today's date: 22    PCP: Naresh Soto MD      Anticipated discharge date:    Barriers:      Assessment / Plan:    Septic shock POA initial BP 46.34, temp 86.5, RR 42, GLORIA, lactate 4.26, pressor requirements  Klebsiella Bacteremia with UTI POA  POA  Serratia marcescens bacteremia  and 2022 POA  - Admit UA 5-10 WBC, 1+ bacteria  - Admit BC with klebsiella and Serratia   Sensitive to zosyn, ceftriaxone  - Admit urine culture + for klebsiella  - Repeat BC  with recurrent sepsis + for Serratia  -CT abdomen/pelvis without IV contrast  with no hydronephrosis   No intraabdominal pathology  - Chest CT with bilateral effusions, no ASD  - Continue zosyn --> consider transition to ceftriaxone in Am  - weaned off pressors  - Check repeat Blood culture    Acute kidney injury POA BUN/creat 114 and 5.97  Rhabdomyolysis CPK peak 22, 325   -Last baseline creat 1.28 at [facility] on 3/9/2021  -Followed by renal  -Started on CRRT till , now on HD  -No hydronephrosis  -Likely ATN with hypotension, sepsis  -Serial labs  -Await renal recovery    DKA POA BS 1259, HgBa1c > 14.0, AG > 20, pH 6.99  - Not sure if DKA present, acidosis may have been due to sepsis, GLORIA   Urine with trace ketones, but B-hydroxybutyrate levels in blood elevated  - Likely patient was not taking insulin at home as her A1c is >14  - Insulin gtt -->NPH 20 units BID plus SSI  - BS yesterday 251, 140, 119, 154           Today       186, 156, 147      Elevated lipase.  -Non contrast CT is neg for changes of acute pancreatitis. -Lipase improving.  -Restart oral feeding if ok with speech therapy.     Acute metabolic encephalopathy POA  - Multifactorial with septic shock, low BP, GLORIA, elevated BS > 1200  - Head CT with atrophy, no acute events  -Improving.  Closely monitor mental status.  -manage metabolic issues as above.     Symptomatic bradycardia.  -Likely precipitated by severe acidemia, also on BB at home.   -Again had long pauses on 5/25, EP following. Overweight POA Body mass index is 29.39 kg/m². DVT prophylaxis. [facility]. SUP. Pepcid. Code status. Full code. Subjective:     Chief Complaint / Reason for Physician Visit    Discussed with RN events overnight. Transferring out of ICU 5/28    Review of Systems:  Symptom Y/N Comments  Symptom Y/N Comments   Fever/Chills    Chest Pain     Poor Appetite    Edema     Cough    Abdominal Pain     Sputum    Joint Pain     SOB/BEE    Headache     Nausea/vomit    Tolerating PT/OT     Diarrhea    Tolerating Diet     Constipation    Other       Could NOT obtain due to:      Objective:     VITALS:   Last 24hrs VS reviewed since prior progress note.  Most recent are:  Patient Vitals for the past 24 hrs:   Temp Pulse Resp BP SpO2   05/28/22 1800 -- (!) 125 19 (!) 155/81 --   05/28/22 1700 -- 66 20 (!) 147/68 --   05/28/22 1600 -- 72 19 134/64 --   05/28/22 1513 -- 68 20 (!) 149/64 95 %   05/28/22 1400 -- 74 18 (!) 143/81 94 %   05/28/22 1300 -- 76 16 (!) 154/106 93 %   05/28/22 1246 98 °F (36.7 °C) -- -- -- --   05/28/22 1152 -- (!) 116 18 (!) 151/137 95 %   05/28/22 1138 -- (!) 127 -- -- --   05/28/22 1117 -- 76 25 (!) 147/63 92 %   05/28/22 1052 -- 76 25 (!) 154/57 94 %   05/28/22 1017 -- 74 19 (!) 155/64 94 %   05/28/22 0917 -- 87 18 (!) 162/64 95 %   05/28/22 0825 97.9 °F (36.6 °C) -- -- -- --   05/28/22 0817 -- 75 18 (!) 150/55 94 %   05/28/22 0717 -- 75 20 (!) 154/56 95 %   05/28/22 0700 -- 74 21 (!) 155/49 95 %   05/28/22 0617 -- 72 20 (!) 160/66 94 %   05/28/22 0517 -- 71 23 (!) 163/113 93 %   05/28/22 0417 -- 65 16 (!) 159/67 96 %   05/28/22 0400 97.8 °F (36.6 °C) -- -- -- --   05/28/22 0317 -- 69 19 (!) 157/64 96 %   05/28/22 0217 -- 76 17 135/63 97 %   05/28/22 0117 -- 60 17 (!) 146/67 96 %   05/28/22 0000 98.5 °F (36.9 °C) -- -- -- --   05/27/22 2300 -- 68 20 (!) 142/69 97 %   05/27/22 2200 -- 70 19 (!) 143/62 97 %   05/27/22 2100 -- 73 17 (!) 141/59 96 %   05/27/22 2000 97.8 °F (36.6 °C) 78 18 (!) 150/84 96 %       Intake/Output Summary (Last 24 hours) at 5/28/2022 1941  Last data filed at 5/28/2022 1806  Gross per 24 hour   Intake 370 ml   Output 3 ml   Net 367 ml        Wt Readings from Last 12 Encounters:   05/26/22 82.6 kg (182 lb 1.6 oz)       PHYSICAL EXAM:    I had a face to face encounter and independently examined this patient on 05/28/22 as outlined below:        Reviewed most current lab test results and cultures  YES  Reviewed most current radiology test results   YES  Review and summation of old records today    NO  Reviewed patient's current orders and MAR    YES  PMH/ reviewed - no change compared to H&P  ________________________________________________________________________  Care Plan discussed with:    Comments   Patient     Family      RN     Care Manager     Consultant                        Multidiciplinary team rounds were held today with , nursing, pharmacist and clinical coordinator. Patient's plan of care was discussed; medications were reviewed and discharge planning was addressed. ________________________________________________________________________      Comments   >50% of visit spent in counseling and coordination of care     ________________________________________________________________________  Dayne Holguin MD     Procedures: see electronic medical records for all procedures/Xrays and details which were not copied into this note but were reviewed prior to creation of Plan. LABS:  I reviewed today's most current labs and imaging studies.   Pertinent labs include:  Recent Labs     05/28/22 0449 05/27/22 0308 05/26/22  0206   WBC 9.4 12.5* 9.1   HGB 9.4* 9.0* 8.8*   HCT 28.5* 27.5* 27.3*   PLT 62* 69* 68*     Recent Labs     05/28/22 0449 05/27/22 0308 05/26/22  0206   NA  --  138 139   K  --  4.4 4.2   CL  --  105 107   CO2  --  23 23   GLU  --  232* 126*   BUN  --  31* 23*   CREA  --  2.64* 1.95*   CA  --  7.2* 7.5*   MG 2.4 2.8* 2.5*   PHOS 2.9 3.3 2.6   ALB  --  1.8* 1.9*   TBILI  --  0.7 0.5   ALT  --  201* 226*     General: WD, WN. Alert, cooperative, no acute distress    EENT:  PERRL. Anicteric sclerae. MMM  Neck:  No meningismus, no thyromegaly  Resp:  CTA bilaterally, no wheezing or rales. No accessory muscle use  CV:  Regular  rhythm,  No edema  GI:  Soft, Non distended, Non tender. +Bowel sounds, no rebound  LN:  No cervical or inguinal CHINO  Neurologic:  Alert and oriented X 3, normal speech, non focal motor exam  Psych:   Good insight. Not anxious nor agitated  Skin:  No rashes.   No jaundice

## 2022-05-28 NOTE — PROGRESS NOTES
Progress Note      5/28/2022 10:04 AM  NAME: Susan Cervantes   MRN:  [id number]   Admit Diagnosis: [facility] [E11.10]      PCP:  Carolyn Acevedo MD     Assessment: 1. Profound sinus bradycardia 20-30's in the setting of DKA, hyperkalemia, and OP beta-blocker. This has resolved. 2. Paroxysmal atrial fibrillation with post-conversion pauses up to 6 seconds. 3. 2nd degree AV block with single dropped PAC's, RBBB with normal LA in sinus rhythm. 4. Hypotension likely due to dehydration/volume depletion, metabolic acidosis, bradycardia. Improved with volume resuscitation, pressor. 5. No evidence of acute ischemia or infarct by presenting ECG. Echo with normal LV and RV systolic function. 6. GLORIA atop CKD stage 3 at baseline. Rhabdomyolysis. 7. Diabetes mellitus type 2 on chronic insulin complicated by DKA. 8. Elevated lipase possibly due to DKA rather than acute pancreatitis. 9. Metabolic encephalopathy, improving. 10. Anemia. 11. Thrombocytopenia. 12. Full code.     Plan:      1. Remains in NSR with PACs and PVCs, no Afib or pauses since last night (5/27)   2. She's on DVT prophylaxis at this time. I'd avoid FULL anticoagulation if possible given her anemia and thrombocytopenia as long as Afib episodes are brief, self-limited. 3. HD per renal   4. Off pressor. Discussed with nursing. Cont to monitor on tele             Subjective:     HPI:     Reports not feeling well   But no CP or SOB   No afib or pause since last night     Objective:      Physical Exam:    Last 24hrs VS reviewed since prior progress note.  Most recent are:    Visit Vitals  BP (!) 155/49   Pulse 74   Temp 97.9 °F (36.6 °C)   Resp 21   Ht 5' 6\" (1.676 m)   Wt 82.6 kg (182 lb 1.6 oz)   SpO2 95%   BMI 29.39 kg/m²       Intake/Output Summary (Last 24 hours) at 5/28/2022 1004  Last data filed at 5/28/2022 0955  Gross per 24 hour   Intake 300 ml   Output 2010 ml   Net -1710 ml           General: Alert and awake, no distress  Neck: Supple   Respiratory: No respiratory distress, clear anteriorly   Cardiovascular: Regular rate rhythm, S1S2   Abdomen: soft, non tender   Neuro: moves all extremities   Skin: warm and dry   Extremity:  warm to touch      Data Review    Telemetry: normal sinus rhythm          Lab Data Personally Reviewed:    Recent Labs     05/28/22 0449 05/27/22 0308   WBC 9.4 12.5*   HGB 9.4* 9.0*   HCT 28.5* 27.5*   PLT 62* 69*     No results for input(s): INR, PTP, APTT, INREXT in the last 72 hours. Recent Labs     05/28/22 0449 05/27/22 0308 05/26/22  0206 05/25/22  1311 05/25/22  1311   NA  --  138 139  --  138   K  --  4.4 4.2  --  4.8   CL  --  105 107  --  107   CO2  --  23 23  --  22   BUN  --  31* 23*  --  30*   CREA  --  2.64* 1.95*  --  2.31*   GLU  --  232* 126*  --  187*   CA  --  7.2* 7.5*  --  7.7*   MG 2.4 2.8* 2.5*   < > 2.7*    < > = values in this interval not displayed. Recent Labs     05/27/22 0308 05/26/22  0206 05/25/22  1311   CPK 9,410* 21,025* 22,325*     Lab Results   Component Value Date/Time    Triglyceride 95 05/26/2022 08:30 AM       Recent Labs     05/27/22 0308 05/26/22  0206 05/25/22  1311    136*  --    TP 5.0* 5.3*  --    ALB 1.8* 1.9* 2.0*   GLOB 3.2 3.4  --    LPSE  --  1,107*  --      No results for input(s): PH, PCO2, PO2 in the last 72 hours.     Medications Personally Reviewed:    Current Facility-Administered Medications   Medication Dose Route Frequency    albumin human 25% (BUMINATE) solution 25 g  25 g IntraVENous DIALYSIS PRN    insulin NPH (NOVOLIN N, HUMULIN N) injection 20 Units  20 Units SubCUTAneous BID    metoprolol (LOPRESSOR) injection 2.5 mg  2.5 mg IntraVENous Q6H PRN    insulin lispro (HUMALOG) injection   SubCUTAneous AC&HS    piperacillin-tazobactam (ZOSYN) 3.375 g in 0.9% sodium chloride (MBP/ADV) 100 mL MBP  3.375 g IntraVENous Q12H    fluconazole (DIFLUCAN) 200mg/100 mL IVPB (premix)  200 mg IntraVENous Q24H    hydrocortisone Sod Succ (PF) (SOLU-CORTEF) injection 50 mg  50 mg IntraVENous Q6H    glucose chewable tablet 16 g  4 Tablet Oral PRN    glucagon (GLUCAGEN) injection 1 mg  1 mg IntraMUSCular PRN    dextrose 10% infusion 0-250 mL  0-250 mL IntraVENous PRN    sodium chloride (NS) flush 5-40 mL  5-40 mL IntraVENous Q8H    sodium chloride (NS) flush 5-40 mL  5-40 mL IntraVENous PRN    acetaminophen (TYLENOL) tablet 650 mg  650 mg Oral Q6H PRN    Or    acetaminophen (TYLENOL) suppository 650 mg  650 mg Rectal Q6H PRN    polyethylene glycol (MIRALAX) packet 17 g  17 g Oral DAILY PRN    ondansetron (ZOFRAN ODT) tablet 4 mg  4 mg Oral Q8H PRN    Or    ondansetron (ZOFRAN) injection 4 mg  4 mg IntraVENous Q6H PRN    [Held by provider] heparin (porcine) injection 5,000 Units  5,000 Units SubCUTAneous Q8H    balsam peru-castor oiL (VENELEX) ointment   Topical BID    alcohol 62% (NOZIN) nasal  1 Ampule  1 Ampule Topical Q12H    balsam peru-castor oiL (VENELEX) ointment   Topical BID    famotidine (PF) (PEPCID) 20 mg in 0.9% sodium chloride 10 mL injection  20 mg IntraVENous Q24H              Wilbert Abdi MD

## 2022-05-29 NOTE — PROGRESS NOTES
Hospitalist Progress Note    NAME: Tom Dick   :  1934   MRN:  [id number]     Admit date: 2022    Today's date: 22    PCP: Charissa Randall MD      Anticipated discharge date:    Barriers:      Assessment / Plan:    Septic shock POA initial BP 46.34, temp 86.5, RR 42, GLORIA, lactate 4.26, pressor requirements  Klebsiella Bacteremia with UTI POA  POA  Serratia marcescens bacteremia  and 2022 POA  - Admit UA 5-10 WBC, 1+ bacteria  - Admit BC with klebsiella and Serratia   Sensitive to zosyn, ceftriaxone  - Admit urine culture + for klebsiella  - Repeat BC  with recurrent sepsis + for Serratia  -CT abdomen/pelvis without IV contrast  with no hydronephrosis   No intraabdominal pathology  - Chest CT with bilateral effusions, no ASD  - Continue zosyn --> consider transition to ceftriaxone in Am  - weaned off pressors  - Check repeat Blood culture  - unusual she has a second bacteremia during the admit  - With the elevated lipase, Epigastric and RUQ pain, ? Biliary process or ascending cholangitis   CT with normal GB and ducts  - Check GGT and RUQ US in AM    Acute kidney injury POA BUN/creat 114 and 5.97  Rhabdomyolysis CPK peak 22, 325   -Last baseline creat 1.28 at [facility] on 3/9/2021  -Followed by renal  -Started on CRRT till , now on HD  -No hydronephrosis  -Likely ATN with hypotension, sepsis  - Oligouric < 50 cc urine output yesterday  -Serial labs  -Await renal recovery    DKA POA BS 1259, HgBa1c > 14.0, AG > 20, pH 6.99  - Not sure if DKA present, acidosis may have been due to sepsis, GLORIA   Urine with trace ketones, but B-hydroxybutyrate levels in blood elevated  - Likely patient was not taking insulin at home as her A1c is >14  - Insulin gtt -->NPH 20 units BID plus SSI  - BS yesterday 186, 156, 147, 157   Today    139, 159           Elevated lipase.  -Non contrast CT is neg for changes of acute pancreatitis.   -Lipase improving to 1107, now increased to 2499  -Restart oral feeding if ok with speech therapy.     Acute metabolic encephalopathy POA  - Multifactorial with septic shock, low BP, GLORIA, elevated BS > 1200  - Head CT with atrophy, no acute events  -Improving. Closely monitor mental status.  -manage metabolic issues as above.     Symptomatic bradycardia.  -Likely precipitated by severe acidemia, also on BB at home.   -Again had long pauses on 5/25, EP following. Overweight POA Body mass index is 29.39 kg/m². DVT prophylaxis. [facility] & snf. SUP. Pepcid. Code status. Full code. Subjective:     Chief Complaint / Reason for Physician Visit    Discussed with RN events overnight. Alert, talking, denies complaints  Now out of ICU  No N/V or abdominal pain or diarrhea  No Cp or SOB    Review of Systems:  Symptom Y/N Comments  Symptom Y/N Comments   Fever/Chills n   Chest Pain n    Poor Appetite    Edema     Cough n   Abdominal Pain n    Sputum    Joint Pain     SOB/BEE n   Headache     Nausea/vomit n   Tolerating PT/OT     Diarrhea n   Tolerating Diet y    Constipation    Other       Could NOT obtain due to:      Objective:     VITALS:   Last 24hrs VS reviewed since prior progress note.  Most recent are:  Patient Vitals for the past 24 hrs:   Temp Pulse Resp BP SpO2   05/29/22 1155 98 °F (36.7 °C) 74 18 135/72 100 %   05/29/22 0805 97.8 °F (36.6 °C) 73 18 (!) 147/50 100 %   05/29/22 0254 97.7 °F (36.5 °C) 66 16 (!) 152/63 96 %   05/29/22 0100 -- (!) 128 22 (!) 143/110 95 %   05/29/22 0000 97.8 °F (36.6 °C) 69 19 134/68 95 %   05/28/22 2300 -- (!) 115 19 (!) 163/72 95 %   05/28/22 2200 -- 69 17 (!) 145/61 94 %   05/28/22 2146 -- 73 19 -- 95 %   05/28/22 2100 -- 73 17 (!) 158/74 97 %   05/28/22 2046 -- 76 19 -- 94 %   05/28/22 2000 98.5 °F (36.9 °C) 76 16 (!) 143/65 95 %   05/28/22 1946 -- (!) 125 -- -- --   05/28/22 1800 -- (!) 125 19 (!) 155/81 --   05/28/22 1700 -- 66 20 (!) 147/68 --   05/28/22 1600 -- 72 19 134/64 --       Intake/Output Summary (Last 24 hours) at 5/29/2022 1559  Last data filed at 5/29/2022 0000  Gross per 24 hour   Intake 220 ml   Output 10 ml   Net 210 ml        Wt Readings from Last 12 Encounters:   05/26/22 82.6 kg (182 lb 1.6 oz)       PHYSICAL EXAM:    I had a face to face encounter and independently examined this patient on 05/29/22 as outlined below:    General: WD, WN. Alert, cooperative, no acute distress    EENT:  PERRL. Anicteric sclerae. MMM  Resp:  CTA bilaterally, no wheezing or rales. No accessory muscle use  CV:  Regular  rhythm,  No edema  GI:  Soft, Non distended, Tender RUQ and epigastric region. +Bowel sounds, no rebound  LN:  No cervical or inguinal CHINO  Neurologic:  Alert, normal speech, non focal motor exam  Psych:   Not anxious nor agitated  Skin:  No rashes. No jaundice      Reviewed most current lab test results and cultures  YES  Reviewed most current radiology test results   YES  Review and summation of old records today    NO  Reviewed patient's current orders and MAR    YES  PMH/ reviewed - no change compared to H&P  ________________________________________________________________________  Care Plan discussed with:    Comments   Patient x    Family      RN x    Care Manager     Consultant                        Multidiciplinary team rounds were held today with , nursing, pharmacist and clinical coordinator. Patient's plan of care was discussed; medications were reviewed and discharge planning was addressed. ________________________________________________________________________      Comments   >50% of visit spent in counseling and coordination of care     ________________________________________________________________________  Jesse Haddad MD     Procedures: see electronic medical records for all procedures/Xrays and details which were not copied into this note but were reviewed prior to creation of Plan.       LABS:  I reviewed today's most current labs and imaging studies.   Pertinent labs include:  Recent Labs     05/29/22 0459 05/28/22 0449 05/27/22 0308   WBC 15.5* 9.4 12.5*   HGB 9.5* 9.4* 9.0*   HCT 28.8* 28.5* 27.5*   PLT 63* 62* 69*     Recent Labs     05/29/22 0459 05/28/22 0449 05/27/22 0308     --  138   K 3.2*  --  4.4     --  105   CO2 29  --  23   *  --  232*   BUN 45*  --  31*   CREA 4.04*  --  2.64*   CA 7.2*  --  7.2*   MG  --  2.4 2.8*   PHOS  --  2.9 3.3   ALB 1.6*  --  1.8*   TBILI 0.4  --  0.7   *  --  201*   INR 1.2*  --   --

## 2022-05-29 NOTE — PROGRESS NOTES
Progress Note      5/29/2022 10:04 AM  NAME: Jacobo Saldana   MRN:  [id number]   Ad[facility] [[facility]      PCP:  Chris Patterson MD     Assessment: 1. Profound sinus bradycardia 20-30's in the setting of DKA, hyperkalemia, and OP beta-blocker. This has resolved. 2. Paroxysmal atrial fibrillation with post-conversion pauses up to 6 seconds. 3. 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 4. Hypotension likely due to dehydration/volume depletion, metabolic acidosis, bradycardia. Improved with volume resuscitation, pressor. 5. No evidence of acute ischemia or infarct by presenting ECG. Echo with normal LV and RV systolic function. 6. GLORIA atop CKD stage 3 at baseline. Rhabdomyolysis. 7. Diabetes mellitus type 2 on chronic insulin complicated by DKA. 8. Elevated lipase possibly due to DKA rather than acute pancreatitis. 9. Metabolic encephalopathy, improving. 10. Anemia. 11. Thrombocytopenia. 12. Full code.     Plan:      1. Remains in NSR with PACs and PVCs, no Afib or pauses since last night (5/27)   2. She's on DVT prophylaxis at this time. I'd avoid FULL anticoagulation if possible given her anemia and thrombocytopenia as long as Afib episodes are brief, self-limited. 3. HD per renal         Cont to monitor on tele             Subjective:     HPI:     Sleeping   Out of ICU     No afib or pause      Objective:      Physical Exam:    Last 24hrs VS reviewed since prior progress note.  Most recent are:    Visit Vitals  BP (!) 151/59 (BP 1 Location: Left upper arm, BP Patient Position: At rest)   Pulse (!) 57   Temp 98 °F (36.7 °C)   Resp 20   Ht 5' 6\" (1.676 m)   Wt 82.6 kg (182 lb 1.6 oz)   SpO2 97%   BMI 29.39 kg/m²       Intake/Output Summary (Last 24 hours) at 5/29/2022 1831  Last data filed at 5/29/2022 0000  Gross per 24 hour   Intake 0 ml   Output 10 ml   Net -10 ml           General: Alert and awake, no distress  Neck: Supple   Respiratory: No respiratory distress, clear anteriorly   Cardiovascular: Regular rate rhythm, S1S2   Abdomen: soft, non tender   Neuro: moves all extremities   Skin: warm and dry   Extremity:  warm to touch      Data Review    Telemetry: normal sinus rhythm          Lab Data Personally Reviewed:    Recent Labs     05/29/22 0459 05/28/22 0449   WBC 15.5* 9.4   HGB 9.5* 9.4*   HCT 28.8* 28.5*   PLT 63* 62*     Recent Labs     05/29/22 0459   INR 1.2*   PTP 12.7*   APTT 30.1      Recent Labs     05/29/22 0459 05/28/22 0449 05/27/22 0308     --  138   K 3.2*  --  4.4     --  105   CO2 29  --  23   BUN 45*  --  31*   CREA 4.04*  --  2.64*   *  --  232*   CA 7.2*  --  7.2*   MG  --  2.4 2.8*     Recent Labs     05/29/22 0459 05/27/22 0308   CPK 1,824* 9,410*     Lab Results   Component Value Date/Time    Triglyceride 95 05/26/2022 08:30 AM       Recent Labs     05/29/22 0459 05/27/22 0308    102   TP 5.1* 5.0*   ALB 1.6* 1.8*   GLOB 3.5 3.2   LPSE 2,499*  --      No results for input(s): PH, PCO2, PO2 in the last 72 hours.     Medications Personally Reviewed:    Current Facility-Administered Medications   Medication Dose Route Frequency    hydrocortisone Sod Succ (PF) (SOLU-CORTEF) injection 50 mg  50 mg IntraVENous Q8H    albumin human 25% (BUMINATE) solution 25 g  25 g IntraVENous DIALYSIS PRN    insulin NPH (NOVOLIN N, HUMULIN N) injection 20 Units  20 Units SubCUTAneous BID    metoprolol (LOPRESSOR) injection 2.5 mg  2.5 mg IntraVENous Q6H PRN    insulin lispro (HUMALOG) injection   SubCUTAneous AC&HS    piperacillin-tazobactam (ZOSYN) 3.375 g in 0.9% sodium chloride (MBP/ADV) 100 mL MBP  3.375 g IntraVENous Q12H    fluconazole (DIFLUCAN) 200mg/100 mL IVPB (premix)  200 mg IntraVENous Q24H    glucose chewable tablet 16 g  4 Tablet Oral PRN    glucagon (GLUCAGEN) injection 1 mg  1 mg IntraMUSCular PRN    dextrose 10% infusion 0-250 mL  0-250 mL IntraVENous PRN    sodium chloride (NS) flush 5-40 mL  5-40 mL IntraVENous Q8H    sodium chloride (NS) flush 5-40 mL  5-40 mL IntraVENous PRN    acetaminophen (TYLENOL) tablet 650 mg  650 mg Oral Q6H PRN    Or    acetaminophen (TYLENOL) suppository 650 mg  650 mg Rectal Q6H PRN    polyethylene glycol (MIRALAX) packet 17 g  17 g Oral DAILY PRN    ondansetron (ZOFRAN ODT) tablet 4 mg  4 mg Oral Q8H PRN    Or    ondansetron (ZOFRAN) injection 4 mg  4 mg IntraVENous Q6H PRN    [Held by provider] heparin (porcine) injection 5,000 Units  5,000 Units SubCUTAneous Q8H    balsam peru-castor oiL (VENELEX) ointment   Topical BID    alcohol 62% (NOZIN) nasal  1 Ampule  1 Ampule Topical Q12H    balsam peru-castor oiL (VENELEX) ointment   Topical BID    famotidine (PF) (PEPCID) 20 mg in 0.9% sodium chloride 10 mL injection  20 mg IntraVENous Q24H              Mo Rainey MD

## 2022-05-30 NOTE — PROGRESS NOTES
Progress Note      5/30/2022 10:04 AM  NAME: Ambrocio Joaquin   MRN:  [id number]   Ad[facility]      PCP:  Jeffry Posada MD     Assessment: 1. Profound sinus bradycardia 20-30's in the setting of DKA, hyperkalemia, and OP beta-blocker. This has resolved. 2. Paroxysmal atrial fibrillation with post-conversion pauses up to 6 seconds. 3. 2nd degree AV block with single dropped PAC's, RBBB with normal IL in sinus rhythm. 4. Hypotension likely due to dehydration/volume depletion, metabolic acidosis, bradycardia. Improved with volume resuscitation, pressor. 5. No evidence of acute ischemia or infarct by presenting ECG. Echo with normal LV and RV systolic function. 6. GLORIA atop CKD stage 3 at baseline. Rhabdomyolysis. 7. Diabetes mellitus type 2 on chronic insulin complicated by DKA. 8. Elevated lipase possibly due to DKA rather than acute pancreatitis. 9. Metabolic encephalopathy, improving. 10. Anemia. 11. Thrombocytopenia. 12. Full code.     Plan:      1. Remains in NSR with PACs and PVCs, no Afib or pauses since last night (5/27)   2. Avoiding FULL anticoagulation if possible given her anemia and thrombocytopenia as long as Afib episodes are brief, self-limited. 3. HD per renal         Cont to monitor on tele             Subjective:     HPI:     Okay from cardiac standpoint . No CP or SOB  No afib or pause      Objective:      Physical Exam:    Last 24hrs VS reviewed since prior progress note.  Most recent are:    Visit Vitals  /78 (BP 1 Location: Left upper arm, BP Patient Position: At rest)   Pulse 74   Temp 98.6 °F (37 °C)   Resp 17   Ht 5' 6\" (1.676 m)   Wt 82.6 kg (182 lb 1.6 oz)   SpO2 98%   BMI 29.39 kg/m²       Intake/Output Summary (Last 24 hours) at 5/30/2022 1754  Last data filed at 5/30/2022 1536  Gross per 24 hour   Intake --   Output 32 ml   Net -32 ml           General: Alert and awake, no distress  Neck: Supple Respiratory: No respiratory distress, clear anteriorly   Cardiovascular: Regular rate rhythm, S1S2   Abdomen: soft, non tender   Neuro: moves all extremities   Skin: warm and dry   Extremity:  warm to touch      Data Review    Telemetry: normal sinus rhythm          Lab Data Personally Reviewed:    Recent Labs     05/30/22 0338 05/29/22 0459   WBC 15.9* 15.5*   HGB 9.5* 9.5*   HCT 28.6* 28.8*   PLT 76* 63*     Recent Labs     05/29/22 0459   INR 1.2*   PTP 12.7*   APTT 30.1      Recent Labs     05/30/22 0338 05/29/22 0459 05/28/22 0449    141  --    K 2.9* 3.2*  --     104  --    CO2 28 29  --    BUN 57* 45*  --    CREA 5.07* 4.04*  --    GLU 71 151*  --    CA 7.3* 7.2*  --    MG  --   --  2.4     Recent Labs     05/29/22 0459   CPK 1,824*     Lab Results   Component Value Date/Time    Triglyceride 95 05/26/2022 08:30 AM       Recent Labs     05/30/22 0338 05/29/22 0459   AP 85 103   TP 4.8* 5.1*   ALB 1.5* 1.6*   GLOB 3.3 3.5   GGT 28  --    LPSE 1,602* 2,499*     No results for input(s): PH, PCO2, PO2 in the last 72 hours.     Medications Personally Reviewed:    Current Facility-Administered Medications   Medication Dose Route Frequency    heparin (porcine) 1,000 unit/mL injection 1,400 Units  1,400 Units InterCATHeter DIALYSIS PRN    And    heparin (porcine) 1,000 unit/mL injection 1,100 Units  1,100 Units InterCATHeter DIALYSIS PRN    insulin NPH (NOVOLIN N, HUMULIN N) injection 14 Units  14 Units SubCUTAneous BID    hydrALAZINE (APRESOLINE) 20 mg/mL injection 20 mg  20 mg IntraVENous Q6H PRN    [START ON 5/31/2022] cefTRIAXone (ROCEPHIN) 2 g in 0.9% sodium chloride (MBP/ADV) 50 mL MBP  2 g IntraVENous Q24H    hydrocortisone Sod Succ (PF) (SOLU-CORTEF) injection 50 mg  50 mg IntraVENous Q8H    albumin human 25% (BUMINATE) solution 25 g  25 g IntraVENous DIALYSIS PRN    metoprolol (LOPRESSOR) injection 2.5 mg  2.5 mg IntraVENous Q6H PRN    insulin lispro (HUMALOG) injection SubCUTAneous AC&HS    piperacillin-tazobactam (ZOSYN) 3.375 g in 0.9% sodium chloride (MBP/ADV) 100 mL MBP  3.375 g IntraVENous Q12H    glucose chewable tablet 16 g  4 Tablet Oral PRN    glucagon (GLUCAGEN) injection 1 mg  1 mg IntraMUSCular PRN    dextrose 10% infusion 0-250 mL  0-250 mL IntraVENous PRN    sodium chloride (NS) flush 5-40 mL  5-40 mL IntraVENous Q8H    sodium chloride (NS) flush 5-40 mL  5-40 mL IntraVENous PRN    acetaminophen (TYLENOL) tablet 650 mg  650 mg Oral Q6H PRN    Or    acetaminophen (TYLENOL) suppository 650 mg  650 mg Rectal Q6H PRN    polyethylene glycol (MIRALAX) packet 17 g  17 g Oral DAILY PRN    ondansetron (ZOFRAN ODT) tablet 4 mg  4 mg Oral Q8H PRN    Or    ondansetron (ZOFRAN) injection 4 mg  4 mg IntraVENous Q6H PRN    [Held by provider] heparin (porcine) injection 5,000 Units  5,000 Units SubCUTAneous Q8H    balsam peru-castor oiL (VENELEX) ointment   Topical BID    alcohol 62% (NOZIN) nasal  1 Ampule  1 Ampule Topical Q12H    balsam peru-castor oiL (VENELEX) ointment   Topical BID    famotidine (PF) (PEPCID) 20 mg in 0.9% sodium chloride 10 mL injection  20 mg IntraVENous Q24H              Davina Braxton MD

## 2022-05-30 NOTE — PROGRESS NOTES
End of Shift Note    Bedside shift change report given to Sayra Vieyra RN (oncoming nurse) by Bhupinder Taveras LPN (offgoing nurse). Report included the following information SBAR, Kardex and MAR    Shift worked:  7a-7p     Shift summary and any significant changes:    Went to dialysis today where they found dialysis port is no longer working. Order placed for new port, but IR not here today so will most likely be done tomorrow. Patients blood sugar was 67 in AM, gave OJ and it came up to 97. Poor appetite. Concerns for physician to address: none   Zone phone for oncoming shift:   [phone]       Activity:  Activity Level: Bed Rest  Number times ambulated in hallways past shift: 0  Number of times OOB to chair past shift: 0    Cardiac:   Cardiac Monitoring: Yes      Cardiac Rhythm: Sinus Rhythm    Access:   Current line(s): PICC     Genitourinary:   Urinary status: ramos    Respiratory:   O2 Device: None (Room air)  Chronic home O2 use?: NO  Incentive spirometer at bedside: NO       GI:  Last Bowel Movement Date: 05/28/22  Current diet:  ADULT ORAL NUTRITION SUPPLEMENT Breakfast, Lunch, Dinner; Renal Supplement  ADULT DIET Clear Liquid  Passing flatus: YES  Tolerating current diet: YES       Pain Management:   Patient states pain is manageable on current regimen: YES    Skin:  Markos Score: 12  Interventions: turn team, speciality bed, float heels, increase time out of bed and nutritional support     Patient Safety:  Fall Score:  Total Score: 4  Interventions: bed/chair alarm and gripper socks  High Fall Risk: Yes    Length of Stay:  Expected LOS: 4d 19h  Actual LOS: [facility], [facility]

## 2022-05-30 NOTE — PROGRESS NOTES
Blood sugar taken when patient came back from dialysis was 67. Gave patient orange juice to bring sugar up. Rechecked and sugar is now 97. Will continue to monitor.

## 2022-05-30 NOTE — PROGRESS NOTES
Hospitalist Progress Note    NAME: Jacobo Saldana   :  1934   MRN:  [id number]     Admit date: 2022    Today's date: 22    PCP: Chris Patterson MD      Anticipated discharge date: 6/3/2022    Barriers:  recovering from infection, ? needs long-term HD    Assessment / Plan:    Septic shock POA initial BP 46/34, temp 86.5, RR 42, GLORIA, lactate 4.26, pressor requirements  Klebsiella Bacteremia with UTI  POA  Serratia marcescens bacteremia  and 2022 POA  - Admit UA 5-10 WBC, 1+ bacteria  - Admit BC with klebsiella and Serratia   Sensitive to zosyn, ceftriaxone  - Admit urine culture + for klebsiella  - Repeat BC  with recurrent sepsis + for Serratia  - CT abdomen/pelvis without IV contrast  with no hydronephrosis   No intraabdominal pathology  - Chest CT with bilateral effusions, no ASD  - With elevated lipase and abnormal LFTs, ?  Biliary source   GGT 28   US with no gallstones, contracted GB, CBD 0.4 cm              Biliary source seems less likely  - Continue zosyn --> transition to ceftriaxone  - weaned off pressors  - Check repeat Blood culture  - unusual she has a second bacteremia during the admit  - Check follow up [facility]  - Will need 2 weeks of IV antibiotics    Acute kidney injury POA BUN/creat 114 and 5.97  Rhabdomyolysis CPK peak 22,325   -Last baseline creat 1.28 at [facility] on 3/9/2021  -Followed by renal  -Started on CRRT till , now on HD  -No hydronephrosis  -Likely ATN with hypotension, sepsis  - Still oligouric, U.O. < 15 cc yesterday  -Serial labs  -Await renal recovery    DKA POA BS 1259, HgBa1c > 14.0, AG > 20, pH 6.99  - Not sure if DKA present, acidosis may have been due to sepsis, GLORIA   Urine with trace ketones, but B-hydroxybutyrate levels in blood elevated  - Likely patient was not taking insulin at home as her A1c is >14  - Insulin gtt -->NPH 20 units BID plus SSI  - Diet reduced to clears, reduce NPH to 14 units BID as BS dropped  - BS yesterday 139, 159, 109   Today    79, 97          Acute pancreatitis POA  - admit Non contrast CT is neg for changes of acute pancreatitis. - Lipase > 3000 -->  1107 -->  2499 --> 1602  - + abdominal tenderness  - clears  - No Gallstones or ductal dilatation  - serial labs     Acute metabolic encephalopathy POA  - Multifactorial with septic shock, low BP, GLORIA, elevated BS > 1200  - Head CT with atrophy, no acute events  -Improving. Closely monitor mental status.  -manage metabolic issues as above.     Symptomatic bradycardia.  -Likely precipitated by severe acidemia, also on BB at home.   -Again had long pauses on 5/25, EP following. Overweight POA Body mass index is 29.39 kg/m². DVT prophylaxis. [address]. SUP. Pepcid. Code status. Full code. Subjective:     Chief Complaint / Reason for Physician Visit    Discussed with RN events overnight. Alert, talking, denies complaints  Now out of ICU  No N/V or abdominal pain or diarrhea  No Cp or SOB    Review of Systems:  Symptom Y/N Comments  Symptom Y/N Comments   Fever/Chills n   Chest Pain n    Poor Appetite    Edema     Cough n   Abdominal Pain n    Sputum    Joint Pain     SOB/BEE n   Headache     Nausea/vomit n   Tolerating PT/OT     Diarrhea n   Tolerating Diet y    Constipation    Other       Could NOT obtain due to:      Objective:     VITALS:   Last 24hrs VS reviewed since prior progress note.  Most recent are:  Patient Vitals for the past 24 hrs:   Temp Pulse Resp BP SpO2   05/30/22 1200 -- 66 -- -- --   05/30/22 1122 98.8 °F (37.1 °C) 66 18 (!) 123/55 --   05/30/22 1115 -- 64 18 (!) 118/52 --   05/30/22 1100 -- 64 18 (!) 122/57 --   05/30/22 0945 -- 67 18 (!) 121/55 --   05/30/22 0915 -- (!) 58 18 (!) 125/54 --   05/30/22 0904 -- 76 18 (!) 107/46 --   05/30/22 0900 -- 66 18 (!) 100/50 --   05/30/22 0851 98.8 °F (37.1 °C) 67 18 (!) 128/53 --   05/30/22 0753 -- 78 -- -- --   05/30/22 0750 99 °F (37.2 °C) 70 18 121/60 --   05/30/22 0547 -- (!) 135 -- -- --   05/30/22 0507 98.5 °F (36.9 °C) 68 18 (!) 122/58 --   05/30/22 0016 98 °F (36.7 °C) 60 18 128/81 97 %   05/29/22 2358 -- (!) 118 -- 129/81 --   05/29/22 2002 97.5 °F (36.4 °C) 62 20 (!) 142/62 --   05/29/22 1651 98 °F (36.7 °C) (!) 57 20 (!) 151/59 97 %       Intake/Output Summary (Last 24 hours) at 5/30/2022 1521  Last data filed at 5/30/2022 0547  Gross per 24 hour   Intake --   Output 12 ml   Net -12 ml        Wt Readings from Last 12 Encounters:   05/26/22 82.6 kg (182 lb 1.6 oz)       PHYSICAL EXAM:    I had a face to face encounter and independently examined this patient on 05/30/22 as outlined below:    General: WD, WN. Alert, cooperative, no acute distress    EENT:  PERRL. Anicteric sclerae. MMM  Resp:  CTA bilaterally, no wheezing or rales. No accessory muscle use  CV:  Regular  rhythm,  No edema  GI:  Soft, Non distended, Tender RUQ and epigastric region. +Bowel sounds, no rebound  Neurologic:  Alert, normal speech, non focal motor exam  Psych:   Not anxious nor agitated  Skin:  No rashes. No jaundice      Reviewed most current lab test results and cultures  YES  Reviewed most current radiology test results   YES  Review and summation of old records today    NO  Reviewed patient's current orders and MAR    YES  PMH/ reviewed - no change compared to H&P  ________________________________________________________________________  Care Plan discussed with:    Comments   Patient x    Family      RN x    Care Manager     Consultant                        Multidiciplinary team rounds were held today with , nursing, pharmacist and clinical coordinator. Patient's plan of care was discussed; medications were reviewed and discharge planning was addressed.      ________________________________________________________________________      Comments   >50% of visit spent in counseling and coordination of care     ________________________________________________________________________  Jacob Kanaris Lida Lim MD     Procedures: see electronic medical records for all procedures/Xrays and details which were not copied into this note but were reviewed prior to creation of Plan. LABS:  I reviewed today's most current labs and imaging studies.   Pertinent labs include:  Recent Labs     05/30/22 0338 05/29/22 0459 05/28/22 0449   WBC 15.9* 15.5* 9.4   HGB 9.5* 9.5* 9.4*   HCT 28.6* 28.8* 28.5*   PLT 76* 63* 62*     Recent Labs     05/30/22 0338 05/29/22 0459 05/28/22 0449    141  --    K 2.9* 3.2*  --     104  --    CO2 28 29  --    GLU 71 151*  --    BUN 57* 45*  --    CREA 5.07* 4.04*  --    CA 7.3* 7.2*  --    MG  --   --  2.4   PHOS  --   --  2.9   ALB 1.5* 1.6*  --    TBILI 0.4 0.4  --    * 150*  --    INR  --  1.2*  --

## 2022-05-30 NOTE — PROCEDURES
Hemodialysis / [phone]    Vitals Pre Post Assessment Pre Post   BP BP: (!) 125/54 (05/30/22 0915) 123/55 LOC A&Ox2, intermittent confusion No change   HR Pulse (Heart Rate): (!) 58 (05/30/22 0915) 66 Lungs Diminished lung sound No change   Resp Resp Rate: 18 (05/30/22 0915) 18 Cardiac NSR, Afib No change   Temp Temp: 98.8 °F (37.1 °C) (05/30/22 0851) 98.8 Skin Warm, dry No change   Weight  n/a n/a Edema generalized No change   Tele status remote remote Pain Pain Intensity 1: 0 (05/29/22 1651) 0     Orders   Duration: Start: 7694 End: 1122 Total: 20 min   Dialyzer: Dialyzer/Set Up Inspection: Lana Lewis (05/30/22 0851)   Elejose Oconnor Bath: Dialysate K (mEq/L): 4 (05/30/22 0851)   Ca Bath: Dialysate CA (mEq/L): 2.5 (05/30/22 0851)   Na: Dialysate NA (mEq/L): 138 (05/30/22 0851)   Bicarb: Dialysate HCO3 (mEq/L): 35 (05/30/22 0851)   Target Fluid Removal: Goal/Amount of Fluid to Remove (mL): 2000 mL (05/30/22 0851)     Access   Type & Location:    Comments:      JULIETA Gordon: Dressing CDI. No s/s of infection. Arterial lumen not aspirating. Venous lumen aspirating and flushing well. Lines reversed. Running at  d/t high pressures. *See complete note on tx summary below.              Labs   HBsAg (Antigen) / date:  Negative 5/24/22                                             HBsAb (Antibody) / date: Susceptible 5/24/22   Source: Epic   Obtained/Reviewed  Critical Results Called HGB   Date Value Ref Range Status   05/30/2022 9.5 (L) 11.5 - 16.0 g/dL Final     Potassium   Date Value Ref Range Status   05/30/2022 2.9 (L) 3.5 - 5.1 mmol/L Final     Calcium   Date Value Ref Range Status   05/30/2022 7.3 (L) 8.5 - 10.1 MG/DL Final     BUN   Date Value Ref Range Status   05/30/2022 57 (H) 6 - 20 MG/DL Final     Creatinine   Date Value Ref Range Status   05/30/2022 5.07 (H) 0.55 - 1.02 MG/DL Final        Meds Given   Name Dose Route   cathflo 2mg & 2mg 1.1ml and 1.4ml cvc at 1010   Heparin 1:1000 1100 units 1.1 ml red port at 1123   Heparin 1:1000 1400 units 1.4ml blue port at 1123     Adequacy / Fluid    Total Liters Process: 5.5L   Net Fluid Removed: +870 ml (Prime and rinse back x2)      Comments   Time Out Done:   (Time) 0848   Admitting Diagnosis: GLORIA   Consent obtained/signed: Informed Consent Verified: Yes (05/30/22 0368)   Machine / Ab Elder # Machine Number: [id number] (05/30/22 8383)   Primary Nurse Rpt Pre: Marycarmen Moss RN   Primary Nurse Rpt PostDenimilady Teresa RN   Pt Education: Procedure, cvc care, declotting of cvc   Care Plan: On going   Pts outpatient clinic: n/a     Tx Summary   Comments:     SBAR received from Primary RN. Pt arrived to HD suite A&Ox4. Consent signed & on file. 0629: Each catheter limb disinfected per p&p, caps removed, hubs disinfected per p&p. VSS. Dialysis Tx initiated. 0900: Bp dropped. Uf turned off. Rechecked BP. Arterial pressure high. Both lumens not aspirating. Tried reversing lines, not successful.   [id number]: Blood returned. BP still  Low. Md contacted. *Dr. Frederick Montalvo ordered cathflo on both lumens to dwell 45min to 1hr. Orders put in [facility]. *Waiting for pharmacy to approved and deliver to dialysis unit. 1010: Cathflo instilled. Pt stable at this time. 1100: Cathflo retrieved. Flushing and aspirating well. tx restarted  1115: Arterial pressure still spiking high. Patient repositioned. No improvement. Running at 200 BFR. Md notified. Will put in order for a new dialysis CVC.     1122: Tx ended due to high arterial pressures despite of cathflo dwell and repositioning. VSS. Each dialysis catheter limb disinfected per p&p, all possible blood returned per p&p, and each dialysis hub disinfected per p&p. Each lumen flushed, post dialysis catheter Heparin dwell instilled per order, and caps applied. Bed locked and in the lowest position, call bell and belongings in reach. SBAR given to Primary, RN. Patient is stable at time of their departure. All Dialysis related medications have been reviewed.

## 2022-05-30 NOTE — PROGRESS NOTES
[address]  YOB: 1934          Assessment & Plan:   HD dependent GLORIA due to ATN  CKD 3b  DKA  Sepsis  UTI    Rec:  HD MWF  Try activase and re-attempt HD  Albumin PRN for low BP  Monitor for recovery       Subjective:   CC: f/u GLORIA  HPI: Ran for 9 min but stopped due to poor cath function.  Also had low BP with UF.   ROS: no sob  Current Facility-Administered Medications   Medication Dose Route Frequency    potassium chloride SR (KLOR-CON 10) tablet 20 mEq  20 mEq Oral BID    hydrocortisone Sod Succ (PF) (SOLU-CORTEF) injection 50 mg  50 mg IntraVENous Q8H    albumin human 25% (BUMINATE) solution 25 g  25 g IntraVENous DIALYSIS PRN    insulin NPH (NOVOLIN N, HUMULIN N) injection 20 Units  20 Units SubCUTAneous BID    metoprolol (LOPRESSOR) injection 2.5 mg  2.5 mg IntraVENous Q6H PRN    insulin lispro (HUMALOG) injection   SubCUTAneous AC&HS    piperacillin-tazobactam (ZOSYN) 3.375 g in 0.9% sodium chloride (MBP/ADV) 100 mL MBP  3.375 g IntraVENous Q12H    fluconazole (DIFLUCAN) 200mg/100 mL IVPB (premix)  200 mg IntraVENous Q24H    glucose chewable tablet 16 g  4 Tablet Oral PRN    glucagon (GLUCAGEN) injection 1 mg  1 mg IntraMUSCular PRN    dextrose 10% infusion 0-250 mL  0-250 mL IntraVENous PRN    sodium chloride (NS) flush 5-40 mL  5-40 mL IntraVENous Q8H    sodium chloride (NS) flush 5-40 mL  5-40 mL IntraVENous PRN    acetaminophen (TYLENOL) tablet 650 mg  650 mg Oral Q6H PRN    Or    acetaminophen (TYLENOL) suppository 650 mg  650 mg Rectal Q6H PRN    polyethylene glycol (MIRALAX) packet 17 g  17 g Oral DAILY PRN    ondansetron (ZOFRAN ODT) tablet 4 mg  4 mg Oral Q8H PRN    Or    ondansetron (ZOFRAN) injection 4 mg  4 mg IntraVENous Q6H PRN    [Held by provider] heparin (porcine) injection 5,000 Units  5,000 Units SubCUTAneous Q8H    balsam peru-castor oiL (VENELEX) ointment   Topical BID    alcohol 62% (NOZIN) nasal  1 Ampule  1 Ampule Topical Q12H    balsam peru-castor oiL (VENELEX) ointment   Topical BID    famotidine (PF) (PEPCID) 20 mg in 0.9% sodium chloride 10 mL injection  20 mg IntraVENous Q24H          Objective:     Vitals:  Blood pressure (!) 121/55, pulse 67, temperature 98.8 °F (37.1 °C), temperature source Oral, resp. rate 18, height 5' 6\" (1.676 m), weight 82.6 kg (182 lb 1.6 oz), SpO2 97 %. Temp (24hrs), Av.3 °F (36.8 °C), Min:97.5 °F (36.4 °C), Max:99 °F (37.2 °C)      Intake and Output:  No intake/output data recorded.  1901 -  0700  In: 0   Out: 22 [Urine:22]    Physical Exam:               GENERAL ASSESSMENT: NAD  NECK: IJ HD cath   CHEST: CTA  HEART: S1S2  ABDOMEN: Soft,NT  EXTREMITY: +EDEMA        ECG/rhythm:    Data Review      No results for input(s): TNIPOC in the last 72 hours. No lab exists for component: ITNL   Recent Labs     22   CPK 1,824*     Recent Labs     22  0338 229 22  0449    141  --    K 2.9* 3.2*  --     104  --    CO2   --    BUN 57* 45*  --    CREA 5.07* 4.04*  --    GLU 71 151*  --    PHOS  --   --  2.9   MG  --   --  2.4   CA 7.3* 7.2*  --    ALB 1.5* 1.6*  --    WBC 15.9* 15.5* 9.4   HGB 9.5* 9.5* 9.4*   HCT 28.6* 28.8* 28.5*   PLT 76* 63* 62*      Recent Labs     22   INR 1.2*   PTP 12.7*   APTT 30.1     Needs: urine analysis, urine sodium, protein and creatinine  Lab Results   Component Value Date/Time    Sodium,urine random 37 2022 07:45 PM    Creatinine, urine 59.90 2022 07:45 PM         : Sheyla Gutierrez MD  2022        [facility]:  [email or web address]  [place] office:  [address] [address]  [address]  Phone: [phone]  Fax :     [phone]    [place] office:  [address]  Phone - [phone]  Fax - [phone]

## 2022-05-30 NOTE — PROGRESS NOTES
End of Shift Note    Bedside shift change report given to  (oncoming nurse) by Leandro Miguel RN (offgoing nurse). Report included the following information SBAR, Kardex and MAR    Shift worked:  7p-7a     Shift summary and any significant changes:    23:30 Patient went into Afib w/h HR of 130 shortly after a complete bed change was complete. Patient was given Metoprolol x2 PRN for Afib . Pt resting comfortably. Blood cultures drawn.    06:30: PT HR improved , running normal sinus/ 70 on Telemetry       Concerns for physician to address: Heart Rate     Zone phone for oncoming shift:

## 2022-05-31 NOTE — PROGRESS NOTES
End of Shift Note    Bedside shift change report given to Frank Barron RN (oncoming nurse) by Al Jay LPN (offgoing nurse). Report included the following information SBAR, Kardex and MAR    Shift worked:  7a-7p     Shift summary and any significant changes:    Patient very drowsy and has severe breakdown on her bottom. Wound care stepped in to see patient today & put zinc paste and foam dressing on patient. Ramos has very little output. Patients BP was elevated so hydralazine was given then patient became tachycardic w/o symptoms. Concerns for physician to address: Continue ramos catheter? Zone phone for oncoming shift:  [phone]     Activity:  Activity Level: Bed Rest  Number times ambulated in hallways past shift: 0  Number of times OOB to chair past shift: 0    Cardiac:   Cardiac Monitoring: No      Cardiac Rhythm: Sinus Rhythm    Access:   Current line(s): PICC     Genitourinary:   Urinary status: incontinent, ramos and oliguric    Respiratory:   O2 Device: None (Room air)  Chronic home O2 use?: NO  Incentive spirometer at bedside: NO       GI:  Last Bowel Movement Date: 05/31/22  Current diet:  ADULT ORAL NUTRITION SUPPLEMENT Breakfast, Lunch, Dinner; Renal Supplement  ADULT DIET Clear Liquid  DIET NPO  Passing flatus: YES  Tolerating current diet: YES       Pain Management:   Patient states pain is manageable on current regimen: YES    Skin:  Markos Score: 14  Interventions: speciality bed, float heels, foam dressing, limit briefs, internal/external urinary devices and nutritional support     Patient Safety:  Fall Score:  Total Score: 1  Interventions: bed/chair alarm  High Fall Risk: Yes    Length of Stay:  Expected LOS: 4d 19h  Actual LOS: 906 [facility]

## 2022-05-31 NOTE — PROGRESS NOTES
Rob NP Note         1945- Notified by nursing that patient is in A. fib, with heart rates up to the 150s, blood pressure 99/49, asymptomatic. Cardiology is following and managing her dysrhythmias (has had profound sinus bradycardia as well). Ordered stat labs and EKG-nursing to reach out to on-call for Dr. Jeromy Rivera for further orders. Please note that this note was dictated using Dragon computer voice recognition software. Quite often unanticipated grammatical, syntax, homophones, and other interpretive errors are inadvertently transcribed by the computer software. Please disregard these errors. Please excuse any errors that have escaped final proofreading.

## 2022-05-31 NOTE — PROGRESS NOTES
Pt spent the shift without change in clinical status. Routine nursing care provided as needed. All due meds given and tolerated. Two sets of blood culture drawn . Report endorsed to Nahed Valles.

## 2022-05-31 NOTE — DIABETES MGMT
[facility]    CLINICAL NURSE SPECIALIST CONSULT     Initial Presentation   Mati Garcia is a 80 y.o. female admitted from the ER today 5/23/22 in DKA with profound bradycardia, after being found down by granddaughter. Granddaughter states that patient is independent in her activities of daily living, and has a routine that includes her diabetes self-care. He grandmother had reported that she wasn't feeling well and the granddaughter had made arrangements for a home visit. When she went into her room this morning to remind her of the visit, she found her down on the floor. EMS called. Of note, granddaughter states that she (herself) had been hospitalized for a month and returned home at the end of April. She surmises that her grandmother may not have been taking her insulin because there was more insulin in the refrigerator than there should have been. LAB:  WBC 10.9. AST 75. Lipase >3000. Troponin 31. NT pro-BNP 1588. Urinary glucose & ketone +. CT Head: No extra-axial fluid collection, hemorrhage or shift. Atrophy mostly posterior fossa. CXR: Mild pulmonary edema    HX:   Past Medical History:   Diagnosis Date    Diabetes ([facility] [address]       INITIAL DX:   DKA (diabetic ketoacidosis) ([facility] [address] [E11.10]     Current Treatment     TX: Insulin. Pepcid. ABx. Steroids    Consulted by Provider for advanced diabetes nursing assessment and care for:   [x] Transitioning off Othelia Tamika   [x] Inpatient management strategy  [] Home management assessment  [] Survival skill education    Hospital Course   Clinical progress has been complicated by need for ICU level of care. 5/24/22 Alert but babbling; Precedex+. O2NC. Remains on three (3) pressors. CRRT+. Scheduled for CT chest & ABd wo contrast today. Plans to add steroids. Discussion of transition off Othelia Swink made during IPR.  5/25/22 Alert. Conversing in understandable language. O2NC. Afib. Remains on two (2) pressors.  Skin per wound care/nursing. CRRT+  5/26/22 Alert & oriented to person. Off O2. NPO except for supplements. CRRT+. 5/27/22 Alert & oriented. Answering questions clearly. Dialysis now+.   5/31/22 Patient ate almost all of her breakfast tray and is now resting comfortable with eyes closed. Diabetes History   Type 2 diabetes treated with insulin therapy  Admission BG 1259 with AG 30    Diabetes-related Medical History: Deferred    Diabetes Medication History  Key Antihyperglycemic Medications             glipiZIDE (GLUCOTROL) 10 mg tablet Take 10 mg by mouth daily. insulin lispro protamine/insulin lispro (HUMALOG MIX 75/25) 100 unit/mL (75-25) injection 48 Units by SubCUTAneous route daily. take 48 units in the morning    insulin lispro protamine/insulin lispro (HUMALOG MIX 75/25) 100 unit/mL (75-25) injection 32 Units by SubCUTAneous route every evening. take 32 units subcutaneously at night. Diabetes self-management practices: Per granddaughter  Eating pattern   [x] Not eating a carbohydrate-controlled mealplan  [x] Breakfast Cheerios with milk & banana. Coffee (may have had Cheese toast earlier)  [x] Sankc  Chips a Hoy cookies (while she is watching TV  [x] Dinner  With granddaughter   Physical activity pattern - Uses Rollator to move about the house   [x] Not employing a physical activity program to control BG  Monitoring pattern - Tracks on a tablet by bedside & takes to her provider   [x] Testing BGs   [x] Breakfast   Taking medications pattern  [x] Consistent administration  [x] Affordable  Overall evaluation:    [x] Last A1c located 9% (3/9/2021) @[facility]    Subjective   \"Resting in bed with eyes closed. \"     Objective   Physical exam  General Obese female in no acute distress  Neuro  Alert and talking  Vital Signs   Visit Vitals  BP (!) 159/60   Pulse 75   Temp 98.4 °F (36.9 °C)   Resp 18   Ht 5' 6\" (1.676 m)   Wt 82.6 kg (182 lb 1.6 oz)   SpO2 99%   BMI 29.39 kg/m²     Laboratory  Recent Labs 05/31/22  0332 05/30/22  0338 05/29/22  0459   * 71 151*   AGAP 9 9 8   WBC 18.9* 15.9* 15.5*   CREA 5.69* 5.07* 4.04*   GFRNA 7* 8* 10*   AST 86* 133* 179*   * 134* 150*     Factors impacting BG management  Factor Dose Comments   Nutrition:  Clear Liquids    Ate almost all of breakfast tray   Drugs:  Steroids   HC 50mg Q6 hrs   Impairs insulin action   Infection Rocephin 2 g Q 24 hours Afebrile. WBCs sloan   Other:   Kidney function  Liver function   GFR 7  Enzymes remain elevated   Dialysis+     Blood glucose pattern      Significant diabetes-related events over the past 24-72 hours  Admitted in profound DKA with electrolyte disturbances. BG 1259 with AG 30  On Glucostabilizer; received 27-32 units of insulin/hour initially  BG finally under 250mg/dl at 1am 5/24/22 after 5L of fluid in ICU  Significant GLORIA - Requiring dialysis  Steroids started 5/24/22 - BGs higher than target  NPH dose adjustment with BGs in 120-150s until she began eating 5/26/22    Assessment and Plan   Nursing Diagnosis Risk for unstable blood glucose pattern   Nursing Intervention Domain 5259 Decision-making Support   Nursing Interventions Examined current inpatient diabetes/blood glucose control   Explored factors facilitating and impeding inpatient management     Evaluation   This overweight AA female was admitted in DKA associated with bradycardia. Received significant amounts of insulin/hour via Glucostabilizer. Fluid resuscitation also needed in presence of elevated NT pro-BNP. BGs down under 250mg/dl at 1am 5/24/22, and AG closed at 8am. Transitioned off GS, and steroids added 5/24/22. BGs sloan into the low 200s. NPH insulin started to override steroids in presence of kidney dysfunction with small increase in dose. BGs in target until she began eating yesterday 5/26/22. This woman uses combo insulin at home, which is not available in-house so we can tailor dosing to changing circumstances.  Current insulin dose is at half her usual basal insulin dose, but her kidney function has deteriorated in light of her presenting situation, e.g., DKA. Recommend increasing basal dose incrementally to establish basal dose in light of GLORIA/CKD. Some Info extracted from [address] [facility] note    5/31/22 BG within goal this morning on 14 units NPH twice daily. The patient continues to receive 50 mg hydrocortisone injection Q 8 hours. I recommend continuing on current diabetes medication regimen for now. Diabetes management will continue to follow with this patient, monitoring BG trends and making recommendations as needed. Recommendations     1. Continue  NPH insulin 14 units twice daily. 2. Continue HIGH sensitivity corrective insulin     Billing Code(s)   [x] 37647 IP subsequent hospital care - 15 minutes     Before making these care recommendations, I personally reviewed the hospitalization record, including notes, laboratory & diagnostic data and current medications, and examined the patient at the bedside (circumstances permitting) before making care recommendations. More than fifty (50) percent of the time was spent in patient counseling and/or care coordination.   Total minutes: 15 minutes    SANA Salazar  Diabetes Clinical Nurse Specialist  [facility]  Access via [employer]

## 2022-05-31 NOTE — PROGRESS NOTES
Hospitalist Progress Note    NAME: Glenys Cheadle   :  1934   MRN:  [id number]     Admit date: 2022    Today's date: 22    PCP: Kevin Ferrer MD    Anticipated discharge date: 2022    Barriers:  recovering from infection, still with bacteremia    ? needs long-term HD    Assessment / Plan:    Septic shock POA initial BP 46/34, temp 86.5, RR 42, GLORIA, lactate 4.26, pressor requirements  Klebsiella Bacteremia with UTI  POA  Serratia marcescens bacteremia  and 2022 POA  Persistent GNR bacteremia POA  - Admit UA 5-10 WBC, 1+ bacteria  - Admit BC with klebsiella and Serratia   Sensitive to zosyn, ceftriaxone  - Admit urine culture + for klebsiella  - Repeat BC  with recurrent sepsis + for Serratia  - CT abdomen/pelvis without IV contrast  with no hydronephrosis   No intraabdominal pathology  - Chest CT with bilateral effusions, no ASD  - With elevated lipase and abnormal LFTs, ?  Biliary source   GGT 28, never had elevated Alk Phos and T bili   US with no gallstones, contracted GB, CBD 0.4 cm              Biliary source seems less likely  - IV pressors at admit, weaned off   Started on HD for GLORIA from ATN  - IV steroids, but no clear AI documented   Will wean  - Transferred to floor 2022  - S/p zosyn --> transition to ceftriaxone   - Repeat Blood culture  with GNR  - Repeat blood culture  pending  - Not sure why she is persistent bacteremic with GNR despite appropriate antibiotics    TTE LVEF 55 to 60%, trace MR and AR  - Repeat CT abdomen given the tenderness, persistent bacteremia  -  consult ID in AM  - spoke with granddaughter last PM    Acute kidney injury POA BUN/creat 114 and 5.97  Rhabdomyolysis CPK peak 22,325   -Last baseline creat 1.28 at [facility] on 3/9/2021  -Started on CRRT till , now on HD  -No hydronephrosis  -Likely ATN with hypotension, sepsis, rhabdomyolysis  - Oligouric  -Serial labs  -Await renal recovery, likely will need outpatient HD    DKA POA BS 1259, HgBa1c > 14.0, AG > 20, pH 6.99  - Not sure if DKA present, acidosis may have been due to sepsis, GLORIA   Urine with trace ketones, but B-hydroxybutyrate levels in blood elevated  - Likely patient was not taking insulin at home as her A1c is >14  - Insulin gtt -->NPH 20 units BID plus SSI  - Diet reduced to clears 5/30, reduced insulin  - BS yesterday 67, 97, 154, 203   Today    137, 217, 250  - increase NPH to 16 units  - SSI          Acute pancreatitis POA  - admit Non contrast CT is neg for changes of acute pancreatitis. - Lipase > 3000 -->  1107 -->  2499 --> 1602  - + abdominal tenderness  - clears  - No Gallstones or ductal dilatation, no ETOH  - ? Ischemic   - lipase tredning down  -Repeat CT abdomen/pelvis  - serial labs     Acute metabolic encephalopathy POA  - Multifactorial with septic shock, low BP, GLORIA, elevated BS > 1200  - Head CT with atrophy, no acute events  -Improving. Closely monitor mental status.  -manage metabolic issues as above.     Symptomatic bradycardia.  -Likely precipitated by severe acidemia, also on BB at home.   -Again had long pauses on 5/25, EP following. Overweight POA Body mass index is 29.39 kg/m². DVT prophylaxis. [address] SUP. Pepcid. Code status. Full code. Subjective:     Chief Complaint / Reason for Physician Visit    Discussed with RN events overnight. Alert, talking, \"I feel tired\"  No pin but still with RUQ tenderness  No N/V or  diarrhea  No CP or SOB    Review of Systems:  Symptom Y/N Comments  Symptom Y/N Comments   Fever/Chills n   Chest Pain n    Poor Appetite    Edema     Cough n   Abdominal Pain n    Sputum    Joint Pain     SOB/BEE n   Headache     Nausea/vomit n   Tolerating PT/OT     Diarrhea n   Tolerating Diet y    Constipation    Other       Could NOT obtain due to:      Objective:     VITALS:   Last 24hrs VS reviewed since prior progress note.  Most recent are:  Patient Vitals for the past 24 hrs:   Temp Pulse Resp BP SpO2   05/31/22 1554 -- 82 -- -- --   05/31/22 1530 97.7 °F (36.5 °C) 82 20 (!) 186/79 90 %   05/31/22 1200 -- 75 -- -- --   05/31/22 1135 98.4 °F (36.9 °C) 75 18 (!) 159/60 99 %   05/31/22 0814 98.2 °F (36.8 °C) 73 18 (!) 168/74 99 %   05/31/22 0403 97.8 °F (36.6 °C) 69 18 (!) 160/67 100 %   05/30/22 2343 98.2 °F (36.8 °C) 71 18 (!) 149/55 99 %   05/30/22 2029 97.9 °F (36.6 °C) 78 18 (!) 148/69 100 %       Intake/Output Summary (Last 24 hours) at 5/31/2022 1857  Last data filed at 5/31/2022 0403  Gross per 24 hour   Intake --   Output 750 ml   Net -750 ml        Wt Readings from Last 12 Encounters:   05/26/22 82.6 kg (182 lb 1.6 oz)       PHYSICAL EXAM:    I had a face to face encounter and independently examined this patient on 05/31/22 as outlined below:    General: WD, WN. Alert, cooperative, no acute distress    EENT:  PERRL. Anicteric sclerae. MMM  Resp:  CTA bilaterally, no wheezing or rales. No accessory muscle use  CV:  Regular  rhythm,  No edema  GI:  Soft, Non distended, Tender RUQ and epigastric region. +Bowel sounds, no rebound  Neurologic:  Alert, normal speech, non focal motor exam  Psych:   Not anxious nor agitated  Skin:  No rashes. No jaundice      Reviewed most current lab test results and cultures  YES  Reviewed most current radiology test results   YES  Review and summation of old records today    NO  Reviewed patient's current orders and MAR    YES  PMH/SH reviewed - no change compared to H&P  ________________________________________________________________________  Care Plan discussed with:    Comments   Patient x    Family      RN x    Care Manager     Consultant                        Multidiciplinary team rounds were held today with , nursing, pharmacist and clinical coordinator. Patient's plan of care was discussed; medications were reviewed and discharge planning was addressed.      ________________________________________________________________________ Comments   >50% of visit spent in counseling and coordination of care     ________________________________________________________________________  Bill Rivas MD     Procedures: see electronic medical records for all procedures/Xrays and details which were not copied into this note but were reviewed prior to creation of Plan. LABS:  I reviewed today's most current labs and imaging studies.   Pertinent labs include:  Recent Labs     05/31/22 0332 05/30/22 0338 05/29/22 0459   WBC 18.9* 15.9* 15.5*   HGB 9.7* 9.5* 9.5*   HCT 29.6* 28.6* 28.8*   PLT 93* 76* 63*     Recent Labs     05/31/22 0332 05/30/22 0338 05/29/22 0459    143 141   K 3.6 2.9* 3.2*    106 104   CO2 26 28 29   * 71 151*   BUN 58* 57* 45*   CREA 5.69* 5.07* 4.04*   CA 7.3* 7.3* 7.2*   ALB 1.6* 1.5* 1.6*   TBILI 0.5 0.4 0.4   * 134* 150*   INR  --   --  1.2*

## 2022-05-31 NOTE — PROGRESS NOTES
Problem: Mobility Impaired (Adult and Pediatric)  Goal: *Acute Goals and Plan of Care (Insert Text)  Description: FUNCTIONAL STATUS PRIOR TO ADMISSION: Patient was modified independent using a rolling walker for functional mobility. The patient was ambulating short distances in the home up until this admission 5/23/22    HOME SUPPORT PRIOR TO ADMISSION: The patient lived alone with no local support. She has a family member that comes to check on her. Physical Therapy Goals  Initiated 5/31/2022  1. Patient will move from supine to sit and sit to supine  in bed with moderate assistance  within 7 day(s). 2.  Patient will transfer from bed to chair and chair to bed with moderate assistance  using the least restrictive device within 7 day(s). 3.  Patient will perform sit to stand with moderate assistance  within 7 day(s). 4.  Patient will ambulate with moderate assistance  for 15 feet with the least restrictive device within 7 day(s). Outcome: Progressing Towards Goal   PHYSICAL THERAPY EVALUATION  Patient: Perry Albarado (12 y.o. female)  Date: 5/31/2022  Primary Diagnosis: DKA (diabetic ketoacidosis) ([facility] [address] [E11.10]        Precautions:        ASSESSMENT  Based on the objective data described below, the patient presents with decreased independence with functional mobility S/P admission for DKA. Patient is received supine in sand bed due to large sacral wound. Heels are protected in bilateral pressure reducing boots. Patient reports fatigue. She is provided Total A for rolling R and L in order to change chux pads and allow RN to address wound. Patient demonstrates fecal incontinence during rolling, she reports she in continent. She is positioned in L sidelying in order to reduce pressure on sacral wound. Patient declines attempt at sitting today but understands the importance to maintain her independence. Current Level of Function Impacting Discharge (mobility/balance):  Total A rolling in sand bed Functional Outcome Measure: The patient scored 15/100 on the Barthel outcome measure. Other factors to consider for discharge: medical stability, decreased strength/endurance, increased need for assistance      Patient will benefit from skilled therapy intervention to address the above noted impairments. PLAN :  Recommendations and Planned Interventions: bed mobility training, transfer training, gait training, therapeutic exercises, neuromuscular re-education, edema management/control, patient and family training/education, and therapeutic activities      Frequency/Duration: Patient will be followed by physical therapy:  5 times a week to address goals. Recommendation for discharge: (in order for the patient to meet his/her long term goals)  Therapy up to 5 days/week in SNF setting    This discharge recommendation:  Has not yet been discussed the attending provider and/or case management    IF patient discharges home will need the following DME: to be determined (TBD)         SUBJECTIVE:   Patient stated I can't sit today.     OBJECTIVE DATA SUMMARY:   HISTORY:    Past Medical History:   Diagnosis Date    Diabetes ([facility] [address]      Past Surgical History:   Procedure Laterality Date    IR INSERT NON TUNL CVC OVER 5 YRS  5/26/2022       Personal factors and/or comorbidities impacting plan of care: please see above    Home Situation  Home Environment: Private residence  # Steps to Enter: 0  One/Two Story Residence: Two story, live on 1st floor  Living Alone: No  Support Systems: Other Family Member(s)  Patient Expects to be Discharged to[de-identified] Home with family assistance  Current DME Used/Available at Home: Walker, rolling    EXAMINATION/PRESENTATION/DECISION MAKING:   Critical Behavior:  Neurologic State: Alert,Confused  Orientation Level: Oriented to person,Oriented to situation,Oriented to time,Disoriented to place  Cognition: Follows commands  Safety/Judgement: Decreased awareness of need for assistance  Hearing: Auditory  Auditory Impairment: None  Skin:    Edema:   Range Of Motion:  AROM: Generally decreased, functional           PROM: Generally decreased, functional           Strength:    Strength: Generally decreased, functional                    Tone & Sensation:   Tone: Normal                              Coordination:  Coordination: Generally decreased, functional  Vision:      Functional Mobility:  Bed Mobility:  Rolling: Total assistance           Transfers:                             Balance:      Ambulation/Gait Training:                                                         Stairs: Therapeutic Exercises:       Functional Measure:  Barthel Index:    Bathin  Bladder: 0  Bowels: 0  Groomin  Dressin  Feedin  Mobility: 0  Stairs: 0  Toilet Use: 5  Transfer (Bed to Chair and Back): 0  Total: 15/100       The Barthel ADL Index: Guidelines  1. The index should be used as a record of what a patient does, not as a record of what a patient could do. 2. The main aim is to establish degree of independence from any help, physical or verbal, however minor and for whatever reason. 3. The need for supervision renders the patient not independent. 4. A patient's performance should be established using the best available evidence. Asking the patient, friends/relatives and nurses are the usual sources, but direct observation and common sense are also important. However direct testing is not needed. 5. Usually the patient's performance over the preceding 24-48 hours is important, but occasionally longer periods will be relevant. 6. Middle categories imply that the patient supplies over 50 per cent of the effort. 7. Use of aids to be independent is allowed. Score Interpretation (from [address] [facility])    Independent   60-79 Minimally independent   40-59 Partially dependent   20-39 Very dependent   <20 Totally dependent     -Nelly Jimenez., Barthel, DGeorgeW. (1965).  Functional evaluation: the Barthel Index. [address] [facility] ([address] (1997). The Barthel activities of daily living index: self-reporting versus actual performance in the old (> or = 75 years). Journal of [address] [place] [address] 45(7), [facility], ARIADNE, Bridgett Grover., Radha Greene. (1999). Measuring the change in disability after inpatient rehabilitation; comparison of the responsiveness of the Barthel Index and Functional [place] Measure. Journal of Neurology, Neurosurgery, and Psychiatry, 66(4), 142-101. LAMAR Antony, BENNIE Perez, & Miriam Gr MGeorgeA. (2004) Assessment of post-stroke quality of life in cost-effectiveness studies: The usefulness of the Barthel Index and the EuroQoL-5D. Quality of Life Research, 15, 917-77            Physical Therapy Evaluation Charge Determination   History Examination Presentation Decision-Making   HIGH Complexity :3+ comorbidities / personal factors will impact the outcome/ POC  LOW Complexity : 1-2 Standardized tests and measures addressing body structure, function, activity limitation and / or participation in recreation  MEDIUM Complexity : Evolving with changing characteristics  Other outcome measures Barthel  HIGH       Based on the above components, the patient evaluation is determined to be of the following complexity level: HIGH     Pain Rating:      Activity Tolerance:   Fair    After treatment patient left in no apparent distress:   Patient positioned in left sidelying for pressure relief, Call bell within reach, and Side rails x 3    COMMUNICATION/EDUCATION:   The patients plan of care was discussed with: Occupational therapist and Registered nurse. Fall prevention education was provided and the patient/caregiver indicated understanding., Patient/family have participated as able in goal setting and plan of care. , and Patient/family agree to work toward stated goals and plan of care.     Thank you for this referral.  Juanjo Horne, PT   Time Calculation: 36 mins

## 2022-05-31 NOTE — PROGRESS NOTES
Progress Note      5/31/2022 10:04 AM  NAME: Claribel Mendoza   MRN:  [id number]   Admit Diagnosis: DKA (diabetic ketoacidosis) ([facility] [E11.10]      PCP:  Martínez Layton MD     Assessment: 1. Profound sinus bradycardia 20-30's in the setting of DKA, hyperkalemia, and OP beta-blocker. This has resolved. 2. Paroxysmal atrial fibrillation with post-conversion pauses up to 6 seconds. 3. 2nd degree AV block with single dropped PAC's, RBBB with normal MN in sinus rhythm. 4. Hypotension likely due to dehydration/volume depletion, metabolic acidosis, bradycardia. Improved with volume resuscitation, pressor. 5. No evidence of acute ischemia or infarct by presenting ECG. Echo with normal LV and RV systolic function. 6. GLORIA atop CKD stage 3 at baseline. Rhabdomyolysis. 7. Diabetes mellitus type 2 on chronic insulin complicated by DKA. 8. Elevated lipase possibly due to DKA rather than acute pancreatitis. 9. Metabolic encephalopathy, improving. 10. Anemia. 11. Thrombocytopenia. 12. Full code.     Plan:      1. She's on DVT prophylaxis at this time. I'd avoid FULL anticoagulation if possible given her anemia and thrombocytopenia as long as Afib episodes are brief, self-limited. 2. HD per renal     Cont to monitor on tele          Subjective:     HPI:     No CP, dizziness, palpitations    No afib or pause      Objective:      Physical Exam:    Last 24hrs VS reviewed since prior progress note.  Most recent are:    Visit Vitals  BP (!) 186/79   Pulse 82   Temp 97.7 °F (36.5 °C)   Resp 20   Ht 5' 6\" (1.676 m)   Wt 82.6 kg (182 lb 1.6 oz)   SpO2 90%   BMI 29.39 kg/m²       Intake/Output Summary (Last 24 hours) at 5/31/2022 1805  Last data filed at 5/31/2022 0403  Gross per 24 hour   Intake --   Output 750 ml   Net -750 ml           General: Alert and awake, no distress  Neck: Supple   Respiratory: No respiratory distress, clear anteriorly   Cardiovascular: Regular rate rhythm, S1S2   Abdomen: soft, non tender   Neuro: moves all extremities   Skin: warm and dry   Extremity:  warm to touch      Data Review    Telemetry:  normal sinus rhythm          Lab Data Personally Reviewed:    Recent Labs     05/31/22 0332 05/30/22 0338   WBC 18.9* 15.9*   HGB 9.7* 9.5*   HCT 29.6* 28.6*   PLT 93* 76*     Recent Labs     05/29/22 0459   INR 1.2*   PTP 12.7*   APTT 30.1      Recent Labs     05/31/22 0332 05/30/22 0338 05/29/22 0459    143 141   K 3.6 2.9* 3.2*    106 104   CO2 26 28 29   BUN 58* 57* 45*   CREA 5.69* 5.07* 4.04*   * 71 151*   CA 7.3* 7.3* 7.2*     Recent Labs     05/31/22 0332 05/29/22 0459   * 1,824*     Lab Results   Component Value Date/Time    Triglyceride 95 05/26/2022 08:30 AM       Recent Labs     05/31/22 0332 05/30/22 0338 05/29/22 0459    85 103   TP 4.9* 4.8* 5.1*   ALB 1.6* 1.5* 1.6*   GLOB 3.3 3.3 3.5   GGT  --  28  --    LPSE 1,482* 1,602* 2,499*     No results for input(s): PH, PCO2, PO2 in the last 72 hours.     Medications Personally Reviewed:    Current Facility-Administered Medications   Medication Dose Route Frequency    bumetanide (BUMEX) injection 2 mg  2 mg IntraVENous BID    heparin (porcine) 1,000 unit/mL injection 1,400 Units  1,400 Units InterCATHeter DIALYSIS PRN    And    heparin (porcine) 1,000 unit/mL injection 1,100 Units  1,100 Units InterCATHeter DIALYSIS PRN    insulin NPH (NOVOLIN N, HUMULIN N) injection 14 Units  14 Units SubCUTAneous BID    hydrALAZINE (APRESOLINE) 20 mg/mL injection 20 mg  20 mg IntraVENous Q6H PRN    cefTRIAXone (ROCEPHIN) 2 g in 0.9% sodium chloride (MBP/ADV) 50 mL MBP  2 g IntraVENous Q24H    hydrocortisone Sod Succ (PF) (SOLU-CORTEF) injection 50 mg  50 mg IntraVENous Q8H    albumin human 25% (BUMINATE) solution 25 g  25 g IntraVENous DIALYSIS PRN    metoprolol (LOPRESSOR) injection 2.5 mg  2.5 mg IntraVENous Q6H PRN    insulin lispro (HUMALOG) injection   SubCUTAneous AC&HS    glucose chewable tablet 16 g  4 Tablet Oral PRN    glucagon (GLUCAGEN) injection 1 mg  1 mg IntraMUSCular PRN    dextrose 10% infusion 0-250 mL  0-250 mL IntraVENous PRN    sodium chloride (NS) flush 5-40 mL  5-40 mL IntraVENous Q8H    sodium chloride (NS) flush 5-40 mL  5-40 mL IntraVENous PRN    acetaminophen (TYLENOL) tablet 650 mg  650 mg Oral Q6H PRN    Or    acetaminophen (TYLENOL) suppository 650 mg  650 mg Rectal Q6H PRN    polyethylene glycol (MIRALAX) packet 17 g  17 g Oral DAILY PRN    ondansetron (ZOFRAN ODT) tablet 4 mg  4 mg Oral Q8H PRN    Or    ondansetron (ZOFRAN) injection 4 mg  4 mg IntraVENous Q6H PRN    [Held by provider] heparin (porcine) injection 5,000 Units  5,000 Units SubCUTAneous Q8H    balsam peru-castor oiL (VENELEX) ointment   Topical BID    alcohol 62% (NOZIN) nasal  1 Ampule  1 Ampule Topical Q12H    balsam peru-castor oiL (VENELEX) ointment   Topical BID    famotidine (PF) (PEPCID) 20 mg in 0.9% sodium chloride 10 mL injection  20 mg IntraVENous Q24H              Randall Landrum MD

## 2022-05-31 NOTE — PROGRESS NOTES
[address]  YOB: 1934          Assessment & Plan:   HD dependent GLORIA due to ATN  CKD 3b  DKA  Sepsis  UTI    Rec:  HD MWF  Her GLORIA is now hemodialysis pending. Plan for PermCath placement tomorrow. Spoke to  for outpatient hemodialysis placement. Albumin PRN for low BP  Monitor for recovery       Subjective:   CC: f/u GLORIA  HPI: Minimal urine output.  -Creatinine remains high.  -Remains dialysis dependent. With no recovery.   ROS: no sob  Current Facility-Administered Medications   Medication Dose Route Frequency    bumetanide (BUMEX) injection 2 mg  2 mg IntraVENous BID    heparin (porcine) 1,000 unit/mL injection 1,400 Units  1,400 Units InterCATHeter DIALYSIS PRN    And    heparin (porcine) 1,000 unit/mL injection 1,100 Units  1,100 Units InterCATHeter DIALYSIS PRN    insulin NPH (NOVOLIN N, HUMULIN N) injection 14 Units  14 Units SubCUTAneous BID    hydrALAZINE (APRESOLINE) 20 mg/mL injection 20 mg  20 mg IntraVENous Q6H PRN    cefTRIAXone (ROCEPHIN) 2 g in 0.9% sodium chloride (MBP/ADV) 50 mL MBP  2 g IntraVENous Q24H    hydrocortisone Sod Succ (PF) (SOLU-CORTEF) injection 50 mg  50 mg IntraVENous Q8H    albumin human 25% (BUMINATE) solution 25 g  25 g IntraVENous DIALYSIS PRN    metoprolol (LOPRESSOR) injection 2.5 mg  2.5 mg IntraVENous Q6H PRN    insulin lispro (HUMALOG) injection   SubCUTAneous AC&HS    glucose chewable tablet 16 g  4 Tablet Oral PRN    glucagon (GLUCAGEN) injection 1 mg  1 mg IntraMUSCular PRN    dextrose 10% infusion 0-250 mL  0-250 mL IntraVENous PRN    sodium chloride (NS) flush 5-40 mL  5-40 mL IntraVENous Q8H    sodium chloride (NS) flush 5-40 mL  5-40 mL IntraVENous PRN    acetaminophen (TYLENOL) tablet 650 mg  650 mg Oral Q6H PRN    Or    acetaminophen (TYLENOL) suppository 650 mg  650 mg Rectal Q6H PRN    polyethylene glycol (MIRALAX) packet 17 g  17 g Oral DAILY PRN    ondansetron (ZOFRAN ODT) tablet 4 mg  4 mg Oral Q8H PRN    Or    ondansetron (ZOFRAN) injection 4 mg  4 mg IntraVENous Q6H PRN    [Held by provider] heparin (porcine) injection 5,000 Units  5,000 Units SubCUTAneous Q8H    balsam peru-castor oiL (VENELEX) ointment   Topical BID    alcohol 62% (NOZIN) nasal  1 Ampule  1 Ampule Topical Q12H    balsam peru-castor oiL (VENELEX) ointment   Topical BID    famotidine (PF) (PEPCID) 20 mg in 0.9% sodium chloride 10 mL injection  20 mg IntraVENous Q24H          Objective:     Vitals:  Blood pressure (!) 168/74, pulse 73, temperature 98.2 °F (36.8 °C), resp. rate 18, height 5' 6\" (1.676 m), weight 82.6 kg (182 lb 1.6 oz), SpO2 99 %. Temp (24hrs), Av.1 °F (36.7 °C), Min:97.8 °F (36.6 °C), Max:98.6 °F (37 °C)      Intake and Output:  No intake/output data recorded.  1901 -  0700  In: -   Out: 0 [Urine:782]    Physical Exam:               GENERAL ASSESSMENT: NAD  NECK: IJ HD cath   CHEST: CTA  HEART: S1S2  ABDOMEN: Soft,NT  EXTREMITY: +EDEMA        ECG/rhythm:    Data Review      No results for input(s): TNIPOC in the last 72 hours.     No lab exists for component: ITNL   Recent Labs     22   * 1,824*     Recent Labs     22  0332 22  03322    143 141   K 3.6 2.9* 3.2*    106 104   CO2 26 28 29   BUN 58* 57* 45*   CREA 5.69* 5.07* 4.04*   * 71 151*   CA 7.3* 7.3* 7.2*   ALB 1.6* 1.5* 1.6*   WBC 18.9* 15.9* 15.5*   HGB 9.7* 9.5* 9.5*   HCT 29.6* 28.6* 28.8*   PLT 93* 76* 63*      Recent Labs     22  0459   INR 1.2*   PTP 12.7*   APTT 30.1     Needs: urine analysis, urine sodium, protein and creatinine  Lab Results   Component Value Date/Time    Sodium,urine random 37 2022 07:45 PM    Creatinine, urine 59.90 2022 07:45 PM         : Lawanda Watts MD  2022        [facility]:  [email or web address]  Elysia Santos office:  [address], [address] [place] [address],[address]  [place], [address] [facility]  Phone: [phone]  Fax :     [phone]    [facility] office:  [address] [facility], [facility]  Phone - [phone]  Fax - [phone]

## 2022-05-31 NOTE — PROGRESS NOTES
Problem: Breathing Pattern - Ineffective  Goal: *Absence of hypoxia  Outcome: Progressing Towards Goal     Problem: Patient Education: Go to Patient Education Activity  Goal: Patient/Family Education  Outcome: Progressing Towards Goal     Problem: Pressure Injury - Risk of  Goal: *Prevention of pressure injury  Description: Document Markos Scale and appropriate interventions in the flowsheet.   Outcome: Progressing Towards Goal  Note: Pressure Injury Interventions:  Sensory Interventions: Assess changes in LOC    Moisture Interventions: Minimize layers,Maintain skin hydration (lotion/cream)    Activity Interventions: Pressure redistribution bed/mattress(bed type)    Mobility Interventions: HOB 30 degrees or less    Nutrition Interventions: Document food/fluid/supplement intake    Friction and Shear Interventions: Lift team/patient mobility team                Problem: Patient Education: Go to Patient Education Activity  Goal: Patient/Family Education  Outcome: Progressing Towards Goal

## 2022-05-31 NOTE — PROGRESS NOTES
Palliative Medicine  [facility]: [phone] ([phone]  [facility]: [phone] ([phone])        Oma Disla is a 80 y.o. with a past history of HTN, IDDM, and HLD, who was admitted on 5/23/2022 from home with a diagnosis of septic shock.   She had been feeling sick and lethargic for about 4 days, and her granddaughter had called their PCP for an appointment, but when she doing her minimally responsive on 5/23 she called EMD.  On presentation to the ED she was found to be hypotensive and bradycardic- and was admitted to the ICU      Functional baseline:  Ms Mariam Homans is very independent at baseline. She moves around the house with a rollator, prefers to stay inside and never goes out. She makes simple meals for herself for breakfast and lunch, and her granddaughter cooks dinner. She does her own ADLs- bathing in the sink and dresses herself. Her granddaughter sets up a pill box, but she takes her own meds, and doses her own insulin. She spends a lot of time in her lift-recliner, getting up to get food or use the restroom (From Geo Porter, SOWMYA notes). Patient was seen by this writer to assess her ability to name an mPOA as she has a son and siblings, but everyone in the family appears to defer to her grand daughter Elena Donaldson for decision making. Patient lives with Elena Donaldson. Patient with eyes closed, but when this writer spoke to her and introduced self, she said \"hello\" and then opened her eyes. Patient appears to sleep much of the time. The TV was on but sound was on mute. Writer assessed patient to see if she is able to engage in a conversation. Asked patient if she knew what doctors were saying about her medical condition and she noted, \"I don't know anything\". She was able to state that she was previously \"walking and could get around\" and now she is bed bound.  Assessed about patient's ability to name an mPOA and asked patient who she would trust to be her voice, if she is unable to speak to doctors. At first, patient noted, \"Birdie\". Asked about Shruthi and what her last name is, she noted \"Shruthi Huber\" and she stated that Shruthi lives with her. There is no one by the name of  Shruthi listed in patient's chart. Asked patient about Yared Gonzales (her grand daughter that she lives with) and patient said, \"that's her, that's Birdbro\". When asked if Shruthi is in \"good health\" to be able to speak to doctors and come in if needed, patient noted, \"no\". Asked patient about Selestine Cos and patient stated, Daysi Dalton has diabetes, she is not in good health\". LCSW also asked patient if she has thought about CPR at a time when her heart stops or she stops breathing, when \"God has called you\". Patient did not have an answer, she does not seem to have thought about this much and let her know that this was fine, have asked her to think about what she would want at that time: to go naturally or if she is okay to be on machines to keep her alive. Asked patient if she would want family present when we discuss this again and she noted, \"yes\". LCSW called her grand daughter Marylu Hoover to see if she is coming in today or tomorrow; Marylu Hoover is coming in this afternoon. Briefly let Marylu Hoover know what I had spoken to patient about. Marylu Hoover indicated that she does not know anyone named \"Birdie\", nor is she \"Birdie\". Assuming that patient was somewhat confused this morning. Will meet with Marylu Hoover and patient again when Marylu oHover comes in (between 2:30 and 3:00 pm).     ADDENDUM: Benito Bianchi twice about time she was coming in, noted she was on her way, but no call received until 5 pm.

## 2022-05-31 NOTE — PROGRESS NOTES
Problem: Self Care Deficits Care Plan (Adult)  Goal: *Acute Goals and Plan of Care (Insert Text)  Description: FUNCTIONAL STATUS PRIOR TO ADMISSION: Patient was modified independent using a rolling walker for functional mobility. Patient was supervision for basic and instrumental ADLs but needing support recently due to primary caregiver hospitalized. HOME SUPPORT: The patient lived with alone with grand-daughter support until her hospitalized/unable had another cousin checking in on her since then ~1 month, per chart. Occupational Therapy Goals  Initiated 5/31/2022  1. Patient will perform self-feeding with modified independence within 7 day(s). 2.  Patient will perform grooming with supervision/set-up within 7 day(s). 3.  Patient will perform upper body dressing with supervision/set-up within 7 day(s). 4.  Patient will perform toilet transfers with moderate assistance  within 7 day(s). 5.  Patient will perform all aspects of toileting with moderate assistance  within 7 day(s). 6.  Patient will participate in upper extremity therapeutic exercise/activities with supervision/set-up for 5 minutes within 7 day(s). 7.  Patient will utilize energy conservation techniques during functional activities with verbal cues within 7 day(s).    Outcome: Progressing Towards Goal   OCCUPATIONAL THERAPY EVALUATION  Patient: Becka Serrano (80 y.o. female)  Date: 5/31/2022  Primary Diagnosis: [facility] [E11.10]        Precautions: wounds/skin       ASSESSMENT  Based on the objective data described below, the patient presents with decreased activity tolerance, pain at sacral/wound areas, fair UB strength and endurance, and inability to assess sitting due to pain and wound care hygiene/cleaning needs, s/p admission with unresponsiveness with hypotension and low HR 8 days ago, currently total A for rolling, and left in sidelying and setup with liquid breakfast tray, once pain in managed hopeful for EOB and OOB due sand bed may need 2-3A expected. Pending progression recommend SNF at this time. Current Level of Function Impacting Discharge (ADLs/self-care): total A for bed mobility, setup for self feeding with encouragement    Functional Outcome Measure: The patient scored Total: 15/100 on the Barthel Index outcome measure which is indicative of being below baseline in basic self-care. Other factors to consider for discharge:  was living alone; HD/kidney, rabdo     Patient will benefit from skilled therapy intervention to address the above noted impairments. PLAN :  Recommendations and Planned Interventions: self care training, functional mobility training, therapeutic exercise, balance training, therapeutic activities, endurance activities, patient education, and home safety training    Frequency/Duration: Patient will be followed by occupational therapy 5 times a week to address goals. Recommendation for discharge: (in order for the patient to meet his/her long term goals)  Therapy up to 5 days/week in SNF setting    This discharge recommendation:  A follow-up discussion with the attending provider and/or case management is planned    IF patient discharges home will need the following DME: AE: long handled bathing, AE: long handled dressing, bedside commode, shower chair, walker: rolling, and wheelchair       SUBJECTIVE:   Patient stated I walked in here.     OBJECTIVE DATA SUMMARY:   HISTORY:   Past Medical History:   Diagnosis Date    Diabetes ([facility]      Past Surgical History:   Procedure Laterality Date    IR INSERT NON TUNL CVC OVER 5 YRS  5/26/2022       Expanded or extensive additional review of patient history:     Home Situation  Home Environment: Private residence  # Steps to Enter: 0  One/Two Story Residence: Two story, live on 1st floor  Living Alone: No  Support Systems: Other Family Member(s)  Patient Expects to be Discharged to[de-identified] Home with family assistance  Current DME Used/Available at Home: Walker, rolling    Hand dominance: Right    EXAMINATION OF PERFORMANCE DEFICITS:  Cognitive/Behavioral Status:  Neurologic State: Alert;Confused  Orientation Level: Oriented to person;Oriented to situation;Oriented to time;Disoriented to place  Cognition: Follows commands             Skin: sacral wounds/skin open areas    Edema: B LE    Hearing: Auditory  Auditory Impairment: None    Vision/Perceptual:                                     Range of Motion:  B UE  AROM: Generally decreased, functional  PROM: Generally decreased, functional                      Strength:  B UE  Strength: Generally decreased, functional                Coordination:  Coordination: Generally decreased, functional  Fine Motor Skills-Upper: Left Intact; Right Intact         Tone & Sensation:  B UE  Tone: Normal                         Balance:       Functional Mobility and Transfers for ADLs:  Bed Mobility:  Rolling: Total assistance    Transfers:       ADL Assessment:  Feeding: Setup    Oral Facial Hygiene/Grooming: Maximum assistance    Bathing: Total assistance    Upper Body Dressing: Total assistance    Lower Body Dressing: Total assistance    Toileting: Total assistance         Completed OT evaluation and ADLs at sidelying positioning due severe pain and hygiene needs at wound areas Educated on safety and endurance training with encouragement for full participation in ADLs while in hospital. Good understanding noted.          ADL Intervention and task modifications:  Feeding  Container Management: Total assistance (dependent)  Food to Mouth: Set-up  Drink to Mouth: Set-up  Cues: Visual/perceptual training/retraining  Setup tray cups used in order to improve liquid drink independence    Grooming  Washing Hands: Set-up       Patient instructed and indicated understanding the benefits of maintaining activity tolerance, functional mobility, and independence with self care tasks during acute stay  to ensure safe return home and to baseline. Encouraged patient to increase frequency and duration OOB, be out of bed for all meals, perform daily ADLs (as approved by RN/MD regarding bathing etc), and performing functional mobility to/from bathroom. Lower Body Dressing Assistance  Socks: Total assistance (dependent)    Toileting  Toileting Assistance: Total assistance(dependent)  Bladder Hygiene: Total assistance (dependent)  Bowel Hygiene: Total assistance (dependent)  Clothing Management: Total assistance (dependent)  Cues: Visual/perceptual training/retraining         Therapeutic Exercise:     Functional Measure:    Barthel Index:  Bathin  Bladder: 0  Bowels: 0  Groomin  Dressin  Feedin  Mobility: 0  Stairs: 0  Toilet Use: 5  Transfer (Bed to Chair and Back): 0  Total: 15/100      The Barthel ADL Index: Guidelines  1. The index should be used as a record of what a patient does, not as a record of what a patient could do. 2. The main aim is to establish degree of independence from any help, physical or verbal, however minor and for whatever reason. 3. The need for supervision renders the patient not independent. 4. A patient's performance should be established using the best available evidence. Asking the patient, friends/relatives and nurses are the usual sources, but direct observation and common sense are also important. However direct testing is not needed. 5. Usually the patient's performance over the preceding 24-48 hours is important, but occasionally longer periods will be relevant. 6. Middle categories imply that the patient supplies over 50 per cent of the effort. 7. Use of aids to be independent is allowed. Score Interpretation (from [address] [address])    Independent   60-79 Minimally independent   40-59 Partially dependent   20-39 Very dependent   <20 Totally dependent     -Nelly Jimenez., Barthel, D.W. (1965). Functional evaluation: the Barthel Index.  [address] [facility] ([address] [facility] [address] (1997). The Barthel activities of daily living index: self-reporting versus actual performance in the old (> or = 75 years). Journal [address] [address]), 14 [facility], ARMANDO, Stephanie Sepulveda, [address] Street. (1999). Measuring the change in disability after inpatient rehabilitation; comparison of the responsiveness of the Barthel Index and Functional [place] Measure. Journal of Neurology, Neurosurgery, and Psychiatry, 66(4), 611-512. LAMAR Rivera, BENNIE Perez, & Lashanda Rich M.A. (2004) Assessment of post-stroke quality of life in cost-effectiveness studies: The usefulness of the Barthel Index and the EuroQoL-5D. Quality of Life Research, 15, 255-04     Occupational Therapy Evaluation Charge Determination   History Examination Decision-Making   MEDIUM Complexity : Expanded review of history including physical, cognitive and psychosocial  history  HIGH Complexity : 5 or more performance deficits relating to physical, cognitive , or psychosocial skils that result in activity limitations and / or participation restrictions MEDIUM Complexity : Patient may present with comorbidities that affect occupational performnce. Miniml to moderate modification of tasks or assistance (eg, physical or verbal ) with assesment(s) is necessary to enable patient to complete evaluation       Based on the above components, the patient evaluation is determined to be of the following complexity level: MEDIUM  Pain Rating:  Crying during hygiene/wound needs; otherwise no complaint    Activity Tolerance:   Poor and requires frequent rest breaks    After treatment patient left in no apparent distress:    Supine in bed, Patient positioned in L sidelying for pressure relief, and Call bell within reach    COMMUNICATION/EDUCATION:   The patients plan of care was discussed with: Physical therapist and Registered nurse.      Home safety education was provided and the patient/caregiver indicated understanding., Patient/family have participated as able in goal setting and plan of care. , and Patient/family agree to work toward stated goals and plan of care. This patients plan of care is appropriate for delegation to SAW.     Thank you for this referral.  Leo eBe, OT  Time Calculation: 26 mins

## 2022-05-31 NOTE — PROGRESS NOTES
Transition of Care Plan:     RUR:17  Disposition: Home with granddaughter vs. SNF  Granddaughter is interested in applying for Medicaid in case she needs LTC or Caregivers at home. - Referral sent to [facility] to screen    Follow up appointments: PCP; Fred Garduno. DME needed: tbd  Transportation at Discharge: Granddaughter or GD Spouse will transport if she is able to go in car. Keys or means to access home:     Granddafuad   IM Medicare Letter: to be delivered prior to DC. 1st IM Letter given 5/23/22  Is patient a BCPI-A Bundle:        no              If yes, was Bundle Letter given?:    Is patient a [place] and connected with the [place]? No   If yes, was [employer] transfer form completed and [facility] notified? Caregiver Contact: Joshua Minium: [phone]  GD Spouse: Raoul Kingn: [phone]  Discharge Caregiver contacted prior to discharge? Yes reviewed plan with Granddaughter over the phone. Care Conference needed?: to be determined. 3:53 p.m. CM reviewed therapy's recommendation of SNF at d/c.  CM spoke with pt's granddaughter, Ms. Jose Antonio Hobson, who is coming to meet with the palliative . CM will leave SNF list in room and touch base with her tomorrow to follow up.     1:59 p.m. CM faxed clinical documentation to [facility]. CM informed pt will need HD set up for d/c.  CM called [facility] and spoke with [address] at [phone] to provide clinical information to start set up. [address] is going to fax CM sheet with required documentation. Pt will need a PCR test prior to admission. Their fax is [phone]. [address] is setting pt up for M, W, F at the [place] location. Chair time will be provided once approved.       Benjamin Rider, MSW  Care Management, [facility]

## 2022-06-01 NOTE — PROGRESS NOTES
Problem: Self Care Deficits Care Plan (Adult)  Goal: *Acute Goals and Plan of Care (Insert Text)  Description: FUNCTIONAL STATUS PRIOR TO ADMISSION: Patient was modified independent using a rolling walker for functional mobility. Patient was supervision for basic and instrumental ADLs but needing support recently due to primary caregiver hospitalized. HOME SUPPORT: The patient lived with alone with grand-daughter support until her hospitalized/unable had another cousin checking in on her since then ~1 month, per chart. Occupational Therapy Goals  Initiated 5/31/2022  1. Patient will perform self-feeding with modified independence within 7 day(s). 2.  Patient will perform grooming with supervision/set-up within 7 day(s). 3.  Patient will perform upper body dressing with supervision/set-up within 7 day(s). 4.  Patient will perform toilet transfers with moderate assistance  within 7 day(s). 5.  Patient will perform all aspects of toileting with moderate assistance  within 7 day(s). 6.  Patient will participate in upper extremity therapeutic exercise/activities with supervision/set-up for 5 minutes within 7 day(s). 7.  Patient will utilize energy conservation techniques during functional activities with verbal cues within 7 day(s). Outcome: Not Progressing Towards Goal   OCCUPATIONAL THERAPY TREATMENT  Patient: Bossman Garg (21 y.o. female)  Date: 6/1/2022  Diagnosis: DKA (diabetic ketoacidosis) ([facility] [E11.10] <principal problem not specified>       Precautions:    Chart, occupational therapy assessment, plan of care, and goals were reviewed. ASSESSMENT  Patient continues with skilled OT services and is not progressing towards goals. Pt was supine in bed and she was soiled with BM and was max of 2 for rolling in bed and was cleaned. Pt was not able to bend her left knee and stated that it was painful to move or flex.   Pt was max to reach across her body to reach for bed rails, and was max of 1 to stay on her side. Pt was left on her right side in bed to get pressure off her buttocks area. Pt at this time is very weak and painful to move, and decreased her frequency to 3x week and she now has a rectal tube. Pt will need rehab at SNF at discharge. Current Level of Function Impacting Discharge (ADLs): max to total for ADLs              PLAN :  Patient continues to benefit from skilled intervention to address the above impairments. Continue treatment per established plan of care to address goals. Recommend with staff: HOB elevated in bed     Recommend next OT session: work on [facility] elevated and UB ADLs    Recommendation for discharge: (in order for the patient to meet his/her long term goals)  Therapy up to 5 days/week in SNF setting    This discharge recommendation:  Has been made in collaboration with the attending provider and/or case management    IF patient discharges home will need the following DME: tbd       SUBJECTIVE:   Patient stated It hurts to move my knee.     OBJECTIVE DATA SUMMARY:   Cognitive/Behavioral Status:  Neurologic State: Alert  Orientation Level: Oriented X4  Cognition: Follows commands  Perception: Appears intact  Perseveration: No perseveration noted  Safety/Judgement: Fall prevention    Functional Mobility and Transfers for ADLs:  Bed Mobility:  Rolling: Maximum assistance; Additional time;Assist x2 (pt able to reach for bedrail intermittently ;multiple trials)    Transfers:   Not tested          Balance:   impaired    ADL Intervention:  Feeding  Feeding Assistance: Minimum assistance;Supervision     Max to total for UB and LB ADLs          Toileting  Toileting Assistance: Total assistance(dependent)  Bladder Hygiene: Total assistance (dependent)  Bowel Hygiene:  Total assistance (dependent)    Cognitive Retraining  Safety/Judgement: Fall prevention        Activity Tolerance:   Poor    After treatment patient left in no apparent distress:   Supine in bed and Call bell within reach    COMMUNICATION/COLLABORATION:   The patients plan of care was discussed with: Physical therapist and Registered nurse.      Carmel Tee OT  Time Calculation: 32 mins

## 2022-06-01 NOTE — PROGRESS NOTES
Comprehensive Nutrition Assessment    Type and Reason for Visit: Reassess    Nutrition Recommendations/Plan:   1. Resume diet per SLP  2. RD to add Glucerna TID once diet resumed     Malnutrition Assessment:  Malnutrition Status: At risk for malnutrition (specify) (05/25/22 1338)        Nutrition Assessment:  Chart reviewed, pt in the room lethargic. Spoke with grand daughter at the bedside. Pt was NPO for procedure today, per RN diet to be resumed. Per RN flowsheet's, PO intake has been poor. Family reports she is not consuming Nepro shakes. She does like Glucerna though, K 3.3 so will add TID once diet resumed. Noted plans for line holiday until Friday as she has bacteremia. Last BM was 5/30. -223. Asked family to encourage PO intake and bring in favorite foods from home as long as compliant with diet texture. Patient Vitals for the past 168 hrs:   % Diet Eaten   05/29/22 0950 1 - 25%   05/28/22 2000 0%   05/28/22 1806 26 - 50%   05/28/22 0955 1 - 25%   05/28/22 0400 0%   05/27/22 1300 26 - 50%   05/26/22 1812 26 - 50%   05/26/22 1330 1 - 25%     Patient Vitals for the past 168 hrs:   Supplement intake %   05/29/22 0950 1 - 25%   05/28/22 2000 0%   05/27/22 1300 26 - 50%   05/26/22 1330 51 - 75%          Nutrition Related Findings:    Meds: bumex, pepcid, solucortef, humalog, insulin NPH, vancomycin. Edema: trace-BLE. BM 6/1 Wound Type: Deep tissue injury (sacrum, heels)    Current Nutrition Intake & Therapies:  Average Meal Intake: NPO  Average Supplement Intake: 51-75%  ADULT ORAL NUTRITION SUPPLEMENT Breakfast, Lunch, Dinner; Renal Supplement  DIET NPO    Anthropometric Measures:  Height: 5' 6\" (167.6 cm)  Ideal Body Weight (IBW): 130 lbs (59 kg)     Current Body Wt:  82.6 kg (182 lb 1.6 oz), 150.8 % IBW. Bed scale  Current BMI (kg/m2): 29.4                          BMI Category: Overweight (BMI 25.0-29. 9)    Estimated Daily Nutrient Needs:  Energy Requirements Based On: Formula  Weight Used for Energy Requirements: Current  Energy (kcal/day): MSJ 1750 (1336 x 1.3)  Weight Used for Protein Requirements: Current  Protein (g/day): 89g (1gPro/kg)  Method Used for Fluid Requirements: Standard renal  Fluid (ml/day): 1200mL    Nutrition Diagnosis:   · Inadequate protein-energy intake related to other (specify) (poor appetite) as evidenced by intake 0-25%  Previous dx continues.      Nutrition Interventions:   Food and/or Nutrient Delivery: Start oral diet,Start oral nutrition supplement  Nutrition Education/Counseling: No recommendations at this time  Coordination of Nutrition Care: Continue to monitor while inpatient,Interdisciplinary rounds       Goals:  Previous Goal Met: Progress towards goal(s) declining  Goals: PO intake 50% or greater,by next RD assessment,within 2 days (in 2-4 days)       Nutrition Monitoring and Evaluation:   Behavioral-Environmental Outcomes: None identified  Food/Nutrient Intake Outcomes: Food and nutrient intake,Supplement intake  Physical Signs/Symptoms Outcomes: Biochemical data,Nutrition focused physical findings,Skin,Weight,Fluid status or edema,Hemodynamic status    Discharge Planning:    Continue oral nutrition supplement,Continue current diet    Rick Gotti RD, CNSC  Contact: ext [phone]

## 2022-06-01 NOTE — PROGRESS NOTES
Problem: Mobility Impaired (Adult and Pediatric)  Goal: *Acute Goals and Plan of Care (Insert Text)  Description: FUNCTIONAL STATUS PRIOR TO ADMISSION: Patient was modified independent using a rolling walker for functional mobility. The patient was ambulating short distances in the home up until this admission 5/23/22    HOME SUPPORT PRIOR TO ADMISSION: The patient lived alone with no local support. She has a family member that comes to check on her. Physical Therapy Goals  Initiated 5/31/2022  1. Patient will move from supine to sit and sit to supine  in bed with moderate assistance  within 7 day(s). 2.  Patient will transfer from bed to chair and chair to bed with moderate assistance  using the least restrictive device within 7 day(s). 3.  Patient will perform sit to stand with moderate assistance  within 7 day(s). 4.  Patient will ambulate with moderate assistance  for 15 feet with the least restrictive device within 7 day(s). Outcome: Not Met     PHYSICAL THERAPY TREATMENT  Patient: Anne Robledo (43 y.o. female)  Date: 6/1/2022  Diagnosis: DKA (diabetic ketoacidosis) ([facility] [E11.10] <principal problem not specified>       Precautions:  Fall ; Skin (sacral wounds) ; rectal tube  Chart, physical therapy assessment, plan of care and goals were reviewed. ASSESSMENT  Nurse clears pt for tx session and reports pt is in need of hygiene care as rectal tube not functioning correctly. Pt with loose stool on arrival.  Rolling multiple trials for extensive hygiene care and attempted BLE exercises this date , but pt unable to tolerate. Patient continues with skilled PT services and is not progressing towards goals at this time. Pt educated to continue to attempt ROM efforts in bed. She is positioned into R side-lying for skin integrity purposes with pillow props. Pt requires 24/7 care/assist at this time. Continue to follow.      Current Level of Function Impacting Discharge (mobility/balance): max. X 2 for rolling    Other factors to consider for discharge: sacral wounds ; has been unable to sit up edge of bed         PLAN :  Patient continues to benefit from skilled intervention to address the above impairments. Continue treatment per established plan of care. to address goals. Recommendation for discharge: (in order for the patient to meet his/her long term goals)  Therapy up to 5 days/week in SNF setting    This discharge recommendation:  Has been made in collaboration with the attending provider and/or case management    IF patient discharges home will need the following DME: hospital bed, mechanical lift, and to be determined (TBD)       SUBJECTIVE:   Patient stated I was doing okay until I got sick.     OBJECTIVE DATA SUMMARY:   Critical Behavior:  Neurologic State: Alert  Orientation Level: Oriented X4  Cognition: Follows commands  Safety/Judgement: Decreased awareness of need for assistance  Functional Mobility Training:  Bed Mobility:  Rolling: Maximum assistance; Additional time;Assist x2 (pt able to reach for bedrail intermittently ;multiple trials)       Interventions: Tactile cues; Verbal cues; Visual cues      Therapeutic Exercises:   Pt unable to tolerate due to c/o pain BLE with attempts    Pain Rating:  C/o pain with attempted ROM of either LE, pt did not rate    Activity Tolerance:   Poor    After treatment patient left in no apparent distress:   Patient positioned in right sidelying for pressure relief, Call bell within reach, and Side rails x 3 ; nurse present    COMMUNICATION/COLLABORATION:   The patients plan of care was discussed with: Occupational therapist and Registered nurse.      Abbe Pantoja, PT, DPT   Time Calculation: 31 mins

## 2022-06-01 NOTE — PROGRESS NOTES
Transition of Care Plan:     RUR:18%  Disposition: Home with granddaughter vs. SNF  Granddaughter is interested in applying for Medicaid in case she needs LTC or Caregivers at home.  - Referral sent to [facility] to screen    Follow up appointments: PCP; Beatriz Garcia.   DME needed: tbd  Transportation at Discharge: Granddaughter or GD Spouse will transport if she is able to go in car.   Keys or means to access home:     Granddafuad   IM Medicare Letter: to be delivered prior to DC.   1st IM Letter given 5/23/22  Is patient a BCPI-A Bundle:        no              If yes, was Bundle Letter given?:    Is patient a [place] and connected with the [facility]?              No   If yes, was [employer] transfer form completed and [address] [facility] notified? Caregiver Contact: Granddaughter: Elena Donladson: [phone]  GD Spouse: Justin Caballero: [phone]  Discharge Caregiver contacted prior to discharge? Yes reviewed plan with Granddaughter over the phone.   Care Conference needed?: to be determined. Per request, CM faxed([phone]) face sheet, flow sheet, & chest x-ray to [facility]. CM will fax [facility] labs when available & continue to follow.     Hali Aguilar  Ext [phone]

## 2022-06-01 NOTE — PROGRESS NOTES
Palliative Medicine  [place]: [phone] ([phone])  [facility]: [phone] ([phone])    Went by patient's room to see if any family present, to follow up on AMD, which is important for patient to have. Patient awake, noted she has had a \"rough\" day, could not specifically say why, but she does not feel good. She shared that her grand daughter Skinny Sams left\". This writer did not get a call from Shanthi Lopes the previous day or today. Hector Diaz, re-introduced self and the importance of patient having an AMD, documenting who she would want physicians to speak to, if she could not speak for herself. Shanthi Lopes agreeable to meeting this writer in patient's room at 3 pm tomorrow.

## 2022-06-01 NOTE — PROGRESS NOTES
At the onset of shift, pt was observed having high heart rate in the 150's. NP [employer] was notified and she said the cardiologist should be notified because pt is having A-fib. Dr Morteza Hyde was notified and Amiodarone protocol was ordered and he said he will see the pt in the morning. See MAR for the Amiodarone protocol. Pt has converted from the 150's to high 70's to 80's. Close monitoring is ongoing.

## 2022-06-01 NOTE — ACP (ADVANCE CARE PLANNING)
Palliative Medicine  [place]: [phone] ([phone])  formerly [facility]: [phone] ([phone])    This writer met with patient, no family present. Assessed about patient's ability to name an mPOA and asked patient who she would trust to be her voice, if she is unable to speak to doctors. At first, patient noted, \"Birdbro\". Asked about Shruthi and what her last name is, she noted \"Shruthi Huber\" and she stated that Shruthi lives with her. There is no one by the name of  Shruthi listed in patient's chart. Asked patient about Charles Edgar (her grand daughter that she lives with) and patient said, \"that's her, that's Shruthi\". When asked if Shruthi is in \"good health\" to be able to speak to doctors and come in if needed, patient noted, \"no\". Asked patient about Charles Madrigala and patient stated, Esau Jacobs has diabetes, she is not in good health\".      LCSW also asked patient if she has thought about CPR at a time when her heart stops or she stops breathing, when \"God has called you\". Patient did not have an answer, she does not seem to have thought about this much and let her know that this was fine, have asked her to think about what she would want at that time: to go naturally or if she is okay to be on machines to keep her alive.      Asked patient if she would want family present when we discuss this again and she noted, \"yes\". LCSW called her grand daughter Ellen Hyde to see if she is coming in today or tomorrow, she will call when she is here. (No call received from Ellen Hyde, although she did tell this writer that she would be coming in). Ellen Hyde also noted that she does not know anyone named Jarad Tejeda, patient is not fully cognizant to complete AMD today. Will wait for Ellen Hyde to contact us as it would be important to have her present during these conversations.

## 2022-06-01 NOTE — PROGRESS NOTES
Patient seen and examined. Persistent Serratia bacteremia. Also Klebsiella bacteremia this admission. CT abd pelvis with IV contrast done today. Patient's abdominal exam is benign. Will follow the CT, but given the risk of seeding of the central lines (HD jolene and triple lumen), these will need to be removed as these are a potential source as well. Followed by line holiday till atleast Friday. Discussed with Dr. Kia Lopez and Joce Sheffield. Repeat paired blood cultures must be obtained TODAY after all lines are removed. These have been ordered. If bacteremia is present despite all of the above work-up, will need to pursue NAI. Sacral wound breakdown seen, but no deep-tracking wound thus far. FMS in today - please monitor the diarrhea. If persistent watery despite no laxatives, send for Cdiff testing. Continue ceftriaxone for now. Full consult to follow.          Travon Jackson MD  Infectious Diseases

## 2022-06-01 NOTE — PROGRESS NOTES
Problem: Breathing Pattern - Ineffective  Goal: *Absence of hypoxia  Outcome: Progressing Towards Goal     Problem: Patient Education: Go to Patient Education Activity  Goal: Patient/Family Education  Outcome: Progressing Towards Goal     Problem: Pressure Injury - Risk of  Goal: *Prevention of pressure injury  Description: Document Markos Scale and appropriate interventions in the flowsheet. Outcome: Progressing Towards Goal  Note: Pressure Injury Interventions:  Sensory Interventions: Assess changes in LOC    Moisture Interventions: Minimize layers,Apply protective barrier, creams and emollients    Activity Interventions: Pressure redistribution bed/mattress(bed type)    Mobility Interventions: HOB 30 degrees or less    Nutrition Interventions: Document food/fluid/supplement intake    Friction and [place]ar Interventions: Lift team/patient mobility team                Problem: Patient Education: Go to Patient Education Activity  Goal: Patient/Family Education  Outcome: Progressing Towards Goal     Problem: Diabetes Self-Management  Goal: *Disease process and treatment process  Description: Define diabetes and identify own type of diabetes; list 3 options for treating diabetes.   Outcome: Progressing Towards Goal

## 2022-06-01 NOTE — PROGRESS NOTES
Progress Note      6/1/2022  NAME: America Duval   MRN:  [id number]   Admit Diagnosis: DKA (diabetic ketoacidosis) ([facility] [address] [E11.10]  PCP:  Calvin Vaughn MD     Assessment: 1. Profound sinus bradycardia 20-30's in the setting of DKA, hyperkalemia, and OP beta-blocker. This has resolved. 2. Paroxysmal atrial fibrillation with post-conversion pauses up to 6 seconds on beta-blocker. Now off beta-blocker. 3. Paroxysmal atrial flutter by tele. 4. 2nd degree AV block with single dropped PAC's, RBBB with normal VT in sinus rhythm. 5. Hypotension likely due to dehydration/volume depletion, metabolic acidosis, bradycardia. Improved with volume resuscitation, pressor. Now off pressor, but evidence of bacteremia with Serratia (see ID note for details). 6. No evidence of acute ischemia or infarct by presenting ECG. Echo with normal LV and RV systolic function. 7. GLORIA atop CKD stage 3 at baseline. Rhabdomyolysis. On dialysis. 8. Diabetes mellitus type 2 on chronic insulin complicated by DKA. 9. Elevated lipase possibly due to DKA rather than acute pancreatitis. 10. Metabolic encephalopathy, resolved. 11. Anemia. 12. Thrombocytopenia. 13. Full code.     Plan:      1. She's on DVT prophylaxis at this time. I'd avoid FULL anticoagulation if possible given her anemia and thrombocytopenia as long as Afib and flutter episodes are brief, self-limited. 2. HD per renal team.  Her dialysis catheter was removed due to bacteremia. 3. ID consult noted. If persistent bacteremia, then will need a NAI. BCX from 6/1 pending. She had runs of atrial tachycardia and then followed by probably sustained Afib which organized into a right atrial flutter (by tele morphology) before terminating to sinus. Subjective:     No CP, dizziness, palpitations. Objective:      Physical Exam:    Last 24hrs VS reviewed since prior progress note.  Most recent are:    Visit Vitals  BP (!) 157/63 (BP 1 Location: Left upper arm)   Pulse 74   Temp 97.7 °F (36.5 °C)   Resp 18   Ht 5' 6\" (1.676 m)   Wt 82.6 kg (182 lb 1.6 oz)   SpO2 97%   BMI 29.39 kg/m²       Intake/Output Summary (Last 24 hours) at 6/1/2022 1807  Last data filed at 6/1/2022 1700  Gross per 24 hour   Intake --   Output 0 ml   Net 0 ml           General: Tired and awake, no distress  Neck: Supple,   Respiratory: No respiratory distress, clear anteriorly   Cardiovascular: Regular rate rhythm, S1S2   Abdomen: soft, non tender   Neuro: moves all extremities   Skin: warm and dry   Extremity:  warm to touch      Data Review    Telemetry:  normal sinus rhythm with runs of atrial tachycardia, then what appears to be atrial fibrillation, then organizing into RA flutter before termination         Lab Data Personally Reviewed:    Recent Labs     06/01/22 0409 05/31/22 2022   WBC 16.2* 16.1*   HGB 10.0* 9.6*   HCT 30.4* 28.8*   * 125*     No results for input(s): INR, PTP, APTT, INREXT, INREXT in the last 72 hours. Recent Labs     06/01/22 0409 05/31/22 2022 05/31/22 0332    139 142   K 3.3* 3.2* 3.6    104 107   CO2 24 24 26   BUN 73* 71* 58*   CREA 6.77* 6.30* 5.69*   * 250* 156*   CA 7.3* 7.3* 7.3*   MG  --  2.5*  --      Recent Labs     05/31/22 0332   *     Lab Results   Component Value Date/Time    Triglyceride 95 05/26/2022 08:30 AM       Recent Labs     06/01/22  0409 05/31/22 0332 05/30/22 0338   AP 99 109 85   TP 5.3* 4.9* 4.8*   ALB 1.7* 1.6* 1.5*   GLOB 3.6 3.3 3.3   GGT  --   --  28   LPSE 1,387* 1,482* 1,602*     No results for input(s): PH, PCO2, PO2 in the last 72 hours.     Medications Personally Reviewed:    Current Facility-Administered Medications   Medication Dose Route Frequency    insulin NPH (NOVOLIN N, HUMULIN N) injection 16 Units  16 Units SubCUTAneous BID    vancomycin (VANCOCIN) 2000 mg in  ml infusion  2,000 mg IntraVENous ONCE    [START ON 6/3/2022] vancomycin random level with AM labs   Other ONCE    [START ON 6/2/2022] Vancomycin - Pharmacist to Dose after HD   Other Rx Dosing/Monitoring    [START ON 6/2/2022] hydrocortisone Sod Succ (PF) (SOLU-CORTEF) injection 50 mg  50 mg IntraVENous Q12H    cefepime (MAXIPIME) 2 g in 0.9% sodium chloride 10 mL IV syringe  2 g IntraVENous NOW    Followed by   Nicci Barnes ON 6/2/2022] cefepime (MAXIPIME) 1 g in 0.9% sodium chloride (MBP/ADV) 50 mL MBP  1 g IntraVENous Q24H    bumetanide (BUMEX) injection 2 mg  2 mg IntraVENous BID    amiodarone (CORDARONE) 375 mg/250 mL D5W infusion  0.5 mg/min IntraVENous CONTINUOUS    heparin (porcine) 1,000 unit/mL injection 1,400 Units  1,400 Units InterCATHeter DIALYSIS PRN    And    heparin (porcine) 1,000 unit/mL injection 1,100 Units  1,100 Units InterCATHeter DIALYSIS PRN    hydrALAZINE (APRESOLINE) 20 mg/mL injection 20 mg  20 mg IntraVENous Q6H PRN    albumin human 25% (BUMINATE) solution 25 g  25 g IntraVENous DIALYSIS PRN    metoprolol (LOPRESSOR) injection 2.5 mg  2.5 mg IntraVENous Q6H PRN    insulin lispro (HUMALOG) injection   SubCUTAneous AC&HS    glucose chewable tablet 16 g  4 Tablet Oral PRN    glucagon (GLUCAGEN) injection 1 mg  1 mg IntraMUSCular PRN    dextrose 10 % infusion 0-250 mL  0-250 mL IntraVENous PRN    sodium chloride (NS) flush 5-40 mL  5-40 mL IntraVENous Q8H    sodium chloride (NS) flush 5-40 mL  5-40 mL IntraVENous PRN    acetaminophen (TYLENOL) tablet 650 mg  650 mg Oral Q6H PRN    Or    acetaminophen (TYLENOL) suppository 650 mg  650 mg Rectal Q6H PRN    polyethylene glycol (MIRALAX) packet 17 g  17 g Oral DAILY PRN    ondansetron (ZOFRAN ODT) tablet 4 mg  4 mg Oral Q8H PRN    Or    ondansetron (ZOFRAN) injection 4 mg  4 mg IntraVENous Q6H PRN    [Held by provider] heparin (porcine) injection 5,000 Units  5,000 Units SubCUTAneous Q8H    balsam peru-castor oiL (VENELEX) ointment   Topical BID    alcohol 62% (NOZIN) nasal  1 Ampule  1 Ampule Topical Q12H    balsam peru-castor oiL (VENELEX) ointment   Topical BID    famotidine (PF) (PEPCID) 20 mg in 0.9% sodium chloride 10 mL injection  20 mg IntraVENous Q24H              Beka Antonio MD

## 2022-06-01 NOTE — PROGRESS NOTES
Speech Pathology:  Chart reviewed in preparation for dysphagia treatment. Patient currently off floor for CT. SLP to continue to follow for dysphagia treatment, tolerance of pureed diet, thin liquids and assessment for diet advance as appropriate. Thank you.     Eliu Pyle, SLP

## 2022-06-01 NOTE — PROGRESS NOTES
Pharmacy Antimicrobial Kinetic Dosing    Indication for Antimicrobials: Bacteremia    Current Regimen of Each Antimicrobial:  Ceftriaxone  (Start Date ; Day # 2 of 10)  Vancomycin  IV  (Restart ;  Day #1)    Previous Antimicrobial Therapy:  Vanc -  Zosyn -  Fluconazole  -     Goal Level: VANCOMYCIN TROUGH GOAL RANGE    Vancomycin Trough Other:  ~ 15mcg/ml    Significant Positive Cultures:   : Blood-  GNR in 1 of 2  :  Blood  GNR in 1 of 2    Conditions for Dosing Consideration: Hemodialysis    Labs:  Recent Labs     22   CREA 6.77* 6.30* 5.69*   BUN 73* 71* 58*   PCT 52.27  --   --      Recent Labs     22   WBC 16.2* 16.1* 18.9* 15.9*   BANDS  --  1 1 3     Temp (24hrs), Av.9 °F (36.6 °C), Min:97.7 °F (36.5 °C), Max:98.3 °F (36.8 °C)        Creatinine Clearance (mL/min):   CrCl (Ideal Body Weight): 5.4    Impression/Plan:   Previous UTI with Klebsiella, now Bacteremic with PCT 52  Ceftriaxone Iv x10 days  ordered    -  Vancomycin  2000mg IV x1 then check level 6/2 AM  Antimicrobial stop date   Ceftriaxone= 10 days;  Vancomycin- pending     Pharmacy will follow daily and adjust medications as appropriate for renal function and/or serum levels.     Thank you,  Sakshi Lim, [facility]

## 2022-06-01 NOTE — DIABETES MGMT
[id number] [facility]    CLINICAL NURSE SPECIALIST CONSULT     Initial Presentation   Rosana Proctor is a 80 y.o. female admitted from the ER today 5/23/22 in DKA with profound bradycardia, after being found down by granddaughter. Granddaughter states that patient is independent in her activities of daily living, and has a routine that includes her diabetes self-care. He grandmother had reported that she wasn't feeling well and the granddaughter had made arrangements for a home visit. When she went into her room this morning to remind her of the visit, she found her down on the floor. EMS called. Of note, granddaughter states that she (herself) had been hospitalized for a month and returned home at the end of April. She surmises that her grandmother may not have been taking her insulin because there was more insulin in the refrigerator than there should have been. LAB:  WBC 10.9. AST 75. Lipase >3000. Troponin 31. NT pro-BNP 1588. Urinary glucose & ketone +. CT Head: No extra-axial fluid collection, hemorrhage or shift. Atrophy mostly posterior fossa. CXR: Mild pulmonary edema    HX:   Past Medical History:   Diagnosis Date    Diabetes ([facility] [address]       INITIAL DX:   DKA (diabetic ketoacidosis) ([facility] [address] [E11.10]     Current Treatment     TX: Insulin. Pepcid. ABx. Steroids    Consulted by Provider for advanced diabetes nursing assessment and care for:   [x] Transitioning off Syed [place]   [x] Inpatient management strategy  [] Home management assessment  [] Survival skill education    Hospital Course   Clinical progress has been complicated by need for ICU level of care. 5/24/22 Alert but babbling; Precedex+. O2NC. Remains on three (3) pressors. CRRT+. Scheduled for CT chest & ABd wo contrast today. Plans to add steroids. Discussion of transition off Syed [place] made during IPR.  5/25/22 Alert. Conversing in understandable language. O2NC. Afib. Remains on two (2) pressors.  Skin per wound care/nursing. CRRT+  5/26/22 Alert & oriented to person. Off O2. NPO except for supplements. CRRT+. 5/27/22 Alert & oriented. Answering questions clearly. Dialysis now+.   5/31/22 Patient ate almost all of her breakfast tray and is now resting comfortable with eyes closed. Diabetes History   Type 2 diabetes treated with insulin therapy  Admission BG 1259 with AG 30    Diabetes-related Medical History: Deferred    Diabetes Medication History  Key Antihyperglycemic Medications             glipiZIDE (GLUCOTROL) 10 mg tablet Take 10 mg by mouth daily. insulin lispro protamine/insulin lispro (HUMALOG MIX 75/25) 100 unit/mL (75-25) injection 48 Units by SubCUTAneous route daily. take 48 units in the morning    insulin lispro protamine/insulin lispro (HUMALOG MIX 75/25) 100 unit/mL (75-25) injection 32 Units by SubCUTAneous route every evening. take 32 units subcutaneously at night. Diabetes self-management practices: Per granddaughter  Eating pattern   [x] Not eating a carbohydrate-controlled mealplan  [x] Breakfast Cheerios with milk & banana. Coffee (may have had Cheese toast earlier)  [x] Sankc  Chips a Hoy cookies (while she is watching TV  [x] Dinner  With granddaughter   Physical activity pattern - Uses Rollator to move about the house   [x] Not employing a physical activity program to control BG  Monitoring pattern - Tracks on a tablet by bedside & takes to her provider   [x] Testing BGs   [x] Breakfast   Taking medications pattern  [x] Consistent administration  [x] Affordable  Overall evaluation:    [x] Last A1c located 9% (3/9/2021) @[facility]    Subjective   \"SHIRLEY for imaging. \"     Objective   Physical exam  General Obese female in no acute distress  Neuro  Alert and talking  Vital Signs   Visit Vitals  BP (!) 157/73 (BP 1 Location: Left upper arm)   Pulse 74   Temp 97.9 °F (36.6 °C)   Resp 19   Ht 5' 6\" (1.676 m)   Wt 82.6 kg (182 lb 1.6 oz)   SpO2 95%   BMI 29.39 kg/m² Laboratory  Recent Labs     06/01/22  0409 05/31/22 2022 05/31/22  0332 05/30/22  0338 05/30/22  0338   * 250* 156*   < > 71   AGAP 12 11 9   < > 9   WBC 16.2* 16.1* 18.9*   < > 15.9*   CREA 6.77* 6.30* 5.69*   < > 5.07*   GFRNA 6* 6* 7*   < > 8*   AST 55*  --  86*  --  133*   *  --  121*  --  134*    < > = values in this interval not displayed. Factors impacting BG management  Factor Dose Comments   Nutrition:  Clear Liquids    Ate almost all of breakfast tray   Drugs:  Steroids   HC 50mg Q6 hrs   Impairs insulin action   Infection Rocephin 2 g Q 24 hours Afebrile. WBCs sloan   Other:   Kidney function  Liver function   GFR 7  Enzymes remain elevated   Dialysis+     Blood glucose pattern      Significant diabetes-related events over the past 24-72 hours  Admitted in profound DKA with electrolyte disturbances. BG 1259 with AG 30  On Glucostabilizer; received 27-32 units of insulin/hour initially  BG finally under 250mg/dl at 1am 5/24/22 after 5L of fluid in ICU  Significant GLORIA - Requiring dialysis  Steroids started 5/24/22 - BGs higher than target  NPH dose adjustment with BGs in 120-150s until she began eating 5/26/22    Assessment and Plan   Nursing Diagnosis Risk for unstable blood glucose pattern   Nursing Intervention Domain 7403 Decision-making Support   Nursing Interventions Examined current inpatient diabetes/blood glucose control   Explored factors facilitating and impeding inpatient management     Evaluation   This overweight AA female was admitted in DKA associated with bradycardia. Received significant amounts of insulin/hour via Glucostabilizer. Fluid resuscitation also needed in presence of elevated NT pro-BNP. BGs down under 250mg/dl at 1am 5/24/22, and AG closed at 8am. Transitioned off GS, and steroids added 5/24/22. BGs sloan into the low 200s. NPH insulin started to override steroids in presence of kidney dysfunction with small increase in dose.  BGs in target until she began eating yesterday 5/26/22. This woman uses combo insulin at home, which is not available in-house so we can tailor dosing to changing circumstances. Current insulin dose is at half her usual basal insulin dose, but her kidney function has deteriorated in light of her presenting situation, e.g., DKA. Recommend increasing basal dose incrementally to establish basal dose in light of GLORIA/CKD. Some Info extracted from [address] note    5/31/22 BG within goal this morning on 14 units NPH twice daily. The patient continues to receive 50 mg hydrocortisone injection Q 8 hours. I recommend continuing on current diabetes medication regimen for now. Diabetes management will continue to follow with this patient, monitoring BG trends and making recommendations as needed. 6/1/22 BG's elevated today on 14 units NPH twice daily. The patient continues on 50 mg hydrocortisone Q 8 hours. Consider a slight increase in the basal insulin dose. (0.4 x kg) 17 units NPH twice daily  Recommendations     1. Increase to  NPH insulin 17 units twice daily. 2. Continue HIGH sensitivity corrective insulin     Billing Code(s)   [x] 80653 IP subsequent hospital care - 15 minutes     Before making these care recommendations, I personally reviewed the hospitalization record, including notes, laboratory & diagnostic data and current medications, and examined the patient at the bedside (circumstances permitting) before making care recommendations. More than fifty (50) percent of the time was spent in patient counseling and/or care coordination.   Total minutes: 15 minutes    SANA Cook  Diabetes Clinical Nurse Specialist  [facility]  Access via [employer] 3D

## 2022-06-01 NOTE — PROGRESS NOTES
Hospitalist Progress Note    NAME: Michelle Davis   :  1934   MRN:  [id number]     Admit date: 2022    Today's date: 22    PCP: Giovana Abreu MD    Anticipated discharge date: 2022    Barriers:  recovering from infection, still with bacteremia    ? needs long-term HD    Assessment / Plan:    Septic shock POA initial BP 46/34, temp 86.5, RR 42, GLORIA, lactate 4.26, pressor requirements  Klebsiella Bacteremia with UTI  POA  Serratia marcescens bacteremia  and 2022 POA  Persistent GNR bacteremia POA  - Admit UA 5-10 WBC, 1+ bacteria  - Admit BC with klebsiella and Serratia   Sensitive to zosyn, ceftriaxone  - Admit urine culture + for klebsiella  - Repeat BC  with recurrent sepsis + for Serratia  - CT abdomen/pelvis without IV contrast  with no hydronephrosis   No intraabdominal pathology  - Chest CT with bilateral effusions, no ASD  - With elevated lipase and abnormal LFTs, ? Biliary source   GGT 28, never had elevated Alk Phos and T bili   US with no gallstones, contracted GB, CBD 0.4 cm              Biliary source seems less likely  - IV pressors at admit, weaned off   Started on HD for GLORIA from ATN  -Started on stress steroid, being weaned down  - Transferred to floor 2022  - S/p zosyn --> transition to ceftriaxone   - Repeat Blood culture  with GNR  - Repeat blood culture  positive for gram-negative rods  - Not sure why she is persistent bacteremic with GNR despite appropriate antibiotics    TTE LVEF 55 to 60%, trace MR and AR  - Repeat CT abdomen given the tenderness, persistent bacteremia  -ID consulted, recommended to remove central line and the Evan repeat blood cultures. Discussed with ID and nephrology  .   CT abdomen pelvis with contrast did not show any acute pathology    A. fib with RVR overnight  Started on amnio drip overnight cardiology on board, awaiting further recommendations  Patient has had episode of bradycardia with pauses during the stay due to combination of DKA, hyperkalemia  Beta-blocker was stopped. Poor candidate for  Anticoagulation     Acute kidney injury POA BUN/creat 114 and 5.97  Rhabdomyolysis CPK peak 22,325   -Last baseline creat 1.28 at [facility] on 3/9/2021  -Started on CRRT till 5/26, now on HD  -No hydronephrosis  -Likely ATN with hypotension, sepsis, rhabdomyolysis  - Oligouric  -Serial labs  -Await renal recovery, likely will need outpatient HD    DKA POA BS 1259, HgBa1c > 14.0, AG > 20, pH 6.99  - Not sure if DKA present, acidosis may have been due to sepsis, GLORIA   Urine with trace ketones, but B-hydroxybutyrate levels in blood elevated  - Likely patient was not taking insulin at home as her A1c is >14  - s/p Insulin gtt -->NPH 20 units BID plus SSI  - Diet reduced to clears 5/30, reduced insulin  - increase NPH to 16 units in p.m.  - SSI          Acute pancreatitis POA  - admit Non contrast CT is neg for changes of acute pancreatitis. - Lipase > 3000 -->  1107 -->  2499 --> 1602  - + abdominal tenderness  - clears  - No Gallstones or ductal dilatation, no ETOH  - ? Ischemic   - lipase tredning down  -Repeat CT abdomen/pelvis  - serial labs     Acute metabolic encephalopathy POA  - Multifactorial with septic shock, low BP, GLORIA, elevated BS > 1200  - Head CT with atrophy, no acute events  -Improving. Closely monitor mental status.  -manage metabolic issues as above.     Symptomatic bradycardia.  -Likely precipitated by severe acidemia, also on BB at home.   -Again had long pauses on 5/25, EP following. Overweight POA Body mass index is 29.39 kg/m². DVT prophylaxis. [facility]. SUP. Pepcid. Code status. Full code.       Subjective:     Chief Complaint / Reason for Physician Visit  Overnight Event noted, patient went into A. fib with RVR, started on amnio drip  Review of Systems:  Symptom Y/N Comments  Symptom Y/N Comments   Fever/Chills n   Chest Pain n    Poor Appetite    Edema     Cough n Abdominal Pain n    Sputum    Joint Pain     SOB/BEE n   Headache     Nausea/vomit n   Tolerating PT/OT     Diarrhea n   Tolerating Diet y    Constipation    Other       Could NOT obtain due to:      Objective:     VITALS:   Last 24hrs VS reviewed since prior progress note. Most recent are:  Patient Vitals for the past 24 hrs:   Temp Pulse Resp BP SpO2   06/01/22 0829 -- 72 -- (!) 149/61 --   06/01/22 0813 98.1 °F (36.7 °C) (!) 115 18 (!) 153/112 96 %   06/01/22 0707 97.8 °F (36.6 °C) 75 18 (!) 148/62 98 %   06/01/22 0240 97.8 °F (36.6 °C) 78 18 (!) 143/68 98 %   05/31/22 2255 97.8 °F (36.6 °C) 75 18 (!) 132/57 98 %   05/31/22 1920 98.3 °F (36.8 °C) (!) 133 20 (!) 99/49 95 %   05/31/22 1554 -- 82 -- -- --   05/31/22 1530 97.7 °F (36.5 °C) 82 20 (!) 186/79 90 %   05/31/22 1200 -- 75 -- -- --   05/31/22 1135 98.4 °F (36.9 °C) 75 18 (!) 159/60 99 %     No intake or output data in the 24 hours ending 06/01/22 0854     Wt Readings from Last 12 Encounters:   05/26/22 82.6 kg (182 lb 1.6 oz)       PHYSICAL EXAM:    I had a face to face encounter and independently examined this patient on 06/01/22 as outlined below:    General: WD, WN. Alert, cooperative, no acute distress    EENT:  PERRL. Anicteric sclerae. MMM  Resp:  CTA bilaterally, no wheezing or rales. No accessory muscle use  CV:  Regular  rhythm,  No edema  GI:  Soft, Non distended, Tender RUQ and epigastric region. +Bowel sounds, no rebound  Neurologic:  Alert, normal speech, non focal motor exam  Psych:   Not anxious nor agitated  Skin:  No rashes.   No jaundice      Reviewed most current lab test results and cultures  YES  Reviewed most current radiology test results   YES  Review and summation of old records today    NO  Reviewed patient's current orders and MAR    YES  PMH/SH reviewed - no change compared to H&P  ________________________________________________________________________  Care Plan discussed with:    Comments   Patient x    Family      RN x Care Manager     Consultant                        Multidiciplinary team rounds were held today with , nursing, pharmacist and clinical coordinator. Patient's plan of care was discussed; medications were reviewed and discharge planning was addressed. ________________________________________________________________________      Comments   >50% of visit spent in counseling and coordination of care     ________________________________________________________________________  Reny Hooks MD     Procedures: see electronic medical records for all procedures/Xrays and details which were not copied into this note but were reviewed prior to creation of Plan. LABS:  I reviewed today's most current labs and imaging studies. Pertinent labs include:  Recent Labs     06/01/22 0409 05/31/22 2022 05/31/22 0332   WBC 16.2* 16.1* 18.9*   HGB 10.0* 9.6* 9.7*   HCT 30.4* 28.8* 29.6*   * 125* 93*     Recent Labs     06/01/22 0409 05/31/22 2022 05/31/22 0332 05/30/22 0338 05/30/22 0338    139 142   < > 143   K 3.3* 3.2* 3.6   < > 2.9*    104 107   < > 106   CO2 24 24 26   < > 28   * 250* 156*   < > 71   BUN 73* 71* 58*   < > 57*   CREA 6.77* 6.30* 5.69*   < > 5.07*   CA 7.3* 7.3* 7.3*   < > 7.3*   MG  --  2.5*  --   --   --    PHOS  --  4.3  --   --   --    ALB 1.7*  --  1.6*  --  1.5*   TBILI 0.3  --  0.5  --  0.4   *  --  121*  --  134*    < > = values in this interval not displayed.

## 2022-06-01 NOTE — PROGRESS NOTES
[address] [facility]  YOB: 1934          Assessment & Plan:   HD dependent GLORIA due to ATN  CKD 3b  DKA  Sepsis  UTI    Rec:  Spoke to the ID team today. Patient is now bacteremic. I will remove the Evan catheter. I told IR not to place any permacath, plan for Evan catheter placement again on Friday. We will give her a line holiday for now up to Friday. ID will order to draw repeat cultures tomorrow. Case was also discussed with Dr. Ana Feng. Patient is dialysis dependent and will need outpatient hemodialysis set up. Subjective:   CC: f/u GLORIA  HPI: Minimal urine output.  -Creatinine remains high.  -Remains dialysis dependent. With no recovery.   ROS: no sob  Current Facility-Administered Medications   Medication Dose Route Frequency    insulin NPH (NOVOLIN N, HUMULIN N) injection 16 Units  16 Units SubCUTAneous BID    bumetanide (BUMEX) injection 2 mg  2 mg IntraVENous BID    amiodarone (CORDARONE) 375 mg/250 mL D5W infusion  0.5 mg/min IntraVENous CONTINUOUS    heparin (porcine) 1,000 unit/mL injection 1,400 Units  1,400 Units InterCATHeter DIALYSIS PRN    And    heparin (porcine) 1,000 unit/mL injection 1,100 Units  1,100 Units InterCATHeter DIALYSIS PRN    hydrALAZINE (APRESOLINE) 20 mg/mL injection 20 mg  20 mg IntraVENous Q6H PRN    cefTRIAXone (ROCEPHIN) 2 g in 0.9% sodium chloride (MBP/ADV) 50 mL MBP  2 g IntraVENous Q24H    hydrocortisone Sod Succ (PF) (SOLU-CORTEF) injection 50 mg  50 mg IntraVENous Q8H    albumin human 25% (BUMINATE) solution 25 g  25 g IntraVENous DIALYSIS PRN    metoprolol (LOPRESSOR) injection 2.5 mg  2.5 mg IntraVENous Q6H PRN    insulin lispro (HUMALOG) injection   SubCUTAneous AC&HS    glucose chewable tablet 16 g  4 Tablet Oral PRN    glucagon (GLUCAGEN) injection 1 mg  1 mg IntraMUSCular PRN    dextrose 10% infusion 0-250 mL  0-250 mL IntraVENous PRN    sodium chloride (NS) flush 5-40 mL  5-40 mL IntraVENous Q8H    sodium chloride (NS) flush 5-40 mL  5-40 mL IntraVENous PRN    acetaminophen (TYLENOL) tablet 650 mg  650 mg Oral Q6H PRN    Or    acetaminophen (TYLENOL) suppository 650 mg  650 mg Rectal Q6H PRN    polyethylene glycol (MIRALAX) packet 17 g  17 g Oral DAILY PRN    ondansetron (ZOFRAN ODT) tablet 4 mg  4 mg Oral Q8H PRN    Or    ondansetron (ZOFRAN) injection 4 mg  4 mg IntraVENous Q6H PRN    [Held by provider] heparin (porcine) injection 5,000 Units  5,000 Units SubCUTAneous Q8H    balsam peru-castor oiL (VENELEX) ointment   Topical BID    alcohol 62% (NOZIN) nasal  1 Ampule  1 Ampule Topical Q12H    balsam peru-castor oiL (VENELEX) ointment   Topical BID    famotidine (PF) (PEPCID) 20 mg in 0.9% sodium chloride 10 mL injection  20 mg IntraVENous Q24H          Objective:     Vitals:  Blood pressure (!) 157/73, pulse 74, temperature 97.9 °F (36.6 °C), resp. rate 19, height 5' 6\" (1.676 m), weight 82.6 kg (182 lb 1.6 oz), SpO2 95 %. Temp (24hrs), Av.9 °F (36.6 °C), Min:97.7 °F (36.5 °C), Max:98.3 °F (36.8 °C)      Intake and Output:  No intake/output data recorded.  1901 -  0700  In: -   Out: 750 [Urine:750]    Physical Exam:               GENERAL ASSESSMENT: NAD  NECK: IJ HD cath   CHEST: CTA  HEART: S1S2  ABDOMEN: Soft,NT  EXTREMITY: +EDEMA        ECG/rhythm:    Data Review      No results for input(s): TNIPOC in the last 72 hours.     No lab exists for component: ITNL   Recent Labs     22   *     Recent Labs     22  0409 228 22    139 142   < > 143   K 3.3* 3.2* 3.6   < > 2.9*    104 107   < > 106   CO2 24 24 26   < > 28   BUN 73* 71* 58*   < > 57*   CREA 6.77* 6.30* 5.69*   < > 5.07*   * 250* 156*   < > 71   PHOS  --  4.3  --   --   --    MG  --  2.5*  --   --   --    CA 7.3* 7.3* 7.3*   < > 7.3*   ALB 1.7*  --  1.6*  --  1.5* WBC 16.2* 16.1* 18.9*   < > 15.9*   HGB 10.0* 9.6* 9.7*   < > 9.5*   HCT 30.4* 28.8* 29.6*   < > 28.6*   * 125* 93*   < > 76*    < > = values in this interval not displayed. No results for input(s): INR, PTP, APTT, INREXT, INREXT in the last 72 hours. Needs: urine analysis, urine sodium, protein and creatinine  Lab Results   Component Value Date/Time    Sodium,urine random 37 05/23/2022 07:45 PM    Creatinine, urine 59.90 05/23/2022 07:45 PM         : Rd Lacey MD  6/1/2022        [facility]:  [email or web address]  Leonel Brown office:  Michelle [address] [place] [address],[address]  [address]  Phone: [phone]  Fax :     [phone]    [place] office:  [address] [place] [address]  Phone - [phone]  Fax - [phone]

## 2022-06-01 NOTE — PROGRESS NOTES
Rapid Response nurse called by Surg Tele team to assist with Amio bolus and drip on this patient. EKG obtained, confirming Afib with rate of 150-160s. Primary nurse spoke with Stone Duty with cardiology, verbal orders received from  Patient denies symtpoms. Amio bolus started at 2021.      2100 Amio drip started at 1 mg. Heart rate at 70-80 range at this time. Primary nurse aware.

## 2022-06-02 NOTE — PROGRESS NOTES
Physical Therapy  Attempted to see patient for PT treatment, but she is currently meeting with Palliative team. Will defer PT, but continue to follow pending outcome of palliative care meeting.   Thank you,  Duane Long, PT

## 2022-06-02 NOTE — PROGRESS NOTES
Occupational Therapy    Attempted to see patient for OT treatment, but she is currently meeting with Palliative team. Will defer OT, but continue to follow pending outcome of palliative care meeting.     Cristopher Holden, OTR/L

## 2022-06-02 NOTE — PROGRESS NOTES
Hospitalist Progress Note    NAME: Kelsea Marquez   :  1934   MRN:  [id number]     Admit date: 2022    Today's date: 22    PCP: Jose Cameron MD    Anticipated discharge date: 2022    Barriers:  recovering from infection, still with bacteremia    ? needs long-term HD    Assessment / Plan:    Septic shock POA initial BP 46/34, temp 86.5, RR 42, GLORIA, lactate 4.26, pressor requirements  Klebsiella Bacteremia with UTI  POA  Serratia marcescens bacteremia  and 2022 POA  Persistent GNR bacteremia POA  - Admit UA 5-10 WBC, 1+ bacteria  - Admit BC with klebsiella and Serratia   Sensitive to zosyn, ceftriaxone  - Admit urine culture + for klebsiella  - Repeat BC  with recurrent sepsis + for Serratia  - CT abdomen/pelvis without IV contrast  with no hydronephrosis   No intraabdominal pathology  - Chest CT with bilateral effusions, no ASD  - With elevated lipase and abnormal LFTs, ? Biliary source   GGT 28, never had elevated Alk Phos and T bili   US with no gallstones, contracted GB, CBD 0.4 cm              Biliary source seems less likely  - IV pressors at admit, weaned off   Started on HD for GLORIA from ATN  -Started on stress steroid, being weaned down  - Transferred to floor 2022  - S/p zosyn --> transition to ceftriaxone   - Repeat Blood culture  with GNR  - Repeat blood culture  positive for gram-negative rods  - Not sure why she is persistent bacteremic with GNR despite appropriate antibiotics    TTE LVEF 55 to 60%, trace MR and AR  - Repeat CT abdomen given the tenderness, persistent bacteremia  -ID consulted, recommended to remove central line and the Evan repeat blood cultures. Discussed with ID and nephrology  . CT abdomen pelvis with contrast did not show any acute pathology    Central line and Evan removed and plan for NAI by cardiology  .   Blood cultures from 602 pending  A. fib with RVR  Started on amnio drip overnight cardiology on board, awaiting further recommendations  Patient has had episode of bradycardia with pauses during the stay due to combination of DKA, hyperkalemia  Beta-blocker was stopped. Poor candidate for  Anticoagulation   Discussed with cardiology, plan to stop amnio drip and switch to p.o. amnio if heart rate controlled      Acute kidney injury POA BUN/creat 114 and 5.97  Rhabdomyolysis CPK peak 22,325   -Last baseline creat 1.28 at [facility] on 3/9/2021  -Started on CRRT till 5/26, now on HD  -No hydronephrosis  -Likely ATN with hypotension, sepsis, rhabdomyolysis  - Oligouric  -Serial labs  -Await renal recovery, likely will need outpatient HD    DKA POA BS 1259, HgBa1c > 14.0, AG > 20, pH 6.99  - Not sure if DKA present, acidosis may have been due to sepsis, GLORIA   Urine with trace ketones, but B-hydroxybutyrate levels in blood elevated  - Likely patient was not taking insulin at home as her A1c is >14  - s/p Insulin gtt -->NPH 20 units BID plus SSI  - Diet reduced to clears 5/30, reduced insulin  - increase NPH to 16 units in p.m.  - SSI          Acute pancreatitis POA  - admit Non contrast CT is neg for changes of acute pancreatitis. - Lipase > 3000 -->  1107 -->  2499 --> 1602  - + abdominal tenderness  - clears  - No Gallstones or ductal dilatation, no ETOH  - ? Ischemic   - lipase tredning down  -Repeat CT abdomen/pelvis  - serial labs     Acute metabolic encephalopathy POA  - Multifactorial with septic shock, low BP, GLORIA, elevated BS > 1200  - Head CT with atrophy, no acute events  -Improving. Closely monitor mental status.  -manage metabolic issues as above.     Symptomatic bradycardia.  -Likely precipitated by severe acidemia, also on BB at home.   -Again had long pauses on 5/25, EP following. Overweight POA Body mass index is 29.39 kg/m². DVT prophylaxis. [facility] & halfway. SUP. Pepcid. Code status. Full code.       Subjective:     Chief Complaint / Reason for Physician Visit  Follow-up bacteremia  Patient looks tired  Review of Systems:  Symptom Y/N Comments  Symptom Y/N Comments   Fever/Chills n   Chest Pain n    Poor Appetite    Edema     Cough n   Abdominal Pain n    Sputum    Joint Pain     SOB/BEE n   Headache     Nausea/vomit n   Tolerating PT/OT     Diarrhea n   Tolerating Diet y    Constipation    Other       Could NOT obtain due to:      Objective:     VITALS:   Last 24hrs VS reviewed since prior progress note. Most recent are:  Patient Vitals for the past 24 hrs:   Temp Pulse Resp BP SpO2   06/02/22 1510 98.7 °F (37.1 °C) 81 19 (!) 145/65 100 %   06/02/22 1203 98.8 °F (37.1 °C) 80 18 (!) 173/55 99 %   06/02/22 0755 98.6 °F (37 °C) 77 18 138/69 97 %   06/02/22 0038 98.1 °F (36.7 °C) 74 20 (!) 144/63 97 %   06/01/22 2039 97.5 °F (36.4 °C) 79 20 (!) 155/66 96 %       Intake/Output Summary (Last 24 hours) at 6/2/2022 1551  Last data filed at 6/2/2022 0755  Gross per 24 hour   Intake --   Output 135 ml   Net -135 ml        Wt Readings from Last 12 Encounters:   05/26/22 82.6 kg (182 lb 1.6 oz)       PHYSICAL EXAM:    I had a face to face encounter and independently examined this patient on 06/02/22 as outlined below:    General: WD, WN. Alert, cooperative, no acute distress    EENT:  PERRL. Anicteric sclerae. MMM  Resp:  CTA bilaterally, no wheezing or rales. No accessory muscle use  CV:  Regular  rhythm,  No edema  GI:  Soft, Non distended, Tender RUQ and epigastric region. +Bowel sounds, no rebound  Neurologic:  Alert, normal speech, non focal motor exam  Psych:   Not anxious nor agitated  Skin:  No rashes.   No jaundice      Reviewed most current lab test results and cultures  YES  Reviewed most current radiology test results   YES  Review and summation of old records today    NO  Reviewed patient's current orders and MAR    YES  PMH/SH reviewed - no change compared to H&P  ________________________________________________________________________  Care Plan discussed with:    Comments Patient x    Family      RN x    Care Manager     Consultant                        Multidiciplinary team rounds were held today with , nursing, pharmacist and clinical coordinator. Patient's plan of care was discussed; medications were reviewed and discharge planning was addressed. ________________________________________________________________________      Comments   >50% of visit spent in counseling and coordination of care     ________________________________________________________________________  Ashlee Gallego MD     Procedures: see electronic medical records for all procedures/Xrays and details which were not copied into this note but were reviewed prior to creation of Plan. LABS:  I reviewed today's most current labs and imaging studies. Pertinent labs include:  Recent Labs     06/02/22 0118 06/01/22 0409 05/31/22 2022   WBC 18.3* 16.2* 16.1*   HGB 9.5* 10.0* 9.6*   HCT 28.4* 30.4* 28.8*    142* 125*     Recent Labs     06/02/22 0118 06/01/22 0409 05/31/22 2022 05/31/22 0332 05/31/22 0332    140 139   < > 142   K 3.3* 3.3* 3.2*   < > 3.6    104 104   < > 107   CO2 25 24 24   < > 26   * 228* 250*   < > 156*   BUN 82* 73* 71*   < > 58*   CREA 7.45* 6.77* 6.30*   < > 5.69*   CA 7.5* 7.3* 7.3*   < > 7.3*   MG  --   --  2.5*  --   --    PHOS  --   --  4.3  --   --    ALB  --  1.7*  --   --  1.6*   TBILI  --  0.3  --   --  0.5   ALT  --  107*  --   --  121*    < > = values in this interval not displayed.

## 2022-06-02 NOTE — PROGRESS NOTES
Problem: Breathing Pattern - Ineffective  Goal: *Absence of hypoxia  Outcome: Progressing Towards Goal     Problem: Patient Education: Go to Patient Education Activity  Goal: Patient/Family Education  Outcome: Progressing Towards Goal     Problem: Pressure Injury - Risk of  Goal: *Prevention of pressure injury  Description: Document Markos Scale and appropriate interventions in the flowsheet. Outcome: Progressing Towards Goal  Note: Pressure Injury Interventions:  Sensory Interventions: Minimize linen layers    Moisture Interventions: Absorbent underpads    Activity Interventions: PT/OT evaluation    Mobility Interventions: PT/OT evaluation    Nutrition Interventions: Document food/fluid/supplement intake    Friction and Shear Interventions: Lift team/patient mobility team,Minimize layers                Problem: Patient Education: Go to Patient Education Activity  Goal: Patient/Family Education  Outcome: Progressing Towards Goal     Problem: Diabetes Self-Management  Goal: *Disease process and treatment process  Description: Define diabetes and identify own type of diabetes; list 3 options for treating diabetes.   Outcome: Progressing Towards Goal     Problem: Patient Education: Go to Patient Education Activity  Goal: Patient/Family Education  Outcome: Progressing Towards Goal     Problem: DKA: Day 1  Goal: Off Pathway (Use only if patient is Off Pathway)  Outcome: Progressing Towards Goal     Problem: Hypotension  Goal: *Blood pressure within specified parameters  Outcome: Progressing Towards Goal     Problem: Patient Education: Go to Patient Education Activity  Goal: Patient/Family Education  Outcome: Progressing Towards Goal

## 2022-06-02 NOTE — ACP (ADVANCE CARE PLANNING)
Palliative Medicine  [place]: [phone] ([phone])  [facility]: [phone] ([phone])        Primary Decision Maker: Mimi [facility] - [phone]  Advance Care Planning 6/2/2022   Patient's Healthcare Decision Maker is: Named in scanned ACP document   Confirm Advance Directive Yes, on file     This writer and Dr Lisa Metzger met with patient (we were awaiting grand daughter Davis Chino, who came in after we had this conversation with patient) to discuss AMD. Patient initially noting that she wants her cousin or a sister to be named (she is one of 11 siblings) as mPOA. Patient has not seen them in some time, they have not come to the hospital to see her, patient has been living with Capo Hendrickson since 2017. Patient noting then that she trusts Capo Hendrickson to be her voice, if she cannot speak to doctors about her health condition. Patient only completed the mPOA portion of this document due to her not having fully informed medical decision making knowledge. When asked what the doctors are telling her, she again notes, \"not much\" (this is what she told this writer the last time). Patient is to remain Full Code at this time. Our team met with Davis Lulú after our meeting with patient, she has been the family member most involved with patient in terms of medical issues. Scanlon Emeka shared that her mother had CPR and life support and \"we had her for five more days\". Capo Hendrickson also shared that patient's mother is 8 years old and lives in [place]. Capo Hendrickson did acknowledge that she would not want patient to linger on life support for an extended period of time. Copy of AMD made, placed in chart for scanning, a copy given to Davis Chino. .

## 2022-06-02 NOTE — PROGRESS NOTES
ID:    Repeat blood cultures today (paired). Blood cxs from 6/1 flagging positive as well. Need NAI to evaluate the persistent bacteremia. Continue cefepime. On line holiday currently.          Christiano Sheth MD  Infectious Diseases

## 2022-06-02 NOTE — PROGRESS NOTES
Problem: Breathing Pattern - Ineffective  Goal: *Absence of hypoxia  Outcome: Progressing Towards Goal     Problem: Pressure Injury - Risk of  Goal: *Prevention of pressure injury  Description: Document Markos Scale and appropriate interventions in the flowsheet.   Outcome: Progressing Towards Goal  Note: Pressure Injury Interventions:  Sensory Interventions: Minimize linen layers    Moisture Interventions: Internal/External fecal devices,Internal/External urinary devices    Activity Interventions: Pressure redistribution bed/mattress(bed type)    Mobility Interventions: Pressure redistribution bed/mattress (bed type)    Nutrition Interventions: Offer support with meals,snacks and hydration    Friction and Shear Interventions: HOB 30 degrees or less

## 2022-06-02 NOTE — PROGRESS NOTES
[address] [facility]  YOB: 1934          Assessment & Plan:   HD dependent GLORIA due to ATN  CKD 3b  DKA  Sepsis  UTI    Rec:  Last HD was on 5/27/2022  Evan catheter has been removed yesterday. New blood cultures have been ordered by ID this morning. Plan for temporary catheter placement tomorrow and will dialyze tomorrow. Subjective:   CC: f/u GLORIA  HPI:   -Minimal urine output.  -Creatinine remains high.  -Remains dialysis dependent. With no recovery.   ROS: no sob  Current Facility-Administered Medications   Medication Dose Route Frequency    insulin NPH (NOVOLIN N, HUMULIN N) injection 16 Units  16 Units SubCUTAneous BID    [START ON 6/3/2022] vancomycin random level with AM labs   Other ONCE    Vancomycin - Pharmacist to Dose after HD   Other Rx Dosing/Monitoring    hydrocortisone Sod Succ (PF) (SOLU-CORTEF) injection 50 mg  50 mg IntraVENous Q12H    cefepime (MAXIPIME) 1 g in 0.9% sodium chloride (MBP/ADV) 50 mL MBP  1 g IntraVENous Q24H    bumetanide (BUMEX) injection 2 mg  2 mg IntraVENous BID    amiodarone (CORDARONE) 375 mg/250 mL D5W infusion  0.5 mg/min IntraVENous CONTINUOUS    heparin (porcine) 1,000 unit/mL injection 1,400 Units  1,400 Units InterCATHeter DIALYSIS PRN    And    heparin (porcine) 1,000 unit/mL injection 1,100 Units  1,100 Units InterCATHeter DIALYSIS PRN    hydrALAZINE (APRESOLINE) 20 mg/mL injection 20 mg  20 mg IntraVENous Q6H PRN    albumin human 25% (BUMINATE) solution 25 g  25 g IntraVENous DIALYSIS PRN    metoprolol (LOPRESSOR) injection 2.5 mg  2.5 mg IntraVENous Q6H PRN    insulin lispro (HUMALOG) injection   SubCUTAneous AC&HS    glucose chewable tablet 16 g  4 Tablet Oral PRN    glucagon (GLUCAGEN) injection 1 mg  1 mg IntraMUSCular PRN    dextrose 10 % infusion 0-250 mL  0-250 mL IntraVENous PRN    sodium chloride (NS) flush 5-40 mL  5-40 mL IntraVENous Q8H    sodium chloride (NS) flush 5-40 mL  5-40 mL IntraVENous PRN    acetaminophen (TYLENOL) tablet 650 mg  650 mg Oral Q6H PRN    Or    acetaminophen (TYLENOL) suppository 650 mg  650 mg Rectal Q6H PRN    polyethylene glycol (MIRALAX) packet 17 g  17 g Oral DAILY PRN    ondansetron (ZOFRAN ODT) tablet 4 mg  4 mg Oral Q8H PRN    Or    ondansetron (ZOFRAN) injection 4 mg  4 mg IntraVENous Q6H PRN    [Held by provider] heparin (porcine) injection 5,000 Units  5,000 Units SubCUTAneous Q8H    alcohol 62% (NOZIN) nasal  1 Ampule  1 Ampule Topical Q12H    balsam peru-castor oiL (VENELEX) ointment   Topical BID    famotidine (PF) (PEPCID) 20 mg in 0.9% sodium chloride 10 mL injection  20 mg IntraVENous Q24H          Objective:     Vitals:  Blood pressure (!) 173/55, pulse 80, temperature 98.8 °F (37.1 °C), resp. rate 18, height 5' 6\" (1.676 m), weight 82.6 kg (182 lb 1.6 oz), SpO2 99 %. Temp (24hrs), Av.1 °F (36.7 °C), Min:97.5 °F (36.4 °C), Max:98.8 °F (37.1 °C)      Intake and Output:   0701 -  1900  In: -   Out: 135 [Urine:35; Drains:100]  No intake/output data recorded. Physical Exam:               GENERAL ASSESSMENT: NAD  NECK: IJ HD cath   CHEST: CTA  HEART: S1S2  ABDOMEN: Soft,NT  EXTREMITY: +EDEMA        ECG/rhythm:    Data Review      No results for input(s): TNIPOC in the last 72 hours.     No lab exists for component: ITNL   Recent Labs     22   *     Recent Labs     22  0118 22  0409 22    140 139   < > 142   K 3.3* 3.3* 3.2*   < > 3.6    104 104   < > 107   CO2 25 24 24   < > 26   BUN 82* 73* 71*   < > 58*   CREA 7.45* 6.77* 6.30*   < > 5.69*   * 228* 250*   < > 156*   PHOS  --   --  4.3  --   --    MG  --   --  2.5*  --   --    CA 7.5* 7.3* 7.3*   < > 7.3*   ALB  --  1.7*  --   --  1.6*   WBC 18.3* 16.2* 16.1*   < > 18.9*   HGB 9.5* 10.0* 9.6*   < > 9.7*   HCT 28.4* 30.4* 28.8*   < > 29.6*  142* 125*   < > 93*    < > = values in this interval not displayed. No results for input(s): INR, PTP, APTT, INREXT, INREXT in the last 72 hours. Needs: urine analysis, urine sodium, protein and creatinine  Lab Results   Component Value Date/Time    Sodium,urine random 37 05/23/2022 07:45 PM    Creatinine, urine 59.90 05/23/2022 07:45 PM         : Gisella Callejas MD  6/2/2022        [facility]:  [email or web address]. Eyewitness Surveillance  Kyle Machuca office:  [address] [place] [address], [address] [place] [address],[address]  [place], [address] [place] [address]  Phone: [phone]  Fax :     [phone]    [place] office:  [address] [facility], [address] [place] [address]  Phone - [phone]  Fax - [phone]

## 2022-06-02 NOTE — PROGRESS NOTES
Progress Note      6/2/2022  NAME: Nish Phelps   MRN:  [id number]   Admit Diagnosis: DKA (diabetic ketoacidosis) ([facility] [E11.10]  PCP:  Analy Ivan MD     Assessment: 1. Profound sinus bradycardia 20-30's in the setting of DKA, hyperkalemia, and OP beta-blocker. This has resolved. 2. Paroxysmal atrial fibrillation with post-conversion pauses up to 6 seconds on beta-blocker. Now off beta-blocker. 3. Paroxysmal atrial flutter by tele. She had runs of atrial tachycardia and then followed by probably sustained Afib which organized into a right atrial flutter (by tele morphology) before terminating to sinus. 4. 2nd degree AV block with single dropped PAC's, RBBB with normal MT in sinus rhythm. 5. Hypotension likely due to dehydration/volume depletion, metabolic acidosis, bradycardia. Improved with volume resuscitation, pressor. Now off pressor, but evidence of bacteremia with Serratia (see ID note for details). 6. No evidence of acute ischemia or infarct by presenting ECG. Echo with normal LV and RV systolic function. 7. GLORIA atop CKD stage 3 at baseline. Rhabdomyolysis. On dialysis. 8. Diabetes mellitus type 2 on chronic insulin complicated by DKA. 9. Elevated lipase possibly due to DKA rather than acute pancreatitis. 10. Metabolic encephalopathy, resolved. 11. Anemia. 12. Thrombocytopenia. 13. Full code.     Plan:      1. She's on DVT prophylaxis at this time. I'd avoid FULL anticoagulation if possible given her anemia and thrombocytopenia as long as Afib and flutter episodes are brief, self-limited. 2. HD per renal team.  Her dialysis catheter was removed due to bacteremia. 3. ID consult noted. Due to persistent bacteremia, will need a NAI. BCX from 6/1 have 1 of 2 bottles positive. NPO after MN, see if NAI can be done tomorrow. Subjective:     No CP, dizziness, palpitations. Objective:      Physical Exam:    Last 24hrs VS reviewed since prior progress note.  Most recent are:    Visit Vitals  BP (!) 173/55   Pulse 80   Temp 98.8 °F (37.1 °C)   Resp 18   Ht 5' 6\" (1.676 m)   Wt 82.6 kg (182 lb 1.6 oz)   SpO2 99%   BMI 29.39 kg/m²       Intake/Output Summary (Last 24 hours) at 6/2/2022 1233  Last data filed at 6/2/2022 0755  Gross per 24 hour   Intake --   Output 135 ml   Net -135 ml           General: Tired and awake, no distress  Neck: Supple,   Respiratory: No respiratory distress, clear anteriorly   Cardiovascular: Regular rate rhythm, S1S2   Abdomen: soft, non tender   Neuro: moves all extremities   Skin: warm and dry   Extremity:  warm to touch      Data Review    Telemetry:  normal sinus rhythm         Lab Data Personally Reviewed:    Recent Labs     06/02/22 0118 06/01/22 0409   WBC 18.3* 16.2*   HGB 9.5* 10.0*   HCT 28.4* 30.4*    142*     No results for input(s): INR, PTP, APTT, INREXT, INREXT in the last 72 hours. Recent Labs     06/02/22  0118 06/01/22 0409 05/31/22 2022    140 139   K 3.3* 3.3* 3.2*    104 104   CO2 25 24 24   BUN 82* 73* 71*   CREA 7.45* 6.77* 6.30*   * 228* 250*   CA 7.5* 7.3* 7.3*   MG  --   --  2.5*     Recent Labs     05/31/22 0332   *     Lab Results   Component Value Date/Time    Triglyceride 95 05/26/2022 08:30 AM       Recent Labs     06/01/22  0409 05/31/22 0332   AP 99 109   TP 5.3* 4.9*   ALB 1.7* 1.6*   GLOB 3.6 3.3   LPSE 1,387* 1,482*     No results for input(s): PH, PCO2, PO2 in the last 72 hours.     Medications Personally Reviewed:    Current Facility-Administered Medications   Medication Dose Route Frequency    insulin NPH (NOVOLIN N, HUMULIN N) injection 16 Units  16 Units SubCUTAneous BID    [START ON 6/3/2022] vancomycin random level with AM labs   Other ONCE    Vancomycin - Pharmacist to Dose after HD   Other Rx Dosing/Monitoring    hydrocortisone Sod Succ (PF) (SOLU-CORTEF) injection 50 mg  50 mg IntraVENous Q12H    cefepime (MAXIPIME) 1 g in 0.9% sodium chloride (MBP/ADV) 50 mL MBP  1 g IntraVENous Q24H    bumetanide (BUMEX) injection 2 mg  2 mg IntraVENous BID    amiodarone (CORDARONE) 375 mg/250 mL D5W infusion  0.5 mg/min IntraVENous CONTINUOUS    heparin (porcine) 1,000 unit/mL injection 1,400 Units  1,400 Units InterCATHeter DIALYSIS PRN    And    heparin (porcine) 1,000 unit/mL injection 1,100 Units  1,100 Units InterCATHeter DIALYSIS PRN    hydrALAZINE (APRESOLINE) 20 mg/mL injection 20 mg  20 mg IntraVENous Q6H PRN    albumin human 25% (BUMINATE) solution 25 g  25 g IntraVENous DIALYSIS PRN    metoprolol (LOPRESSOR) injection 2.5 mg  2.5 mg IntraVENous Q6H PRN    insulin lispro (HUMALOG) injection   SubCUTAneous AC&HS    glucose chewable tablet 16 g  4 Tablet Oral PRN    glucagon (GLUCAGEN) injection 1 mg  1 mg IntraMUSCular PRN    dextrose 10 % infusion 0-250 mL  0-250 mL IntraVENous PRN    sodium chloride (NS) flush 5-40 mL  5-40 mL IntraVENous Q8H    sodium chloride (NS) flush 5-40 mL  5-40 mL IntraVENous PRN    acetaminophen (TYLENOL) tablet 650 mg  650 mg Oral Q6H PRN    Or    acetaminophen (TYLENOL) suppository 650 mg  650 mg Rectal Q6H PRN    polyethylene glycol (MIRALAX) packet 17 g  17 g Oral DAILY PRN    ondansetron (ZOFRAN ODT) tablet 4 mg  4 mg Oral Q8H PRN    Or    ondansetron (ZOFRAN) injection 4 mg  4 mg IntraVENous Q6H PRN    [Held by provider] heparin (porcine) injection 5,000 Units  5,000 Units SubCUTAneous Q8H    alcohol 62% (NOZIN) nasal  1 Ampule  1 Ampule Topical Q12H    balsam peru-castor oiL (VENELEX) ointment   Topical BID    famotidine (PF) (PEPCID) 20 mg in 0.9% sodium chloride 10 mL injection  20 mg IntraVENous Q24H              Abdiel Porter MD

## 2022-06-02 NOTE — PROGRESS NOTES
Spiritual Care Assessment/Progress Note  [facility]      NAME: Jacobo Saldana      MRN: [id number]  AGE: 80 y.o. SEX: female  Religion Affiliation: Jeanna Zhang   Language: English     6/2/2022     Total Time (in minutes): 10     Spiritual Assessment begun in [facility] 3 SURG TELE through conversation with:         []Patient        [] Family    [] Friend(s)        Reason for Consult: Palliative Care, Initial/Spiritual Assessment     Spiritual beliefs: (Please include comment if needed)     [x] Identifies with a greta tradition:         [] Supported by a greta community:            [] Claims no spiritual orientation:           [] Seeking spiritual identity:                [] Adheres to an individual form of spirituality:           [] Not able to assess:                           Identified resources for coping:      [] Prayer                               [] Music                  [] Guided Imagery     [] Family/friends                 [] Pet visits     [] Devotional reading                         [x] Unknown     [] Other:                                             Interventions offered during this visit: (See comments for more details)    Patient Interventions: Initial visit,Prayer (actual)           Plan of Care:     [] Support spiritual and/or cultural needs    [] Support AMD and/or advance care planning process      [] Support grieving process   [] Coordinate Rites and/or Rituals    [] Coordination with community clergy   [] No spiritual needs identified at this time   [] Detailed Plan of Care below (See Comments)  [] Make referral to Music Therapy  [] Make referral to Pet Therapy     [] Make referral to Addiction services  [] Make referral to [facility]  [] Make referral to Spiritual Care Partner  [] No future visits requested        [x] Contact Spiritual Care for further referrals     Comments: Initial spiritual assessment with palliative pt in 3232. Pt appeared to be resting.  Briefly opened eyes to empathic touch and 's voice, otherwise did not respond. Pt's chart identified pt as Pam Stacy.  provided words of comfort and prayer on behalf of pt. No family present. Contact Spiritual Care for any further referrals.   Rosana Vinson M.Div, [facility]   Paging Service [phone])

## 2022-06-02 NOTE — WOUND CARE
Wound care Nurse consult: consult placed by [facility] nurse to re-assess sacral/buttocks POA wounds. Patient in an [place] air fluidized bed, fecal management system (FMS)placed due to chronic runny stool incontinence, patient is HD and has a ramos. Patient stooling around FMS and FMS repositioned to run between legs to base of bed. DTPI's are opening/evolving:      Sacral/buttocks dressing change changed to as follows: Sacrum: Daily, cleanse open skin with NS moist 4x4. Apply No Sting skin barrier wipes to tavo-wound skin where foam dressing needs to stick, Smear with gloved finger or 4x4 some Silvasorb gel to open wounds, cover open wound with Alginate (maxorb) and secure with a foam dressings. Plan: Continue weekly wound checks to sacrum as DTPI continues to evolve (dead skin peeling away).    Reginald Chicas RN, [employer]

## 2022-06-02 NOTE — PROGRESS NOTES
Palliative Medicine Consult  [place]: [phone] ([phone])    Patient Name: Becka Serrano  YOB: 1934    Date of Initial Consult: 5/23/22  Reason for Consult: Care Decisions  Requesting Provider: Carmen Anguiano MD  Primary Care Physician: Deep Bautista MD     SUMMARY:   Becka Serrano is a 80 y.o. with a past history of HTN, IDDM, and HLD, who was admitted on 5/23/2022 from home with a diagnosis of septic shock. She had been feeling sick and lethargic for about 4 days, and her granddaughter had called their PCP for an appointment, but when she doing her minimally responsive on 5/23 she called EMD.  On presentation to the ED she was found to be hypotensive and bradycardic- and was admitted to the ICU.     5/24:  More awake today, interacting with family, but when not being engaged directly she falls asleep  5/25:  More interactive with me today, but when not directly engaging she stops paying attention, but she stayed awake this entire visit  6/2- Since our team last saw 5/25 pt out of ICU, remains on abx but BCx remain positive (6/1 +) and needs NAI. Awake, alert but very weak- max A for bed mobility and has poor appetite. Functional baseline:  Ms Anat Peacock is very independent at baseline. She moves around the house with a rollator, prefers to stay inside and never goes out. She makes simple meals for herself for breakfast and lunch, and her granddaughter cooks dinner. She does her own ADLs- bathing in the sink and dresses herself. Her granddaughter sets up a pill box, but she takes her own meds, and doses her own insulin. She spends a lot of time in her lift-recliner, getting up to get food or use the restroom. Unfortunately her granddaughter was away for a month as she herself was hospitalized. She had her sister checking in with her grandmother but as her sister was not used to her grandmothers medical issue, or routine, I wonder if this is when she developed her DTI's. Granddaughter was not aware of them as her grandmother bathes herself. She also shared with me that she checked her grandmothers room last night, and her insulin was not being stored in the fridge, so there is a question of whether it has lost its effectiveness    Social: Pt had 2 children, son still living. Has 10 siblings. Her mother is alive and lives in [place]- is 8 years old. For past 5 years ashely Govea has lived w/ her. PALLIATIVE DIAGNOSES:   1. Debility-  Max A bed assist   2. Poor appetite  3. Sacral and heel deep tissue injuries   4. GLORIA, still requiring HD  5. Bacteremia, persistent      PLAN:   1. Advance Care Planning: Along w/ Ej Mcdonnell LCSW meet w/ pt who is alert to person, place, city and somewhat to situation. Has capacity to make some decisions but do not think she can make complex medical decisions on her own. Do think that she can name who she trusts to make medical decisions on her behalf and to help her do so- she is clear that it is her granddtr Angela Govea. Complete first part of AMD and will be in Epic. 2. Then meet w/ granddtr Angela Govea in room. Talk about current medical issues, that pt will need NAI given her continued positive blood Cx. Goals are for full restorative measures, but this does name Angela Govea nervous as she herself recently had an EGD and had an esophageal perforation. Before NAI done, consent will be done in detail and Angela Govea can ask specific questions. 3. There are still unknowns- uncertain if will need dialysis long term, uncertain if this will be her new baseline. If cannot improve functionally, and if her po intake continues to be poorErna Donate aware that her deep tissue injuries will not heal. She is already thinking if she can care for patient given the new level of care needed. 4. Code status:  This was discussed earlier w/ my colleague Dontrell Kinney- and Angela Govea wishes for pt to remain full code, as her mother had CPR and she survived 5 more days, and she wouldn't have had that extra time w/ her if pt had DNR status. Does understand that pt would not do well w/ resuscitative efforts. She also knows that pt would not want to be in life support for prolonged period of time. 5. Communicated plan of care with: Palliative IDTRosa 192 Team     GOALS OF CARE / TREATMENT PREFERENCES:     GOALS OF CARE:  Patient/Health Care Proxy Stated Goals: Prolong life    TREATMENT PREFERENCES:   Code Status: Full Code    Advance Care Planning:  [] The [facility] Interdisciplinary Team has updated the ACP Navigator with Health Care Decision Maker and Patient Capacity      Advance Care Planning 5/25/2022   Confirm Advance Directive None       Medical Interventions: Full interventions       Other:    As far as possible, the palliative care team has discussed with patient / health care proxy about goals of care / treatment preferences for patient. HISTORY:     History obtained from: chart, granddaughter, pt     CHIEF COMPLAINT: \"I am not hungry\"    HPI/SUBJECTIVE:    The patient is:   [x] Verbal and participatory  [] Non-participatory due to:     Pt answers questions, does not know year but does know the year in which she was born. Has very good hearing- will say things when we are talking w/ her granddtr far from the bed.      Clinical Pain Assessment (nonverbal scale for severity on nonverbal patients):   Clinical Pain Assessment  Severity: 0     Activity (Movement): Lying quietly, normal position    Duration: for how long has pt been experiencing pain (e.g., 2 days, 1 month, years)  Frequency: how often pain is an issue (e.g., several times per day, once every few days, constant)     FUNCTIONAL ASSESSMENT:     Palliative Performance Scale (PPS):  PPS: 30       PSYCHOSOCIAL/SPIRITUAL SCREENING:     Palliative IDT has assessed this patient for cultural preferences / practices and a referral made as appropriate to needs (Cultural Services, Patient Advocacy, Ethics, etc.)    Any spiritual / Sabianist concerns:  [] Yes /  [x] No   If \"Yes\" to discuss with pastoral care during IDT     Does caregiver feel burdened by caring for their loved one:   [] Yes /  [x] No /  [] No Caregiver Present    If \"Yes\" to discuss with social work during IDT    Anticipatory grief assessment:   [x] Normal  / [] Maladaptive     If \"Maladaptive\" to discuss with social work during IDT    ESAS Anxiety: Anxiety: 0    ESAS Depression: Depression: 0        REVIEW OF SYSTEMS:     Positive and pertinent negative findings in ROS are noted above in HPI. The following systems were [x] reviewed / [] unable to be reviewed as noted in HPI  Other findings are noted below. Systems: constitutional, ears/nose/mouth/throat, respiratory, gastrointestinal, genitourinary, musculoskeletal, integumentary, neurologic, psychiatric, endocrine. Positive findings noted below. Modified ESAS Completed by: provider   Fatigue: 6     Depression: 0 Pain: 0   Anxiety: 0 Nausea: 0   Anorexia: 0 Dyspnea: 0           Stool Occurrence(s): 1        PHYSICAL EXAM:     From RN flowsheet:  Wt Readings from Last 3 Encounters:   05/26/22 182 lb 1.6 oz (82.6 kg)     Blood pressure (!) 145/65, pulse 81, temperature 98.7 °F (37.1 °C), resp. rate 19, height 5' 6\" (1.676 m), weight 182 lb 1.6 oz (82.6 kg), SpO2 100 %.     Pain Scale 1: Numeric (0 - 10)  Pain Intensity 1: 0                 Last bowel movement, if known:     Constitutional: elderly woman, pleasant, NAD, alert to person, place, city, not to year and not to most of situation   Eyes: pupils equal, anicteric  ENMT: no nasal discharge, moist mucous membranes  Respiratory: breathing not labored  Gastrointestinal: soft   Musculoskeletal: no deformity, no tenderness to palpation  Skin: warm, dry  Neurologic: following commands, moving all extremities         HISTORY:     Active Problems:    DKA (diabetic ketoacidosis) ([facility] (5/23/2022)      Goals of care, counseling/discussion ()      Lethargy () Advanced care planning/counseling discussion ()      Palliative care by specialist ()      Past Medical History:   Diagnosis Date    Diabetes [facility])       Past Surgical History:   Procedure Laterality Date    IR INSERT NON TUNL CVC OVER 5 YRS  5/26/2022      History reviewed. No pertinent family history. History reviewed, no pertinent family history.   Social History     Tobacco Use    Smoking status: Never Smoker    Smokeless tobacco: Never Used   Substance Use Topics    Alcohol use: Not Currently     No Known Allergies   Current Facility-Administered Medications   Medication Dose Route Frequency    insulin NPH (NOVOLIN N, HUMULIN N) injection 16 Units  16 Units SubCUTAneous BID    [START ON 6/3/2022] vancomycin random level with AM labs   Other ONCE    Vancomycin - Pharmacist to Dose after HD   Other Rx Dosing/Monitoring    hydrocortisone Sod Succ (PF) (SOLU-CORTEF) injection 50 mg  50 mg IntraVENous Q12H    cefepime (MAXIPIME) 1 g in 0.9% sodium chloride (MBP/ADV) 50 mL MBP  1 g IntraVENous Q24H    bumetanide (BUMEX) injection 2 mg  2 mg IntraVENous BID    amiodarone (CORDARONE) 375 mg/250 mL D5W infusion  0.5 mg/min IntraVENous CONTINUOUS    heparin (porcine) 1,000 unit/mL injection 1,400 Units  1,400 Units InterCATHeter DIALYSIS PRN    And    heparin (porcine) 1,000 unit/mL injection 1,100 Units  1,100 Units InterCATHeter DIALYSIS PRN    hydrALAZINE (APRESOLINE) 20 mg/mL injection 20 mg  20 mg IntraVENous Q6H PRN    albumin human 25% (BUMINATE) solution 25 g  25 g IntraVENous DIALYSIS PRN    metoprolol (LOPRESSOR) injection 2.5 mg  2.5 mg IntraVENous Q6H PRN    insulin lispro (HUMALOG) injection   SubCUTAneous AC&HS    glucose chewable tablet 16 g  4 Tablet Oral PRN    glucagon (GLUCAGEN) injection 1 mg  1 mg IntraMUSCular PRN    dextrose 10 % infusion 0-250 mL  0-250 mL IntraVENous PRN    sodium chloride (NS) flush 5-40 mL  5-40 mL IntraVENous Q8H    sodium chloride (NS) flush 5-40 mL 5-40 mL IntraVENous PRN    acetaminophen (TYLENOL) tablet 650 mg  650 mg Oral Q6H PRN    Or    acetaminophen (TYLENOL) suppository 650 mg  650 mg Rectal Q6H PRN    polyethylene glycol (MIRALAX) packet 17 g  17 g Oral DAILY PRN    ondansetron (ZOFRAN ODT) tablet 4 mg  4 mg Oral Q8H PRN    Or    ondansetron (ZOFRAN) injection 4 mg  4 mg IntraVENous Q6H PRN    [Held by provider] heparin (porcine) injection 5,000 Units  5,000 Units SubCUTAneous Q8H    alcohol 62% (NOZIN) nasal  1 Ampule  1 Ampule Topical Q12H    balsam peru-castor oiL (VENELEX) ointment   Topical BID    famotidine (PF) (PEPCID) 20 mg in 0.9% sodium chloride 10 mL injection  20 mg IntraVENous Q24H          LAB AND IMAGING FINDINGS:     Lab Results   Component Value Date/Time    WBC 18.3 (H) 06/02/2022 01:18 AM    HGB 9.5 (L) 06/02/2022 01:18 AM    PLATELET 180 00/16/0893 01:18 AM     Lab Results   Component Value Date/Time    Sodium 139 06/02/2022 01:18 AM    Potassium 3.3 (L) 06/02/2022 01:18 AM    Chloride 104 06/02/2022 01:18 AM    CO2 25 06/02/2022 01:18 AM    BUN 82 (H) 06/02/2022 01:18 AM    Creatinine 7.45 (H) 06/02/2022 01:18 AM    Calcium 7.5 (L) 06/02/2022 01:18 AM    Magnesium 2.5 (H) 05/31/2022 08:22 PM    Phosphorus 4.3 05/31/2022 08:22 PM      Lab Results   Component Value Date/Time    Alk.  phosphatase 99 06/01/2022 04:09 AM    Protein, total 5.3 (L) 06/01/2022 04:09 AM    Albumin 1.7 (L) 06/01/2022 04:09 AM    Globulin 3.6 06/01/2022 04:09 AM    GGT 28 05/30/2022 03:38 AM     Lab Results   Component Value Date/Time    INR 1.2 (H) 05/29/2022 04:59 AM    Prothrombin time 12.7 (H) 05/29/2022 04:59 AM    aPTT 30.1 05/29/2022 04:59 AM      No results found for: IRON, FE, TIBC, IBCT, PSAT, FERR   Lab Results   Component Value Date/Time    pH 7.20 (LL) 05/23/2022 02:59 PM    PCO2 34 (L) 05/23/2022 02:59 PM    PO2 107 (H) 05/23/2022 02:59 PM     No components found for: [place]   Lab Results   Component Value Date/Time     (H) 05/31/2022 03:32 AM                Total time: 72 m  Counseling / coordination time, spent as noted above: 50m  > 50% counseling / coordination?: yes    Prolonged service was provided for  [x]30 min   []75 min in face to face time in the presence of the patient, spent as noted above. Time Start: 3pm  Time End: 315pm  Time Start 325pm  Time end 355pm   Note: this can only be billed with  (initial) or 21 [phone] (follow up). If multiple start / stop times, list each separately.

## 2022-06-02 NOTE — PROGRESS NOTES
Problem: Breathing Pattern - Ineffective  Goal: *Absence of hypoxia  Outcome: Progressing Towards Goal     Problem: Patient Education: Go to Patient Education Activity  Goal: Patient/Family Education  Outcome: Progressing Towards Goal     Problem: Pressure Injury - Risk of  Goal: *Prevention of pressure injury  Description: Document Markos Scale and appropriate interventions in the flowsheet. Outcome: Progressing Towards Goal  Note: Pressure Injury Interventions:  Sensory Interventions: Minimize linen layers    Moisture Interventions: Contain wound drainage,Assess need for specialty bed    Activity Interventions: PT/OT evaluation    Mobility Interventions: PT/OT evaluation    Nutrition Interventions: Document food/fluid/supplement intake    Friction and Shear Interventions: Lift team/patient mobility team,Minimize layers                Problem: Patient Education: Go to Patient Education Activity  Goal: Patient/Family Education  Outcome: Progressing Towards Goal     Problem: Diabetes Self-Management  Goal: *Disease process and treatment process  Description: Define diabetes and identify own type of diabetes; list 3 options for treating diabetes. Outcome: Progressing Towards Goal  Goal: *Incorporating nutritional management into lifestyle  Description: Describe effect of type, amount and timing of food on blood glucose; list 3 methods for planning meals. Outcome: Progressing Towards Goal  Goal: *Incorporating physical activity into lifestyle  Description: State effect of exercise on blood glucose levels. Outcome: Progressing Towards Goal  Goal: *Developing strategies to promote health/change behavior  Description: Define the ABC's of diabetes; identify appropriate screenings, schedule and personal plan for screenings.   Outcome: Progressing Towards Goal  Goal: *Using medications safely  Description: State effect of diabetes medications on diabetes; name diabetes medication taking, action and side effects. Outcome: Progressing Towards Goal  Goal: *Monitoring blood glucose, interpreting and using results  Description: Identify recommended blood glucose targets  and personal targets. Outcome: Progressing Towards Goal  Goal: *Prevention, detection, treatment of acute complications  Description: List symptoms of hyper- and hypoglycemia; describe how to treat low blood sugar and actions for lowering  high blood glucose level. Outcome: Progressing Towards Goal  Goal: *Prevention, detection and treatment of chronic complications  Description: Define the natural course of diabetes and describe the relationship of blood glucose levels to long term complications of diabetes.   Outcome: Progressing Towards Goal  Goal: *Developing strategies to address psychosocial issues  Description: Describe feelings about living with diabetes; identify support needed and support network  Outcome: Progressing Towards Goal  Goal: *Insulin pump training  Outcome: Progressing Towards Goal  Goal: *Sick day guidelines  Outcome: Progressing Towards Goal  Goal: *Patient Specific Goal (EDIT GOAL, INSERT TEXT)  Outcome: Progressing Towards Goal     Problem: Patient Education: Go to Patient Education Activity  Goal: Patient/Family Education  Outcome: Progressing Towards Goal     Problem: Patient Education: Go to Patient Education Activity  Goal: Patient/Family Education  Outcome: Progressing Towards Goal     Problem: DKA: Day 1  Goal: Off Pathway (Use only if patient is Off Pathway)  Outcome: Progressing Towards Goal  Goal: Activity/Safety  Outcome: Progressing Towards Goal  Goal: Consults, if ordered  Outcome: Progressing Towards Goal  Goal: Diagnostic Tests/Procedures, if Ordered  Outcome: Progressing Towards Goal  Goal: Nutrition/Diet  Outcome: Progressing Towards Goal  Goal: Discharge Planning  Outcome: Progressing Towards Goal  Goal: Medications  Outcome: Progressing Towards Goal  Goal: Respiratory  Outcome: Progressing Towards Goal  Goal: Treatments/Interventions/Procedures  Outcome: Progressing Towards Goal  Goal: Psychosocial  Outcome: Progressing Towards Goal  Goal: *Hemodynamically stable  Outcome: Progressing Towards Goal  Goal: *Blood glucose falling 50 to 100 mg/dl/hr  Outcome: Progressing Towards Goal  Goal: *Potassium normalizing  Outcome: Progressing Towards Goal     Problem: DKA: Day 3  Goal: Off Pathway (Use only if patient is Off Pathway)  Outcome: Progressing Towards Goal  Goal: Activity/Safety  Outcome: Progressing Towards Goal  Goal: Diagnostic Test/Procedures  Outcome: Progressing Towards Goal  Goal: Nutrition/Diet  Outcome: Progressing Towards Goal  Goal: Discharge Planning  Outcome: Progressing Towards Goal  Goal: Medications  Outcome: Progressing Towards Goal  Goal: Treatments/Interventions/Procedures  Outcome: Progressing Towards Goal  Goal: Psychosocial  Outcome: Progressing Towards Goal     Problem: DKA: Discharge Outcomes  Goal: *Ambulates and performs ADL's  Outcome: Progressing Towards Goal  Goal: *Describes follow-up/return visits to physicians, diabetes treatment coordinator and other resources  Outcome: Progressing Towards Goal  Goal: *Blood glucose at patient's target range  Outcome: Progressing Towards Goal  Goal: *Acidosis resolved  Outcome: Progressing Towards Goal  Goal: *Tolerating diet  Outcome: Progressing Towards Goal  Goal: *Verbalizes understanding and describes prescribed diet  Outcome: Progressing Towards Goal  Goal: *Describes blood glucose goals, monitoring, sick day rules, hypo/hyperglycemia  Outcome: Progressing Towards Goal  Goal: *Describes available resources and support systems  Outcome: Progressing Towards Goal  Goal: *Verbalizes name, dosage, time, side effects, and number of days to continue medications  Outcome: Progressing Towards Goal  Goal: *Demonstrates ability to self-administer insulin  Outcome: Progressing Towards Goal     Problem: Hypotension  Goal: *Blood pressure within specified parameters  Outcome: Progressing Towards Goal  Goal: *Fluid volume balance  Outcome: Progressing Towards Goal  Goal: *Labs within defined limits  Outcome: Progressing Towards Goal     Problem: Patient Education: Go to Patient Education Activity  Goal: Patient/Family Education  Outcome: Progressing Towards Goal

## 2022-06-02 NOTE — DIABETES MGMT
[address] [facility]    CLINICAL NURSE SPECIALIST CONSULT     Initial Presentation   Rosana Proctor is a 80 y.o. female admitted from the ER today 5/23/22 in DKA with profound bradycardia, after being found down by granddaughter. Granddaughter states that patient is independent in her activities of daily living, and has a routine that includes her diabetes self-care. He grandmother had reported that she wasn't feeling well and the granddaughter had made arrangements for a home visit. When she went into her room this morning to remind her of the visit, she found her down on the floor. EMS called. Of note, granddaughter states that she (herself) had been hospitalized for a month and returned home at the end of April. She surmises that her grandmother may not have been taking her insulin because there was more insulin in the refrigerator than there should have been. LAB:  WBC 10.9. AST 75. Lipase >3000. Troponin 31. NT pro-BNP 1588. Urinary glucose & ketone +. CT Head: No extra-axial fluid collection, hemorrhage or shift. Atrophy mostly posterior fossa. CXR: Mild pulmonary edema    HX:   Past Medical History:   Diagnosis Date    Diabetes ([facility] [address]       INITIAL DX:   DKA (diabetic ketoacidosis) ([facility] [address] [E11.10]     Current Treatment     TX: Insulin. Pepcid. ABx. Steroids    Consulted by Provider for advanced diabetes nursing assessment and care for:   [x] Transitioning off Syed Wells   [x] Inpatient management strategy  [] Home management assessment  [] Survival skill education    Hospital Course   Clinical progress has been complicated by need for ICU level of care. 5/24/22 Alert but babbling; Precedex+. O2NC. Remains on three (3) pressors. CRRT+. Scheduled for CT chest & ABd wo contrast today. Plans to add steroids. Discussion of transition off Syed Wells made during IPR.  5/25/22 Alert. Conversing in understandable language. O2NC. Afib. Remains on two (2) pressors.  Skin per wound care/nursing. CRRT+  5/26/22 Alert & oriented to person. Off O2. NPO except for supplements. CRRT+. 5/27/22 Alert & oriented. Answering questions clearly. Dialysis now+.   5/31/22 Patient ate almost all of her breakfast tray and is now resting comfortable with eyes closed. Diabetes History   Type 2 diabetes treated with insulin therapy  Admission BG 1259 with AG 30    Diabetes-related Medical History: Deferred    Diabetes Medication History  Key Antihyperglycemic Medications             glipiZIDE (GLUCOTROL) 10 mg tablet Take 10 mg by mouth daily. insulin lispro protamine/insulin lispro (HUMALOG MIX 75/25) 100 unit/mL (75-25) injection 48 Units by SubCUTAneous route daily. take 48 units in the morning    insulin lispro protamine/insulin lispro (HUMALOG MIX 75/25) 100 unit/mL (75-25) injection 32 Units by SubCUTAneous route every evening. take 32 units subcutaneously at night. Diabetes self-management practices: Per granddaughter  Eating pattern   [x] Not eating a carbohydrate-controlled mealplan  [x] Breakfast Cheerios with milk & banana.  Coffee (may have had Cheese toast earlier)  [x] Sankc  Chips a Hoy cookies (while she is watching TV  [x] Dinner  With granddaughter   Physical activity pattern - Uses Rollator to move about the house   [x] Not employing a physical activity program to control BG  Monitoring pattern - Tracks on a tablet by bedside & takes to her provider   [x] Testing BGs   [x] Breakfast   Taking medications pattern  [x] Consistent administration  [x] Affordable  Overall evaluation:    [x] Last A1c located 9% (3/9/2021) @[facility]    Subjective        Objective   Physical exam  General Obese female in no acute distress  Neuro  Alert and talking  Vital Signs   Visit Vitals  BP (!) 145/65   Pulse 81   Temp 98.7 °F (37.1 °C)   Resp 19   Ht 5' 6\" (1.676 m)   Wt 82.6 kg (182 lb 1.6 oz)   SpO2 100%   BMI 29.39 kg/m²     Laboratory  Recent Labs     06/02/22  0118 06/01/22  7920 05/31/22 2022 05/31/22 0332 05/31/22  0332   * 228* 250*   < > 156*   AGAP 10 12 11   < > 9   WBC 18.3* 16.2* 16.1*   < > 18.9*   CREA 7.45* 6.77* 6.30*   < > 5.69*   GFRNA 5* 6* 6*   < > 7*   AST  --  55*  --   --  86*   ALT  --  107*  --   --  121*    < > = values in this interval not displayed. Factors impacting BG management  Factor Dose Comments   Nutrition:  Dysphagia Pureed       Drugs:  Steroids   HC 50mg Q6 hrs   Impairs insulin action   Infection Rocephin 2 g Q 24 hours Afebrile. WBCs sloan   Other:   Kidney function  Liver function   GFR 5  Enzymes remain elevated   Dialysis+     Blood glucose pattern      Significant diabetes-related events over the past 24-72 hours  Admitted in profound DKA with electrolyte disturbances. BG 1259 with AG 30  On Glucostabilizer; received 27-32 units of insulin/hour initially  BG finally under 250mg/dl at 1am 5/24/22 after 5L of fluid in ICU  Significant GLORIA - Requiring dialysis  Steroids started 5/24/22 - BGs higher than target  NPH dose adjustment with BGs in 120-150s until she began eating 5/26/22    Assessment and Plan   Nursing Diagnosis Risk for unstable blood glucose pattern   Nursing Intervention Domain 5250 Decision-making Support   Nursing Interventions Examined current inpatient diabetes/blood glucose control   Explored factors facilitating and impeding inpatient management     Evaluation   This overweight AA female was admitted in DKA associated with bradycardia. Received significant amounts of insulin/hour via Glucostabilizer. Fluid resuscitation also needed in presence of elevated NT pro-BNP. BGs down under 250mg/dl at 1am 5/24/22, and AG closed at 8am. Transitioned off GS, and steroids added 5/24/22. BGs sloan into the low 200s. NPH insulin started to override steroids in presence of kidney dysfunction with small increase in dose. BGs in target until she began eating yesterday 5/26/22.     This woman uses combo insulin at home, which is not available in-house so we can tailor dosing to changing circumstances. Current insulin dose is at half her usual basal insulin dose, but her kidney function has deteriorated in light of her presenting situation, e.g., DKA. Recommend increasing basal dose incrementally to establish basal dose in light of GLORIA/CKD. Some Info extracted from [address] [facility] note    5/31/22 BG within goal this morning on 14 units NPH twice daily. The patient continues to receive 50 mg hydrocortisone injection Q 8 hours. I recommend continuing on current diabetes medication regimen for now. Diabetes management will continue to follow with this patient, monitoring BG trends and making recommendations as needed. 6/1/22 BG's elevated today on 14 units NPH twice daily. The patient continues on 50 mg hydrocortisone Q 8 hours. Consider a slight increase in the basal insulin dose. (0.4 x kg) 17 units NPH twice daily  6/2/22  BG's elevated today on 16 units NPH twice daily. The patient continues on 50 mg hydrocortisone Q 8 hours. Consider a slight increase in the basal dose. Recommendations     1. Increase to  NPH insulin 18 units twice daily. 2. Continue HIGH sensitivity corrective insulin     Billing Code(s)   [x] 88388 IP subsequent hospital care - 15 minutes     Before making these care recommendations, I personally reviewed the hospitalization record, including notes, laboratory & diagnostic data and current medications, and examined the patient at the bedside (circumstances permitting) before making care recommendations. More than fifty (50) percent of the time was spent in patient counseling and/or care coordination.   Total minutes: 15 minutes    SANA Strange  Diabetes Clinical Nurse Specialist  [facility]  Access via [employer]

## 2022-06-02 NOTE — PROGRESS NOTES
Pt spent the shift on the Amiodarone infusion running at 0.5mg/min. Pt's heart rate maintained in the high 70's to 80's. Rectal tube and ramos cath are intact but they are not draining. Pt couldn't be weigh due to faulty bed scale.  Report endorsed to Benito Hector

## 2022-06-03 NOTE — PROGRESS NOTES
TRANSFER - OUT REPORT:    Verbal report given to Alix Garcia (name) on Bredna Shellfatou being transferred to Surg Tele (unit) for routine progression of care       Report consisted of patient's Situation, Background, Assessment and   Recommendations(SBAR). Information from the following report(s) SBAR, Kardex, Procedure Summary and Intake/Output was reviewed with the receiving nurse. Opportunity for questions and clarification was provided.       Patient transported with:   Monitor

## 2022-06-03 NOTE — PROGRESS NOTES
Hospitalist Progress Note    NAME: Micaela Arora   :  1934   MRN:  [id number]     Admit date: 2022    Today's date: 22    PCP: Joselin Cross MD    Anticipated discharge date: 2022    Barriers:  recovering from infection, still with bacteremia    ? needs long-term HD    Assessment / Plan:    Septic shock POA initial BP 46/34, temp 86.5, RR 42, GLORIA, lactate 4.26, pressor requirements  Klebsiella Bacteremia with UTI  POA  Serratia marcescens bacteremia  and 2022 POA  Persistent GNR bacteremia POA  ? Infectious endocarditis  - Admit UA 5-10 WBC, 1+ bacteria  - Admit BC with klebsiella and Serratia   Sensitive to zosyn, ceftriaxone  - Admit urine culture + for klebsiella  - Repeat BC  with recurrent sepsis + for Serratia  - CT abdomen/pelvis without IV contrast  with no hydronephrosis   No intraabdominal pathology  - Chest CT with bilateral effusions, no ASD  - With elevated lipase and abnormal LFTs, ? Biliary source   GGT 28, never had elevated Alk Phos and T bili   US with no gallstones, contracted GB, CBD 0.4 cm              Biliary source seems less likely  - IV pressors at admit, weaned off   Started on HD for GLORIA from ATN  -Started on stress steroid, being weaned down  - Transferred to floor 2022  - S/p zosyn --> transition to ceftriaxone   - Repeat Blood culture  with GNR  - Repeat blood culture  positive for gram-negative rods  - Not sure why she is persistent bacteremic with GNR despite appropriate antibiotics    TTE LVEF 55 to 60%, trace MR and AR  - Repeat CT abdomen given the tenderness, persistent bacteremia  -ID consulted, recommended to remove central line and the Evan repeat blood cultures. Discussed with ID and nephrology  . CT abdomen pelvis with contrast did not show any acute pathology    Central line and Evan removed and plan for NAI by cardiology  .   Blood cultures from 602 negative to date  Status post NAI on 06/03, prelim report showed:  S/p NAI performed in sinus rhythm preliminarily revealing biatrial enlargement, moderate MR with very small mobile abnormality on the atrial side of the mitral valve, cannot exclude what might be a small vegetation. Large left atrial appendage with low exit velocity and without thrombus. Mild TR with evidence of moderate pulmonary hypertension. Small pericardial effusion. Continue with IV cefepime    A. fib with RVR  Started on amnio drip overnight cardiology on board, awaiting further recommendations  Patient has had episode of bradycardia with pauses during the stay due to combination of DKA, hyperkalemia  Beta-blocker was stopped. Poor candidate for  Anticoagulation   Discussed with cardiology, plan to stop amnio drip and switch to p.o. amnio if heart rate controlled  06/03: Still on amnio drip, defer management to cardiology     Acute kidney injury POA BUN/creat 114 and 5.97  Rhabdomyolysis CPK peak 22,325   -Last baseline creat 1.28 at [facility] on 3/9/2021  -Started on CRRT till 5/26, now on HD  -No hydronephrosis  -Likely ATN with hypotension, sepsis, rhabdomyolysis  - Oligouric  -Serial labs  -Await renal recovery, likely will need outpatient HD  Likely can DC Gee    DKA POA BS 1259, HgBa1c > 14.0, AG > 20, pH 6.99  - Not sure if DKA present, acidosis may have been due to sepsis, GLORIA   Urine with trace ketones, but B-hydroxybutyrate levels in blood elevated  - Likely patient was not taking insulin at home as her A1c is >14  - s/p Insulin gtt --> currently on NPH 16 units twice daily          Acute pancreatitis POA  - admit Non contrast CT is neg for changes of acute pancreatitis. - Lipase > 3000 -->  1107 -->  2499 --> 1602  - + abdominal tenderness  - clears  - No Gallstones or ductal dilatation, no ETOH  - ?  Ischemic   - lipase tredning down  -Repeat CT abdomen/pelvis showed no acute abnormality  - serial labs     Acute metabolic encephalopathy POA  - Multifactorial with septic shock, low BP, GLORIA, elevated BS > 1200  - Head CT with atrophy, no acute events  -Improving. Closely monitor mental status.  -manage metabolic issues as above.     Symptomatic bradycardia.  -Likely precipitated by severe acidemia, also on BB at home.   -Again had long pauses on 5/25, EP following. Overweight POA Body mass index is 29.39 kg/m². DVT prophylaxis. [address] SUP. Pepcid. Code status. Full code. Subjective:     Chief Complaint / Reason for Physician Visit  Follow-up bacteremia  No acute issues  Going for NAI today  Review of Systems:  Symptom Y/N Comments  Symptom Y/N Comments   Fever/Chills n   Chest Pain n    Poor Appetite    Edema     Cough n   Abdominal Pain n    Sputum    Joint Pain     SOB/BEE n   Headache     Nausea/vomit n   Tolerating PT/OT     Diarrhea n   Tolerating Diet y    Constipation    Other       Could NOT obtain due to:      Objective:     VITALS:   Last 24hrs VS reviewed since prior progress note. Most recent are:  Patient Vitals for the past 24 hrs:   Temp Pulse Resp BP SpO2   06/03/22 0838 -- 69 17 -- 100 %   06/03/22 0810 97.3 °F (36.3 °C) 68 20 (!) 150/67 98 %   06/03/22 0427 97.6 °F (36.4 °C) 66 16 (!) 147/49 98 %   06/02/22 2258 98.1 °F (36.7 °C) 68 18 (!) 139/52 97 %   06/02/22 1937 97.7 °F (36.5 °C) 75 21 110/83 97 %   06/02/22 1510 98.7 °F (37.1 °C) 81 19 (!) 145/65 100 %   06/02/22 1203 98.8 °F (37.1 °C) 80 18 (!) 173/55 99 %     No intake or output data in the 24 hours ending 06/03/22 0856     Wt Readings from Last 12 Encounters:   05/26/22 82.6 kg (182 lb 1.6 oz)       PHYSICAL EXAM:    I had a face to face encounter and independently examined this patient on 06/03/22 as outlined below:    General: WD, WN. Alert, cooperative, no acute distress    EENT:  PERRL. Anicteric sclerae. MMM  Resp:  CTA bilaterally, no wheezing or rales.   No accessory muscle use  CV:  Regular  rhythm,  No edema  GI:  Soft, Non distended, Tender RUQ and epigastric region. +Bowel sounds, no rebound  Neurologic:  Alert, normal speech, non focal motor exam  Psych:   Not anxious nor agitated  Skin:  No rashes. No jaundice      Reviewed most current lab test results and cultures  YES  Reviewed most current radiology test results   YES  Review and summation of old records today    NO  Reviewed patient's current orders and MAR    YES  PMH/SH reviewed - no change compared to H&P  ________________________________________________________________________  Care Plan discussed with:    Comments   Patient x    Family      RN x    Care Manager     Consultant                        Multidiciplinary team rounds were held today with , nursing, pharmacist and clinical coordinator. Patient's plan of care was discussed; medications were reviewed and discharge planning was addressed. ________________________________________________________________________      Comments   >50% of visit spent in counseling and coordination of care     ________________________________________________________________________  Mireille MD Timi     Procedures: see electronic medical records for all procedures/Xrays and details which were not copied into this note but were reviewed prior to creation of Plan. LABS:  I reviewed today's most current labs and imaging studies.   Pertinent labs include:  Recent Labs     06/02/22 0118 06/01/22 0409 05/31/22 2022   WBC 18.3* 16.2* 16.1*   HGB 9.5* 10.0* 9.6*   HCT 28.4* 30.4* 28.8*    142* 125*     Recent Labs     06/02/22 0118 06/01/22 0409 05/31/22 2022    140 139   K 3.3* 3.3* 3.2*    104 104   CO2 25 24 24   * 228* 250*   BUN 82* 73* 71*   CREA 7.45* 6.77* 6.30*   CA 7.5* 7.3* 7.3*   MG  --   --  2.5*   PHOS  --   --  4.3   ALB  --  1.7*  --    TBILI  --  0.3  --    ALT  --  107*  --

## 2022-06-03 NOTE — PROGRESS NOTES
Physical Therapy  Attempting to see patient for PT this am. Patient is current;y off floor for NAI. Will defer therapy at this time and continue to follow. Continue to recommend SNF following discharge.   Thank you,  Parmjit Fenton, PT

## 2022-06-03 NOTE — PROGRESS NOTES
Problem: Dysphagia (Adult)  Goal: *Acute Goals and Plan of Care (Insert Text)  Description: 5/25/2022, Re-evaluation 6/3/22  Speech path goals  1. Patient will tolerate full liquids with no overt s/s of aspiration. MET  2. Patient will tolerate diet upgrade with no overt s/s of aspiration. Upgraded to Puree/Thin Liquid 5/26/2022  MET  3. New goal 6/3/22  Patient will tolerate easy to chew diet, thin liquids without overt s/s aspiration within 7 days. Outcome: Progressing Towards Goal     SPEECH LANGUAGE PATHOLOGY DYSPHAGIA TREATMENT: WEEKLY REASSESSMENT  Patient: Michelle Davis (80 y.o. female)  Date: 6/3/2022  Diagnosis: DKA (diabetic ketoacidosis) ([facility] [E11.10] <principal problem not specified>       Precautions:       ASSESSMENT:  Patient demonstrated prolonged mastication of soft solids. Mastication complete and no oral residue noted. Suspect prolonged mastication is related to missing most teeth. Patient tolerated PO without overt s/s aspiration. Patient's progression toward goals since last assessment: Diet advanced to easy to chew     PLAN:  Goals have been updated based on progression since last assessment. Patient continues to benefit from skilled intervention to address the above impairments. Continue to follow the patient 1 time a week to address goals. Recommendations and Planned Interventions:  Easy to chew diet  Thin liquids  Discharge Recommendations: To Be Determined     SUBJECTIVE:   Patient agreed to PO trials. Diet advanced to easy to chew. OBJECTIVE:   Cognitive and Communication Status:  Neurologic State: Alert  Orientation Level: Oriented to person,Oriented to situation,Disoriented to place,Disoriented to time  Cognition: Follows commands  Perception: Appears intact  Perseveration: No perseveration noted  Safety/Judgement: Fall prevention  Dysphagia Treatment:  Oral Assessment:     P.O.  Trials:  Patient Position: Upright in bed  Vocal quality prior to P.O.: No impairment  Consistency Presented: Mechanical soft  How Presented: SLP-fed/presented;Spoon     Bolus Acceptance: No impairment  Bolus Formation/Control: Impaired  Type of Impairment: Delayed  Propulsion: No impairment  Oral Residue: None        Aspiration Signs/Symptoms: None                      After treatment patient left in no apparent distress:   Patient left in no apparent distress in bed and Call bell within reach    COMMUNICATION/EDUCATION:   Patient was educated regarding role of SLP, diet consistency and POC. Patient nodded. The patients plan of care including recommendations, planned interventions, and recommended diet changes were discussed with: Registered nurse.      MERE Car  Time Calculation: 10 mins

## 2022-06-03 NOTE — PROGRESS NOTES
Nephrology Progress Note  [facility]ora     [email or web address]. [facility]  Phone - [phone]   Patient: Mikael Shah    YOB: 1934        Date- 6/3/2022   Admit Date: 5/23/2022  CC: Follow up for GLORIA        IMPRESSION & PLAN:   · GLORIA due to ATN - on hd  · CKD 3b  · DKA  · Sepsis   UTI   hypokalemia   Gram +ve blood cx    PLAN-   Hd today   New jolene cath today   epogen for anemia     Subjective: Interval History:   -  S/p NAI today  bp high  No sob    Objective:   Vitals:    06/03/22 0935 06/03/22 0940 06/03/22 0945 06/03/22 1009   BP: (!) 143/52 (!) 146/48 (!) 137/47 (!) 149/67   Pulse: 61 63 60 63   Resp: 12 12 11 16   Temp:    97.4 °F (36.3 °C)   TempSrc:       SpO2: 99% 100% 98% 97%   Weight:       Height:          06/02 0701 - 06/03 0700  In: -   Out: 135 [Urine:35; Drains:100]  Last 3 Recorded Weights in this Encounter    05/23/22 0957 05/24/22 0727 05/26/22 0631   Weight: 89.2 kg (196 lb 10.4 oz) 88.9 kg (196 lb) 82.6 kg (182 lb 1.6 oz)      Physical exam:    GEN: NAD  NECK- Supple, no mass  RESP: No wheezing, Clear b/l  CVS: S1,S2  RRR  EXT:+Edema   PSYCH: Can't access due to patient's current condition       Chart reviewed. Pertinent Notes reviewed. Data Review :  Recent Labs     06/02/22 0118 06/01/22 0409 05/31/22 2022    140 139   K 3.3* 3.3* 3.2*    104 104   CO2 25 24 24   BUN 82* 73* 71*   CREA 7.45* 6.77* 6.30*   * 228* 250*   CA 7.5* 7.3* 7.3*   MG  --   --  2.5*   PHOS  --   --  4.3     Recent Labs     06/02/22 0118 06/01/22 0409 05/31/22 2022   WBC 18.3* 16.2* 16.1*   HGB 9.5* 10.0* 9.6*   HCT 28.4* 30.4* 28.8*    142* 125*     No results for input(s): FE, TIBC, PSAT, FERR in the last 72 hours.    Medication list  reviewed  Current Facility-Administered Medications   Medication Dose Route Frequency    [START ON 6/6/2022] Vancomycin Random level with AM labs on 6/6/22   Other ONCE    insulin NPH (NOVOLIN N, HUMULIN N) injection 16 Units  16 Units SubCUTAneous BID    Vancomycin - Pharmacist to Dose after HD   Other Rx Dosing/Monitoring    hydrocortisone Sod Succ (PF) (SOLU-CORTEF) injection 50 mg  50 mg IntraVENous Q12H    cefepime (MAXIPIME) 1 g in 0.9% sodium chloride (MBP/ADV) 50 mL MBP  1 g IntraVENous Q24H    amiodarone (CORDARONE) 375 mg/250 mL D5W infusion  0.5 mg/min IntraVENous CONTINUOUS    heparin (porcine) 1,000 unit/mL injection 1,400 Units  1,400 Units InterCATHeter DIALYSIS PRN    And    heparin (porcine) 1,000 unit/mL injection 1,100 Units  1,100 Units InterCATHeter DIALYSIS PRN    hydrALAZINE (APRESOLINE) 20 mg/mL injection 20 mg  20 mg IntraVENous Q6H PRN    albumin human 25% (BUMINATE) solution 25 g  25 g IntraVENous DIALYSIS PRN    metoprolol (LOPRESSOR) injection 2.5 mg  2.5 mg IntraVENous Q6H PRN    insulin lispro (HUMALOG) injection   SubCUTAneous AC&HS    glucose chewable tablet 16 g  4 Tablet Oral PRN    glucagon (GLUCAGEN) injection 1 mg  1 mg IntraMUSCular PRN    dextrose 10 % infusion 0-250 mL  0-250 mL IntraVENous PRN    sodium chloride (NS) flush 5-40 mL  5-40 mL IntraVENous Q8H    sodium chloride (NS) flush 5-40 mL  5-40 mL IntraVENous PRN    acetaminophen (TYLENOL) tablet 650 mg  650 mg Oral Q6H PRN    Or    acetaminophen (TYLENOL) suppository 650 mg  650 mg Rectal Q6H PRN    polyethylene glycol (MIRALAX) packet 17 g  17 g Oral DAILY PRN    ondansetron (ZOFRAN ODT) tablet 4 mg  4 mg Oral Q8H PRN    Or    ondansetron (ZOFRAN) injection 4 mg  4 mg IntraVENous Q6H PRN    [Held by provider] heparin (porcine) injection 5,000 Units  5,000 Units SubCUTAneous Q8H    alcohol 62% (NOZIN) nasal  1 Ampule  1 Ampule Topical Q12H    balsam peru-castor oiL (VENELEX) ointment   Topical BID    famotidine (PF) (PEPCID) 20 mg in 0.9% sodium chloride 10 mL injection  20 mg IntraVENous Q24H          Gaston Turner MD              [facility] [address], [address]  [place], [address]  Phone - [phone]               Fax - [phone]

## 2022-06-03 NOTE — PROGRESS NOTES
Patient arrived to Non-Invasive Cardiology Lab for In Patient NAI Procedure. Staff introduced to patient. Patient identifiers verified with Name and Date of Birth. Procedure verified with patient. Consent forms reviewed and signed by patient or authorized representative and verified. Allergies verified. Patient informed of procedure and plan of care. Questions answered with review. Patient on cardiac monitor, non-invasive blood pressure, SPO2 monitor. On 2l NC O2. Patient is A&Ox3. Patient reports no complaints. Patient on bed, in low position, with side rails up. Patient instructed to call for assistance as needed.

## 2022-06-03 NOTE — PROGRESS NOTES
Transition of Care Plan:     RUR:17%  Disposition: Home with granddaughter vs. SNF  Granddaughter is interested in applying for Medicaid in case she needs LTC or Caregivers at home.  - Referral sent to [facility] to screen    Follow up appointments: PCP; Beatriz Garcia.   DME needed: tbd  Transportation at Discharge: Granddaughter or GD Spouse will transport if she is able to go in car.   Keys or means to access home:     Granddaughter   IM Medicare Letter: to be delivered prior to DC.   1st IM Letter given 5/23/22  Is patient a BCPI-A Bundle:        no              If yes, was Bundle Letter given?:    Is patient a [place] and connected with the [facility]?              No   If yes, was [facility] transfer form completed and [place] notified? Caregiver Contact: Granddaughter: David Gill: [phone]  GD Spouse: Pao Bah: [phone]  Discharge Caregiver contacted prior to discharge? Yes reviewed plan with Granddaughter over the phone.   Care Conference needed?: to be determined. CM provided pt's granddaughter with a SNF list yesterday & requested three choices be made. CM will continue to follow.     Marija Stoner  Ext [phone]

## 2022-06-03 NOTE — PROGRESS NOTES
Progress Note      6/3/2022  NAME: Kevin Rubio   MRN:  [id number]   Admit Diagnosis[facility]  PCP:  Av Richmond MD     Assessment: 1. Profound sinus bradycardia 20-30's in the setting of DKA, hyperkalemia, and OP beta-blocker. This has resolved. 2. Paroxysmal atrial fibrillation with post-conversion pauses up to 6 seconds on beta-blocker. Now off beta-blocker. 3. Paroxysmal atrial flutter by tele. She had runs of atrial tachycardia and then followed by probably sustained Afib which organized into a right atrial flutter (by tele morphology) before terminating to sinus. 4. 2nd degree AV block with single dropped PAC's, RBBB with normal ND in sinus rhythm. 5. Hypotension likely due to dehydration/volume depletion, metabolic acidosis, bradycardia. Improved with volume resuscitation, pressor. Now off pressor, but evidence of bacteremia with Serratia (see ID note for details). 6. No evidence of acute ischemia or infarct by presenting ECG. Echo with normal LV and RV systolic function. 7. GLORIA atop CKD stage 3 at baseline. Rhabdomyolysis. On dialysis. 8. Diabetes mellitus type 2 on chronic insulin complicated by DKA. 9. Elevated lipase possibly due to DKA rather than acute pancreatitis. 10. Metabolic encephalopathy, resolved. 11. Anemia. 12. Thrombocytopenia. 13. Full code.     Plan:      1. She's on DVT prophylaxis at this time. I'd avoid FULL anticoagulation if possible given her anemia and thrombocytopenia as long as Afib and flutter episodes are brief, self-limited. 2. Her dialysis catheter was removed due to bacteremia. 3. ID consult noted. Due to persistent bacteremia, a NAI was done. There may be a small vegetation on the mitral valve. Will have Dr. Leonor Gunn see her this weekend. Subjective:     No CP, dizziness, palpitations. Objective:      Physical Exam:    Last 24hrs VS reviewed since prior progress note.  Most recent are:    Visit Vitals  BP (!) 158/71 (BP 1 Location: Left upper arm, BP Patient Position: At rest)   Pulse 65   Temp 97.2 °F (36.2 °C)   Resp 16   Ht 5' 6\" (1.676 m)   Wt 82.6 kg (182 lb 1.6 oz)   SpO2 97%   BMI 29.39 kg/m²       Intake/Output Summary (Last 24 hours) at 6/3/2022 1536  Last data filed at 6/3/2022 1214  Gross per 24 hour   Intake --   Output 200 ml   Net -200 ml           General: Tired and awake, no distress  Neck: Supple,   Respiratory: No respiratory distress, clear anteriorly   Cardiovascular: Regular rate rhythm, S1S2   Abdomen: soft, non tender   Neuro: moves all extremities   Skin: warm and dry   Extremity:  warm to touch      Data Review    Telemetry:  normal sinus rhythm         Lab Data Personally Reviewed:    Recent Labs     06/02/22 0118 06/01/22 0409   WBC 18.3* 16.2*   HGB 9.5* 10.0*   HCT 28.4* 30.4*    142*     No results for input(s): INR, PTP, APTT, INREXT, INREXT in the last 72 hours. Recent Labs     06/02/22 0118 06/01/22 0409 05/31/22 2022    140 139   K 3.3* 3.3* 3.2*    104 104   CO2 25 24 24   BUN 82* 73* 71*   CREA 7.45* 6.77* 6.30*   * 228* 250*   CA 7.5* 7.3* 7.3*   MG  --   --  2.5*     No results for input(s): CPK, CKNDX, TROIQ in the last 72 hours. No lab exists for component: CPKMB  Lab Results   Component Value Date/Time    Triglyceride 95 05/26/2022 08:30 AM       Recent Labs     06/01/22 0409   AP 99   TP 5.3*   ALB 1.7*   GLOB 3.6   LPSE 1,387*     No results for input(s): PH, PCO2, PO2 in the last 72 hours.     Medications Personally Reviewed:    Current Facility-Administered Medications   Medication Dose Route Frequency    epoetin betina-epbx (RETACRIT) 12,000 Units combo injection  12,000 Units SubCUTAneous Q MON, WED & FRI    heparin (porcine) pf 300 Units  300 Units InterCATHeter ONCE    heparinized saline 2 units/mL infusion 800 Units  400 mL Irrigation ONCE    lidocaine (XYLOCAINE) 20 mg/mL (2 %) injection 200 mg  10 mL SubCUTAneous ONCE    [START ON 6/4/2022] hydrocortisone Sod Succ (PF) (SOLU-CORTEF) injection 50 mg  50 mg IntraVENous DAILY    insulin NPH (NOVOLIN N, HUMULIN N) injection 16 Units  16 Units SubCUTAneous BID    cefepime (MAXIPIME) 1 g in 0.9% sodium chloride (MBP/ADV) 50 mL MBP  1 g IntraVENous Q24H    amiodarone (CORDARONE) 375 mg/250 mL D5W infusion  0.5 mg/min IntraVENous CONTINUOUS    heparin (porcine) 1,000 unit/mL injection 1,400 Units  1,400 Units InterCATHeter DIALYSIS PRN    And    heparin (porcine) 1,000 unit/mL injection 1,100 Units  1,100 Units InterCATHeter DIALYSIS PRN    hydrALAZINE (APRESOLINE) 20 mg/mL injection 20 mg  20 mg IntraVENous Q6H PRN    albumin human 25% (BUMINATE) solution 25 g  25 g IntraVENous DIALYSIS PRN    metoprolol (LOPRESSOR) injection 2.5 mg  2.5 mg IntraVENous Q6H PRN    insulin lispro (HUMALOG) injection   SubCUTAneous AC&HS    glucose chewable tablet 16 g  4 Tablet Oral PRN    glucagon (GLUCAGEN) injection 1 mg  1 mg IntraMUSCular PRN    dextrose 10 % infusion 0-250 mL  0-250 mL IntraVENous PRN    sodium chloride (NS) flush 5-40 mL  5-40 mL IntraVENous Q8H    sodium chloride (NS) flush 5-40 mL  5-40 mL IntraVENous PRN    acetaminophen (TYLENOL) tablet 650 mg  650 mg Oral Q6H PRN    Or    acetaminophen (TYLENOL) suppository 650 mg  650 mg Rectal Q6H PRN    polyethylene glycol (MIRALAX) packet 17 g  17 g Oral DAILY PRN    ondansetron (ZOFRAN ODT) tablet 4 mg  4 mg Oral Q8H PRN    Or    ondansetron (ZOFRAN) injection 4 mg  4 mg IntraVENous Q6H PRN    [Held by provider] heparin (porcine) injection 5,000 Units  5,000 Units SubCUTAneous Q8H    alcohol 62% (NOZIN) nasal  1 Ampule  1 Ampule Topical Q12H    balsam peru-castor oiL (VENELEX) ointment   Topical BID    famotidine (PF) (PEPCID) 20 mg in 0.9% sodium chloride 10 mL injection  20 mg IntraVENous Q24H              Dano Corley MD

## 2022-06-03 NOTE — PROGRESS NOTES
Attempting to see patient for OT this am. Patient is current;y off floor for NAI. Will defer therapy at this time and continue to follow. Continue to recommend SNF following discharge.

## 2022-06-03 NOTE — PROGRESS NOTES
Infectious Disease Progress Note         Interval:  NAEo    Subjective:   Patient is tired appearing but non-toxic. She reports she is feeling \"much better today\". She denies anyu focal pain, and no acute complaints. Objective:    Vitals:   Reviewed in chart. Physical Exam:  ?   ? GEN: NAD, looks non-toxic  ? HEENT: Normocephalic, atraumatic, PERRL, no scleral icterus  ? CV: Hemodynamically stable  ? Lungs: Patient is on room air  ? Abdomen: soft, non distended, non tender  ? Genitourinary:  no ramos  ? Extremities: no edema  ? Neuro: Alert, oriented to time, place and situation, moves all extremities to commands, verbal   ? Skin: no rash, no tenderness at the back. Superficial skin breakdown is seen at the sacrum. ? Psych: good affect, good eye contact, non tearful   ?  Lines: right IJ triple lumen and left IJ jolene removed 6/1/22          Labs:  Recent Results (from the past 24 hour(s))   GLUCOSE, POC    Collection Time: 06/02/22 11:49 AM   Result Value Ref Range    Glucose (POC) 219 (H) 65 - 117 mg/dL    Performed by Hardy Sanchez PCT    CULTURE, BLOOD    Collection Time: 06/02/22  1:20 PM    Specimen: Blood   Result Value Ref Range    Special Requests: NO SPECIAL REQUESTS      Culture result: NO GROWTH AFTER 16 HOURS     CULTURE, BLOOD    Collection Time: 06/02/22  1:20 PM    Specimen: Blood   Result Value Ref Range    Special Requests: NO SPECIAL REQUESTS      Culture result: NO GROWTH AFTER 16 HOURS     GLUCOSE, POC    Collection Time: 06/02/22  4:51 PM   Result Value Ref Range    Glucose (POC) 177 (H) 65 - 117 mg/dL    Performed by Eran Obando RN    GLUCOSE, POC    Collection Time: 06/02/22  9:02 PM   Result Value Ref Range    Glucose (POC) 163 (H) 65 - 117 mg/dL    Performed by Dilma Khan (PCT)    VANCOMYCIN, RANDOM    Collection Time: 06/03/22  3:42 AM   Result Value Ref Range    Vancomycin, random 27.5 UG/ML   ECHO NAI W OR WO CONTRAST    Collection Time: 06/03/22  9:06 AM Result Value Ref Range    TR Peak Gradient 49 mmHg    TR Max Velocity 3.50 m/s   GLUCOSE, POC    Collection Time: 06/03/22 10:19 AM   Result Value Ref Range    Glucose (POC) 158 (H) 65 - 117 mg/dL    Performed by Brianna Adams              Assessment:  81 yo F with:    Septic shock due to Persistent Serratia bacteremia. Also Klebsiella pneumoniae bacteremia this admission. Symptomatic bradycardia, DKA, rhabdomyolysis GLORIA requiring CRRT (now HD). High-grade serratia marcescens bacteremia, positive blood cultures, 5/23/22 to 6/1/22 (and more cultures pending so far). All central lines removed 6/1/22. Recommendations:  - NAI \"cannot exclude small MV vegetation\" per note - final report to follow. - Continue cefepime HD dosing  - nephrology planning bean Mckenzie today  - follow up blood cultures from 6/2/22.   - Send repeat blood cultures today (6/3/22) as well. - Patient also noted to be on vancomycin with HD - unclear why. Will dc it. No evidence for GPC. All GNR bacteremia so far. ID will follow. Thank you for the opportunity to participate in the care of this patient. Please contact with questions or concerns.       Christiano Sheth MD  Infectious Diseases

## 2022-06-03 NOTE — PROGRESS NOTES
Comprehensive Nutrition Assessment    Type and Reason for Visit: Reassess    Nutrition Recommendations/Plan:   1. Continue diet per SLP  2. Continue PO supplements as ordered     Malnutrition Assessment:  Malnutrition Status: At risk for malnutrition (specify) (05/25/22 1338)        Nutrition Assessment:  Chart reviewed, pt working with SLP at time of attempted visit. Pt remains disoriented. PO intake remains minimal.  She is s/p NAI, and HD today. Palliative is following.  K 3.3 yesterday. Patient Vitals for the past 168 hrs:   % Diet Eaten   05/29/22 0950 1 - 25%   05/28/22 2000 0%   05/28/22 1806 26 - 50%   05/28/22 0955 1 - 25%   05/28/22 0400 0%     Patient Vitals for the past 168 hrs:   Supplement intake %   05/29/22 0950 1 - 25%   05/28/22 2000 0%          Nutrition Related Findings:    Meds: cefepime, pepcid, solucortef, humalog, insulin NPH. Edema: nonpitting-generalized, +1 nonpitting-BLE. BM: FMS Wound Type: Deep tissue injury (sacrum, heels)    Current Nutrition Intake & Therapies:  Average Meal Intake: 1-25%  Average Supplement Intake: 1-25%  ADULT ORAL NUTRITION SUPPLEMENT Breakfast, Lunch, Dinner; Diabetic Supplement  ADULT DIET Easy to Chew    Anthropometric Measures:  Height: 5' 6\" (167.6 cm)  Ideal Body Weight (IBW): 130 lbs (59 kg)     Current Body Wt:  82.6 kg (182 lb 1.6 oz), 150.8 % IBW. Bed scale  Current BMI (kg/m2): 29.4                          BMI Category: Overweight (BMI 25.0-29. 9)    Estimated Daily Nutrient Needs:  Energy Requirements Based On: Formula  Weight Used for Energy Requirements: Current  Energy (kcal/day): MSJ 1750 (1336 x 1.3)  Weight Used for Protein Requirements: Current  Protein (g/day): 89g (1gPro/kg)  Method Used for Fluid Requirements: Standard renal  Fluid (ml/day): 1200mL    Nutrition Diagnosis:   · Inadequate protein-energy intake related to other (specify) (poor appetite) as evidenced by intake 0-25%  Previous dx continues.      Nutrition Interventions: House Dust Mite Allergy        The house dust mite is an arachnid about 0.3 mm in size and not visible to the naked eye. There are around 150 species of house dust mites in the world. One mite produces up to 40 fecal droppings a day. One teaspoonful of bedroom dust contains an average of nearly 1000 mites and 250,000 minute droppings.    Causes and triggers of house dust mite allergy  The house dust mite requires a warm, moist environment without light in order to live and reproduce. Our beds are ideal. The mite feeds on human and animal skin scales. The allergen is mainly contained in the mite's feces. The feces contain allergy-triggering constituents which are spread in fine dust, are breathed in and can cause an allergic reaction.    Symptoms  When the allergens come into contact with the mucous membranes in the eyes, nose, mouth and throat, sufferers develop symptoms typical of an allergic cold (allergic rhinitis) or an allergic inflammation of the conjunctiva (allergic conjunctivitis): blocked or runny nose, sneezing, red, itchy eyes. If all of these symptoms are present, then the condition is also known as rhinoconjunctivitis. Often, the upper respiratory tract becomes chronically inflamed, primarily because house dust mites are present all year round.  The symptoms of house dust mite allergy typically occur in the morning and are more frequent in the cold months of the year.    Therapy and treatment  As a first step, mattress, pillows and duvet/comforter should be placed in mite-proof or anti-mite covers, sometimes known as encasings. Alternatively you can use pillows or comforter that can be washed at over 130 F monthly. At the same time, house dust should be minimized. If necessary, the symptoms can be treated with medication, for example antihistamines in the form of nasal sprays, eye drops and tablets. Desensitization/specific immunotherapy (SIT) is recommended for house dust allergy if all the measures  "above are not sufficient.    Tips and tricks:    Keep room temperature at 66-70 F and relative air humidity at a maximum of 50%.    Ideally, thoroughly air your home two to three times a day for 5 to 10 minutes each time.    Wash bed linens in at least 130 F every week.    Remove stuffed animals or freeze them every other week.    Keep ceiling fans off in the bedroom as they can stir up dust mite allergens.    Remove dust from furniture with a damp cloth and regularly wet mop floors.    Do not put pot and hydroponic plants in the bedroom and also avoid putting too many in living areas, as they increase room humidity.    When staying overnight in other accommodations, we recommend taking your own bed linen and the above anti-mite mattress covers with you.    Remove upholstered furniture from the bedroom and consider removing the carpets. Ideally, use sealed parquet or laminate sourav, cork tiles or sourav made of wood, novilon or PVC.    Maybe additionally reduce dust mites in mattress, upholstery, or sourav using hot steam .      Modified from \"House Dust Mite Allergy\" by [employer] [facility].    Removal of Patch Tests on Day 3:    1. Remove patches and tape from one test area (one rectangle)          2. Using the purple surgical markers provided (or other permanent marker), draw a grid around the test area so that the circular indentation is in the center of each square, as below. Try to be as neat as possible and keep the lines as straight as you can (you can use a ruler if you need to)          3. Redraw the number that was underneath the tape above the grids, as shown below. Try to print clearly.          4. Repeat the process for the remaining test areas.          5. Photograph the entire area then take close-up photos of any possible reactions. Examples of possible reactions are spots that look like these:          6. Return to clinic for evaluation as instructed. You should continue to " Food and/or Nutrient Delivery: Continue current diet,Continue oral nutrition supplement  Nutrition Education/Counseling: No recommendations at this time  Coordination of Nutrition Care: Continue to monitor while inpatient,Interdisciplinary rounds       Goals:  Previous Goal Met: Progress towards goal(s) declining  Goals: PO intake 50% or greater,by next RD assessment,within 2 days (in 2-4 days)       Nutrition Monitoring and Evaluation:   Behavioral-Environmental Outcomes: None identified  Food/Nutrient Intake Outcomes: Food and nutrient intake,Supplement intake  Physical Signs/Symptoms Outcomes: Biochemical data,Nutrition focused physical findings,Skin,Weight,Fluid status or edema,Hemodynamic status    Discharge Planning:    Continue oral nutrition supplement,Continue current diet    Shantelle Dixon RD, CNSC  Contact: ext [phone] avoid getting the area wet (no showering or strenuous exercise), exposing the area to UV light, using topical steroids, or scratching.         Who should I call with questions?    [facility], [place]: [phone]    For urgent needs outside of business hours call the [facility] at [phone] and ask for the dermatology resident on call      If you develop any serious or adverse allergic reaction after office hours please seek immediate medical assistance at the nearest clinic or emergency room

## 2022-06-03 NOTE — PROGRESS NOTES
Pharmacy Antimicrobial Kinetic Dosing    Indication for Antimicrobials: Bacteremia    Current Regimen of Each Antimicrobial:  Vancomycin  IV adjust per levels HD dosing (Restart 6/1;  Day #3)  Cefepime 1gm IV q24h  (Start 6/1;  Day #3)      Goal Level: VANCOMYCIN TROUGH GOAL RANGE    Vancomycin Trough Other:  ~ 15mcg/ml    Date Dose & Interval Measured (mcg/mL)   6/3 AM Vanc 2000mg IV load x1  on 6/1 PM 27.5                 Impression/Plan:   Vancomycin random level high at 27.5 mcg/ml  Hold dose after HD today and check level again before dialysis Monday  Previous UTI with Klebsiella, now Bacteremic with PCT 52 on 6/1  Blood cultures Seratia Marcescens  Cefepime 1gm Iv q24h   HD dosing  Random level ordered for Monday AM  Antimicrobial stop date   -pending     Pharmacy will follow daily and adjust medications as appropriate for renal function and/or serum levels.     Thank you,  Lorraine Tong, [facility]

## 2022-06-03 NOTE — PROGRESS NOTES
S/p NAI performed in sinus rhythm preliminarily revealing biatrial enlargement, moderate MR with very small mobile abnormality on the atrial side of the mitral valve, cannot exclude what might be a small vegetation. Large left atrial appendage with low exit velocity and without thrombus. Mild TR with evidence of moderate pulmonary hypertension. Small pericardial effusion. I'll review the images again, final report to follow.

## 2022-06-03 NOTE — PROGRESS NOTES
End of Shift Note    Bedside shift change report given to Florentino Mijares RN (oncoming nurse) by Sumeet Salazar RN (offgoing nurse). Report included the following information SBAR, Kardex, Procedure Summary, Intake/Output, MAR and Accordion    Shift worked:  7a to 7p     Shift summary and any significant changes:     S/P NAI today. Remains on telemetry in NSR to SB. S/P Evan catheter placement for dialysis, currently having Dialysis in room. Ramos D/C'd at 1655 today. Concerns for physician to address:       Zone phone for oncoming shift:   [phone]       Activity:  Activity Level: Bed Rest  Number times ambulated in hallways past shift: 0  Number of times OOB to chair past shift: 0    Cardiac:   Cardiac Monitoring: Yes      Cardiac Rhythm: Sinus Rhythm  /Sinus bradycardia    Access:   Current line(s): PIV     Genitourinary:   Urinary status: incontinent and ramos    Respiratory:   O2 Device: None (Room air)  Chronic home O2 use?: NO  Incentive spirometer at bedside: NO       GI:  Last Bowel Movement Date:  (FMS)  Current diet:  ADULT ORAL NUTRITION SUPPLEMENT Breakfast, Lunch, Dinner; Diabetic Supplement  ADULT DIET Easy to Chew  Passing flatus: YES  Tolerating current diet: YES       Pain Management:   Patient states pain is manageable on current regimen: YES    Skin:  Markos Score: 11  Interventions: turn team, speciality bed, float heels, foam dressing and PT/OT consult    Patient Safety:  Fall Score:  Total Score: 2  Interventions: stay with me (per policy)  High Fall Risk: Yes    Length of Stay:  Expected LOS: 4d 19h  Actual LOS: 261 Shekhar Becker RN

## 2022-06-04 NOTE — PROCEDURES
Hemodialysis / [phone]    Vitals Pre Post Assessment Pre Post   BP BP: (!) 142/55 (06/03/22 2005) 129/48 LOC Drowsy, responds to voice, A&O x 3 Drowsy, responds to voice, A&O x 3   HR Pulse (Heart Rate): 63 (06/03/22 2005) 63 Lungs Diminished Diminished   Resp Resp Rate: 16 (06/03/22 1534) 16 Cardiac HR 63 HR 63   Temp Temp: 97.3 °F (36.3 °C) (06/03/22 1957) 98.0 Skin Fragile Fragile   Weight  Not obtained Not obtained Edema +2-+3 pitting BLE +2-+3 pitting BLE   Tele status No tele No tele Pain Pain Intensity 1: 0 (06/03/22 1534) No c/o pain     Orders   Duration: Start: 2004 End: 2355 Total: 3h45m   Dialyzer: Dialyzer/Set Up Inspection: Wagner Carrion (06/03/22 2004)   K Bath: Dialysate K (mEq/L): 3 (06/03/22 2004)   Ca Bath: Dialysate CA (mEq/L): 2.5 (06/03/22 2004)   Na: Dialysate NA (mEq/L): 140 (06/03/22 2004)   Bicarb: Dialysate HCO3 (mEq/L): 40 (06/03/22 2004)   Target Fluid Removal: Goal/Amount of Fluid to Remove (mL): 2000 mL (06/03/22 2004)     Access   Type & Location: RIJ CVC   Comments:           Dressing CDI. Skin appropriate for patient's ethnicity. Limbs cleansed per P&P.  5cc blood aspirated swiftly from each limb. Both limbs flushed with 10cc NS, no resistance noted.                                Labs   HBsAg (Antigen) / date:              Negative         5/24/22                        HBsAb (Antibody) / date:              Susceptible    5/24/22   Source:               Epic   Obtained/Reviewed  Critical Results Called HGB   Date Value Ref Range Status   06/03/2022 10.1 (L) 11.5 - 16.0 g/dL Final     Potassium   Date Value Ref Range Status   06/02/2022 3.3 (L) 3.5 - 5.1 mmol/L Final     Calcium   Date Value Ref Range Status   06/02/2022 7.5 (L) 8.5 - 10.1 MG/DL Final     BUN   Date Value Ref Range Status   06/02/2022 82 (H) 6 - 20 MG/DL Final     Creatinine   Date Value Ref Range Status   06/02/2022 7.45 (H) 0.55 - 1.02 MG/DL Final        Meds Given   Name Dose Route   Heparin 2500units Catheter dwell               Adequacy / Fluid    Total Liters Process: 81.6   Net Fluid Removed: 2000      Comments   Time Out Done:   (Time) 1700   Admitting Diagnosis: GLORIA r/t ATN   Consent obtained/signed: Informed Consent Verified: Yes (06/03/22 2004)   Machine / Linzie Pick # Machine Number: [id number] (06/03/22 2004)   Primary Nurse Rpt Pre: Sherrill Sherwood RN   Primary Nurse Rpt Post: Sherrill Sherwood RN   Pt Education: Infection prevention   Care Plan: Continue HD plan of care   Pts outpatient clinic: None     Tx Summary   Comments:                        2004 - HD initiated without incident. 2355 - Patient tolerated HD without incident, UF goal achieved. All possible blood returned to patient. Catheter limbs cleansed per P&P. Limbs flushed with 10cc NS, followed by ordered heparin dwell. Limbs clamped, secured with new clean caps. Report to LEX Herndon RN.

## 2022-06-04 NOTE — PROGRESS NOTES
Progress Note      6/4/2022  NAME: Danial Mcghee   MRN:  [id number]   Admit Diagnosis: DKA (diabetic ketoacidosis) ([facility] [place] [facility] [E11.10]  PCP:  Azael Colorado MD     Assessment: 1. Profound sinus bradycardia 20-30's in the setting of DKA, hyperkalemia, and OP beta-blocker. This has resolved. 2. Paroxysmal atrial fibrillation with post-conversion pauses up to 6 seconds on beta-blocker. Now off beta-blocker. 3. Paroxysmal atrial flutter by tele. She had runs of atrial tachycardia and then followed by probably sustained Afib which organized into a right atrial flutter (by tele morphology) before terminating to sinus. 4. 2nd degree AV block with single dropped PAC's, RBBB with normal [place] in sinus rhythm. 5. Hypotension likely due to dehydration/volume depletion, metabolic acidosis, bradycardia. Improved with volume resuscitation, pressor. Now off pressor, but evidence of bacteremia with Serratia (see ID note for details). 6. No evidence of acute ischemia or infarct by presenting ECG. Echo with normal LV and RV systolic function. 7. GLORIA atop CKD stage 3 at baseline. Rhabdomyolysis. On dialysis. 8. Diabetes mellitus type 2 on chronic insulin complicated by DKA. 9. Elevated lipase possibly due to DKA rather than acute pancreatitis. 10. Metabolic encephalopathy, resolved. 11. Anemia. 12. Thrombocytopenia. 13. Full code.     Plan:      1. She's on DVT prophylaxis at this time. I'd avoid FULL anticoagulation if possible given her anemia and thrombocytopenia as long as Afib and flutter episodes are brief, self-limited. 2. Still having short episodes of afib, cont po amiodarone  3. Her dialysis catheter was removed due to bacteremia. 4. ID consult noted. Due to persistent bacteremia, a NAI was done. There may be a small vegetation on the mitral valve. Subjective:     No CP, dizziness, palpitations. Objective:      Physical Exam:    Last 24hrs VS reviewed since prior progress note.  Most recent are:    Visit Vitals  BP (!) 144/57 (BP 1 Location: Right upper arm, BP Patient Position: At rest) Comment: nurse notified   Pulse 72   Temp 97.4 °F (36.3 °C)   Resp 18   Ht 5' 6\" (1.676 m)   Wt 82.6 kg (182 lb 1.6 oz)   SpO2 100%   BMI 29.39 kg/m²       Intake/Output Summary (Last 24 hours) at 6/4/2022 0935  Last data filed at 6/3/2022 2355  Gross per 24 hour   Intake 575 ml   Output 2360 ml   Net -1785 ml           General: Tired and awake, no distress  Neck: Supple,   Respiratory: No respiratory distress, clear anteriorly   Cardiovascular: Regular rate rhythm, S1S2   Abdomen: soft, non tender   Neuro: moves all extremities   Skin: warm and dry   Extremity:  warm to touch      Data Review    Telemetry:  normal sinus rhythm         Lab Data Personally Reviewed:    Recent Labs     06/03/22 1958 06/02/22 0118   WBC 10.5 18.3*   HGB 10.1* 9.5*   HCT 30.0* 28.4*    164     No results for input(s): INR, PTP, APTT, INREXT, INREXT in the last 72 hours. Recent Labs     06/03/22 1958 06/02/22 0118    139   K 3.3* 3.3*    104   CO2 22 25   BUN 96* 82*   CREA 8.87* 7.45*   * 248*   CA 7.3* 7.5*     No results for input(s): CPK, CKNDX, TROIQ in the last 72 hours. No lab exists for component: CPKMB  Lab Results   Component Value Date/Time    Triglyceride 95 05/26/2022 08:30 AM       Recent Labs     06/03/22 1958   ALB 1.7*     No results for input(s): PH, PCO2, PO2 in the last 72 hours.     Medications Personally Reviewed:    Current Facility-Administered Medications   Medication Dose Route Frequency    epoetin betina-epbx (RETACRIT) 12,000 Units combo injection  12,000 Units SubCUTAneous Q MON, WED & FRI    hydrocortisone Sod Succ (PF) (SOLU-CORTEF) injection 50 mg  50 mg IntraVENous DAILY    amiodarone (CORDARONE) tablet 400 mg  400 mg Oral DAILY    insulin NPH (NOVOLIN N, HUMULIN N) injection 16 Units  16 Units SubCUTAneous BID    cefepime (MAXIPIME) 1 g in 0.9% sodium chloride (MBP/ADV) 50 mL MBP  1 g IntraVENous Q24H    heparin (porcine) 1,000 unit/mL injection 1,400 Units  1,400 Units InterCATHeter DIALYSIS PRN    And    heparin (porcine) 1,000 unit/mL injection 1,100 Units  1,100 Units InterCATHeter DIALYSIS PRN    hydrALAZINE (APRESOLINE) 20 mg/mL injection 20 mg  20 mg IntraVENous Q6H PRN    albumin human 25% (BUMINATE) solution 25 g  25 g IntraVENous DIALYSIS PRN    metoprolol (LOPRESSOR) injection 2.5 mg  2.5 mg IntraVENous Q6H PRN    insulin lispro (HUMALOG) injection   SubCUTAneous AC&HS    glucose chewable tablet 16 g  4 Tablet Oral PRN    glucagon (GLUCAGEN) injection 1 mg  1 mg IntraMUSCular PRN    dextrose 10 % infusion 0-250 mL  0-250 mL IntraVENous PRN    sodium chloride (NS) flush 5-40 mL  5-40 mL IntraVENous Q8H    sodium chloride (NS) flush 5-40 mL  5-40 mL IntraVENous PRN    acetaminophen (TYLENOL) tablet 650 mg  650 mg Oral Q6H PRN    Or    acetaminophen (TYLENOL) suppository 650 mg  650 mg Rectal Q6H PRN    polyethylene glycol (MIRALAX) packet 17 g  17 g Oral DAILY PRN    ondansetron (ZOFRAN ODT) tablet 4 mg  4 mg Oral Q8H PRN    Or    ondansetron (ZOFRAN) injection 4 mg  4 mg IntraVENous Q6H PRN    [Held by provider] heparin (porcine) injection 5,000 Units  5,000 Units SubCUTAneous Q8H    alcohol 62% (NOZIN) nasal  1 Ampule  1 Ampule Topical Q12H    balsam peru-castor oiL (VENELEX) ointment   Topical BID    famotidine (PF) (PEPCID) 20 mg in 0.9% sodium chloride 10 mL injection  20 mg IntraVENous Q24H              Jason Kerns MD

## 2022-06-04 NOTE — PROGRESS NOTES
End of Shift Note    Bedside shift change report given to Jonnathan Feng RN (oncoming nurse) by Ezequiel Gallegos (offgoing nurse). Report included the following information SBAR, Kardex, Procedure Summary, Intake/Output, MAR and Accordion    Shift worked:  7a to 7p     Shift summary and any significant changes: Tolerated medication   Tolerated diet   Drank more then consumed \"not in the mood\" still kept educating on the necessity to eat  Evan catheter placement for dialysis,     Room air   Patient turned with turn team and peers on unit    Flushed all drains   Changed patient 3 times when fecal system is kinked it backs up to flow out of the rectum   Flushed the fecl system BID today      Concerns for physician to address: None    Zone phone for oncoming shift:  None      Activity:  Activity Level: Bed Rest  Number times ambulated in hallways past shift: 0  Number of times OOB to chair past shift: 0    Cardiac:   Cardiac Monitoring: Yes      Cardiac Rhythm: Sinus Rhythm  /Sinus bradycardia    Access:   Current line(s): PIV     Genitourinary:   Urinary status: incontinent and ramos    Respiratory:   O2 Device: None (Room air)  Chronic home O2 use?: NO  Incentive spirometer at bedside: NO       GI:  Last Bowel Movement Date:  (FMS)  Current diet:  ADULT ORAL NUTRITION SUPPLEMENT Breakfast, Lunch, Dinner; Diabetic Supplement  ADULT DIET Easy to Chew  Passing flatus: YES  Tolerating current diet: YES       Pain Management:   Patient states pain is manageable on current regimen: YES    Skin:  Markos Score: 11  Interventions: turn team, speciality bed, float heels, foam dressing and PT/OT consult    Patient Safety:  Fall Score:  Total Score: 3  Interventions: stay with me (per policy)  High Fall Risk: Yes    Length of Stay:  Expected LOS: 4d 19h  Actual LOS:   [address]

## 2022-06-04 NOTE — PROGRESS NOTES
Hospitalist Progress Note    NAME: Radha Walton   :  1934   MRN:  [id number]     Admit date: 2022    Today's date: 22    PCP: Stephanie Kaye MD    Anticipated discharge date: 2022    Barriers:  recovering from infection, still with bacteremia    ? needs long-term HD    Assessment / Plan:    Septic shock POA initial BP 46/34, temp 86.5, RR 42, GLORIA, lactate 4.26, pressor requirements  Klebsiella Bacteremia with UTI  POA  Serratia marcescens bacteremia  and 2022 POA  Persistent GNR bacteremia POA  ? Infectious endocarditis  - Admit UA 5-10 WBC, 1+ bacteria  - Admit BC with klebsiella and Serratia   Sensitive to zosyn, ceftriaxone  - Admit urine culture + for klebsiella  - Repeat BC  with recurrent sepsis + for Serratia  - CT abdomen/pelvis without IV contrast  with no hydronephrosis   No intraabdominal pathology  - Chest CT with bilateral effusions, no ASD  - With elevated lipase and abnormal LFTs, ? Biliary source   GGT 28, never had elevated Alk Phos and T bili   US with no gallstones, contracted GB, CBD 0.4 cm              Biliary source seems less likely  - IV pressors at admit, weaned off   Started on HD for GLORIA from [facility]  -Started on stress steroid, being weaned down  - Transferred to floor 2022  - S/p zosyn --> transition to ceftriaxone   - Repeat Blood culture  with GNR  - Repeat blood culture  positive for gram-negative rods  - Not sure why she is persistent bacteremic with GNR despite appropriate antibiotics    TTE LVEF 55 to 60%, trace MR and AR  - Repeat CT abdomen given the tenderness, persistent bacteremia  -ID consulted, recommended to remove central line and the Evan repeat blood cultures. Discussed with ID and nephrology  . CT abdomen pelvis with contrast did not show any acute pathology    Central line and Evan removed and plan for NAI by cardiology  .   Blood cultures from 602 negative to date  Status post NAI on 06/03, prelim report showed:  S/p NAI performed in sinus rhythm preliminarily revealing biatrial enlargement, moderate MR with very small mobile abnormality on the atrial side of the mitral valve, cannot exclude what might be a small vegetation. Large left atrial appendage with low exit velocity and without thrombus. Mild TR with evidence of moderate pulmonary hypertension. Small pericardial effusion. Continue with IV cefepime      A. fib with RVR  Patient has had episode of bradycardia with pauses during the stay due to combination of DKA, hyperkalemia  Beta-blocker was stopped. Poor candidate for  Anticoagulation   Status post amnio drip, switch to p.o. amnio    Acute kidney injury POA BUN/creat 114 and 5.97  Rhabdomyolysis CPK peak 22,325   -Last baseline creat 1.28 at [facility] on 3/9/2021  -Started on CRRT till 5/26, now on HD  -No hydronephrosis  -Likely ATN with hypotension, sepsis, rhabdomyolysis  - Oligouric  -Serial labs  -Await renal recovery, likely will need outpatient HD  Likely can DC Gee    DKA POA BS 1259, HgBa1c > 14.0, AG > 20, pH 6.99  - Not sure if DKA present, acidosis may have been due to sepsis, GLORIA   Urine with trace ketones, but B-hydroxybutyrate levels in blood elevated  - Likely patient was not taking insulin at home as her A1c is >14  - s/p Insulin gtt --> currently on NPH 16 units twice daily          Acute pancreatitis POA  - admit Non contrast CT is neg for changes of acute pancreatitis. - Lipase > 3000 -->  1107 -->  2499 --> 1602  - + abdominal tenderness  - clears  - No Gallstones or ductal dilatation, no ETOH  - ? Ischemic   - lipase tredning down  -Repeat CT abdomen/pelvis showed no acute abnormality  - serial labs     Acute metabolic encephalopathy POA resolved  - Multifactorial with septic shock, low BP, GLORIA, elevated BS > 1200  - Head CT with atrophy, no acute events  -Improving.  Closely monitor mental status.  -manage metabolic issues as above.     Symptomatic bradycardia.  -Likely precipitated by severe acidemia, also on BB at home.   -Again had long pauses on 5/25, EP following. Overweight POA Body mass index is 29.39 kg/m². DVT prophylaxis. [address] SUP. Pepcid. Code status. Full code. Subjective:     Chief Complaint / Reason for Physician Visit  Follow-up bacteremia  No acute issues    Review of Systems:  Symptom Y/N Comments  Symptom Y/N Comments   Fever/Chills n   Chest Pain n    Poor Appetite    Edema     Cough n   Abdominal Pain n    Sputum    Joint Pain     SOB/BEE n   Headache     Nausea/vomit n   Tolerating PT/OT     Diarrhea n   Tolerating Diet y    Constipation    Other       Could NOT obtain due to:      Objective:     VITALS:   Last 24hrs VS reviewed since prior progress note.  Most recent are:  Patient Vitals for the past 24 hrs:   Temp Pulse Resp BP SpO2   06/04/22 0737 97.4 °F (36.3 °C) 72 18 (!) 144/57 100 %   06/04/22 0337 97.6 °F (36.4 °C) 77 20 137/89 99 %   06/03/22 2356 98 °F (36.7 °C) 63 16 (!) 129/48 --   06/03/22 2353 -- 62 -- (!) 136/49 --   06/03/22 2336 -- 64 -- 130/60 --   06/03/22 2320 -- 65 -- 118/71 --   06/03/22 2318 97.8 °F (36.6 °C) -- 13 -- 98 %   06/03/22 2305 -- 65 -- 138/68 --   06/03/22 2250 -- 94 -- 133/64 --   06/03/22 2235 -- 64 -- 138/62 --   06/03/22 2220 -- 64 -- (!) 128/59 --   06/03/22 2206 -- 68 -- (!) 126/59 --   06/03/22 2150 -- 64 -- (!) 145/57 --   06/03/22 2135 -- 62 -- (!) 145/62 --   06/03/22 2120 -- 62 -- (!) 142/94 --   06/03/22 2106 -- 63 -- (!) 130/57 --   06/03/22 2051 -- 62 -- 139/61 --   06/03/22 2035 -- 63 -- (!) 141/68 --   06/03/22 2020 -- 71 -- 138/80 --   06/03/22 2005 -- 63 -- (!) 142/55 --   06/03/22 1957 97.3 °F (36.3 °C) 65 -- 136/71 --   06/03/22 1534 97.2 °F (36.2 °C) 65 16 (!) 158/71 97 %   06/03/22 1009 97.4 °F (36.3 °C) 63 16 (!) 149/67 97 %   06/03/22 0945 -- 60 11 (!) 137/47 98 %   06/03/22 0940 -- 63 12 (!) 146/48 100 %   06/03/22 0935 -- 61 12 (!) 143/52 99 %   06/03/22 0930 -- 61 12 (!) 136/52 98 %   06/03/22 0925 -- (!) 59 13 (!) 152/49 98 %   06/03/22 0920 -- 60 12 (!) 143/43 99 %       Intake/Output Summary (Last 24 hours) at 6/4/2022 0915  Last data filed at 6/3/2022 2355  Gross per 24 hour   Intake 575 ml   Output 2360 ml   Net -1785 ml        Wt Readings from Last 12 Encounters:   05/26/22 82.6 kg (182 lb 1.6 oz)       PHYSICAL EXAM:    I had a face to face encounter and independently examined this patient on 06/04/22 as outlined below:    General: WD, WN. Alert, cooperative, no acute distress    EENT:  PERRL. Anicteric sclerae. MMM  Resp:  CTA bilaterally, no wheezing or rales. No accessory muscle use  CV:  Regular  rhythm,  No edema  GI:  Soft, Non distended, Tender RUQ and epigastric region. +Bowel sounds, no rebound  Neurologic:  Alert, normal speech, non focal motor exam  Psych:   Not anxious nor agitated  Skin:  No rashes. No jaundice      Reviewed most current lab test results and cultures  YES  Reviewed most current radiology test results   YES  Review and summation of old records today    NO  Reviewed patient's current orders and MAR    YES  PMH/SH reviewed - no change compared to H&P  ________________________________________________________________________  Care Plan discussed with:    Comments   Patient x    Family      RN x    Care Manager     Consultant                        Multidiciplinary team rounds were held today with , nursing, pharmacist and clinical coordinator. Patient's plan of care was discussed; medications were reviewed and discharge planning was addressed.      ________________________________________________________________________      Comments   >50% of visit spent in counseling and coordination of care     ________________________________________________________________________  Gaetano Martines MD     Procedures: see electronic medical records for all procedures/Xrays and details which were not copied into this note but were reviewed prior to creation of Plan. LABS:  I reviewed today's most current labs and imaging studies.   Pertinent labs include:  Recent Labs     06/03/22 1958 06/02/22 0118   WBC 10.5 18.3*   HGB 10.1* 9.5*   HCT 30.0* 28.4*    164     Recent Labs     06/03/22 1958 06/02/22 0118    139   K 3.3* 3.3*    104   CO2 22 25   * 248*   BUN 96* 82*   CREA 8.87* 7.45*   CA 7.3* 7.5*   PHOS 6.1*  --    ALB 1.7*  --

## 2022-06-04 NOTE — PROGRESS NOTES
Problem: Dysphagia (Adult)  Goal: *Acute Goals and Plan of Care (Insert Text)  Description: 5/25/2022, Re-evaluation 6/3/22  Speech path goals  1. Patient will tolerate full liquids with no overt s/s of aspiration. MET  2. Patient will tolerate diet upgrade with no overt s/s of aspiration. Upgraded to Puree/Thin Liquid 5/26/2022  MET  3. New goal 6/3/22  Patient will tolerate easy to chew diet, thin liquids without overt s/s aspiration within 7 days. Outcome: Progressing Towards Goal   SPEECH LANGUAGE PATHOLOGY DYSPHAGIA TREATMENT  Patient: Michelle Davis (80 y.o. female)  Date: 6/4/2022  Diagnosis: DKA (diabetic ketoacidosis) ([facility] [address] [E11.10] <principal problem not specified>       Precautions: aspiration      ASSESSMENT:  Patient requesting diet downgrade b/c meats on easy to chew diet were too hard. Swallow otherwise WNL     PLAN:  Recommendations and Planned Interventions:  Downgrade diet to soft and bite sized, thins. Upright for PO. Patient continues to benefit from skilled intervention to address the above impairments. Continue treatment per established plan of care. Discharge Recommendations:  None     SUBJECTIVE:   Patient stated I didn't like the food\" \"I don't have many teeth\"    OBJECTIVE:   Cognitive and Communication Status:  Neurologic State: Alert  Orientation Level: Oriented to person,Oriented to place  Cognition: Follows commands  Perception: Appears intact  Perseveration: No perseveration noted  Safety/Judgement: Fall prevention  Dysphagia Treatment:  Oral Assessment:  Oral Assessment  Dentition:  (she has 4 bottom teeth and top gums show evidence of broken off teeth)  P.O. Trials:  Patient Position: upright in bed  Vocal quality prior to P.O.: No impairment  Consistency Presented: Mechanical soft; Thin liquid  How Presented: SLP-fed/presented; Successive swallows;Straw;Spoon   ORAL PHASE:   Bolus Acceptance: No impairment  Bolus Formation/Control: Impaired (c/o reduced chew and that many meats are too hard to eat on her current diet. WIll try downgrade to S&BS diet)  Type of Impairment: Mastication (extended)  Propulsion: No impairment  Oral Residue: None  PHARYNGEAL PHASE:   Initiation of Swallow: No impairment  Laryngeal Elevation: Functional  Aspiration Signs/Symptoms: None      RN reports no issues taking pills this am or breakfast.   She only ate 20% of bfast.                 Exercises:  Laryngeal Exercises:                                                                                                                                   Pain:  Pain Scale 1: Numeric (0 - 10)  Pain Intensity 1: 0       After treatment:   Patient left in no apparent distress in bed and Nursing notified    COMMUNICATION/EDUCATION:   Patient was educated regarding her deficit(s) of reduced chew as this relates to her diagnosis of . She demonstrated Good understanding as evidenced by discussion. .    The patient's plan of care including recommendations, planned interventions, and recommended diet changes were discussed with: Registered nurse.      Carlton Callejas, SLP  Time Calculation: 10 mins

## 2022-06-05 NOTE — PROGRESS NOTES
Hospitalist Progress Note    NAME: Keri Flannery   :  1934   MRN:  [id number]     Admit date: 2022    Today's date: 22    PCP: Gladis Elisa MD    Anticipated discharge date: 2022    Barriers:  recovering from infection, still with bacteremia    ? needs long-term HD    Assessment / Plan:    Acute metabolic encephalopathy   Hypoglycemia  Lethargic, BS 58   Given orange juice , glucagon   Started on d5 at 5ccc/h   Pt not eating much    Septic shock POA initial BP 46/34, temp 86.5, RR 42, GLORIA, lactate 4.26, pressor requirements  Klebsiella Bacteremia with UTI  POA  Serratia marcescens bacteremia  and 2022 POA  Persistent GNR bacteremia POA  ? Infectious endocarditis  - Admit UA 5-10 WBC, 1+ bacteria  - Admit BC with klebsiella and Serratia   Sensitive to zosyn, ceftriaxone  - Admit urine culture + for klebsiella  - Repeat BC  with recurrent sepsis + for Serratia  - CT abdomen/pelvis without IV contrast  with no hydronephrosis   No intraabdominal pathology  - Chest CT with bilateral effusions, no ASD  - With elevated lipase and abnormal LFTs, ? Biliary source   GGT 28, never had elevated Alk Phos and T bili   US with no gallstones, contracted GB, CBD 0.4 cm              Biliary source seems less likely  - IV pressors at admit, weaned off   Started on HD for GLORIA from [facility]  -Started on stress steroid, being weaned down  - Transferred to floor 2022  - S/p zosyn --> transition to ceftriaxone   - Repeat Blood culture  with GNR  - Repeat blood culture  positive for gram-negative rods  - Not sure why she is persistent bacteremic with GNR despite appropriate antibiotics    TTE LVEF 55 to 60%, trace MR and AR  - Repeat CT abdomen given the tenderness, persistent bacteremia  -ID consulted, recommended to remove central line and the Evan repeat blood cultures. Discussed with ID and nephrology  .   CT abdomen pelvis with contrast did not show any acute pathology    Central line and Evan removed and plan for NAI by cardiology  . Blood cultures from 0602 negative to date  Status post NAI on 06/03, prelim report showed:  S/p NAI performed in sinus rhythm preliminarily revealing biatrial enlargement, moderate MR with very small mobile abnormality on the atrial side of the mitral valve, cannot exclude what might be a small vegetation. Large left atrial appendage with low exit velocity and without thrombus. Mild TR with evidence of moderate pulmonary hypertension. Small pericardial effusion. Continue with IV cefepime  Repeat blood cultures so far has been negative since 06/02    A. fib with RVR  Patient has had episode of bradycardia with pauses during the stay due to combination of DKA, hyperkalemia  Beta-blocker was stopped. Poor candidate for  Anticoagulation   Status post amnio drip, switch to p.o. amnio    Acute kidney injury POA BUN/creat 114 and 5.97  Rhabdomyolysis CPK peak 22,325   -Last baseline [facility] 1.28 at [facility] on 3/9/2021  -Started on CRRT till 5/26, now on HD  -No hydronephrosis  -Likely ATN with hypotension, sepsis, rhabdomyolysis  - Oligouric  -Serial labs  -Await renal recovery, likely will need outpatient HD  Likely can DC [facility]    DKA POA BS 1259, HgBa1c > 14.0, AG > 20, pH 6.99  - Not sure if DKA present, acidosis may have been due to sepsis, GLORIA   Urine with trace ketones, but B-hydroxybutyrate levels in blood elevated  - Likely patient was not taking insulin at home as her A1c is >14  - s/p Insulin gtt --> currently on NPH 16 units twice daily, will hold it due to hypoglycemia          Acute pancreatitis POA  - admit Non contrast CT is neg for changes of acute pancreatitis. - Lipase > 3000 -->  1107 -->  2499 --> 1602  - + abdominal tenderness  - clears  - No Gallstones or ductal dilatation, no ETOH  - ?  Ischemic   - lipase tredning down  -Repeat CT abdomen/pelvis showed no acute abnormality  - serial labs     Acute metabolic encephalopathy POA resolved  - Multifactorial with septic shock, low BP, GLORIA, elevated BS > 1200  - Head CT with atrophy, no acute events  -Improving. Closely monitor mental status.  -manage metabolic issues as above.     Symptomatic bradycardia.  -Likely precipitated by severe acidemia, also on BB at home.   -Again had long pauses on 5/25, EP following. Overweight POA Body mass index is 29.39 kg/m². DVT prophylaxis. [address]. SUP. Pepcid. Code status. Full code. Subjective:     Chief Complaint / Reason for Physician Visit  Follow-up bacteremia  Patient was lethargic this morning blood sugar 58  Has been refusing to eat  Review of systems unable to obtain as patient was lethargic this morning  Review of Systems:  Symptom Y/N Comments  Symptom Y/N Comments   Fever/Chills n   Chest Pain     Poor Appetite y   Edema     Cough    Abdominal Pain     Sputum    Joint Pain     SOB/BEE    Headache     Nausea/vomit    Tolerating PT/OT     Diarrhea n   Tolerating Diet     Constipation    Other       Could NOT obtain due to:      Objective:     VITALS:   Last 24hrs VS reviewed since prior progress note. Most recent are:  Patient Vitals for the past 24 hrs:   Temp Pulse Resp BP SpO2   06/05/22 0400 98 °F (36.7 °C) 70 18 137/60 98 %   06/04/22 2033 97.5 °F (36.4 °C) 77 18 139/63 100 %   06/04/22 1545 98.4 °F (36.9 °C) 72 18 (!) 155/55 97 %   06/04/22 1136 -- -- -- (!) 155/77 --   06/04/22 1109 98.3 °F (36.8 °C) 73 18 (!) 175/79 92 %       Intake/Output Summary (Last 24 hours) at 6/5/2022 0844  Last data filed at 6/5/2022 0400  Gross per 24 hour   Intake 346 ml   Output 700 ml   Net -354 ml        Wt Readings from Last 12 Encounters:   05/26/22 82.6 kg (182 lb 1.6 oz)       PHYSICAL EXAM:    I had a face to face encounter and independently examined this patient on 06/05/22 as outlined below:    General: WD, WN. Lethargic cooperative, no acute distress    EENT:  PERRL. Anicteric sclerae.  MMM  Resp:  CTA bilaterally, no wheezing or rales. No accessory muscle use  CV:  Regular  rhythm,  No edema  GI:  Soft, Non distended, Tender RUQ and epigastric region. +Bowel sounds, no rebound  Neurologic:  Lethargic this morning normal speech, non focal motor exam  Psych:   Not anxious nor agitated  Skin:  No rashes. No jaundice      Reviewed most current lab test results and cultures  YES  Reviewed most current radiology test results   YES  Review and summation of old records today    NO  Reviewed patient's current orders and MAR    YES  PMH/SH reviewed - no change compared to H&P  ________________________________________________________________________  Care Plan discussed with:    Comments   Patient x    Family      RN x    Care Manager     Consultant                        Multidiciplinary team rounds were held today with , nursing, pharmacist and clinical coordinator. Patient's plan of care was discussed; medications were reviewed and discharge planning was addressed. ________________________________________________________________________      Comments   >50% of visit spent in counseling and coordination of care     ________________________________________________________________________  Aundrea Mayer MD     Procedures: see electronic medical records for all procedures/Xrays and details which were not copied into this note but were reviewed prior to creation of Plan. LABS:  I reviewed today's most current labs and imaging studies.   Pertinent labs include:  Recent Labs     06/05/22  0138 06/04/22  1015 06/03/22  1958   WBC 12.6* 12.5* 10.5   HGB 9.1* 9.6* 10.1*   HCT 26.9* 27.4* 30.0*    180 174     Recent Labs     06/05/22  0138 06/04/22  1015 06/03/22 1958    138 136   K 3.2* 3.2* 3.3*    102 102   CO2 31 29 22   GLU 78 123* 151*   BUN 48* 44* 96*   CREA 5.69* 4.94* 8.87*   CA 7.2* 7.3* 7.3*   PHOS  --   --  6.1*   ALB  --   --  1.7*

## 2022-06-05 NOTE — PROGRESS NOTES
End of Shift Note    Bedside shift change report given to Elkin Clark RN (oncoming nurse) by Ailyn Marie RN (offgoing nurse). Report included the following information SBAR, Kardex, ED Summary, Intake/Output, MAR and Recent Results    Shift worked:  2620-6709     Shift summary and any significant changes:     Patient had an eventful night. A-fib controlled. Concerns for physician to address:         Zone phone for oncoming shift:          Activity:  Activity Level: Bed Rest  Number times ambulated in hallways past shift: 0  Number of times OOB to chair past shift: 0    Cardiac:   Cardiac Monitoring: Yes      Cardiac Rhythm: Atrial Fib    Access:   Current line(s): PIV     Genitourinary:   Urinary status: anuric    Respiratory:   O2 Device: None (Room air)  Chronic home O2 use?: NO  Incentive spirometer at bedside: YES       GI:  Last Bowel Movement Date:  (FMS)  Current diet:  ADULT ORAL NUTRITION SUPPLEMENT Breakfast, Lunch, Dinner; Diabetic Supplement  ADULT DIET Easy to Chew  Passing flatus: YES  Tolerating current diet: YES       Pain Management:   Patient states pain is manageable on current regimen: YES    Skin:  Markos Score: 11  Interventions: turn team, speciality bed, float heels, increase time out of bed, foam dressing, limit briefs, internal/external urinary devices and nutritional support     Patient Safety:  Fall Score:  Total Score: 3  Interventions: assistive device (walker, cane, etc) and stay with me (per policy)  High Fall Risk: Yes    Length of Stay:  Expected LOS: 4d 19h  Actual LOS: [address], RN

## 2022-06-05 NOTE — PROGRESS NOTES
Progress Note      6/5/2022  NAME: Fermín Veloz   MRN:  [id number]   Admit Diagnosis: DKA (diabetic ketoacidosis) ([facility] [E11.10]  PCP:  Carine Del Cid MD     Assessment: 1. Profound sinus bradycardia 20-30's in the setting of DKA, hyperkalemia, and OP beta-blocker. This has resolved. 2. Paroxysmal atrial fibrillation with post-conversion pauses up to 6 seconds on beta-blocker. Now off beta-blocker. 3. Paroxysmal atrial flutter by tele. She had runs of atrial tachycardia and then followed by probably sustained Afib which organized into a right atrial flutter (by tele morphology) before terminating to sinus. 4. 2nd degree AV block with single dropped PAC's, RBBB with normal IA in sinus rhythm. 5. Hypotension likely due to dehydration/volume depletion, metabolic acidosis, bradycardia. Improved with volume resuscitation, pressor. Now off pressor, but evidence of bacteremia with Serratia (see ID note for details). 6. No evidence of acute ischemia or infarct by presenting ECG. Echo with normal LV and RV systolic function. 7. GLORIA atop CKD stage 3 at baseline. Rhabdomyolysis. On dialysis. 8. Diabetes mellitus type 2 on chronic insulin complicated by DKA. 9. Elevated lipase possibly due to DKA rather than acute pancreatitis. 10. Metabolic encephalopathy, resolved. 11. Anemia. 12. Thrombocytopenia. 13. Full code.     Plan:      1. She's on DVT prophylaxis at this time. I'd avoid FULL anticoagulation if possible given her anemia and thrombocytopenia as long as Afib and flutter episodes are brief, self-limited. 2. Still having short episodes of afib, cont po amiodarone  3. Her dialysis catheter was removed due to bacteremia. 4. ID consult noted. Due to persistent bacteremia, a NAI was done. There may be a small vegetation on the mitral valve. Subjective:     No CP, dizziness, palpitations. Objective:      Physical Exam:    Last 24hrs VS reviewed since prior progress note.  Most recent are:    Visit Vitals  BP (!) 132/48   Pulse 70   Temp 98.7 °F (37.1 °C)   Resp 16   Ht 5' 6\" (1.676 m)   Wt 82.6 kg (182 lb 1.6 oz)   SpO2 96%   BMI 29.39 kg/m²       Intake/Output Summary (Last 24 hours) at 6/5/2022 1000  Last data filed at 6/5/2022 0400  Gross per 24 hour   Intake 346 ml   Output 700 ml   Net -354 ml           General: Tired and awake, no distress  Neck: Supple,   Respiratory: No respiratory distress, clear anteriorly   Cardiovascular: Regular rate rhythm, S1S2   Abdomen: soft, non tender   Neuro: moves all extremities   Skin: warm and dry   Extremity:  warm to touch      Data Review    Telemetry:  normal sinus rhythm         Lab Data Personally Reviewed:    Recent Labs     06/05/22 0138 06/04/22 1015   WBC 12.6* 12.5*   HGB 9.1* 9.6*   HCT 26.9* 27.4*    180     No results for input(s): INR, PTP, APTT, INREXT, INREXT in the last 72 hours. Recent Labs     06/05/22 0138 06/04/22  1015 06/03/22 1958    138 136   K 3.2* 3.2* 3.3*    102 102   CO2 31 29 22   BUN 48* 44* 96*   CREA 5.69* 4.94* 8.87*   GLU 78 123* 151*   CA 7.2* 7.3* 7.3*     No results for input(s): CPK, CKNDX, TROIQ in the last 72 hours. No lab exists for component: CPKMB  Lab Results   Component Value Date/Time    Triglyceride 95 05/26/2022 08:30 AM       Recent Labs     06/03/22 1958   ALB 1.7*     No results for input(s): PH, PCO2, PO2 in the last 72 hours.     Medications Personally Reviewed:    Current Facility-Administered Medications   Medication Dose Route Frequency    epoetin betina-epbx (RETACRIT) 12,000 Units combo injection  12,000 Units SubCUTAneous Q MON, WED & FRI    hydrocortisone Sod Succ (PF) (SOLU-CORTEF) injection 50 mg  50 mg IntraVENous DAILY    amiodarone (CORDARONE) tablet 400 mg  400 mg Oral DAILY    insulin NPH (NOVOLIN N, HUMULIN N) injection 16 Units  16 Units SubCUTAneous BID    cefepime (MAXIPIME) 1 g in 0.9% sodium chloride (MBP/ADV) 50 mL MBP  1 g IntraVENous Q24H  heparin (porcine) 1,000 unit/mL injection 1,400 Units  1,400 Units InterCATHeter DIALYSIS PRN    And    heparin (porcine) 1,000 unit/mL injection 1,100 Units  1,100 Units InterCATHeter DIALYSIS PRN    hydrALAZINE (APRESOLINE) 20 mg/mL injection 20 mg  20 mg IntraVENous Q6H PRN    albumin human 25% (BUMINATE) solution 25 g  25 g IntraVENous DIALYSIS PRN    metoprolol (LOPRESSOR) injection 2.5 mg  2.5 mg IntraVENous Q6H PRN    insulin lispro (HUMALOG) injection   SubCUTAneous AC&HS    glucose chewable tablet 16 g  4 Tablet Oral PRN    glucagon (GLUCAGEN) injection 1 mg  1 mg IntraMUSCular PRN    dextrose 10 % infusion 0-250 mL  0-250 mL IntraVENous PRN    sodium chloride (NS) flush 5-40 mL  5-40 mL IntraVENous Q8H    sodium chloride (NS) flush 5-40 mL  5-40 mL IntraVENous PRN    acetaminophen (TYLENOL) tablet 650 mg  650 mg Oral Q6H PRN    Or    acetaminophen (TYLENOL) suppository 650 mg  650 mg Rectal Q6H PRN    polyethylene glycol (MIRALAX) packet 17 g  17 g Oral DAILY PRN    ondansetron (ZOFRAN ODT) tablet 4 mg  4 mg Oral Q8H PRN    Or    ondansetron (ZOFRAN) injection 4 mg  4 mg IntraVENous Q6H PRN    [Held by provider] heparin (porcine) injection 5,000 Units  5,000 Units SubCUTAneous Q8H    alcohol 62% (NOZIN) nasal  1 Ampule  1 Ampule Topical Q12H    balsam peru-castor oiL (VENELEX) ointment   Topical BID    famotidine (PF) (PEPCID) 20 mg in 0.9% sodium chloride 10 mL injection  20 mg IntraVENous Q24H              Marline García MD

## 2022-06-05 NOTE — PROGRESS NOTES
Problem: Pressure Injury - Risk of  Goal: *Prevention of pressure injury  Description: Document Markos Scale and appropriate interventions in the flowsheet.   Outcome: Progressing Towards Goal  Note: Pressure Injury Interventions:  Sensory Interventions: Assess changes in LOC,Check visual cues for pain,Float heels    Moisture Interventions: Absorbent underpads,Internal/External fecal devices    Activity Interventions: Pressure redistribution bed/mattress(bed type)    Mobility Interventions: Float heels,Pressure redistribution bed/mattress (bed type),Suspension boots    Nutrition Interventions: Document food/fluid/supplement intake    Friction and Shear Interventions: Apply protective barrier, creams and emollients,HOB 30 degrees or less,Lift sheet

## 2022-06-05 NOTE — PROGRESS NOTES
0952 BG 58, given orange juice, drank 200 ml OJ.   1013 BG 58,  Dr. Daina Adame aware,   1030 IM Glucagon given  1055 BG 92, encouraged to drink Glucerna, pt very reluctant to take anything to eat or more to drink, IV D5W started .

## 2022-06-05 NOTE — PROGRESS NOTES
End of Shift Note    Bedside shift change report given to Viki CROWLEY JOSE Villatoro LPN (oncoming nurse) by Beverely Riedel, RN (offgoing nurse). Report included the following information SBAR, Kardex, Procedure Summary, Intake/Output, MAR and Accordion    Shift worked:  7a to 7p     Shift summary and any significant changes:     Wound care completed as ordered, Fecal incontinence tube with some stool around it, not draining as much as yesterday though. Needs to be offered her Glucerna's. Concerns for physician to address:  Not eating any solid food, will drink Glucerna's when offered. Hypoglycemic episode this am with BG 58, treated with glucagon IM and started on IV fluids of D5W. Zone phone for oncoming shift:   [id number]       Activity:  Activity Level: Bed Rest  Number times ambulated in hallways past shift: 0  Number of times OOB to chair past shift: 0    Cardiac:   Cardiac Monitoring: Yes      Cardiac Rhythm: Atrial Fib  /Sinus bradycardia    Access:   Current line(s): PIV     Genitourinary:   Urinary status: incontinent and ramos    Respiratory:   O2 Device: None (Room air)  Chronic home O2 use?: NO  Incentive spirometer at bedside: NO       GI:  Last Bowel Movement Date:  (FMS)  Current diet:  ADULT ORAL NUTRITION SUPPLEMENT Breakfast, Lunch, Dinner; Diabetic Supplement  ADULT DIET Easy to Chew  Passing flatus: YES  Tolerating current diet: YES       Pain Management:   Patient states pain is manageable on current regimen: YES    Skin:  Markos Score: 11  Interventions: turn team, speciality bed, float heels, foam dressing and PT/OT consult    Patient Safety:  Fall Score:  Total Score: 3  Interventions: stay with me (per policy)  High Fall Risk: Yes    Length of Stay:  Expected LOS: 4d 19h  Actual LOS: [address], RN

## 2022-06-06 NOTE — PROGRESS NOTES
Transition of Care Plan:     RUR:18%  Disposition: Home with granddaughter vs. SNF  Granddaughter is interested in applying for Medicaid in case she needs LTC or Caregivers at home.  - Referral sent to [facility] [employer] to screen    Follow up appointments: PCP; Beatriz Garcia.   DME needed: tbd  Transportation at Discharge: Granddaughter or GD Spouse will transport if she is able to go in car.   Keys or means to access home:     Granddafuad   IM Medicare Letter: to be delivered prior to DC.   1st IM Letter given 5/23/22  Is patient a BCPI-A Bundle:        no              If yes, was Bundle Letter given?:    Is patient a [place] and connected with the [facility]?              No   If yes, was [employer] transfer form completed and [facility] notified? Caregiver Contact: Granddaughter: Yared Cos: [phone]  GD Spouse: Claudia Miller: [phone]  Discharge Caregiver contacted prior to discharge? Yes reviewed plan with Granddaughter over the phone.   Care Conference needed?: to be determined. CM contacted pt's granddaughter, to request SNF choices. Granddaughter said she had not made any choices as of yet, but would review list this evening. CM will contact granddaughter in the morning for SNF choices.     Fleet Dale  Ext [phone]

## 2022-06-06 NOTE — PROGRESS NOTES
ID:    Blood cultures have cleared, no growth on 6/2 and 6/3. OK to place Permacath. For the long-run, please evaluate for graft/fistula placement to minimize bloodstream infections. Recommendations will be IV cefepime with HD for 6 weeks. Final abx recs to follow for CM.          Edward Mock MD  Infectious Diseases

## 2022-06-06 NOTE — DIABETES MGMT
[facility]    CLINICAL NURSE SPECIALIST CONSULT     Initial Presentation   Fermín Veloz is a 80 y.o. female admitted from the ER today 5/23/22 in DKA with profound bradycardia, after being found down by granddaughter. Granddaughter states that patient is independent in her activities of daily living, and has a routine that includes her diabetes self-care. He grandmother had reported that she wasn't feeling well and the granddaughter had made arrangements for a home visit. When she went into her room this morning to remind her of the visit, she found her down on the floor. EMS called. Of note, granddaughter states that she (herself) had been hospitalized for a month and returned home at the end of April. She surmises that her grandmother may not have been taking her insulin because there was more insulin in the refrigerator than there should have been. LAB:  WBC 10.9. AST 75. Lipase >3000. Troponin 31. NT pro-BNP 1588. Urinary glucose & ketone +. CT Head: No extra-axial fluid collection, hemorrhage or shift. Atrophy mostly posterior fossa. CXR: Mild pulmonary edema    HX:   Past Medical History:   Diagnosis Date    Diabetes ([facility]       INITIAL DX:   DKA (diabetic ketoacidosis) ([facility] [E11.10]     Current Treatment     TX: Insulin. Pepcid. ABx. Steroids    Consulted by Provider for advanced diabetes nursing assessment and care for:   [x] Transitioning off Loralyn Herrlich   [x] Inpatient management strategy  [] Home management assessment  [] Survival skill education    Hospital Course   Clinical progress has been complicated by need for ICU level of care. 5/24/22 Alert but babbling; Precedex+. O2NC. Remains on three (3) pressors. CRRT+. Scheduled for CT chest & ABd wo contrast today. Plans to add steroids. Discussion of transition off Loralyn Herrlich made during IPR.  5/25/22 Alert. Conversing in understandable language. O2NC. Afib. Remains on two (2) pressors.  Skin per wound care/nursing. CRRT+  5/26/22 Alert & oriented to person. Off O2. NPO except for supplements. CRRT+. 5/27/22 Alert & oriented. Answering questions clearly. Dialysis now+.   5/31/22 Patient ate almost all of her breakfast tray and is now resting comfortable with eyes closed. 6/6/22 Resting with eyes closed. Dialysis today. It is noted that the patient was OK'd for permacath placement. Her dialysis catheter was removed due to bacteremia. Diabetes History   Type 2 diabetes treated with insulin therapy  Admission BG 1259 with AG 30    Diabetes-related Medical History: Deferred    Diabetes Medication History  Key Antihyperglycemic Medications             glipiZIDE (GLUCOTROL) 10 mg tablet Take 10 mg by mouth daily. insulin lispro protamine/insulin lispro (HUMALOG MIX 75/25) 100 unit/mL (75-25) injection 48 Units by SubCUTAneous route daily. take 48 units in the morning    insulin lispro protamine/insulin lispro (HUMALOG MIX 75/25) 100 unit/mL (75-25) injection 32 Units by SubCUTAneous route every evening. take 32 units subcutaneously at night. Diabetes self-management practices: Per granddaughter  Eating pattern   [x] Not eating a carbohydrate-controlled mealplan  [x] Breakfast Cheerios with milk & banana.  Coffee (may have had Cheese toast earlier)  [x] Sankc  Chips a Hoy cookies (while she is watching TV  [x] Dinner  With granddaughter   Physical activity pattern - Uses [employer]ator to move about the house   [x] Not employing a physical activity program to control BG  Monitoring pattern - Tracks on a tablet by bedside & takes to her provider   [x] Testing BGs   [x] Breakfast   Taking medications pattern  [x] Consistent administration  [x] Affordable  Overall evaluation:    [x] A1c 13.6 (5/23/22)    Subjective   Resting with eyes closed     Objective   Physical exam  General Obese female in no acute distress  Neuro  Alert and talking  Vital Signs   Visit Vitals  BP (!) 153/53   Pulse 81   Temp 99.5 °F (37.5 °C) (Axillary)   Resp 18   Ht 5' 6\" (1.676 m)   Wt 82.6 kg (182 lb 1.6 oz)   SpO2 94%   BMI 29.39 kg/m²     Laboratory  Recent Labs     06/06/22  0426 06/05/22  0138 06/04/22  1015 06/03/22 1958 06/03/22 1958   * 78 123*   < > 151*   AGAP 11 7 7   < > 12   WBC  --  12.6* 12.5*  --  10.5   CREA 6.86* 5.69* 4.94*   < > 8.87*   GFRNA 6* 7* 8*   < > 4*    < > = values in this interval not displayed. Factors impacting BG management  Factor Dose Comments   Nutrition:  Dysphagia Pureed       Drugs:  Steroids   HC 50mg Q6 hrs   Impairs insulin action   Infection Cefepime 1 g Q 24 hours Afebrile. WBCs sloan   Other:   Kidney function  Liver function   GFR 6  Enzymes remain elevated   Dialysis+     Blood glucose pattern      Significant diabetes-related events over the past 24-72 hours  Admitted in profound DKA with electrolyte disturbances. BG 1259 with AG 30  On Glucostabilizer; received 27-32 units of insulin/hour initially  BG finally under 250mg/dl at 1am 5/24/22 after 5L of fluid in ICU  Significant GLORIA - Requiring dialysis  Steroids started 5/24/22 - BGs higher than target  NPH dose adjustment with BGs in 120-150s until she began eating 5/26/22 6/6/22 Hypoglycemic episode yesterday morning on 16 units NPH twice daily. NPH dose reduced to 10 units twice daily to start today. Assessment and Plan   Nursing Diagnosis Risk for unstable blood glucose pattern   Nursing Intervention Domain 6565 Decision-making Support   Nursing Interventions Examined current inpatient diabetes/blood glucose control   Explored factors facilitating and impeding inpatient management     Evaluation   This overweight AA female was admitted in DKA associated with bradycardia. Received significant amounts of insulin/hour via Glucostabilizer. Fluid resuscitation also needed in presence of elevated NT pro-BNP. BGs down under 250mg/dl at 1am 5/24/22, and AG closed at 8am. Transitioned off GS, and steroids added 5/24/22.  BGs sloan into the low 200s. NPH insulin started to override steroids in presence of kidney dysfunction with small increase in dose. BGs in target until she began eating yesterday 5/26/22. This woman uses combo insulin at home, which is not available in-house so we can tailor dosing to changing circumstances. Current insulin dose is at half her usual basal insulin dose, but her kidney function has deteriorated in light of her presenting situation, e.g., DKA. Recommend increasing basal dose incrementally to establish basal dose in light of GLORIA/CKD. Some Info extracted from [address] [facility] note    5/31/22 BG within goal this morning on 14 units NPH twice daily. The patient continues to receive 50 mg hydrocortisone injection Q 8 hours. I recommend continuing on current diabetes medication regimen for now. Diabetes management will continue to follow with this patient, monitoring BG trends and making recommendations as needed. 6/1/22 BG's elevated today on 14 units NPH twice daily. The patient continues on 50 mg hydrocortisone Q 8 hours. Consider a slight increase in the basal insulin dose. (0.4 x kg) 17 units NPH twice daily  6/2/22  BG's elevated today on 16 units NPH twice daily. The patient continues on 50 mg hydrocortisone Q 8 hours. Consider a slight increase in the basal dose. 6/6/22 BG elevated this morning. Basal insulin dose was held yesterday due to hypoglycemic episode yesterday morning. The patient's NPH dose has been decreased to 10 units twice daily to start today. The steroid dose has been decreased as well to 25 mg hydrocortisone daily. I suspect the patient may not require as much insulin. Consider 8 units NPH ( 0.2 x kg) twice daily. Recommendations     1. Decrease to 8 units NPH twice daily.      2.Continue HIGH sensitivity corrective insulin     Billing Code(s)   [x] 41091 IP subsequent hospital care - 15 minutes     Before making these care recommendations, I personally reviewed the hospitalization record, including notes, laboratory & diagnostic data and current medications, and examined the patient at the bedside (circumstances permitting) before making care recommendations. More than fifty (50) percent of the time was spent in patient counseling and/or care coordination.   Total minutes: 15 minutes    SANA Colón  Diabetes Clinical Nurse Specialist  [facility]  Access via Phone Warrior

## 2022-06-06 NOTE — PROGRESS NOTES
Speech Pathology:  Patient currently unavailable for dysphagia treatment as she is off the floor for dialysis. Dysphagia treatment deferred at this time. Note SLP recommended soft and bite sized diet on 6/4/22. SLP to continue to follow. Thank you.     Yusef Mena, SLP

## 2022-06-06 NOTE — PROGRESS NOTES
Progress Note      6/6/2022  NAME: Anne Robledo   MRN:  [id number]   Admit Diagnosis: [facility]abetic [facility] [E11.10]  PCP:  Yannick Fuller MD     Assessment: 1. Profound sinus bradycardia 20-30's in the setting of DKA, hyperkalemia, and OP beta-blocker. This has resolved. 2. Paroxysmal atrial fibrillation with post-conversion pauses up to 6 seconds on beta-blocker. Now off beta-blocker. 3. Paroxysmal atrial flutter by tele. She had runs of atrial tachycardia and then followed by probably sustained Afib which organized into a right atrial flutter (by tele morphology) before terminating to sinus. 4. 2nd degree AV block with single dropped PAC's, RBBB with normal NC in sinus rhythm. 5. Hypotension likely due to dehydration/volume depletion, metabolic acidosis, bradycardia. Improved with volume resuscitation, pressor. Now off pressor, but evidence of bacteremia with Serratia (see ID note for details). 6. No evidence of acute ischemia or infarct by presenting ECG. Echo with normal LV and RV systolic function. 7. GLORIA atop CKD stage 3 at baseline. Rhabdomyolysis. On dialysis. 8. Diabetes mellitus type 2 on chronic insulin complicated by DKA. 9. Elevated lipase possibly due to DKA rather than acute pancreatitis. 10. Metabolic encephalopathy, resolved. 11. Anemia. 12. Thrombocytopenia. 13. Full code.     Plan:      1. She's on DVT prophylaxis at this time. I'd avoid FULL anticoagulation if possible given her anemia and thrombocytopenia as long as Afib and flutter episodes are brief, self-limited. 2. Still having short episodes of afib, cont po amiodarone, sinus this AM.  3. Her dialysis catheter was removed due to bacteremia. 4. ID consult noted. Due to persistent bacteremia, a NAI was done. There may be a small vegetation on the mitral valve. Subjective:     No CP, dizziness, palpitations.     Objective:      Physical Exam:    Last 24hrs VS reviewed since prior progress note. Most recent are:    Visit Vitals  /67   Pulse 87   Temp 98.8 °F (37.1 °C)   Resp 18   Ht 5' 6\" (1.676 m)   Wt 82.6 kg (182 lb 1.6 oz)   SpO2 94%   BMI 29.39 kg/m²       Intake/Output Summary (Last 24 hours) at 6/6/2022 0920  Last data filed at 6/5/2022 1556  Gross per 24 hour   Intake 640 ml   Output 50 ml   Net 590 ml           General: Tired and awake, no distress  Neck: Supple,   Respiratory: No respiratory distress, clear anteriorly   Cardiovascular: Regular rate rhythm, S1S2   Abdomen: soft, non tender   Neuro: moves all extremities   Skin: warm and dry   Extremity:  warm to touch      Data Review    Telemetry:  normal sinus rhythm         Lab Data Personally Reviewed:    Recent Labs     06/05/22  0138 06/04/22  1015   WBC 12.6* 12.5*   HGB 9.1* 9.6*   HCT 26.9* 27.4*    180     No results for input(s): INR, PTP, APTT, INREXT, INREXT in the last 72 hours. Recent Labs     06/06/22  0426 06/05/22  0138 06/04/22  1015    139 138   K 3.2* 3.2* 3.2*   CL 99 101 102   CO2 26 31 29   BUN 58* 48* 44*   CREA 6.86* 5.69* 4.94*   * 78 123*   CA 7.0* 7.2* 7.3*     No results for input(s): CPK, CKNDX, TROIQ in the last 72 hours. No lab exists for component: CPKMB  Lab Results   Component Value Date/Time    Triglyceride 95 05/26/2022 08:30 AM       Recent Labs     06/03/22  1958   ALB 1.7*     No results for input(s): PH, PCO2, PO2 in the last 72 hours.     Medications Personally Reviewed:    Current Facility-Administered Medications   Medication Dose Route Frequency    insulin NPH (NOVOLIN N, HUMULIN N) injection 10 Units  10 Units SubCUTAneous BID    hydrocortisone Sod Succ (PF) (SOLU-CORTEF) injection 25 mg  25 mg IntraVENous DAILY    famotidine (PEPCID) tablet 20 mg  20 mg Oral DAILY    epoetin betina-epbx (RETACRIT) 12,000 Units combo injection  12,000 Units SubCUTAneous Q MON, WED & FRI    amiodarone (CORDARONE) tablet 400 mg  400 mg Oral DAILY    cefepime (MAXIPIME) 1 g in 0.9% sodium chloride (MBP/ADV) 50 mL MBP  1 g IntraVENous Q24H    heparin (porcine) 1,000 unit/mL injection 1,400 Units  1,400 Units InterCATHeter DIALYSIS PRN    And    heparin (porcine) 1,000 unit/mL injection 1,100 Units  1,100 Units InterCATHeter DIALYSIS PRN    hydrALAZINE (APRESOLINE) 20 mg/mL injection 20 mg  20 mg IntraVENous Q6H PRN    albumin human 25% (BUMINATE) solution 25 g  25 g IntraVENous DIALYSIS PRN    metoprolol (LOPRESSOR) injection 2.5 mg  2.5 mg IntraVENous Q6H PRN    insulin lispro (HUMALOG) injection   SubCUTAneous AC&HS    glucose chewable tablet 16 g  4 Tablet Oral PRN    glucagon (GLUCAGEN) injection 1 mg  1 mg IntraMUSCular PRN    dextrose 10 % infusion 0-250 mL  0-250 mL IntraVENous PRN    sodium chloride (NS) flush 5-40 mL  5-40 mL IntraVENous Q8H    sodium chloride (NS) flush 5-40 mL  5-40 mL IntraVENous PRN    acetaminophen (TYLENOL) tablet 650 mg  650 mg Oral Q6H PRN    Or    acetaminophen (TYLENOL) suppository 650 mg  650 mg Rectal Q6H PRN    polyethylene glycol (MIRALAX) packet 17 g  17 g Oral DAILY PRN    ondansetron (ZOFRAN ODT) tablet 4 mg  4 mg Oral Q8H PRN    Or    ondansetron (ZOFRAN) injection 4 mg  4 mg IntraVENous Q6H PRN    [Held by provider] heparin (porcine) injection 5,000 Units  5,000 Units SubCUTAneous Q8H    alcohol 62% (NOZIN) nasal  1 Ampule  1 Ampule Topical Q12H    balsam peru-castor oiL (VENELEX) ointment   Topical BID              Marline García MD

## 2022-06-06 NOTE — PROGRESS NOTES
Infectious Disease Progress Note         Interval:  NAEo    Subjective:   Reports feeling well. Seen at HD. Objective:    Vitals:   Reviewed in chart. Physical Exam:  ?   ? GEN: NAD, looks non-toxic  ? HEENT: Normocephalic, atraumatic, PERRL, no scleral icterus  ? CV: Hemodynamically stable  ? Lungs: Patient is on room air  ? Abdomen: soft, non distended, non tender  ? Genitourinary:  no ramos  ? Extremities: no edema  ? Neuro: Alert, oriented to time, place and situation, moves all extremities to commands, verbal   ? Psych: good affect, good eye contact, non tearful   ?  Lines: right IJ triple lumen and left IJ jolene removed 6/1/22          Labs:  Recent Results (from the past 24 hour(s))   GLUCOSE, POC    Collection Time: 06/05/22  9:52 AM   Result Value Ref Range    Glucose (POC) 58 (L) 65 - 117 mg/dL    Performed by [address] [place] [address], POC    Collection Time: 06/05/22 10:13 AM   Result Value Ref Range    Glucose (POC) 58 (L) 65 - 117 mg/dL    Performed by [address] [place] [address], POC    Collection Time: 06/05/22 10:55 AM   Result Value Ref Range    Glucose (POC) 92 65 - 117 mg/dL    Performed by [address] [place] [address], POC    Collection Time: 06/05/22  4:39 PM   Result Value Ref Range    Glucose (POC) 291 (H) 65 - 117 mg/dL    Performed by Claudene Fu PCT    GLUCOSE, POC    Collection Time: 06/05/22  9:39 PM   Result Value Ref Range    Glucose (POC) 211 (H) 65 - 117 mg/dL    Performed by Milagros Palacio (PIPER RN)    METABOLIC PANEL, BASIC    Collection Time: 06/06/22  4:26 AM   Result Value Ref Range    Sodium 136 136 - 145 mmol/L    Potassium 3.2 (L) 3.5 - 5.1 mmol/L    Chloride 99 97 - 108 mmol/L    CO2 26 21 - 32 mmol/L    Anion gap 11 5 - 15 mmol/L    Glucose 174 (H) 65 - 100 mg/dL    BUN 58 (H) 6 - 20 MG/DL    Creatinine 6.86 (H) 0.55 - 1.02 MG/DL    BUN/Creatinine ratio 8 (L) 12 - 20      GFR est AA 7 (L) >60 ml/min/1.73m2    GFR est non-AA 6 (L) >60 ml/min/1.73m2    Calcium 7.0 (L) 8.5 - 10.1 MG/DL             Assessment:  81 yo F with:    Septic shock due to Persistent Serratia bacteremia. Also Klebsiella pneumoniae bacteremia this admission. Symptomatic bradycardia, DKA, rhabdomyolysis GLORIA requiring CRRT (now HD). High-grade serratia marcescens bacteremia, positive blood cultures, 5/23/22 to 6/1/22 (and more cultures pending so far). 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 Recommendations:  - Cefepime 2gm-2gm-3gm with HD   - Duration 6 weeks, 6/3/22 to 7/15/22      · Please have labs done on weekly basis for CBC with diff, CMP, ESR, CRP  and have results sent to me by faxing to  [phone]. · Call with critical labs at [phone]. .    · Please ensure that patient has PICC care arranged per protocol   · Smoking cessation encouraged if patient is current smoker to aid in infection and wound healing        ID will now sign off, please recall as needed. Thank you for the opportunity to participate in the care of this patient. Please contact with questions or concerns.       Katie Leong MD  Infectious Diseases

## 2022-06-06 NOTE — PROGRESS NOTES
Problem: Dysphagia (Adult)  Goal: *Acute Goals and Plan of Care (Insert Text)  Description: 5/25/2022, Re-evaluation 6/3/22  Speech path goals  1. Patient will tolerate full liquids with no overt s/s of aspiration. MET  2. Patient will tolerate diet upgrade with no overt s/s of aspiration. Upgraded to Puree/Thin Liquid 5/26/2022  MET  3. New goal 6/3/22  Patient will tolerate easy to chew diet, thin liquids without overt s/s aspiration within 7 days. Outcome: Progressing Towards Goal     SPEECH LANGUAGE PATHOLOGY DYSPHAGIA TREATMENT  Patient: Micaela Arora (15 y.o. female)  Date: 6/6/2022  Diagnosis: DKA (diabetic ketoacidosis) ([facility] [address] [E11.10] <principal problem not specified>       Precautions:       ASSESSMENT:  Patient tolerated purees and soft solids (diced peaches) without overt s/s aspiration. Mastication is prolonged and mildly incomplete with solids. No oral residue noted. Patient reports satisfaction with soft and bite sized diet however limited intake per chart. Given bedside presentation this date feel soft and bite sized diet remains appropriate. PLAN:  Recommendations and Planned Interventions:  Soft and bite sized  Thin liquids  Sit up for all PO  Patient continues to benefit from skilled intervention to address the above impairments. Continue treatment per established plan of care. Discharge Recommendations: To Be Determined     SUBJECTIVE:   Patient stated No more. OBJECTIVE:   Cognitive and Communication Status:  Neurologic State: Alert  Orientation Level: Oriented to person,Oriented to place  Cognition: Follows commands  Perception: Appears intact  Perseveration: No perseveration noted  Safety/Judgement: Fall prevention  Dysphagia Treatment:  Oral Assessment:     P.O. Trials:  Patient Position: Upright in bed  Vocal quality prior to P.O.: Low volume  Consistency Presented: Puree; Solid  How Presented: SLP-fed/presented;Spoon     Bolus Acceptance: No impairment  Bolus Formation/Control: Impaired  Type of Impairment: Delayed; Incomplete;Mastication  Propulsion: No impairment  Oral Residue: None        Aspiration Signs/Symptoms: None                        After treatment:   Patient left in no apparent distress in bed and Call bell within reach    COMMUNICATION/EDUCATION:   Patient was educated regarding role of SLP, diet and POC. Patient nodded.     The patient's plan of care including recommendations, planned interventions, and recommended diet changes were discussed with:     MERE Cerda  Time Calculation: 15 mins

## 2022-06-06 NOTE — PROGRESS NOTES
Nephrology Progress Note  Pb Zamora     [email or web address]  Phone - [phone]   Patient: Anne Robledo    YOB: 1934        Date- 6/6/2022   Admit Date: 5/23/2022  CC: Follow up for GLORIA        IMPRESSION & PLAN:   · GLORIA due to ATN - on hd  · CKD 3b  · DKA  · Sepsis   UTI   hypokalemia   Gram +ve blood cx    PLAN-   Hd today to the hospital   Will need to get permacath once she is okayed by ID. Olaf Grossman We will keep her on hemodialysis Monday Wednesday Friday.  epogen for anemia     Subjective: Interval History:   -Seen and examined today on HD.  -Still has Evan. Objective:   Vitals:    06/06/22 1030 06/06/22 1045 06/06/22 1100 06/06/22 1115   BP: (!) 128/58 (!) 144/52 (!) 134/54 (!) 130/56   Pulse: 82 82 79 80   Resp: 18 18 18 18   Temp:       TempSrc:       SpO2:       Weight:       Height:          06/05 0701 - 06/06 0700  In: 640 [P.O.:640]  Out: 100 [Drains:100]  Last 3 Recorded Weights in this Encounter    05/23/22 0957 05/24/22 0727 05/26/22 0631   Weight: 89.2 kg (196 lb 10.4 oz) 88.9 kg (196 lb) 82.6 kg (182 lb 1.6 oz)      Physical exam:    GEN: NAD  NECK- Supple, no mass  RESP: No wheezing, Clear b/l  CVS: S1,S2  RRR  EXT:+Edema   PSYCH: Can't access due to patient's current condition       Chart reviewed. Pertinent Notes reviewed. Data Review :  Recent Labs     06/06/22  0426 06/05/22  0138 06/04/22  1015 06/03/22 1958 06/03/22 1958    139 138   < > 136   K 3.2* 3.2* 3.2*   < > 3.3*   CL 99 101 102   < > 102   CO2 26 31 29   < > 22   BUN 58* 48* 44*   < > 96*   CREA 6.86* 5.69* 4.94*   < > 8.87*   * 78 123*   < > 151*   CA 7.0* 7.2* 7.3*   < > 7.3*   PHOS  --   --   --   --  6.1*    < > = values in this interval not displayed.      Recent Labs     06/05/22  0138 06/04/22  1015 06/03/22 1958   WBC 12.6* 12.5* 10.5   HGB 9.1* 9.6* 10.1*   HCT 26.9* 27.4* 30.0*    180 174     No results for input(s): FE, TIBC, PSAT, FERR in the last 72 hours.    Medication list  reviewed  Current Facility-Administered Medications   Medication Dose Route Frequency    insulin NPH (NOVOLIN N, HUMULIN N) injection 10 Units  10 Units SubCUTAneous BID    hydrocortisone Sod Succ (PF) (SOLU-CORTEF) injection 25 mg  25 mg IntraVENous DAILY    famotidine (PEPCID) tablet 20 mg  20 mg Oral DAILY    epoetin betina-epbx (RETACRIT) 12,000 Units combo injection  12,000 Units SubCUTAneous Q MON, WED & FRI    amiodarone (CORDARONE) tablet 400 mg  400 mg Oral DAILY    cefepime (MAXIPIME) 1 g in 0.9% sodium chloride (MBP/ADV) 50 mL MBP  1 g IntraVENous Q24H    heparin (porcine) 1,000 unit/mL injection 1,400 Units  1,400 Units InterCATHeter DIALYSIS PRN    And    heparin (porcine) 1,000 unit/mL injection 1,100 Units  1,100 Units InterCATHeter DIALYSIS PRN    hydrALAZINE (APRESOLINE) 20 mg/mL injection 20 mg  20 mg IntraVENous Q6H PRN    albumin human 25% (BUMINATE) solution 25 g  25 g IntraVENous DIALYSIS PRN    metoprolol (LOPRESSOR) injection 2.5 mg  2.5 mg IntraVENous Q6H PRN    insulin lispro (HUMALOG) injection   SubCUTAneous AC&HS    glucose chewable tablet 16 g  4 Tablet Oral PRN    glucagon (GLUCAGEN) injection 1 mg  1 mg IntraMUSCular PRN    dextrose 10 % infusion 0-250 mL  0-250 mL IntraVENous PRN    sodium chloride (NS) flush 5-40 mL  5-40 mL IntraVENous Q8H    sodium chloride (NS) flush 5-40 mL  5-40 mL IntraVENous PRN    acetaminophen (TYLENOL) tablet 650 mg  650 mg Oral Q6H PRN    Or    acetaminophen (TYLENOL) suppository 650 mg  650 mg Rectal Q6H PRN    polyethylene glycol (MIRALAX) packet 17 g  17 g Oral DAILY PRN    ondansetron (ZOFRAN ODT) tablet 4 mg  4 mg Oral Q8H PRN    Or    ondansetron (ZOFRAN) injection 4 mg  4 mg IntraVENous Q6H PRN    [Held by provider] heparin (porcine) injection 5,000 Units  5,000 Units SubCUTAneous Q8H    alcohol 62% (NOZIN) nasal  1 Ampule  1 Ampule Topical Q12H    balsam peru-castor oiL (VENELEX) ointment   Topical BID          Jose Daniel Hernandez, [id number] [facility]  [facility] / [facility]  [address], [address]  [place], [address]  Phone - [phone]               Fax - [phone]

## 2022-06-06 NOTE — PROGRESS NOTES
Pt chart reviewed and attempted to see for therapy. Pt currently FRANK for dialysis. Will defer and continue to follow.     Violette Trevino, PTA

## 2022-06-06 NOTE — PROCEDURES
Hemodialysis / [phone]    Vitals Pre Post Assessment Pre Post   BP BP: (!) 161/65 (06/06/22 0944) 142/54 LOC A&Ox2 same   HR Pulse (Heart Rate): (!) 105 (06/06/22 0944) 80 Lungs Coarse bases same   Resp Resp Rate: 18 (06/06/22 0944) 18 Cardiac RRR same   Temp Temp: 99.5 °F (37.5 °C) (06/06/22 0944) 98.9 Skin Sacral wound same   Weight    Edema 1+ BLE same   Tele status   Pain Pain Intensity 1: 0 (06/06/22 0750)      Orders   Duration: Start: 3090 End: 8546 Total: 3.5hrs   Dialyzer: Dialyzer/Set Up Inspection: Jasso Popper (06/06/22 0944)   K Bath: Dialysate K (mEq/L): 4 (06/06/22 0944)   Ca Bath: Dialysate CA (mEq/L): 2.5 (06/06/22 0944)   Na: Dialysate NA (mEq/L): 138 (06/06/22 0944)   Bicarb: Dialysate HCO3 (mEq/L): 40 (06/06/22 0944)   Target Fluid Removal: Goal/Amount of Fluid to Remove (mL): 2000 mL (06/06/22 0944)     Access   Type & Location: RIJ CVC: Dressing CDI. No s/s of infection. Both lumens aspirate & flush well. Running well at . SBAR received from Primary RN. Pt arrived to HD suite A&Ox4. Consent signed & on file. Each catheter limb disinfected per p&p, caps removed, hubs disinfected per p&p. Each lumen aspirated for blood return and flushed with Normal Saline per policy. VSS. Dialysis Tx initiated. Comments:   Dressing dated 6/3/22 CDI. No s/s of infection noted.                                      Labs   HBsAg (Antigen) / date: Neg 5/24/22                                              HBsAb (Antibody) / date: Susc 5/24/22   Source: EPIC   Obtained/Reviewed  Critical Results Called HGB   Date Value Ref Range Status   06/05/2022 9.1 (L) 11.5 - 16.0 g/dL Final     Potassium   Date Value Ref Range Status   06/06/2022 3.2 (L) 3.5 - 5.1 mmol/L Final     Calcium   Date Value Ref Range Status   06/06/2022 7.0 (L) 8.5 - 10.1 MG/DL Final     BUN   Date Value Ref Range Status   06/06/2022 58 (H) 6 - 20 MG/DL Final     Creatinine   Date Value Ref Range Status   06/06/2022 6.86 (H) 0.55 - 1.02 MG/DL Final        Meds Given   Name Dose Route   Heparin 1:1000 1.1 [email or web address]   Heparin 1:1000 1.4 [email or web address]          Adequacy / Fluid    Total Liters Process: 76.8   Net Fluid Removed: 1500mL      Comments   Time Out Done:   (Time) 2384   Admitting Diagnosis: DKA   Consent obtained/signed: Informed Consent Verified: Yes (06/06/22 9944)   Machine / RO # Machine Number: [id number] (06/06/22 6334)   Primary Nurse Rpt Pre: Renetta Pace RN   Primary Nurse Rpt Post: Jared Rabago LPN   Pt Education: procedural   Care Plan: On going   Pts outpatient clinic: TBD     Tx Summary  [phone]:  RIJ CVC: Dressing CDI. No s/s of infection. Both lumens aspirate & flush well. Running well at . SBAR received from Primary RN. Pt arrived to HD suite A&Ox4. Consent signed & on file. Each catheter limb disinfected per p&p, caps removed, hubs disinfected per p&p. Each lumen aspirated for blood return and flushed with Normal Saline per policy. VSS. Dialysis Tx initiated. 1000:  Pt resting  1015:  Pt resting  1030:  Pt resting  1045:  Pt resting  1100:  Pt resting  1115:  Pt resting  1130:  Pt resting  1145:  Pt resting  1200:  Pt resting  1215:  Pt resting  1230:  200ns given to prevent clotting; pt resting  1245:  Pt resting  1300:  200ns given to prevent clotting; pt resting  1315:  Per Dr Maryanne Tim, tx ended d/t pt clotting off system. VSS. Each dialysis catheter limb disinfected per p&p, all possible blood returned per p&p, and each dialysis hub disinfected per p&p. Each lumen flushed, post dialysis catheter Heparin dwell instilled per order, and caps applied. Bed locked and in the lowest position, call bell and belongings in reach. SBAR given to Primary, RN. Patient is stable at time of their/ my departure. All Dialysis related medications have been reviewed. Comments:   Assessment performed by RN.   Procedure and documentation observed and reviewed by Anastasiya Estrada RN

## 2022-06-06 NOTE — PROGRESS NOTES
Music Therapy Assessment  [address] [place] [address] [phone]     1934  80 y.o.  female    Patient Telephone Number: [phone] (home)   Jewish Affiliation: Lelon Burkitt   Language: English   Patient Active Problem List    Diagnosis Date Noted    Goals of care, counseling/discussion     Lethargy     Advanced care planning/counseling discussion     Palliative care by specialist     DKA (diabetic ketoacidosis) ([facility] [address] 05/23/2022        Date: 6/6/2022            Total Time (in minutes): 5          MRM 3 SURG TELE    Mental Status:   [x] Graylin Kale [  ] Red Combe [  ]  Confused  [  ] Minimally responsive  [  ] Sleeping    Communication Status: [  ] Impaired Speech [  ] Nonverbal -N/A    Physical Status:   [  ] Oxygen in use  [  ] Hard of Hearing [  ] Vision Impaired  [  ] Ambulatory  [  ] Ambulatory with assistance [  ] Non-ambulatory -N/A    Music Preferences, Background: N/A: Please See Session Observations Below    Clinical Problem to be addressed: Relaxation    Goal(s) met in session: N/A: Please See Session Observations Below   Physical/Pain management (Scale of 1-10):    Pre-session rating ___________    Post-session rating __________  [  ] Increased relaxation   [  ] Affected breathing patterns  [  ] Decreased muscle tension   [  ] Decreased agitation  [  ] Affected heart rate    [  ] Increased alertness     Emotional/Psychological:  [  ] Increased self-expression   [  ] Decreased aggressive behavior   [  ] Decreased feelings of stress  [  ] Discussed healthy coping skills     [  ] Improved mood    [  ] Decreased withdrawn behavior     Social:  [  ] Decreased feelings of isolation/loneliness [  ] Positive social interaction   [  ] Provided support and/or comfort for family/friends    Spiritual:  [  ] Spiritual support    [  ] Expressed peace  [  ] Expressed greta    [  ] Discussed beliefs    Techniques Utilized (Check all that apply): N/A: Please See Session Observations Below   [  ] Procedural support MT [  ] Music for relaxation [  ] Patient preferred music  [  ] Shaista analysis  [  ] Song choice  [  ] Music for validation  [  ] Entrainment  [  ] Movement to music [  ] Guided visualization  [  ] Chadd Harper  [  ] Patient instrument playing [  ] Roslyn Bile writing  [  ] Sladevickie Figueroao along   [  ] Kathryn White  [  ] Sensory stimulation  [  ] Active Listening  [  ] Music for spiritual support [  ] Making of CDs as gifts    Session Observations:  Referred by Brock Soriano, Palliative . Patient (pt) was lying in bed, with her eyes closed when this music therapist (MT) entered the room. MT greeted the pt, and pt responded while keeping her eyes closed. MT introduced self, and asked how she was feeling. She responded \"I'm feeling alright\" to this, and opened her eyes for a brief moment. MT acknowledged she just returned from dialysis, and offered comforting music as an additional layer of relaxation and support. She shook her head \"no\" in response to this, and declined needing anything further. MT exited quietly.     ZEHRA Weinstein (Music Therapist, Board Certified)  [id number] [facility]

## 2022-06-06 NOTE — PROGRESS NOTES
Hospitalist Progress Note    NAME: Josh Monk   :  1934   MRN:  [id number]     Admit date: 2022    Today's date: 22    PCP: Gracia Brown MD    Anticipated discharge date: 2022    Barriers:  recovering from infection, still with bacteremia    ? needs long-term HD    Assessment / Plan:    Acute metabolic encephalopathy   Hypoglycemia on   Lethargic, BS 58 on , resolved  Given orange juice , glucagon   Started on d5 at 5ccc/h, stopped today and blood sugar trending up  We will restart NPH at reduced dose at 8 unit twice daily  Pt not eating much    Septic shock POA initial BP 46/34, temp 86.5, RR 42, GLORIA, lactate 4.26, pressor requirements  Klebsiella Bacteremia with UTI  POA  Serratia marcescens bacteremia  and 2022 POA  Persistent GNR bacteremia POA  ? Infectious endocarditis  - Admit UA 5-10 WBC, 1+ bacteria  - Admit BC with klebsiella and Serratia   Sensitive to zosyn, ceftriaxone  - Admit urine culture + for klebsiella  - Repeat BC  with recurrent sepsis + for Serratia  - CT abdomen/pelvis without IV contrast  with no hydronephrosis   No intraabdominal pathology  - Chest CT with bilateral effusions, no ASD  - With elevated lipase and abnormal LFTs, ?  Biliary source   GGT 28, never had elevated Alk Phos and T bili   US with no gallstones, contracted GB, CBD 0.4 cm              Biliary source seems less likely  - IV pressors at admit, weaned off   Started on HD for GLORIA from ATN  -Started on stress steroid, being weaned down  - Transferred to floor 2022  - S/p zosyn --> transition to ceftriaxone   - Repeat Blood culture  with GNR  - Repeat blood culture  positive for gram-negative rods  - Not sure why she is persistent bacteremic with GNR despite appropriate antibiotics    TTE LVEF 55 to 60%, trace MR and AR  - Repeat CT abdomen given the tenderness, persistent bacteremia  -ID consulted, recommended to remove central line and the Evan repeat blood cultures. Discussed with ID and nephrology  . CT abdomen pelvis with contrast did not show any acute pathology    Central line and Evan removed and plan for NAI by cardiology  . Blood cultures from 0602 negative to date  Status post NAI on 06/03, prelim report showed:  S/p NAI performed in sinus rhythm preliminarily revealing biatrial enlargement, moderate MR with very small mobile abnormality on the atrial side of the mitral valve, cannot exclude what might be a small vegetation. Large left atrial appendage with low exit velocity and without thrombus. Mild TR with evidence of moderate pulmonary hypertension. Small pericardial effusion. Continue with IV cefepime  Repeat blood cultures so far has been negative since 06/02  ID recommended 6 weeks of cefepime with hemodialysis  Outpatient hemodialysis to be set up by his neurology  A. fib with RVR  Patient has had episode of bradycardia with pauses during the stay due to combination of DKA, hyperkalemia  Beta-blocker was stopped. Poor candidate for  Anticoagulation   Status post amnio drip, switch to p.o. amnio    Acute kidney injury POA BUN/creat 114 and 5.97  Rhabdomyolysis CPK peak 22,325   -Last baseline creat 1.28 at [facility] on 3/9/2021  -Started on CRRT till 5/26, now on HD  -No hydronephrosis  -Likely ATN with hypotension, sepsis, rhabdomyolysis  - Oligouric  -Serial labs  -Await renal recovery, likely will need outpatient HD  Likely can DC Marlen    DKA POA BS 1259, HgBa1c > 14.0, AG > 20, pH 6.99  - Not sure if DKA present, acidosis may have been due to sepsis, GLORIA   Urine with trace ketones, but B-hydroxybutyrate levels in blood elevated  - Likely patient was not taking insulin at home as her A1c is >14  - s/p Insulin gtt --> currently on NPH 16 units twice daily, will hold it due to hypoglycemia          Acute pancreatitis POA  - admit Non contrast CT is neg for changes of acute pancreatitis.   - Lipase > 3000 -->  1107 -->  2499 --> 1602  - + abdominal tenderness  - clears  - No Gallstones or ductal dilatation, no ETOH  - ? Ischemic   - lipase tredning down  -Repeat CT abdomen/pelvis showed no acute abnormality  - serial labs     Acute metabolic encephalopathy POA resolved  - Multifactorial with septic shock, low BP, GLORIA, elevated BS > 1200  - Head CT with atrophy, no acute events  -Improving. Closely monitor mental status.  -manage metabolic issues as above.     Symptomatic bradycardia.  -Likely precipitated by severe acidemia, also on BB at home.   -Again had long pauses on 5/25, EP following. Overweight POA Body mass index is 29.39 kg/m². DVT prophylaxis. [address] SUP. Pepcid. Code status. Full code. Subjective:     Chief Complaint / Reason for Physician Visit  Follow-up bacteremia  Seen at dialysis suite, more awake  Review of Systems:  Symptom Y/N Comments  Symptom Y/N Comments   Fever/Chills n   Chest Pain n    Poor Appetite y   Edema     Cough    Abdominal Pain     Sputum    Joint Pain     SOB/BEE    Headache     Nausea/vomit    Tolerating PT/OT     Diarrhea n   Tolerating Diet     Constipation    Other       Could NOT obtain due to:      Objective:     VITALS:   Last 24hrs VS reviewed since prior progress note.  Most recent are:  Patient Vitals for the past 24 hrs:   Temp Pulse Resp BP SpO2   06/06/22 0750 98.8 °F (37.1 °C) 87 18 118/67 94 %   06/06/22 0022 98.8 °F (37.1 °C) 79 18 (!) 153/71 100 %   06/05/22 2008 98.3 °F (36.8 °C) 84 20 120/66 97 %   06/05/22 1546 -- (!) 102 -- -- --   06/05/22 1508 99.1 °F (37.3 °C) (!) 109 16 (!) 143/80 92 %   06/05/22 1158 98.6 °F (37 °C) (!) 101 20 135/70 95 %       Intake/Output Summary (Last 24 hours) at 6/6/2022 0853  Last data filed at 6/5/2022 1556  Gross per 24 hour   Intake 640 ml   Output 50 ml   Net 590 ml        Wt Readings from Last 12 Encounters:   05/26/22 82.6 kg (182 lb 1.6 oz)       PHYSICAL EXAM:    I had a face to face encounter and independently examined this patient on 06/06/22 as outlined below:    General: WD, WN. Lethargic cooperative, no acute distress    EENT:  PERRL. Anicteric sclerae. MMM  Resp:  CTA bilaterally, no wheezing or rales. No accessory muscle use  CV:  Regular  rhythm,  No edema  GI:  Soft, Non distended, Tender RUQ and epigastric region. +Bowel sounds, no rebound  Neurologic:  Lethargic this morning normal speech, non focal motor exam  Psych:   Not anxious nor agitated  Skin:  No rashes. No jaundice      Reviewed most current lab test results and cultures  YES  Reviewed most current radiology test results   YES  Review and summation of old records today    NO  Reviewed patient's current orders and MAR    YES  PMH/SH reviewed - no change compared to H&P  ________________________________________________________________________  Care Plan discussed with:    Comments   Patient x    Family      RN x    Care Manager     Consultant                        Multidiciplinary team rounds were held today with , nursing, pharmacist and clinical coordinator. Patient's plan of care was discussed; medications were reviewed and discharge planning was addressed. ________________________________________________________________________      Comments   >50% of visit spent in counseling and coordination of care     ________________________________________________________________________  Claudia Pastrana MD     Procedures: see electronic medical records for all procedures/Xrays and details which were not copied into this note but were reviewed prior to creation of Plan. LABS:  I reviewed today's most current labs and imaging studies.   Pertinent labs include:  Recent Labs     06/05/22 0138 06/04/22  1015 06/03/22 1958   WBC 12.6* 12.5* 10.5   HGB 9.1* 9.6* 10.1*   HCT 26.9* 27.4* 30.0*    180 174     Recent Labs     06/06/22  0426 06/05/22 0138 06/04/22  1015 06/03/22 1958 06/03/22 1958    139 138   < > 136   K 3.2* 3. 2* 3.2*   < > 3.3*   CL 99 101 102   < > 102   CO2 26 31 29   < > 22   * 78 123*   < > 151*   BUN 58* 48* 44*   < > 96*   CREA 6.86* 5.69* 4.94*   < > 8.87*   CA 7.0* 7.2* 7.3*   < > 7.3*   PHOS  --   --   --   --  6.1*   ALB  --   --   --   --  1.7*    < > = values in this interval not displayed.

## 2022-06-07 NOTE — PROGRESS NOTES
Problem: Breathing Pattern - Ineffective  Goal: *Absence of hypoxia  Outcome: Progressing Towards Goal     Problem: Pressure Injury - Risk of  Goal: *Prevention of pressure injury  Description: Document Markos Scale and appropriate interventions in the flowsheet.   Outcome: Progressing Towards Goal  Note: Pressure Injury Interventions:  Sensory Interventions: Minimize linen layers,Assess need for specialty bed    Moisture Interventions: Minimize layers,Maintain skin hydration (lotion/cream)    Activity Interventions: PT/OT evaluation,Increase time out of bed    Mobility Interventions: HOB 30 degrees or less    Nutrition Interventions: Document food/fluid/supplement intake    Friction and Shear Interventions: HOB 30 degrees or less,Apply protective barrier, creams and emollients,Minimize layers

## 2022-06-07 NOTE — PROGRESS NOTES
Speech Pathology Note    Chart reviewed and spoke with RN in anticipation of skilled SLP session. RN reported patient drowsy on this date and with limited PO intake. Will defer and follow-up as patient is appropriate. Thank you.     Kevin Moreno M.S., CCC-SLP

## 2022-06-07 NOTE — PROGRESS NOTES
Problem: Self Care Deficits Care Plan (Adult)  Goal: *Acute Goals and Plan of Care (Insert Text)  Description: FUNCTIONAL STATUS PRIOR TO ADMISSION: Patient was modified independent using a rolling walker for functional mobility. Patient was supervision for basic and instrumental ADLs but needing support recently due to primary caregiver hospitalized. HOME SUPPORT: The patient lived with alone with grand-daughter support until her hospitalized/unable had another cousin checking in on her since then ~1 month, per chart. Occupational Therapy Goals    Initiated 6/7/2022 for Weekly Re-assessment  1. Patient will perform self-feeding (HOB elevated) with modified independence within 7 day(s). 2.  Patient will perform simple grooming tasks (HOB elevated) with supervision/set-up within 7 day(s). 3.  Patient will perform UB sponge bathing and dressing with moderate assistance within 7 days. 4.  Patient will roll side<>side during bed level toileting/tavo hygiene with moderate assistance within 7 days. 5.  Patient will participate in upper extremity therapeutic exercise/activities to maximize UB strength required for ADL with supervision/set-up for 5 minutes within 7 day(s). Initiated 5/31/2022, Reviewed 6/7/2022 and goals updated. 1.  Patient will perform self-feeding with modified independence within 7 day(s). Not met, continued. 2.  Patient will perform grooming with supervision/set-up within 7 day(s). Not met, continued. 3.  Patient will perform upper body dressing with supervision/set-up within 7 day(s). Downgraded to Mod A   4. Patient will perform toilet transfers with moderate assistance within 7 day(s). Not met, modified  5. Patient will perform all aspects of toileting with moderate assistance within 7 day(s). Downgraded to Max A   6. Patient will participate in upper extremity therapeutic exercise/activities with supervision/set-up for 5 minutes within 7 day(s). Continued  7. Patient will utilize energy conservation techniques during functional activities with verbal cues within 7 day(s). Discontinued  Outcome: Not Met   OCCUPATIONAL THERAPY TREATMENT/WEEKLY RE-EVALUATION  Patient: Becka Serrano (80 y.o. female)  Date: 6/7/2022  Diagnosis: DKA (diabetic ketoacidosis) ([facility] [address] [E11.10] <principal problem not specified>       Precautions:    Chart, occupational therapy assessment, plan of care, and goals were reviewed. ASSESSMENT  Based on the objective data described below, Pt is not progressing toward the above listed goals. Of note, OT Tx has been limited since admission d/t Pt being FRANK for HD and other medical interventions. At this time all goals have been continued or downgraded/modified, as listed above. Currently Pt requires Setup to Total Assist x2 with ADL and now Max-Total Assist x2 with rolling side<>side with bed mobility during tavo hygiene, and on pressure relieving mattress d/t sacral ulcers. Pt verbalized feeling better this date, however exhibited fatigue and lethargy after rolling side<>side multiple times with Max-Total x2 assist while RN completed wound care to sacral ulcers. Pt was somewhat receptive to B shoulder flexion/extension exercises yet only able to manage shoulder flexion on one UE at a time d/t fatigue. Pt ate several bites of her scrambled eggs/hasbrowns with Setup A, however fatigued quickly and was noted to drift off to sleep several times. OT assisted Pt back into L side lying for safety with pillow support beneath buttock/back. Continue to recommend SNF v [facility] OT with 24/7 caregiver assistance, pending Pt and family desires. Current Level of Function Impacting Discharge (ADLs): Setup to Total Assist with ADL, Max-Total Assist x2 with all aspects of bed mobility. Other factors to consider for discharge: Previously lived alone. Fecal management system. Pressure ulcers. On HD. Requires Max-Total Assist x2 with rolling.           PLAN :  Goals have been updated based on progression since last assessment. Patient continues to benefit from skilled intervention to address the above impairments. Continue to follow patient 3 times a week to address goals. Recommend with staff: Upright for all meals per SLP, Side lying when resting in order to minimize further risk for pressure ulcers, Encourage active participation in ADL. Recommend next OT session: ADL with HOB elevated. BUE TherEx. Recommendation for discharge: (in order for the patient to meet his/her long term goals)  Therapy up to 5 days/week in SNF vs [facility] OT with 24/7 caregiver assistance, pending Pt and family request.    This discharge recommendation:  Has been made in collaboration with the attending provider and/or case management    Equipment recommendations for successful discharge (if) home: If d/c home, would require specialty hospital bed with pressure relieving mattress, mechanical lift and wheelchair with pressure relieving cushion. SUBJECTIVE:   Patient stated I feel better today. \"    OBJECTIVE DATA SUMMARY:   Cognitive/Behavioral Status:  Neurologic State: Drowsy  Orientation Level: Oriented to person;Oriented to place; Disoriented to situation;Disoriented to time  Cognition: Follows commands  Functional Mobility and Transfers for ADLs:  Bed Mobility:  Rolling: Maximum assistance; Total assistance;Assist x2 (limited ability to reach for bedrails, multiple trials)      ADL Intervention:    Educated Pt on importance of nutrition and challenged Pts active engagement in ADL, given Pt initially requesting OT wash her face and hold orange juice cup for her. Provided multimodal cues to assist Pt with rolling side<>side and holding self in position during tavo hygiene and wound care. Instructed Pt to notify staff when done eating in order to be repositioning into side lying given extensive sacral ulcers, she should limit time seated completely upright.      Feeding  Container Management: Total assistance (dependent)  Utensil Management: Set-up  Food to Mouth: Set-up  Drink to Mouth: Contact guard assistance (orange juice in styrofoam cup)  Cues: Physical assistance;Visual/perceptual training/retraining    Grooming  Washing Face: Maximum assistance  Cues: Physical assistance;Visual/perceptual training/retraining (cues for initiation and d/t decreased B shoulder AROM)    TherEx:  1x5 reps B shoulder flex/ext (could only tolerate one UE at a time d/t fatigue)      Pain:  Denied pain at onset of session, however exhibited facial grimacing while NP completed wound care to sacral ulcers. Activity Tolerance:   Fair on 1 L oxygen.      After treatment patient left in no apparent distress:   Supine in bed, Heels elevated for pressure relief, Patient positioned in L sidelying for pressure relief, Call bell within reach, and Side rails x 3    COMMUNICATION/COLLABORATION:   The patients plan of care was discussed with: Physical Therapist, Registered Nurse, and Patient    EVA Vaughan/L  Time Calculation: 36 mins

## 2022-06-07 NOTE — PROGRESS NOTES
Comprehensive Nutrition Assessment    Type and Reason for Visit: Reassess    Nutrition Recommendations/Plan:   1. Continue diet/supplements as ordered  2. Given minimal intake for several days, if family wants aggressive care, recommend dobbhoff placement and initiation of TF (RD to follow with specific recommendations)      Malnutrition Assessment:  Malnutrition Status:  Severe malnutrition (06/07/22 1612)    Context:  Acute illness     Findings of the 6 clinical characteristics of malnutrition:   Energy Intake:  50% or less of est energy requirements for 5 or more days  Weight Loss:  Greater than 2% over 1 week     Body Fat Loss:   ,     Muscle Mass Loss:   ,    Fluid Accumulation:  Mild, Extremities,Generalized   Strength:  Not performed         Nutrition Assessment:  Chart reviewed, pt sleeping soundly at time of visit. No family in the room to speak with. Poor appetite continues, she had episodes of hypoglycemia overnight. She is drinking glucerna when encouraged but very little from meal trays per RN notes. She has lost a significant amount of wt since admission, was initially 11.9kg and is now 82.6kg (7% wt loss over the course of the past 2 weeks). She remains a full code, would consider supplemental TF via dobbhoff is family wants to continue aggressive care. Patient Vitals for the past 168 hrs:   % Diet Eaten   06/06/22 1700 1 - 25%   06/06/22 1502 51 - 75%   06/04/22 1336 0%   06/04/22 0832 26 - 50%          Nutrition Related Findings:    Meds: cefepime, pepcid, solucortef, humalog, insulin NPH. Edema: nonpitting-generalized, +1-BLE.   BM 6/6 Wound Type: Deep tissue injury (sacrum, heels)    Current Nutrition Intake & Therapies:  Average Meal Intake: 1-25%  Average Supplement Intake: Unable to assess  ADULT ORAL NUTRITION SUPPLEMENT Breakfast, Lunch, Dinner; Diabetic Supplement  ADULT DIET Dysphagia - Soft & Bite Sized  DIET NPO    Anthropometric Measures:  Height: 5' 6\" (167.6 cm)  Ideal Body Weight (IBW): 130 lbs (59 kg)     Current Body Wt:  82.6 kg (182 lb 1.6 oz), 150.8 % IBW. Bed scale  Current BMI (kg/m2): 29.4                          BMI Category: Overweight (BMI 25.0-29. 9)    Estimated Daily Nutrient Needs:  Energy Requirements Based On: Formula  Weight Used for Energy Requirements: Current  Energy (kcal/day): MSJ 1750 (1336 x 1.3)  Weight Used for Protein Requirements: Current  Protein (g/day): 89g (1gPro/kg)  Method Used for Fluid Requirements: Standard renal  Fluid (ml/day): 1200mL    Nutrition Diagnosis:   · Inadequate protein-energy intake related to other (specify) (poor appetite) as evidenced by intake 0-25%  Previous dx continues.      Nutrition Interventions:   Food and/or Nutrient Delivery: Continue current diet,Continue oral nutrition supplement,Start tube feeding  Nutrition Education/Counseling: No recommendations at this time  Coordination of Nutrition Care: Continue to monitor while inpatient,Interdisciplinary rounds       Goals:  Previous Goal Met: Progress towards goal(s) declining  Goals: PO intake 50% or greater,within 2 days,by next RD assessment (vs start nutrition support in 2-4 days)       Nutrition Monitoring and Evaluation:   Behavioral-Environmental Outcomes: None identified  Food/Nutrient Intake Outcomes: Food and nutrient intake,Supplement intake  Physical Signs/Symptoms Outcomes: Biochemical data,Nutrition focused physical findings,Skin,Weight,Fluid status or edema,Hemodynamic status    Discharge Planning:    Continue oral nutrition supplement,Continue current diet    Teja Sotelo RD, [facility]  Contact: ext [phone]

## 2022-06-07 NOTE — PROGRESS NOTES
End of Shift Note    Bedside shift change report given to MARCIA Pugh (oncoming nurse) by Karen Singer RN (offgoing nurse). Report included the following information SBAR, Kardex and MAR    Shift worked:  7PM-7AM     Shift summary and any significant changes:     Patient continues on IV abt therapy. Turn and reposition during the night. Rectal management remains in place. No pain voiced.      Concerns for physician to address:      Zone phone for oncoming shift:         Karen Singer RN

## 2022-06-07 NOTE — PROGRESS NOTES
06/06/22 2020pm patient placed on 2 liters of oxygen via nasal cannula for o2 sats of 88% on room air.

## 2022-06-07 NOTE — PROGRESS NOTES
Transition of Care Plan:     RUR:17%  Disposition: Home with granddaughter vs. SNF  Granddaughter is interested in applying for Medicaid in case she needs LTC or Caregivers at home.  - Referral sent to [facility] [employer] to screen    Follow up appointments: PCP; Beatriz Garcia.   DME needed: tbd  Transportation at Discharge: Granddaughter or GD Spouse will transport if she is able to go in car.   Keys or means to access home:     Granddaughter   IM Medicare Letter: to be delivered prior to DC.   1st IM Letter given 5/23/22  Is patient a BCPI-A Bundle:        no              If yes, was Bundle Letter given?:    Is patient a [place] and connected with the [facility]?              No   If yes, was [employer] transfer form completed and [place] notified? Caregiver Contact: Granddaughter: Estefany Hernandes: [phone]  GD Spouse: Diane Mata: [phone]  Discharge Caregiver contacted prior to discharge? Yes reviewed plan with Granddaughter over the phone.   Care Conference needed?: to be determined. CM reached out to pt's granddaughter earlier in the day to discuss SNF choices & was asked to call back after 3pm.  CM phoned granddaughter again about 4pm and left a , requesting a return call.     Clara Smith  Ext [phone]

## 2022-06-07 NOTE — PROGRESS NOTES
End of Shift Note    Bedside shift change report given to Johnson Celis RN (oncoming nurse) by Sharmin Spencer (offgoing nurse). Report included the following information SBAR, Kardex, Procedure Summary, Intake/Output, MAR and Accordion    Shift worked:  7a to 7p     Shift summary and any significant changes: Tolerated medication   Tolerated diet   Drank more then consumed  still kept educating on the necessity to eat  Evan catheter placement for dialysis,     Room air   Patient turned with turn team and peers on unit    Flushed all drains   Changed patient 3 times when fecal system is kinked it backs up to flow out of the rectum   Flushed the fecl system BID today   Dialysis today 1.5 L off      Concerns for physician to address: None    Zone phone for oncoming shift:  None      Activity:  Activity Level: Bed Rest  Number times ambulated in hallways past shift: 0  Number of times OOB to chair past shift: 0    Cardiac:   Cardiac Monitoring: Yes      Cardiac Rhythm: Atrial Fib  /Sinus bradycardia    Access:   Current line(s): PIV     Genitourinary:   Urinary status: incontinent and ramos    Respiratory:   O2 Device: None (Room air)  Chronic home O2 use?: NO  Incentive spirometer at bedside: NO       GI:  Last Bowel Movement Date: 06/06/22  Current diet:  ADULT ORAL NUTRITION SUPPLEMENT Breakfast, Lunch, Dinner; Diabetic Supplement  ADULT DIET Dysphagia - Soft & Bite Sized  Passing flatus: YES  Tolerating current diet: YES       Pain Management:   Patient states pain is manageable on current regimen: YES    Skin:  Markos Score: 11  Interventions: turn team, speciality bed, float heels, foam dressing and PT/OT consult    Patient Safety:  Fall Score:  Total Score: 3  Interventions: stay with me (per policy)  High Fall Risk: Yes    Length of Stay:  Expected LOS: 4d 19h  Actual LOS: [facility]

## 2022-06-07 NOTE — PROGRESS NOTES
Hospitalist Progress Note    NAME: Rachel Motley   :  1934   MRN:  [id number]     Admit date: 2022    Today's date: 22    PCP: Renetta Medina MD    Anticipated discharge date:     Barriers:  SNF placment and permcath and OP HD     Assessment / Plan:    Acute metabolic encephalopathy   Hypoglycemia on   Lethargic, BS 58 on , resolved  Given orange juice , glucagon   Blood sugar in the 1 teens  Continue with NPH at reduced dose at 8 unit twice daily  Patient is doing much better, eating    Septic shock POA initial BP 46/34, temp 86.5, RR 42, GLORIA, lactate 4.26, pressor requirements  Klebsiella Bacteremia with UTI  POA  Serratia marcescens bacteremia  and 2022 POA  Persistent GNR bacteremia POA  ? Infectious endocarditis  - Admit UA 5-10 WBC, 1+ bacteria  - Admit BC with klebsiella and Serratia   Sensitive to zosyn, ceftriaxone  - Admit urine culture + for klebsiella  - Repeat BC  with recurrent sepsis + for Serratia  - CT abdomen/pelvis without IV contrast  with no hydronephrosis   No intraabdominal pathology  - Chest CT with bilateral effusions, no ASD  - With elevated lipase and abnormal LFTs, ? Biliary source   GGT 28, never had elevated Alk Phos and T bili   US with no gallstones, contracted GB, CBD 0.4 cm              Biliary source seems less likely  - IV pressors at admit, weaned off   Started on HD for GLORIA from ATN  -Started on stress steroid, being weaned down  - Transferred to floor 2022  - S/p zosyn --> transition to ceftriaxone   - Repeat Blood culture  with GNR  - Repeat blood culture  positive for gram-negative rods  - Not sure why she is persistent bacteremic with GNR despite appropriate antibiotics    TTE LVEF 55 to 60%, trace MR and AR  - Repeat CT abdomen given the tenderness, persistent bacteremia  -ID consulted, recommended to remove central line and the Evan repeat blood cultures.   Discussed with ID and nephrology  . CT abdomen pelvis with contrast did not show any acute pathology    Central line and Evan removed and plan for NAI by cardiology  . Blood cultures from 0602 negative to date  Status post NAI on 06/03, prelim report showed:  S/p NAI performed in sinus rhythm preliminarily revealing biatrial enlargement, moderate MR with very small mobile abnormality on the atrial side of the mitral valve, cannot exclude what might be a small vegetation. Large left atrial appendage with low exit velocity and without thrombus. Mild TR with evidence of moderate pulmonary hypertension. Small pericardial effusion. Continue with IV cefepime  Repeat blood cultures so far has been negative since 06/02  ID recommended 6 weeks of cefepime with hemodialysis  Outpatient hemodialysis to be set up by his neurology    A. fib with RVR  Patient has had episode of bradycardia with pauses during the stay due to combination of DKA, hyperkalemia  Beta-blocker was stopped. Poor candidate for  Anticoagulation   Status post amnio drip, switch to p.o. amnio    Acute kidney injury POA BUN/creat 114 and 5.97  Rhabdomyolysis CPK peak 22,325   -Last baseline creat 1.28 at [facility] on 3/9/2021  -Started on CRRT till 5/26, now on HD  -No hydronephrosis  -Likely ATN with hypotension, sepsis, rhabdomyolysis  - Oligouric  -Serial labs  -Await renal recovery, likely will need outpatient HD  Likely can DC Marlen    DKA POA BS 1259, HgBa1c > 14.0, AG > 20, pH 6.99  - Not sure if DKA present, acidosis may have been due to sepsis, GLORIA   Urine with trace ketones, but B-hydroxybutyrate levels in blood elevated  - Likely patient was not taking insulin at home as her A1c is >14  - s/p Insulin gtt --> currently on NPH 16 units twice daily, will hold it due to hypoglycemia          Acute pancreatitis POA  - admit Non contrast CT is neg for changes of acute pancreatitis.   - Lipase > 3000 -->  1107 -->  2499 --> 1602  - + abdominal tenderness  - clears  - No Gallstones or ductal dilatation, no ETOH  - ? Ischemic   - lipase tredning down  -Repeat CT abdomen/pelvis showed no acute abnormality  - serial labs     Acute metabolic encephalopathy POA resolved  - Multifactorial with septic shock, low BP, GLORIA, elevated BS > 1200  - Head CT with atrophy, no acute events  -Improving. Closely monitor mental status.  -manage metabolic issues as above.     Symptomatic bradycardia.  -Likely precipitated by severe acidemia, also on BB at home.   -Again had long pauses on 5/25, EP following. Overweight POA Body mass index is 29.39 kg/m². DVT prophylaxis. [address] SUP. Pepcid. Code status. Full code. Subjective:     Chief Complaint / Reason for Physician Visit  Follow-up bacteremia  Clinically improving, need for permacath    Fever/Chills n   Chest Pain n    Poor Appetite y   Edema     Cough    Abdominal Pain     Sputum    Joint Pain     SOB/BEE    Headache     Nausea/vomit    Tolerating PT/OT     Diarrhea n   Tolerating Diet     Constipation    Other       Could NOT obtain due to:      Objective:     VITALS:   Last 24hrs VS reviewed since prior progress note. Most recent are:  Patient Vitals for the past 24 hrs:   Temp Pulse Resp BP SpO2   06/07/22 1551 98.1 °F (36.7 °C) 80 16 (!) 145/49 98 %   06/07/22 1151 98.2 °F (36.8 °C) 77 17 (!) 143/59 94 %   06/07/22 0822 98.2 °F (36.8 °C) 80 17 (!) 118/59 99 %   06/07/22 0319 97.5 °F (36.4 °C) 82 16 (!) 116/57 99 %   06/06/22 2014 97.7 °F (36.5 °C) 89 16 (!) 133/52 96 %       Intake/Output Summary (Last 24 hours) at 6/7/2022 1644  Last data filed at 6/7/2022 0304  Gross per 24 hour   Intake 200 ml   Output 100 ml   Net 100 ml        Wt Readings from Last 12 Encounters:   05/26/22 82.6 kg (182 lb 1.6 oz)       PHYSICAL EXAM:    I had a face to face encounter and independently examined this patient on 06/07/22 as outlined below:    General: WD, WN. Lethargic cooperative, no acute distress    EENT:  PERRL. Anicteric sclerae. MMM  Resp:  CTA bilaterally, no wheezing or rales. No accessory muscle use  CV:  Regular  rhythm,  No edema  GI:  Soft, Non distended, Tender RUQ and epigastric region. +Bowel sounds, no rebound  Neurologic:  Lethargic this morning normal speech, non focal motor exam  Psych:   Not anxious nor agitated  Skin:  No rashes. No jaundice      Reviewed most current lab test results and cultures  YES  Reviewed most current radiology test results   YES  Review and summation of old records today    NO  Reviewed patient's current orders and MAR    YES  PMH/SH reviewed - no change compared to H&P  ________________________________________________________________________  Care Plan discussed with:    Comments   Patient x    Family      RN x    Care Manager     Consultant                        Multidiciplinary team rounds were held today with , nursing, pharmacist and clinical coordinator. Patient's plan of care was discussed; medications were reviewed and discharge planning was addressed. ________________________________________________________________________      Comments   >50% of visit spent in counseling and coordination of care     ________________________________________________________________________  Joanne Fountain MD     Procedures: see electronic medical records for all procedures/Xrays and details which were not copied into this note but were reviewed prior to creation of Plan. LABS:  I reviewed today's most current labs and imaging studies.   Pertinent labs include:  Recent Labs     06/05/22 0138   WBC 12.6*   HGB 9.1*   HCT 26.9*        Recent Labs     06/06/22  0426 06/05/22  0138    139   K 3.2* 3.2*   CL 99 101   CO2 26 31   * 78   BUN 58* 48*   CREA 6.86* 5.69*   CA 7.0* 7.2*

## 2022-06-07 NOTE — PROGRESS NOTES
Nephrology Progress Note  [facility]     [facility]  Phone - [phone]   Patient: Clarisse Rose    YOB: 1934        Date- 6/7/2022   Admit Date: 5/23/2022  CC: Follow up for GLORIA        IMPRESSION & PLAN:   · GLORIA due to ATN - HD dependent now, no signs of recovery  · CKD 3b  · DKA  · Sepsis   UTI   hypokalemia   Gram +ve blood cx    PLAN-   HD tomorrow.  Will keep her on MWF schedule   Consulted IR for [facility] cath placement tomorrow. It has been cleared by ID.  epogen for anemia     Subjective: Interval History:   -Seen and examined today   -Still has Evan. Objective:   Vitals:    06/06/22 2014 06/07/22 0319 06/07/22 0822 06/07/22 1151   BP: (!) 133/52 (!) 116/57 (!) 118/59 (!) 143/59   Pulse: 89 82 80 77   Resp: 16 16 17 17   Temp: 97.7 °F (36.5 °C) 97.5 °F (36.4 °C) 98.2 °F (36.8 °C) 98.2 °F (36.8 °C)   TempSrc:       SpO2: 96% 99% 99% 94%   Weight:       Height:          06/06 0701 - 06/07 0700  In: 400 [P.O.:300; I.V.:100]  Out: 1600 [Drains:100]  Last 3 Recorded Weights in this Encounter    05/23/22 0957 05/24/22 0727 05/26/22 0631   Weight: 89.2 kg (196 lb 10.4 oz) 88.9 kg (196 lb) 82.6 kg (182 lb 1.6 oz)      Physical exam:    GEN: NAD  NECK- Supple, no mass  RESP: No wheezing, Clear b/l  CVS: S1,S2  RRR  EXT:+Edema   PSYCH: Can't access due to patient's current condition       Chart reviewed. Pertinent Notes reviewed. Data Review :  Recent Labs     06/06/22  0426 06/05/22  0138    139   K 3.2* 3.2*   CL 99 101   CO2 26 31   BUN 58* 48*   CREA 6.86* 5.69*   * 78   CA 7.0* 7.2*     Recent Labs     06/05/22  0138   WBC 12.6*   HGB 9.1*   HCT 26.9*        No results for input(s): FE, TIBC, PSAT, FERR in the last 72 hours.    Medication list  reviewed  Current Facility-Administered Medications   Medication Dose Route Frequency    hydrocortisone Sod Succ (PF) (SOLU-CORTEF) injection 25 mg  25 mg IntraVENous DAILY    insulin NPH (NOVOLIN N, HUMULIN N) injection 8 Units  8 Units SubCUTAneous BID    famotidine (PEPCID) tablet 20 mg  20 mg Oral DAILY    epoetin betina-epbx (RETACRIT) 12,000 Units combo injection  12,000 Units SubCUTAneous Q MON, WED & FRI    amiodarone (CORDARONE) tablet 400 mg  400 mg Oral DAILY    cefepime (MAXIPIME) 1 g in 0.9% sodium chloride (MBP/ADV) 50 mL MBP  1 g IntraVENous Q24H    heparin (porcine) 1,000 unit/mL injection 1,400 Units  1,400 Units InterCATHeter DIALYSIS PRN    And    heparin (porcine) 1,000 unit/mL injection 1,100 Units  1,100 Units InterCATHeter DIALYSIS PRN    hydrALAZINE (APRESOLINE) 20 mg/mL injection 20 mg  20 mg IntraVENous Q6H PRN    albumin human 25% (BUMINATE) solution 25 g  25 g IntraVENous DIALYSIS PRN    metoprolol (LOPRESSOR) injection 2.5 mg  2.5 mg IntraVENous Q6H PRN    insulin lispro (HUMALOG) injection   SubCUTAneous AC&HS    glucose chewable tablet 16 g  4 Tablet Oral PRN    glucagon (GLUCAGEN) injection 1 mg  1 mg IntraMUSCular PRN    dextrose 10 % infusion 0-250 mL  0-250 mL IntraVENous PRN    sodium chloride (NS) flush 5-40 mL  5-40 mL IntraVENous Q8H    sodium chloride (NS) flush 5-40 mL  5-40 mL IntraVENous PRN    acetaminophen (TYLENOL) tablet 650 mg  650 mg Oral Q6H PRN    Or    acetaminophen (TYLENOL) suppository 650 mg  650 mg Rectal Q6H PRN    polyethylene glycol (MIRALAX) packet 17 g  17 g Oral DAILY PRN    ondansetron (ZOFRAN ODT) tablet 4 mg  4 mg Oral Q8H PRN    Or    ondansetron (ZOFRAN) injection 4 mg  4 mg IntraVENous Q6H PRN    [Held by provider] heparin (porcine) injection 5,000 Units  5,000 Units SubCUTAneous Q8H    alcohol 62% (NOZIN) nasal  1 Ampule  1 Ampule Topical Q12H    balsam peru-castor oiL (VENELEX) ointment   Topical BID          Florian Habermann, MD [facility]  [facility] / [facility], [address]  [place], [address]  Phone - [phone]               Fax - [phone]

## 2022-06-07 NOTE — PROGRESS NOTES
End of Shift Note    Bedside shift change report given to Judi Lua RN (oncoming nurse) by Kellen Quezada RN (offgoing nurse). Report included the following information SBAR, Kardex and MAR    Shift worked:  7a-7p     Shift summary and any significant changes:     Pt slept for most of the day. Fecal management system in place. Did not eat any meals. No concerns voiced by pt. Wound care completed.      Concerns for physician to address:  None   Zone phone for oncoming shift:   [phone]

## 2022-06-08 NOTE — PROGRESS NOTES
Progress Note      6/8/2022  NAME: Micaela Arora   MRN:  [id number]   Admit Diagnosis: [facility] [E11.10]  PCP:  Joselin Cross MD     Assessment: 1. Profound sinus bradycardia 20-30's in the setting of DKA, hyperkalemia, and OP beta-blocker. This has resolved. 2. Paroxysmal atrial fibrillation with post-conversion pauses up to 6 seconds on beta-blocker. Now off beta-blocker. Still some minor pauses. 3. Paroxysmal atrial flutter by tele. She had runs of atrial tachycardia and then followed by probably sustained Afib which organized into a right atrial flutter (by tele morphology) before terminating to sinus. 4. 2nd degree AV block with single dropped PAC's, RBBB with normal NH in sinus rhythm. 5. Hypotension likely due to dehydration/volume depletion, metabolic acidosis, bradycardia. Improved with volume resuscitation, pressor. Now off pressor, but bacteremia with Serratia (see ID note for details). 6. No evidence of acute ischemia or infarct by presenting ECG. Echo with normal LV and RV systolic function. 7. GLORIA atop CKD stage 3 at baseline. Rhabdomyolysis. On dialysis now. 8. Diabetes mellitus type 2 on chronic insulin complicated by DKA. 9. Elevated lipase possibly due to DKA rather than acute pancreatitis. 10. Metabolic encephalopathy, resolved. 11. Anemia. 12. Thrombocytopenia, improved. 13. Full code.     Plan:      1. She's on DVT prophylaxis at this time. I'd avoid FULL anticoagulation if possible given her anemia and thrombocytopenia. 2. Continue oral amiodarone. 3. Renal recs per nephrology team.  4. ID consult is following. Due to persistent bacteremia, a NAI was done 6/3. There may be a small vegetation on the mitral valve. Continue current management. Subjective:     No CP, dizziness, palpitations. Objective:      Physical Exam:    Last 24hrs VS reviewed since prior progress note.  Most recent are:    Visit Vitals  BP (!) 165/57 Pulse 65   Temp 97.8 °F (36.6 °C)   Resp 14   Ht 5' 6\" (1.676 m)   Wt 82.6 kg (182 lb 1.6 oz)   SpO2 99%   BMI 29.39 kg/m²       Intake/Output Summary (Last 24 hours) at 6/8/2022 1806  Last data filed at 6/8/2022 1234  Gross per 24 hour   Intake --   Output 2500 ml   Net -2500 ml           General: Tired and awake, no distress  Neck: Supple,   Respiratory: No respiratory distress, clear anteriorly   Cardiovascular: Regular rate rhythm, S1S2   Abdomen: soft, non tender   Neuro: moves all extremities   Skin: warm and dry   Extremity:  warm to touch      Data Review    Telemetry:  Afib with post-conversion pause earlier today of 3 sec. Currently in sinus in the 70's. Lab Data Personally Reviewed:    Recent Labs     06/08/22  0815   WBC 7.8   HGB 8.0*   HCT 25.0*        No results for input(s): INR, PTP, APTT, INREXT, INREXT in the last 72 hours. Recent Labs     06/06/22  0426      K 3.2*   CL 99   CO2 26   BUN 58*   CREA 6.86*   *   CA 7.0*     No results for input(s): CPK, CKNDX, TROIQ in the last 72 hours. No lab exists for component: CPKMB  Lab Results   Component Value Date/Time    Triglyceride 95 05/26/2022 08:30 AM       No results for input(s): AP, TBIL, TP, ALB, GLOB, GGT, AML, LPSE in the last 72 hours. No lab exists for component: SGOT, GPT, AMYP, HLPSE  No results for input(s): PH, PCO2, PO2 in the last 72 hours.     Medications Personally Reviewed:    Current Facility-Administered Medications   Medication Dose Route Frequency    insulin NPH (NOVOLIN N, HUMULIN N) injection 8 Units  8 Units SubCUTAneous BID    famotidine (PEPCID) tablet 20 mg  20 mg Oral DAILY    epoetin betina-epbx (RETACRIT) 12,000 Units combo injection  12,000 Units SubCUTAneous Q MON, WED & FRI    amiodarone (CORDARONE) tablet 400 mg  400 mg Oral DAILY    cefepime (MAXIPIME) 1 g in 0.9% sodium chloride (MBP/ADV) 50 mL MBP  1 g IntraVENous Q24H    heparin (porcine) 1,000 unit/mL injection 1,400 Units 1,400 Units InterCATHeter DIALYSIS PRN    And    heparin (porcine) 1,000 unit/mL injection 1,100 Units  1,100 Units InterCATHeter DIALYSIS PRN    hydrALAZINE (APRESOLINE) 20 mg/mL injection 20 mg  20 mg IntraVENous Q6H PRN    albumin human 25% (BUMINATE) solution 25 g  25 g IntraVENous DIALYSIS PRN    metoprolol (LOPRESSOR) injection 2.5 mg  2.5 mg IntraVENous Q6H PRN    insulin lispro (HUMALOG) injection   SubCUTAneous AC&HS    glucose chewable tablet 16 g  4 Tablet Oral PRN    glucagon (GLUCAGEN) injection 1 mg  1 mg IntraMUSCular PRN    dextrose 10 % infusion 0-250 mL  0-250 mL IntraVENous PRN    sodium chloride (NS) flush 5-40 mL  5-40 mL IntraVENous Q8H    sodium chloride (NS) flush 5-40 mL  5-40 mL IntraVENous PRN    acetaminophen (TYLENOL) tablet 650 mg  650 mg Oral Q6H PRN    Or    acetaminophen (TYLENOL) suppository 650 mg  650 mg Rectal Q6H PRN    polyethylene glycol (MIRALAX) packet 17 g  17 g Oral DAILY PRN    ondansetron (ZOFRAN ODT) tablet 4 mg  4 mg Oral Q8H PRN    Or    ondansetron (ZOFRAN) injection 4 mg  4 mg IntraVENous Q6H PRN    [Held by provider] heparin (porcine) injection 5,000 Units  5,000 Units SubCUTAneous Q8H    alcohol 62% (NOZIN) nasal  1 Ampule  1 Ampule Topical Q12H    balsam peru-castor oiL (VENELEX) ointment   Topical BID              Justin Penn MD

## 2022-06-08 NOTE — ADT AUTH CERT NOTES
Renal Failure, Acute - Care Day 7 (6/6/2022) by Jonas Alonso       Review Status Review Entered   Completed 6/6/2022 18:55      Criteria Review      Care Day: 7 Care Date: 6/6/2022 Level of Care: Telemetry    Guideline Day 2    Clinical Status    ( ) * Electrolyte abnormalities absent or improved    6/6/2022 18:55:28 EDT by Jonas Alonso      K 3.2  Ca 7.0  Na 136    ( ) * Acid-base abnormalities absent or improved    (X) * Hypotension absent    6/6/2022 18:55:28 EDT by Angie Waterman      /55    Activity    (X) Activity as tolerated    6/6/2022 18:55:28 EDT by Jonas Alonso      UP W ASSIST    Routes    (X) Parenteral or oral medications    6/6/2022 18:55:28 EDT by Angie Waterman      hydrocortisone Sod Succ (PF) (SOLU-CORTEF) injection 25 mg  Dose: 25 mg  Freq: DAILY Route: IV    (X) Oral diet as tolerated    6/6/2022 18:55:28 EDT by Honey Simons, [address] [place] [address] DIET Dysphagia - Soft & Bite Sized    Interventions    (X) Possible dialysis or renal replacement therapy    6/6/2022 18:55:28 EDT by Kadi JOHNSTON    (X) Monitor electrolytes, renal function tests, acid-base, and volume status    6/6/2022 18:55:28 EDT by Angie Waterman      ORDERED    Medications    (X) Possible medical therapies    6/6/2022 18:55:28 EDT by Lyly Gomes    * Milestone   Additional Notes    DATE: 6/6/2022   IP         Pertinent Updates:   Seen at dialysis suite, more awake         Vitals:   130/55   98.3   89   18   94% RA         Abnl/Pertinent Labs/Radiology/Diagnostic Studies:      Sodium: 136   Potassium: 3.2 (L)   Chloride: 99   CO2: 26   Anion gap: 11   Glucose: 174 (H)   BUN: 58 (H)   Creatinine: 6.86 (H)   BUN/Creatinine ratio: 8 (L)   Calcium: 7.0 (L)   GFR est non-AA: 6 (L)   GFR est AA: 7 (L)            Physical Exam:   General:          WD, WN.  Lethargic cooperative, no acute distress     EENT:              PERRL.  Anicteric sclerae. MMM   Resp:               CTA bilaterally, no wheezing or rales.  No accessory muscle use   CV:                  Regular  rhythm,  No edema   GI:                   Soft, Non distended, Tender RUQ and epigastric region.  +Bowel sounds, no rebound   Neurologic:  Lethargic this morning normal speech, non focal motor exam   Psych:   Not anxious nor agitated   Skin:                No rashes.  No jaundice            MD Consults/Assessments & Plans:   Acute metabolic encephalopathy    Hypoglycemia on 06/05   Lethargic, BS 58 on 06/05, resolved   Given orange juice , glucagon    Started on d5 at 5ccc/h, stopped today and blood sugar trending up   We will restart NPH at reduced dose at 8 unit twice daily   Pt not eating much       Septic shock POA initial BP 46/34, temp 86.5, RR 42, GLORIA, lactate 4.26, pressor requirements   Klebsiella Bacteremia with UTI 5/23 POA   Serratia marcescens bacteremia 5/23 and 5/26/2022 POA   Persistent GNR bacteremia POA   ?  Infectious endocarditis   - Admit UA 5-10 WBC, 1+ bacteria   - Admit BC with klebsiella and Serratia               Sensitive to zosyn, ceftriaxone   - Admit urine culture + for klebsiella   - Repeat BC 5/26 with recurrent sepsis + for Serratia   - CT abdomen/pelvis without IV contrast 5/24 with no hydronephrosis               No intraabdominal pathology   - Chest CT with bilateral effusions, no ASD   - With elevated lipase and abnormal LFTs, ?  Biliary source               GGT 28, never had elevated Alk Phos and T bili               US with no gallstones, contracted GB, CBD 0.4 cm               Biliary source seems less likely   - IV pressors at admit, weaned off               Started on HD for GLORIA from ATN   -Started on stress steroid, being weaned down   - Transferred to floor 5/29/2022   - S/p zosyn --> transition to ceftriaxone 5/31   - Repeat Blood culture 5/30 with GNR   - Repeat blood culture 5/31 positive for gram-negative rods   - Not sure why she is persistent bacteremic with GNR despite appropriate antibiotics                TTE LVEF 55 to 60%, trace MR and AR   - Repeat CT abdomen given the tenderness, persistent bacteremia   -ID consulted, recommended to remove central line and the Evan repeat blood cultures.  Discussed with ID and nephrology   .  CT abdomen pelvis with contrast did not show any acute pathology       Central line and Evan removed and plan for NAI by cardiology   .  Blood cultures from 0602 negative to date   Status post NAI on 06/03, prelim report showed:   S/p NAI performed in sinus rhythm preliminarily revealing biatrial enlargement, moderate MR with very small mobile abnormality on the atrial side of the mitral valve, cannot exclude what might be a small vegetation.  Large left atrial appendage with low exit velocity and without thrombus.  Mild TR with evidence of moderate pulmonary hypertension.  Small pericardial effusion.    Continue with IV cefepime   Repeat blood cultures so far has been negative since 06/02   ID recommended 6 weeks of cefepime with hemodialysis   Outpatient hemodialysis to be set up by his neurology   A. fib with RVR   Patient has had episode of bradycardia with pauses during the stay due to combination of DKA, hyperkalemia   Beta-blocker was stopped.  Poor candidate for  Anticoagulation    Status post amnio drip, switch to p.o. amnio       Acute kidney injury POA BUN/creat 114 and 5.97   Rhabdomyolysis CPK peak 22,325    -Last baseline creat 1.28 at [facility] on 3/9/2021   -Started on CRRT till 5/26, now on HD   -No hydronephrosis   -Likely ATN with hypotension, sepsis, rhabdomyolysis   - Oligouric   -Serial labs   -Await renal recovery, likely will need outpatient HD   Likely can DC Marlen       DKA POA BS 1259, HgBa1c > 14.0, AG > 20, pH 6.99   - Not sure if DKA present, acidosis may have been due to sepsis, GLORIA               Urine with trace ketones, but B-hydroxybutyrate levels in blood elevated   - Likely patient was not taking insulin at home as her A1c is >14   - s/p Insulin gtt --> currently on NPH 16 units twice daily, will hold it due to hypoglycemia            Acute pancreatitis POA   - admit Non contrast CT is neg for changes of acute pancreatitis. - Lipase > 3000 -->  1107 -->  2499 --> 1602   - + abdominal tenderness   - clears   - No Gallstones or ductal dilatation, no ETOH   - ? Ischemic    - lipase tredning down   -Repeat CT abdomen/pelvis showed no acute abnormality   - serial labs       Acute metabolic encephalopathy POA resolved   - Multifactorial with septic shock, low BP, GLORIA, elevated BS > 1200   - Head CT with atrophy, no acute events   -Improving. Closely monitor mental status.   -manage metabolic issues as above.       Symptomatic bradycardia.   -Likely precipitated by severe acidemia, also on BB at home.    -Again had long pauses on 5/25, EP following. CARDIOLOGY NOTE:   1. She's on DVT prophylaxis at this time.  I'd avoid FULL anticoagulation if possible given her anemia and thrombocytopenia as long as Afib and flutter episodes are brief, self-limited. 2. Still having short episodes of afib, cont po amiodarone, sinus this AM.   3. Her dialysis catheter was removed due to bacteremia. 4. ID consult noted. Due to persistent bacteremia, a NAI was done. Migdalia Lang may be a small vegetation on the mitral valve. NEPHROLOGY NOTE:   · Hd today to the hospital   · Will need to get permacath once she is okayed by ID. · We will keep her on hemodialysis Monday Wednesday Friday. · epogen for anemia            ID NOTE:   Blood cultures have cleared, no growth on 6/2 and 6/3. OK to place Permacath. For the long-run, please evaluate for graft/fistula placement to minimize bloodstream infections. Recommendations will be IV cefepime with HD for 6 weeks.           Medications:   amiodarone (CORDARONE) tablet 400 mg   Dose: 400 mg   Freq: DAILY Route: PO               CM Assessments or Notes:   CM contacted pt's granddaughter, to request SNF choices.  Granddaughter said she had not made any choices as of yet, but would review list this evening.  CM will contact granddaughter in the morning for SNF choices.             PT/OT UNABLE TO SEE PT, AWAY FOR DIALYSIS               SLP NOTE:   ASSESSMENT:   Patient tolerated purees and soft solids (diced peaches) without overt s/s aspiration.  Mastication is prolonged and mildly incomplete with solids.  No oral residue noted.  Patient reports satisfaction with soft and bite sized diet however limited intake per chart.  Given bedside presentation this date feel soft and bite sized diet remains appropriate.       PLAN:   Recommendations and Planned Interventions:   Soft and bite sized   Thin liquids   Sit up for all PO   Patient continues to benefit from skilled intervention to address the above impairments.  Continue treatment per established plan of care.              Renal Failure, Acute - Care Day 4 (6/3/2022) by Arleth Duval       Review Status Review Entered   Completed 6/3/2022 14:34      Criteria Review      Care Day: 4 Care Date: 6/3/2022 Level of Care:    Guideline Day 1    Clinical Status    (X) * Clinical Indications met    6/3/2022 14:34:52 EDT by Arleth Duval      indicators met    Routes    (X) Parenteral or oral medications    6/3/2022 14:34:52 EDT by Arleth Duval      meds attached    (X) Oral diet as tolerated    6/3/2022 14:34:52 EDT by Arleth Duval      regular diet    Interventions    (X) Urinalysis and other urine tests, CBC, renal function tests, hepatitis B test, other laboratory tests    6/3/2022 14:34:52 EDT by [address] [place] [address] labs in summary    (X) Possible insertion of temporary venous or peritoneal dialysis access    6/3/2022 14:34:52 EDT by Arleth Duval      plans for new jolene today    (X) Possible dialysis or renal replacement therapy    6/3/2022 14:34:52 EDT by Migdalia Cuevas is on HD    Medications (X) Discontinue potentially nephrotoxic medications    6/3/2022 14:34:52 EDT by Esther Rodriguez dc nephrotoxic meds/agnts    * Milestone   Additional Notes      81 yo F with:       Septic shock due to Persistent Serratia bacteremia. Also Klebsiella pneumoniae bacteremia this admission. Symptomatic bradycardia, DKA, rhabdomyolysis GLORIA requiring CRRT (now HD).     High-grade serratia marcescens bacteremia, positive blood cultures, 5/23/22 to 6/1/22 (and more cultures pending so far).     All central lines removed 6/1/22.            Recommendations:   - NAI \"cannot exclude small MV vegetation\" per note - final report to follow. - Continue cefepime HD dosing   - nephrology planning bean Kemp today   - follow up blood cultures from 6/2/22.    - Send repeat blood cultures today (6/3/22) as well. - Patient also noted to be on vancomycin with HD - unclear why. Will dc it. No evidence for GPC. All GNR bacteremia so far       Nephrology note   CC: Follow up for GLORIA         IMPRESSION & PLAN:   \" GLORIA due to ATN - on hd   \" CKD 3b   \" DKA   \" Sepsis   \" UTI   \" hypokalemia   \" Gram +ve blood cx       PLAN-   \" Hd today   \" Alverta Cassia cath today   \" epogen for anemia    Subjective:    Interval History:    -  S/p NAI today   bp high   No sob       Objective:       Vitals:     06/03/22 0935 06/03/22 0940 06/03/22 0945 06/03/22 1009   BP: (!) 143/52 (!) 146/48 (!) 137/47 (!) 149/67   Pulse: 61 63 60 63         insulin NPH (NOVOLIN N, HUMULIN N) injection 16 Units 16 Units SubCUTAneous BID   o Vancomycin - Pharmacist to Dose after HD Other Rx Dosing/Monitoring   o hydrocortisone Sod Succ (PF) (SOLU-CORTEF) injection 50 mg 50 mg IntraVENous Q12H   o cefepime (MAXIPIME) 1 g in 0.9% sodium chloride (MBP/ADV) 50 mL MBP 1 g IntraVENous Q24H   o amiodarone (CORDARONE) 375 mg/250 mL D5W infusion 0.5 mg/min IntraVENous CONTINUOUS   o heparin (porcine) 1,000 unit/mL injection 1,400 Units 1,400 Units InterCATHeter DIALYSIS PRN     And   o heparin (porcine) 1,000 unit/mL injection 1,100 Units 1,100 Units InterCATHeter DIALYSIS PRN   o hydrALAZINE (APRESOLINE) 20 mg/mL injection 20 mg 20 mg IntraVENous Q6H PRN   o albumin human 25% (BUMINATE) solution 25 g 25 g IntraVENous DIALYSIS PRN   o metoprolol (LOPRESSOR) injection 2.5 mg 2.5 mg IntraVENous Q6H PRN   o insulin lispro (HUMALOG) injection SubCUTAneous AC&HS   o glucose chewable tablet 16 g 4 Tablet Oral PRN   o glucagon (GLUCAGEN) injection 1 mg 1 mg IntraMUSCular PRN   o dextrose 10 % infusion 0-250 mL 0-250 mL IntraVENous PRN   o sodium chloride (NS) flush 5-40 mL 5-40 mL IntraVENous Q8H   o sodium chloride (NS) flush 5-40 mL 5-40 mL IntraVENous PRN   o acetaminophen (TYLENOL) tablet 650 mg 650 mg Oral Q6H PRN     Or   o acetaminophen (TYLENOL) suppository 650 mg 650 mg Rectal Q6H PRN   o polyethylene glycol (MIRALAX) packet 17 g 17 g Oral DAILY PRN   o ondansetron (ZOFRAN ODT) tablet 4 mg 4 mg Oral Q8H PRN     Or   o ondansetron (ZOFRAN) injection 4 mg 4 mg IntraVENous Q6H PRN   o [Held by provider] heparin (porcine) injection 5,000 Units 5,000 Units SubCUTAneous Q8H   o alcohol 62% (NOZIN) nasal  1 Ampule 1 Ampule Topical Q12H   o balsam peru-castor oiL (VENELEX) ointment Topical BID   o famotidine (PF) (PEPCID) 20 mg in 0.9% sodium chloride 10 mL injection 20 mg IntraVENous Q24H

## 2022-06-08 NOTE — PROGRESS NOTES
PT note:     Chart reviewed and noted patient is off the floor in HD. Will defer and continue to follow.      Aide Duncan, PT, DPT

## 2022-06-08 NOTE — PROGRESS NOTES
Pt arrives to IR department as an IP for a Evan to [employer]. Pt is A&Ox4 with no c/o pain. Pt reports eating eggs this morning and drinking juice \"not that long ago\". Plan to proceed with local and IV analgesics as sedation is contraindicated at this time.

## 2022-06-08 NOTE — PROGRESS NOTES
Transition of Care Plan:     RUR:16%  Disposition: Home with granddaughter vs. SNF  Granddaughter is interested in applying for Medicaid in case she needs LTC or Caregivers at home.  - Referral sent to [facility] [employer] to screen    Follow up appointments: PCP; Beatriz Garcia.   DME needed: tbd  Transportation at Discharge: Granddaughter or GD Spouse will transport if she is able to go in car.   Keys or means to access home:     Granddaughter   IM Medicare Letter: to be delivered prior to DC.   1st IM Letter given 5/23/22  Is patient a BCPI-A Bundle:        no              If yes, was Bundle Letter given?:    Is patient a [place] and connected with the [facility]?              No   If yes, was [employer] transfer form completed and [place] notified? Caregiver Contact: Granddaughter: Radha Mujica: [phone]  GD Spouse: Raoul Alonso: [phone]  Discharge Caregiver contacted prior to discharge? Yes reviewed plan with Granddaughter over the phone.   Care Conference needed?: to be determined. CM unable to reach pt's granddaughter via phone & was not able to leave a vm message, due to a full mailbox. CM will call again in the morning. CM also faxed HEP B results to [id number].     Verona Hamilton  Ext [phone]

## 2022-06-08 NOTE — PROGRESS NOTES
Patient arrived back from dialysis, and was found (by tech) to be bleeding from right Dialysis Evan  access site, bleeding from underneath ports. Cleansed area under arm pit and applied 4X4's to access site. Covering RN notified. Patient denies pain or discomfort from this area.

## 2022-06-08 NOTE — PROGRESS NOTES
Nephrology Progress Note  Pb [facility]ora     [facility]  Phone - [phone]   Patient: Javier Daly    YOB: 1934        Date- 6/8/2022   Admit Date: 5/23/2022  CC: Follow up for GLORIA        IMPRESSION & PLAN:   · GLORIA due to ATN - HD dependent now, no signs of recovery  · CKD 3b  · DKA  · Sepsis   UTI   hypokalemia   Gram +ve blood cx    PLAN-   HD today.  Will keep her on MWF schedule   Consulted IR for [facility] cath placement today. It has been cleared by ID.  epogen for anemia     Subjective: Interval History:   -Seen and examined today on HD.  -Still has Evan. Objective:   Vitals:    06/08/22 1030 06/08/22 1045 06/08/22 1100 06/08/22 1115   BP: 133/64 (!) 148/62 (!) 147/62 (!) 153/60   Pulse: 64 64 63 66   Resp: 16 16 16 16   Temp:       TempSrc:       SpO2:       Weight:       Height:          No intake/output data recorded. Last 3 Recorded Weights in this Encounter    05/23/22 0957 05/24/22 0727 05/26/22 0631   Weight: 89.2 kg (196 lb 10.4 oz) 88.9 kg (196 lb) 82.6 kg (182 lb 1.6 oz)      Physical exam:    GEN: NAD  NECK- Supple, no mass  RESP: No wheezing, Clear b/l  CVS: S1,S2  RRR  EXT:+Edema   PSYCH: Can't access due to patient's current condition       Chart reviewed. Pertinent Notes reviewed. Data Review :  Recent Labs     06/06/22  0426      K 3.2*   CL 99   CO2 26   BUN 58*   CREA 6.86*   *   CA 7.0*     Recent Labs     06/08/22  0815   WBC 7.8   HGB 8.0*   HCT 25.0*        No results for input(s): FE, TIBC, PSAT, FERR in the last 72 hours.    Medication list  reviewed  Current Facility-Administered Medications   Medication Dose Route Frequency    insulin NPH (NOVOLIN N, HUMULIN N) injection 8 Units  8 Units SubCUTAneous BID    famotidine (PEPCID) tablet 20 mg  20 mg Oral DAILY    epoetin betina-epbx (RETACRIT) 12,000 Units combo injection  12,000 Units SubCUTAneous Q MON, WED & FRI    amiodarone (CORDARONE) tablet 400 mg  400 mg Oral DAILY    cefepime (MAXIPIME) 1 g in 0.9% sodium chloride (MBP/ADV) 50 mL MBP  1 g IntraVENous Q24H    heparin (porcine) 1,000 unit/mL injection 1,400 Units  1,400 Units InterCATHeter DIALYSIS PRN    And    heparin (porcine) 1,000 unit/mL injection 1,100 Units  1,100 Units InterCATHeter DIALYSIS PRN    hydrALAZINE (APRESOLINE) 20 mg/mL injection 20 mg  20 mg IntraVENous Q6H PRN    albumin human 25% (BUMINATE) solution 25 g  25 g IntraVENous DIALYSIS PRN    metoprolol (LOPRESSOR) injection 2.5 mg  2.5 mg IntraVENous Q6H PRN    insulin lispro (HUMALOG) injection   SubCUTAneous AC&HS    glucose chewable tablet 16 g  4 Tablet Oral PRN    glucagon (GLUCAGEN) injection 1 mg  1 mg IntraMUSCular PRN    dextrose 10 % infusion 0-250 mL  0-250 mL IntraVENous PRN    sodium chloride (NS) flush 5-40 mL  5-40 mL IntraVENous Q8H    sodium chloride (NS) flush 5-40 mL  5-40 mL IntraVENous PRN    acetaminophen (TYLENOL) tablet 650 mg  650 mg Oral Q6H PRN    Or    acetaminophen (TYLENOL) suppository 650 mg  650 mg Rectal Q6H PRN    polyethylene glycol (MIRALAX) packet 17 g  17 g Oral DAILY PRN    ondansetron (ZOFRAN ODT) tablet 4 mg  4 mg Oral Q8H PRN    Or    ondansetron (ZOFRAN) injection 4 mg  4 mg IntraVENous Q6H PRN    [Held by provider] heparin (porcine) injection 5,000 Units  5,000 Units SubCUTAneous Q8H    alcohol 62% (NOZIN) nasal  1 Ampule  1 Ampule Topical Q12H    balsam peru-castor oiL (VENELEX) ointment   Topical BID          Stephanie Vargas MD              [facility]  [facility] / [facility], [address]  [place], [address]  Phone - [phone]               Fax - [phone]

## 2022-06-08 NOTE — PROGRESS NOTES
Name of procedure: Evan to Permacath exchange     Medications given: Yes    Fentanyl: 100 mcg      Vital Signs: Stable    Fluids removed: No    Samples sent to lab: No    Any complications related to procedure: No    Post Procedure Care Needed/order sets placed in [facility].

## 2022-06-08 NOTE — PROGRESS NOTES
Head to toe assessment completed @7625 with instructor. No abnormal findings, patient repositioned and rectal tube assessed,small amount of pasty stool observed on pads, working properly. Kaur care provided.

## 2022-06-08 NOTE — DIABETES MGMT
[facility]    CLINICAL NURSE SPECIALIST CONSULT     Initial Presentation   Claribel Mendoza is a 80 y.o. female admitted from the ER today 5/23/22 in DKA with profound bradycardia, after being found down by granddaughter. Granddaughter states that patient is independent in her activities of daily living, and has a routine that includes her diabetes self-care. He grandmother had reported that she wasn't feeling well and the granddaughter had made arrangements for a home visit. When she went into her room this morning to remind her of the visit, she found her down on the floor. EMS called. Of note, granddaughter states that she (herself) had been hospitalized for a month and returned home at the end of April. She surmises that her grandmother may not have been taking her insulin because there was more insulin in the refrigerator than there should have been. LAB:  WBC 10.9. AST 75. Lipase >3000. Troponin 31. NT pro-BNP 1588. Urinary glucose & ketone +. CT Head: No extra-axial fluid collection, hemorrhage or shift. Atrophy mostly posterior fossa. CXR: Mild pulmonary edema    HX:   Past Medical History:   Diagnosis Date    Diabetes ([facility] [address]       INITIAL DX:   DKA (diabetic ketoacidosis) ([facility] [address] [E11.10]     Current Treatment     TX: Insulin. Pepcid. ABx. Steroids    Consulted by Provider for advanced diabetes nursing assessment and care for:   [x] Transitioning off Ej Croissant   [x] Inpatient management strategy  [] Home management assessment  [] Survival skill education    Hospital Course   Clinical progress has been complicated by need for ICU level of care. 5/24/22 Alert but babbling; Precedex+. O2NC. Remains on three (3) pressors. CRRT+. Scheduled for CT chest & ABd wo contrast today. Plans to add steroids. Discussion of transition off Ej Croissant made during IPR.  5/25/22 Alert. Conversing in understandable language. O2NC. Afib. Remains on two (2) pressors.  Skin per wound care/nursing. CRRT+  5/26/22 Alert & oriented to person. Off O2. NPO except for supplements. CRRT+. 5/27/22 Alert & oriented. Answering questions clearly. Dialysis now+.   5/31/22 Patient ate almost all of her breakfast tray and is now resting comfortable with eyes closed. 6/6/22 Resting with eyes closed. Dialysis today. It is noted that the patient was OK'd for permacath placement. Her dialysis catheter was removed due to bacteremia. 6/8/22 Dialysis this morning    Diabetes History   Type 2 diabetes treated with insulin therapy  Admission BG 1259 with AG 30    Diabetes-related Medical History: Deferred    Diabetes Medication History  Key Antihyperglycemic Medications             glipiZIDE (GLUCOTROL) 10 mg tablet Take 10 mg by mouth daily. insulin lispro protamine/insulin lispro (HUMALOG MIX 75/25) 100 unit/mL (75-25) injection 48 Units by SubCUTAneous route daily. take 48 units in the morning    insulin lispro protamine/insulin lispro (HUMALOG MIX 75/25) 100 unit/mL (75-25) injection 32 Units by SubCUTAneous route every evening. take 32 units subcutaneously at night. Diabetes self-management practices: Per granddaughter  Eating pattern   [x] Not eating a carbohydrate-controlled mealplan  [x] Breakfast Cheerios with milk & banana.  Coffee (may have had Cheese toast earlier)  [x] Sankc  Chips a Hoy cookies (while she is watching TV  [x] Dinner  With granddaughter   Physical activity pattern - Uses Rollator to move about the house   [x] Not employing a physical activity program to control BG  Monitoring pattern - Tracks on a tablet by bedside & takes to her provider   [x] Testing BGs   [x] Breakfast   Taking medications pattern  [x] Consistent administration  [x] Affordable  Overall evaluation:    [x] A1c 13.6 (5/23/22)    Subjective   SHIRLEY ( dialysis)     Objective   Physical exam  General Obese female in no acute distress  Neuro  Alert and talking  Vital Signs   Visit Vitals  BP (!) 159/49 (BP 1 Location: Left arm, BP Patient Position: At rest)   Pulse 71   Temp 98.2 °F (36.8 °C)   Resp 16   Ht 5' 6\" (1.676 m)   Wt 82.6 kg (182 lb 1.6 oz)   SpO2 100%   BMI 29.39 kg/m²     Laboratory  Recent Labs     06/08/22  0815 06/06/22  0426   GLU  --  174*   AGAP  --  11   WBC 7.8  --    CREA  --  6.86*   GFRNA  --  6*     Factors impacting BG management  Factor Dose Comments   Nutrition:  Dysphagia Pureed       Drugs:  Steroids   HC 50mg Q6 hrs ( Completed    Impairs insulin action   Infection Cefepime 1 g Q 24 hours  Ancef 2 g ( Once)   Afebrile. WBCs sloan   Other:   Kidney function   GFR 6 ( 6/6/22)   Dialysis+     Blood glucose pattern      Significant diabetes-related events over the past 24-72 hours  Admitted in profound DKA with electrolyte disturbances. BG 1259 with AG 30  On Glucostabilizer; received 27-32 units of insulin/hour initially  BG finally under 250mg/dl at 1am 5/24/22 after 5L of fluid in ICU  Significant GLORIA - Requiring dialysis  Steroids started 5/24/22 - BGs higher than target  NPH dose adjustment with BGs in 120-150s until she began eating 5/26/22 6/6/22 Hypoglycemic episode yesterday morning on 16 units NPH twice daily. NPH dose reduced to 10 units twice daily to start today. Assessment and Plan   Nursing Diagnosis Risk for unstable blood glucose pattern   Nursing Intervention Domain 9229 Decision-making Support   Nursing Interventions Examined current inpatient diabetes/blood glucose control   Explored factors facilitating and impeding inpatient management     Evaluation   This overweight AA female was admitted in DKA associated with bradycardia. Received significant amounts of insulin/hour via Glucostabilizer. Fluid resuscitation also needed in presence of elevated NT pro-BNP. BGs down under 250mg/dl at 1am 5/24/22, and AG closed at 8am. Transitioned off GS, and steroids added 5/24/22. BGs sloan into the low 200s.  NPH insulin started to override steroids in presence of kidney dysfunction with small increase in dose. BGs in target until she began eating yesterday 5/26/22. This woman uses combo insulin at home, which is not available in-house so we can tailor dosing to changing circumstances. Current insulin dose is at half her usual basal insulin dose, but her kidney function has deteriorated in light of her presenting situation, e.g., DKA. Recommend increasing basal dose incrementally to establish basal dose in light of GLORIA/CKD. Some Info extracted from [address] [facility] note    5/31/22 BG within goal this morning on 14 units NPH twice daily. The patient continues to receive 50 mg hydrocortisone injection Q 8 hours. I recommend continuing on current diabetes medication regimen for now. Diabetes management will continue to follow with this patient, monitoring BG trends and making recommendations as needed. 6/1/22 BG's elevated today on 14 units NPH twice daily. The patient continues on 50 mg hydrocortisone Q 8 hours. Consider a slight increase in the basal insulin dose. (0.4 x kg) 17 units NPH twice daily  6/2/22  BG's elevated today on 16 units NPH twice daily. The patient continues on 50 mg hydrocortisone Q 8 hours. Consider a slight increase in the basal dose. 6/6/22 BG elevated this morning. Basal insulin dose was held yesterday due to hypoglycemic episode yesterday morning. The patient's NPH dose has been decreased to 10 units twice daily to start today. The steroid dose has been decreased as well to 25 mg hydrocortisone daily. I suspect the patient may not require as much insulin. Consider 8 units NPH ( 0.2 x kg) twice daily. 6/8/22 BG's ranging 81 -139 for past 24 hours on NPH 8 units twice daily. The morning BG was 82. I recommend a further increase in the basal dose . Consider using 4 units NPH ( 0.1 x kg) twice daily. Recommendations     1. Decrease to 4 units NPH twice daily.      2.Continue HIGH sensitivity corrective insulin     Billing Code(s)   [x] 43925 IP subsequent hospital care - 15 minutes     Before making these care recommendations, I personally reviewed the hospitalization record, including notes, laboratory & diagnostic data and current medications, and examined the patient at the bedside (circumstances permitting) before making care recommendations. More than fifty (50) percent of the time was spent in patient counseling and/or care coordination.   Total minutes: 15 minutes    SANA Doyle  Diabetes Clinical Nurse Specialist  [facility]  Access via [employer]

## 2022-06-08 NOTE — PROGRESS NOTES
Attempted to see pt for OT services. Pt is off the floor for dialysis. Will defer but continue to follow.

## 2022-06-08 NOTE — PROGRESS NOTES
TRANSFER - OUT REPORT:    Verbal report given to Rodolfo Gonzalez RN (name) on Lugenia Check  being transferred to Surg Tele(unit) for routine progression of care       Report consisted of patients Situation, Background, Assessment and   Recommendations(SBAR). Information from the following report(s) Procedure Summary was reviewed with the receiving nurse. Lines:   Peripheral IV 04/94/51 Right Basilic (Active)   Site Assessment Clean, dry, & intact 06/07/22 2225   Phlebitis Assessment 0 06/07/22 2225   Infiltration Assessment 0 06/07/22 2225   Dressing Status Clean, dry, & intact 06/07/22 2225   Dressing Type Transparent;Tape 06/07/22 2225   Hub Color/Line Status Green;Capped 06/07/22 2225   Action Taken Open ports on tubing capped 06/07/22 2225   Alcohol Cap Used Yes 06/07/22 2225        Opportunity for questions and clarification was provided.       Patient transported with:   O2 @ 2 liters

## 2022-06-08 NOTE — PROCEDURES
Hemodialysis / [phone]    Vitals Pre Post Assessment Pre Post   BP BP: (!) 173/70 (06/08/22 0842) 148/61 LOC A&Ox2 same   HR Pulse (Heart Rate): 73 (06/08/22 0842) 63 Lungs Coarse bases same   Resp Resp Rate: 16 (06/08/22 0842) 16 Cardiac RRR same   Temp Temp: 97.2 °F (36.2 °C) (06/08/22 0842) 97.4 Skin Sacral wound same   Weight    Edema 1+ BLE same   Tele status   Pain Pain Intensity 1: 0 (06/08/22 0736)      Orders   Duration: Start: 7354 End: 5055 Total: 3.45hrs   Dialyzer: Dialyzer/Set Up Inspection: Gwendolynn Bun (06/08/22 0842)   K Bath: Dialysate K (mEq/L): 4 (06/08/22 0842)   Ca Bath: Dialysate CA (mEq/L): 2.5 (06/08/22 0842)   Na: Dialysate NA (mEq/L): 138 (06/08/22 0842)   Bicarb: Dialysate HCO3 (mEq/L): 40 (06/08/22 0842)   Target Fluid Removal: Goal/Amount of Fluid to Remove (mL): 2000 mL (06/08/22 0842)     Access   Type & Location: RIJ CVC: Dressing CDI. No s/s of infection. Both lumens aspirate & flush well. Running well at . SBAR received from Primary RN. Pt arrived to HD suite A&Ox4. Consent signed & on file. Each catheter limb disinfected per p&p, caps removed, hubs disinfected per p&p. Each lumen aspirated for blood return and flushed with Normal Saline per policy. Labs drawn per request/ order. VSS. Dialysis Tx initiated. Comments:  No s/s of infection noted.   Pt to get PC today                                      Labs   HBsAg (Antigen) / date: Neg 5/24/22                                              HBsAb (Antibody) / date: Susc 5/24/22   Source: EPIC   Obtained/Reviewed  Critical Results Called HGB   Date Value Ref Range Status   06/05/2022 9.1 (L) 11.5 - 16.0 g/dL Final     Potassium   Date Value Ref Range Status   06/06/2022 3.2 (L) 3.5 - 5.1 mmol/L Final     Calcium   Date Value Ref Range Status   06/06/2022 7.0 (L) 8.5 - 10.1 MG/DL Final     BUN   Date Value Ref Range Status   06/06/2022 58 (H) 6 - 20 MG/DL Final     Creatinine   Date Value Ref Range Status   06/06/2022 6.86 (H) 0.55 - 1.02 MG/DL Final        Meds Given   Name Dose Route   None ordered                 Adequacy / Fluid    Total Liters Process: 79.9   Net Fluid Removed: 2500mL      Comments   Time Out Done:   (Time) 0968   Admitting Diagnosis: DKA   Consent obtained/signed: Informed Consent Verified: Yes (06/08/22 1010)   Machine / Preeti Wyman # Machine Number: [id number] (06/08/22 2605)   Primary Nurse Rpt Pre: Carole Floyd (name)MARCIA   Primary Nurse Rpt Post: Carole Floyd (name), RN   Pt Education: procedural   Care Plan: On going   Pts outpatient clinic: TBD     Tx Summary  0162:  RIJ CVC: Dressing CDI. No s/s of infection. Both lumens aspirate & flush well. Running well at . SBAR received from Primary RN. Pt arrived to HD suite A&Ox4. Consent signed & on file. Each catheter limb disinfected per p&p, caps removed, hubs disinfected per p&p. Each lumen aspirated for blood return and flushed with Normal Saline per policy. Labs drawn per request/ order. Lines reversed. VSS. Dialysis Tx initiated. 0900:  Pt resting  0915:  Pt resting  0930:  Pt resting  0945:  Pt resting  1000:  Pt resting; 200ns given to prevent clotting  1015:  Pt resting  1030:  Pt resting  1045:  Pt resting  1100:  Pt resting  1115:  Pt resting  1130:  Pt resting; BFR reduced to 300 d/t AP, Dr Charles Gilmore at bedside requested to pull more fluid. Goal adjusted accordingly. 1145:  Pt resting  1200:  Pt resting  1215:  Pt resting  1230:  Pt resting  1234: Tx ended. VSS. Each dialysis catheter limb disinfected per p&p, all possible blood returned per p&p, and each dialysis hub disinfected per p&p. Each lumen flushed, post dialysis catheter Heparin dwell instilled per order, and caps applied. Bed locked and in the lowest position, call bell and belongings in reach. SBAR given to Primary, RN. Patient is stable at time of their/ my departure. All Dialysis related medications have been reviewed. Comments:   Assessment performed by RN.   Procedure and documentation observed and reviewed by Anne Marie Toledo RN.

## 2022-06-08 NOTE — PROGRESS NOTES
Hospitalist Progress Note    NAME: Josh Monk   :  1934   MRN:  [id number]     Admit date: 2022    Today's date: 22    PCP: Gracia Brown MD    Anticipated discharge date:     Barriers:  SNF placment and permcath and OP HD     Assessment / Plan:    Acute metabolic encephalopathy   Hypoglycemia on   Lethargic, BS 58 on , resolved  Given orange juice , glucagon   Blood sugar in the 1 teens  Continue with NPH at reduced dose at 8 unit twice daily  Patient is doing much better, eating    Septic shock POA initial BP 46/34, temp 86.5, RR 42, GLORIA, lactate 4.26, pressor requirements  Klebsiella Bacteremia with UTI  POA  Serratia marcescens bacteremia  and 2022 POA  Persistent GNR bacteremia POA  ? Infectious endocarditis  - Admit UA 5-10 WBC, 1+ bacteria  - Admit BC with klebsiella and Serratia   Sensitive to zosyn, ceftriaxone  - Admit urine culture + for klebsiella  - Repeat BC  with recurrent sepsis + for Serratia  - CT abdomen/pelvis without IV contrast  with no hydronephrosis   No intraabdominal pathology  - Chest CT with bilateral effusions, no ASD  - With elevated lipase and abnormal LFTs, ? Biliary source   GGT 28, never had elevated Alk Phos and T bili   US with no gallstones, contracted GB, CBD 0.4 cm              Biliary source seems less likely  - IV pressors at admit, weaned off   Started on HD for GLORIA from ATN  -Started on stress steroid, being weaned down  - Transferred to floor 2022  - S/p zosyn --> transition to ceftriaxone   - Repeat Blood culture  with GNR  - Repeat blood culture  positive for gram-negative rods  - Not sure why she is persistent bacteremic with GNR despite appropriate antibiotics    TTE LVEF 55 to 60%, trace MR and AR  - Repeat CT abdomen given the tenderness, persistent bacteremia  -ID consulted, recommended to remove central line and the Evan repeat blood cultures.   Discussed with ID and nephrology  . CT abdomen pelvis with contrast did not show any acute pathology    Central line and Evan removed and plan for NAI by cardiology  . Blood cultures from 0602 negative to date  Status post NAI on 06/03, prelim report showed:  S/p NAI performed in sinus rhythm preliminarily revealing biatrial enlargement, moderate MR with very small mobile abnormality on the atrial side of the mitral valve, cannot exclude what might be a small vegetation. Large left atrial appendage with low exit velocity and without thrombus. Mild TR with evidence of moderate pulmonary hypertension. Small pericardial effusion. Continue with IV cefepime  Repeat blood cultures so far has been negative since 06/02  ID recommended 6 weeks of cefepime with hemodialysis  Outpatient hemodialysis to be set up by his neurology  Plan for permacath today  A. fib with RVR  Patient has had episode of bradycardia with pauses during the stay due to combination of DKA, hyperkalemia  Beta-blocker was stopped. Poor candidate for  Anticoagulation   Status post amnio drip, switch to p.o. amnio    Acute kidney injury POA BUN/creat 114 and 5.97  Rhabdomyolysis CPK peak 22,325   -Last baseline creat 1.28 at [facility] on 3/9/2021  -Started on CRRT till 5/26, now on HD  -No hydronephrosis  -Likely ATN with hypotension, sepsis, rhabdomyolysis  - Oligouric  -Serial labs  -Await renal recovery, likely will need outpatient HD  Likely can NARINDER Gee    DKA POA BS 1259, HgBa1c > 14.0, AG > 20, pH 6.99  - Not sure if DKA present, acidosis may have been due to sepsis, GLORIA   Urine with trace ketones, but B-hydroxybutyrate levels in blood elevated  - Likely patient was not taking insulin at home as her A1c is >14  - s/p Insulin gtt --> currently on NPH 16 units twice daily, will hold it due to hypoglycemia          Acute pancreatitis POA  - admit Non contrast CT is neg for changes of acute pancreatitis.   - Lipase > 3000 -->  1107 -->  2499 --> 1602  - + abdominal tenderness  - clears  - No Gallstones or ductal dilatation, no ETOH  - ? Ischemic   - lipase tredning down  -Repeat CT abdomen/pelvis showed no acute abnormality  - serial labs     Acute metabolic encephalopathy POA resolved  - Multifactorial with septic shock, low BP, GLORIA, elevated BS > 1200  - Head CT with atrophy, no acute events  -Improving. Closely monitor mental status.  -manage metabolic issues as above.     Symptomatic bradycardia.  -Likely precipitated by severe acidemia, also on BB at home.   -Again had long pauses on 5/25, EP following. Overweight POA Body mass index is 29.39 kg/m². DVT prophylaxis. [facility]. SUP. Pepcid. Code status. Full code. Subjective:     Chief Complaint / Reason for Physician Visit  Follow-up bacteremia  Had episode of pauses and bradycardia this morning    Fever/Chills n   Chest Pain n    Poor Appetite y   Edema     Cough    Abdominal Pain     Sputum    Joint Pain     SOB/BEE    Headache     Nausea/vomit    Tolerating PT/OT     Diarrhea n   Tolerating Diet     Constipation    Other       Could NOT obtain due to:      Objective:     VITALS:   Last 24hrs VS reviewed since prior progress note. Most recent are:  Patient Vitals for the past 24 hrs:   Temp Pulse Resp BP SpO2   06/08/22 0736 97.1 °F (36.2 °C) 76 16 138/81 99 %   06/08/22 0019 98.4 °F (36.9 °C) 76 18 (!) 161/55 98 %   06/07/22 1944 99 °F (37.2 °C) 82 16 (!) 141/61 100 %   06/07/22 1551 98.1 °F (36.7 °C) 80 16 (!) 145/49 98 %   06/07/22 1151 98.2 °F (36.8 °C) 77 17 (!) 143/59 94 %     No intake or output data in the 24 hours ending 06/08/22 0823     Wt Readings from Last 12 Encounters:   05/26/22 82.6 kg (182 lb 1.6 oz)       PHYSICAL EXAM:    I had a face to face encounter and independently examined this patient on 06/08/22 as outlined below:    General: WD, WN. Lethargic cooperative, no acute distress    EENT:  PERRL. Anicteric sclerae. MMM  Resp:  CTA bilaterally, no wheezing or rales.   No accessory muscle use  CV:  Regular  rhythm,  No edema  GI:  Soft, Non distended, Tender RUQ and epigastric region. +Bowel sounds, no rebound  Neurologic:  Lethargic this morning normal speech, non focal motor exam  Psych:   Not anxious nor agitated  Skin:  No rashes. No jaundice      Reviewed most current lab test results and cultures  YES  Reviewed most current radiology test results   YES  Review and summation of old records today    NO  Reviewed patient's current orders and MAR    YES  PMH/SH reviewed - no change compared to H&P  ________________________________________________________________________  Care Plan discussed with:    Comments   Patient x    Family      RN x    Care Manager     Consultant                        Multidiciplinary team rounds were held today with , nursing, pharmacist and clinical coordinator. Patient's plan of care was discussed; medications were reviewed and discharge planning was addressed. ________________________________________________________________________      Comments   >50% of visit spent in counseling and coordination of care     ________________________________________________________________________  Jessie Blue MD     Procedures: see electronic medical records for all procedures/Xrays and details which were not copied into this note but were reviewed prior to creation of Plan. LABS:  I reviewed today's most current labs and imaging studies. Pertinent labs include:  No results for input(s): WBC, HGB, HCT, PLT, HGBEXT, HCTEXT, PLTEXT, HGBEXT, HCTEXT, PLTEXT in the last 72 hours.   Recent Labs     06/06/22  0426      K 3.2*   CL 99   CO2 26   *   BUN 58*   CREA 6.86*   CA 7.0*

## 2022-06-08 NOTE — PROGRESS NOTES
Speech pathology note  Reviewed chart and note patient NPO for perm cath placement in IR tomorrow. Recommend resume soft and bite sized/thin liquid diet when patient cleared for PO intake. SLP will continue to follow. Thank you.     Sarabjit Fuller., CCC-SLP

## 2022-06-08 NOTE — PROGRESS NOTES
Progress Note      6/7/2022  NAME: Josh Monk   MRN:  [id number]   Admit Diagnosis: DKA (diabetic ketoacidosis) ([facility] [E11.10]  PCP:  Gracia Brown MD     Assessment: 1. Profound sinus bradycardia 20-30's in the setting of DKA, hyperkalemia, and OP beta-blocker. This has resolved. 2. Paroxysmal atrial fibrillation with post-conversion pauses up to 6 seconds on beta-blocker. Now off beta-blocker. 3. Paroxysmal atrial flutter by tele. She had runs of atrial tachycardia and then followed by probably sustained Afib which organized into a right atrial flutter (by tele morphology) before terminating to sinus. 4. 2nd degree AV block with single dropped PAC's, RBBB with normal MO in sinus rhythm. 5. Hypotension likely due to dehydration/volume depletion, metabolic acidosis, bradycardia. Improved with volume resuscitation, pressor. Now off pressor, but evidence of bacteremia with Serratia (see ID note for details). 6. No evidence of acute ischemia or infarct by presenting ECG. Echo with normal LV and RV systolic function. 7. GLORIA atop CKD stage 3 at baseline. Rhabdomyolysis. On dialysis. 8. Diabetes mellitus type 2 on chronic insulin complicated by DKA. 9. Elevated lipase possibly due to DKA rather than acute pancreatitis. 10. Metabolic encephalopathy, resolved. 11. Anemia. 12. Thrombocytopenia. 13. Full code.     Plan:      1. She's on DVT prophylaxis at this time. I'd avoid FULL anticoagulation if possible given her anemia and thrombocytopenia as long as Afib and flutter episodes are brief, self-limited. 2. Continue oral amiodarone. 3. Renal disease per nephrology team.  4. ID consult noted. Due to persistent bacteremia, a NAI was done 6/3. There may be a small vegetation on the mitral valve. Continue current management. Subjective:     No CP, dizziness, palpitations. Objective:      Physical Exam:    Last 24hrs VS reviewed since prior progress note.  Most recent are:    Visit Vitals  BP (!) 141/61 (BP 1 Location: Left arm, BP Patient Position: At rest;Semi fowlers)   Pulse 82   Temp 99 °F (37.2 °C)   Resp 16   Ht 5' 6\" (1.676 m)   Wt 82.6 kg (182 lb 1.6 oz)   SpO2 100%   BMI 29.39 kg/m²       Intake/Output Summary (Last 24 hours) at 6/7/2022 2205  Last data filed at 6/7/2022 0304  Gross per 24 hour   Intake 50 ml   Output --   Net 50 ml           General: Tired and awake, no distress  Neck: Supple,   Respiratory: No respiratory distress, clear anteriorly   Cardiovascular: Regular rate rhythm, S1S2   Abdomen: soft, non tender   Neuro: moves all extremities   Skin: warm and dry   Extremity:  warm to touch      Data Review    Telemetry:  normal sinus rhythm         Lab Data Personally Reviewed:    Recent Labs     06/05/22  0138   WBC 12.6*   HGB 9.1*   HCT 26.9*        No results for input(s): INR, PTP, APTT, INREXT, INREXT in the last 72 hours. Recent Labs     06/06/22  0426 06/05/22  0138    139   K 3.2* 3.2*   CL 99 101   CO2 26 31   BUN 58* 48*   CREA 6.86* 5.69*   * 78   CA 7.0* 7.2*     No results for input(s): CPK, CKNDX, TROIQ in the last 72 hours. No lab exists for component: CPKMB  Lab Results   Component Value Date/Time    Triglyceride 95 05/26/2022 08:30 AM       No results for input(s): AP, TBIL, TP, ALB, GLOB, GGT, AML, LPSE in the last 72 hours. No lab exists for component: SGOT, GPT, AMYP, HLPSE  No results for input(s): PH, PCO2, PO2 in the last 72 hours.     Medications Personally Reviewed:    Current Facility-Administered Medications   Medication Dose Route Frequency    hydrocortisone Sod Succ (PF) (SOLU-CORTEF) injection 25 mg  25 mg IntraVENous DAILY    insulin NPH (NOVOLIN N, HUMULIN N) injection 8 Units  8 Units SubCUTAneous BID    famotidine (PEPCID) tablet 20 mg  20 mg Oral DAILY    epoetin betina-epbx (RETACRIT) 12,000 Units combo injection  12,000 Units SubCUTAneous Q MON, WED & FRI    amiodarone (CORDARONE) tablet 400 mg  400 mg Oral DAILY    cefepime (MAXIPIME) 1 g in 0.9% sodium chloride (MBP/ADV) 50 mL MBP  1 g IntraVENous Q24H    heparin (porcine) 1,000 unit/mL injection 1,400 Units  1,400 Units InterCATHeter DIALYSIS PRN    And    heparin (porcine) 1,000 unit/mL injection 1,100 Units  1,100 Units InterCATHeter DIALYSIS PRN    hydrALAZINE (APRESOLINE) 20 mg/mL injection 20 mg  20 mg IntraVENous Q6H PRN    albumin human 25% (BUMINATE) solution 25 g  25 g IntraVENous DIALYSIS PRN    metoprolol (LOPRESSOR) injection 2.5 mg  2.5 mg IntraVENous Q6H PRN    insulin lispro (HUMALOG) injection   SubCUTAneous AC&HS    glucose chewable tablet 16 g  4 Tablet Oral PRN    glucagon (GLUCAGEN) injection 1 mg  1 mg IntraMUSCular PRN    dextrose 10 % infusion 0-250 mL  0-250 mL IntraVENous PRN    sodium chloride (NS) flush 5-40 mL  5-40 mL IntraVENous Q8H    sodium chloride (NS) flush 5-40 mL  5-40 mL IntraVENous PRN    acetaminophen (TYLENOL) tablet 650 mg  650 mg Oral Q6H PRN    Or    acetaminophen (TYLENOL) suppository 650 mg  650 mg Rectal Q6H PRN    polyethylene glycol (MIRALAX) packet 17 g  17 g Oral DAILY PRN    ondansetron (ZOFRAN ODT) tablet 4 mg  4 mg Oral Q8H PRN    Or    ondansetron (ZOFRAN) injection 4 mg  4 mg IntraVENous Q6H PRN    [Held by provider] heparin (porcine) injection 5,000 Units  5,000 Units SubCUTAneous Q8H    alcohol 62% (NOZIN) nasal  1 Ampule  1 Ampule Topical Q12H    balsam peru-castor oiL (VENELEX) ointment   Topical BID              Justin Penn MD

## 2022-06-09 NOTE — PROGRESS NOTES
Progress Note      6/9/2022  NAME: Rusty Oglesby   MRN:  [id number]   Admit Diagnosis: [facility]KA [facility]di[facility] [E11.10]  PCP:  Kalin Gallego MD     Assessment: 1. Profound sinus bradycardia 20-30's in the setting of DKA, hyperkalemia, and OP beta-blocker. This has resolved. 2. Paroxysmal atrial fibrillation with post-conversion pauses up to 6 seconds on beta-blocker. Now off beta-blocker. Still some minor pauses. 3. Paroxysmal atrial flutter by tele. She had runs of atrial tachycardia and then followed by probably sustained Afib which organized into a right atrial flutter (by tele morphology) before terminating to sinus. 4. 2nd degree AV block with single dropped PAC's, RBBB with normal [place] in sinus rhythm. 5. Hypotension likely due to dehydration/volume depletion, metabolic acidosis, bradycardia. Improved with volume resuscitation, pressor. Now off pressor, but bacteremia with Serratia (see ID note for details). 6. No evidence of acute ischemia or infarct by presenting ECG. Echo with normal LV and RV systolic function. 7. GLORIA atop CKD stage 3 at baseline. Rhabdomyolysis. On dialysis now. 8. Diabetes mellitus type 2 on chronic insulin complicated by DKA. 9. Elevated lipase possibly due to DKA rather than acute pancreatitis. 10. Metabolic encephalopathy, resolved. 11. Anemia. 12. Thrombocytopenia, improved. 13. Full code.     Plan:      1. She's on DVT prophylaxis at this time. I'd avoid FULL anticoagulation if possible given her anemia and thrombocytopenia. 2. Continue oral amiodarone. 3. Renal recs per nephrology team.  4. ID consult is following. Due to persistent bacteremia, a NAI was done 6/3. There may be a small vegetation on the mitral valve. Update: 6/9/2022: sleepy. Reports no CP or SOB. Tele: no arrhythmia. HR 80, /44. Cont amiodarone. Continue current management.        Subjective:     Remains in stable rhythm   No CP, dizziness, palpitations. Objective:      Physical Exam:    Last 24hrs VS reviewed since prior progress note. Most recent are:    Visit Vitals  BP (!) 137/44   Pulse 80   Temp 98.4 °F (36.9 °C)   Resp 16   Ht 5' 6\" (1.676 m)   Wt 82.6 kg (182 lb 1.6 oz)   SpO2 97%   BMI 29.39 kg/m²       Intake/Output Summary (Last 24 hours) at 6/9/2022 1451  Last data filed at 6/8/2022 1700  Gross per 24 hour   Intake --   Output 50 ml   Net -50 ml           General: Tired and awake, no distress  Neck: Supple,   Respiratory: No respiratory distress, clear anteriorly   Cardiovascular: Regular rate rhythm, S1S2   Abdomen: soft, non tender   Neuro: moves all extremities   Skin: warm and dry   Extremity:  warm to touch      Data Review    Telemetry:  Afib with post-conversion pause earlier today of 3 sec. Currently in sinus in the 70's. Lab Data Personally Reviewed:    Recent Labs     06/08/22  0815   WBC 7.8   HGB 8.0*   HCT 25.0*        No results for input(s): INR, PTP, APTT, INREXT, INREXT in the last 72 hours. No results for input(s): NA, K, CL, CO2, BUN, CREA, GLU, CA, MG in the last 72 hours. No results for input(s): CPK, CKNDX, TROIQ in the last 72 hours. No lab exists for component: CPKMB  Lab Results   Component Value Date/Time    Triglyceride 95 05/26/2022 08:30 AM       No results for input(s): AP, TBIL, TP, ALB, GLOB, GGT, AML, LPSE in the last 72 hours. No lab exists for component: SGOT, GPT, AMYP, HLPSE  No results for input(s): PH, PCO2, PO2 in the last 72 hours.     Medications Personally Reviewed:    Current Facility-Administered Medications   Medication Dose Route Frequency    insulin NPH (NOVOLIN N, HUMULIN N) injection 8 Units  8 Units SubCUTAneous BID    famotidine (PEPCID) tablet 20 mg  20 mg Oral DAILY    epoetin betina-epbx (RETACRIT) 12,000 Units combo injection  12,000 Units SubCUTAneous Q MON, WED & FRI    amiodarone (CORDARONE) tablet 400 mg  400 mg Oral DAILY    cefepime (MAXIPIME) 1 g in 0.9% sodium chloride (MBP/ADV) 50 mL MBP  1 g IntraVENous Q24H    heparin (porcine) 1,000 unit/mL injection 1,400 Units  1,400 Units InterCATHeter DIALYSIS PRN    And    heparin (porcine) 1,000 unit/mL injection 1,100 Units  1,100 Units InterCATHeter DIALYSIS PRN    hydrALAZINE (APRESOLINE) 20 mg/mL injection 20 mg  20 mg IntraVENous Q6H PRN    albumin human 25% (BUMINATE) solution 25 g  25 g IntraVENous DIALYSIS PRN    metoprolol (LOPRESSOR) injection 2.5 mg  2.5 mg IntraVENous Q6H PRN    insulin lispro (HUMALOG) injection   SubCUTAneous AC&HS    glucose chewable tablet 16 g  4 Tablet Oral PRN    glucagon (GLUCAGEN) injection 1 mg  1 mg IntraMUSCular PRN    dextrose 10 % infusion 0-250 mL  0-250 mL IntraVENous PRN    sodium chloride (NS) flush 5-40 mL  5-40 mL IntraVENous Q8H    sodium chloride (NS) flush 5-40 mL  5-40 mL IntraVENous PRN    acetaminophen (TYLENOL) tablet 650 mg  650 mg Oral Q6H PRN    Or    acetaminophen (TYLENOL) suppository 650 mg  650 mg Rectal Q6H PRN    polyethylene glycol (MIRALAX) packet 17 g  17 g Oral DAILY PRN    ondansetron (ZOFRAN ODT) tablet 4 mg  4 mg Oral Q8H PRN    Or    ondansetron (ZOFRAN) injection 4 mg  4 mg IntraVENous Q6H PRN    [Held by provider] heparin (porcine) injection 5,000 Units  5,000 Units SubCUTAneous Q8H    alcohol 62% (NOZIN) nasal  1 Ampule  1 Ampule Topical Q12H    balsam peru-castor oiL (VENELEX) ointment   Topical BID              Eder Teague MD

## 2022-06-09 NOTE — PROGRESS NOTES
End of Shift Note     Bedside shift change report given to Huey Phillips RN (oncoming nurse) by Janet Harmon LPN (offgoing nurse). Report included the following information SBAR, Kardex and MAR     Shift worked:  7p-7a      Shift summary and any significant changes:     Pt new catheter line dressing changed. No insulin coverage needed during shift. Uneventful shift. Concerns for physician to address:  None      Zone phone for oncoming shift:   [phone]         Activity:  Activity Level: Bed Rest  Number times ambulated in hallways past shift: 0  Number of times OOB to chair past shift: 0     Cardiac:   Cardiac Monitoring: Yes      Cardiac Rhythm: Sinus Rhythm     Access:   Current line(s): PIV and HD access      Genitourinary:   Urinary status: anuric     Respiratory:   O2 Device: Nasal cannula  Chronic home O2 use?: NO  Incentive spirometer at bedside: NO     GI:  Last Bowel Movement Date: 06/08/22  Current diet:  ADULT ORAL NUTRITION SUPPLEMENT Breakfast, Lunch, Dinner; Diabetic Supplement  Tolerating current diet: YES     Pain Management:   Patient states pain is manageable on current regimen: YES     Skin:  Markos Score: 10  Interventions: turn team, speciality bed, float heels and foam dressing    Patient Safety:  Fall Score: Total Score: 2  Interventions: bed/chair alarm  High Fall Risk:  Yes     Length of Stay:  Expected LOS: 4d 19h  Actual LOS: 17        JUAN CARLOS Carrillo

## 2022-06-09 NOTE — WOUND CARE
Wound Care Nurse: re-assess sacral and heel wounds. Patient's granddaughter is looking at [facility] because of the new need for hemodialysis and total care of patient. Anuric  Fecal management system for loose, runny stool incontinence. Patient's sacral/buttocks wounds has improved. The buttock portion of the wound is now almost completely epitheliazed and the sacral/coccyx section is Unstageable with slough and granulation tissue. Right heel:      Left Heel:      Wound Sacrum red/purple/non-blanchable area with skin breakdown and bleeding (Active)   Wound Image   06/09/22 1211   Wound Etiology Pressure Unstageable 06/09/22 1211   Dressing Status New dressing applied 06/09/22 1211   Cleansed Cleansed with saline 06/09/22 1211   Dressing/Treatment Silver dressing; Foam 06/09/22 1211   Wound Length (cm) 5 cm 06/09/22 1211   Wound Width (cm) 2 cm 06/09/22 1211   Wound Surface Area (cm^2) 10 cm^2 06/09/22 1211   Wound Assessment Slough;Pink/red 06/09/22 1211   Drainage Amount Small 06/09/22 1211   Drainage Description Serosanguinous 06/09/22 1211   Wound Odor None 06/09/22 1211   Kaur-Wound/Incision Assessment Intact 06/09/22 1211   Edges Undefined edges 06/09/22 1211   Wound Thickness Description Full thickness 06/09/22 1211   Number of days: 17       Wound Heel Left DTI 05/23/22 (Active)   Wound Image   06/09/22 1211   Wound Etiology Deep Tissue/Injury 06/09/22 1211   Dressing Status Clean;Dry; Intact 06/08/22 1816   Cleansed Soap and water 06/09/22 1211   Dressing/Treatment Pharmaceutical agent (see MAR) 06/09/22 1211   Wound Length (cm) 3 cm 06/09/22 1211   Wound Width (cm) 3 cm 06/09/22 1211   Wound Surface Area (cm^2) 9 cm^2 06/09/22 1211   Change in Wound Size % 0 06/09/22 1211   Wound Assessment Purple/maroon;Eschar dry 06/09/22 1211   Drainage Amount None 06/09/22 1211   Wound Odor None 06/09/22 1211   Kaur-Wound/Incision Assessment Intact 06/09/22 1211   Edges Defined edges 06/09/22 1211   Wound Thickness Description Full thickness 06/09/22 1211   Number of days: 17       Wound Heel Right DTI (Active)   Wound Image   06/09/22 1211   Wound Etiology Deep Tissue/Injury 06/09/22 1211   Dressing Status Clean;Dry; Intact 06/08/22 1816   Cleansed Soap and water 06/09/22 1211   Dressing/Treatment Pharmaceutical agent (see MAR) 06/09/22 1211   Wound Length (cm) 2.5 cm 06/09/22 1211   Wound Width (cm) 2.5 cm 06/09/22 1211   Wound Surface Area (cm^2) 6.25 cm^2 06/09/22 1211   Wound Assessment Purple/maroon;Eschar dry 06/09/22 1211   Drainage Amount None 06/09/22 1211   Wound Odor None 06/09/22 1211   Kaur-Wound/Incision Assessment Intact 06/09/22 1211   Edges Defined edges 06/09/22 1211   Wound Thickness Description Full thickness 06/09/22 1211   Number of days: 16      Recommend:    Continue Venelex to bilateral heels BID and float with heel boots. Sacrum/coccyx: continue current wound care.

## 2022-06-09 NOTE — PROGRESS NOTES
End of Shift Note    Bedside shift change report given to [address] [place] (oncoming nurse) by Filomena Toro RN (offgoing nurse). Report included the following information SBAR, Kardex and MAR    Shift worked:  7a-7p     Shift summary and any significant changes: The patient received dialysis today, and then placement of a permanent dialysis catheter to replace her Evan. Upon coming back to the floor, bleeding was noted around the site so the gauze was replaced, pressure applied, and medipore tape taped across to secure pressure. Concerns for physician to address:  None     Zone phone for oncoming shift:   [phone]       Activity:  Activity Level: Bed Rest  Number times ambulated in hallways past shift: 0  Number of times OOB to chair past shift: 0    Cardiac:   Cardiac Monitoring: Yes      Cardiac Rhythm: Sinus Rhythm    Access:   Current line(s): PIV and HD access     Genitourinary:   Urinary status: anuric    Respiratory:   O2 Device: Nasal cannula  Chronic home O2 use?: NO  Incentive spirometer at bedside: NO       GI:  Last Bowel Movement Date: 06/08/22  Current diet:  ADULT ORAL NUTRITION SUPPLEMENT Breakfast, Lunch, Dinner; Diabetic Supplement  Tolerating current diet: YES       Pain Management:   Patient states pain is manageable on current regimen: YES    Skin:  Markos Score: 10  Interventions: turn team, speciality bed, float heels and foam dressing    Patient Safety:  Fall Score:  Total Score: 3  Interventions: bed/chair alarm  High Fall Risk: Yes    Length of Stay:  Expected LOS: 4d 19h  Actual LOS: [address], RN

## 2022-06-09 NOTE — PROGRESS NOTES
Speech Pathology Note    Chart reviewed and spoke with RN. Patient currently drowsy and not appropriate for PO trials. RN reports patient continues with limited PO intake although tolerating current diet. Continue to recommend Soft&Bite-Sized/Thin Liquids, as this continues to be the most appropriate diet, when patient is alert/awake and actively accepting PO intake. No further acute SLP needs at this time. Will sign off. Please re-consult if needs arise. Thank you.     Kevin Moreno M.S., CCC-SLP

## 2022-06-09 NOTE — PROGRESS NOTES
Hospitalist Progress Note    NAME: Franky Goodwin   :  1934   MRN:  [id number]     Admit date: 2022    Today's date: 22    PCP: Jose Watts MD    Anticipated discharge date:     Barriers:  SNF placment and permcath and OP HD     Assessment / Plan:    Acute metabolic encephalopathy   Hypoglycemia on   Lethargic, BS 58 on , resolved  Given orange juice , glucagon   Blood sugar in the 110's  Continue with NPH at reduced dose at 8 unit twice daily  Patient is doing much better, eating    Septic shock POA initial BP 46/34, temp 86.5, RR 42, GLORIA, lactate 4.26, pressor requirements  Klebsiella Bacteremia with UTI  POA  Serratia marcescens bacteremia  and 2022 POA  Persistent GNR bacteremia POA  ? Infectious endocarditis  - Admit UA 5-10 WBC, 1+ bacteria  - Admit BC with klebsiella and Serratia   Sensitive to zosyn, ceftriaxone  - Admit urine culture + for klebsiella  - Repeat BC  with recurrent sepsis + for Serratia  - CT abdomen/pelvis without IV contrast  with no hydronephrosis   No intraabdominal pathology  - Chest CT with bilateral effusions, no ASD  - With elevated lipase and abnormal LFTs, ? Biliary source   GGT 28, never had elevated Alk Phos and T bili   US with no gallstones, contracted GB, CBD 0.4 cm              Biliary source seems less likely  - IV pressors at admit, weaned off   Started on HD for GLORIA from ATN  -Started on stress steroid, being weaned down  - Transferred to floor 2022  - S/p zosyn --> transition to ceftriaxone   - Repeat Blood culture  with GNR  - Repeat blood culture  positive for gram-negative rods  - Not sure why she is persistent bacteremic with GNR despite appropriate antibiotics    TTE LVEF 55 to 60%, trace MR and AR  - Repeat CT abdomen given the tenderness, persistent bacteremia  -ID consulted, recommended to remove central line and the Evan repeat blood cultures.   Discussed with ID and nephrology  . CT abdomen pelvis with contrast did not show any acute pathology    Central line and Evan removed and plan for NAI by cardiology  . Blood cultures from 0602 negative to date  Status post NAI on 06/03, prelim report showed:  S/p NAI performed in sinus rhythm preliminarily revealing biatrial enlargement, moderate MR with very small mobile abnormality on the atrial side of the mitral valve, cannot exclude what might be a small vegetation. Large left atrial appendage with low exit velocity and without thrombus. Mild TR with evidence of moderate pulmonary hypertension. Small pericardial effusion. Continue with IV cefepime  Repeat blood cultures so far has been negative since 06/02  ID recommended 6 weeks of cefepime with hemodialysis  Outpatient hemodialysis to be set up by his neurology  s/Post permacath on 06/08  A. fib with RVR  Patient has had episode of bradycardia with pauses during the stay due to combination of DKA, hyperkalemia  Beta-blocker was stopped. Poor candidate for  Anticoagulation   Status post amnio drip, switch to p.o. amnio    Acute kidney injury POA BUN/creat 114 and 5.97  Rhabdomyolysis CPK peak 22,325   -Last baseline [facility].28 [facility] on 3[facility]9/[facility]21  -Started on CRRT till 5/26, now on HD  -No hydronephrosis  -Likely ATN with hypotension, sepsis, rhabdomyolysis  - Oligouric  -Serial labs  Patient hemodialysis to be set up by case management  Marlen hurst, will also discontinue the fecal management system    DKA POA BS 1259, HgBa1c > 14.0, AG > 20, pH 6.99  - Not sure if DKA present, acidosis may have been due to sepsis, GLORIA   Urine with trace ketones, but B-hydroxybutyrate levels in blood elevated  - Likely patient was not taking insulin at home as her A1c is >14  - s/p Insulin gtt --> currently on NPH 16 units twice daily, will hold it due to hypoglycemia          Acute pancreatitis POA  - admit Non contrast CT is neg for changes of acute pancreatitis.   - Lipase > 3000 -->  1107 -->  2499 --> 1602  - + abdominal tenderness  - clears  - No Gallstones or ductal dilatation, no ETOH  - ? Ischemic   - lipase tredning down  -Repeat CT abdomen/pelvis showed no acute abnormality  - serial labs     Acute metabolic encephalopathy POA resolved  - Multifactorial with septic shock, low BP, GLORIA, elevated BS > 1200  - Head CT with atrophy, no acute events  -Improving. Closely monitor mental status.  -manage metabolic issues as above.     Symptomatic bradycardia.  -Likely precipitated by severe acidemia, also on BB at home.   -Again had long pauses on 5/25, EP following. Overweight POA Body mass index is 29.39 kg/m². DVT prophylaxis. [address]. SUP. Pepcid. Code status. Full code. Subjective:     Chief Complaint / Reason for Physician Visit  Follow-up bacteremia  No acute issues  Review of system limited as patient is sleepy    Fever/Chills n   Chest Pain n    Poor Appetite y   Edema     Cough    Abdominal Pain     Sputum    Joint Pain     SOB/BEE    Headache     Nausea/vomit    Tolerating PT/OT     Diarrhea n   Tolerating Diet     Constipation    Other       Could NOT obtain due to:      Objective:     VITALS:   Last 24hrs VS reviewed since prior progress note.  Most recent are:  Patient Vitals for the past 24 hrs:   Temp Pulse Resp BP SpO2   06/09/22 0717 98 °F (36.7 °C) 85 16 135/67 98 %   06/09/22 0329 97.9 °F (36.6 °C) 93 -- (!) 121/100 98 %   06/08/22 2330 98.3 °F (36.8 °C) 76 16 (!) 138/42 99 %   06/08/22 2022 98.2 °F (36.8 °C) 73 14 (!) 140/82 100 %   06/08/22 1546 97.8 °F (36.6 °C) 65 14 (!) 165/57 99 %   06/08/22 1435 -- 79 14 (!) 154/63 99 %   06/08/22 1430 -- 74 12 (!) 152/57 100 %   06/08/22 1334 98.2 °F (36.8 °C) 71 16 (!) 159/49 100 %   06/08/22 1313 98.1 °F (36.7 °C) 75 15 -- --   06/08/22 1234 97.4 °F (36.3 °C) 63 16 (!) 148/61 --   06/08/22 1230 -- 62 16 (!) 133/57 --   06/08/22 1215 -- 61 16 (!) 144/63 --   06/08/22 1200 -- 65 16 (!) 144/63 --   06/08/22 1145 -- 64 16 (!) 150/62 --   06/08/22 1130 -- 66 16 (!) 141/60 --   06/08/22 1115 -- 66 16 (!) 153/60 --   06/08/22 1100 -- 63 16 (!) 147/62 --   06/08/22 1045 -- 64 16 (!) 148/62 --   06/08/22 1030 -- 64 16 133/64 --   06/08/22 1015 -- (!) 58 16 139/60 --   06/08/22 1000 -- 69 16 (!) 155/69 --   06/08/22 0945 -- 65 16 (!) 156/60 --   06/08/22 0930 -- 61 16 (!) 146/62 --       Intake/Output Summary (Last 24 hours) at 6/9/2022 5427  Last data filed at 6/8/2022 1700  Gross per 24 hour   Intake --   Output 2550 ml   Net -2550 ml        Wt Readings from Last 12 Encounters:   05/26/22 82.6 kg (182 lb 1.6 oz)       PHYSICAL EXAM:    I had a face to face encounter and independently examined this patient on 06/09/22 as outlined below:    General: WD, WN. Lethargic cooperative, no acute distress    EENT:  PERRL. Anicteric sclerae. MMM  Resp:  CTA bilaterally, no wheezing or rales. No accessory muscle use  CV:  Regular  rhythm,  No edema  GI:  Soft, Non distended, Tender RUQ and epigastric region. +Bowel sounds, no rebound  Neurologic:  Sleepy but able to be aroused normal speech, non focal motor exam  Psych:   Not anxious nor agitated  Skin:  No rashes. No jaundice      Reviewed most current lab test results and cultures  YES  Reviewed most current radiology test results   YES  Review and summation of old records today    NO  Reviewed patient's current orders and MAR    YES  PMH/SH reviewed - no change compared to H&P  ________________________________________________________________________  Care Plan discussed with:    Comments   Patient x    Family      RN x    Care Manager     Consultant                        Multidiciplinary team rounds were held today with , nursing, pharmacist and clinical coordinator. Patient's plan of care was discussed; medications were reviewed and discharge planning was addressed.      ________________________________________________________________________      Comments   >50% of visit spent in counseling and coordination of care     ________________________________________________________________________  Rena Mitchell MD     Procedures: see electronic medical records for all procedures/Xrays and details which were not copied into this note but were reviewed prior to creation of Plan. LABS:  I reviewed today's most current labs and imaging studies. Pertinent labs include:  Recent Labs     06/08/22  0815   WBC 7.8   HGB 8.0*   HCT 25.0*        No results for input(s): NA, K, CL, CO2, GLU, BUN, CREA, CA, MG, PHOS, ALB, TBIL, TBILI, ALT, INR, INREXT, INREXT in the last 72 hours.     No lab exists for component: SGOT

## 2022-06-09 NOTE — PROGRESS NOTES
Nephrology Progress Note  [facility]     [email or web address]  Phone - [phone]   Patient: Lei Borden    YOB: 1934        Date- 6/9/2022   Admit Date: 5/23/2022  CC: Follow up for GLORIA        IMPRESSION & PLAN:   · GLORIA due to ATN - HD dependent now, no signs of recovery  · CKD 3b  · DKA  · Sepsis   UTI   hypokalemia   Gram +ve blood cx    PLAN-   HD tomorrow.  Status post placement of permacath by IR yesterday.  Will keep her on MWF schedule   epogen for anemia   Spoke to  about placement of dialysis at [facility], [address] [place] [address] or [facility].     Subjective: Interval History:   -Seen and examined today . -S/p PC placement    Objective:   Vitals:    06/08/22 2330 06/09/22 0329 06/09/22 0717 06/09/22 1212   BP: (!) 138/42 (!) 121/100 135/67 (!) 137/44   Pulse: 76 93 85 80   Resp: 16  16 16   Temp: 98.3 °F (36.8 °C) 97.9 °F (36.6 °C) 98 °F (36.7 °C) 98.4 °F (36.9 °C)   TempSrc:       SpO2: 99% 98% 98% 97%   Weight:       Height:          06/08 0701 - 06/09 0700  In: -   Out: 2550 [Drains:50]  Last 3 Recorded Weights in this Encounter    05/23/22 0957 05/24/22 0727 05/26/22 0631   Weight: 89.2 kg (196 lb 10.4 oz) 88.9 kg (196 lb) 82.6 kg (182 lb 1.6 oz)      Physical exam:    GEN: NAD  NECK- Supple, no mass  RESP: No wheezing, Clear b/l  CVS: S1,S2  RRR  EXT:+Edema   PSYCH: Can't access due to patient's current condition       Chart reviewed. Pertinent Notes reviewed. Data Review :  No results for input(s): NA, K, CL, CO2, BUN, CREA, GLU, CA, MG, PHOS, URICA in the last 72 hours. Recent Labs     06/08/22  0815   WBC 7.8   HGB 8.0*   HCT 25.0*        No results for input(s): FE, TIBC, PSAT, FERR in the last 72 hours.    Medication list  reviewed  Current Facility-Administered Medications   Medication Dose Route Frequency    insulin NPH (NOVOLIN N, HUMULIN N) injection 8 Units  8 Units SubCUTAneous BID    famotidine (PEPCID) tablet 20 mg  20 mg Oral DAILY    epoetin betina-epbx (RETACRIT) 12,000 Units combo injection  12,000 Units SubCUTAneous Q MON, WED & FRI    amiodarone (CORDARONE) tablet 400 mg  400 mg Oral DAILY    cefepime (MAXIPIME) 1 g in 0.9% sodium chloride (MBP/ADV) 50 mL MBP  1 g IntraVENous Q24H    heparin (porcine) 1,000 unit/mL injection 1,400 Units  1,400 Units InterCATHeter DIALYSIS PRN    And    heparin (porcine) 1,000 unit/mL injection 1,100 Units  1,100 Units InterCATHeter DIALYSIS PRN    hydrALAZINE (APRESOLINE) 20 mg/mL injection 20 mg  20 mg IntraVENous Q6H PRN    albumin human 25% (BUMINATE) solution 25 g  25 g IntraVENous DIALYSIS PRN    metoprolol (LOPRESSOR) injection 2.5 mg  2.5 mg IntraVENous Q6H PRN    insulin lispro (HUMALOG) injection   SubCUTAneous AC&HS    glucose chewable tablet 16 g  4 Tablet Oral PRN    glucagon (GLUCAGEN) injection 1 mg  1 mg IntraMUSCular PRN    dextrose 10 % infusion 0-250 mL  0-250 mL IntraVENous PRN    sodium chloride (NS) flush 5-40 mL  5-40 mL IntraVENous Q8H    sodium chloride (NS) flush 5-40 mL  5-40 mL IntraVENous PRN    acetaminophen (TYLENOL) tablet 650 mg  650 mg Oral Q6H PRN    Or    acetaminophen (TYLENOL) suppository 650 mg  650 mg Rectal Q6H PRN    polyethylene glycol (MIRALAX) packet 17 g  17 g Oral DAILY PRN    ondansetron (ZOFRAN ODT) tablet 4 mg  4 mg Oral Q8H PRN    Or    ondansetron (ZOFRAN) injection 4 mg  4 mg IntraVENous Q6H PRN    [Held by provider] heparin (porcine) injection 5,000 Units  5,000 Units SubCUTAneous Q8H    alcohol 62% (NOZIN) nasal  1 Ampule  1 Ampule Topical Q12H    balsam peru-castor oiL (VENELEX) ointment   Topical BID          Lara Perea MD              [facility]  [facility], [address] [place], [address]  Phone - [phone]               Fax - [phone]

## 2022-06-09 NOTE — PROGRESS NOTES
Transition of Care Plan:     RUR:14%  Disposition:  SNF-new HD at [facility] to begin Wed 6/15 at 6:15am-chair time is [email or web address]  Follow up appointments: PCP; Deneise Apgar  DME needed: tbd  Transportation at [address] or [place] Spouse will transport if she is able to go in car.   Keys or means to access home:     Granddaughter   IM Medicare Letter: to be delivered prior to DC.   1st IM Letter given 22  Is patient a BCPI-A Bundle:        no              If yes, was Bundle Letter given?:    Is patient a [place] and connected with the [facility]?              No   If yes, was [employer] transfer form completed and [place] notified? Caregiver Contact: Granddaughter: Brianna Showers: [phone]  [place] Spouse: Sancho Cashus: [phone]  Discharge Caregiver contacted prior to discharge? Yes reviewed plan with Granddaughter over the phone.   Care Conference needed?: to be determined. 3:47  CM left granddaughter a vm, informing her that pt is ready for d/c & to please call w/SNF choices. 2:26  SNF is unable to accept pt with a Fecal management system. Granddaughter will provide CM w/SNF choice this afternoon, when she comes in to visit. CM will continue to follow.     Autumn Johnson  Ext [phone]

## 2022-06-09 NOTE — PROGRESS NOTES
End of Shift Note    Bedside shift change report given to Simone (oncoming nurse) by Reyes Johns, RN (offgoing nurse).   Report included the following information SBAR, Kardex, Intake/Output, MAR and Recent Results    Shift worked:  7a-7p     Shift summary and any significant changes:     HD port dressing changed, wound care completed by Pedro Linder, PCR covid test sent to lab, dysphagia diet reordered, encouraged fluids      Concerns for physician to address:  none     Zone phone for oncoming shift:   [phone]

## 2022-06-10 NOTE — CONSULTS
Gastroenterology Consultation Note  Reeda Kocher, PA   for Dr. Faisal Le    NAME: Claribel Mendoza : 1934 MRN: [id number]   ATTG: Dr. Ryan Harmon PCP: Martínez Layton MD  Date/Time:  6/10/2022 3:23 PM  Subjective:   REASON FOR CONSULT:      Claribel Mendoza is a 80 y.o.  female who I was asked to see for blood loss anemia. She was initially admitted on  after being found unresponsive by granddaughter (caretaker). Initally admitted to the ICU with BG >1,000 and found to be in DKA. Also with ARF, per notes no history of such in the past.    Cardiology also saw patient as she was significantly bradycardic and hypotensive on arrial. With treatment of DKA and metabolic derangements that seems to have resolved. She was initially started on CRRT and now on HD. Per notes and patient she is fairly independent and lives with her granddaughter. Patient reports she resides in [facility]. Unsure how [place] [address] She was completing ADLs PTA. Granddaughter unfortunately was unable to care for grandmother over the last few weeks as she was in the hospital herself and so other family was helping out and calling patient via phone. Unclear if meds were stored/taken appropriately. Palliative saw patient earlier during admission but no significant changes made, patient remains a full code. Per RN no s/sx of GI bleeding. She did have significant blood on her chest and gown after permacath placement on . FMS in place as patient as sacral wound and no melena or hematochezia. She denies any abdominal pain or N/V. No dysphagia, has chronic reflux. She tolerated diet today without issues. Patient denies any NSAID use, no AC use. Hgb 8 on , decreased to 6.7 today at 0337. Improved to 8.6 after one unit of blood. Appears hgb has been in the 9-10 range this admission. MCV high at 102, plt 109, BUN 18, Cr 4.99.   Stool heme positive     CT done of A/O with contrast on  showing  1. Moderate right and small left pleural effusions. Anasarca. 2. No acute findings in the abdomen or pelvis. IMPRESSION    She is not aware of any hx of GI bleeding in the past.  She does not think she has ever had a colonoscopy or EGD. She thinks maternal aunt had colon cancer, no other family hx of GI malignancy. Used to be a smoker but quit years ago, social drinker. Never a heavy drinker. Past Medical History:   Diagnosis Date    Diabetes [facility])       Past Surgical History:   Procedure Laterality Date    IR INSERT NON TUNL CVC OVER 5 YRS  2022    IR INSERT NON TUNL CVC OVER 5 YRS  6/3/2022    IR INSERT TUNL CVC W/O PORT OVER 5 YR  2022     Social History     Tobacco Use    Smoking status: Never Smoker    Smokeless tobacco: Never Used   Substance Use Topics    Alcohol use: Not Currently      History reviewed. No pertinent family history. No Known Allergies   Home Medications:  Prior to Admission Medications   Prescriptions Last Dose Informant Patient Reported? Taking? amLODIPine (NORVASC) 10 mg tablet Unknown at Unknown time  Yes No   Sig: Take 10 mg by mouth daily. aspirin delayed-release 81 mg tablet Unknown at Unknown time  Yes No   Sig: Take 81 mg by mouth daily. atenolol (TENORMIN) 50 mg tablet Unknown at Unknown time  Yes No   Sig: Take 50 mg by mouth daily. atorvastatin (LIPITOR) 80 mg tablet Unknown at Unknown time  Yes No   Sig: Take 80 mg by mouth daily. cholecalciferol (VITAMIN D3) (1000 Units /25 mcg) tablet Unknown at Unknown time  Yes No   Sig: Take 1,000 Units by mouth daily. cloNIDine HCl (CATAPRES) 0.3 mg tablet Unknown at Unknown time  Yes No   Sig: Take 0.3 mg by mouth two (2) times a day. glipiZIDE (GLUCOTROL) 10 mg tablet Unknown at Unknown time  Yes No   Sig: Take 10 mg by mouth daily.    insulin lispro protamine/insulin lispro (HUMALOG MIX 75/25) 100 unit/mL (75-25) injection Unknown at Unknown time  Yes No   Si Units by SubCUTAneous route daily. take 48 units in the morning   insulin lispro protamine/insulin lispro (HUMALOG MIX 75/25) 100 unit/mL (75-25) injection Unknown at Unknown time  Yes No   Si Units by SubCUTAneous route every evening. take 32 units subcutaneously at night.   ramipril (ALTACE) 10 mg capsule Unknown at Unknown time  Yes No   Sig: Take 10 mg by mouth two (2) times a day. terazosin (HYTRIN) 10 mg capsule Unknown at Unknown time  Yes No   Sig: Take 10 mg by mouth daily.  take once a day at bedtime      Facility-Administered Medications: None     Hospital medications:  Current Facility-Administered Medications   Medication Dose Route Frequency    0.9% sodium chloride infusion 250 mL  250 mL IntraVENous PRN    pantoprazole (PROTONIX) 40 mg in 0.9% sodium chloride 10 mL injection  40 mg IntraVENous Q12H    insulin NPH (NOVOLIN N, HUMULIN N) injection 8 Units  8 Units SubCUTAneous BID    famotidine (PEPCID) tablet 20 mg  20 mg Oral DAILY    epoetin betina-epbx (RETACRIT) 12,000 Units combo injection  12,000 Units SubCUTAneous Q MON, WED & FRI    amiodarone (CORDARONE) tablet 400 mg  400 mg Oral DAILY    cefepime (MAXIPIME) 1 g in 0.9% sodium chloride (MBP/ADV) 50 mL MBP  1 g IntraVENous Q24H    heparin (porcine) 1,000 unit/mL injection 1,400 Units  1,400 Units InterCATHeter DIALYSIS PRN    And    heparin (porcine) 1,000 unit/mL injection 1,100 Units  1,100 Units InterCATHeter DIALYSIS PRN    hydrALAZINE (APRESOLINE) 20 mg/mL injection 20 mg  20 mg IntraVENous Q6H PRN    albumin human 25% (BUMINATE) solution 25 g  25 g IntraVENous DIALYSIS PRN    metoprolol (LOPRESSOR) injection 2.5 mg  2.5 mg IntraVENous Q6H PRN    insulin lispro (HUMALOG) injection   SubCUTAneous AC&HS    glucose chewable tablet 16 g  4 Tablet Oral PRN    glucagon (GLUCAGEN) injection 1 mg  1 mg IntraMUSCular PRN    dextrose 10 % infusion 0-250 mL  0-250 mL IntraVENous PRN    sodium chloride (NS) flush 5-40 mL  5-40 mL IntraVENous Q8H    sodium chloride (NS) flush 5-40 mL  5-40 mL IntraVENous PRN    acetaminophen (TYLENOL) tablet 650 mg  650 mg Oral Q6H PRN    Or    acetaminophen (TYLENOL) suppository 650 mg  650 mg Rectal Q6H PRN    polyethylene glycol (MIRALAX) packet 17 g  17 g Oral DAILY PRN    ondansetron (ZOFRAN ODT) tablet 4 mg  4 mg Oral Q8H PRN    Or    ondansetron (ZOFRAN) injection 4 mg  4 mg IntraVENous Q6H PRN    [Held by provider] heparin (porcine) injection 5,000 Units  5,000 Units SubCUTAneous Q8H    alcohol 62% (NOZIN) nasal  1 Ampule  1 Ampule Topical Q12H    balsam peru-castor oiL (VENELEX) ointment   Topical BID     REVIEW OF SYSTEMS:     []     Unable to obtain  ROS due to  []    mental status change  []    sedated   []    intubated  Review of Systems -   History obtained from the patient  General ROS: negative for - weight loss  Psychological ROS: negative for - anxiety or depression  Hematological and Lymphatic ROS: positive for - anemia  Respiratory ROS: no cough, shortness of breath, or wheezing  Cardiovascular ROS: no chest pain or dyspnea on exertion  Gastrointestinal ROS: See HPI  Genito-Urinary ROS: no dysuria, trouble voiding, or hematuria  Musculoskeletal ROS: positive for - joint pain  Neurological ROS: no TIA or stroke symptoms  Dermatological ROS: negative for - rash    Objective:   VITALS:    Visit Vitals  BP (!) 147/50 (BP 1 Location: Left upper arm, BP Patient Position: Supine)   Pulse 78   Temp 98 °F (36.7 °C)   Resp 18   Ht 5' 6\" (1.676 m)   Wt 82.6 kg (182 lb 1.6 oz)   SpO2 100%   BMI 29.39 kg/m²     Temp (24hrs), Av.7 °F (37.1 °C), Min:98 °F (36.7 °C), Max:99.9 °F (37.7 °C)    PHYSICAL EXAM:   General:    Alert, cooperative, elderly BF,  no distress, appears stated age. Head:   Normocephalic, without obvious abnormality, atraumatic. Eyes:   Conjunctivae clear, anicteric sclerae. Pupils are equal  Nose:  Nares normal. No drainage or sinus tenderness.   Throat: Lips, mucosa, and tongue normal.  No Thrush  Neck:  Supple, symmetrical,  no adenopathy, thyroid: non tender  Lungs:   CTA bilaterally. No wheezing/rhonchi/rales. Heart:   Regular rate and rhythm,  no murmur, rub or gallop. Abdomen:   Soft, non-tender. Not distended. Bowel sounds normal. No masses. No                            hepatosplenomegaly  Rectal:  Deferred, liquid stool in FMS without melena or hematochezia  Extremities: No cyanosis. No edema. No clubbing  Skin:     Texture, turgor normal. No rashes/lesions/jaundice  Lymph: Cervical, supraclavicular normal.  Psych:  Poor insight. Not depressed. Not anxious or agitated. Neurologic: EOMs intact. No facial asymmetry. No aphasia or slurred speech normal strength. Disoriented to time and place. LAB DATA REVIEWED:    Lab Results   Component Value Date/Time    WBC 6.2 06/10/2022 03:37 AM    HGB 8.6 (L) 06/10/2022 01:00 PM    HCT 27.5 (L) 06/10/2022 01:00 PM    PLATELET 145 (L) 60/20/2215 03:37 AM    .0 (H) 06/10/2022 03:37 AM     Lab Results   Component Value Date/Time    ALT (SGPT) 107 (H) 06/01/2022 04:09 AM    GGT 28 05/30/2022 03:38 AM    Alk.  phosphatase 99 06/01/2022 04:09 AM    Bilirubin, direct 0.2 05/24/2022 03:21 AM    Bilirubin, total 0.3 06/01/2022 04:09 AM     Lab Results   Component Value Date/Time    Sodium 140 06/10/2022 03:37 AM    Potassium 3.9 06/10/2022 03:37 AM    Chloride 102 06/10/2022 03:37 AM    CO2 33 (H) 06/10/2022 03:37 AM    Anion gap 5 06/10/2022 03:37 AM    Glucose 102 (H) 06/10/2022 03:37 AM    BUN 18 06/10/2022 03:37 AM    Creatinine 4.99 (H) 06/10/2022 03:37 AM    BUN/Creatinine ratio 4 (L) 06/10/2022 03:37 AM    GFR est AA 10 (L) 06/10/2022 03:37 AM    GFR est non-AA 8 (L) 06/10/2022 03:37 AM    Calcium 7.1 (L) 06/10/2022 03:37 AM     Lab Results   Component Value Date/Time    Lipase 1,387 (H) 06/01/2022 04:09 AM     Lab Results   Component Value Date/Time    INR 1.2 (H) 05/29/2022 04:59 AM    INR 1.1 05/23/2022 08:05 AM    Prothrombin time 12.7 (H) 05/29/2022 04:59 AM    Prothrombin time 11.3 (H) 05/23/2022 08:05 AM       CT Results (most recent):  Results from [place] encounter on 05/23/22    CT ABD PELV W CONT    Narrative  EXAM: CT ABD PELV W CONT    INDICATION: persistent sepsis and GNR bacteremia, abdominal pain, persistent  elevated lipase    COMPARISON: CT chest abdomen pelvis May 24, 2022    CONTRAST: 100 mL of Isovue-370. ORAL CONTRAST: None    TECHNIQUE:  Following the uneventful intravenous administration of contrast, thin axial  images were obtained through the abdomen and pelvis. Coronal and sagittal  reconstructions were generated. CT dose reduction was achieved through use of a  standardized protocol tailored for this examination and automatic exposure  control for dose modulation. FINDINGS:  LOWER THORAX: Moderate right and small left pleural effusions, with bibasilar  atelectasis. LIVER: No mass. BILIARY TREE: Gallbladder is within normal limits. CBD is not dilated. SPLEEN: within normal limits. PANCREAS: No mass or ductal dilatation. ADRENALS: Unremarkable. KIDNEYS: No mass, calculus, or hydronephrosis. STOMACH: Unremarkable. SMALL BOWEL: No dilatation or wall thickening. COLON: Sigmoid diverticulosis without evidence of diverticulitis. Rectal tube in  place. APPENDIX: Unremarkable. PERITONEUM: No ascites or pneumoperitoneum. RETROPERITONEUM: No lymphadenopathy or aortic aneurysm. REPRODUCTIVE ORGANS: Coarse calcifications in the uterus compatible with  fibroids. URINARY BLADDER: Gee catheter in place. BONES: No destructive bone lesion. ABDOMINAL WALL: No mass or hernia. ADDITIONAL COMMENTS: Anasarca. Impression  1. Moderate right and small left pleural effusions. Anasarca. 2. No acute findings in the abdomen or pelvis.       Impression:  Active Problems:    DKA (diabetic ketoacidosis) ([facility] [address] (5/23/2022)      Goals of care, counseling/discussion () Lethargy ()      Advanced care planning/counseling discussion ()      Palliative care by specialist ()       Plan:  Patient with worsening acute on chronic macrocytic anemia. No significant melena or hematochezia in FMS, stool checked and heme positive however RN did reports significant bleeding post permcath placement. Also question if 6.7 hgb was accurate as she was given one unit with improvement to hgb 8.6. Given no convincing GI bleeding do not recommend proceeding with EGD or colonoscopy unless that is to change. Continue to monitor hgb and transfuse as needed. Agree with BID PPI. Will see on request over the weekend and revisit Monday to see how she progresses.   Would benefit from O/P follow up with local GI on d/c to revisit.          ___________________________________________________  Care Plan discussed with:    [x]    Patient   [x]    Family   []    Nursing   []    Attending  Total Time :  30  minutes   ___________________________________________________  GI: NORY Ponce

## 2022-06-10 NOTE — PROGRESS NOTES
Problem: Breathing Pattern - Ineffective  Goal: *Absence of hypoxia  Outcome: Progressing Towards Goal     Problem: Pressure Injury - Risk of  Goal: *Prevention of pressure injury  Description: Document Markos Scale and appropriate interventions in the flowsheet.   Outcome: Progressing Towards Goal  Note: Pressure Injury Interventions:  Sensory Interventions: Assess changes in LOC    Moisture Interventions: Minimize layers,Apply protective barrier, creams and emollients    Activity Interventions: Increase time out of bed,PT/OT evaluation    Mobility Interventions: HOB 30 degrees or less    Nutrition Interventions: Document food/fluid/supplement intake    Friction and Shear Interventions: Feet elevated on foot rest,Foam dressings/transparent film/skin sealants,Lift sheet,Lift team/patient mobility team,Apply protective barrier, creams and emollients,HOB 30 degrees or less

## 2022-06-10 NOTE — PROGRESS NOTES
Transition of Care Plan:     RUR:14%  Disposition:  SNF-new HD at [facility] to begin Wed 6/15 at 6:15am-chair time is [email or web address]  Follow up appointments: PCP; hK[facility]ows  DME needed: tbd  Transportation at [address] or [place] Spouse will transport if she is able to go in car.   Keys or means to access home:     Granddaughter   IM Medicare Letter: to be delivered prior to DC.   1st IM Letter given 5/23/22  Is patient a BCPI-A Bundle:        no              If yes, was Bundle Letter given?:    Is patient a [place] and connected with the [facility]?              No   If yes, was [employer] transfer form completed and [place] notified? Caregiver Contact: Granddaughter: Keerthi Pisanojessica: [phone]  GD Spouse: Rafa Ventura: [phone]  Discharge Caregiver contacted prior to discharge? Yes reviewed plan with Granddaughter over the phone.   Care Conference needed?: to be determined. Patients granddaughter has chosen, [place], [address] [place] [address]  [phone] has accepted & will initiate Auth on Monday. CM will continue to follow.     Malick Fail  Ext [phone]

## 2022-06-10 NOTE — DIABETES MGMT
[facility]    CLINICAL NURSE SPECIALIST CONSULT     Initial Presentation   Tom Dick is a 80 y.o. female admitted from the ER today 5/23/22 in DKA with profound bradycardia, after being found down by granddaughter. Granddaughter states that patient is independent in her activities of daily living, and has a routine that includes her diabetes self-care. He grandmother had reported that she wasn't feeling well and the granddaughter had made arrangements for a home visit. When she went into her room this morning to remind her of the visit, she found her down on the floor. EMS called. Of note, granddaughter states that she (herself) had been hospitalized for a month and returned home at the end of April. She surmises that her grandmother may not have been taking her insulin because there was more insulin in the refrigerator than there should have been. LAB:  WBC 10.9. AST 75. Lipase >3000. Troponin 31. NT pro-BNP 1588. Urinary glucose & ketone +. CT Head: No extra-axial fluid collection, hemorrhage or shift. Atrophy mostly posterior fossa. CXR: Mild pulmonary edema    HX:   Past Medical History:   Diagnosis Date    Diabetes ([facility] [address]       INITIAL DX:   DKA (diabetic ketoacidosis) ([facility] [address] [E11.10]     Current Treatment     TX: Insulin. Pepcid. ABx. Steroids    Consulted by Provider for advanced diabetes nursing assessment and care for:   [x] Transitioning off [place]   [x] Inpatient management strategy  [] Home management assessment  [] Survival skill education    Hospital Course   Clinical progress has been complicated by need for ICU level of care. 5/24/22 Alert but babbling; Precedex+. O2NC. Remains on three (3) pressors. CRRT+. Scheduled for CT chest & ABd wo contrast today. Plans to add steroids. Discussion of transition off [place] made during IPR.  5/25/22 Alert. Conversing in understandable language. O2NC. Afib. Remains on two (2) pressors.  Skin per wound care/nursing. CRRT+  5/26/22 Alert & oriented to person. Off O2. NPO except for supplements. CRRT+. 5/27/22 Alert & oriented. Answering questions clearly. Dialysis now+.   5/31/22 Patient ate almost all of her breakfast tray and is now resting comfortable with eyes closed. 6/6/22 Resting with eyes closed. Dialysis today. It is noted that the patient was OK'd for permacath placement. Her dialysis catheter was removed due to bacteremia. 6/8/22 Dialysis this morning  6/10/22 Dialysis this morning. Also with HgB 6.7 with plans to transfuse at dialysis. Intervention given: Transfuse 1 unit PRBC , H/H 1 hour s/p transfusion ordered    Diabetes History   Type 2 diabetes treated with insulin therapy  Admission BG 1259 with AG 30    Diabetes-related Medical History: Deferred    Diabetes Medication History  Key Antihyperglycemic Medications             glipiZIDE (GLUCOTROL) 10 mg tablet Take 10 mg by mouth daily. insulin lispro protamine/insulin lispro (HUMALOG MIX 75/25) 100 unit/mL (75-25) injection 48 Units by SubCUTAneous route daily. take 48 units in the morning    insulin lispro protamine/insulin lispro (HUMALOG MIX 75/25) 100 unit/mL (75-25) injection 32 Units by SubCUTAneous route every evening. take 32 units subcutaneously at night. Diabetes self-management practices: Per granddaughter  Eating pattern   [x] Not eating a carbohydrate-controlled mealplan  [x] Breakfast Cheerios with milk & banana.  Coffee (may have had Cheese toast earlier)  [x] Sankc  Chips a Hoy cookies (while she is watching TV  [x] Dinner  With granddaughter   Physical activity pattern - Uses Rollator to move about the house   [x] Not employing a physical activity program to control BG  Monitoring pattern - Tracks on a tablet by bedside & takes to her provider   [x] Testing BGs   [x] Breakfast   Taking medications pattern  [x] Consistent administration  [x] Affordable  Overall evaluation:    [x] A1c 13.6 (5/23/22)    Subjective   SHIRLEY ( dialysis)     Objective   Physical exam  General Obese female in no acute distress  Neuro  Alert and talking  Vital Signs   Visit Vitals  BP (!) 169/76   Pulse 82   Temp 98.1 °F (36.7 °C)   Resp 18   Ht 5' 6\" (1.676 m)   Wt 82.6 kg (182 lb 1.6 oz)   SpO2 99%   BMI 29.39 kg/m²     Laboratory  Recent Labs     06/10/22  0337 06/08/22  0815   *  --    AGAP 5  --    WBC 6.2 7.8   CREA 4.99*  --    GFRNA 8*  --      Factors impacting BG management  Factor Dose Comments   Nutrition:  Dysphagia Soft; bite sized      Drugs:  Steroids   HC 50mg Q6 hrs ( Completed    Impairs insulin action   Infection Cefepime 1 g Q 24 hours   Afebrile. WBCs sloan   Other:   Kidney function   GFR 8   Dialysis+     Blood glucose pattern      Significant diabetes-related events over the past 24-72 hours  Admitted in profound DKA with electrolyte disturbances. BG 1259 with AG 30  On Glucostabilizer; received 27-32 units of insulin/hour initially  BG finally under 250mg/dl at 1am 5/24/22 after 5L of fluid in ICU  Significant GLORIA - Requiring dialysis  Steroids started 5/24/22 - BGs higher than target  NPH dose adjustment with BGs in 120-150s until she began eating 5/26/22 6/6/22 Hypoglycemic episode yesterday morning on 16 units NPH twice daily. NPH dose reduced to 10 units twice daily to start today. Assessment and Plan   Nursing Diagnosis Risk for unstable blood glucose pattern   Nursing Intervention Domain 6164 Decision-making Support   Nursing Interventions Examined current inpatient diabetes/blood glucose control   Explored factors facilitating and impeding inpatient management     Evaluation   This overweight AA female was admitted in DKA associated with bradycardia. Received significant amounts of insulin/hour via Glucostabilizer. Fluid resuscitation also needed in presence of elevated NT pro-BNP.  BGs down under 250mg/dl at 1am 5/24/22, and AG closed at 8am. Transitioned off GS, and steroids added 5/24/22. BGs sloan into the low 200s. NPH insulin started to override steroids in presence of kidney dysfunction with small increase in dose. BGs in target until she began eating yesterday 5/26/22. This woman uses combo insulin at home, which is not available in-house so we can tailor dosing to changing circumstances. Current insulin dose is at half her usual basal insulin dose, but her kidney function has deteriorated in light of her presenting situation, e.g., DKA. Recommend increasing basal dose incrementally to establish basal dose in light of GLORIA/CKD. Some Info extracted from [address] [facility] note    5/31/22 BG within goal this morning on 14 units NPH twice daily. The patient continues to receive 50 mg hydrocortisone injection Q 8 hours. I recommend continuing on current diabetes medication regimen for now. Diabetes management will continue to follow with this patient, monitoring BG trends and making recommendations as needed. 6/1/22 BG's elevated today on 14 units NPH twice daily. The patient continues on 50 mg hydrocortisone Q 8 hours. Consider a slight increase in the basal insulin dose. (0.4 x kg) 17 units NPH twice daily  6/2/22  BG's elevated today on 16 units NPH twice daily. The patient continues on 50 mg hydrocortisone Q 8 hours. Consider a slight increase in the basal dose. 6/6/22 BG elevated this morning. Basal insulin dose was held yesterday due to hypoglycemic episode yesterday morning. The patient's NPH dose has been decreased to 10 units twice daily to start today. The steroid dose has been decreased as well to 25 mg hydrocortisone daily. I suspect the patient may not require as much insulin. Consider 8 units NPH ( 0.2 x kg) twice daily. 6/8/22 BG's ranging 81 -139 for past 24 hours on NPH 8 units twice daily. The morning BG was 82. I recommend a further increase in the basal dose . Consider using 4 units NPH ( 0.1 x kg) twice daily.    6/10/22 BG's ranging  for past 24 hours. The patient received 8 units NPH in past 24 hours with a morning BG of 102. This demonstrates that the basal dose may require a decrease. Consider using NPH 4 units twice daily. Recommendations     1. Decrease to 4 units NPH twice daily. 2.Continue HIGH sensitivity corrective insulin     Billing Code(s)   [x] 93574 IP subsequent hospital care - 15 minutes     Before making these care recommendations, I personally reviewed the hospitalization record, including notes, laboratory & diagnostic data and current medications, and examined the patient at the bedside (circumstances permitting) before making care recommendations. More than fifty (50) percent of the time was spent in patient counseling and/or care coordination.   Total minutes: 15 minutes    SANA Sims  Diabetes Clinical Nurse Specialist  [facility]  Access via [employer]

## 2022-06-10 NOTE — PROGRESS NOTES
Occupational Therapy    Patient currently off the floor at dialysis. Also with HgB 6.7 with plans to transfuse at dialysis. Will continue to follow.     Cristopher Holden, OTR/L

## 2022-06-10 NOTE — PROGRESS NOTES
Received notification from bedside RN about patient with regards to: H/H 6.7/20.8  VS: /71, HR 79, RR 16, O2 sat 99% on NC 2 L    Intervention given: Transfuse 1 unit PRBC , H/H 1 hour s/p transfusion ordered

## 2022-06-10 NOTE — PROGRESS NOTES
Physical Therapy  Patient currently off the floor at dialysis. Also with HgB 6.7 with plans to transfuse at dialysis. Will continue to follow.   Tonya Moses, PT, DPT

## 2022-06-10 NOTE — PROGRESS NOTES
Problem: Breathing Pattern - Ineffective  Goal: *Absence of hypoxia  Outcome: Progressing Towards Goal     Problem: Patient Education: Go to Patient Education Activity  Goal: Patient/Family Education  Outcome: Progressing Towards Goal     Problem: Pressure Injury - Risk of  Goal: *Prevention of pressure injury  Description: Document Markos Scale and appropriate interventions in the flowsheet. Outcome: Progressing Towards Goal  Note: Pressure Injury Interventions:  Sensory Interventions: Keep linens dry and wrinkle-free,Minimize linen layers    Moisture Interventions: Internal/External fecal devices,Maintain skin hydration (lotion/cream)    Activity Interventions: Pressure redistribution bed/mattress(bed type)    Mobility Interventions: Pressure redistribution bed/mattress (bed type)    Nutrition Interventions: Offer support with meals,snacks and hydration    Friction and Shear Interventions: Foam dressings/transparent film/skin sealants,Lift team/patient mobility team                Problem: Patient Education: Go to Patient Education Activity  Goal: Patient/Family Education  Outcome: Progressing Towards Goal     Problem: Diabetes Self-Management  Goal: *Disease process and treatment process  Description: Define diabetes and identify own type of diabetes; list 3 options for treating diabetes. Outcome: Progressing Towards Goal  Goal: *Incorporating nutritional management into lifestyle  Description: Describe effect of type, amount and timing of food on blood glucose; list 3 methods for planning meals. Outcome: Progressing Towards Goal  Goal: *Incorporating physical activity into lifestyle  Description: State effect of exercise on blood glucose levels. Outcome: Progressing Towards Goal  Goal: *Developing strategies to promote health/change behavior  Description: Define the ABC's of diabetes; identify appropriate screenings, schedule and personal plan for screenings.   Outcome: Progressing Towards Goal  Goal: *Using medications safely  Description: State effect of diabetes medications on diabetes; name diabetes medication taking, action and side effects. Outcome: Progressing Towards Goal  Goal: *Monitoring blood glucose, interpreting and using results  Description: Identify recommended blood glucose targets  and personal targets. Outcome: Progressing Towards Goal  Goal: *Prevention, detection, treatment of acute complications  Description: List symptoms of hyper- and hypoglycemia; describe how to treat low blood sugar and actions for lowering  high blood glucose level. Outcome: Progressing Towards Goal  Goal: *Prevention, detection and treatment of chronic complications  Description: Define the natural course of diabetes and describe the relationship of blood glucose levels to long term complications of diabetes.   Outcome: Progressing Towards Goal  Goal: *Developing strategies to address psychosocial issues  Description: Describe feelings about living with diabetes; identify support needed and support network  Outcome: Progressing Towards Goal  Goal: *Insulin pump training  Outcome: Progressing Towards Goal  Goal: *Sick day guidelines  Outcome: Progressing Towards Goal  Goal: *Patient Specific Goal (EDIT GOAL, INSERT TEXT)  Outcome: Progressing Towards Goal

## 2022-06-10 NOTE — PROGRESS NOTES
Progress Note      6/10/2022  NAME: Clarisse Rose   MRN:  [id number]   Admit Diagnosis: [facility]abetic [facility] [E11.10]  PCP:  Mary Jaime MD     Assessment: 1. Profound sinus bradycardia 20-30's in the setting of DKA, hyperkalemia, and OP beta-blocker. This has resolved. 2. Paroxysmal atrial fibrillation with post-conversion pauses up to 6 seconds on beta-blocker. Now off beta-blocker. Still some minor pauses. 3. Paroxysmal atrial flutter by tele. She had runs of atrial tachycardia and then followed by probably sustained Afib which organized into a right atrial flutter (by tele morphology) before terminating to sinus. 4. 2nd degree AV block with single dropped PAC's, RBBB with normal LA in sinus rhythm. 5. Hypotension likely due to dehydration/volume depletion, metabolic acidosis, bradycardia. Improved with volume resuscitation, pressor. Now off pressor, but bacteremia with Serratia (see ID note for details). 6. No evidence of acute ischemia or infarct by presenting ECG. Echo with normal LV and RV systolic function. 7. GLORIA atop CKD stage 3 at baseline. Rhabdomyolysis. On dialysis now. 8. Diabetes mellitus type 2 on chronic insulin complicated by DKA. 9. Elevated lipase possibly due to DKA rather than acute pancreatitis. 10. Metabolic encephalopathy, resolved. 11. Anemia, multifactorial.  S/p transfusion. 12. Thrombocytopenia, improved. 13. Full code.     Plan:      1. She's on DVT prophylaxis at this time. I'd avoid FULL anticoagulation if possible given her anemia and thrombocytopenia. 2. Continue oral amiodarone. 3. Renal recs per nephrology team.  4. ID consult is following. Due to persistent bacteremia, a NAI was done 6/3. There may be a small vegetation on the mitral valve. ID wants 6 weeks of cefepime. Continue current management. Subjective:     Remains in stable rhythm   No CP, dizziness, palpitations.     Objective:      Physical Exam:    Last 24hrs VS reviewed since prior progress note. Most recent are:    Visit Vitals  BP (!) 164/58 (BP 1 Location: Left upper arm, BP Patient Position: Supine)   Pulse 78   Temp 97.7 °F (36.5 °C)   Resp 12   Ht 5' 6\" (1.676 m)   Wt 82.6 kg (182 lb 1.6 oz)   SpO2 99%   BMI 29.39 kg/m²       Intake/Output Summary (Last 24 hours) at 6/10/2022 1941  Last data filed at 6/10/2022 1144  Gross per 24 hour   Intake 523.8 ml   Output 2250 ml   Net -1726.2 ml           General: Tired and awake, no distress  Neck: Supple,   Respiratory: No respiratory distress, clear anteriorly   Cardiovascular: Regular rate rhythm, S1S2   Abdomen: soft, non tender   Neuro: moves all extremities   Skin: warm and dry   Extremity:  warm to touch      Data Review    Telemetry:  Afib with post-conversion pause earlier today of 3 sec. Currently in sinus in the 70's. Lab Data Personally Reviewed:    Recent Labs     06/10/22  1300 06/10/22  0337 06/08/22  0815 06/08/22  0815   WBC  --  6.2  --  7.8   HGB 8.6* 6.7*   < > 8.0*   HCT 27.5* 20.8*   < > 25.0*   PLT  --  109*  --  153    < > = values in this interval not displayed. No results for input(s): INR, PTP, APTT, INREXT, INREXT in the last 72 hours. Recent Labs     06/10/22  0337      K 3.9      CO2 33*   BUN 18   CREA 4.99*   *   CA 7.1*     No results for input(s): CPK, CKNDX, TROIQ in the last 72 hours. No lab exists for component: CPKMB  Lab Results   Component Value Date/Time    Triglyceride 95 05/26/2022 08:30 AM       No results for input(s): AP, TBIL, TP, ALB, GLOB, GGT, AML, LPSE in the last 72 hours. No lab exists for component: SGOT, GPT, AMYP, HLPSE  No results for input(s): PH, PCO2, PO2 in the last 72 hours.     Medications Personally Reviewed:    Current Facility-Administered Medications   Medication Dose Route Frequency    0.9% sodium chloride infusion 250 mL  250 mL IntraVENous PRN    pantoprazole (PROTONIX) 40 mg in 0.9% sodium chloride 10 mL injection  40 mg IntraVENous Q12H    insulin NPH (NOVOLIN N, HUMULIN N) injection 8 Units  8 Units SubCUTAneous BID    famotidine (PEPCID) tablet 20 mg  20 mg Oral DAILY    epoetin betina-epbx (RETACRIT) 12,000 Units combo injection  12,000 Units SubCUTAneous Q MON, WED & FRI    amiodarone (CORDARONE) tablet 400 mg  400 mg Oral DAILY    cefepime (MAXIPIME) 1 g in 0.9% sodium chloride (MBP/ADV) 50 mL MBP  1 g IntraVENous Q24H    heparin (porcine) 1,000 unit/mL injection 1,400 Units  1,400 Units InterCATHeter DIALYSIS PRN    And    heparin (porcine) 1,000 unit/mL injection 1,100 Units  1,100 Units InterCATHeter DIALYSIS PRN    hydrALAZINE (APRESOLINE) 20 mg/mL injection 20 mg  20 mg IntraVENous Q6H PRN    albumin human 25% (BUMINATE) solution 25 g  25 g IntraVENous DIALYSIS PRN    metoprolol (LOPRESSOR) injection 2.5 mg  2.5 mg IntraVENous Q6H PRN    insulin lispro (HUMALOG) injection   SubCUTAneous AC&HS    glucose chewable tablet 16 g  4 Tablet Oral PRN    glucagon (GLUCAGEN) injection 1 mg  1 mg IntraMUSCular PRN    dextrose 10 % infusion 0-250 mL  0-250 mL IntraVENous PRN    sodium chloride (NS) flush 5-40 mL  5-40 mL IntraVENous Q8H    sodium chloride (NS) flush 5-40 mL  5-40 mL IntraVENous PRN    acetaminophen (TYLENOL) tablet 650 mg  650 mg Oral Q6H PRN    Or    acetaminophen (TYLENOL) suppository 650 mg  650 mg Rectal Q6H PRN    polyethylene glycol (MIRALAX) packet 17 g  17 g Oral DAILY PRN    ondansetron (ZOFRAN ODT) tablet 4 mg  4 mg Oral Q8H PRN    Or    ondansetron (ZOFRAN) injection 4 mg  4 mg IntraVENous Q6H PRN    [Held by provider] heparin (porcine) injection 5,000 Units  5,000 Units SubCUTAneous Q8H    alcohol 62% (NOZIN) nasal  1 Ampule  1 Ampule Topical Q12H    balsam peru-castor oiL (VENELEX) ointment   Topical BID              Michael Topete MD

## 2022-06-10 NOTE — PROGRESS NOTES
Nephrology Progress Note  Pb Zamora     [email or web address]. [email or web address]  Phone - [phone]   Patient: Rosana Proctor    YOB: 1934        Date- 6/10/2022   Admit Date: 5/23/2022  CC: Follow up for GLORIA        IMPRESSION & PLAN:   · GLORIA due to ATN - HD dependent now, no signs of recovery  · CKD 3b  · DKA  · Sepsis   UTI   hypokalemia   Gram +ve blood cx    PLAN-   HD today.  Will keep her on MWF schedule   epogen for anemia   Spoke to  about placement of dialysis at [facility], [address] [place] [address] [facility]     Subjective: Interval History:   -Seen and examined today on HD. -Developed A. fib on HD, hemodynamic stable with stable heart rate. -Got 1 unit of packed RBC this morning  -S/p PC placement    Objective:   Vitals:    06/10/22 1041 06/10/22 1100 06/10/22 1115 06/10/22 1130   BP: (!) 162/71 118/83 (!) 169/76 (!) 153/66   Pulse: 92 75 82 72   Resp: 18 18 18 18   Temp: 98.1 °F (36.7 °C)      TempSrc:       SpO2: 99%      Weight:       Height:          06/09 0701 - 06/10 0700  In: 150 [I.V.:150]  Out: 50 [Drains:50]  Last 3 Recorded Weights in this Encounter    05/23/22 0957 05/24/22 0727 05/26/22 0631   Weight: 89.2 kg (196 lb 10.4 oz) 88.9 kg (196 lb) 82.6 kg (182 lb 1.6 oz)      Physical exam:    GEN: NAD  NECK- Supple, no mass  RESP: No wheezing, Clear b/l  CVS: S1,S2  RRR  EXT:+Edema   PSYCH: Can't access due to patient's current condition       Chart reviewed. Pertinent Notes reviewed. Data Review :  Recent Labs     06/10/22  0337      K 3.9      CO2 33*   BUN 18   CREA 4.99*   *   CA 7.1*     Recent Labs     06/10/22  0337 06/08/22  0815   WBC 6.2 7.8   HGB 6.7* 8.0*   HCT 20.8* 25.0*   * 153     No results for input(s): FE, TIBC, PSAT, FERR in the last 72 hours.    Medication list  reviewed  Current Facility-Administered Medications   Medication Dose Route Frequency  0.9% sodium chloride infusion 250 mL  250 mL IntraVENous PRN    insulin NPH (NOVOLIN N, HUMULIN N) injection 8 Units  8 Units SubCUTAneous BID    famotidine (PEPCID) tablet 20 mg  20 mg Oral DAILY    epoetin betina-epbx (RETACRIT) 12,000 Units combo injection  12,000 Units SubCUTAneous Q MON, WED & FRI    amiodarone (CORDARONE) tablet 400 mg  400 mg Oral DAILY    cefepime (MAXIPIME) 1 g in 0.9% sodium chloride (MBP/ADV) 50 mL MBP  1 g IntraVENous Q24H    heparin (porcine) 1,000 unit/mL injection 1,400 Units  1,400 Units InterCATHeter DIALYSIS PRN    And    heparin (porcine) 1,000 unit/mL injection 1,100 Units  1,100 Units InterCATHeter DIALYSIS PRN    hydrALAZINE (APRESOLINE) 20 mg/mL injection 20 mg  20 mg IntraVENous Q6H PRN    albumin human 25% (BUMINATE) solution 25 g  25 g IntraVENous DIALYSIS PRN    metoprolol (LOPRESSOR) injection 2.5 mg  2.5 mg IntraVENous Q6H PRN    insulin lispro (HUMALOG) injection   SubCUTAneous AC&HS    glucose chewable tablet 16 g  4 Tablet Oral PRN    glucagon (GLUCAGEN) injection 1 mg  1 mg IntraMUSCular PRN    dextrose 10 % infusion 0-250 mL  0-250 mL IntraVENous PRN    sodium chloride (NS) flush 5-40 mL  5-40 mL IntraVENous Q8H    sodium chloride (NS) flush 5-40 mL  5-40 mL IntraVENous PRN    acetaminophen (TYLENOL) tablet 650 mg  650 mg Oral Q6H PRN    Or    acetaminophen (TYLENOL) suppository 650 mg  650 mg Rectal Q6H PRN    polyethylene glycol (MIRALAX) packet 17 g  17 g Oral DAILY PRN    ondansetron (ZOFRAN ODT) tablet 4 mg  4 mg Oral Q8H PRN    Or    ondansetron (ZOFRAN) injection 4 mg  4 mg IntraVENous Q6H PRN    [Held by provider] heparin (porcine) injection 5,000 Units  5,000 Units SubCUTAneous Q8H    alcohol 62% (NOZIN) nasal  1 Ampule  1 Ampule Topical Q12H    balsam peru-castor oiL (VENELEX) ointment   Topical BID          Val Costa MD              [facility]  [facility] / [facility] [address]   [address], [address]  [place], [address]  Phone - [phone]               Fax - [phone]

## 2022-06-10 NOTE — PROGRESS NOTES
Hospitalist Progress Note    NAME: Jacobo Saldana   :  1934   MRN:  [id number]     Admit date: 2022    Today's date: 06/10/22    PCP: Chris Patterson MD    Anticipated discharge date:     Barriers:  SNF placment and permcath and OP HD     Assessment / Plan:    Acute blood loss anemia on chronic anemia   ? GI bleed  Hemoglobin has been dropping down for the last 2 or 3 days , hemoglobin dropped down to 6.7, a unit of PRBC ordered and transfused, repeat hemoglobin is 8.6  Stool occult blood positive, will order Protonix IV twice daily, GI consulted    Acute metabolic encephalopathy   Hypoglycemia on   Lethargic, BS 58 on , resolved  Given orange juice , glucagon   Blood sugar in the 110's  Continue with NPH at reduced dose at 8 unit twice daily  Patient is doing much better, eating  Blood sugar remains on the lower side, but no recurrent episode of hypoglycemia  Continue with current dose of NPH    Septic shock POA resolved  Klebsiella Bacteremia with UTI  POA  Serratia marcescens bacteremia  and 2022 POA  Persistent GNR bacteremia POA  ? Infectious endocarditis  - Admit UA 5-10 WBC, 1+ bacteria  - Admit BC with klebsiella and Serratia   Sensitive to zosyn, ceftriaxone  - Admit urine culture + for klebsiella  - Repeat BC  with recurrent sepsis + for Serratia  - CT abdomen/pelvis without IV contrast  with no hydronephrosis   No intraabdominal pathology  - Chest CT with bilateral effusions, no ASD  - With elevated lipase and abnormal LFTs, ?  Biliary source   GGT 28, never had elevated Alk Phos and T bili   US with no gallstones, contracted GB, CBD 0.4 cm              Biliary source seems less likely  - IV pressors at admit, weaned off   Started on HD for GLORIA from ATN  -Started on stress steroid, being weaned down  - Transferred to floor 2022  - S/p zosyn --> transition to ceftriaxone   - Repeat Blood culture  with GNR  - Repeat blood culture 5/31 positive for gram-negative rods  - Not sure why she is persistent bacteremic with GNR despite appropriate antibiotics    TTE LVEF 55 to 60%, trace MR and AR  - Repeat CT abdomen given the tenderness, persistent bacteremia  -ID consulted, recommended to remove central line and the Evan repeat blood cultures. Discussed with ID and nephrology  . CT abdomen pelvis with contrast did not show any acute pathology    Central line and Evan removed and plan for NAI by cardiology  . Blood cultures from 0602 negative to date  Status post NAI on 06/03, prelim report showed:  S/p NAI performed in sinus rhythm preliminarily revealing biatrial enlargement, moderate MR with very small mobile abnormality on the atrial side of the mitral valve, cannot exclude what might be a small vegetation. Large left atrial appendage with low exit velocity and without thrombus. Mild TR with evidence of moderate pulmonary hypertension. Small pericardial effusion. Continue with IV cefepime  Repeat blood cultures so far has been negative since 06/02  ID recommended 6 weeks of cefepime with hemodialysis  Outpatient hemodialysis to be set up by his neurology  s/Post permacath on 06/08  A. fib with RVR  Patient has had episode of bradycardia with pauses during the stay due to combination of DKA, hyperkalemia  Beta-blocker was stopped.   Poor candidate for  Anticoagulation   Status post amnio drip, switch to p.o. amnio    Acute kidney injury POA BUN/creat 114 and 5.97  Rhabdomyolysis CPK peak 22,325   -Last baseline creat 1.28 at [facility] on 3/9/2021  -Started on CRRT till 5/26, now on HD  -No hydronephrosis  -Likely ATN with hypotension, sepsis, rhabdomyolysis  - Oligouric  -Serial labs  Patient hemodialysis to be set up by case management  Marlen discontinued, will also discontinue the fecal management system    DKA POA BS 1259, HgBa1c > 14.0, AG > 20, pH 6.99  - Not sure if DKA present, acidosis may have been due to sepsis, GLORIA   Urine with trace ketones, but B-hydroxybutyrate levels in blood elevated  - Likely patient was not taking insulin at home as her A1c is >14  - s/p Insulin gtt --> currently on NPH 16 units twice daily, will hold it due to hypoglycemia          Acute pancreatitis POA  - admit Non contrast CT is neg for changes of acute pancreatitis. - Lipase > 3000 -->  1107 -->  2499 --> 1602  - + abdominal tenderness  - clears  - No Gallstones or ductal dilatation, no ETOH  - ? Ischemic   - lipase tredning down  -Repeat CT abdomen/pelvis showed no acute abnormality  - serial labs     Acute metabolic encephalopathy POA resolved  - Multifactorial with septic shock, low BP, GLORIA, elevated BS > 1200  - Head CT with atrophy, no acute events  -Improving. Closely monitor mental status.  -manage metabolic issues as above.     Symptomatic bradycardia.  -Likely precipitated by severe acidemia, also on BB at home.   -Again had long pauses on 5/25, EP following. Overweight POA Body mass index is 29.39 kg/m². DVT prophylaxis. [address] SUP. Pepcid. Code status. Full code. Subjective:     Chief Complaint / Reason for Physician Visit  Follow-up bacteremia  Acute drop in hemoglobin to 6.7, received blood transfusion during dialysis  Patient was seen in dialysis, awake alert x3  Has a fecal management system, dark stools in the bag  Review of system limited as patient is sleepy    Fever/Chills n   Chest Pain n    Poor Appetite y   Edema     Cough    Abdominal Pain n    Sputum    Joint Pain     SOB/BEE    Headache     Nausea/vomit    Tolerating PT/OT     Diarrhea n   Tolerating Diet     Constipation    Other       Could NOT obtain due to:      Objective:     VITALS:   Last 24hrs VS reviewed since prior progress note.  Most recent are:  Patient Vitals for the past 24 hrs:   Temp Pulse Resp BP SpO2   06/10/22 0845 -- 68 18 (!) 153/73 --   06/10/22 0830 -- 65 18 (!) 155/74 --   06/10/22 0815 -- 68 18 (!) 161/69 --   06/10/22 0755 98.9 °F (37.2 °C) 77 18 137/62 98 %   06/10/22 0306 99.2 °F (37.3 °C) 79 -- 127/71 99 %   06/09/22 2344 99.6 °F (37.6 °C) 81 -- (!) 136/48 98 %   06/09/22 1926 99.9 °F (37.7 °C) 86 -- (!) 136/44 100 %   06/09/22 1459 98.1 °F (36.7 °C) 79 16 (!) 136/45 99 %   06/09/22 1212 98.4 °F (36.9 °C) 80 16 (!) 137/44 97 %       Intake/Output Summary (Last 24 hours) at 6/10/2022 0905  Last data filed at 6/10/2022 0755  Gross per 24 hour   Intake 150 ml   Output 50 ml   Net 100 ml        Wt Readings from Last 12 Encounters:   05/26/22 82.6 kg (182 lb 1.6 oz)       PHYSICAL EXAM:    I had a face to face encounter and independently examined this patient on 06/10/22 as outlined below:    General: WD, WN. Lethargic cooperative, no acute distress    EENT:  PERRL. Anicteric sclerae. MMM  Resp:  CTA bilaterally, no wheezing or rales. No accessory muscle use  CV:  Regular  rhythm,  No edema  GI:  Soft, Non distended, Tender RUQ and epigastric region. +Bowel sounds, no rebound  Neurologic:  Sleepy but able to be aroused normal speech, non focal motor exam  Psych:   Not anxious nor agitated  Skin:  No rashes. No jaundice      Reviewed most current lab test results and cultures  YES  Reviewed most current radiology test results   YES  Review and summation of old records today    NO  Reviewed patient's current orders and MAR    YES  PMH/ reviewed - no change compared to H&P  ________________________________________________________________________  Care Plan discussed with:    Comments   Patient x    Family      RN x    Care Manager     Consultant                        Multidiciplinary team rounds were held today with , nursing, pharmacist and clinical coordinator. Patient's plan of care was discussed; medications were reviewed and discharge planning was addressed.      ________________________________________________________________________      Comments   >50% of visit spent in counseling and coordination of care ________________________________________________________________________  Morteza Oliver MD     Procedures: see electronic medical records for all procedures/Xrays and details which were not copied into this note but were reviewed prior to creation of Plan. LABS:  I reviewed today's most current labs and imaging studies.   Pertinent labs include:  Recent Labs     06/10/22  0337 06/08/22  0815   WBC 6.2 7.8   HGB 6.7* 8.0*   HCT 20.8* 25.0*   * 153     Recent Labs     06/10/22  0337      K 3.9      CO2 33*   *   BUN 18   CREA 4.99*   CA 7.1*

## 2022-06-10 NOTE — PROGRESS NOTES
06/10/22 [id number] nurse made NP Maria C Peng aware that patient has a hemoglobin of 6.7 this am.   End of Shift Note    Bedside shift change report given to MARCIA Mendez (oncoming nurse) by Margarette Delgadillo RN (offgoing nurse). Report included the following information SBAR, Kardex and MAR    Shift worked:  7PM-7AM     Shift summary and any significant changes:     Type and screen drawn on patient. One unit of blood ordered for hemoglobin of 6.7. blood can be ran at dialysis. Nurse made oncoming nurse aware.       Concerns for physician to address:      Zone phone for oncoming shift:         Margarette Delgadillo RN

## 2022-06-10 NOTE — PROCEDURES
Hemodialysis / [phone]    Vitals Pre Post Assessment Pre Post   BP BP: 137/62 (06/10/22 0755) 152/85 LOC Alert, awake, conscious and coherent Alert, awake, conscious and coherent   HR Pulse (Heart Rate): 77 (06/10/22 0755) 72 Lungs Clear, on oxygen support at 2lpm Clear, on oxygen support at 2lpm      Resp Resp Rate: 16 (06/09/22 1459) 16 Cardiac Regular rate and rythm Regular rate and rythm   Temp Temp: 98.9 °F (37.2 °C) (06/10/22 0755) 98 Skin Intact, dry and warm Intact, dry and warm   Weight  NA NA Edema No edema noted No edema noted   Tele status Machine machine Pain Pain Intensity 1: 0 (06/10/22 0327) No pain reported     Orders   Duration: Start: 0755 End: 1140 Total: 3.45 hours   Dialyzer: Dialyzer/Set Up Inspection: Madiha Luo (06/10/22 0755)   Josie Johnson Bath: Dialysate K (mEq/L): 3 (06/10/22 0755)   Ca Bath: Dialysate CA (mEq/L): 2.5 (06/10/22 0755)   Na: Dialysate NA (mEq/L): 138 (06/10/22 0755)   Bicarb: Dialysate HCO3 (mEq/L): 40 (06/10/22 0755)   Target Fluid Removal: Goal/Amount of Fluid to Remove (mL): 2000 mL (06/10/22 0755)     Access   Type & Location: Right CVC   Comments:                                       No signs and symptoms of infection noted, each catheter limb are patent, caps removed and hubs disinfected as per p&p.          Labs   HBsAg (Antigen) / date:  Negative 05/24/22                                             HBsAb (Antibody) / date: Susceptible 05/24/22   Source: Epic   Obtained/Reviewed  Critical Results Called HGB   Date Value Ref Range Status   06/10/2022 6.7 (L) 11.5 - 16.0 g/dL Final     Potassium   Date Value Ref Range Status   06/10/2022 3.9 3.5 - 5.1 mmol/L Final     Calcium   Date Value Ref Range Status   06/10/2022 7.1 (L) 8.5 - 10.1 MG/DL Final     BUN   Date Value Ref Range Status   06/10/2022 18 6 - 20 MG/DL Final     Creatinine   Date Value Ref Range Status   06/10/2022 4.99 (H) 0.55 - 1.02 MG/DL Final     Comment:     INVESTIGATED PER DELTA CHECK PROTOCOL Meds Given   Name Dose Route   None                 Adequacy / Fluid    Total Liters Process: 75.5 L   Net Fluid Removed: 2200 mL      Comments   Time Out Done:   (Time) 0745   Admitting Diagnosis: DKA,ESRD   Consent obtained/signed: Informed Consent Verified: Yes (06/10/22 8166)   Machine / Nikko Burt # Machine Number: [id number] (06/10/22 3606)   Primary Nurse Rpt Pre: Caleb Figueroa RN   Primary Nurse Rpt Post: Caleb Figueroa RN   Pt Education: Procedure   Care Plan: HD POC   Pts outpatient clinic: NA     Tx Summary   Comments:      Dialysis order, medication, labs and consent verified pre dialysis. Time Out, ICEBOAT done pre dialysis. 0700: SBAR received from Primary Nurse, patient is on stable condition upon endorsement. CVC clean aseptically as per policy, no signs of infection, dressing not due for change, both lumen are patent. Hgb is 6.7, for BT during dialysis as ordered. 3248: Treatment started, safety checks done, vitals signs are stable upon initiation of treatment, BFR at 400ml/hr. 0915 BT started. 1040 BT completed, no reaction noted. 0940: Received call from telemetry, patient is having Afib. Patient has no complaint. Dr. Rafia Palacios made aware. He said to do atleast 2.5 hours of dialysis if possible then if not stabilizing treatment may discontinue. 0945: Telemetry call, rhythm stabilized. 1025: seen by Dr. Rafia Palacios. 1140: Treatment ended. Blood returned as per policy. Vital  signs stable the whole treatment. SBAR given to Primary nurse. 1 unit of prbc transfused, noreaction noted.

## 2022-06-11 NOTE — PROGRESS NOTES
End of Shift Note    Bedside shift change report given to Pam Concepcion (oncoming nurse) by Santa Hodge RN (offgoing nurse).  Report included the following information SBAR, MAR, Kardex, recent results    Shift worked:  7p-7a     Shift summary and any significant changes:     Plan of care included;  Blood glucose monitoring- NPH 8units given one time  Q2 turns- lift team  Safety measures in place  Lab monitoring-Q8 H&G   Concerns for physician to address:       Zone phone for oncoming shift:   [phone]

## 2022-06-11 NOTE — PROGRESS NOTES
Comprehensive Nutrition Assessment    Type and Reason for Visit: Reassess    Nutrition Recommendations/Plan:   1. Continue current diet  2. Discontinue Glucerna  3. Try chocolate ensure high protein BID and magic cups daily     Nutrition Assessment:    Chart reviewed; medically noted for GLORIA requiring HD, bacteremia, and DM. Receiving a soft and bite sized diet. PO starting to show some improvement. Patient states she is eating a little better. 4-5 Glucerna shakes sitting on bedside table. Patient states she does not care for them. Discussed other supplements available and patient agreeable to trial of chocolate ensure high protein and magic cups. K+ WNL yesterday; no new phos. BG under control. Encouraged PO intake of meals. Patient Vitals for the past 168 hrs:   % Diet Eaten   06/10/22 1830 76 - 100%   06/10/22 1235 26 - 50%   06/06/22 1700 1 - 25%   06/06/22 1502 51 - 75%   06/04/22 1336 0%        Nutrition Related Findings:    -[id number], K+ 3.9  Famotidine, Humalog, NPH, protonix  Wound Type: Deep tissue injury x2,Unstageable (per wound care note)    Current Nutrition Intake & Therapies:  Average Meal Intake: 51-75%  Average Supplement Intake: 1-25%  ADULT ORAL NUTRITION SUPPLEMENT Breakfast, Lunch, Dinner; Diabetic Supplement  ADULT DIET Dysphagia - Soft & Bite Sized    Anthropometric Measures:  Height: 5' 6\" (167.6 cm)  Ideal Body Weight (IBW): 130 lbs (59 kg)     Current Body Wt:  82.6 kg (182 lb 1.6 oz), 150.8 % IBW. Bed scale  Current BMI (kg/m2): 29.4                          BMI Category: Overweight (BMI 25.0-29. 9)    Estimated Daily Nutrient Needs:  Energy Requirements Based On: Formula  Weight Used for Energy Requirements: Current  Energy (kcal/day): 1660 kcal (BMR 1277 x 1. 3AF)  Weight Used for Protein Requirements: Current  Protein (g/day): 100-116g (1.2-1.4 g/kg bw)  Method Used for Fluid Requirements: Standard renal  Fluid (ml/day): 1500 mL    Nutrition Diagnosis:   · Inadequate protein-energy intake related to  (poor appetite) as evidenced by  (varying PO)    Nutrition Interventions:   Food and/or Nutrient Delivery: Continue current diet,Modify oral nutrition supplement  Nutrition Education/Counseling: No recommendations at this time  Coordination of Nutrition Care: Continue to monitor while inpatient       Goals:  Previous Goal Met: Progressing toward goal(s)  Goals:  (PO intake at least 50% of meals and 70% of ONS next 3-5 days)       Nutrition Monitoring and Evaluation:   Behavioral-Environmental Outcomes: None identified  Food/Nutrient Intake Outcomes: Food and nutrient intake,Supplement intake  Physical Signs/Symptoms Outcomes: Biochemical data,Weight,Hemodynamic status,Fluid status or edema    Discharge Planning:    Continue current diet,Continue oral nutrition supplement    Kenton Cruz RD  Contact: ext [phone]

## 2022-06-11 NOTE — PROGRESS NOTES
End of Shift Note    Bedside shift change report given to Vani (oncoming nurse) by Tavon Palmer RN (offgoing nurse).   Report included the following information SBAR, Kardex, Intake/Output, MAR and Recent Results    Shift worked:  7a-7p     Shift summary and any significant changes:     Hgb better today, assisted with meals, wound care completed, venelex to heels     Concerns for physician to address:       Zone phone for oncoming shift:   [phone]

## 2022-06-11 NOTE — PROGRESS NOTES
Progress Note      6/11/2022  NAME: Glenys Cheadle   MRN:  [id number]   Admit Diagnosis: DKA (diabetic ketoacidosis) ([facility] [address] [E11.10]  PCP:  Kevin Ferrer MD     Assessment: 1. Profound sinus bradycardia 20-30's in the setting of DKA, hyperkalemia, and OP beta-blocker. This has resolved. 2. Paroxysmal atrial fibrillation with post-conversion pauses up to 6 seconds on beta-blocker. Now off beta-blocker. Still some minor pauses. 3. Paroxysmal atrial flutter by tele. She had runs of atrial tachycardia and then followed by probably sustained Afib which organized into a right atrial flutter (by tele morphology) before terminating to sinus. 4. 2nd degree AV block with single dropped PAC's, RBBB with normal IL in sinus rhythm. 5. Hypotension likely due to dehydration/volume depletion, metabolic acidosis, bradycardia. Improved with volume resuscitation, pressor. Now off pressor, but bacteremia with Serratia (see ID note for details). 6. No evidence of acute ischemia or infarct by presenting ECG. Echo with normal LV and RV systolic function. 7. GLORIA atop CKD stage 3 at baseline. Rhabdomyolysis. On dialysis now. 8. Diabetes mellitus type 2 on chronic insulin complicated by DKA. 9. Elevated lipase possibly due to DKA rather than acute pancreatitis. 10. Metabolic encephalopathy, resolved. 11. Anemia, multifactorial.  S/p transfusion. 12. Thrombocytopenia, improved. 13. Full code.     Plan:      1. She's on DVT prophylaxis at this time. I'd avoid FULL anticoagulation if possible given her anemia and thrombocytopenia. 2. Continue oral amiodarone. 3. Renal recs per nephrology team.  4. ID consult is following. Due to persistent bacteremia, a NAI was done 6/3. There may be a small vegetation on the mitral valve. ID wants 6 weeks of cefepime. Update: 6/11/2022: Reports no CP or SOB. Tele: no arrhythmia. HR 69, /52, A+o, NAD, RRR, No MGR, Lung clear anterior, no pedal edema.  Cont amiodarone and monitor on tele.            Subjective:     Remains in stable rhythm   No CP, dizziness, palpitations. Objective:      Physical Exam:    Last 24hrs VS reviewed since prior progress note. Most recent are:    Visit Vitals  BP (!) 138/52   Pulse 69   Temp 98.2 °F (36.8 °C)   Resp 17   Ht 5' 6\" (1.676 m)   Wt 82.6 kg (182 lb 1.6 oz)   SpO2 100%   BMI 29.39 kg/m²       Intake/Output Summary (Last 24 hours) at 6/11/2022 0951  Last data filed at 6/10/2022 1830  Gross per 24 hour   Intake 613.8 ml   Output 2200 ml   Net -1586.2 ml           General: Tired and awake, no distress  Neck: Supple,   Respiratory: No respiratory distress, clear anteriorly   Cardiovascular: Regular rate rhythm, S1S2   Abdomen: soft, non tender   Neuro: moves all extremities   Skin: warm and dry   Extremity:  warm to touch      Data Review    Telemetry:  Afib with post-conversion pause earlier today of 3 sec. Currently in sinus in the 70's. Lab Data Personally Reviewed:    Recent Labs     06/11/22  0607 06/10/22  2201 06/10/22  1300 06/10/22  0337   WBC  --   --   --  6.2   HGB 9.2* 9.3*   < > 6.7*   HCT 29.6* 29.2*   < > 20.8*   PLT  --   --   --  109*    < > = values in this interval not displayed. No results for input(s): INR, PTP, APTT, INREXT, INREXT in the last 72 hours. Recent Labs     06/10/22  0337      K 3.9      CO2 33*   BUN 18   CREA 4.99*   *   CA 7.1*     No results for input(s): CPK, CKNDX, TROIQ in the last 72 hours. No lab exists for component: CPKMB  Lab Results   Component Value Date/Time    Triglyceride 95 05/26/2022 08:30 AM       No results for input(s): AP, TBIL, TP, ALB, GLOB, GGT, AML, LPSE in the last 72 hours. No lab exists for component: SGOT, GPT, AMYP, HLPSE  No results for input(s): PH, PCO2, PO2 in the last 72 hours.     Medications Personally Reviewed:    Current Facility-Administered Medications   Medication Dose Route Frequency    0.9% sodium chloride infusion 250 mL  250 mL IntraVENous PRN    pantoprazole (PROTONIX) 40 mg in 0.9% sodium chloride 10 mL injection  40 mg IntraVENous Q12H    insulin NPH (NOVOLIN N, HUMULIN N) injection 8 Units  8 Units SubCUTAneous BID    famotidine (PEPCID) tablet 20 mg  20 mg Oral DAILY    epoetin betina-epbx (RETACRIT) 12,000 Units combo injection  12,000 Units SubCUTAneous Q MON, WED & FRI    amiodarone (CORDARONE) tablet 400 mg  400 mg Oral DAILY    cefepime (MAXIPIME) 1 g in 0.9% sodium chloride (MBP/ADV) 50 mL MBP  1 g IntraVENous Q24H    heparin (porcine) 1,000 unit/mL injection 1,400 Units  1,400 Units InterCATHeter DIALYSIS PRN    And    heparin (porcine) 1,000 unit/mL injection 1,100 Units  1,100 Units InterCATHeter DIALYSIS PRN    hydrALAZINE (APRESOLINE) 20 mg/mL injection 20 mg  20 mg IntraVENous Q6H PRN    albumin human 25% (BUMINATE) solution 25 g  25 g IntraVENous DIALYSIS PRN    metoprolol (LOPRESSOR) injection 2.5 mg  2.5 mg IntraVENous Q6H PRN    insulin lispro (HUMALOG) injection   SubCUTAneous AC&HS    glucose chewable tablet 16 g  4 Tablet Oral PRN    glucagon (GLUCAGEN) injection 1 mg  1 mg IntraMUSCular PRN    dextrose 10 % infusion 0-250 mL  0-250 mL IntraVENous PRN    sodium chloride (NS) flush 5-40 mL  5-40 mL IntraVENous Q8H    sodium chloride (NS) flush 5-40 mL  5-40 mL IntraVENous PRN    acetaminophen (TYLENOL) tablet 650 mg  650 mg Oral Q6H PRN    Or    acetaminophen (TYLENOL) suppository 650 mg  650 mg Rectal Q6H PRN    polyethylene glycol (MIRALAX) packet 17 g  17 g Oral DAILY PRN    ondansetron (ZOFRAN ODT) tablet 4 mg  4 mg Oral Q8H PRN    Or    ondansetron (ZOFRAN) injection 4 mg  4 mg IntraVENous Q6H PRN    [Held by provider] heparin (porcine) injection 5,000 Units  5,000 Units SubCUTAneous Q8H    alcohol 62% (NOZIN) nasal  1 Ampule  1 Ampule Topical Q12H    balsam peru-castor oiL (VENELEX) ointment   Topical BID              Day Bui MD

## 2022-06-11 NOTE — PROGRESS NOTES
End of Shift Note    Bedside shift change report given to  (oncoming nurse) by Ganga Dozier RN (offgoing nurse). Report included the following information SBAR    Shift worked:  0236-5717     Shift summary and any significant changes:     1 unit RBCs transfused, Run of Afib in dialysis x5 min, +occult stool     Concerns for physician to address:  N/A     Zone phone for oncoming shift:   [phone]       Activity:  Activity Level: Bed Rest    Cardiac:   Cardiac Monitoring: Yes      Cardiac Rhythm: Sinus Rhythm, Run of Afib x5 min in dialysis    Access:   Current line(s): HD access, PIV     Genitourinary:   Urinary status: anuric    Respiratory:   O2 Device: Nasal cannula  Chronic home O2 use?: NO  Incentive spirometer at bedside: YES       GI:  Last Bowel Movement Date:  (FMS)  Current diet:  ADULT ORAL NUTRITION SUPPLEMENT Breakfast, Lunch, Dinner; Diabetic Supplement  ADULT DIET Dysphagia - Soft & Bite Sized  Passing flatus: YES  Tolerating current diet: YES       Pain Management:   Patient states pain is manageable on current regimen: YES    Skin:  Markos Score: 11  Interventions: speciality bed    Patient Safety:  Fall Score:  Total Score: 2  Interventions: pt to call before getting OOB  High Fall Risk: Yes    Length of Stay:  Expected LOS: 4d 19h  Actual LOS: [address], RN

## 2022-06-11 NOTE — PROGRESS NOTES
Hospitalist Progress Note    NAME: Emiliano Will   :  1934   MRN:  [id number]     Admit date: 2022    Today's date: 22    PCP: Tj Shi MD    Anticipated discharge date:     Barriers:  SNF placment and permcath and OP HD     Assessment / Plan:    Acute blood loss anemia on chronic anemia   ? GI bleed  Hemoglobin has been dropping down for the last 2 or 3 days , hemoglobin dropped down to 6.7 on 06/10, a unit of PRBC ordered and transfused, repeat hemoglobin is 8.6  Stool occult blood positive, will order Protonix IV twice daily, GI consulted  GI input reviewed, no plan for inpatient colonoscopy or endoscopy, recommended continue monitoring hemoglobin    Acute metabolic encephalopathy   Hypoglycemia on   Lethargic, BS 58 on , resolved  Given orange juice , glucagon   Blood sugar in the 110's  Continue with NPH at reduced dose at 8 unit twice daily  Patient is doing much better, eating  Blood sugar remains on the lower side, but no recurrent episode of hypoglycemia  Continue with current dose of NPH    Septic shock POA resolved  Klebsiella Bacteremia with UTI  POA  Serratia marcescens bacteremia  and 2022 POA  Persistent GNR bacteremia POA  ? Infectious endocarditis  - Admit UA 5-10 WBC, 1+ bacteria  - Admit BC with klebsiella and Serratia   Sensitive to zosyn, ceftriaxone  - Admit urine culture + for klebsiella  - Repeat BC  with recurrent sepsis + for Serratia  - CT abdomen/pelvis without IV contrast  with no hydronephrosis   No intraabdominal pathology  - Chest CT with bilateral effusions, no ASD  - With elevated lipase and abnormal LFTs, ?  Biliary source   GGT 28, never had elevated Alk Phos and T bili   US with no gallstones, contracted GB, CBD 0.4 cm              Biliary source seems less likely  - IV pressors at admit, weaned off   Started on HD for GLORIA from ATN  -Started on stress steroid, being weaned down  - Transferred to floor 5/29/2022  - S/p zosyn --> transition to ceftriaxone 5/31  - Repeat Blood culture 5/30 with GNR  - Repeat blood culture 5/31 positive for gram-negative rods  - Not sure why she is persistent bacteremic with GNR despite appropriate antibiotics    TTE LVEF 55 to 60%, trace MR and AR  - Repeat CT abdomen given the tenderness, persistent bacteremia  -ID consulted, recommended to remove central line and the Evan repeat blood cultures. Discussed with ID and nephrology  . CT abdomen pelvis with contrast did not show any acute pathology    Central line and Evan removed and plan for NAI by cardiology  . Blood cultures from 0602 negative to date  Status post NAI on 06/03, prelim report showed:  S/p NAI performed in sinus rhythm preliminarily revealing biatrial enlargement, moderate MR with very small mobile abnormality on the atrial side of the mitral valve, cannot exclude what might be a small vegetation. Large left atrial appendage with low exit velocity and without thrombus. Mild TR with evidence of moderate pulmonary hypertension. Small pericardial effusion. Continue with IV cefepime  Repeat blood cultures so far has been negative since 06/02  ID recommended 6 weeks of cefepime with hemodialysis  Outpatient hemodialysis to be set up by his neurology  s/Post permacath on 06/08  A. fib with RVR  Patient has had episode of bradycardia with pauses during the stay due to combination of DKA, hyperkalemia  Beta-blocker was stopped.   Poor candidate for  Anticoagulation   Status post amnio drip, switch to p.o. amnio    Acute kidney injury POA BUN/creat 114 and 5.97  Rhabdomyolysis CPK peak 22,325   -Last baseline creat 1.28 at [facility] on 3/9/2021  -Started on CRRT till 5/26, now on HD  -No hydronephrosis  -Likely ATN with hypotension, sepsis, rhabdomyolysis  - Oligouric  -Serial labs  Patient hemodialysis to be set up by case management  Marlen hurst, will also discontinue the fecal management system    DKA POA BS 1259, HgBa1c > 14.0, AG > 20, pH 6.99  - Not sure if DKA present, acidosis may have been due to sepsis, GLORIA   Urine with trace ketones, but B-hydroxybutyrate levels in blood elevated  - Likely patient was not taking insulin at home as her A1c is >14  - s/p Insulin gtt --> currently on NPH 16 units twice daily, will hold it due to hypoglycemia          Acute pancreatitis POA  - admit Non contrast CT is neg for changes of acute pancreatitis. - Lipase > 3000 -->  1107 -->  2499 --> 1602  - + abdominal tenderness  - clears  - No Gallstones or ductal dilatation, no ETOH  - ? Ischemic   - lipase tredning down  -Repeat CT abdomen/pelvis showed no acute abnormality  - serial labs     Acute metabolic encephalopathy POA resolved  - Multifactorial with septic shock, low BP, GLORIA, elevated BS > 1200  - Head CT with atrophy, no acute events  -Improving. Closely monitor mental status.  -manage metabolic issues as above.     Symptomatic bradycardia.  -Likely precipitated by severe acidemia, also on BB at home.   -Again had long pauses on 5/25, EP following. Overweight POA Body mass index is 29.39 kg/m². DVT prophylaxis. [address] SUP. Pepcid. Code status. Full code. Subjective:     Chief Complaint / Reason for Physician Visit  Follow-up bacteremia  Patient is more alert awake x3, no acute complaints  Blood sugar better controlled    Fever/Chills n   Chest Pain n    Poor Appetite y   Edema     Cough    Abdominal Pain n    Sputum    Joint Pain     SOB/BEE    Headache     Nausea/vomit    Tolerating PT/OT     Diarrhea n   Tolerating Diet     Constipation    Other       Could NOT obtain due to:      Objective:     VITALS:   Last 24hrs VS reviewed since prior progress note.  Most recent are:  Patient Vitals for the past 24 hrs:   Temp Pulse Resp BP SpO2   06/11/22 1450 98.4 °F (36.9 °C) 72 17 (!) 111/57 100 %   06/11/22 1135 98 °F (36.7 °C) 70 18 135/60 100 %   06/11/22 0735 98.2 °F (36.8 °C) 69 17 (!) 138/52 100 %   06/11/22 0438 98 °F (36.7 °C) 82 16 131/67 --   06/10/22 2010 98.5 °F (36.9 °C) 85 14 (!) 151/53 99 %   06/10/22 1654 97.7 °F (36.5 °C) 78 12 (!) 164/58 99 %       Intake/Output Summary (Last 24 hours) at 6/11/2022 1628  Last data filed at 6/10/2022 1830  Gross per 24 hour   Intake 120 ml   Output --   Net 120 ml        Wt Readings from Last 12 Encounters:   05/26/22 82.6 kg (182 lb 1.6 oz)       PHYSICAL EXAM:    I had a face to face encounter and independently examined this patient on 06/11/22 as outlined below:    General: WD, WN. Lethargic cooperative, no acute distress    EENT:  PERRL. Anicteric sclerae. MMM  Resp:  CTA bilaterally, no wheezing or rales. No accessory muscle use  CV:  Regular  rhythm,  No edema  GI:  Soft, Non distended, Tender RUQ and epigastric region. +Bowel sounds, no rebound  Neurologic:  Sleepy but able to be aroused normal speech, non focal motor exam  Psych:   Not anxious nor agitated  Skin:  No rashes. No jaundice      Reviewed most current lab test results and cultures  YES  Reviewed most current radiology test results   YES  Review and summation of old records today    NO  Reviewed patient's current orders and MAR    YES  PMH/ reviewed - no change compared to H&P  ________________________________________________________________________  Care Plan discussed with:    Comments   Patient x    Family      RN x    Care Manager     Consultant                        Multidiciplinary team rounds were held today with , nursing, pharmacist and clinical coordinator. Patient's plan of care was discussed; medications were reviewed and discharge planning was addressed.      ________________________________________________________________________      Comments   >50% of visit spent in counseling and coordination of care     ________________________________________________________________________  Aundrea Mayer MD     Procedures: see electronic medical records for all procedures/Xrays and details which were not copied into this note but were reviewed prior to creation of Plan. LABS:  I reviewed today's most current labs and imaging studies. Pertinent labs include:  Recent Labs     06/11/22  0607 06/10/22  2201 06/10/22  1300 06/10/22  0337 06/10/22  0337   WBC  --   --   --   --  6.2   HGB 9.2* 9.3* 8.6*   < > 6.7*   HCT 29.6* 29.2* 27.5*   < > 20.8*   PLT  --   --   --   --  109*    < > = values in this interval not displayed.      Recent Labs     06/10/22  0337      K 3.9      CO2 33*   *   BUN 18   CREA 4.99*   CA 7.1*

## 2022-06-12 NOTE — PROGRESS NOTES
End of Shift Note    Bedside shift change report given to IGOR KENNEDY (oncoming nurse) by Jarod Covarrubias RN (offgoing nurse). Report included the following information SBAR, Kardex, Intake/Output, MAR, Accordion and Recent Results    Shift worked:  7a-7p     Shift summary and any significant changes:     Pt stable, wound care done, turning pt t/o shift. Plan for HD anabell. Otherwise uneventful shift. Concerns for physician to address:  none     Zone phone for oncoming shift:   [phone]       Activity:  Activity Level: Bed Rest  Number times ambulated in hallways past shift: 0  Number of times OOB to chair past shift: 0    Cardiac:   Cardiac Monitoring: No      Cardiac Rhythm: Sinus Rhythm    Access:   Current line(s): PIV     Genitourinary:   Urinary status: anuric    Respiratory:   O2 Device: Nasal cannula  Chronic home O2 use?: NO  Incentive spirometer at bedside: NO       GI:  Last Bowel Movement Date: 06/12/22  Current diet:  ADULT DIET Dysphagia - Soft & Bite Sized  ADULT ORAL NUTRITION SUPPLEMENT Breakfast, Dinner; Low Calorie/High Protein  ADULT ORAL NUTRITION SUPPLEMENT Lunch; Frozen Supplement  Passing flatus: YES  Tolerating current diet: YES       Pain Management:   Patient states pain is manageable on current regimen: N/A    Skin:  Markos Score: 15  Interventions: increase time out of bed    Patient Safety:  Fall Score:  Total Score: 1  Interventions: gripper socks  High Fall Risk: Yes    Length of Stay:  Expected LOS: 4d 19h  Actual LOS: [address], RN

## 2022-06-12 NOTE — PROGRESS NOTES
Hospitalist Progress Note    NAME: Vani Lincoln   :  1934   MRN:  [id number]     Admit date: 2022    Today's date: 22    PCP: Christi Acevedo MD    Anticipated discharge date:     Barriers:  SNF placment and permcath and OP HD     Assessment / Plan:    Acute blood loss anemia on chronic anemia   ? GI bleed  Hemoglobin has been dropping down for the last 2 or 3 days , hemoglobin dropped down to 6.7 on 06/10, a unit of PRBC ordered and transfused, repeat hemoglobin is 8.6  Stool occult blood positive, will order Protonix IV twice daily, GI consulted  GI input reviewed, no plan for inpatient colonoscopy or endoscopy, recommended continue monitoring hemoglobin    Acute metabolic encephalopathy   Hypoglycemia on   Lethargic, BS 58 on , resolved  Given orange juice , glucagon   Blood sugar in the 110's  Continue with NPH at reduced dose at 8 unit twice daily  Patient is doing much better, eating  Blood sugar remains on the lower side, but no recurrent episode of hypoglycemia  Continue with current dose of NPH    Septic shock POA resolved  Klebsiella Bacteremia with UTI  POA  Serratia marcescens bacteremia  and 2022 POA  Persistent GNR bacteremia POA  ? Infectious endocarditis  - Admit UA 5-10 WBC, 1+ bacteria  - Admit BC with klebsiella and Serratia   Sensitive to zosyn, ceftriaxone  - Admit urine culture + for klebsiella  - Repeat BC  with recurrent sepsis + for Serratia  - CT abdomen/pelvis without IV contrast  with no hydronephrosis   No intraabdominal pathology  - Chest CT with bilateral effusions, no ASD  - With elevated lipase and abnormal LFTs, ?  Biliary source   GGT 28, never had elevated Alk Phos and T bili   US with no gallstones, contracted GB, CBD 0.4 cm              Biliary source seems less likely  - IV pressors at admit, weaned off   Started on HD for GLORIA from ATN  -Started on stress steroid, being weaned down  - Transferred to floor 5/29/2022  - S/p zosyn --> transition to ceftriaxone 5/31  - Repeat Blood culture 5/30 with GNR  - Repeat blood culture 5/31 positive for gram-negative rods  - Not sure why she is persistent bacteremic with GNR despite appropriate antibiotics    TTE LVEF 55 to 60%, trace MR and AR  - Repeat CT abdomen given the tenderness, persistent bacteremia  -ID consulted, recommended to remove central line and the Evan repeat blood cultures. Discussed with ID and nephrology  . CT abdomen pelvis with contrast did not show any acute pathology    Central line and Evan removed and plan for NAI by cardiology  . Blood cultures from 0602 negative to date  Status post NAI on 06/03, prelim report showed:  S/p NAI performed in sinus rhythm preliminarily revealing biatrial enlargement, moderate MR with very small mobile abnormality on the atrial side of the mitral valve, cannot exclude what might be a small vegetation. Large left atrial appendage with low exit velocity and without thrombus. Mild TR with evidence of moderate pulmonary hypertension. Small pericardial effusion. Continue with IV cefepime  Repeat blood cultures so far has been negative since 06/02  ID recommended 6 weeks of cefepime with hemodialysis  Outpatient hemodialysis to be set up by his neurology  s/Post permacath on 06/08  A. fib with RVR  Patient has had episode of bradycardia with pauses during the stay due to combination of DKA, hyperkalemia  Beta-blocker was stopped.   Poor candidate for  Anticoagulation   Status post amnio drip, switch to p.o. amnio    Acute kidney injury POA BUN/creat 114 and 5.97  Rhabdomyolysis CPK peak 22,325   -Last baseline creat 1.28 at [facility] on 3/9/2021  -Started on CRRT till 5/26, now on HD  -No hydronephrosis  -Likely ATN with hypotension, sepsis, rhabdomyolysis  - Oligouric  -Serial labs  Patient hemodialysis to be set up by case management  Marlen hurst, will also discontinue the fecal management system    DKA POA BS 1259, HgBa1c > 14.0, AG > 20, pH 6.99  - Not sure if DKA present, acidosis may have been due to sepsis, GLORIA   Urine with trace ketones, but B-hydroxybutyrate levels in blood elevated  - Likely patient was not taking insulin at home as her A1c is >14  - s/p Insulin gtt --> currently on NPH 16 units twice daily, will hold it due to hypoglycemia          Acute pancreatitis POA  - admit Non contrast CT is neg for changes of acute pancreatitis. - Lipase > 3000 -->  1107 -->  2499 --> 1602  - + abdominal tenderness  - clears  - No Gallstones or ductal dilatation, no ETOH  - ? Ischemic   - lipase tredning down  -Repeat CT abdomen/pelvis showed no acute abnormality  - serial labs     Acute metabolic encephalopathy POA resolved  - Multifactorial with septic shock, low BP, GLORIA, elevated BS > 1200  - Head CT with atrophy, no acute events  -Improving. Closely monitor mental status.  -manage metabolic issues as above.     Symptomatic bradycardia.  -Likely precipitated by severe acidemia, also on BB at home.   -Again had long pauses on 5/25, EP following. Overweight POA Body mass index is 29.39 kg/m². DVT prophylaxis. [address] SUP. Pepcid. Code status. Full code. Subjective:     Chief Complaint / Reason for Physician Visit  Follow-up bacteremia  No acute issues    Fever/Chills n   Chest Pain n    Poor Appetite y   Edema     Cough    Abdominal Pain n    Sputum    Joint Pain     SOB/BEE    Headache     Nausea/vomit    Tolerating PT/OT     Diarrhea n   Tolerating Diet     Constipation    Other       Could NOT obtain due to:      Objective:     VITALS:   Last 24hrs VS reviewed since prior progress note.  Most recent are:  Patient Vitals for the past 24 hrs:   Temp Pulse Resp BP SpO2   06/12/22 0728 98.7 °F (37.1 °C) 65 18 129/65 96 %   06/12/22 0409 98.8 °F (37.1 °C) 62 18 119/61 97 %   06/11/22 2336 98.1 °F (36.7 °C) 72 18 (!) 138/52 100 %   06/11/22 2031 98.9 °F (37.2 °C) 72 18 (!) 144/43 100 % 06/11/22 1450 98.4 °F (36.9 °C) 72 17 (!) 111/57 100 %   06/11/22 1135 98 °F (36.7 °C) 70 18 135/60 100 %       Intake/Output Summary (Last 24 hours) at 6/12/2022 0950  Last data filed at 6/11/2022 1929  Gross per 24 hour   Intake 50 ml   Output --   Net 50 ml        Wt Readings from Last 12 Encounters:   05/26/22 82.6 kg (182 lb 1.6 oz)       PHYSICAL EXAM:    I had a face to face encounter and independently examined this patient on 06/12/22 as outlined below:    General: WD, WN. Lethargic cooperative, no acute distress    EENT:  PERRL. Anicteric sclerae. MMM  Resp:  CTA bilaterally, no wheezing or rales. No accessory muscle use  CV:  Regular  rhythm,  No edema  GI:  Soft, Non distended, Tender RUQ and epigastric region. +Bowel sounds, no rebound  Neurologic:  Sleepy but able to be aroused normal speech, non focal motor exam  Psych:   Not anxious nor agitated  Skin:  No rashes. No jaundice      Reviewed most current lab test results and cultures  YES  Reviewed most current radiology test results   YES  Review and summation of old records today    NO  Reviewed patient's current orders and MAR    YES  PMH/SH reviewed - no change compared to H&P  ________________________________________________________________________  Care Plan discussed with:    Comments   Patient x    Family      RN x    Care Manager     Consultant                        Multidiciplinary team rounds were held today with , nursing, pharmacist and clinical coordinator. Patient's plan of care was discussed; medications were reviewed and discharge planning was addressed.      ________________________________________________________________________      Comments   >50% of visit spent in counseling and coordination of care     ________________________________________________________________________  Albin Hays MD     Procedures: see electronic medical records for all procedures/Xrays and details which were not copied into this note but were reviewed prior to creation of Plan. LABS:  I reviewed today's most current labs and imaging studies. Pertinent labs include:  Recent Labs     06/11/22  0607 06/10/22  2201 06/10/22  1300 06/10/22  0337 06/10/22  0337   WBC  --   --   --   --  6.2   HGB 9.2* 9.3* 8.6*   < > 6.7*   HCT 29.6* 29.2* 27.5*   < > 20.8*   PLT  --   --   --   --  109*    < > = values in this interval not displayed.      Recent Labs     06/10/22  0337      K 3.9      CO2 33*   *   BUN 18   CREA 4.99*   CA 7.1*

## 2022-06-12 NOTE — PROGRESS NOTES
Progress Note      6/12/2022  NAME: Danial Mcghee   MRN:  [id number]   Admit Diagnosis: DKA (diabetic ketoacidosis) [facility] [E11.10]  PCP:  Azael Colorado MD     Assessment: 1. Profound sinus bradycardia 20-30's in the setting of DKA, hyperkalemia, and OP beta-blocker. This has resolved. 2. Paroxysmal atrial fibrillation with post-conversion pauses up to 6 seconds on beta-blocker. Now off beta-blocker. Still some minor pauses. 3. Paroxysmal atrial flutter by tele. She had runs of atrial tachycardia and then followed by probably sustained Afib which organized into a right atrial flutter (by tele morphology) before terminating to sinus. 4. 2nd degree AV block with single dropped PAC's, RBBB with normal HI in sinus rhythm. 5. Hypotension likely due to dehydration/volume depletion, metabolic acidosis, bradycardia. Improved with volume resuscitation, pressor. Now off pressor, but bacteremia with Serratia (see ID note for details). 6. No evidence of acute ischemia or infarct by presenting ECG. Echo with normal LV and RV systolic function. 7. GLORIA atop CKD stage 3 at baseline. Rhabdomyolysis. On dialysis now. 8. Diabetes mellitus type 2 on chronic insulin complicated by DKA. 9. Elevated lipase possibly due to DKA rather than acute pancreatitis. 10. Metabolic encephalopathy, resolved. 11. Anemia, multifactorial.  S/p transfusion. 12. Thrombocytopenia, improved. 13. Full code.     Plan:      1. She's on DVT prophylaxis at this time. I'd avoid FULL anticoagulation if possible given her anemia and thrombocytopenia. 2. Continue oral amiodarone. 3. Renal recs per nephrology team.  4. ID consult is following. Due to persistent bacteremia, a NAI was done 6/3. There may be a small vegetation on the mitral valve. ID wants 6 weeks of cefepime. Update: 6/11/2022: Reports no CP or SOB. Tele: no arrhythmia. HR 69, /52, A+o, NAD, RRR, No MGR, Lung clear anterior, no pedal edema.  Cont amiodarone and monitor on tele.       Update: 6/12/2022: doing well. No cardiac symptoms. Tele: no arrhythmia. Cont amiodarone and monitor on tele. Subjective:     Remains in stable rhythm   No CP, dizziness, palpitations. Objective:      Physical Exam:    Last 24hrs VS reviewed since prior progress note. Most recent are:    Visit Vitals  /63   Pulse 63   Temp 98.6 °F (37 °C)   Resp 18   Ht 5' 6\" (1.676 m)   Wt 82.6 kg (182 lb 1.6 oz)   SpO2 97%   BMI 29.39 kg/m²       Intake/Output Summary (Last 24 hours) at 6/12/2022 1413  Last data filed at 6/11/2022 1929  Gross per 24 hour   Intake 50 ml   Output --   Net 50 ml           General: Tired and awake, no distress  Neck: Supple,   Respiratory: No respiratory distress, clear anteriorly   Cardiovascular: Regular rate rhythm, S1S2   Abdomen: soft, non tender   Neuro: moves all extremities   Skin: warm and dry   Extremity:  warm to touch      Data Review    Telemetry:  Afib with post-conversion pause earlier today of 3 sec. Currently in sinus in the 70's. Lab Data Personally Reviewed:    Recent Labs     06/11/22  0607 06/10/22  2201 06/10/22  1300 06/10/22  0337   WBC  --   --   --  6.2   HGB 9.2* 9.3*   < > 6.7*   HCT 29.6* 29.2*   < > 20.8*   PLT  --   --   --  109*    < > = values in this interval not displayed. No results for input(s): INR, PTP, APTT, INREXT, INREXT in the last 72 hours. Recent Labs     06/10/22  0337      K 3.9      CO2 33*   BUN 18   CREA 4.99*   *   CA 7.1*     No results for input(s): CPK, CKNDX, TROIQ in the last 72 hours. No lab exists for component: CPKMB  Lab Results   Component Value Date/Time    Triglyceride 95 05/26/2022 08:30 AM       No results for input(s): AP, TBIL, TP, ALB, GLOB, GGT, AML, LPSE in the last 72 hours. No lab exists for component: SGOT, GPT, AMYP, HLPSE  No results for input(s): PH, PCO2, PO2 in the last 72 hours.     Medications Personally Reviewed:    Current Facility-Administered Medications   Medication Dose Route Frequency    0.9% sodium chloride infusion 250 mL  250 mL IntraVENous PRN    pantoprazole (PROTONIX) 40 mg in 0.9% sodium chloride 10 mL injection  40 mg IntraVENous Q12H    insulin NPH (NOVOLIN N, HUMULIN N) injection 8 Units  8 Units SubCUTAneous BID    famotidine (PEPCID) tablet 20 mg  20 mg Oral DAILY    epoetin betina-epbx (RETACRIT) 12,000 Units combo injection  12,000 Units SubCUTAneous Q MON, WED & FRI    amiodarone (CORDARONE) tablet 400 mg  400 mg Oral DAILY    cefepime (MAXIPIME) 1 g in 0.9% sodium chloride (MBP/ADV) 50 mL MBP  1 g IntraVENous Q24H    heparin (porcine) 1,000 unit/mL injection 1,400 Units  1,400 Units InterCATHeter DIALYSIS PRN    And    heparin (porcine) 1,000 unit/mL injection 1,100 Units  1,100 Units InterCATHeter DIALYSIS PRN    hydrALAZINE (APRESOLINE) 20 mg/mL injection 20 mg  20 mg IntraVENous Q6H PRN    albumin human 25% (BUMINATE) solution 25 g  25 g IntraVENous DIALYSIS PRN    metoprolol (LOPRESSOR) injection 2.5 mg  2.5 mg IntraVENous Q6H PRN    insulin lispro (HUMALOG) injection   SubCUTAneous AC&HS    glucose chewable tablet 16 g  4 Tablet Oral PRN    glucagon (GLUCAGEN) injection 1 mg  1 mg IntraMUSCular PRN    dextrose 10 % infusion 0-250 mL  0-250 mL IntraVENous PRN    sodium chloride (NS) flush 5-40 mL  5-40 mL IntraVENous Q8H    sodium chloride (NS) flush 5-40 mL  5-40 mL IntraVENous PRN    acetaminophen (TYLENOL) tablet 650 mg  650 mg Oral Q6H PRN    Or    acetaminophen (TYLENOL) suppository 650 mg  650 mg Rectal Q6H PRN    polyethylene glycol (MIRALAX) packet 17 g  17 g Oral DAILY PRN    ondansetron (ZOFRAN ODT) tablet 4 mg  4 mg Oral Q8H PRN    Or    ondansetron (ZOFRAN) injection 4 mg  4 mg IntraVENous Q6H PRN    [Held by provider] heparin (porcine) injection 5,000 Units  5,000 Units SubCUTAneous Q8H    alcohol 62% (NOZIN) nasal  1 Ampule  1 Ampule Topical Q12H    balsam peru-castor oiL (VENELEX) ointment   Topical BID              Adrienne Waters MD

## 2022-06-12 NOTE — PROGRESS NOTES
Received notification from bedside RN about patient with regards to: , refused dinner tray and has scheduled evening Lantus    Intervention given: Hold this evening's scheduled Lantus ordered

## 2022-06-12 NOTE — PROGRESS NOTES
6/11/22 10:07 PM RN perfectserved NP : \"Patient bedtime blood glucose was 114mg/dL. She has a night time order for NPH 8 units. She refused her dinner tray. Do you want me to hold it please. \"     6/11/22 10:18 PM, NP responded: \"You can hold it\"    End of Shift Note     Bedside shift change report given to [facility] (oncoming nurse) by Lorene Alejandre RN (offgoing nurse).  Report included the following information SBAR, MAR, Kardex, recent results     Shift worked:  7p-7a      Shift summary and any significant changes:      Plan of care included;  Blood glucose monitoring- NPH 8units held   Q2 turns  Safety measures in place  Lab monitoring-Q8 H&G   Concerns for physician to address:        Zone phone for oncoming shift:

## 2022-06-13 NOTE — PROGRESS NOTES
@ 10:16 PM RN alexserved NP-\" Patient's bedtime blood glucose was 93mg/dL. Do you want me to hold the NPH please? \"      10:19 PM NP responded- 'Yes'    End of Shift Note     Bedside shift change report given to JUAN CARLOS Pulido (oncoming nurse) by Lorene Alejandre RN (offgoing nurse). Report included the following information SBAR, MAR, Kardex, recent results     Shift worked:  7p-7a      Shift summary and any significant changes:      Patient alert and oriented X3-4.  Plan of care included;  Blood glucose monitoring- NPH held at bedtime 8units held   Q2 turns  Safety measures in place   Concerns for physician to address:        Zone phone for oncoming shift:

## 2022-06-13 NOTE — PROGRESS NOTES
Hospitalist Progress Note    NAME: Glenys Cheadle   :  1934   MRN:  [id number]     Admit date: 2022    Today's date: 22    PCP: Kevin Ferrer MD    Anticipated discharge date: , medically stable    Barriers: SNF placement    Assessment / Plan:    Acute blood loss anemia on chronic anemia   ? GI bleed  Hemoglobin has been dropping down for the last 2 or 3 days , hemoglobin dropped down to 6.7 on 06/10, a unit of PRBC ordered and transfused, repeat hemoglobin is 8.6  Stool occult blood positive, will order Protonix IV twice daily, GI consulted  GI input reviewed, no plan for inpatient colonoscopy or endoscopy, recommended continue monitoring hemoglobin    Acute metabolic encephalopathy   Hypoglycemia on   Lethargic, BS 58 on , resolved  Given orange juice , glucagon   Blood sugar in the 110's  Continue with NPH at reduced dose at 8 unit twice daily  Patient is doing much better, eating  Blood sugar remains on the lower side, but no recurrent episode of hypoglycemia  Continue with current dose of NPH    Septic shock POA resolved  Klebsiella Bacteremia with UTI  POA  Serratia marcescens bacteremia  and 2022 POA  Persistent GNR bacteremia POA  ? Infectious endocarditis  - Admit UA 5-10 WBC, 1+ bacteria  - Admit BC with klebsiella and Serratia   Sensitive to zosyn, ceftriaxone  - Admit urine culture + for klebsiella  - Repeat BC  with recurrent sepsis + for Serratia  - CT abdomen/pelvis without IV contrast  with no hydronephrosis   No intraabdominal pathology  - Chest CT with bilateral effusions, no ASD  - With elevated lipase and abnormal LFTs, ?  Biliary source   GGT 28, never had elevated Alk Phos and T bili   US with no gallstones, contracted GB, CBD 0.4 cm              Biliary source seems less likely  - IV pressors at admit, weaned off   Started on HD for GLORIA from ATN  -Started on stress steroid, being weaned down  - Transferred to floor 5/29/2022  - S/p zosyn --> transition to ceftriaxone 5/31  - Repeat Blood culture 5/30 with GNR  - Repeat blood culture 5/31 positive for gram-negative rods  - Not sure why she is persistent bacteremic with GNR despite appropriate antibiotics    TTE LVEF 55 to 60%, trace MR and AR  - Repeat CT abdomen given the tenderness, persistent bacteremia  -ID consulted, recommended to remove central line and the Evan repeat blood cultures. Discussed with ID and nephrology  . CT abdomen pelvis with contrast did not show any acute pathology    Central line and Evan removed and plan for NAI by cardiology  . Blood cultures from 0602 negative to date  Status post NAI on 06/03, prelim report showed:  S/p NAI performed in sinus rhythm preliminarily revealing biatrial enlargement, moderate MR with very small mobile abnormality on the atrial side of the mitral valve, cannot exclude what might be a small vegetation. Large left atrial appendage with low exit velocity and without thrombus. Mild TR with evidence of moderate pulmonary hypertension. Small pericardial effusion. Continue with IV cefepime  Repeat blood cultures so far has been negative since 06/02  ID recommended 6 weeks of cefepime with hemodialysis  Outpatient hemodialysis to be set up by his neurology  s/Post permacath on 06/08  A. fib with RVR  Patient has had episode of bradycardia with pauses during the stay due to combination of DKA, hyperkalemia  Beta-blocker was stopped.   Poor candidate for  Anticoagulation   Status post amnio drip, switch to p.o. amnio    Acute kidney injury POA BUN/creat 114 and 5.97  Rhabdomyolysis CPK peak 22,325   -Last baseline creat 1.28 at [facility] on 3/9/2021  -Started on CRRT till 5/26, now on HD  -No hydronephrosis  -Likely ATN with hypotension, sepsis, rhabdomyolysis  - Oligouric  -Serial labs  Patient hemodialysis to be set up by case management  Marlen hurst, will also discontinue the fecal management system    DKA POA BS 1259, HgBa1c > 14.0, AG > 20, pH 6.99  - Not sure if DKA present, acidosis may have been due to sepsis, GLORIA   Urine with trace ketones, but B-hydroxybutyrate levels in blood elevated  - Likely patient was not taking insulin at home as her A1c is >14  - s/p Insulin gtt --> currently on NPH 16 units twice daily, will hold it due to hypoglycemia          Acute pancreatitis POA  - admit Non contrast CT is neg for changes of acute pancreatitis. - Lipase > 3000 -->  1107 -->  2499 --> 1602  - + abdominal tenderness  - clears  - No Gallstones or ductal dilatation, no ETOH  - ? Ischemic   - lipase tredning down  -Repeat CT abdomen/pelvis showed no acute abnormality  - serial labs     Acute metabolic encephalopathy POA resolved  - Multifactorial with septic shock, low BP, GLORIA, elevated BS > 1200  - Head CT with atrophy, no acute events  -Improving. Closely monitor mental status.  -manage metabolic issues as above.     Symptomatic bradycardia.  -Likely precipitated by severe acidemia, also on BB at home.   -Again had long pauses on 5/25, EP following. Overweight POA Body mass index is 29.39 kg/m². DVT prophylaxis. [facility] & intermediate. SUP. Pepcid. Code status. Full code. Subjective:     Chief Complaint / Reason for Physician Visit  Follow-up bacteremia  No acute issues   seen hemodialysis    Fever/Chills n   Chest Pain n    Poor Appetite y   Edema     Cough    Abdominal Pain n    Sputum    Joint Pain     SOB/BEE    Headache     Nausea/vomit    Tolerating PT/OT     Diarrhea n   Tolerating Diet     Constipation    Other       Could NOT obtain due to:      Objective:     VITALS:   Last 24hrs VS reviewed since prior progress note.  Most recent are:  Patient Vitals for the past 24 hrs:   Temp Pulse Resp BP SpO2   06/13/22 0330 98.5 °F (36.9 °C) 67 18 126/64 98 %   06/12/22 2312 98.3 °F (36.8 °C) 65 18 125/61 99 %   06/12/22 1952 98.2 °F (36.8 °C) 63 18 130/72 99 %   06/12/22 1510 98.4 °F (36.9 °C) 64 18 125/70 99 %   06/12/22 1110 98.6 °F (37 °C) 63 18 130/63 97 %       Intake/Output Summary (Last 24 hours) at 6/13/2022 0805  Last data filed at 6/12/2022 1810  Gross per 24 hour   Intake --   Output 50 ml   Net -50 ml        Wt Readings from Last 12 Encounters:   05/26/22 82.6 kg (182 lb 1.6 oz)       PHYSICAL EXAM:    I had a face to face encounter and independently examined this patient on 06/13/22 as outlined below:    General: WD, WN. Lethargic cooperative, no acute distress    EENT:  PERRL. Anicteric sclerae. MMM  Resp:  CTA bilaterally, no wheezing or rales. No accessory muscle use  CV:  Regular  rhythm,  No edema  GI:  Soft, Non distended, Tender RUQ and epigastric region. +Bowel sounds, no rebound  Neurologic:  Sleepy but able to be aroused normal speech, non focal motor exam  Psych:   Not anxious nor agitated  Skin:  No rashes. No jaundice      Reviewed most current lab test results and cultures  YES  Reviewed most current radiology test results   YES  Review and summation of old records today    NO  Reviewed patient's current orders and MAR    YES  PMH/ reviewed - no change compared to H&P  ________________________________________________________________________  Care Plan discussed with:    Comments   Patient x    Family      RN x    Care Manager     Consultant                        Multidiciplinary team rounds were held today with , nursing, pharmacist and clinical coordinator. Patient's plan of care was discussed; medications were reviewed and discharge planning was addressed. ________________________________________________________________________      Comments   >50% of visit spent in counseling and coordination of care     ________________________________________________________________________  Joanne Fountain MD     Procedures: see electronic medical records for all procedures/Xrays and details which were not copied into this note but were reviewed prior to creation of Plan.       LABS:  I reviewed today's most current labs and imaging studies. Pertinent labs include:  Recent Labs     06/11/22  0607 06/10/22  2201 06/10/22  1300   HGB 9.2* 9.3* 8.6*   HCT 29.6* 29.2* 27.5*     No results for input(s): NA, K, CL, CO2, GLU, BUN, CREA, CA, MG, PHOS, ALB, TBIL, TBILI, ALT, INR, INREXT, INREXT in the last 72 hours.     No lab exists for component: SGOT

## 2022-06-13 NOTE — PROGRESS NOTES
Problem: Self Care Deficits Care Plan (Adult)  Goal: *Acute Goals and Plan of Care (Insert Text)  Description: FUNCTIONAL STATUS PRIOR TO ADMISSION: Patient was modified independent using a rolling walker for functional mobility. Patient was supervision for basic and instrumental ADLs but needing support recently due to primary caregiver hospitalized. HOME SUPPORT: The patient lived with alone with grand-daughter support until her hospitalized/unable had another cousin checking in on her since then ~1 month, per chart. Occupational Therapy Goals    Initiated 6/7/2022 for Weekly Re-assessment, Goals remain appropriate as per 6/13 weekly reevaluation. 1.  Patient will perform self-feeding (HOB elevated) with modified independence within 7 day(s). 2.  Patient will perform simple grooming tasks (HOB elevated) with supervision/set-up within 7 day(s). 3.  Patient will perform UB sponge bathing and dressing with moderate assistance within 7 days. 4.  Patient will roll side<>side during bed level toileting/tavo hygiene with moderate assistance within 7 days. 5.  Patient will participate in upper extremity therapeutic exercise/activities to maximize UB strength required for ADL with supervision/set-up for 5 minutes within 7 day(s). Initiated 5/31/2022, Reviewed 6/7/2022 and goals updated. 1.  Patient will perform self-feeding with modified independence within 7 day(s). Not met, continued. 2.  Patient will perform grooming with supervision/set-up within 7 day(s). Not met, continued. 3.  Patient will perform upper body dressing with supervision/set-up within 7 day(s). Downgraded to Mod A   4. Patient will perform toilet transfers with moderate assistance within 7 day(s). Not met, modified  5. Patient will perform all aspects of toileting with moderate assistance within 7 day(s). Downgraded to Max A   6.   Patient will participate in upper extremity therapeutic exercise/activities with supervision/set-up for 5 minutes within 7 day(s). Continued  7. Patient will utilize energy conservation techniques during functional activities with verbal cues within 7 day(s). Discontinued  Outcome: Not Progressing Towards Goal    OCCUPATIONAL THERAPY TREATMENT/WEEKLY RE-ASSESSMENT  Patient: Michelle Davis (35 y.o. female)  Date: 6/13/2022  Diagnosis: DKA (diabetic ketoacidosis) ([facility] [address] [E11.10] <principal problem not specified>       Precautions: Skin,Fall  Chart, occupational therapy assessment, plan of care, and goals were reviewed. ASSESSMENT  Patient continues with skilled OT services, but has not progressed much toward her goals due to her HD schedule and poor activity tolerance. Overall she was max A of 2 to total A for bed mobility, she demonstrates poor sitting balance and she was only able to demonstrate limited participation in UB exercise today. Session ended 2/2 patient becoming tachycardic to as high as 141 with EOB activity. The patient is currently limited by GW, decreased ROM, poor motivation, poor activity tolerance, decreased safety awareness and decreased balance. At this time she will continue to benefit from OT acutely and at [facility] rehab after discharge in order to maximize her functional potential. She will ultimately likely need LTC placement. PLAN :  Goals have been updated based on progression since last assessment. Patient continues to benefit from skilled intervention to address the above impairments. Continue to follow patient 3 times a week to address goals. Recommendation for discharge: (in order for the patient to meet his/her long term goals)  Therapy up to 5 days/week in SNF setting with transition to LTC    This dischargIF patient discharges home will need the following DME: TBD       SUBJECTIVE:   Patient stated I don't feel like doing this right now.     OBJECTIVE DATA SUMMARY:   Cognitive/Behavioral Status:  Neurologic State: Alert  Orientation Level: Oriented to place;Oriented to person;Oriented to situation;Disoriented to time  Cognition: Decreased command following;Decreased attention/concentration  Perception: Cues to maintain midline in sitting; Tactile;Verbal  Perseveration: No perseveration noted  Safety/Judgement: Fall prevention;Decreased insight into deficits; Awareness of environment    Functional Mobility and Transfers for ADLs:  Bed Mobility:  Rolling: Total assistance;Assist x2;Maximum assistance  Supine to Sit: Maximum assistance; Total assistance;Assist x2  Sit to Supine: Total assistance;Assist x2  Scooting: Total assistance    Transfers:  Sit to Stand:  (deferred due to HR increasing to 135-141)       Balance:  Sitting: Impaired  Sitting - Static: Poor (constant support) (improved from mod/max a to min a by the end of session)  Sitting - Dynamic: Poor (constant support)  Standing: Impaired  Standing - Static: Not tested  Standing - Dynamic : Not tested    ADL Intervention:    Therapeutic exercise:  1 set each, 5 repetitions each of scapular elevation and retraction. Pain:  Complaint of pain in BLE when being handled to assist patient to EOB    Activity Tolerance:   Poor  HR: 135 to 141 seated EOB  SpO2 stable on 1 L NC      After treatment patient left in no apparent distress:   Supine in bed and Call bell within reach    COMMUNICATION/COLLABORATION:   The patients plan of care was discussed with: Physical therapist and Registered nurse.      Cristopher Holden, OTR/L  Time Calculation: 24 mins

## 2022-06-13 NOTE — PROGRESS NOTES
Received notification from bedside RN about patient with regards to: BG 93, has NPH 8 units scheduled at hs    Intervention given: Hold this evening's dose of NPH

## 2022-06-13 NOTE — PROGRESS NOTES
Problem: Mobility Impaired (Adult and Pediatric)  Goal: *Acute Goals and Plan of Care (Insert Text)  Description: FUNCTIONAL STATUS PRIOR TO ADMISSION: Patient was modified independent using a rolling walker for functional mobility. The patient was ambulating short distances in the home up until this admission 5/23/22    HOME SUPPORT PRIOR TO ADMISSION: The patient lived alone with no local support. She has a family member that comes to check on her. Physical Therapy Goals  Revised 6/13/2022  1. Patient will move from supine to sit and sit to supine  and roll side to side in bed with maximal assistance within 7 day(s). 2.  Patient will transfer from bed to chair and chair to bed with maximal assistance using the least restrictive device within 7 day(s). 3.  Patient will perform sit to stand with maximal assistance within 7 day(s). 4.  Patient will increase bilateral knee flexion 0 to 80 degrees within 7 days. Physical Therapy Goals  Initiated 5/31/2022  1. Patient will move from supine to sit and sit to supine  in bed with moderate assistance  within 7 day(s). Not met 6/13/22. 2.  Patient will transfer from bed to chair and chair to bed with moderate assistance  using the least restrictive device within 7 day(s). Not met 6/13/22. 3.  Patient will perform sit to stand with moderate assistance  within 7 day(s). Not met 6/13/22. 4.  Patient will ambulate with moderate assistance for 15 feet with the least restrictive device within 7 day(s). Not met 6/13/22. Outcome: Not Progressing Towards Goal  PHYSICAL THERAPY TREATMENT: WEEKLY REASSESSMENT  Patient: Lei Borden (49 y.o. female)  Date: 6/13/2022  Primary Diagnosis: DKA [facility]diabetic k[facility]acidosis[facility] [E11.10]        Precautions:  Skin,Fall      ASSESSMENT  Patient continues with skilled PT services and is not progressing towards goals.  She is severely deconditioned with severe generalized weakness in all extremities, has bilateral knee extension contractures flexion limited 35-45 degrees either side when sitting on the side of bed, very poor sitting balance with very delayed righting reactions and poor activity tolerance with HR increasing to 135-141 sitting on the side of bed, and mobility skills are very poor needing max/total assistance x 2 for rolling and supine to sit transfers. Sitting balance required max a initially to maintain midline with patient falling backwards and to R side. After about 5 minutes this improved to mod/min a. Performed trunk strengthening exercises with OT at this time. Returned to supine with total assistance x 2 and repositioned into comfortable position in bed. Resting comfortably when the session ended. Her O2 sats were 97% on RA when sitting on the side of bed. Patient's progression toward goals since last assessment: Declined from 15/100 to 0/100 on the Barthel. No PT goals have been met; last seen for PT on 6/1/22 due to unavailability due to HD or other procedures being performed. New goals created for the next 7 days. Current Level of Function Impacting Discharge (mobility/balance): max a x 2 to total assistance x 2 for rolling and supine to sit transfers; needs strong assistance to maintain sitting balance    Functional Outcome Measure: The patient scored 0/100 on the Barthel outcome measure which is indicative of being totally dependent for all all ADLs and mobility skills at this time. Other factors to consider for discharge: very high risk for falls and skin breakdown; lives alone         PLAN :  Goals have been updated based on progression since last assessment. Patient continues to benefit from skilled intervention to address the above impairments.     Recommendations and Planned Interventions: bed mobility training, transfer training, therapeutic exercises, neuromuscular re-education, patient and family training/education, and therapeutic activities      Frequency/Duration: Patient will be followed by physical therapy:  3 times a week to address goals. Recommendation for discharge: (in order for the patient to meet his/her long term goals)  Therapy up to 5 days/week in SNF setting with possible transition to long term care    This discharge recommendation:  Has been made in collaboration with the attending provider and/or case management    IF patient discharges home will need the following DME: hospital bed, mechanical lift, wheelchair, and 24/7 supervision/assistance       SUBJECTIVE:   Patient stated  I don't feel like doing anything.     OBJECTIVE DATA SUMMARY:   HISTORY:    Past Medical History:   Diagnosis Date    Diabetes ([facility] [address]      Past Surgical History:   Procedure Laterality Date    IR INSERT NON TUNL CVC OVER 5 YRS  5/26/2022    IR INSERT NON TUNL CVC OVER 5 YRS  6/3/2022    IR INSERT TUNL CVC W/O PORT OVER 5 YR  6/8/2022       Personal factors and/or comorbidities impacting plan of care: dm, currently needing HD; dysphagia    Home Situation  Home Environment: Private residence  # Steps to Enter: 0  One/Two Story Residence: Two story, live on 1st floor  Living Alone: No  Support Systems: Other Family Member(s)  Patient Expects to be Discharged to[de-identified] Skilled nursing facility  Current DME Used/Available at Home: Walker, rolling    EXAMINATION/PRESENTATION/DECISION MAKING:   Critical Behavior:  Neurologic State: Alert  Orientation Level: Oriented to place,Oriented to person,Oriented to situation,Disoriented to time  Cognition: Decreased command following,Decreased attention/concentration  Safety/Judgement: Fall prevention,Decreased insight into deficits,Awareness of environment  Hearing:   Auditory  Auditory Impairment: None  Skin:  sacral wound dressing to be changed by nursing later  Edema: none  Range Of Motion:  AROM: Generally decreased, functional           PROM: Generally decreased, functional (B knee ext contractures - limited to 35-45 degrees each side)           Strength: Strength: Generally decreased, functional (grossly 2-/5)                    Tone & Sensation:   Tone: Normal              Sensation: Intact               Coordination:  Coordination: Generally decreased, functional  Functional Mobility:  Bed Mobility:  Rolling: Total assistance;Assist x2;Maximum assistance  Supine to Sit: Maximum assistance; Total assistance;Assist x2  Sit to Supine: Total assistance;Assist x2  Scooting: Total assistance  Transfers:  Sit to Stand:  (deferred due to HR increasing to 135-141)                          Balance:   Sitting: Impaired  Sitting - Static: Poor (constant support) (improved from mod/max a to min a by the end of session)  Sitting - Dynamic: Poor (constant support)  Standing: Impaired  Standing - Static: Not tested  Standing - Dynamic : Not tested  Ambulation/Gait Training:                                              Therapeutic Exercises:   Aarom to PROM for ankles, knees and hips. 5-10 reps each side    Functional Measure:  Barthel Index:    Bathin  Bladder: 0  Bowels: 0  Groomin  Dressin  Feedin  Mobility: 0  Stairs: 0  Toilet Use: 0  Transfer (Bed to Chair and Back): 0  Total: 0/100       The Barthel ADL Index: Guidelines  1. The index should be used as a record of what a patient does, not as a record of what a patient could do. 2. The main aim is to establish degree of independence from any help, physical or verbal, however minor and for whatever reason. 3. The need for supervision renders the patient not independent. 4. A patient's performance should be established using the best available evidence. Asking the patient, friends/relatives and nurses are the usual sources, but direct observation and common sense are also important. However direct testing is not needed. 5. Usually the patient's performance over the preceding 24-48 hours is important, but occasionally longer periods will be relevant.   6. Middle categories imply that the patient supplies over 50 per cent of the effort. 7. Use of aids to be independent is allowed. Score Interpretation (from [address] [facility])    Independent   60-79 Minimally independent   40-59 Partially dependent   20-39 Very dependent   <20 Totally dependent     -Nelly Jimenez., Barthel, D.W. (1965). Functional evaluation: the Barthel Index. [address] ([address] (1997). The Barthel activities of daily living index: self-reporting versus actual performance in the old (> or = 75 years). Journal of [address] [place] [address] 45(7), [facility], J.J.M.F, [place]., Mati Mujica. (1999). Measuring the change in disability after inpatient rehabilitation; comparison of the responsiveness of the Barthel Index and Functional [place] Measure. Journal of Neurology, Neurosurgery, and Psychiatry, 66(4), 935-224. LAMAR Lindsay, BENNIE Perez, & Selma Velez M.A. (2004) Assessment of post-stroke quality of life in cost-effectiveness studies: The usefulness of the Barthel Index and the EuroQoL-5D. Quality of Life Research, 13, 427-43       Pain Rating:  Knees hurt when flexed    Activity Tolerance:   Fair, SpO2 stable on RA, and requires frequent rest breaks    After treatment patient left in no apparent distress:   Supine in bed, Call bell within reach, and Side rails x 4 due to being in sand bed on rotation mode. COMMUNICATION/EDUCATION:   The patients plan of care was discussed with: Occupational therapist, Registered nurse, and Case management. Patient understands intent and goals of therapy, but is neutral about his/her participation.     Thank you for this referral.  Jasson Schafer, PT   Time Calculation: 24 mins

## 2022-06-13 NOTE — PROGRESS NOTES
Nephrology Progress Note  Pb Zamora     [facility]. [facility]  Phone - [phone]   Patient: Mati Records    YOB: 1934        Date- 6/13/2022   Admit Date: 5/23/2022  CC: Follow up for GLORIA ON HD      IMPRESSION & PLAN:   · GLORIA  due to ATN - HD dependent now, no signs of recovery  · CKD 3b  · DKA  · Sepsis   UTI   hypokalemia   Gram +ve blood cx   AFIB   Bradycardia- 2nd degree av block   DM 2    PLAN-   Seen on hd today   Restart norvasc if bp remains high after hd   Will keep her on MWF schedule   epogen for anemia   [facility]  [facility] [address] or  [facility] Waiting for placement at [facility]     Subjective: Interval History:   -seen on hd  bp high    Objective:   Vitals:    06/13/22 0930 06/13/22 0945 06/13/22 1000 06/13/22 1015   BP: (!) 155/77 (!) 168/79 (!) 157/86 (!) 165/60   Pulse: 61 (!) 57 85 (!) 59   Resp: 16 16 16 16   Temp:       TempSrc:       SpO2:       Weight:       Height:          06/12 0701 - 06/13 0700  In: -   Out: 50 [Drains:50]  Last 3 Recorded Weights in this Encounter    05/23/22 0957 05/24/22 0727 05/26/22 0631   Weight: 89.2 kg (196 lb 10.4 oz) 88.9 kg (196 lb) 82.6 kg (182 lb 1.6 oz)      Physical exam:    GEN: nad  NECK- Supple, no mass  RESP: No wheezing, clear b/l  CVS: S1,S2  RRR  EXT:+Edema   PSYCH: Can't access due to patient's current condition     Chart reviewed. Pertinent Notes reviewed. Data Review :  Recent Labs     06/13/22  0910      K 3.7      CO2 30   BUN 23*   CREA 5.84*   *   CA 7.4*   PHOS 3.3     Recent Labs     06/13/22  0910 06/11/22  0607 06/10/22  2201   WBC 6.2  --   --    HGB 8.5* 9.2* 9.3*   HCT 27.1* 29.6* 29.2*   PLT 75*  --   --      No results for input(s): FE, TIBC, PSAT, FERR in the last 72 hours.    Medication list  reviewed  Current Facility-Administered Medications   Medication Dose Route Frequency  insulin NPH (NOVOLIN N, HUMULIN N) injection 5 Units  5 Units SubCUTAneous BID    0.9% sodium chloride infusion 250 mL  250 mL IntraVENous PRN    pantoprazole (PROTONIX) 40 mg in 0.9% sodium chloride 10 mL injection  40 mg IntraVENous Q12H    famotidine (PEPCID) tablet 20 mg  20 mg Oral DAILY    epoetin betina-epbx (RETACRIT) 12,000 Units combo injection  12,000 Units SubCUTAneous Q MON, WED & FRI    amiodarone (CORDARONE) tablet 400 mg  400 mg Oral DAILY    cefepime (MAXIPIME) 1 g in 0.9% sodium chloride (MBP/ADV) 50 mL MBP  1 g IntraVENous Q24H    heparin (porcine) 1,000 unit/mL injection 1,400 Units  1,400 Units InterCATHeter DIALYSIS PRN    And    heparin (porcine) 1,000 unit/mL injection 1,100 Units  1,100 Units InterCATHeter DIALYSIS PRN    hydrALAZINE (APRESOLINE) 20 mg/mL injection 20 mg  20 mg IntraVENous Q6H PRN    albumin human 25% (BUMINATE) solution 25 g  25 g IntraVENous DIALYSIS PRN    metoprolol (LOPRESSOR) injection 2.5 mg  2.5 mg IntraVENous Q6H PRN    insulin lispro (HUMALOG) injection   SubCUTAneous AC&HS    glucose chewable tablet 16 g  4 Tablet Oral PRN    glucagon (GLUCAGEN) injection 1 mg  1 mg IntraMUSCular PRN    dextrose 10 % infusion 0-250 mL  0-250 mL IntraVENous PRN    sodium chloride (NS) flush 5-40 mL  5-40 mL IntraVENous Q8H    sodium chloride (NS) flush 5-40 mL  5-40 mL IntraVENous PRN    acetaminophen (TYLENOL) tablet 650 mg  650 mg Oral Q6H PRN    Or    acetaminophen (TYLENOL) suppository 650 mg  650 mg Rectal Q6H PRN    polyethylene glycol (MIRALAX) packet 17 g  17 g Oral DAILY PRN    ondansetron (ZOFRAN ODT) tablet 4 mg  4 mg Oral Q8H PRN    Or    ondansetron (ZOFRAN) injection 4 mg  4 mg IntraVENous Q6H PRN    [Held by provider] heparin (porcine) injection 5,000 Units  5,000 Units SubCUTAneous Q8H    alcohol 62% (NOZIN) nasal  1 Ampule  1 Ampule Topical Q12H    balsam peru-castor oiL (VENELEX) ointment   Topical BID          Strickland Meyer, MD              [facility]  [facility] / [facility], [address]  [place], [address]  Phone - [phone]               Fax - [phone]

## 2022-06-13 NOTE — PROGRESS NOTES
Gastroenterology Progress Note  Wade Lundborg, PA   for Dr. Kori Potts    6/13/2022    Admit Date: 5/23/2022    Subjective: Follow up for:  1)  Acute on chronic anemia- Improving    2) Heme positive stool     Patient is on dysphagia diet. Pain: Patient complains of abdominal pain no. Bowel Movements: Normal    There is no bleeding    Results for Dot Cardenas (MRN [id number]) as of 6/13/2022 12:53   Ref.  Range 6/10/2022 03:37 6/10/2022 13:00 6/10/2022 22:01 6/11/2022 06:07 6/13/2022 09:10   HGB Latest Ref Range: 11.5 - 16.0 g/dL 6.7 (L) 8.6 (L) 9.3 (L) 9.2 (L) 8.5 (L)   HCT Latest Ref Range: 35.0 - 47.0 % 20.8 (L) 27.5 (L) 29.2 (L) 29.6 (L) 27.1 (L)      Has received one unit of blood on 6/10, nothing further    Just returned to floor from dialysis    Current Facility-Administered Medications   Medication Dose Route Frequency    insulin NPH (NOVOLIN N, HUMULIN N) injection 5 Units  5 Units SubCUTAneous BID    0.9% sodium chloride infusion 250 mL  250 mL IntraVENous PRN    pantoprazole (PROTONIX) 40 mg in 0.9% sodium chloride 10 mL injection  40 mg IntraVENous Q12H    famotidine (PEPCID) tablet 20 mg  20 mg Oral DAILY    epoetin betina-epbx (RETACRIT) 12,000 Units combo injection  12,000 Units SubCUTAneous Q MON, WED & FRI    amiodarone (CORDARONE) tablet 400 mg  400 mg Oral DAILY    cefepime (MAXIPIME) 1 g in 0.9% sodium chloride (MBP/ADV) 50 mL MBP  1 g IntraVENous Q24H    heparin (porcine) 1,000 unit/mL injection 1,400 Units  1,400 Units InterCATHeter DIALYSIS PRN    And    heparin (porcine) 1,000 unit/mL injection 1,100 Units  1,100 Units InterCATHeter DIALYSIS PRN    hydrALAZINE (APRESOLINE) 20 mg/mL injection 20 mg  20 mg IntraVENous Q6H PRN    albumin human 25% (BUMINATE) solution 25 g  25 g IntraVENous DIALYSIS PRN    metoprolol (LOPRESSOR) injection 2.5 mg  2.5 mg IntraVENous Q6H PRN    insulin lispro (HUMALOG) injection   SubCUTAneous AC&HS    glucose chewable tablet 16 g  4 Tablet Oral PRN    glucagon (GLUCAGEN) injection 1 mg  1 mg IntraMUSCular PRN    dextrose 10 % infusion 0-250 mL  0-250 mL IntraVENous PRN    sodium chloride (NS) flush 5-40 mL  5-40 mL IntraVENous Q8H    sodium chloride (NS) flush 5-40 mL  5-40 mL IntraVENous PRN    acetaminophen (TYLENOL) tablet 650 mg  650 mg Oral Q6H PRN    Or    acetaminophen (TYLENOL) suppository 650 mg  650 mg Rectal Q6H PRN    polyethylene glycol (MIRALAX) packet 17 g  17 g Oral DAILY PRN    ondansetron (ZOFRAN ODT) tablet 4 mg  4 mg Oral Q8H PRN    Or    ondansetron (ZOFRAN) injection 4 mg  4 mg IntraVENous Q6H PRN    [Held by provider] heparin (porcine) injection 5,000 Units  5,000 Units SubCUTAneous Q8H    alcohol 62% (NOZIN) nasal  1 Ampule  1 Ampule Topical Q12H    balsam peru-castor oiL (VENELEX) ointment   Topical BID        Objective:     Blood pressure (!) 164/77, pulse 93, temperature 97.8 °F (36.6 °C), resp.  rate 16, height 5' 6\" (1.676 m), weight 82.6 kg (182 lb 1.6 oz), SpO2 99 %.    06/13 0701 - 06/13 1900  In: -   Out: 2000 06/11 1901 - 06/13 0700  In: 50 [I.V.:50]  Out: 48 [Drains:50]    EXAM:    GEN: Elderly WF, NAD  HEENT: NCAT  Lungs; CTA, normal WOB  Abdomen: soft, NT, ND, normal BS  Ext: no edema    Data Review    Recent Results (from the past 24 hour(s))   GLUCOSE, POC    Collection Time: 06/12/22  4:20 PM   Result Value Ref Range    Glucose (POC) 155 (H) 65 - 117 mg/dL    Performed by Sara Michael PCT    GLUCOSE, POC    Collection Time: 06/12/22  9:21 PM   Result Value Ref Range    Glucose (POC) 93 65 - 117 mg/dL    Performed by Adriano Davis PCT    GLUCOSE, POC    Collection Time: 06/13/22  7:43 AM   Result Value Ref Range    Glucose (POC) 127 (H) 65 - 117 mg/dL    Performed by Jhonny Trevino PCT    CBC W/O DIFF    Collection Time: 06/13/22  9:10 AM   Result Value Ref Range    WBC 6.2 3.6 - 11.0 K/uL    RBC 2.67 (L) 3.80 - 5.20 M/uL    HGB 8.5 (L) 11.5 - 16.0 g/dL    HCT 27.1 (L) 35.0 - 47.0 %    .5 (H) 80.0 - 99.0 FL    MCH 31.8 26.0 - 34.0 PG    MCHC 31.4 30.0 - 36.5 g/dL    RDW 18.1 (H) 11.5 - 14.5 %    PLATELET 75 (L) 490 - 400 K/uL    MPV 11.5 8.9 - 12.9 FL    NRBC 0.0 0  WBC    ABSOLUTE NRBC 0.00 0.00 - 0.01 K/uL   RENAL FUNCTION PANEL    Collection Time: 06/13/22  9:10 AM   Result Value Ref Range    Sodium 139 136 - 145 mmol/L    Potassium 3.7 3.5 - 5.1 mmol/L    Chloride 100 97 - 108 mmol/L    CO2 30 21 - 32 mmol/L    Anion gap 9 5 - 15 mmol/L    Glucose 129 (H) 65 - 100 mg/dL    BUN 23 (H) 6 - 20 MG/DL    Creatinine 5.84 (H) 0.55 - 1.02 MG/DL    BUN/Creatinine ratio 4 (L) 12 - 20      GFR est AA 8 (L) >60 ml/min/1.73m2    GFR est non-AA 7 (L) >60 ml/min/1.73m2    Calcium 7.4 (L) 8.5 - 10.1 MG/DL    Phosphorus 3.3 2.6 - 4.7 MG/DL    Albumin 1.4 (L) 3.5 - 5.0 g/dL     Recent Labs     06/13/22  0910 06/11/22  0607   WBC 6.2  --    HGB 8.5* 9.2*   HCT 27.1* 29.6*   PLT 75*  --      Recent Labs     06/13/22  0910      K 3.7      CO2 30   BUN 23*   CREA 5.84*   *   CA 7.4*   PHOS 3.3     Recent Labs     06/13/22  0910   ALB 1.4*     No results for input(s): INR, PTP, APTT, INREXT in the last 72 hours. No results for input(s): FE, TIBC, PSAT, FERR in the last 72 hours. No results found for: FOL, RBCF   No results for input(s): PH, PCO2, PO2 in the last 72 hours. No results for input(s): CPK, CKNDX, TROIQ in the last 72 hours.     No lab exists for component: CPKMB  Lab Results   Component Value Date/Time    Triglyceride 95 05/26/2022 08:30 AM     Lab Results   Component Value Date/Time    Glucose (POC) 127 (H) 06/13/2022 07:43 AM    Glucose (POC) 93 06/12/2022 09:21 PM    Glucose (POC) 155 (H) 06/12/2022 04:20 PM    Glucose (POC) 174 (H) 06/12/2022 11:55 AM    Glucose (POC) 109 06/12/2022 07:28 AM     Lab Results   Component Value Date/Time    Color YELLOW/STRAW 05/23/2022 09:33 AM    Appearance CLOUDY (A) 05/23/2022 09:33 AM    Specific gravity 1.024 05/23/2022 09:33 AM    pH (UA) 5.0 05/23/2022 09:33 AM    Protein Negative 05/23/2022 09:33 AM    Glucose >1,000 (A) 05/23/2022 09:33 AM    Ketone TRACE (A) 05/23/2022 09:33 AM    Bilirubin Negative 05/23/2022 09:33 AM    Urobilinogen 0.2 05/23/2022 09:33 AM    Nitrites Negative 05/23/2022 09:33 AM    Leukocyte Esterase SMALL (A) 05/23/2022 09:33 AM    Epithelial cells MANY (A) 05/23/2022 09:33 AM    Bacteria 1+ (A) 05/23/2022 09:33 AM    WBC 5-10 05/23/2022 09:33 AM    RBC 5-10 05/23/2022 09:33 AM           Assessment:     Active Problems:    DKA (diabetic ketoacidosis) ([address] (5/23/2022)      Goals of care, counseling/discussion ()      Lethargy ()      Advanced care planning/counseling discussion ()      Palliative care by specialist ()      Plan:   Patient with stable hgb and no overt bleeding. Will hold off on endoscopic eval at this time as anemia is likely due to other causes/bleeding after permcath placement. Should she have significant bleeding with hemodynamic instability please contact us as we would reconsider at that time. We will sign off the case, feel free to call with questions or if further consultation is required.      NORY Nobles    06/13/22  12:51 PM  [address] [place] [address], [address] [place] [address]  [id number]  [address] [place] [address] South: [phone]

## 2022-06-13 NOTE — PROGRESS NOTES
Progress Note      6/13/2022  NAME: Jacobo Saldana   MRN:  [id number]   Admit Diagnosis: DKA (diabetic ketoacidosis) ([facility] [address] [E11.10]  PCP:  Chris Patterson MD     Assessment:   Profound sinus bradycardia 20-30's in the setting of DKA, hyperkalemia, and OP beta-blocker. This has resolved. Paroxysmal atrial fibrillation with post-conversion pauses up to 6 seconds on beta-blocker. Now off beta-blocker. Still some minor pauses. Paroxysmal atrial flutter by tele. She had runs of atrial tachycardia and then followed by probably sustained Afib which organized into a right atrial flutter (by tele morphology) before terminating to sinus. 2nd degree AV block with single dropped PAC's, RBBB with normal OR in sinus rhythm. Hypotension likely due to dehydration/volume depletion, metabolic acidosis, bradycardia. Improved with volume resuscitation, pressor. Now off pressor, but bacteremia with Serratia (see ID note for details). No evidence of acute ischemia or infarct by presenting ECG. Echo with normal LV and RV systolic function. GLORIA atop CKD stage 3 at baseline. Rhabdomyolysis. On dialysis now. Diabetes mellitus type 2 on chronic insulin complicated by DKA. Elevated lipase possibly due to DKA rather than acute pancreatitis. Metabolic encephalopathy, resolved. Anemia, multifactorial.  S/p transfusion. Thrombocytopenia, improved. Full code. Plan:      She's on DVT prophylaxis at this time. I'd avoid FULL anticoagulation if possible given her anemia and thrombocytopenia. Continue oral amiodarone. Renal recs per nephrology team.  On dialysis. ID consult is following. Due to persistent bacteremia, a NAI was done 6/3. There may be a small vegetation on the mitral valve. ID wants 6 weeks of cefepime. Subjective:     Remains in stable rhythm. No CP, dizziness, palpitations. Currently in dialysis. Objective:      Physical Exam:    Last 24hrs VS reviewed since prior progress note. Most recent are:    Visit Vitals  BP (!) 160/98   Pulse 73   Temp 97.9 °F (36.6 °C) (Oral)   Resp 16   Ht 5' 6\" (1.676 m)   Wt 82.6 kg (182 lb 1.6 oz)   SpO2 99%   BMI 29.39 kg/m²       Intake/Output Summary (Last 24 hours) at 6/13/2022 0933  Last data filed at 6/12/2022 1810  Gross per 24 hour   Intake --   Output 50 ml   Net -50 ml           General: Tired and awake, no distress  Neck: Supple,   Respiratory: No respiratory distress, clear anteriorly   Cardiovascular: Regular rate rhythm, S1S2   Abdomen: soft, non tender   Neuro: moves all extremities   Skin: warm and dry   Extremity:  warm to touch      Data Review    Telemetry:  No events. Lab Data Personally Reviewed:    Recent Labs     06/11/22  0607 06/10/22  2201   HGB 9.2* 9.3*   HCT 29.6* 29.2*     No results for input(s): INR, PTP, APTT, INREXT, INREXT in the last 72 hours. No results for input(s): NA, K, CL, CO2, BUN, CREA, GLU, CA, MG in the last 72 hours. No results for input(s): CPK, CKNDX, TROIQ in the last 72 hours. No lab exists for component: CPKMB  Lab Results   Component Value Date/Time    Triglyceride 95 05/26/2022 08:30 AM       No results for input(s): AP, TBIL, TP, ALB, GLOB, GGT, AML, LPSE in the last 72 hours. No lab exists for component: SGOT, GPT, AMYP, HLPSE  No results for input(s): PH, PCO2, PO2 in the last 72 hours.     Medications Personally Reviewed:    Current Facility-Administered Medications   Medication Dose Route Frequency    insulin NPH (NOVOLIN N, HUMULIN N) injection 5 Units  5 Units SubCUTAneous BID    0.9% sodium chloride infusion 250 mL  250 mL IntraVENous PRN    pantoprazole (PROTONIX) 40 mg in 0.9% sodium chloride 10 mL injection  40 mg IntraVENous Q12H    famotidine (PEPCID) tablet 20 mg  20 mg Oral DAILY    epoetin betina-epbx (RETACRIT) 12,000 Units combo injection  12,000 Units SubCUTAneous Q MON, WED & FRI    amiodarone (CORDARONE) tablet 400 mg  400 mg Oral DAILY    cefepime (MAXIPIME) 1 g in 0.9% sodium chloride (MBP/ADV) 50 mL MBP  1 g IntraVENous Q24H    heparin (porcine) 1,000 unit/mL injection 1,400 Units  1,400 Units InterCATHeter DIALYSIS PRN    And    heparin (porcine) 1,000 unit/mL injection 1,100 Units  1,100 Units InterCATHeter DIALYSIS PRN    hydrALAZINE (APRESOLINE) 20 mg/mL injection 20 mg  20 mg IntraVENous Q6H PRN    albumin human 25% (BUMINATE) solution 25 g  25 g IntraVENous DIALYSIS PRN    metoprolol (LOPRESSOR) injection 2.5 mg  2.5 mg IntraVENous Q6H PRN    insulin lispro (HUMALOG) injection   SubCUTAneous AC&HS    glucose chewable tablet 16 g  4 Tablet Oral PRN    glucagon (GLUCAGEN) injection 1 mg  1 mg IntraMUSCular PRN    dextrose 10 % infusion 0-250 mL  0-250 mL IntraVENous PRN    sodium chloride (NS) flush 5-40 mL  5-40 mL IntraVENous Q8H    sodium chloride (NS) flush 5-40 mL  5-40 mL IntraVENous PRN    acetaminophen (TYLENOL) tablet 650 mg  650 mg Oral Q6H PRN    Or    acetaminophen (TYLENOL) suppository 650 mg  650 mg Rectal Q6H PRN    polyethylene glycol (MIRALAX) packet 17 g  17 g Oral DAILY PRN    ondansetron (ZOFRAN ODT) tablet 4 mg  4 mg Oral Q8H PRN    Or    ondansetron (ZOFRAN) injection 4 mg  4 mg IntraVENous Q6H PRN    [Held by provider] heparin (porcine) injection 5,000 Units  5,000 Units SubCUTAneous Q8H    alcohol 62% (NOZIN) nasal  1 Ampule  1 Ampule Topical Q12H    balsam peru-castor oiL (VENELEX) ointment   Topical BID              Arin Metzger MD

## 2022-06-13 NOTE — PROGRESS NOTES
Occupational Therapy    Chart reviewed for updates and attempted to see patient for OT treatment. Patient is presently off floor for HD. Will follow up later today or tomorrow for OT treatment.     Cristopher Holden, OTR/L

## 2022-06-13 NOTE — PROCEDURES
Hemodialysis / [phone]    Vitals Pre Post Assessment Pre Post   BP BP: (!) 170/64 (06/13/22 0820) 164/77 LOC Alert and oriented x 2 No change   HR Pulse (Heart Rate): (!) 59 (06/13/22 0820) 93 Lungs Diminished at the bases No change   Resp Resp Rate: 16 (06/13/22 0820) 16 Cardiac  Afib No change   Temp Temp: 97.9 °F (36.6 °C) (06/13/22 0820) 97.8 Skin Warm, dry No change   Weight  n/a n/a Edema generalized No change   Tele status remote remote Pain Pain Intensity 1: 0 (06/12/22 0830) 0     Orders   Duration: Start: 0820 End: 1205 Total: 3hr 45min   Dialyzer: Dialyzer/Set Up Inspection: Revaclear (06/13/22 0820)   K Bath: Dialysate K (mEq/L): 3 (06/13/22 0820)   Ca Bath: Dialysate CA (mEq/L): 2.5 (06/13/22 0820)   Na: Dialysate NA (mEq/L): 138 (06/13/22 0820)   Bicarb: Dialysate HCO3 (mEq/L): 40 (06/13/22 0820)   Target Fluid Removal: Goal/Amount of Fluid to Remove (mL): 2000 mL (06/13/22 0820)     Access   Type & Location:    Comments:      JULIETA Gordon: Dressing CDI. No s/s of infection.  Flushed and aspirated well    BFR running well at 400           Labs   HBsAg (Antigen) / date:  Negative 5/24/22                                             HBsAb (Antibody) / date: Susceptible 5/24/22   Source: Epic   Obtained/Reviewed  Critical Results Called HGB   Date Value Ref Range Status   06/11/2022 9.2 (L) 11.5 - 16.0 g/dL Final     Potassium   Date Value Ref Range Status   06/10/2022 3.9 3.5 - 5.1 mmol/L Final     Calcium   Date Value Ref Range Status   06/10/2022 7.1 (L) 8.5 - 10.1 MG/DL Final     BUN   Date Value Ref Range Status   06/10/2022 18 6 - 20 MG/DL Final     Creatinine   Date Value Ref Range Status   06/10/2022 4.99 (H) 0.55 - 1.02 MG/DL Final     Comment:     INVESTIGATED PER DELTA CHECK PROTOCOL        Meds Given   Name Dose Route        Heparn 2500 units cvc dwell          Adequacy / Fluid    Total Liters Process: 81.2   Net Fluid Removed: 2000 ml      Comments   Time Out Done:   (Time) 7605 Admitting Diagnosis: DKA   Consent obtained/signed: Informed Consent Verified: Yes (06/13/22 0820)   Machine / RO # Machine Number: [id number] (06/13/22 0820)   Primary Nurse Rpt Pre: Colette Newton RN   Primary Nurse Rpt PostGhugo Martin RN   Pt Education: Procedure   Care Plan: Continue nephrology plan of care   Pts outpatient clinic: TBD     Tx Summary   Comments:     0820- A SBAR was received from Primary RN. Pt arrived to HD suite. Labs drawn, order reveiwed Each catheter limb disinfected per p&p, caps removed, hubs disinfected per p&p. Dialysis Tx initiated. 1205-HD was completed and tolerated well with no issue. VSS. Each dialysis catheter limb disinfected per p&p, all possible blood returned per p&p, and each dialysis hub disinfected per p&p. Each lumen flushed, post dialysis catheter Heparin dwell instilled per order, and caps applied. Bed locked and in the lowest position, call bell and belongings in reach. SBAR given to Primary, RN. Patient is stable at time of their departure.

## 2022-06-14 NOTE — PROGRESS NOTES
Transition of Care Plan:     RUR:18%  Disposition:  SNF-[place]-new HD at [facility] to begin Fri 6/17 at 7:00am-chair time is [email or web address]  Follow up appointments: PCP; Artie Garcia.   DME needed: tbd  Transportation at [email or web address] or means to access home:     Granddaughter   IM Medicare Letter: to be delivered prior to DC.   1st IM Letter given 5/23/22  Is patient a BCPI-A Bundle:        no              If yes, was Bundle Letter given?:    Is patient a [place] and connected with the [facility]?              No   If yes, was [place] transfer form completed and [facility] notified? Caregiver Contact: Granddaughter: Clare Neumann: [phone]  GD Spouse: Carmen Girard: [phone]  Discharge Caregiver contacted prior to discharge? Yes reviewed plan with Granddaughter over the phone.   Care Conference needed?: to be determined. 4:02  Granddaughter is in agreement w/d/c to [employer]. 3:26  Insurance Reyes Silva has been obtained. SNF can accept pt tomorrow.   Transport has been rescheduled for 3pm  Wed    New HD to begin at [facility] on Wed 6/14 at 7am.

## 2022-06-14 NOTE — PROGRESS NOTES
Problem: Breathing Pattern - Ineffective  Goal: *Absence of hypoxia  Outcome: Progressing Towards Goal     Problem: Patient Education: Go to Patient Education Activity  Goal: Patient/Family Education  Outcome: Progressing Towards Goal     Problem: Pressure Injury - Risk of  Goal: *Prevention of pressure injury  Description: Document Markos Scale and appropriate interventions in the flowsheet. Outcome: Progressing Towards Goal  Note: Pressure Injury Interventions:  Sensory Interventions: Assess changes in LOC    Moisture Interventions: Apply protective barrier, creams and emollients    Activity Interventions: Pressure redistribution bed/mattress(bed type)    Mobility Interventions: Turn and reposition approx. every two hours(pillow and wedges)    Nutrition Interventions: Document food/fluid/supplement intake    Friction and Shear Interventions: Apply protective barrier, creams and emollients,HOB 30 degrees or less                Problem: Patient Education: Go to Patient Education Activity  Goal: Patient/Family Education  Outcome: Progressing Towards Goal     Problem: Diabetes Self-Management  Goal: *Disease process and treatment process  Description: Define diabetes and identify own type of diabetes; list 3 options for treating diabetes. Outcome: Progressing Towards Goal  Goal: *Incorporating nutritional management into lifestyle  Description: Describe effect of type, amount and timing of food on blood glucose; list 3 methods for planning meals. Outcome: Progressing Towards Goal  Goal: *Incorporating physical activity into lifestyle  Description: State effect of exercise on blood glucose levels. Outcome: Progressing Towards Goal  Goal: *Developing strategies to promote health/change behavior  Description: Define the ABC's of diabetes; identify appropriate screenings, schedule and personal plan for screenings.   Outcome: Progressing Towards Goal  Goal: *Using medications safely  Description: State effect of diabetes medications on diabetes; name diabetes medication taking, action and side effects. Outcome: Progressing Towards Goal  Goal: *Monitoring blood glucose, interpreting and using results  Description: Identify recommended blood glucose targets  and personal targets. Outcome: Progressing Towards Goal  Goal: *Prevention, detection, treatment of acute complications  Description: List symptoms of hyper- and hypoglycemia; describe how to treat low blood sugar and actions for lowering  high blood glucose level. Outcome: Progressing Towards Goal  Goal: *Prevention, detection and treatment of chronic complications  Description: Define the natural course of diabetes and describe the relationship of blood glucose levels to long term complications of diabetes.   Outcome: Progressing Towards Goal  Goal: *Developing strategies to address psychosocial issues  Description: Describe feelings about living with diabetes; identify support needed and support network  Outcome: Progressing Towards Goal  Goal: *Insulin pump training  Outcome: Progressing Towards Goal  Goal: *Sick day guidelines  Outcome: Progressing Towards Goal  Goal: *Patient Specific Goal (EDIT GOAL, INSERT TEXT)  Outcome: Progressing Towards Goal     Problem: Patient Education: Go to Patient Education Activity  Goal: Patient/Family Education  Outcome: Progressing Towards Goal     Problem: Patient Education: Go to Patient Education Activity  Goal: Patient/Family Education  Outcome: Progressing Towards Goal     Problem: DKA: Day 1  Goal: Off Pathway (Use only if patient is Off Pathway)  Outcome: Progressing Towards Goal  Goal: Activity/Safety  Outcome: Progressing Towards Goal  Goal: Consults, if ordered  Outcome: Progressing Towards Goal  Goal: Diagnostic Tests/Procedures, if Ordered  Outcome: Progressing Towards Goal  Goal: Nutrition/Diet  Outcome: Progressing Towards Goal  Goal: Discharge Planning  Outcome: Progressing Towards Goal  Goal: Medications  Outcome: Progressing Towards Goal  Goal: Respiratory  Outcome: Progressing Towards Goal  Goal: Treatments/Interventions/Procedures  Outcome: Progressing Towards Goal  Goal: Psychosocial  Outcome: Progressing Towards Goal  Goal: *Hemodynamically stable  Outcome: Progressing Towards Goal  Goal: *Blood glucose falling 50 to 100 mg/dl/hr  Outcome: Progressing Towards Goal  Goal: *Potassium normalizing  Outcome: Progressing Towards Goal     Problem: DKA: Day 2  Goal: Off Pathway (Use only if patient is Off Pathway)  Outcome: Progressing Towards Goal  Goal: Activity/Safety  Outcome: Progressing Towards Goal  Goal: Consults, if ordered  Outcome: Progressing Towards Goal  Goal: Diagnostic Test/Procedures  Outcome: Progressing Towards Goal  Goal: Nutrition/Diet  Outcome: Progressing Towards Goal  Goal: Discharge Planning  Outcome: Progressing Towards Goal  Goal: Medications  Outcome: Progressing Towards Goal  Goal: Respiratory  Outcome: Progressing Towards Goal  Goal: Treatments/Interventions/Procedures  Outcome: Progressing Towards Goal  Goal: Psychosocial  Outcome: Progressing Towards Goal  Goal: *Acidosis resolved  Outcome: Progressing Towards Goal  Goal: *Tolerating diet  Outcome: Progressing Towards Goal  Goal: *Demonstrates progressive activity  Outcome: Progressing Towards Goal  Goal: *Blood glucose 80 to 180 mg/dl  Outcome: Progressing Towards Goal     Problem: DKA: Day 3  Goal: Off Pathway (Use only if patient is Off Pathway)  Outcome: Progressing Towards Goal  Goal: Activity/Safety  Outcome: Progressing Towards Goal  Goal: Diagnostic Test/Procedures  Outcome: Progressing Towards Goal  Goal: Nutrition/Diet  Outcome: Progressing Towards Goal  Goal: Discharge Planning  Outcome: Progressing Towards Goal  Goal: Medications  Outcome: Progressing Towards Goal  Goal: Treatments/Interventions/Procedures  Outcome: Progressing Towards Goal  Goal: Psychosocial  Outcome: Progressing Towards Goal     Problem: DKA: Discharge Outcomes  Goal: *Ambulates and performs ADL's  Outcome: Progressing Towards Goal  Goal: *Describes follow-up/return visits to physicians, diabetes treatment coordinator and other resources  Outcome: Progressing Towards Goal  Goal: *Blood glucose at patient's target range  Outcome: Progressing Towards Goal  Goal: *Acidosis resolved  Outcome: Progressing Towards Goal  Goal: *Tolerating diet  Outcome: Progressing Towards Goal  Goal: *Verbalizes understanding and describes prescribed diet  Outcome: Progressing Towards Goal  Goal: *Describes blood glucose goals, monitoring, sick day rules, hypo/hyperglycemia  Outcome: Progressing Towards Goal  Goal: *Describes available resources and support systems  Outcome: Progressing Towards Goal  Goal: *Verbalizes name, dosage, time, side effects, and number of days to continue medications  Outcome: Progressing Towards Goal  Goal: *Demonstrates ability to self-administer insulin  Outcome: Progressing Towards Goal     Problem: Hypotension  Goal: *Blood pressure within specified parameters  Outcome: Progressing Towards Goal  Goal: *Fluid volume balance  Outcome: Progressing Towards Goal  Goal: *Labs within defined limits  Outcome: Progressing Towards Goal     Problem: Patient Education: Go to Patient Education Activity  Goal: Patient/Family Education  Outcome: Progressing Towards Goal

## 2022-06-14 NOTE — DIABETES MGMT
[address] [facility]    CLINICAL NURSE SPECIALIST CONSULT     Initial Presentation   Kim Allen is a 80 y.o. female admitted from the ER today 5/23/22 in DKA with profound bradycardia, after being found down by granddaughter. Granddaughter states that patient is independent in her activities of daily living, and has a routine that includes her diabetes self-care. He grandmother had reported that she wasn't feeling well and the granddaughter had made arrangements for a home visit. When she went into her room this morning to remind her of the visit, she found her down on the floor. EMS called. Of note, granddaughter states that she (herself) had been hospitalized for a month and returned home at the end of April. She surmises that her grandmother may not have been taking her insulin because there was more insulin in the refrigerator than there should have been. LAB:  WBC 10.9. AST 75. Lipase >3000. Troponin 31. NT pro-BNP 1588. Urinary glucose & ketone +. CT Head: No extra-axial fluid collection, hemorrhage or shift. Atrophy mostly posterior fossa. CXR: Mild pulmonary edema    HX:   Past Medical History:   Diagnosis Date    Diabetes ([facility] [address]       INITIAL DX:   DKA (diabetic ketoacidosis) ([facility] [address] [E11.10]     Current Treatment     TX: Insulin. Pepcid. ABx. Steroids    Consulted by Provider for advanced diabetes nursing assessment and care for:   [x] Transitioning off [address]   [x] Inpatient management strategy  [] Home management assessment  [] Survival skill education    Hospital Course   Clinical progress has been complicated by need for ICU level of care. 5/24/22 Alert but babbling; Precedex+. O2NC. Remains on three (3) pressors. CRRT+. Scheduled for CT chest & ABd wo contrast today. Plans to add steroids. Discussion of transition off [address] made during IPR.  5/25/22 Alert. Conversing in understandable language. O2NC. Afib. Remains on two (2) pressors.  Skin per wound care/nursing. CRRT+  5/26/22 Alert & oriented to person. Off O2. NPO except for supplements. CRRT+. 5/27/22 Alert & oriented. Answering questions clearly. Dialysis now+.   5/31/22 Patient ate almost all of her breakfast tray and is now resting comfortable with eyes closed. 6/6/22 Resting with eyes closed. Dialysis today. It is noted that the patient was OK'd for permacath placement. Her dialysis catheter was removed due to bacteremia. 6/8/22 Dialysis this morning  6/10/22 Dialysis this morning. Also with HgB 6.7 with plans to transfuse at dialysis. Intervention given: Transfuse 1 unit PRBC , H/H 1 hour s/p transfusion ordered  6/14/22 Patient accepted at [facility]; expected discharge tomorrow. Diabetes History   Type 2 diabetes treated with insulin therapy  Admission BG 1259 with AG 30    Diabetes-related Medical History: Deferred    Diabetes Medication History  Key Antihyperglycemic Medications             glipiZIDE (GLUCOTROL) 10 mg tablet Take 10 mg by mouth daily. insulin lispro protamine/insulin lispro (HUMALOG MIX 75/25) 100 unit/mL (75-25) injection 48 Units by SubCUTAneous route daily. take 48 units in the morning    insulin lispro protamine/insulin lispro (HUMALOG MIX 75/25) 100 unit/mL (75-25) injection 32 Units by SubCUTAneous route every evening. take 32 units subcutaneously at night. Diabetes self-management practices: Per granddaughter  Eating pattern   [x] Not eating a carbohydrate-controlled mealplan  [x] Breakfast Cheerios with milk & banana.  Coffee (may have had Cheese toast earlier)  [x] Sankc  Chips a Hoy cookies (while she is watching TV  [x] Dinner  With granddaughter   Physical activity pattern - Uses [employer] to move about the house   [x] Not employing a physical activity program to control BG  Monitoring pattern - Tracks on a tablet by bedside & takes to her provider   [x] Testing BGs   [x] Breakfast   Taking medications pattern  [x] Consistent administration  [x] Affordable  Overall evaluation:    [x] A1c 13.6 (5/23/22)    Subjective   \"Thank you\"      Objective   Physical exam  General Obese female in no acute distress  Neuro  Alert and talking  Vital Signs   Visit Vitals  /62   Pulse 70   Temp 98.2 °F (36.8 °C)   Resp 15   Ht 5' 6\" (1.676 m)   Wt 82.6 kg (182 lb 1.6 oz)   SpO2 100%   BMI 29.39 kg/m²     Laboratory  Recent Labs     06/14/22  1316 06/13/22  0910   * 129*   AGAP 4* 9   WBC  --  6.2   CREA 4.10* 5.84*   GFRNA 10* 7*     Factors impacting BG management  Factor Dose Comments   Nutrition:  Dysphagia Soft; bite sized      Drugs:  Steroids   HC 50mg Q6 hrs ( Completed    Impairs insulin action   Infection Cefepime 1 g Q 24 hours   Afebrile. WBCs sloan   Other:   Kidney function   GFR 10   Dialysis+     Blood glucose pattern      Significant diabetes-related events over the past 24-72 hours  Admitted in profound DKA with electrolyte disturbances. BG 1259 with AG 30  On Glucostabilizer; received 27-32 units of insulin/hour initially  BG finally under 250mg/dl at 1am 5/24/22 after 5L of fluid in ICU  Significant GLORIA - Requiring dialysis  Steroids started 5/24/22 - BGs higher than target  NPH dose adjustment with BGs in 120-150s until she began eating 5/26/22 6/6/22 Hypoglycemic episode yesterday morning on 16 units NPH twice daily. NPH dose reduced to 10 units twice daily to start today. Assessment and Plan   Nursing Diagnosis Risk for unstable blood glucose pattern   Nursing Intervention Domain 2608 Decision-making Support   Nursing Interventions Examined current inpatient diabetes/blood glucose control   Explored factors facilitating and impeding inpatient management     Evaluation   This overweight AA female was admitted in DKA associated with bradycardia. Received significant amounts of insulin/hour via Glucostabilizer. Fluid resuscitation also needed in presence of elevated NT pro-BNP.  BGs down under 250mg/dl at 1am 5/24/22, and AG closed at 8am. Transitioned off GS, and steroids added 5/24/22. BGs sloan into the low 200s. NPH insulin started to override steroids in presence of kidney dysfunction with small increase in dose. BGs in target until she began eating yesterday 5/26/22. This woman uses combo insulin at home, which is not available in-house so we can tailor dosing to changing circumstances. Current insulin dose is at half her usual basal insulin dose, but her kidney function has deteriorated in light of her presenting situation, e.g., DKA. Recommend increasing basal dose incrementally to establish basal dose in light of GLORIA/CKD. Some Info extracted from [address] [facility] note    5/31/22 BG within goal this morning on 14 units NPH twice daily. The patient continues to receive 50 mg hydrocortisone injection Q 8 hours. I recommend continuing on current diabetes medication regimen for now. Diabetes management will continue to follow with this patient, monitoring BG trends and making recommendations as needed. 6/1/22 BG's elevated today on 14 units NPH twice daily. The patient continues on 50 mg hydrocortisone Q 8 hours. Consider a slight increase in the basal insulin dose. (0.4 x kg) 17 units NPH twice daily  6/2/22  BG's elevated today on 16 units NPH twice daily. The patient continues on 50 mg hydrocortisone Q 8 hours. Consider a slight increase in the basal dose. 6/6/22 BG elevated this morning. Basal insulin dose was held yesterday due to hypoglycemic episode yesterday morning. The patient's NPH dose has been decreased to 10 units twice daily to start today. The steroid dose has been decreased as well to 25 mg hydrocortisone daily. I suspect the patient may not require as much insulin. Consider 8 units NPH ( 0.2 x kg) twice daily. 6/8/22 BG's ranging 81 -139 for past 24 hours on NPH 8 units twice daily. The morning BG was 82. I recommend a further increase in the basal dose .  Consider using 4 units NPH ( 0.1 x kg) twice daily. 6/10/22 BG's ranging  for past 24 hours. The patient received 8 units NPH in past 24 hours with a morning BG of 102. This demonstrates that the basal dose may require a decrease. Consider using NPH 4 units twice daily. 6/14/22 BG's stable today on 5 units NPH. The patient is now ordered 5 units NPH twice daily. I recommend continuing this regimen. Recommendations     1. Continue   5 units NPH twice daily. 2.Continue HIGH sensitivity corrective insulin     Billing Code(s)   [x] 10856 IP subsequent hospital care - 15 minutes     Before making these care recommendations, I personally reviewed the hospitalization record, including notes, laboratory & diagnostic data and current medications, and examined the patient at the bedside (circumstances permitting) before making care recommendations. More than fifty (50) percent of the time was spent in patient counseling and/or care coordination.   Total minutes: 15 minutes    SANA Isaac  Diabetes Clinical Nurse Specialist  [facility]  Access via [employer]

## 2022-06-14 NOTE — PROGRESS NOTES
Problem: Breathing Pattern - Ineffective  Goal: *Absence of hypoxia  Outcome: Progressing Towards Goal     Problem: Patient Education: Go to Patient Education Activity  Goal: Patient/Family Education  Outcome: Progressing Towards Goal     Problem: Pressure Injury - Risk of  Goal: *Prevention of pressure injury  Description: Document Markos Scale and appropriate interventions in the flowsheet. Outcome: Progressing Towards Goal  Note: Pressure Injury Interventions:  Sensory Interventions: Assess changes in LOC,Pressure redistribution bed/mattress (bed type)    Moisture Interventions: Apply protective barrier, creams and emollients    Activity Interventions: Pressure redistribution bed/mattress(bed type)    Mobility Interventions: HOB 30 degrees or less    Nutrition Interventions: Document food/fluid/supplement intake    Friction and Shear Interventions: Apply protective barrier, creams and emollients                Problem: Patient Education: Go to Patient Education Activity  Goal: Patient/Family Education  Outcome: Progressing Towards Goal     Problem: Diabetes Self-Management  Goal: *Disease process and treatment process  Description: Define diabetes and identify own type of diabetes; list 3 options for treating diabetes. Outcome: Progressing Towards Goal  Goal: *Incorporating nutritional management into lifestyle  Description: Describe effect of type, amount and timing of food on blood glucose; list 3 methods for planning meals. Outcome: Progressing Towards Goal  Goal: *Incorporating physical activity into lifestyle  Description: State effect of exercise on blood glucose levels. Outcome: Progressing Towards Goal  Goal: *Developing strategies to promote health/change behavior  Description: Define the ABC's of diabetes; identify appropriate screenings, schedule and personal plan for screenings.   Outcome: Progressing Towards Goal  Goal: *Using medications safely  Description: State effect of diabetes medications on diabetes; name diabetes medication taking, action and side effects. Outcome: Progressing Towards Goal  Goal: *Monitoring blood glucose, interpreting and using results  Description: Identify recommended blood glucose targets  and personal targets. Outcome: Progressing Towards Goal  Goal: *Prevention, detection, treatment of acute complications  Description: List symptoms of hyper- and hypoglycemia; describe how to treat low blood sugar and actions for lowering  high blood glucose level. Outcome: Progressing Towards Goal  Goal: *Prevention, detection and treatment of chronic complications  Description: Define the natural course of diabetes and describe the relationship of blood glucose levels to long term complications of diabetes.   Outcome: Progressing Towards Goal  Goal: *Developing strategies to address psychosocial issues  Description: Describe feelings about living with diabetes; identify support needed and support network  Outcome: Progressing Towards Goal  Goal: *Insulin pump training  Outcome: Progressing Towards Goal  Goal: *Sick day guidelines  Outcome: Progressing Towards Goal  Goal: *Patient Specific Goal (EDIT GOAL, INSERT TEXT)  Outcome: Progressing Towards Goal     Problem: Patient Education: Go to Patient Education Activity  Goal: Patient/Family Education  Outcome: Progressing Towards Goal     Problem: Patient Education: Go to Patient Education Activity  Goal: Patient/Family Education  Outcome: Progressing Towards Goal     Problem: DKA: Day 1  Goal: Off Pathway (Use only if patient is Off Pathway)  Outcome: Progressing Towards Goal  Goal: Activity/Safety  Outcome: Progressing Towards Goal  Goal: Consults, if ordered  Outcome: Progressing Towards Goal  Goal: Diagnostic Tests/Procedures, if Ordered  Outcome: Progressing Towards Goal  Goal: Nutrition/Diet  Outcome: Progressing Towards Goal  Goal: Discharge Planning  Outcome: Progressing Towards Goal  Goal: Medications  Outcome: Progressing Towards Goal  Goal: Respiratory  Outcome: Progressing Towards Goal  Goal: Treatments/Interventions/Procedures  Outcome: Progressing Towards Goal  Goal: Psychosocial  Outcome: Progressing Towards Goal  Goal: *Hemodynamically stable  Outcome: Progressing Towards Goal  Goal: *Blood glucose falling 50 to 100 mg/dl/hr  Outcome: Progressing Towards Goal  Goal: *Potassium normalizing  Outcome: Progressing Towards Goal     Problem: DKA: Day 2  Goal: Off Pathway (Use only if patient is Off Pathway)  Outcome: Progressing Towards Goal  Goal: Activity/Safety  Outcome: Progressing Towards Goal  Goal: Consults, if ordered  Outcome: Progressing Towards Goal  Goal: Diagnostic Test/Procedures  Outcome: Progressing Towards Goal  Goal: Nutrition/Diet  Outcome: Progressing Towards Goal  Goal: Discharge Planning  Outcome: Progressing Towards Goal  Goal: Medications  Outcome: Progressing Towards Goal  Goal: Respiratory  Outcome: Progressing Towards Goal  Goal: Treatments/Interventions/Procedures  Outcome: Progressing Towards Goal  Goal: Psychosocial  Outcome: Progressing Towards Goal  Goal: *Acidosis resolved  Outcome: Progressing Towards Goal  Goal: *Tolerating diet  Outcome: Progressing Towards Goal  Goal: *Demonstrates progressive activity  Outcome: Progressing Towards Goal  Goal: *Blood glucose 80 to 180 mg/dl  Outcome: Progressing Towards Goal     Problem: DKA: Day 3  Goal: Off Pathway (Use only if patient is Off Pathway)  Outcome: Progressing Towards Goal  Goal: Activity/Safety  Outcome: Progressing Towards Goal  Goal: Diagnostic Test/Procedures  Outcome: Progressing Towards Goal  Goal: Nutrition/Diet  Outcome: Progressing Towards Goal  Goal: Discharge Planning  Outcome: Progressing Towards Goal  Goal: Medications  Outcome: Progressing Towards Goal  Goal: Treatments/Interventions/Procedures  Outcome: Progressing Towards Goal  Goal: Psychosocial  Outcome: Progressing Towards Goal     Problem: DKA: Discharge Outcomes  Goal: *Ambulates and performs ADL's  Outcome: Progressing Towards Goal  Goal: *Describes follow-up/return visits to physicians, diabetes treatment coordinator and other resources  Outcome: Progressing Towards Goal  Goal: *Blood glucose at patient's target range  Outcome: Progressing Towards Goal  Goal: *Acidosis resolved  Outcome: Progressing Towards Goal  Goal: *Tolerating diet  Outcome: Progressing Towards Goal  Goal: *Verbalizes understanding and describes prescribed diet  Outcome: Progressing Towards Goal  Goal: *Describes blood glucose goals, monitoring, sick day rules, hypo/hyperglycemia  Outcome: Progressing Towards Goal  Goal: *Describes available resources and support systems  Outcome: Progressing Towards Goal  Goal: *Verbalizes name, dosage, time, side effects, and number of days to continue medications  Outcome: Progressing Towards Goal  Goal: *Demonstrates ability to self-administer insulin  Outcome: Progressing Towards Goal     Problem: Hypotension  Goal: *Blood pressure within specified parameters  Outcome: Progressing Towards Goal  Goal: *Fluid volume balance  Outcome: Progressing Towards Goal  Goal: *Labs within defined limits  Outcome: Progressing Towards Goal     Problem: Hypotension  Goal: *Labs within defined limits  Outcome: Progressing Towards Goal     Problem: Patient Education: Go to Patient Education Activity  Goal: Patient/Family Education  Outcome: Progressing Towards Goal

## 2022-06-14 NOTE — PROGRESS NOTES
Progress Note      6/14/2022  NAME: Clarisse Rose   MRN:  [id number]   Admit Diagnosis: DKA (diabetic ketoacidosis) ([facility] [E11.10]  PCP:  Mary Jaime MD     Assessment: 1. Profound sinus bradycardia 20-30's in the setting of DKA, hyperkalemia, and OP beta-blocker. This has resolved. 2. Paroxysmal atrial fibrillation with post-conversion pauses up to 6 seconds on beta-blocker. Now off beta-blocker. Minor pauses early in amio initiation. 3. Paroxysmal atrial flutter by tele. She had runs of atrial tachycardia and then followed by probably sustained Afib which organized into a right atrial flutter (by tele morphology) before terminating to sinus. Hasn't had this since early in admission. 4. 2nd degree AV block with single dropped PAC's, RBBB with normal GA in sinus rhythm. Not seeing this recently. 5. Hypotension likely due to dehydration/volume depletion, metabolic acidosis, bradycardia. Improved with volume resuscitation, pressor. Now off pressor, but bacteremia with Serratia (see ID note for details). Resolved. 6. No evidence of acute ischemia or infarct by presenting ECG. Echo with normal LV and RV systolic function. 7. GLORIA atop CKD stage 3 at baseline. Rhabdomyolysis. On dialysis now. 8. Diabetes mellitus type 2 on chronic insulin complicated by DKA on admission. 9. Elevated lipase possibly due to DKA rather than acute pancreatitis. 10. Metabolic encephalopathy, resolved. 11. Anemia, multifactorial.  S/p transfusion. 12. Thrombocytopenia. 13.  Low albumin. 14.  Full code. Plan:      1. DVT prophylaxis held due to anemia, thrombocytopenia. I'd avoid FULL anticoagulation if possible given her anemia and thrombocytopenia. 2. Continue oral amiodarone, change to 200 mg daily. 3. Renal recs per nephrology team.  On dialysis. 4. ID consult is following. Due to persistent bacteremia, a NAI was done 6/3. There may be a small vegetation on the mitral valve.   ID wants 6 weeks of cefepime which is underway. 5. Low albumin, poor apetite. 6. Weakness. Subjective:     Remains in stable rhythm. No CP, dizziness, palpitations. Objective:      Physical Exam:    Last 24hrs VS reviewed since prior progress note. Most recent are:    Visit Vitals  /62   Pulse 70   Temp 98.2 °F (36.8 °C)   Resp 15   Ht 5' 6\" (1.676 m)   Wt 82.6 kg (182 lb 1.6 oz)   SpO2 100%   BMI 29.39 kg/m²       Intake/Output Summary (Last 24 hours) at 6/14/2022 1619  Last data filed at 6/14/2022 0930  Gross per 24 hour   Intake 120 ml   Output 100 ml   Net 20 ml           General: Tired and awake, no distress  Neck: Supple,   Respiratory: No respiratory distress, clear anteriorly   Cardiovascular: Regular rate rhythm, S1S2   Abdomen: soft, non tender   Neuro: moves all extremities   Skin: warm and dry   Extremity:  warm to touch      Data Review    Telemetry:  No events. Lab Data Personally Reviewed:    Recent Labs     06/13/22  0910   WBC 6.2   HGB 8.5*   HCT 27.1*   PLT 75*     No results for input(s): INR, PTP, APTT, INREXT, INREXT in the last 72 hours. Recent Labs     06/14/22  1316 06/13/22  0910    139   K 3.5 3.7   CL 99 100   CO2 35* 30   BUN 14 23*   CREA 4.10* 5.84*   * 129*   CA 7.2* 7.4*     No results for input(s): CPK, CKNDX, TROIQ in the last 72 hours. No lab exists for component: CPKMB  Lab Results   Component Value Date/Time    Triglyceride 95 05/26/2022 08:30 AM       Recent Labs     06/14/22  1316 06/13/22  0910   ALB 1.5* 1.4*     No results for input(s): PH, PCO2, PO2 in the last 72 hours.     Medications Personally Reviewed:    Current Facility-Administered Medications   Medication Dose Route Frequency    insulin NPH (NOVOLIN N, HUMULIN N) injection 5 Units  5 Units SubCUTAneous BID    0.9% sodium chloride infusion 250 mL  250 mL IntraVENous PRN    pantoprazole (PROTONIX) 40 mg in 0.9% sodium chloride 10 mL injection  40 mg IntraVENous Q12H    famotidine (PEPCID) tablet 20 mg  20 mg Oral DAILY    epoetin betina-epbx (RETACRIT) 12,000 Units combo injection  12,000 Units SubCUTAneous Q MON, WED & FRI    amiodarone (CORDARONE) tablet 400 mg  400 mg Oral DAILY    cefepime (MAXIPIME) 1 g in 0.9% sodium chloride (MBP/ADV) 50 mL MBP  1 g IntraVENous Q24H    heparin (porcine) 1,000 unit/mL injection 1,400 Units  1,400 Units InterCATHeter DIALYSIS PRN    And    heparin (porcine) 1,000 unit/mL injection 1,100 Units  1,100 Units InterCATHeter DIALYSIS PRN    hydrALAZINE (APRESOLINE) 20 mg/mL injection 20 mg  20 mg IntraVENous Q6H PRN    albumin human 25% (BUMINATE) solution 25 g  25 g IntraVENous DIALYSIS PRN    metoprolol (LOPRESSOR) injection 2.5 mg  2.5 mg IntraVENous Q6H PRN    insulin lispro (HUMALOG) injection   SubCUTAneous AC&HS    glucose chewable tablet 16 g  4 Tablet Oral PRN    glucagon (GLUCAGEN) injection 1 mg  1 mg IntraMUSCular PRN    dextrose 10 % infusion 0-250 mL  0-250 mL IntraVENous PRN    sodium chloride (NS) flush 5-40 mL  5-40 mL IntraVENous Q8H    sodium chloride (NS) flush 5-40 mL  5-40 mL IntraVENous PRN    acetaminophen (TYLENOL) tablet 650 mg  650 mg Oral Q6H PRN    Or    acetaminophen (TYLENOL) suppository 650 mg  650 mg Rectal Q6H PRN    polyethylene glycol (MIRALAX) packet 17 g  17 g Oral DAILY PRN    ondansetron (ZOFRAN ODT) tablet 4 mg  4 mg Oral Q8H PRN    Or    ondansetron (ZOFRAN) injection 4 mg  4 mg IntraVENous Q6H PRN    [Held by provider] heparin (porcine) injection 5,000 Units  5,000 Units SubCUTAneous Q8H    alcohol 62% (NOZIN) nasal  1 Ampule  1 Ampule Topical Q12H    balsam peru-castor oiL (VENELEX) ointment   Topical BID              Jared Alcala MD

## 2022-06-14 NOTE — PROGRESS NOTES
End of Shift Note    Bedside shift change report given to [address] [place] [address] (oncoming nurse) by Ezequiel Corea RN (offgoing nurse). Report included the following information SBAR, Kardex, Intake/Output and MAR    Shift worked:  7p-7a     Shift summary and any significant changes:     Wound care done patient rested through night. Offered fluids through out night     Concerns for physician to address:  none     Zone phone for oncoming shift:   [phone]       Activity:  Activity Level: Bed Rest  Number times ambulated in hallways past shift: 0  Number of times OOB to chair past shift: 0    Cardiac:   Cardiac Monitoring: Yes      Cardiac Rhythm: Sinus Rhythm    Access:   Current line(s): PIV and HD access     Genitourinary:   Urinary status: anuric    Respiratory:   O2 Device: Nasal cannula  Chronic home O2 use?: NO  Incentive spirometer at bedside: YES       GI:  Last Bowel Movement Date: 06/14/22  Current diet:  ADULT DIET Dysphagia - Soft & Bite Sized  ADULT ORAL NUTRITION SUPPLEMENT Breakfast, Dinner; Low Calorie/High Protein  ADULT ORAL NUTRITION SUPPLEMENT Lunch; Frozen Supplement  DIET ONE TIME MESSAGE  Passing flatus: YES  Tolerating current diet: YES       Pain Management:   Patient states pain is manageable on current regimen: YES    Skin:  Markos Score: 15  Interventions: turn team, speciality bed and float heels    Patient Safety:  Fall Score:  Total Score: 1  Interventions: bed/chair alarm  High Fall Risk: Yes    Length of Stay:  Expected LOS: 4d 19h  Actual LOS: Loan Solomon RN

## 2022-06-14 NOTE — PROGRESS NOTES
Problem: Breathing Pattern - Ineffective  Goal: *Absence of hypoxia  Outcome: Progressing Towards Goal     Problem: Pressure Injury - Risk of  Goal: *Prevention of pressure injury  Description: Document Markos Scale and appropriate interventions in the flowsheet. Outcome: Progressing Towards Goal  Note: Pressure Injury Interventions:  Sensory Interventions: Assess changes in LOC    Moisture Interventions: Apply protective barrier, creams and emollients    Activity Interventions: Pressure redistribution bed/mattress(bed type)    Mobility Interventions: Turn and reposition approx.  every two hours(pillow and wedges)    Nutrition Interventions: Document food/fluid/supplement intake    Friction and Shear Interventions: Apply protective barrier, creams and emollients,HOB 30 degrees or less

## 2022-06-14 NOTE — PROGRESS NOTES
Hospitalist Progress Note    NAME: Perry Albarado   :  1934   MRN:  [id number]     Admit date: 2022    Today's date: 22    PCP: Asad Bonds MD    Anticipated discharge date: 06/15, medically stable    Barriers: SNF placement    Assessment / Plan:    Acute blood loss anemia on chronic anemia   ? GI bleed  Hemoglobin has been dropping down for the last 2 or 3 days , hemoglobin dropped down to 6.7 on 06/10, a unit of PRBC ordered and transfused, repeat hemoglobin is 8.6  Stool occult blood positive, will order Protonix IV twice daily, GI consulted  GI input reviewed, no plan for inpatient colonoscopy or endoscopy, recommended continue monitoring hemoglobin    Acute metabolic encephalopathy   Hypoglycemia on   Lethargic, BS 58 on , resolved  Given orange juice , glucagon   Blood sugar in the 110's  Continue with NPH at reduced dose at 8 unit twice daily  Patient is doing much better, eating  Blood sugar remains on the lower side, but no recurrent episode of hypoglycemia  Continue with current dose of NPH    Septic shock POA resolved  Klebsiella Bacteremia with UTI  POA  Serratia marcescens bacteremia  and 2022 POA  Persistent GNR bacteremia POA  ? Infectious endocarditis  - Admit UA 5-10 WBC, 1+ bacteria  - Admit BC with klebsiella and Serratia   Sensitive to zosyn, ceftriaxone  - Admit urine culture + for klebsiella  - Repeat BC  with recurrent sepsis + for Serratia  - CT abdomen/pelvis without IV contrast  with no hydronephrosis   No intraabdominal pathology  - Chest CT with bilateral effusions, no ASD  - With elevated lipase and abnormal LFTs, ?  Biliary source   GGT 28, never had elevated Alk Phos and T bili   US with no gallstones, contracted GB, CBD 0.4 cm              Biliary source seems less likely  - IV pressors at admit, weaned off   Started on HD for GLORIA from ATN  -Started on stress steroid, being weaned down  - Transferred to floor 5/29/2022  - S/p zosyn --> transition to ceftriaxone 5/31  - Repeat Blood culture 5/30 with GNR  - Repeat blood culture 5/31 positive for gram-negative rods  - Not sure why she is persistent bacteremic with GNR despite appropriate antibiotics    TTE LVEF 55 to 60%, trace MR and AR  - Repeat CT abdomen given the tenderness, persistent bacteremia  -ID consulted, recommended to remove central line and the Evan repeat blood cultures. Discussed with ID and nephrology  . CT abdomen pelvis with contrast did not show any acute pathology    Central line and Evan removed and plan for NAI by cardiology  . Blood cultures from 0602 negative to date  Status post NAI on 06/03, prelim report showed:  S/p NAI performed in sinus rhythm preliminarily revealing biatrial enlargement, moderate MR with very small mobile abnormality on the atrial side of the mitral valve, cannot exclude what might be a small vegetation. Large left atrial appendage with low exit velocity and without thrombus. Mild TR with evidence of moderate pulmonary hypertension. Small pericardial effusion. Continue with IV cefepime  Repeat blood cultures so far has been negative since 06/02  ID recommended 6 weeks of cefepime with hemodialysis  Outpatient hemodialysis to be set up by his neurology  s/Post permacath on 06/08  A. fib with RVR  Patient has had episode of bradycardia with pauses during the stay due to combination of DKA, hyperkalemia  Beta-blocker was stopped.   Poor candidate for  Anticoagulation   Status post amnio drip, switch to p.o. amnio    Acute kidney injury POA BUN/creat 114 and 5.97  Rhabdomyolysis CPK peak 22,325   -Last baseline creat 1.28 at [facility] on 3/9/2021  -Started on CRRT till 5/26, now on HD  -No hydronephrosis  -Likely ATN with hypotension, sepsis, rhabdomyolysis  - Oligouric  -Serial labs  Patient hemodialysis to be set up by case management  Marlen hurst, will also discontinue the fecal management system    DKA POA BS 1259, HgBa1c > 14.0, AG > 20, pH 6.99  - Not sure if DKA present, acidosis may have been due to sepsis, GLORIA   Urine with trace ketones, but B-hydroxybutyrate levels in blood elevated  - Likely patient was not taking insulin at home as her A1c is >14  - s/p Insulin gtt --> currently on NPH 16 units twice daily, will hold it due to hypoglycemia          Acute pancreatitis POA  - admit Non contrast CT is neg for changes of acute pancreatitis. - Lipase > 3000 -->  1107 -->  2499 --> 1602  - + abdominal tenderness  - clears  - No Gallstones or ductal dilatation, no ETOH  - ? Ischemic   - lipase tredning down  -Repeat CT abdomen/pelvis showed no acute abnormality  - serial labs     Acute metabolic encephalopathy POA resolved  - Multifactorial with septic shock, low BP, GLORIA, elevated BS > 1200  - Head CT with atrophy, no acute events  -Improving. Closely monitor mental status.  -manage metabolic issues as above.     Symptomatic bradycardia.  -Likely precipitated by severe acidemia, also on BB at home.   -Again had long pauses on 5/25, EP following. Overweight POA Body mass index is 29.39 kg/m². DVT prophylaxis. [address] SUP. Pepcid. Code status. Full code. For discharge tomorrow 06/15 at 3 PM after hemodialysis  Subjective:     Chief Complaint / Reason for Physician Visit  Follow-up bacteremia  No acute issues      Fever/Chills n   Chest Pain n    Poor Appetite y   Edema     Cough    Abdominal Pain n    Sputum    Joint Pain     SOB/BEE    Headache     Nausea/vomit    Tolerating PT/OT     Diarrhea n   Tolerating Diet     Constipation    Other       Could NOT obtain due to:      Objective:     VITALS:   Last 24hrs VS reviewed since prior progress note.  Most recent are:  Patient Vitals for the past 24 hrs:   Temp Pulse Resp BP SpO2   06/14/22 0806 97.9 °F (36.6 °C) 73 16 (!) 143/56 100 %   06/14/22 0309 97.7 °F (36.5 °C) 75 -- 138/68 100 %   06/13/22 2349 98.2 °F (36.8 °C) 84 -- (!) 147/66 98 %   06/13/22 2002 98.6 °F (37 °C) 82 -- 125/60 93 %   06/13/22 1636 98.3 °F (36.8 °C) 93 18 (!) 152/75 91 %   06/13/22 1400 97.7 °F (36.5 °C) 84 16 (!) 145/71 99 %   06/13/22 1205 97.8 °F (36.6 °C) 93 16 (!) 164/77 99 %   06/13/22 1200 -- 77 16 (!) 167/77 --   06/13/22 1145 -- 76 16 (!) 175/90 --   06/13/22 1130 -- 79 16 (!) 172/79 --   06/13/22 1115 -- 94 16 (!) 161/69 --   06/13/22 1100 -- 75 16 (!) 170/86 --   06/13/22 1045 -- 74 16 (!) 152/56 --   06/13/22 1015 -- (!) 59 16 (!) 165/60 --   06/13/22 1000 -- 85 16 (!) 157/86 --   06/13/22 0945 -- (!) 57 16 (!) 168/79 --   06/13/22 0930 -- 61 16 (!) 155/77 --   06/13/22 0915 -- 73 16 (!) 160/98 --       Intake/Output Summary (Last 24 hours) at 6/14/2022 0911  Last data filed at 6/13/2022 2006  Gross per 24 hour   Intake --   Output 2100 ml   Net -2100 ml        Wt Readings from Last 12 Encounters:   05/26/22 82.6 kg (182 lb 1.6 oz)       PHYSICAL EXAM:    I had a face to face encounter and independently examined this patient on 06/14/22 as outlined below:    General: WD, WN. Lethargic cooperative, no acute distress    EENT:  PERRL. Anicteric sclerae. MMM  Resp:  CTA bilaterally, no wheezing or rales. No accessory muscle use  CV:  Regular  rhythm,  No edema  GI:  Soft, Non distended, Tender RUQ and epigastric region. +Bowel sounds, no rebound  Neurologic:  Sleepy but able to be aroused normal speech, non focal motor exam  Psych:   Not anxious nor agitated  Skin:  No rashes.   No jaundice      Reviewed most current lab test results and cultures  YES  Reviewed most current radiology test results   YES  Review and summation of old records today    NO  Reviewed patient's current orders and MAR    YES  PMH/SH reviewed - no change compared to H&P  ________________________________________________________________________  Care Plan discussed with:    Comments   Patient x    Family      RN x    Care Manager     Consultant                        Multidiciplinary team rounds were held today with , nursing, pharmacist and clinical coordinator. Patient's plan of care was discussed; medications were reviewed and discharge planning was addressed. ________________________________________________________________________      Comments   >50% of visit spent in counseling and coordination of care     ________________________________________________________________________  Claudia Pastrana MD     Procedures: see electronic medical records for all procedures/Xrays and details which were not copied into this note but were reviewed prior to creation of Plan. LABS:  I reviewed today's most current labs and imaging studies.   Pertinent labs include:  Recent Labs     06/13/22  0910   WBC 6.2   HGB 8.5*   HCT 27.1*   PLT 75*     Recent Labs     06/13/22  0910      K 3.7      CO2 30   *   BUN 23*   CREA 5.84*   CA 7.4*   PHOS 3.3   ALB 1.4*

## 2022-06-14 NOTE — PROGRESS NOTES
Nephrology Progress Note  [facility]     [facility]. [facility]  Phone - [phone]   Patient: Clarisse Rose    YOB: 1934        Date- 6/14/2022   Admit Date: 5/23/2022  CC: Follow up for shine ON HD      IMPRESSION & PLAN:   · SHINE   due to ATN - HD dependent now, no signs of recovery  · CKD 3b  · DKA  · Sepsis   UTI   hypokalemia   Gram +ve blood cx   AFIB   Bradycardia- 2nd degree av block   DM 2    PLAN-   NO HD indicated today   Will keep her on MWF schedule   epogen for anemia        Subjective: Interval History:   -bp stable  S/p hd yesterday    Objective:   Vitals:    06/13/22 2002 06/13/22 2349 06/14/22 0309 06/14/22 0806   BP: 125/60 (!) 147/66 138/68 (!) 143/56   Pulse: 82 84 75 73   Resp:    16   Temp: 98.6 °F (37 °C) 98.2 °F (36.8 °C) 97.7 °F (36.5 °C) 97.9 °F (36.6 °C)   TempSrc:       SpO2: 93% 98% 100% 100%   Weight:       Height:          06/13 0701 - 06/14 0700  In: -   Out: 2100 [Drains:100]  Last 3 Recorded Weights in this Encounter    05/23/22 0957 05/24/22 0727 05/26/22 0631   Weight: 89.2 kg (196 lb 10.4 oz) 88.9 kg (196 lb) 82.6 kg (182 lb 1.6 oz)      Physical exam:    GEN: NAD  NECK- Supple, no mass  RESP: No wheezing, clear b/l  CVS: S1,S2  RRR  EXT:+Edema   PSYCH: Can't access due to patient's current condition     Chart reviewed. Pertinent Notes reviewed. Data Review :  Recent Labs     06/13/22  0910      K 3.7      CO2 30   BUN 23*   CREA 5.84*   *   CA 7.4*   PHOS 3.3     Recent Labs     06/13/22  0910   WBC 6.2   HGB 8.5*   HCT 27.1*   PLT 75*     No results for input(s): FE, TIBC, PSAT, FERR in the last 72 hours.    Medication list  reviewed  Current Facility-Administered Medications   Medication Dose Route Frequency    insulin NPH (NOVOLIN N, HUMULIN N) injection 5 Units  5 Units SubCUTAneous BID    0.9% sodium chloride infusion 250 mL  250 mL IntraVENous PRN    pantoprazole (PROTONIX) 40 mg in 0.9% sodium chloride 10 mL injection  40 mg IntraVENous Q12H    famotidine (PEPCID) tablet 20 mg  20 mg Oral DAILY    epoetin betina-epbx (RETACRIT) 12,000 Units combo injection  12,000 Units SubCUTAneous Q MON, WED & FRI    amiodarone (CORDARONE) tablet 400 mg  400 mg Oral DAILY    cefepime (MAXIPIME) 1 g in 0.9% sodium chloride (MBP/ADV) 50 mL MBP  1 g IntraVENous Q24H    heparin (porcine) 1,000 unit/mL injection 1,400 Units  1,400 Units InterCATHeter DIALYSIS PRN    And    heparin (porcine) 1,000 unit/mL injection 1,100 Units  1,100 Units InterCATHeter DIALYSIS PRN    hydrALAZINE (APRESOLINE) 20 mg/mL injection 20 mg  20 mg IntraVENous Q6H PRN    albumin human 25% (BUMINATE) solution 25 g  25 g IntraVENous DIALYSIS PRN    metoprolol (LOPRESSOR) injection 2.5 mg  2.5 mg IntraVENous Q6H PRN    insulin lispro (HUMALOG) injection   SubCUTAneous AC&HS    glucose chewable tablet 16 g  4 Tablet Oral PRN    glucagon (GLUCAGEN) injection 1 mg  1 mg IntraMUSCular PRN    dextrose 10 % infusion 0-250 mL  0-250 mL IntraVENous PRN    sodium chloride (NS) flush 5-40 mL  5-40 mL IntraVENous Q8H    sodium chloride (NS) flush 5-40 mL  5-40 mL IntraVENous PRN    acetaminophen (TYLENOL) tablet 650 mg  650 mg Oral Q6H PRN    Or    acetaminophen (TYLENOL) suppository 650 mg  650 mg Rectal Q6H PRN    polyethylene glycol (MIRALAX) packet 17 g  17 g Oral DAILY PRN    ondansetron (ZOFRAN ODT) tablet 4 mg  4 mg Oral Q8H PRN    Or    ondansetron (ZOFRAN) injection 4 mg  4 mg IntraVENous Q6H PRN    [Held by provider] heparin (porcine) injection 5,000 Units  5,000 Units SubCUTAneous Q8H    alcohol 62% (NOZIN) nasal  1 Ampule  1 Ampule Topical Q12H    balsam peru-castor oiL (VENELEX) ointment   Topical BID          Ciarra Ramirez MD              [facility]  [facility] / [facility], [address]  [place], [address]  Phone - [phone]               Fax - [phone]

## 2022-06-14 NOTE — PROGRESS NOTES
End of Shift Note    Bedside shift change report given to [address] Hong (oncoming nurse) by Mayra Hicks (offgoing nurse). Report included the following information SBAR, Kardex, Intake/Output, MAR, Accordion and Recent Results    Shift worked:  7a-7p     Shift summary and any significant changes:     Pt stable, wound care done,   turning pt t/o shift. Plan for discharging as soon as the Medical team releases '  Had to  patient to eat all 3 meals during shift    Otherwise uneventful shift. Concerns for physician to address:  none     Zone phone for oncoming shift:         Activity:  Activity Level: Bed Rest  Number times ambulated in hallways past shift: 0  Number of times OOB to chair past shift: 0    Cardiac:   Cardiac Monitoring: No      Cardiac Rhythm: Sinus Rhythm    Access:   Current line(s): PIV     Genitourinary:   Urinary status: anuric    Respiratory:   O2 Device: Nasal cannula  Chronic home O2 use?: NO  Incentive spirometer at bedside: NO       GI:  Last Bowel Movement Date: 06/13/22  Current diet:  ADULT DIET Dysphagia - Soft & Bite Sized  ADULT ORAL NUTRITION SUPPLEMENT Breakfast, Dinner; Low Calorie/High Protein  ADULT ORAL NUTRITION SUPPLEMENT Lunch; Frozen Supplement  DIET ONE TIME MESSAGE  Passing flatus: YES  Tolerating current diet: YES       Pain Management:   Patient states pain is manageable on current regimen: N/A    Skin:  Markos Score: 15  Interventions: increase time out of bed    Patient Safety:  Fall Score:  Total Score: 1  Interventions: gripper socks  High Fall Risk: Yes    Length of Stay:  Expected LOS: 4d 19h  Actual LOS: [address]

## 2022-06-15 NOTE — PROGRESS NOTES
Occupational Therapy    Chart reviewed for updates and attempted to see patient for OT treatment. Patient is presently off floor for HD. Will follow up tomorrow for OT treatment.     Cristopher Holden, OTR/L

## 2022-06-15 NOTE — PROCEDURES
Hemodialysis / [phone]    Vitals Pre Post Assessment Pre Post   BP BP: (!) 153/66 (06/15/22 0800) 167/62 LOC A&Ox2-3 same   HR Pulse (Heart Rate): 66 (06/15/22 0800) 63 Lungs Dim bases same   Resp Resp Rate: 16 (06/15/22 0800) 16 Cardiac RRR same   Temp Temp: 98 °F (36.7 °C) (06/15/22 0800) 97.6 Skin Stage II sacrum / DTI BL Heels same   Weight    Edema 1+ BLE same   Tele status   Pain Pain Intensity 1: 0 (06/15/22 0331)      Orders   Duration: Start: 0800 End: 1146 Total: 3.45hrs   Dialyzer: Dialyzer/Set Up Inspection: Gabe Ramirez (06/15/22 0800)   K Bath: Dialysate K (mEq/L): 3 (06/15/22 0800)   Ca Bath: Dialysate CA (mEq/L): 2.5 (06/15/22 0800)   Na: Dialysate NA (mEq/L): 138 (06/15/22 0800)   Bicarb: Dialysate HCO3 (mEq/L): 40 (06/15/22 0800)   Target Fluid Removal: Goal/Amount of Fluid to Remove (mL): 2000 mL (06/15/22 0800)     Access   Type & Location: RIJ CVC: Dressing changed. No s/s of infection. Both lumens aspirate & flush well. Running well at . SBAR received from Primary RN. Pt arrived to HD suite A&Ox4. Consent signed & on file. Each catheter limb disinfected per p&p, caps removed, hubs disinfected per p&p. Each lumen aspirated for blood return and flushed with Normal Saline per policy. Labs drawn per request/ order. VSS. Dialysis Tx initiated. Comments:   Dressing changed. No s/s of infection noted.                                      Labs   HBsAg (Antigen) / date: Neg 5/24/22                                              HBsAb (Antibody) / date: Susc 5/24/22   Source: EPIC   Obtained/Reviewed  Critical Results Called HGB   Date Value Ref Range Status   06/15/2022 7.7 (L) 11.5 - 16.0 g/dL Final     Potassium   Date Value Ref Range Status   06/14/2022 3.5 3.5 - 5.1 mmol/L Final     Calcium   Date Value Ref Range Status   06/14/2022 7.2 (L) 8.5 - 10.1 MG/DL Final     BUN   Date Value Ref Range Status   06/14/2022 14 6 - 20 MG/DL Final     Creatinine   Date Value Ref Range Status 06/14/2022 4.10 (H) 0.55 - 1.02 MG/DL Final     Comment:     INVESTIGATED PER DELTA CHECK PROTOCOL        Meds Given   Name Dose Route   Heparin 1:1000 1.6 [email or web address]   Heparin 1:1000 1.6 [email or web address]          Adequacy / Fluid    Total Liters Process: 85.5   Net Fluid Removed: 2000mL      Comments   Time Out Done:   (Time) 6202   Admitting Diagnosis: DKA   Consent obtained/signed: Informed Consent Verified: Yes (06/15/22 0800)   Machine / RO # Machine Number: [id number] (06/15/22 0800)   Primary Nurse Rpt Pre: Russell Montano RN   Primary Nurse Rpt Post: Keshia Israel RN   Pt Education: procedural   Care Plan: On going   Pts outpatient clinic: TBD     Tx Summary  0800:  RIJ CVC: Dressing changed. No s/s of infection. Both lumens aspirate & flush well. Running well at . SBAR received from Primary RN. Pt arrived to HD suite A&Ox4. Consent signed & on file. Each catheter limb disinfected per p&p, caps removed, hubs disinfected per p&p. Each lumen aspirated for blood return and flushed with Normal Saline per policy. Labs drawn per request/ order. VSS. Dialysis Tx initiated. 0815:  Pt resting  0830:  Pt resting  0845:  Pt resting  0900:  Pt resting  0915:  Pt resting  0930:  Pt resting  0945:  Pt resting  1000:  Pt resting  1015:  Pt resting  1030:  Pt resting  1045:  Pt resting  1100:  Pt resting  1115:  Pt resting  1130:  Pt resting  1146:  Tx ended. VSS. Each dialysis catheter limb disinfected per p&p, all possible blood returned per p&p, and each dialysis hub disinfected per p&p. Each lumen flushed, post dialysis catheter Heparin dwell instilled per order, and caps applied. Bed locked and in the lowest position, call bell and belongings in reach. SBAR given to Primary, RN. Patient is stable at time of their/ my departure. All Dialysis related medications have been reviewed. Comments:  Assessment performed by RN. Procedure and documentation observed and reviewed by Jacqueline Stein RN.

## 2022-06-15 NOTE — DISCHARGE SUMMARY
Hospitalist Discharge Summary     Patient ID:  Franky Goodwin  [id number]  70 y.o.  1934 5/23/2022    PCP on record: Jose Watts MD    Admit date: 5/23/2022  Discharge date and time: 6/15/2022    DISCHARGE DIAGNOSIS:  Acute on chronic anemia  Septic shock   GNR bacteremia  Afib RVR  DKA    CONSULTATIONS:  IP CONSULT TO CARDIOLOGY  IP CONSULT TO PALLIATIVE CARE - PROVIDER  IP CONSULT TO NEPHROLOGY  IP CONSULT TO INFECTIOUS DISEASES  IP CONSULT TO GASTROENTEROLOGY    Excerpted HPI from H&P of Trisha Hu MD:  Mr. Masoud Coronado is 81yo AAF with h/o HTN, IDDM and HLD  Who lives with her grand daughter but still function and walks with walker. She was brought to ER today after being found unresponsive. I obtained all the history from patient's grand daughter as patient is confused and disoriented. Reportedly she was c/o feeling sick and lethergic for the last about 4 days and her grand daughter arranged appointment with PCP. Today she found her minimally responsive and called EMS. On presentation she was hypotensive and bradycardiac with HR in 30s and required vasopressors. Getting admitted to ICU for further care and monitoring.    ______________________________________________________________________  DISCHARGE SUMMARY/HOSPITAL COURSE:  for full details see H&P, daily progress notes, labs, consult notes. Acute blood loss anemia on chronic anemia   ?   GI bleed  hemoglobin dropped down to 6.7 on 06/10, a unit of PRBC ordered and transfused, repeat hemoglobin has been stable  Stool occult blood positive, c/w protonix  GI input reviewed, no plan for inpatient colonoscopy or endoscopy, recommended continue monitoring hemoglobin  I talked to grand daughter Angela Govea and updated her about discharge today, all questions answered     Acute metabolic encephalopathy   Hypoglycemia on 06/05  Lethargic, BS 58 on 06/05, resolved  Will discharge on low dose NPH on discharge, stopped home Glipizide     Septic shock POA resolved  Klebsiella Bacteremia with UTI 5/23 POA  Serratia marcescens bacteremia 5/23 and 5/26/2022 POA  Persistent GNR bacteremia POA  ? Infectious endocarditis  - Admit UA 5-10 WBC, 1+ bacteria  - Admit BC with klebsiella and Serratia              Sensitive to zosyn, ceftriaxone  - Admit urine culture + for klebsiella  - Repeat BC 5/26 with recurrent sepsis + for Serratia  - CT abdomen/pelvis without IV contrast 5/24 with no hydronephrosis              No intraabdominal pathology  - Chest CT with bilateral effusions, no ASD  - With elevated lipase and abnormal LFTs, ? Biliary source              GGT 28, never had elevated Alk Phos and T bili              US with no gallstones, contracted GB, CBD 0.4 cm              Biliary source seems less likely  - IV pressors at admit, weaned off              Started on HD for GLORIA from [facility]  -Started on stress steroid, being weaned down  - Transferred to floor 5/29/2022  - S/p zosyn --> transition to ceftriaxone 5/31  - Repeat Blood culture 5/30 with GNR  - Repeat blood culture 5/31 positive for gram-negative rods  - Not sure why she is persistent bacteremic with GNR despite appropriate antibiotics               TTE LVEF 55 to 60%, trace MR and AR  - Repeat CT abdomen given the tenderness, persistent bacteremia  -ID consulted, recommended to remove central line and the Evan repeat blood cultures. Discussed with ID and nephrology  . CT abdomen pelvis with contrast did not show any acute pathology     Central line and Evan removed and plan for NAI by cardiology  . Blood cultures from 0602 negative to date  Status post NAI on 06/03, prelim report showed:  S/p NAI performed in sinus rhythm preliminarily revealing biatrial enlargement, moderate MR with very small mobile abnormality on the atrial side of the mitral valve, cannot exclude what might be a small vegetation.  Large left atrial appendage with low exit velocity and without thrombus.  Mild TR with evidence of moderate pulmonary hypertension.  Small pericardial effusion. Continue with IV cefepime  Repeat blood cultures so far has been negative since 06/02  ID recommended 6 weeks of cefepime with hemodialysis  Outpatient hemodialysis to be set up by his neurology  s/Post permacath on 06/08    A. fib with RVR  Patient has had episode of bradycardia with pauses during the stay due to combination of DKA, hyperkalemia  Beta-blocker was stopped. Poor candidate for  Anticoagulation   Status post amnio drip, switch to p.o. amnio     Acute kidney injury POA BUN/creat 114 and 5.97  Rhabdomyolysis CPK peak 22,325   Started on HD, HD chair setup      DKA POA BS 1259, HgBa1c > 14.0, AG > 20, pH 6.99  Resolved, has labile blood sugar  C/w low dose NPH          Acute pancreatitis POA  - admit Non contrast CT is neg for changes of acute pancreatitis. - Lipase > 3000 -->  1107 -->  2499 --> 1602  - + abdominal tenderness  - clears  - No Gallstones or ductal dilatation, no ETOH  - ? Ischemic   - lipase tredning down  -Repeat CT abdomen/pelvis showed no acute abnormality  - serial labs     Acute metabolic encephalopathy POA resolved  - Multifactorial with septic shock, low BP, GLORIA, elevated BS > 1200  - Head CT with atrophy, no acute events  -Improving. Closely monitor mental status.  -manage metabolic issues as above.     Symptomatic bradycardia.  -Likely precipitated by severe acidemia, also on BB at home.   -Again had long pauses on 5/25, EP following. Current Discharge Medication List      START taking these medications    Details   amiodarone (CORDARONE) 200 mg tablet Take 1 Tablet by mouth daily. Qty: 60 Tablet, Refills: 1  Start date: 6/15/2022      cefepime 1 gram 1 g IVPB 1 g by IntraVENous route every twenty-four (24) hours. End date 7/15/2022  Qty: 30 Dose, Refills: 0  Start date: 6/15/2022      pantoprazole (PROTONIX) 20 mg tablet Take 2 Tablets by mouth daily.   Qty: 60 Tablet, Refills: 0  Start date: 6/15/2022         CONTINUE these medications which have CHANGED    Details   atorvastatin (LIPITOR) 20 mg tablet Take 2 Tablets by mouth daily. Qty: 30 Tablet, Refills: 1  Start date: 6/15/2022      insulin lispro protamine/insulin lispro (HUMALOG MIX 75/25) 100 unit/mL (75-25) injection 5 Units by SubCUTAneous route Before breakfast and dinner. Qty: 1 mL, Refills: 0  Start date: 6/15/2022         CONTINUE these medications which have NOT CHANGED    Details   cloNIDine HCl (CATAPRES) 0.3 mg tablet Take 0.3 mg by mouth two (2) times a day. amLODIPine (NORVASC) 10 mg tablet Take 10 mg by mouth daily. aspirin delayed-release 81 mg tablet Take 81 mg by mouth daily. cholecalciferol (VITAMIN D3) (1000 Units /25 mcg) tablet Take 1,000 Units by mouth daily. STOP taking these medications       ramipril (ALTACE) 10 mg capsule Comments:   Reason for Stopping:         terazosin (HYTRIN) 10 mg capsule Comments:   Reason for Stopping:         glipiZIDE (GLUCOTROL) 10 mg tablet Comments:   Reason for Stopping:         atenolol (TENORMIN) 50 mg tablet Comments:   Reason for Stopping:             _______________________________________________________________________  Patient seen and examined by me on discharge day. Pertinent Findings:  Gen:    Not in distress  Chest: Clear lungs  CVS:   Regular rhythm. No edema  Abd:  Soft, not distended, not tender  Neuro:  Alert,   __________________________________      Patient Follow Up Instructions:    Activity: Activity as tolerated  Diet: Diabetic Diet  Wound Care: As directed    Follow-up with  Follow-up Information     Follow up With Specialties Details Why Elaine [facility] [address]  [address]   [address]    Verba Stalling  On 6/17/2022 7:00am for HD [address] [facility], [address] [facility]  [phone]    Estelle Gasca MD Internal Medicine Physician [address]  [phone]          ________________________________________________________________    Risk of deterioration: High    Condition at Discharge:  Stable  __________________________________________________________________    Disposition  SNF/LTC    ____________________________________________________________________    Code Status: Full Code  ___________________________________________________________________      Total time in minutes spent coordinating this discharge (includes going over instructions, follow-up, prescriptions, and preparing report for sign off to her PCP) :  >30 minutes    Signed:  Keli Fuentes MD

## 2022-06-15 NOTE — PROGRESS NOTES
Hospital to [facility] Handoff - Brenda Bonilla                                                                        80 y.o.   female    [address]   Room: [address] [place] [address] 3 SURG TELE  Unit Phone# :  3rd Med surgical telemetry [facility] [address] [address]  Dept: [phone]  Loc: [phone]                    SITUATION     Admitted:  5/23/2022         Attending Provider:  Cindy Hawthorne MD       Consultations:  IP CONSULT TO CARDIOLOGY  IP CONSULT TO PALLIATIVE CARE - PROVIDER  IP CONSULT TO NEPHROLOGY  IP CONSULT TO INFECTIOUS DISEASES  IP CONSULT TO GASTROENTEROLOGY    PCP:  Arti Conteh MD   [phone]    Treatment Team: Attending Provider: Cindy Hawthorne MD; Consulting Provider: Yonas Vargas MD; Utilization Review: Monica Herrera;  Consulting Provider: Juancho Street NP; Consulting Provider: David Montez MD; Nurse Practitioner: Claudette Mayans, NP; Consulting Provider: Shaka Uriostegui MD; Care Manager: Latasha Manley; Primary Nurse: Last Aleman LPN; Primary Nurse: Corina Obando RN; Primary Nurse: Bertrand Barnes RN; Physical Therapist: Anila Denny PT; Occupational Therapist: Sheryl Miguel OTR/L; Staff Nurse: Kasey Thakur    Admitting Dx:  DKA (diabetic ketoacidosis) ([facility] [E11.10]       Principal Problem: <principal problem not specified>    * No surgery found * of      BY: * Surgery not found *             ON: * No surgery found *                  Code Status: Full Code                Advance Directives:   Advance Care Planning 6/2/2022   Patient's Healthcare Decision Maker is: Named in scanned ACP document   Confirm Advance Directive Yes, on file    (Send w/patient)   Not Received       Isolation:  There are currently no Active Isolations       MDRO: No current active infections    Pain Medications given:  0  Last dose:     Special Equipment needed:  Pressure injury bed Type of equipment:    hemodialysis        BACKGROUND     Allergies:  No Known Allergies    Past Medical History:   Diagnosis Date    Diabetes ([facility]        Past Surgical History:   Procedure Laterality Date    IR INSERT NON TUNL CVC OVER 5 YRS  5/26/2022    IR INSERT NON TUNL CVC OVER 5 YRS  6/3/2022    IR INSERT TUNL CVC W/O PORT OVER 5 YR  6/8/2022       Medications Prior to Admission   Medication Sig    ramipril (ALTACE) 10 mg capsule Take 10 mg by mouth two (2) times a day.  cloNIDine HCl (CATAPRES) 0.3 mg tablet Take 0.3 mg by mouth two (2) times a day.  terazosin (HYTRIN) 10 mg capsule Take 10 mg by mouth daily. take once a day at bedtime    glipiZIDE (GLUCOTROL) 10 mg tablet Take 10 mg by mouth daily.  atenolol (TENORMIN) 50 mg tablet Take 50 mg by mouth daily.  amLODIPine (NORVASC) 10 mg tablet Take 10 mg by mouth daily.  aspirin delayed-release 81 mg tablet Take 81 mg by mouth daily.  cholecalciferol (VITAMIN D3) (1000 Units /25 mcg) tablet Take 1,000 Units by mouth daily.  [DISCONTINUED] atorvastatin (LIPITOR) 80 mg tablet Take 80 mg by mouth daily.  [DISCONTINUED] insulin lispro protamine/insulin lispro (HUMALOG MIX 75/25) 100 unit/mL (75-25) injection 48 Units by SubCUTAneous route daily. take 48 units in the morning    [DISCONTINUED] insulin lispro protamine/insulin lispro (HUMALOG MIX 75/25) 100 unit/mL (75-25) injection 32 Units by SubCUTAneous route every evening. take 32 units subcutaneously at night. Hard scripts included in transfer packet no    Vaccinations: There is no immunization history on file for this patient. Readmission Risks:    Known Risks: pressure injury and Hyerglycemia        The Charlson CoMorbitiy Index tool is an evidenced based tool that has more automatic generated information.  The tool looks at many different items such as the age of the patient, how many times they were admitted in the last calendar year, current length of stay in the hospital and their diagnosis. All of these items are pulled automatically from information documented in the chart from various places and will generate a score that predicts whether a patient is at low (less than 13), medium (13-20) or high (21 or greater) risk of being readmitted.         ASSESSMENT                Temp: 97.6 °F (36.4 °C) (06/15/22 1146) Pulse (Heart Rate): 63 (06/15/22 1146)     Resp Rate: 16 (06/15/22 1146)           BP: (!) 167/62 (06/15/22 1146)     O2 Sat (%): 100 % (06/15/22 0718)     Weight: 82.6 kg (182 lb 1.6 oz)    Height: 5' 6\" (167.6 cm) (05/25/22 1330)       If above not within 1 hour of discharge:    BP:_____  P:____  R:____ T:_____ O2 Sat: ___%  O2: ______    Active Orders   Diet    ADULT DIET Dysphagia - Soft & Bite Sized         Orientation: oriented to time, place, person and situation     Active Behaviors: None                                   Active Lines/Drains:  (Peg Tube / Gee / CL or S/L?): no    Urinary Status: Anuria     Last BM: Last Bowel Movement Date: 06/15/22     Skin Integrity: Wound (add Wound LDA)             Mobility: Very limited   Weight Bearing Status: WBAT (Weight Bearing as Tolerated)                Lab Results   Component Value Date/Time    Glucose 117 (H) 06/15/2022 07:57 AM    Hemoglobin A1c >14.0 (H) 05/23/2022 12:01 PM    INR 1.2 (H) 05/29/2022 04:59 AM    INR 1.1 05/23/2022 08:05 AM    HGB 7.7 (L) 06/15/2022 07:57 AM    HGB 8.5 (L) 06/13/2022 09:10 AM        RECOMMENDATION     See After Visit Summary (AVS) for:  · Discharge instructions  · After [address]   · Special equipment needed (entered pre-discharge by Care Management)  · Medication Reconciliation    · Follow up Appointment(s)         Report given/sent by:  Vandana Leigh                    Verbal report given to:  Tesfaye Arango LPN at [facility] [phone]       Estimated discharge time:  6/15/2022 at   1500  Patient left at [phone] 6/15/22

## 2022-06-15 NOTE — PROGRESS NOTES
Patient is ready for d/c from CM standpoint    Transition of Care Plan:     RUR:18%  Disposition:  SNF-[facility] HD at [facility] to begin Fri 6/17 at 7:00am-chair time is [email or web address]  Follow up appointments: PCP   DME needed: tbd  Transportation at [email or web address] or means to access home:     Granddaughter   IM Medicare Letter: 2nd IM info provided to granddaughter via phone on 6/14/22   Is patient a BCPI-A Bundle:        no              If yes, was Bundle Letter given?:    Is patient a  and connected with the [facility]?              No   If yes, was [employer] transfer form completed and [facility] notified? Caregiver Contact: Granddaughter: Tania Lara: [phone]  GD Spouse: Dionicio Mauro: [phone]  Discharge Caregiver contacted prior to discharge? Yes reviewed plan with Granddaughter over the phone.   Care Conference needed?: to be determined. Patient is d/c to SNF today with OP HD set up. RN to call report to [phone]. Stretcher transport for HD has been scheduled w/Jareth w/[facility] [employer] ([phone]) for the following days: Friday 5/17, Monday 6/20 & Wed 6/22 @6:30am.  CM verified James Rodriguez received IV antibiotic order.   CM faxed transportation medical necessity form, along w/supporting documentation to SNF    Riana Ochoa  Ext [phone]

## 2022-06-15 NOTE — PROGRESS NOTES
Problem: Breathing Pattern - Ineffective  Goal: *Absence of hypoxia  Outcome: Resolved/Not Met     Problem: Patient Education: Go to Patient Education Activity  Goal: Patient/Family Education  Outcome: Resolved/Not Met     Problem: Pressure Injury - Risk of  Goal: *Prevention of pressure injury  Description: Document Markos Scale and appropriate interventions in the flowsheet. Outcome: Resolved/Not Met     Problem: Patient Education: Go to Patient Education Activity  Goal: Patient/Family Education  Outcome: Resolved/Not Met     Problem: Diabetes Self-Management  Goal: *Disease process and treatment process  Description: Define diabetes and identify own type of diabetes; list 3 options for treating diabetes. Outcome: Resolved/Not Met  Goal: *Incorporating nutritional management into lifestyle  Description: Describe effect of type, amount and timing of food on blood glucose; list 3 methods for planning meals. Outcome: Resolved/Not Met  Goal: *Incorporating physical activity into lifestyle  Description: State effect of exercise on blood glucose levels. Outcome: Resolved/Not Met  Goal: *Developing strategies to promote health/change behavior  Description: Define the ABC's of diabetes; identify appropriate screenings, schedule and personal plan for screenings. Outcome: Resolved/Not Met  Goal: *Using medications safely  Description: State effect of diabetes medications on diabetes; name diabetes medication taking, action and side effects. Outcome: Resolved/Not Met  Goal: *Monitoring blood glucose, interpreting and using results  Description: Identify recommended blood glucose targets  and personal targets. Outcome: Resolved/Not Met  Goal: *Prevention, detection, treatment of acute complications  Description: List symptoms of hyper- and hypoglycemia; describe how to treat low blood sugar and actions for lowering  high blood glucose level.   Outcome: Resolved/Not Met  Goal: *Prevention, detection and treatment of chronic complications  Description: Define the natural course of diabetes and describe the relationship of blood glucose levels to long term complications of diabetes.   Outcome: Resolved/Not Met  Goal: *Developing strategies to address psychosocial issues  Description: Describe feelings about living with diabetes; identify support needed and support network  Outcome: Resolved/Not Met  Goal: *Insulin pump training  Outcome: Resolved/Not Met  Goal: *Sick day guidelines  Outcome: Resolved/Not Met  Goal: *Patient Specific Goal (EDIT GOAL, INSERT TEXT)  Outcome: Resolved/Not Met     Problem: Patient Education: Go to Patient Education Activity  Goal: Patient/Family Education  Outcome: Resolved/Not Met     Problem: Patient Education: Go to Patient Education Activity  Goal: Patient/Family Education  Outcome: Resolved/Not Met     Problem: DKA: Day 1  Goal: Off Pathway (Use only if patient is Off Pathway)  Outcome: Resolved/Not Met  Goal: Activity/Safety  Outcome: Resolved/Not Met  Goal: Consults, if ordered  Outcome: Resolved/Not Met  Goal: Diagnostic Tests/Procedures, if Ordered  Outcome: Resolved/Not Met  Goal: Nutrition/Diet  Outcome: Resolved/Not Met  Goal: Discharge Planning  Outcome: Resolved/Not Met  Goal: Medications  Outcome: Resolved/Not Met  Goal: Respiratory  Outcome: Resolved/Not Met  Goal: Treatments/Interventions/Procedures  Outcome: Resolved/Not Met  Goal: Psychosocial  Outcome: Resolved/Not Met  Goal: *Hemodynamically stable  Outcome: Resolved/Not Met  Goal: *Blood glucose falling 50 to 100 mg/dl/hr  Outcome: Resolved/Not Met  Goal: *Potassium normalizing  Outcome: Resolved/Not Met     Problem: DKA: Day 2  Goal: Off Pathway (Use only if patient is Off Pathway)  Outcome: Resolved/Not Met  Goal: Activity/Safety  Outcome: Resolved/Not Met  Goal: Consults, if ordered  Outcome: Resolved/Not Met  Goal: Diagnostic Test/Procedures  Outcome: Resolved/Not Met  Goal: Nutrition/Diet  Outcome: Resolved/Not Met  Goal: Discharge Planning  Outcome: Resolved/Not Met  Goal: Medications  Outcome: Resolved/Not Met  Goal: Respiratory  Outcome: Resolved/Not Met  Goal: Treatments/Interventions/Procedures  Outcome: Resolved/Not Met  Goal: Psychosocial  Outcome: Resolved/Not Met  Goal: *Acidosis resolved  Outcome: Resolved/Not Met  Goal: *Tolerating diet  Outcome: Resolved/Not Met  Goal: *Demonstrates progressive activity  Outcome: Resolved/Not Met  Goal: *Blood glucose 80 to 180 mg/dl  Outcome: Resolved/Not Met     Problem: DKA: Day 3  Goal: Off Pathway (Use only if patient is Off Pathway)  Outcome: Resolved/Not Met  Goal: Activity/Safety  Outcome: Resolved/Not Met  Goal: Diagnostic Test/Procedures  Outcome: Resolved/Not Met  Goal: Nutrition/Diet  Outcome: Resolved/Not Met  Goal: Discharge Planning  Outcome: Resolved/Not Met  Goal: Medications  Outcome: Resolved/Not Met  Goal: Treatments/Interventions/Procedures  Outcome: Resolved/Not Met  Goal: Psychosocial  Outcome: Resolved/Not Met     Problem: DKA: Discharge Outcomes  Goal: *Ambulates and performs ADL's  Outcome: Resolved/Not Met  Goal: *Describes follow-up/return visits to physicians, diabetes treatment coordinator and other resources  Outcome: Resolved/Not Met  Goal: *Blood glucose at patient's target range  Outcome: Resolved/Not Met  Goal: *Acidosis resolved  Outcome: Resolved/Not Met  Goal: *Tolerating diet  Outcome: Resolved/Not Met  Goal: *Verbalizes understanding and describes prescribed diet  Outcome: Resolved/Not Met  Goal: *Describes blood glucose goals, monitoring, sick day rules, hypo/hyperglycemia  Outcome: Resolved/Not Met  Goal: *Describes available resources and support systems  Outcome: Resolved/Not Met  Goal: *Verbalizes name, dosage, time, side effects, and number of days to continue medications  Outcome: Resolved/Not Met  Goal: *Demonstrates ability to self-administer insulin  Outcome: Resolved/Not Met     Problem: Hypotension  Goal: *Blood pressure within specified parameters  Outcome: Resolved/Not Met  Goal: *Fluid volume balance  Outcome: Resolved/Not Met  Goal: *Labs within defined limits  Outcome: Resolved/Not Met     Problem: Patient Education: Go to Patient Education Activity  Goal: Patient/Family Education  Outcome: Resolved/Not Met

## 2022-06-15 NOTE — PROGRESS NOTES
Nephrology Progress Note  Pb Zamora     [facility]  Phone - [phone]   Patient: Claribel Mendoza    YOB: 1934        Date- 6/15/2022   Admit Date: 5/23/2022  CC: Follow up for GLORIA ON HD      IMPRESSION & PLAN:   · GLORIA  due to ATN - HD dependent now, no signs of recovery- RRT started on 5-23-22  · CKD 3b- CR 1.3 IN [facility] 2021  · DKA  · Sepsis   UTI   hypokalemia   Gram +ve blood cx   AFIB   Bradycardia- 2nd degree av block   DM 2    PLAN-   Seen on hd today   Will keep her on MWF schedule   epogen for anemia   Hold ramipril   Restart norvasc   She will go to [facility]/c noted   Subjective: Interval History:   -seen on hd  bp high      Objective:   Vitals:    06/15/22 0830 06/15/22 0845 06/15/22 0900 06/15/22 0915   BP: (!) 182/73 (!) 185/71 (!) 168/84 (!) 166/68   Pulse: 62 61 60 65   Resp: 16 16 16 16   Temp:       TempSrc:       SpO2:       Weight:       Height:          06/14 0701 - 06/15 0700  In: 460 [P.O.:460]  Out: 300 [Drains:300]  Last 3 Recorded Weights in this Encounter    05/23/22 0957 05/24/22 0727 05/26/22 0631   Weight: 89.2 kg (196 lb 10.4 oz) 88.9 kg (196 lb) 82.6 kg (182 lb 1.6 oz)      Physical exam:    GEN:NAD  NECK- Supple, no mass  RESP: No wheezing, clear b/l  CVS: S1,S2  RRR  EXT:+Edema   NEURO- NONFOCAL, normal speech  permacath +    Chart reviewed. Pertinent Notes reviewed. Data Review :  Recent Labs     06/15/22  0757 06/14/22  1316 06/13/22  0910    138 139   K 3.4* 3.5 3.7   CL 99 99 100   CO2 33* 35* 30   BUN 17 14 23*   CREA 5.00* 4.10* 5.84*   * 126* 129*   CA 7.5* 7.2* 7.4*   PHOS 2.2* 2.2* 3.3     Recent Labs     06/15/22  0757 06/13/22  0910   WBC 6.7 6.2   HGB 7.7* 8.5*   HCT 25.4* 27.1*   PLT 72* 75*     No results for input(s): FE, TIBC, PSAT, FERR in the last 72 hours.    Medication list  reviewed  Current Facility-Administered Medications   Medication Dose Route Frequency    amiodarone (CORDARONE) tablet 200 mg  200 mg Oral DAILY    insulin NPH (NOVOLIN N, HUMULIN N) injection 5 Units  5 Units SubCUTAneous BID    0.9% sodium chloride infusion 250 mL  250 mL IntraVENous PRN    pantoprazole (PROTONIX) 40 mg in 0.9% sodium chloride 10 mL injection  40 mg IntraVENous Q12H    famotidine (PEPCID) tablet 20 mg  20 mg Oral DAILY    epoetin betina-epbx (RETACRIT) 12,000 Units combo injection  12,000 Units SubCUTAneous Q MON, WED & FRI    cefepime (MAXIPIME) 1 g in 0.9% sodium chloride (MBP/ADV) 50 mL MBP  1 g IntraVENous Q24H    heparin (porcine) 1,000 unit/mL injection 1,400 Units  1,400 Units InterCATHeter DIALYSIS PRN    And    heparin (porcine) 1,000 unit/mL injection 1,100 Units  1,100 Units InterCATHeter DIALYSIS PRN    hydrALAZINE (APRESOLINE) 20 mg/mL injection 20 mg  20 mg IntraVENous Q6H PRN    albumin human 25% (BUMINATE) solution 25 g  25 g IntraVENous DIALYSIS PRN    metoprolol (LOPRESSOR) injection 2.5 mg  2.5 mg IntraVENous Q6H PRN    insulin lispro (HUMALOG) injection   SubCUTAneous AC&HS    glucose chewable tablet 16 g  4 Tablet Oral PRN    glucagon (GLUCAGEN) injection 1 mg  1 mg IntraMUSCular PRN    dextrose 10 % infusion 0-250 mL  0-250 mL IntraVENous PRN    sodium chloride (NS) flush 5-40 mL  5-40 mL IntraVENous Q8H    sodium chloride (NS) flush 5-40 mL  5-40 mL IntraVENous PRN    acetaminophen (TYLENOL) tablet 650 mg  650 mg Oral Q6H PRN    Or    acetaminophen (TYLENOL) suppository 650 mg  650 mg Rectal Q6H PRN    polyethylene glycol (MIRALAX) packet 17 g  17 g Oral DAILY PRN    ondansetron (ZOFRAN ODT) tablet 4 mg  4 mg Oral Q8H PRN    Or    ondansetron (ZOFRAN) injection 4 mg  4 mg IntraVENous Q6H PRN    [Held by provider] heparin (porcine) injection 5,000 Units  5,000 Units SubCUTAneous Q8H    alcohol 62% (NOZIN) nasal  1 Ampule  1 Ampule Topical Q12H    balsam peru-castor oiL (VENELEX) ointment   Topical BID Herman [place], [address] [facility]  [facility]  [address], [address]  [place], [address]  Phone - [phone]               Fax - [phone]

## 2022-06-15 NOTE — DISCHARGE INSTRUCTIONS
HOSPITALIST DISCHARGE INSTRUCTIONS    NAME: Bossman Garg   :  1934   MRN:  [id number]     Date/Time:  6/15/2022 12:57 PM    ADMIT DATE: 2022   DISCHARGE DATE: 6/15/2022     Attending Physician: Aram Scheuermann, MD    DISCHARGE DIAGNOSIS:  Acute on chronic anemia  Septic Shock   Gram Negative rods bacteremia  Afib RVR. Infectious disease instructions:    - Cefepime 2gm-2gm-3gm with HD   - Duration 6 weeks, 6/3/22 to 7/15/22  ? Please have labs done on weekly basis for CBC with diff, CMP, ESR, CRP  and have results sent to me by faxing to  [phone].   ? Call with critical labs at [phone]. .    ? Please ensure that patient has PICC care arranged per protocol   ? Smoking cessation encouraged if patient is current smoker to aid in infection and wound healing         Medications: Per above medication reconciliation. Pain Management: per above medications    Recommended diet: Diabetic Diet    Recommended activity: Activity as tolerated    Wound care: None    Indwelling devices:  None    Supplemental Oxygen: 2 liters, wean as tolerated    Required Lab work: As above    Glucose management:  Accucheck ACHS with sliding scale per SNF protocol    Code status: Full        Outside physician follow up: Follow-up Information     Follow up With Specialties Details Why Elaine [facility]AL [id number] [phone]    Verba Stalling  On 2022 7:00am for HD [facility],  [place], [address] [place] [address]  [phone]    Estelle Gasca MD Internal Medicine Physician   [facility]  [phone]                 Skilled nursing facility/ SNF MD responsible for above on discharge. Information obtained by :  I understand that if any problems occur once I am at home I am to contact my physician.     I understand and acknowledge receipt of the instructions indicated above.                                                                                                                                           Physician's or R.N.'s Signature                                                                  Date/Time                                                                                                                                              Patient or Repres

## 2022-06-15 NOTE — PROGRESS NOTES
Transition of Care Plan to SNF/Rehab    Communication to Patient/Family:  Met with patient and family and they are agreeable to the transition plan. The Plan for Transition of Care is related to the following treatment goals: The Patient and/or patient representative was provided with a choice of provider and agrees  with the discharge plan. Yes [x] No []    A Freedom of choice list was provided with basic dialogue that supports the patient's individualized plan of care/goals and shares the quality data associated with the providers. Yes [x] No []    SNF/Rehab Transition:  Patient has been accepted to [facility] SNF/Rehab and meets criteria for admission. Patient will transported by [facility] and expected to leave at 3pm    Communication to SNF/Rehab:  Bedside RN,  has been notified to update the transition plan to the facility and call report ([phone]). Discharge information has been updated on the AVS. And communicated to facility via [employer], or CC link. Discharge instructions to be fax'd to facility at [facility] #). Nursing Please include all hard scripts for controlled substances, med rec and dc summary, and AVS in packet. Reviewed and confirmed with facility,  can manage the patient care needs for the following:     Mu Abdi with (X) only those applicable:  Medication:  []Medications are available at the facility  []IV Antibiotics    []Controlled Substance - hard copies available sent. []Weekly Labs    Equipment:  []CPAP/BiPAP  []Wound Vacuum  []Gee or Urinary Device  []PICC/Central Line  []Nebulizer  []Ventilator    Treatment:  []Isolation (for MRSA, VRE, etc.)  []Surgical Drain Management  []Tracheostomy Care  [x]Dressing Changes  []Dialysis with transportation  []PEG Care  []Oxygen  []Daily Weights for Heart Failure Sacrum: Daily, cleanse open skin with NS moist 4x4.  Apply No Sting skin barrier wipes to tavo-wound skin where foam dressing needs to stick, Smear with gloved finger or 4x4 some Silvasorb gel to open wounds, cover open wound with Alginate (maxorb) and secure with a foam dressings. Left heel and right heel wounds: apply Venelex BID, float heels   Dietary:  [x]Any diet limitations  []Tube Feedings   []Total Parenteral Management (TPN)  ADULT DIET Dysphagia - Soft & Bite Sized  ADULT ORAL NUTRITION SUPPLEMENT Breakfast, Dinner; Low Calorie/High Protein  ADULT ORAL NUTRITION SUPPLEMENT Lunch; Frozen Supplement   Financial Resources:  [x]Medicaid Application Completed    []UAI Completed and copy given to pt/family  and copy given to pt/family  []A screening has previously been completed. []Level II Completed    [] Private pay individual who will not become   financially eligible for Medicaid within 6 months from admission to a [address] [place] [address] facility. [] Individual refused to have screening conducted. []Medicaid Application Completed    []The screening denied because it was determined individual did not need/did not qualify for nursing facility level of care. [] Out of state residents seeking direct admission to a [address] facility. [] Individuals who are inpatients of an out of state hospital, or in state or out of state veterans/ hospital and seek direct admission to a [address] facility  [] Individuals who are pateints or residents of a state owned/operated facility that is licensed by Department of [employer] and seek direct admission to [address] [place] [address]  [] A screening not required for enrollment in [address] [address] [address] services as set out in [address] [place] [address] 30-  [] [facility]) staff shall perform screenings of the [facility] clients. Advanced Care Plan:  [x]Surrogate Decision Maker of Care  []POA  []Communicated Code Status and copy sent.  Tylor Tsang [phone]   Other:

## 2022-06-26 NOTE — CONSULTS
Infectious Disease Consult Note    Reason for Consult: Persistent GNR bacteremia  Date of Consultation: June 1, 2022  Date of Admission: 5/23/2022  Referring Physician: Dr. Tho Andersen      HPI:  Vani Lincoln is a 80y.o. year old female with history of uncontrolled DM (A1c 13.6 on 5/23/22), who presented on 5/23/22 for confusion, lethargy. On admission, admitted to the ICU for septic shock, symptomatic bradycardia, DKA, rhabdomyolysis GLORIA requiring CRRT (now HD). Had WBC of 10.9 on arrival, with 10% bands soon after. Patient was also found to have an elevated lipase >3000 on arrival.  A triple-lumen was placed on 5/20/2022 for pressors. Chest x-ray, CT head showed no acute findings. CT chest abdomen pelvis without contrast showed no acute findings. Blood cultures from 5/23/2022 grew Klebsiella pneumonia and 1/2 bottles and Serratia marcescens in 2/2 bottles. UA showed no pyuria but urine culture grew 20 5K colonies of Klebsiella pneumonia. TTE showed no vegetations, a trivial pericardial effusion with no evidence of cardiac tamponade. Patient was started on empiric vancomycin and Zosyn on arrival.  Vancomycin was stopped on 5/26/2022. A Evan catheter for HD was placed on 5/26/2022. Blood cultures from 5/26/2022 also positive for Serratia marcescens in 2/2 bottles. Blood cultures from 5/30/2022 and 5/31/2022 also growing gram-negative rods. Patient was switched to ceftriaxone on 5/31/2022 based on the susceptibility results. Patient seen today. She is sleepy however reports no acute issues. She reports no pain in the abdomen, denies any cough. Denies any back pain. Past Medical History:  Past Medical History:   Diagnosis Date    Diabetes [facility])          Surgical History:  Past Surgical History:   Procedure Laterality Date    IR INSERT NON TUNL CVC OVER 5 YRS  5/26/2022         Family History:   History reviewed. No pertinent family history.       Social History:     · Pertinent in HPI.        Allergies:  No Known Allergies      Review of Systems:     Negative except as in HPI    Medications:  No current facility-administered medications on file prior to encounter. Current Outpatient Medications on File Prior to Encounter   Medication Sig Dispense Refill    atorvastatin (LIPITOR) 80 mg tablet Take 80 mg by mouth daily.  ramipril (ALTACE) 10 mg capsule Take 10 mg by mouth two (2) times a day.  cloNIDine HCl (CATAPRES) 0.3 mg tablet Take 0.3 mg by mouth two (2) times a day.  terazosin (HYTRIN) 10 mg capsule Take 10 mg by mouth daily. take once a day at bedtime      glipiZIDE (GLUCOTROL) 10 mg tablet Take 10 mg by mouth daily.  atenolol (TENORMIN) 50 mg tablet Take 50 mg by mouth daily.  amLODIPine (NORVASC) 10 mg tablet Take 10 mg by mouth daily.  aspirin delayed-release 81 mg tablet Take 81 mg by mouth daily.  cholecalciferol (VITAMIN D3) (1000 Units /25 mcg) tablet Take 1,000 Units by mouth daily.  insulin lispro protamine/insulin lispro (HUMALOG MIX 75/25) 100 unit/mL (75-25) injection 48 Units by SubCUTAneous route daily. take 48 units in the morning      insulin lispro protamine/insulin lispro (HUMALOG MIX 75/25) 100 unit/mL (75-25) injection 32 Units by SubCUTAneous route every evening. take 32 units subcutaneously at night.              Physical Exam:    Vitals:   Patient Vitals for the past 24 hrs:   Temp Pulse Resp BP SpO2   06/01/22 0829 -- 72 -- (!) 149/61 --   06/01/22 0813 98.1 °F (36.7 °C) (!) 115 18 (!) 153/112 96 %   06/01/22 0707 97.8 °F (36.6 °C) 75 18 (!) 148/62 98 %   06/01/22 0240 97.8 °F (36.6 °C) 78 18 (!) 143/68 98 %   05/31/22 2255 97.8 °F (36.6 °C) 75 18 (!) 132/57 98 %   05/31/22 1920 98.3 °F (36.8 °C) (!) 133 20 (!) 99/49 95 %   05/31/22 1554 -- 82 -- -- --   05/31/22 1530 97.7 °F (36.5 °C) 82 20 (!) 186/79 90 %   05/31/22 1200 -- 75 -- -- --   05/31/22 1135 98.4 °F (36.9 °C) 75 18 (!) 159/60 99 %   ·   · GEN: NAD, looks non-toxic  · HEENT: Normocephalic, atraumatic, PERRL, no scleral icterus  · CV: Hemodynamically stable  · Lungs: Patient is on room air  · Abdomen: soft, non distended, non tender  · Genitourinary:  no ramos  · Extremities: no edema  · Neuro: Alert, oriented to time, place and situation, moves all extremities to commands, verbal   · Skin: no rash, no tenderness at the back. Superficial skin breakdown is seen at the sacrum. · Psych: good affect, good eye contact, non tearful   · Lines: right IJ triple lumen and left IJ jolene  ·     Recent Results (from the past 24 hour(s))   GLUCOSE, POC    Collection Time: 05/31/22 11:53 AM   Result Value Ref Range    Glucose (POC) 217 (H) 65 - 117 mg/dL    Performed by Hollie REED    GLUCOSE, POC    Collection Time: 05/31/22  4:37 PM   Result Value Ref Range    Glucose (POC) 250 (H) 65 - 117 mg/dL    Performed by Nataliia Lindo    CBC WITH AUTOMATED DIFF    Collection Time: 05/31/22  8:22 PM   Result Value Ref Range    WBC 16.1 (H) 3.6 - 11.0 K/uL    RBC 2.98 (L) 3.80 - 5.20 M/uL    HGB 9.6 (L) 11.5 - 16.0 g/dL    HCT 28.8 (L) 35.0 - 47.0 %    MCV 96.6 80.0 - 99.0 FL    MCH 32.2 26.0 - 34.0 PG    MCHC 33.3 30.0 - 36.5 g/dL    RDW 16.0 (H) 11.5 - 14.5 %    PLATELET 137 (L) 054 - 400 K/uL    NRBC 0.0 0  WBC    ABSOLUTE NRBC 0.00 0.00 - 0.01 K/uL    NEUTROPHILS 93 (H) 32 - 75 %    BAND NEUTROPHILS 1 %    LYMPHOCYTES 2 (L) 12 - 49 %    MONOCYTES 3 (L) 5 - 13 %    EOSINOPHILS 0 0 - 7 %    BASOPHILS 0 0 - 1 %    METAMYELOCYTES 1 %    IMMATURE GRANULOCYTES 0 0.0 - 0.5 %    ABS. NEUTROPHILS 15.1 (H) 1.8 - 8.0 K/UL    ABS. LYMPHOCYTES 0.3 (L) 0.8 - 3.5 K/UL    ABS. MONOCYTES 0.5 0.0 - 1.0 K/UL    ABS. EOSINOPHILS 0.0 0.0 - 0.4 K/UL    ABS. BASOPHILS 0.0 0.0 - 0.1 K/UL    ABS. IMM.  GRANS. 0.0 0.00 - 0.04 K/UL    DF MANUAL      RBC COMMENTS ANISOCYTOSIS  1+       METABOLIC PANEL, BASIC    Collection Time: 05/31/22  8:22 PM   Result Value Ref Range    Sodium 139 136 - 145 mmol/L    Potassium 3.2 (L) 3.5 - 5.1 mmol/L    Chloride 104 97 - 108 mmol/L    CO2 24 21 - 32 mmol/L    Anion gap 11 5 - 15 mmol/L    Glucose 250 (H) 65 - 100 mg/dL    BUN 71 (H) 6 - 20 MG/DL    Creatinine 6.30 (H) 0.55 - 1.02 MG/DL    BUN/Creatinine ratio 11 (L) 12 - 20      GFR est AA 8 (L) >60 ml/min/1.73m2    GFR est non-AA 6 (L) >60 ml/min/1.73m2    Calcium 7.3 (L) 8.5 - 10.1 MG/DL   MAGNESIUM    Collection Time: 05/31/22  8:22 PM   Result Value Ref Range    Magnesium 2.5 (H) 1.6 - 2.4 mg/dL   PHOSPHORUS    Collection Time: 05/31/22  8:22 PM   Result Value Ref Range    Phosphorus 4.3 2.6 - 4.7 MG/DL   GLUCOSE, POC    Collection Time: 05/31/22  8:45 PM   Result Value Ref Range    Glucose (POC) 252 (H) 65 - 117 mg/dL    Performed by Kathy Hendricks (PCT)    CBC WITH AUTOMATED DIFF    Collection Time: 06/01/22  4:09 AM   Result Value Ref Range    WBC 16.2 (H) 3.6 - 11.0 K/uL    RBC 3.16 (L) 3.80 - 5.20 M/uL    HGB 10.0 (L) 11.5 - 16.0 g/dL    HCT 30.4 (L) 35.0 - 47.0 %    MCV 96.2 80.0 - 99.0 FL    MCH 31.6 26.0 - 34.0 PG    MCHC 32.9 30.0 - 36.5 g/dL    RDW 15.9 (H) 11.5 - 14.5 %    PLATELET 061 (L) 951 - 400 K/uL    MPV 12.6 8.9 - 12.9 FL    NRBC 0.0 0  WBC    ABSOLUTE NRBC 0.00 0.00 - 0.01 K/uL    NEUTROPHILS 91 (H) 32 - 75 %    LYMPHOCYTES 4 (L) 12 - 49 %    MONOCYTES 5 5 - 13 %    EOSINOPHILS 0 0 - 7 %    BASOPHILS 0 0 - 1 %    IMMATURE GRANULOCYTES 0 0.0 - 0.5 %    ABS. NEUTROPHILS 14.8 (H) 1.8 - 8.0 K/UL    ABS. LYMPHOCYTES 0.6 (L) 0.8 - 3.5 K/UL    ABS. MONOCYTES 0.8 0.0 - 1.0 K/UL    ABS. EOSINOPHILS 0.0 0.0 - 0.4 K/UL    ABS. BASOPHILS 0.0 0.0 - 0.1 K/UL    ABS. IMM.  GRANS. 0.0 0.00 - 0.04 K/UL    DF MANUAL      RBC COMMENTS NORMOCYTIC, NORMOCHROMIC     METABOLIC PANEL, COMPREHENSIVE    Collection Time: 06/01/22  4:09 AM   Result Value Ref Range    Sodium 140 136 - 145 mmol/L    Potassium 3.3 (L) 3.5 - 5.1 mmol/L    Chloride 104 97 - 108 mmol/L    CO2 24 21 - 32 mmol/L    Anion gap 12 5 - 15 mmol/L    Glucose 228 (H) 65 - 100 mg/dL    BUN 73 (H) 6 - 20 MG/DL    Creatinine 6.77 (H) 0.55 - 1.02 MG/DL    BUN/Creatinine ratio 11 (L) 12 - 20      GFR est AA 7 (L) >60 ml/min/1.73m2    GFR est non-AA 6 (L) >60 ml/min/1.73m2    Calcium 7.3 (L) 8.5 - 10.1 MG/DL    Bilirubin, total 0.3 0.2 - 1.0 MG/DL    ALT (SGPT) 107 (H) 12 - 78 U/L    AST (SGOT) 55 (H) 15 - 37 U/L    Alk. phosphatase 99 45 - 117 U/L    Protein, total 5.3 (L) 6.4 - 8.2 g/dL    Albumin 1.7 (L) 3.5 - 5.0 g/dL    Globulin 3.6 2.0 - 4.0 g/dL    A-G Ratio 0.5 (L) 1.1 - 2.2     PROCALCITONIN    Collection Time: 06/01/22  4:09 AM   Result Value Ref Range    Procalcitonin 52.27 ng/mL   LIPASE    Collection Time: 06/01/22  4:09 AM   Result Value Ref Range    Lipase 1,387 (H) 73 - 393 U/L   GLUCOSE, POC    Collection Time: 06/01/22  6:45 AM   Result Value Ref Range    Glucose (POC) 209 (H) 65 - 117 mg/dL    Performed by Tiana River PCT    GLUCOSE, POC    Collection Time: 06/01/22  9:27 AM   Result Value Ref Range    Glucose (POC) 223 (H) 65 - 117 mg/dL    Performed by Thor Barnes PCT        Microbiology Data: In HPI        Assessment/Recommendations:  Patient seen and examined.      Septic shock due to Persistent Serratia bacteremia. Also Klebsiella bacteremia this admission. Symptomatic bradycardia, DKA, rhabdomyolysis GLORIA requiring CRRT (now HD). CT abd pelvis with IV contrast done today showing no acute findings. Patient's abdominal exam is benign. Source of the initial bacteremia at arrival is unclear right now - the sacral wounds are a possible source, but the sacrum doesn't look actively infected right now. However the persistent nature of the bacteremia is very concerning. Given the risk of seeding of the central lines (HD jolene and triple lumen), these will need to be removed as these are a potential source as well. Followed by line holiday till atleast Friday.    Discussed with Dr. Herman Gandhi and Maryanne Tim.     Repeat paired blood cultures must be obtained TODAY after all lines are removed. These have been ordered. If bacteremia is present despite all of the above work-up, will need to pursue NAI.      Sacral wound breakdown seen, but no deep-tracking wound thus far. FMS in today - please monitor the diarrhea. If persistent watery despite no laxatives, send for Cdiff testing.      Stop ceftriaxone, and switch to cefepime per HD dosing. Thank for the opportunity to participate in the care of this patient. Please contact with questions or concerns.            Clarice Beckford MD  Infectious Diseases No adenopathy or splenomegaly. No cervical or inguinal lymphadenopathy.

## 2022-08-02 PROBLEM — L08.9 WOUND INFECTION: Status: ACTIVE | Noted: 2022-01-01

## 2022-08-02 PROBLEM — T14.8XXA WOUND INFECTION: Status: ACTIVE | Noted: 2022-01-01

## 2022-08-02 NOTE — Clinical Note
Status[de-identified] INPATIENT [101]   Type of Bed: Medical [8]   Inpatient Hospitalization Certified Necessary for the Following Reasons: 9.  Other (further clarification in H&P documentation)   Admitting Diagnosis: Wound infection [[id number]]   Admitting Physician: Quentin Hanks [[id number]]   Attending Physician: Quentin Hanks [[id number]]   Estimated Length of Stay: 3-4 Midnights   Discharge Plan[de-identified] [facility]

## 2022-08-03 NOTE — PROGRESS NOTES
Problem: Pressure Injury - Risk of  Goal: *Prevention of pressure injury  Description: Document Markos Scale and appropriate interventions in the flowsheet. Outcome: Progressing Towards Goal  Note: Pressure Injury Interventions:  Sensory Interventions: Assess changes in LOC, Assess need for specialty bed, Avoid rigorous massage over bony prominences, Check visual cues for pain, Discuss PT/OT consult with provider, Float heels, Keep linens dry and wrinkle-free, Maintain/enhance activity level, Minimize linen layers, Monitor skin under medical devices, Pad between skin to skin, Pressure redistribution bed/mattress (bed type), Turn and reposition approx. every two hours (pillows and wedges if needed)    Moisture Interventions: Absorbent underpads, Apply protective barrier, creams and emollients, Internal/External urinary devices, Limit adult briefs, Maintain skin hydration (lotion/cream), Minimize layers, Moisture barrier, Offer toileting Q_hr    Activity Interventions: Assess need for specialty bed, Pressure redistribution bed/mattress(bed type), PT/OT evaluation    Mobility Interventions: Assess need for specialty bed, Float heels, HOB 30 degrees or less, Pressure redistribution bed/mattress (bed type), PT/OT evaluation, Turn and reposition approx. every two hours(pillow and wedges)    Nutrition Interventions: Document food/fluid/supplement intake, Discuss nutritional consult with provider, Offer support with meals,snacks and hydration    Friction and Shear Interventions: Apply protective barrier, creams and emollients, Feet elevated on foot rest, Foam dressings/transparent film/skin sealants, HOB 30 degrees or less, Lift sheet, Lift team/patient mobility team, Minimize layers                Problem: Falls - Risk of  Goal: *Absence of Falls  Description: Document Felisa Fall Risk and appropriate interventions in the flowsheet.   Outcome: Progressing Towards Goal  Note: Fall Risk Interventions:       Mentation Interventions: Adequate sleep, hydration, pain control, Bed/chair exit alarm, Door open when patient unattended, Evaluate medications/consider consulting pharmacy, More frequent rounding, Reorient patient, Room close to nurse's station, Toileting rounds    Medication Interventions: Bed/chair exit alarm, Patient to call before getting OOB, Teach patient to arise slowly    Elimination Interventions: Bed/chair exit alarm, Call light in reach    History of Falls Interventions: Bed/chair exit alarm, Door open when patient unattended, Evaluate medications/consider consulting pharmacy, Room close to nurse's station

## 2022-08-03 NOTE — PROGRESS NOTES
Comprehensive Nutrition Assessment    Type and Reason for Visit: Initial, Wound, Positive nutrition screen    Nutrition Recommendations/Plan:   Liberalize to an easy to chew diet with no restrictions to help encourage PO intake  Discontinue Nepro per patient request  Add ensure enlive daily and ensure high protein BID     Nutrition Assessment:    Patient medically noted for wound infection. PMH DM and ESRD on HD. MST referrals received for weight loss/poor appetite and wounds. Wound consult pending. Weights reviewed indicating a 40# weight loss x 2 months. Patient with 2 weights this admission - 165# and 142#. Patient unsure of weight loss at time of visit but reports a UBW closer to 180#. She reports her appetite is doing better. Drinking ensure at home and prefers chocolate or strawberry. She refused nepro this morning and does not like Glucerna. Will adjust supplements per patient preference. K+ low, no phos. BG under control. History of poor intake on prior admission so will liberalize diet to help encourage PO intake. Monitor lytes/BG and adjust diet/supplements as needed. Malnutrition Assessment:  Malnutrition Status: Moderate malnutrition (08/03/22 1114)    Context:  Acute illness     Findings of the 6 clinical characteristics of malnutrition:   Energy Intake:  75% or less of est energy req for 7 or more days  Weight Loss:  Greater than 5% over 1 month     Body Fat Loss:  Mild body fat loss,     Muscle Mass Loss:  Mild muscle mass loss,    Fluid Accumulation:  No significant fluid accumulation,     Strength:  Not performed     Nutrition Related Findings:    K+ 2.9, -102-120  BM 8/3  Atorvastatin, Famotidine, Humalog, KCl  Wound Type: Wound consult pending    Current Nutrition Intake & Therapies:        ADULT DIET Easy to Chew; 4 carb choices (60 gm/meal);  Low Fat/Low Chol/High Fiber/2 gm Na  ADULT ORAL NUTRITION SUPPLEMENT Breakfast, Lunch, Dinner; Renal Supplement    Anthropometric Measures:  Height: 5' 4\" (162.6 cm)  Ideal Body Weight (IBW): 120 lbs (55 kg)     Current Body Wt:  64.5 kg (142 lb 3.2 oz), 118.5 % IBW. Current BMI (kg/m2): 24.4                          BMI Category: Normal weight (BMI 18.5-24. 9)    Estimated Daily Nutrient Needs:  Energy Requirements Based On: Formula  Weight Used for Energy Requirements: Current  Energy (kcal/day): 1885 kcals BMR 1065 x 1.3AF +500kcal)  Weight Used for Protein Requirements: Current  Protein (g/day): 98g (1.5 g/kg bw)  Method Used for Fluid Requirements: 1 ml/kcal  Fluid (ml/day): 1800 mL    Nutrition Diagnosis:   Increased nutrient needs related to  (wound healing, wt loss) as evidenced by  (multiple PI's, weight loss)    Nutrition Interventions:   Food and/or Nutrient Delivery: Modify current diet, Modify oral nutrition supplement  Nutrition Education/Counseling: No recommendations at this time  Coordination of Nutrition Care: Continue to monitor while inpatient       Goals:     Goals:  (PO intake >50% of meals/ONS next 3-5 days)       Nutrition Monitoring and Evaluation:   Behavioral-Environmental Outcomes: None identified  Food/Nutrient Intake Outcomes: Food and nutrient intake, Supplement intake  Physical Signs/Symptoms Outcomes: Biochemical data, Skin, Weight, Fluid status or edema    Discharge Planning:    Continue current diet, Continue oral nutrition supplement    Yvette Cheung RD  Contact: ext [phone]

## 2022-08-03 NOTE — H&P
Hospitalist Admission Note    NAME: Alesha Ruiz   :  1934   MRN:  [id number]     Date/Time:  2022 11:10 PM    Patient PCP: Jen Wyatt MD  ______________________________________________________________________  Given the patient's current clinical presentation, I have a high level of concern for decompensation if discharged from the emergency department. Complex decision making was performed, which includes reviewing the patient's available past medical records, laboratory results, and x-ray films. My assessment of this patient's clinical condition and my plan of care is as follows. Subjective:   CHIEF COMPLAINT: Concern for sacral decubitus ulcer infection    HISTORY OF PRESENT ILLNESS:     Alesha Ruiz is a 80 y.o.  female with pertinent past medical history of diabetes mellitus and ESRD on MWF IHD who presents with from home for wound check. Family reports that she has had a sacral decubitus ulcer for many months, but in the past week and has began to become more red with foul-smelling discharge. Home health nurse raise concern for infection and recommended she report to the ED. Patient transported by EMS. Patient reports no other symptoms and has no other complaints or concerns. In the ED, patient normothermic, not tachycardic, with unlabored breathing and saturation in the mid 90s on room air, she was noted to have low blood pressure with lowest 83/40 which improved to 115/40 with 1 L fluid resuscitation then progressively began to decline again prompting repeat bolus with improvement SBP in the 90s. Labs notable for WBC 13.0, hemoglobin 10.5 (near baseline), sodium 137, potassium 2.9 (dialysis yesterday), glucose 120, BUN 29, creatinine 3.89. ECG demonstrates normal sinus rhythm with short MD, PAC noted-QTC prolonged at 563. Given appearance of ulcer concern for early sepsis was raised and sepsis alert was called.   Patient started on Zosyn and clindamycin. We were asked to admit for work up and evaluation of the above problems. Past Medical History:   Diagnosis Date    Diabetes [facility])         Past Surgical History:   Procedure Laterality Date    IR INSERT NON TUNL CVC OVER 5 YRS  5/26/2022    IR INSERT NON TUNL CVC OVER 5 YRS  6/3/2022    IR INSERT TUNL CVC W/O PORT OVER 5 YR  6/8/2022       Social History     Tobacco Use    Smoking status: Never    Smokeless tobacco: Never   Substance Use Topics    Alcohol use: Not Currently        No family history on file. No Known Allergies     Prior to Admission medications    Medication Sig Start Date End Date Taking? Authorizing Provider   famotidine (PEPCID) 20 mg tablet  4/30/22  Yes Timoteo, MD Marcia   atorvastatin (LIPITOR) 20 mg tablet Take 2 Tablets by mouth daily. 6/15/22  Yes Cintia Galaviz MD   amiodarone (CORDARONE) 200 mg tablet Take 1 Tablet by mouth daily. 6/15/22  Yes Cintia Galaviz MD   insulin lispro protamine/insulin lispro (HUMALOG MIX 75/25) 100 unit/mL (75-25) injection 5 Units by SubCUTAneous route Before breakfast and dinner. 6/15/22  Yes Cintia Galaviz MD   cloNIDine HCl (CATAPRES) 0.3 mg tablet Take 0.3 mg by mouth two (2) times a day. Yes Provider, Historical   amLODIPine (NORVASC) 10 mg tablet Take 10 mg by mouth daily. Yes Provider, Historical   aspirin delayed-release 81 mg tablet Take 81 mg by mouth daily. Yes Provider, Historical   cholecalciferol (VITAMIN D3) (1000 Units /25 mcg) tablet Take 1,000 Units by mouth daily. Yes Provider, Historical   furosemide (LASIX) 20 mg tablet  4/30/22   Other, MD Marcia   cefepime 1 gram 1 g IVPB 1 g by IntraVENous route every twenty-four (24) hours. End date 7/15/2022 6/15/22   Cintia Galaviz MD   pantoprazole (PROTONIX) 20 mg tablet Take 2 Tablets by mouth daily.   Patient not taking: Reported on 8/2/2022 6/15/22   Cintia Galaviz MD       REVIEW OF SYSTEMS:     I am not able to complete the review of systems because: The patient is intubated and sedated    The patient has altered mental status due to his acute medical problems    The patient has baseline aphasia from prior stroke(s)    The patient has baseline dementia and is not reliable historian    The patient is in acute medical distress and unable to provide information           Total of 12 systems reviewed as follows:       POSITIVE= bold text  Negative = text not bold  General:  fever, chills, sweats, generalized weakness, weight loss/gain,      loss of appetite   Eyes:    blurred vision, eye pain, loss of vision, double vision  ENT:    rhinorrhea, pharyngitis   Respiratory:   cough, sputum production, SOB, BEE, wheezing, pleuritic pain   Cardiology:   chest pain, palpitations, orthopnea, PND, edema, syncope   Gastrointestinal:  abdominal pain , N/V, diarrhea, dysphagia, constipation, bleeding   Genitourinary:  frequency, urgency, dysuria, hematuria, incontinence   Muskuloskeletal :  arthralgia, myalgia, back pain  Hematology:  easy bruising, nose or gum bleeding, lymphadenopathy   Dermatological: rash, ulceration, pruritis, color change / jaundice  Endocrine:   hot flashes or polydipsia   Neurological:  headache, dizziness, confusion, focal weakness, paresthesia,     Speech difficulties, memory loss, gait difficulty  Psychological: Feelings of anxiety, depression, agitation    Objective:   VITALS:    Visit Vitals  BP (!) 95/33 (BP 1 Location: Right upper arm, BP Patient Position: At rest;Reclining)   Pulse 71   Temp 97.7 °F (36.5 °C)   Resp 20   Ht 5' 4\" (1.626 m)   Wt 74.8 kg (165 lb)   SpO2 95%   BMI 28.32 kg/m²       PHYSICAL EXAM:    General:    Alert, cooperative, no distress, tired and frail-appearing elderly -American female     HEENT: Atraumatic, anicteric sclerae, pink conjunctivae     No oral ulcers, mucosa moist, throat clear, dentition fair  Neck:  symmetrical,  thyroid: non tender  Lungs:   CTAB. No Wheezing or Rhonchi. No rales. Normal respiratory effort. Chest wall:  No tenderness  No Accessory muscle use. Right-sided hemodialysis catheter appreciated  Heart:   RRR,  No  murmur/rub/gallop. No edema. No JVD  Abdomen:   Soft, non-tender. Not distended. Bowel sounds normal. No HSM. Large sacral decubitus ulcer with deep probing and mild surrounding erythema with purulent exudate appreciated. Extremities: No cyanosis. No clubbing,      Skin turgor normal, Capillary refill normal, Radial dial pulse 2+  Skin:     Not pale. Not Jaundiced  No rashes   Psych:  Good insight. Not depressed. Not anxious or agitated. Neurologic: EOMs intact. No facial asymmetry. No aphasia or slurred speech. Symmetrical strength, Sensation grossly intact. Alert and oriented X 4. No overt focal deficits    LAB DATA REVIEWED:    Recent Results (from the past 24 hour(s))   EKG, 12 LEAD, INITIAL    Collection Time: 08/02/22  8:21 PM   Result Value Ref Range    Ventricular Rate 77 BPM    Atrial Rate 77 BPM    P-R Interval 110 ms    QRS Duration 156 ms    Q-T Interval 498 ms    QTC Calculation (Bezet) 563 ms    Calculated P Axis 110 degrees    Calculated R Axis 44 degrees    Calculated T Axis 130 degrees    Diagnosis       Sinus rhythm with short MS with premature atrial complexes  Right bundle branch block  When compared with ECG of 31-MAY-2022 19:20,  Sinus rhythm has replaced Atrial fibrillation  Vent.  rate has decreased BY  60 BPM  QRS duration has increased  Nonspecific T wave abnormality now evident in Inferior leads     POC LACTIC ACID    Collection Time: 08/02/22  8:32 PM   Result Value Ref Range    Lactic Acid (POC) 1.53 0.40 - 2.00 mmol/L   CBC WITH AUTOMATED DIFF    Collection Time: 08/02/22  8:33 PM   Result Value Ref Range    WBC 13.0 (H) 3.6 - 11.0 K/uL    RBC 3.54 (L) 3.80 - 5.20 M/uL    HGB 10.5 (L) 11.5 - 16.0 g/dL    HCT 33.5 (L) 35.0 - 47.0 %    MCV 94.6 80.0 - 99.0 FL    MCH 29.7 26.0 - 34.0 PG    MCHC 31.3 30.0 - 36.5 g/dL    RDW 16.4 (H) 11.5 - 14.5 %    PLATELET 727 629 - 993 K/uL    MPV 10.6 8.9 - 12.9 FL    NRBC 0.0 0  WBC    ABSOLUTE NRBC 0.00 0.00 - 0.01 K/uL    NEUTROPHILS 87 (H) 32 - 75 %    LYMPHOCYTES 5 (L) 12 - 49 %    MONOCYTES 7 5 - 13 %    EOSINOPHILS 0 0 - 7 %    BASOPHILS 0 0 - 1 %    IMMATURE GRANULOCYTES 1 (H) 0.0 - 0.5 %    ABS. NEUTROPHILS 11.3 (H) 1.8 - 8.0 K/UL    ABS. LYMPHOCYTES 0.7 (L) 0.8 - 3.5 K/UL    ABS. MONOCYTES 0.9 0.0 - 1.0 K/UL    ABS. EOSINOPHILS 0.0 0.0 - 0.4 K/UL    ABS. BASOPHILS 0.0 0.0 - 0.1 K/UL    ABS. IMM. GRANS. 0.1 (H) 0.00 - 0.04 K/UL    DF SMEAR SCANNED      RBC COMMENTS NORMOCYTIC, NORMOCHROMIC     METABOLIC PANEL, COMPREHENSIVE    Collection Time: 08/02/22  8:33 PM   Result Value Ref Range    Sodium 137 136 - 145 mmol/L    Potassium 2.9 (L) 3.5 - 5.1 mmol/L    Chloride 100 97 - 108 mmol/L    CO2 27 21 - 32 mmol/L    Anion gap 10 5 - 15 mmol/L    Glucose 120 (H) 65 - 100 mg/dL    BUN 29 (H) 6 - 20 MG/DL    Creatinine 3.89 (H) 0.55 - 1.02 MG/DL    BUN/Creatinine ratio 7 (L) 12 - 20      GFR est AA 13 (L) >60 ml/min/1.73m2    GFR est non-AA 11 (L) >60 ml/min/1.73m2    Calcium 7.8 (L) 8.5 - 10.1 MG/DL    Bilirubin, total 0.4 0.2 - 1.0 MG/DL    ALT (SGPT) 23 12 - 78 U/L    AST (SGOT) 62 (H) 15 - 37 U/L    Alk. phosphatase 136 (H) 45 - 117 U/L    Protein, total 7.1 6.4 - 8.2 g/dL    Albumin 1.4 (L) 3.5 - 5.0 g/dL    Globulin 5.7 (H) 2.0 - 4.0 g/dL    A-G Ratio 0.2 (L) 1.1 - 2.2     SAMPLES BEING HELD    Collection Time: 08/02/22  8:33 PM   Result Value Ref Range    SAMPLES BEING HELD SST, PST     COMMENT        Add-on orders for these samples will be processed based on acceptable specimen integrity and analyte stability, which may vary by analyte.        IMAGING:  None    EKG: Normal sinus rhythm with QT prolongation and right bundle branch block  ______________________________________________________________________    Assessment / Plan:  Sepsis secondary to wound infection-chronic sacral decubitus ulcer  Leukocytosis  Debility  Moderate protein calorie malnutrition  Prolongued QTc  ESRD on IHD MWF  Anemia of chronic disease  IDDM  Essential hypertension    FORMULATION:  Chronically ill 20-year-old -American female presenting with foul-smelling purulent sacral decubitus ulcer with surrounding erythema concerning for infection patient does have leukocytosis without fever. Blood pressure borderline hypotensive and fluid responsive. Patient without overt volume overload tolerating fluids well. Will need to admit for IV antibiotics and wound care.     PLAN:  Sepsis secondary to wound infection-chronic sacral decubitus ulcer  Leukocytosis  Continue Zosyn for wound coverage  Single dose clindamycin given in ED  Given superficial wound culture is likely low yield as high risk for contaminant and likely polymicrobial  Follow blood and urine cultures  Patient now status post 2 L IV fluids given ESRD will further resuscitate with albumin infusions to avoid volume overload  Will need wound care consult in the morning    Debility  Moderate protein calorie malnutrition  Patient will benefit from PT/OT evaluation  Consider nutrition consultation for dietary supplements    Prolongued Qtc  Check magnesium  Avoid QT prolonging medications  Suspect this is over asthma given RBBB morphology    ESRD on IHD MWF  Anemia of chronic disease  Anemia appears stable and not symptomatic  Nephrology consulted, greatly appreciate assistance organizing IHD and managing acid-base/electrolytes    IDDM  Accu-Cheks and sliding scale insulin    Essential hypertension  Hold PTA antihypertensives given borderline hypotension  Noted patient taking clonidine we will monitor closely for rebound hypertension    Code Status: Full    DVT Prophylaxis: Heparin  GI Prophylaxis: not indicated  Diet: Regular/renal       Care Plan discussed with:    Comments   Patient x    Family      RN     Care Manager                    Consultant:      _______________________________________________________________________  Baseline: Dependent on some ADLs but able to function at home with family assistance  Expected  Disposition:   Home with Family    HH/PT/OT/RN x   SNF/LTC    NINI    ________________________________________________________________________  TOTAL TIME:  60 Minutes      Comments    x Reviewed previous records   >50% of visit spent in counseling and coordination of care x Discussion with patient and/or family and questions answered       ________________________________________________________________________  Signed: Dragan Johnson DO  8/2/2022 11:10 PM    Please note that this dictation was completed with Dragon dictation software. Occasionally unanticipated grammatical, syntax, homophones, and other interpretive errors are inadvertently transcribed by the computer software. Please excuse and disregard any errors that have escaped final proofreading. If in doubt please do not hesitate to reach out to myself or the assigned hospitalist via [facility] paging system for clarification. This note is compiled to communicate with medical care team. It may contain sensitive and protected information. It is not intended to serve as a source of communication with patients/families/friends; that communication occurs at the bedside or via alternative direct communications means (messaging, letters, telephone conversations, etc.).

## 2022-08-03 NOTE — PROGRESS NOTES
Received notification from bedside RN about patient with regards to: K+ 2.9 (unchanged) s/p 20 meq IV K+  VS: /64, HR 86, RR 20, O2 sat 96% on RA    Intervention given: Kdur 20 meq PO x 3 doses, recheck K+ at 1600, BMP for tomorrow AM ordered

## 2022-08-03 NOTE — PROGRESS NOTES
Kyree Orellana RN and MARCIA Damon performed a dual skin assessment on this patient Impairment noted- see wound doc flow sheet and images below. Markos score is 11. Sacral      Left heel      Right heel      Wounds cleansed and wet to dry dressing applied to tunneling sacral ulcer. Specialty boots in place for bilateral foot ulcers. Air pump applied to bed. Consult to wound care and mobility services placed. I have reviewed and agree with all charting by Charlie Grijalva RN.

## 2022-08-03 NOTE — PROGRESS NOTES
Pts MAPS have been 50-low 60s all day. I let Dr. Ganga Jenkins know and he placed an order for midodrine. The midodrine did not help. Dialysis transport came around 1530, I rechecked BP and MAPS were 57. MD was at bedside and stated that it was still ok from her to go for dialysis.

## 2022-08-03 NOTE — PROCEDURES
Hemodialysis / [phone]    Vitals Pre Post Assessment Pre Post   BP BP: (!) 115/38 (08/03/22 1455)   117/56   LOC A&Ox4 A&Ox4   HR Pulse (Heart Rate): 74 (08/03/22 1455) 122 Lungs clear clear   Resp Resp Rate: 18 (08/03/22 1455) 18 Cardiac RRR (NSR per report) RRR (NSR per report)   Temp Temp: 98.4 °F (36.9 °C) (08/03/22 1455) 97.5 Skin Sacral wound, B/L heel wounds Sacral wound, B/L heel wounds   Weight  N/a N/a Edema 1+ BLE Trace, +1 BLE   Tele status remote remote Pain 0 0     Orders   Duration: Start: 3933 End: 1757 Total: 3 hours   Dialyzer: Dialyzer/Set Up Inspection: Revaclear (08/03/22 1455)   K Bath: Dialysate K (mEq/L): 4 (08/03/22 1455)   Ca Bath: Dialysate CA (mEq/L): 2.5 (08/03/22 1455)   Na: Dialysate NA (mEq/L): 140 (08/03/22 1455)   Bicarb: Dialysate HCO3 (mEq/L): 40 (08/03/22 1455)   Target Fluid Removal: Goal/Amount of Fluid to Remove (mL): 1000 mL (1000ml per Dr. Jonathan Tolliver) (08/03/22 1455)     Access   Type & Location: R PC   Comments:   Dressing changed and dated 8/3/22. No s/s of infection. Both lumens aspirate & flush well. Running well at . Labs   HBsAg (Antigen) / date: Outdated- Labs send 8/3/22                                              HBsAb (Antibody) / date: Outdated- Labs send 8/3/22       Source:    Obtained/Reviewed  Critical Results Called HGB   Date Value Ref Range Status   08/03/2022 10.3 (L) 11.5 - 16.0 g/dL Final     Potassium   Date Value Ref Range Status   08/03/2022 2.9 (L) 3.5 - 5.1 mmol/L Final     Calcium   Date Value Ref Range Status   08/03/2022 7.3 (L) 8.5 - 10.1 MG/DL Final     BUN   Date Value Ref Range Status   08/03/2022 28 (H) 6 - 20 MG/DL Final     Creatinine   Date Value Ref Range Status   08/03/2022 3.49 (H) 0.55 - 1.02 MG/DL Final        Meds Given   Name Dose Route   Heparin 1900 units and 1900 units CVC dwell post dialysis.                 Adequacy / Fluid    Total Liters Process: 54.2 L   Net Fluid Removed: 1000 ml Comments   Time Out Done:   (Time) 1452   Admitting Diagnosis: Sacral Wound   Consent obtained/signed: Informed Consent Verified: Yes (Chronic dialysis, no consent needed.) (08/03/22 5772)   Machine / RO # Machine Number: Denise Shay (08/03/22 5028)   Primary Nurse Rpt Pre: Alyssa Salgado RN   Primary Nurse Rpt Post: Fox Mann RN   Pt Education: procedure   Care Plan: Ongoing   Pts outpatient clinic: [facility]     Tx Summary   Comments: SBAR received from Primary RN. Pt arrived to HD suite A&Ox4. Pt a chronic dialysis patient so no consent needed. Pt agreeable to treatment. 1455: Each catheter limb disinfected per p&p, caps removed, hubs disinfected per p&p. Each lumen aspirated for blood return and flushed with Normal Saline per policy. Labs drawn per request/ order. VSS. Dialysis Tx initiated. **Pt tolerated procedure well without complications. Started complaining of sacral wound pain at end of treatment. Primary RN notified. 1757 Tx ended. VSS. Each dialysis catheter limb disinfected per p&p, all possible blood returned per p&p, and each dialysis hub disinfected per p&p. Each lumen flushed, post dialysis catheter Heparin dwell instilled per order, and caps applied. Bed locked and in the lowest position, call bell and belongings in reach. SBAR given to Primary, RN. Patient is stable at time of their departure. All Dialysis related medications have been reviewed.

## 2022-08-03 NOTE — ED PROVIDER NOTES
EMERGENCY DEPARTMENT HISTORY AND PHYSICAL EXAM      Date: 8/2/2022  Patient Name: Lupe Freeman    History of Presenting Illness     Chief Complaint   Patient presents with    Wound Check     Patient brought in by EMS from home for a wound check. She had a pressure ulcer to the sacrum prior to arrival and the home health nurse advised EMS that she had a fever for which she was given 325mg Tylenol, she seemed out of it and that the wound was oozing more than normal. Home health nurse sent her in with concern for possible infection. EMS stated that she was 99F for them and that her blood sugar was in the 60's so they gave her some oral glucose and patient seemed more alert so they haven't chec       History Provided By: Patient and patient's granddaughter    HPI: Lupe Freeman, 80 y.o. female  presents to the ED with cc of sacral ulceration and wounds. Patient has a history of a sacral ulcer that is worsening. Family has noticed just in the past week it is gotten much worse. She felt warm at one time but they are not aware if she has had fevers or not. She is a dialysis patient. She is on Monday Wednesday Friday schedule. She was dialyzed yesterday. She does produce urine. Home health was concerned for possible infection. EMS did note that her blood glucose was in the 60s which corrected with oral glucose. Past History     Past Medical History:  Past Medical History:   Diagnosis Date    Diabetes ([address]        Past Surgical History:  Past Surgical History:   Procedure Laterality Date    IR INSERT NON TUNL CVC OVER 5 YRS  5/26/2022    IR INSERT NON TUNL CVC OVER 5 YRS  6/3/2022    IR INSERT TUNL CVC W/O PORT OVER 5 YR  6/8/2022       Medications:  No current facility-administered medications on file prior to encounter. Current Outpatient Medications on File Prior to Encounter   Medication Sig Dispense Refill    atorvastatin (LIPITOR) 20 mg tablet Take 2 Tablets by mouth daily.  30 Tablet 1 amiodarone (CORDARONE) 200 mg tablet Take 1 Tablet by mouth daily. 60 Tablet 1    cefepime 1 gram 1 g IVPB 1 g by IntraVENous route every twenty-four (24) hours. End date 7/15/2022 30 Dose 0    pantoprazole (PROTONIX) 20 mg tablet Take 2 Tablets by mouth daily. 60 Tablet 0    insulin lispro protamine/insulin lispro (HUMALOG MIX 75/25) 100 unit/mL (75-25) injection 5 Units by SubCUTAneous route Before breakfast and dinner. 1 mL 0    cloNIDine HCl (CATAPRES) 0.3 mg tablet Take 0.3 mg by mouth two (2) times a day. amLODIPine (NORVASC) 10 mg tablet Take 10 mg by mouth daily. aspirin delayed-release 81 mg tablet Take 81 mg by mouth daily. cholecalciferol (VITAMIN D3) (1000 Units /25 mcg) tablet Take 1,000 Units by mouth daily. Family History:  No family history on file. Social History:  Social History     Tobacco Use    Smoking status: Never    Smokeless tobacco: Never   Substance Use Topics    Alcohol use: Not Currently    Drug use: Not Currently       Allergies:  No Known Allergies    All the above components of the past  history are auto-populated from the electronic record. They have been reviewed and the patient has been interviewed for any pertinent past history that pertains to the patient's chief complaint and reason for visit. Not all pre-populated components may be accurate at the time this note was generated. Review of Systems   Review of Systems   Constitutional:  Negative for chills and fever. HENT:  Negative for congestion, ear pain, rhinorrhea, sore throat and trouble swallowing. Eyes:  Negative for visual disturbance. Respiratory:  Negative for cough, chest tightness and shortness of breath. Cardiovascular:  Negative for chest pain and palpitations. Gastrointestinal:  Negative for abdominal pain, blood in stool, constipation, diarrhea, nausea and vomiting. Genitourinary:  Negative for decreased urine volume, difficulty urinating, dysuria and frequency. Musculoskeletal:  Negative for back pain and neck pain. Skin:  Positive for wound. Negative for color change and rash. Neurological:  Negative for dizziness, weakness, light-headedness and headaches. Physical Exam   Physical Exam  Vitals and nursing note reviewed. Constitutional:       General: She is not in acute distress. Appearance: She is well-developed. She is not ill-appearing. HENT:      Head: Normocephalic. Eyes:      Conjunctiva/sclera: Conjunctivae normal.   Cardiovascular:      Rate and Rhythm: Normal rate and regular rhythm. Pulmonary:      Effort: Pulmonary effort is normal. No accessory muscle usage or respiratory distress. Abdominal:      General: There is no distension. Musculoskeletal:      Cervical back: Normal range of motion. Skin:     General: Skin is warm and dry. Comments: Bilateral heel ulcerations   Neurological:      Mental Status: She is alert and oriented to person, place, and time. Diagnostic Study Results     Labs -     Recent Results (from the past 24 hour(s))   EKG, 12 LEAD, INITIAL    Collection Time: 08/02/22  8:21 PM   Result Value Ref Range    Ventricular Rate 77 BPM    Atrial Rate 77 BPM    P-R Interval 110 ms    QRS Duration 156 ms    Q-T Interval 498 ms    QTC Calculation (Bezet) 563 ms    Calculated P Axis 110 degrees    Calculated R Axis 44 degrees    Calculated T Axis 130 degrees    Diagnosis       Sinus rhythm with short MO with premature atrial complexes  Right bundle branch block  When compared with ECG of 31-MAY-2022 19:20,  Sinus rhythm has replaced Atrial fibrillation  Vent.  rate has decreased BY  60 BPM  QRS duration has increased  Nonspecific T wave abnormality now evident in Inferior leads     POC LACTIC ACID    Collection Time: 08/02/22  8:32 PM   Result Value Ref Range    Lactic Acid (POC) 1.53 0.40 - 2.00 mmol/L   CBC WITH AUTOMATED DIFF    Collection Time: 08/02/22  8:33 PM   Result Value Ref Range    WBC 13.0 (H) 3.6 - 11.0 K/uL    RBC 3.54 (L) 3.80 - 5.20 M/uL    HGB 10.5 (L) 11.5 - 16.0 g/dL    HCT 33.5 (L) 35.0 - 47.0 %    MCV 94.6 80.0 - 99.0 FL    MCH 29.7 26.0 - 34.0 PG    MCHC 31.3 30.0 - 36.5 g/dL    RDW 16.4 (H) 11.5 - 14.5 %    PLATELET 349 472 - 898 K/uL    MPV 10.6 8.9 - 12.9 FL    NRBC 0.0 0  WBC    ABSOLUTE NRBC 0.00 0.00 - 0.01 K/uL    NEUTROPHILS 87 (H) 32 - 75 %    LYMPHOCYTES 5 (L) 12 - 49 %    MONOCYTES 7 5 - 13 %    EOSINOPHILS 0 0 - 7 %    BASOPHILS 0 0 - 1 %    IMMATURE GRANULOCYTES 1 (H) 0.0 - 0.5 %    ABS. NEUTROPHILS 11.3 (H) 1.8 - 8.0 K/UL    ABS. LYMPHOCYTES 0.7 (L) 0.8 - 3.5 K/UL    ABS. MONOCYTES 0.9 0.0 - 1.0 K/UL    ABS. EOSINOPHILS 0.0 0.0 - 0.4 K/UL    ABS. BASOPHILS 0.0 0.0 - 0.1 K/UL    ABS. IMM. GRANS. 0.1 (H) 0.00 - 0.04 K/UL    DF SMEAR SCANNED      RBC COMMENTS NORMOCYTIC, NORMOCHROMIC     METABOLIC PANEL, COMPREHENSIVE    Collection Time: 08/02/22  8:33 PM   Result Value Ref Range    Sodium 137 136 - 145 mmol/L    Potassium 2.9 (L) 3.5 - 5.1 mmol/L    Chloride 100 97 - 108 mmol/L    CO2 27 21 - 32 mmol/L    Anion gap 10 5 - 15 mmol/L    Glucose 120 (H) 65 - 100 mg/dL    BUN 29 (H) 6 - 20 MG/DL    Creatinine 3.89 (H) 0.55 - 1.02 MG/DL    BUN/Creatinine ratio 7 (L) 12 - 20      GFR est AA 13 (L) >60 ml/min/1.73m2    GFR est non-AA 11 (L) >60 ml/min/1.73m2    Calcium 7.8 (L) 8.5 - 10.1 MG/DL    Bilirubin, total 0.4 0.2 - 1.0 MG/DL    ALT (SGPT) 23 12 - 78 U/L    AST (SGOT) 62 (H) 15 - 37 U/L    Alk. phosphatase 136 (H) 45 - 117 U/L    Protein, total 7.1 6.4 - 8.2 g/dL    Albumin 1.4 (L) 3.5 - 5.0 g/dL    Globulin 5.7 (H) 2.0 - 4.0 g/dL    A-G Ratio 0.2 (L) 1.1 - 2.2     SAMPLES BEING HELD    Collection Time: 08/02/22  8:33 PM   Result Value Ref Range    SAMPLES BEING HELD SST, PST     COMMENT        Add-on orders for these samples will be processed based on acceptable specimen integrity and analyte stability, which may vary by analyte.        Radiologic Studies -   No orders to display     CT Results  (Last 48 hours)      None          CXR Results  (Last 48 hours)      None              Medical Decision Making     I reviewed the vital signs, available nursing notes, past medical history, past surgical history, family history and social history. Vital Signs-I have reviewed the vital signs that have been made available during the patient's emergency department visit. The vital signs auto-populated below are obtained mostly by electronic means through monitoring devices that have been downloaded into the patient's chart by the nursing staff. Some vital signs are not downloaded into the chart until after the patient has been discharged and this note has been completed, therefore some vital signs may not be available to the physician for review prior to patient's discharge or admission. The physician has reviewed the patient's triage vital signs, monitored the electronic monitoring devices remotely for any significant vital sign abnormalities, and have reviewed vital signs prior to discharge. Some vital signs reviewed at bedside or remotely utilizing electronic monitoring devices may be different than the vital signs downloaded into the electronic medical record. Some vital signs may be erroneous and inaccurate since they are obtained by electronic monitoring devices, and not all vital signs are verified for accuracy by nursing staff prior to downloading into the patient's chart. Patient Vitals for the past 24 hrs:   Temp Pulse Resp BP SpO2   08/02/22 2201 98.1 °F (36.7 °C) 61 20 (!) 95/46 96 %   08/02/22 2136 97.8 °F (36.6 °C) 63 24 (!) 91/36 97 %   08/02/22 2119 97.9 °F (36.6 °C) 64 22 (!) 88/35 99 %   08/02/22 1959 97.7 °F (36.5 °C) 92 20 (!) 115/45 95 %         Records Reviewed: Nursing notes for today's visit have been reviewed. I have also reviewed most recent medical records pertinent to today's complaints, if available in our medical record system.   I have also reviewed all labs and imaging results from previous results in comparison to results obtained today. If an EKG was obtained today, it has been compared to previous EKGs, if available. If arriving via EMS, the EMS report has been reviewed if made available to us within the patient's time in the emergency department. ED Course and Medical Decision Making:   Patient has a very deep and extensive sacral decubitus ulcer. I believe she is septic today from this. Blood pressures were mildly low but responding to fluids. She mentions she does have some kidney function and produces urine we will give her fluids and she does not appear volume overloaded. The patient does meet SIRS criteria to suspect possible sepsis. SIRS criteria met in the emergency department includes: WBC >12 or <4, SBP <90 / MAP <65    A source of infection has been identified or suspected to be sacral decubitus ulcer. The patient does not meet criteria for severe sepsis. This patient does not meet criteria for septic shock, including a MAP below 65 on more than 1 sequential blood pressure measurements, and or lactic acid greater than or equal to 4.0, despite adequate fluid resuscitation of 30mL/kg.     Sepsis Re-Assessment Documentation:     Date: 8/2/2022   Time: 10:08 PM      Vital Signs  Level of Consciousness: Alert (0)  Temp: 98.1 °F (36.7 °C)  Temp Source: Oral  Pulse (Heart Rate): 61  Heart Rate Source: Monitor  Cardiac Rhythm: Sinus Rhythm  Resp Rate: 20  BP: (!) 95/46  MAP (Calculated): (!) 62  BP 1 Location: Right upper arm  BP 1 Method: Automatic  BP Patient Position: At rest, Reclining  MEWS Score: 2        ED Course as of 08/02/22 2210 Tue Aug 02, 2022   2206 CBC WITH AUTOMATED DIFF(!):    WBC 13.0(!)   RBC 3.54(!)   HGB 10.5(!)   HCT 33.5(!)   MCV 94.6   MCH 29.7   MCHC 31.3   RDW 16.4(!)   PLATELET 766   MPV 98.9   NRBC 0.0   ABSOLUTE NRBC 0.00   NEUTROPHILS 87(!)   LYMPHOCYTES 5(!)   MONOCYTES 7   EOSINOPHILS 0   BASOPHILS 0 IMMATURE GRANULOCYTES 1(!)   ABS. NEUTROPHILS 11.3(!)   ABS. LYMPHOCYTES 0.7(!)   ABS. MONOCYTES 0.9   ABS. EOSINOPHILS 0.0   ABS. BASOPHILS 0.0   ABS. IMM. GRANS. 0.1(!)   DF SMEAR SCANNED   RBC COMMENTS NORMOCYTIC, NORMOCHROMIC  There is a leukocytosis present. Mild anemia but this is better than previous. [WM]   7925 METABOLIC PANEL, COMPREHENSIVE(!):    Sodium 137   Potassium 2.9(!)   Chloride 100   CO2 27   Anion gap 10   Glucose 120(!)   BUN 29(!)   Creatinine 3.89(!)   BUN/Creatinine ratio 7(!)   GFR est AA 13(!)   GFR est non-AA 11(!)   Calcium 7.8(!)   Bilirubin, total 0.4   ALT 23   AST 62(!)   Alk. phosphatase 136(!)   Protein, total 7.1   Albumin 1.4(!)   Globulin 5.7(!)   A-G Ratio 0.2(!)  She has chronic kidney disease currently on dialysis Monday Wednesday Friday. She is hypokalemic at 2.9 today.   We will replace with IV potassium. [WM]   2207 POC LACTIC ACID:    Lactic Acid (POC) 1.53  Lactic acid is normal.  Do not suspect septic shock or endorgan perfusion damage. [WM]      ED Course User Index  [WM] Jocelyn Libman, MD       Orders Placed This Encounter    CULTURE, BLOOD, PAIRED    CBC WITH AUTOMATED DIFF    METABOLIC PANEL, COMPREHENSIVE    SAMPLES BEING HELD    POC LACTIC ACID    CARDIAC MONITOR - ED ONLY    PULSE OXIMETRY CONTINUOUS    EKG NOTEWRITER(ASAP ONLY)    POC LACTIC ACID    EKG, 12 LEAD, INITIAL    INSERT PERIPHERAL IV ONE TIME STAT    sodium chloride 0.9 % bolus infusion 1,000 mL    potassium chloride 10 mEq in 100 ml IVPB    piperacillin-tazobactam (ZOSYN) 3.375 g in 0.9% sodium chloride (MBP/ADV) 100 mL MBP    clindamycin (CLEOCIN) 600mg D5W 50mL IVPB (premix)    INITIAL PHYSICIAN ORDER: INPATIENT Medical; 9. Other (further clarification in H&P documentation)       EKG    Date/Time: 8/2/2022 8:21 PM  Performed by: Jocelyn Libman, MD  Authorized by: Jocelyn Libman, MD     ECG reviewed by ED Physician in the absence of a cardiologist: yes    Rate:     ECG rate:  77    ECG rate assessment: normal    Rhythm:     Rhythm: sinus rhythm    Ectopy:     Ectopy: PAC    QRS:     QRS axis:  Normal    QRS intervals:  Normal    QRS conduction: RBBB    ST segments:     ST segments:  Normal  T waves:     T waves: non-specific        Critical Care Time:   I have spent 30 minutes of critical care time in evaluating and treating this patient. This includes time spent at bedside, time with family and decision makers, documentation, review of labs and imaging, and/or consultation with specialists. It does not include time spent on separately billed procedures. This patient presents with a critical illness or injury that acutely impairs one or more vital organ systems such that there is a high probability of imminent or life threatening deterioration in the patient's condition. This case involved decision making of high complexity to assess, manipulate, and support vital organ system failure and/or to prevent further life threatening deterioration of the patient's condition. Failure to initiate these interventions on an urgent basis would likely result in sudden, clinically significant or life threatening deterioration in the patient's condition. Abnormal findings supporting critical care: Hypokalemia  Interventions to support critical care: IV potassium  Failure to intervene may result in: Cardiac dysrhythmias      Disposition:  Admit      Diagnosis     Clinical Impression:   1. Sepsis without acute organ dysfunction, due to unspecified organism ([address]    2. Skin ulcer of sacrum with necrosis of muscle ([address]    3.  Hypokalemia

## 2022-08-03 NOTE — WOUND CARE
Wound care consult for the Sacrum wounds and the heel wounds that are present on admission:  Chart reviewed and patient assessed. Pt. Is well known to the wound care team from past care provided to her for buttocks and sacral wound in the past. Pt. Tends to have chronic diarrhea with mucus stools. Assessment: Overall pt. Is alert and answers questions. 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 The left buttocks is also an extension of the wound on the sacrum. She has skin denuded in the rest of the buttocks and thighs. Every time she has been here she has diarrhea. The wound is not going to heal if we can't take away the waste contamination in the wound constantly. The CT scan from 6/20 did not mention any osteo but this will most likely be a complication now if not later. Both heels have unstageable pressure injuries covered with eschar. Dark maroon, wound bed with eschar / slough  Malodorous, liquification of the wound bed:                   Treatment today:  a small area of eschar was able to be removed at the bedside but there is too much to do at the bedside. The wound was packed with NS soaked Kerlix. Dakin solution will be ordered for the wound for a few days. Orders placed for packing. Plan: Recommend that surgeon consult on the debridement of the Sacrum wound as well as recommend a diverting colostomy for this patient. Dakin solution wet to dry until this can happen.    Yvette Cheung, RN, BSN, [facility]

## 2022-08-03 NOTE — PROGRESS NOTES
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 Blood pressure borderline hypotensive and fluid responsive. 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 Estimated discharge date:   Barriers: Surgery consult, ID consult, cultures    Code status: Full  Prophylaxis: Hep SQ  Recommended Disposition: Home w/Family     Subjective:     Chief Complaint / Reason for Physician Visit  Patient seen and examined at the bedside. Both nurse and wound care were at bedside as well. Patient complains of no pain except when being moved.   She denies any chest pain or shortness of breath. No nausea vomiting or diarrhea. She says she is in the hospital because of her bad kidneys. Discussed with RN events overnight. Granddaughter at bedside and updated. Requesting to stay updated. Review of Systems:  Symptom Y/N Comments  Symptom Y/N Comments   Fever/Chills    Chest Pain     Poor Appetite    Edema     Cough    Abdominal Pain     Sputum    Joint Pain     SOB/BEE    Pruritis/Rash     Nausea/vomit    Tolerating PT/OT     Diarrhea    Tolerating Diet     Constipation    Other       Could NOT obtain due to:      Objective:     VITALS:   Last 24hrs VS reviewed since prior progress note.  Most recent are:  Patient Vitals for the past 24 hrs:   Temp Pulse Resp BP SpO2   08/03/22 1545 -- 77 18 (!) 116/48 --   08/03/22 1530 -- 77 18 (!) 103/45 --   08/03/22 1515 -- 72 18 (!) 103/41 --   08/03/22 1455 98.4 °F (36.9 °C) 74 18 (!) 115/38 --   08/03/22 1419 -- -- -- (!) 95/42 --   08/03/22 1115 -- 72 -- (!) 100/40 --   08/03/22 1007 97.1 °F (36.2 °C) 65 18 (!) 96/52 --   08/03/22 0810 -- 65 -- (!) 96/52 --   08/03/22 0731 -- 65 -- (!) 96/52 97 %   08/03/22 0308 97.1 °F (36.2 °C) 74 18 112/70 96 %   08/03/22 0048 97.6 °F (36.4 °C) 86 20 109/64 96 %   08/03/22 0015 -- 64 19 (!) 99/34 96 %   08/03/22 0000 -- 74 -- -- --   08/02/22 2331 -- 63 20 (!) 90/47 95 %   08/02/22 2307 97.7 °F (36.5 °C) 71 20 (!) 95/33 95 %   08/02/22 2247 -- 64 18 (!) 83/40 98 %   08/02/22 2234 -- 62 18 (!) 86/35 97 %   08/02/22 2215 -- 60 21 (!) 88/37 97 %   08/02/22 2201 98.1 °F (36.7 °C) 61 20 (!) 95/46 96 %   08/02/22 2136 97.8 °F (36.6 °C) 63 24 (!) 91/36 97 %   08/02/22 2119 97.9 °F (36.6 °C) 64 22 (!) 88/35 99 %   08/02/22 1959 97.7 °F (36.5 °C) 92 20 (!) 115/45 95 %       Intake/Output Summary (Last 24 hours) at 8/3/2022 1612  Last data filed at 8/2/2022 2311  Gross per 24 hour   Intake 2350 ml   Output --   Net 2350 ml        I had a face to face encounter and independently examined this patient on 8/3/2022, as outlined below:  PHYSICAL EXAM:  General: WD, WN. Alert, cooperative, no acute distress, frail appearing   EENT:  EOMI. Anicteric sclerae. MMM  Resp:  CTA bilaterally, no wheezing or rales. No accessory muscle use  CV:  Regular  rhythm,  No edema  GI:  Soft, Non distended, Non tender. +Bowel sounds  Neurologic:  Alert and oriented X 2 (says it's 2002), normal speech,   Psych:   Good insight. Not anxious nor agitated  Skin:  No rashes. No jaundice, permcath right chest wall                         Reviewed most current lab test results and cultures  YES  Reviewed most current radiology test results   YES  Review and summation of old records today    NO  Reviewed patient's current orders and MAR    YES  PMH/ reviewed - no change compared to H&P  ________________________________________________________________________  Care Plan discussed with:    Comments   Patient     Family      RN     Care Manager     Consultant                        Multidiciplinary team rounds were held today with , nursing, pharmacist and clinical coordinator. Patient's plan of care was discussed; medications were reviewed and discharge planning was addressed. ________________________________________________________________________  Total NON critical care TIME:  38   Minutes    Total CRITICAL CARE TIME Spent:   Minutes non procedure based      Comments   >50% of visit spent in counseling and coordination of care     ________________________________________________________________________  Eddi Chaney MD     Procedures: see electronic medical records for all procedures/Xrays and details which were not copied into this note but were reviewed prior to creation of Plan. LABS:  I reviewed today's most current labs and imaging studies.   Pertinent labs include:  Recent Labs     08/03/22  0013 08/02/22 2033   WBC 14.2* 13.0*   HGB 10.3* 10.5*   HCT 32.9* 33.5*    242     Recent Labs 08/03/22  0013 08/02/22  2340 08/02/22 2033     --  137   K 2.9*  --  2.9*     --  100   CO2 27  --  27   *  --  120*   BUN 28*  --  29*   CREA 3.49*  --  3.89*   CA 7.3*  --  7.8*   MG  --  1.8  --    ALB  --   --  1.4*   TBILI  --   --  0.4   ALT  --   --  23       Signed: Giovanni Cameron MD

## 2022-08-03 NOTE — PROGRESS NOTES
Pt came with pressure injury on both her sacrum and heels. Sacral Ulcer has been assessed and cleansed with normal saline. Foam dressing applied. Wound approximate size:  Height: Left buttocks 6.5 in              Right buttocks 7.5 in  Width: 7.5 in  The wound edges are irregular and in various stages of healing. There is a however a deep hole just in the mid of the sacrum. Unable to determine if it has reached the bones    Both heels have deep tissue injury, skin color is black, pt is also very sensitive to touch on her lower extremities. Specialty boots in place to offload the heels. Wound Consult in place.

## 2022-08-03 NOTE — ED NOTES
Granddaughter called and informed of patient status and room number. She was provided the phone number to the unit if she wants to call and check on the patient.
Hospitalist at the bedside talking to the patient/family at this time.
Hospitalist notified that patient was hypotensive but got better with fluids but now that the fluids have completed that she is getting hypotensive again. Another bag of fluids was ordered and he said that he would be around to see her.
Patient had soiled herself while waiting with EMS for a room. Patient was cleaned up where several wounds were noted. Stool was bright yellow and soft. Very large sacral wound in various stages with a stage 4 at the center. There is a half dollar sized hole at the sacrum which is deep and tunneling. There is a strong foul odor coming from the wound. There is also the beginnings of a stage 2 pressure ulcer to the left hip. Bilateral heel deep tissue injuries noted. There is also a small skin tear to the left shin. Patient was repositioned after cleaning and wound evaluation. Heels floated with blankets off of the bed. Patient is in A-Fib on the monitor. History of A-Fib (takes Amiodarone). Granddaughter and caregiver is at the bedside now. Home health nurses come out twice a week to check on the patient. MD will be notified once they are assigned.
Patient repositioned in the bed for comfort. Patient still hypotensive but MAP's are slowly improving.
Provider at the bedside at this time talking to the patient/family.
Report given to [place], RN. Nurse was informed of reason for arrival, vitals, labs, medications, orders, procedures, results, anything left pending and current plan of action. Questions were asked and received prior to departure from the patient.
Walked blood specimens down to the lab at this time.
Walked magnesium specimens down to the lab at this time.
2 = difficulty speaking (not related to language barrier)

## 2022-08-03 NOTE — PROGRESS NOTES
Problem: Pressure Injury - Risk of  Goal: *Prevention of pressure injury  Description: Document Markos Scale and appropriate interventions in the flowsheet. Outcome: Progressing Towards Goal  Variance Other (add note)  Note: Pressure Injury Interventions:  Sensory Interventions: Assess changes in LOC    Moisture Interventions: Absorbent underpads    Activity Interventions: Assess need for specialty bed    Mobility Interventions: Assess need for specialty bed    Nutrition Interventions: Document food/fluid/supplement intake    Friction and Shear Interventions: Apply protective barrier, creams and emollients                Problem: Patient Education: Go to Patient Education Activity  Goal: Patient/Family Education  Outcome: Progressing Towards Goal     Problem: Falls - Risk of  Goal: *Absence of Falls  Description: Document Rushie Duff Fall Risk and appropriate interventions in the flowsheet.   Outcome: Progressing Towards Goal  Variance Other (add note)  Note: Fall Risk Interventions:       Mentation Interventions: Adequate sleep, hydration, pain control    Medication Interventions: Bed/chair exit alarm    Elimination Interventions: Bed/chair exit alarm    History of Falls Interventions: Bed/chair exit alarm         Problem: Patient Education: Go to Patient Education Activity  Goal: Patient/Family Education  Outcome: Progressing Towards Goal

## 2022-08-03 NOTE — PROGRESS NOTES
1204: TRANSFER - IN REPORT:    Verbal report received from TJ(name) on Brittany Munguia  being received from ED(unit) for routine progression of care      Report consisted of patients Situation, Background, Assessment and   Recommendations(SBAR). Information from the following report(s) SBAR, Kardex, ED Summary, Intake/Output, MAR, Recent Results, and Cardiac Rhythm AF and Sinus Rhythm (on and off)  was reviewed with the receiving nurse. Opportunity for questions and clarification was provided. Assessment completed upon patients arrival to unit and care assumed. 0045 Pt. Arrived in PCU, transferred to bed safely and made comfortable. 0130 Morning labs resulted with rpt serum K =2.9. NP Vanda notified and Potassium tablets given as ordered. 0700 Attempted to call [facility] this morning. They open until 0830. End of Shift Note    Bedside shift change report given to MARCIA Acosta (oncoming nurse) by Nelly Li. Marisol Aragon RN (offgoing nurse). Report included the following information SBAR, Kardex, ED Summary, Intake/Output, MAR, Recent Results, and Cardiac Rhythm Sinus Rhythm with PAC's    Shift worked:  7p-7a     Shift summary and any significant changes:    Pt. Admitted from ED. Cleansed Sacrum ulcer and dressed, heels protected with boots. Pt. Needs Nephrology consult. Concerns for physician to address:  Low potassium       Zone phone for oncoming shift:   0000       Activity:     Number times ambulated in hallways past shift: 0  Number of times OOB to chair past shift: 0    Cardiac:   Cardiac Monitoring: Yes      Cardiac Rhythm: Sinus Rhythm, PAC    Access:  Current line(s): PIV     Genitourinary:   Urinary status: voiding    Respiratory:   O2 Device: None (Room air)  Chronic home O2 use?: NO  Incentive spirometer at bedside: NO       GI:  Last Bowel Movement Date: 08/03/22  Current diet:  ADULT DIET Easy to Chew; 4 carb choices (60 gm/meal);  Low Fat/Low Chol/High Fiber/2 gm Na  Passing flatus: NO  Tolerating current diet: NO       Pain Management:   Patient states pain is manageable on current regimen: YES    Skin:  Markos Score: 11  Interventions: turn team, speciality bed, float heels, increase time out of bed, foam dressing, PT/OT consult, limit briefs, internal/external urinary devices, and nutritional support     Patient Safety:  Fall Score: Total Score: 4  Interventions: bed/chair alarm, gripper socks, and pt to call before getting OOB  High Fall Risk: Yes    Length of Stay:  Expected LOS: - - -  Actual LOS: 1      Ma.  Emanuel Devi RN

## 2022-08-03 NOTE — PROGRESS NOTES
Nephrology Progress Note  [facility] / [address], [place], [address]  Phone - [phone]  Fax - [phone]                 Patient: Aung Naidu                   YOB: 1934        Date- 8/3/2022                      Admit Date: 8/2/2022  CC: Follow up for ESRD          IMPRESSION & PLAN:    ESRD , HD dependent since 5/23/2022, on MWF schedule, [facility], under Dr. Bree Rojas  Sepsis  Chronic sacral decubitus ulcer  hypokalemia  AFIB  DM 2  Anemia  Hypotension    PLAN-  Plans for hemodialysis today, and will keep him on HD on MWF schedule. Antibiotics per internal medicine team.  Hemoglobin stable no need for Epogen  I have added midodrine for hypotension  Thank you for the consult     Subjective: Interval History:   -Patient was seen and examined today. She is a recent conversion to ESRD and has been getting dialyzed since May of this year. [facility] Monday Friday schedule. She presented to the ED for worsening sacral decubitus ulcer and ongoing drainage. Patient is admitted for IV antibiotics. Renal consult requested for evaluation and management of hemodialysis. Objective:   Vitals:    08/03/22 0810 08/03/22 1007 08/03/22 1101 08/03/22 1115   BP: (!) 96/52 (!) 96/52  (!) 100/40   Pulse: 65 65  72   Resp:  18     Temp:  97.1 °F (36.2 °C)     SpO2:       Weight:       Height:   5' 4\" (1.626 m)       08/02 0701 - 08/03 0700  In: 2350 [I.V.:2350]  Out: -   Last 3 Recorded Weights in this Encounter    08/02/22 1959 08/03/22 0100   Weight: 74.8 kg (165 lb) 64.5 kg (142 lb 4.8 oz)      Physical exam:    GEN: NAD  NECK- Supple, no mass  RESP: No wheezing, Clear b/l  CVS: S1,S2  RRR  NEURO: Normal speech, Non focal  EXT: No Edema   HD access: Right chest permacath    Chart reviewed. Pertinent Notes reviewed.      Data Review :  Recent Labs 08/03/22  0013 08/02/22  2340 08/02/22 2033     --  137   K 2.9*  --  2.9*     --  100   CO2 27  --  27   BUN 28*  --  29*   CREA 3.49*  --  3.89*   *  --  120*   CA 7.3*  --  7.8*   MG  --  1.8  --      Recent Labs     08/03/22  0013 08/02/22 2033   WBC 14.2* 13.0*   HGB 10.3* 10.5*   HCT 32.9* 33.5*    242     No results for input(s): FE, TIBC, PSAT, FERR in the last 72 hours.    Medication list  reviewed  Current Facility-Administered Medications   Medication Dose Route Frequency    midodrine (PROAMATINE) tablet 10 mg  10 mg Oral Q MON, WED & FRI    albumin human 25% (BUMINATE) solution 25 g  25 g IntraVENous DIALYSIS PRN    amiodarone (CORDARONE) tablet 200 mg  200 mg Oral DAILY    aspirin delayed-release tablet 81 mg  81 mg Oral DAILY    atorvastatin (LIPITOR) tablet 40 mg  40 mg Oral DAILY    famotidine (PEPCID) tablet 20 mg  20 mg Oral DAILY    sodium chloride (NS) flush 5-40 mL  5-40 mL IntraVENous Q8H    sodium chloride (NS) flush 5-40 mL  5-40 mL IntraVENous PRN    acetaminophen (TYLENOL) tablet 650 mg  650 mg Oral Q6H PRN    Or    acetaminophen (TYLENOL) suppository 650 mg  650 mg Rectal Q6H PRN    polyethylene glycol (MIRALAX) packet 17 g  17 g Oral DAILY PRN    heparin (porcine) injection 5,000 Units  5,000 Units SubCUTAneous Q8H    insulin lispro (HUMALOG) injection   SubCUTAneous AC&HS    glucose chewable tablet 16 g  4 Tablet Oral PRN    glucagon (GLUCAGEN) injection 1 mg  1 mg IntraMUSCular PRN    dextrose 10 % infusion 0-250 mL  0-250 mL IntraVENous PRN    piperacillin-tazobactam (ZOSYN) 3.375 g in 0.9% sodium chloride (MBP/ADV) 100 mL MBP  3.375 g IntraVENous Q12H        Han Lora MD  8/3/2022

## 2022-08-03 NOTE — PROGRESS NOTES
Pharmacy Automatic Renal Dosing Protocol - Antimicrobials    Indication for Antimicrobials: Infected Sacral Decubitus Ulcer      Current Regimen of Each Antimicrobial:  Vancomycin 1500 mg x 1 then 750 mg post HD (Start Date 8/3; Day # )  Piperacillin tazobactam 3.375 Q12H (Start 8/3, )    Previous Antimicrobial Therapy:    Vancomycin Goal AUC : 400-600 mg*hour/liter per 24 hours Trough ~ 20 mcg/ml    Vancomycin Levels  Date Dose & Interval Measured (mcg/mL) Steady State (mcg/mL)                       Date & time of next level:     Significant Cultures:     Radiology / Imaging results: (X-ray, CT scan or MRI):       Labs:  Recent Labs     22  0013 22   CREA 3.49* 3.89*   BUN 28* 29*   WBC 14.2* 13.0*     Temp (24hrs), Av.7 °F (36.5 °C), Min:97.1 °F (36.2 °C), Max:98.4 °F (36.9 °C)      Paralysis, amputations, malnutrition:   Creatinine Clearance (mL/min) or Dialysis: HD    Impression/Plan:   Antibiotics as above  Vancomycin 750 mg post HD     Pharmacy will follow daily and adjust medications as appropriate for renal function and/or serum levels. Thank you,  Eloy Osman, [facility]      Recommended duration of therapy  [email or web address]/[place]/[facility]/Pharmacy/Clinical%20Companion/[email or web address]%20ABX%20therapy. docx    Renal Dosing  [email or web address]/[place]/[facility]/Pharmacy/Clinical%20Companion/Renal%[email or web address]. pdf

## 2022-08-03 NOTE — PROGRESS NOTES
Spiritual Care Assessment/Progress Note  [facility]      NAME: Eleanor Cope      MRN: [id number]  AGE: 80 y.o.  SEX: female  Mandaen Affiliation: Ramesh Mehta   Language: English     8/3/2022     Total Time (in minutes): 37     Spiritual Assessment begun in [facility] PROGRESSIVE CARE through conversation with:         [x]Patient        [] Family    [] Friend(s)        Reason for Consult: Initial/Spiritual assessment, patient floor     Spiritual beliefs: (Please include comment if needed)     [x] Identifies with a greta tradition:         [x] Supported by a greta community:            [] Claims no spiritual orientation:           [] Seeking spiritual identity:                [] Adheres to an individual form of spirituality:           [] Not able to assess:                           Identified resources for coping:      [x] Prayer                               [] Music                  [] Guided Imagery     [x] Family/friends                 [] Pet visits     [] Devotional reading                         [] Unknown     [] Other:                                               Interventions offered during this visit: (See comments for more details)    Patient Interventions: Affirmation of emotions/emotional suffering, Initial/Spiritual assessment, patient floor, Life review/legacy, Normalization of emotional/spiritual concerns, Prayer (assurance of), Mandaen beliefs/image of God discussed, Prayer (actual), Affirmation of greta, Initial visit           Plan of Care:     [x] Support spiritual and/or cultural needs    [] Support AMD and/or advance care planning process      [] Support grieving process   [] Coordinate Rites and/or Rituals    [] Coordination with community clergy   [] No spiritual needs identified at this time   [] Detailed Plan of Care below (See Comments)  [] Make referral to Music Therapy  [] Make referral to Pet Therapy     [] Make referral to Addiction services  [] Make referral to [facility]  [] Make referral to Spiritual Care Partner  [] No future visits requested        [x] Contact Spiritual Care for further referrals     Comments:  reviewed the patient's chart prior to the visit. Ms. Edel Mann (room [phone]) was in her bed when the  entered her room. She was quiet initially during our conversation and then she started to share her thoughts. She is thankful to be 88 years. She is grateful for the support of her grand daughter, Michel Valerio. She lives with her and is grateful for a place to live. She has a son in [place] and another daughter who is . In addition, she is grateful for her family that has supported her throughout the years. Ms. Edel Mann stated she is [facility] and was amenable to prayer.  provided a presence, prayer, empathetic listening and encouragement.  services are available 24 hours a day as requested. Rev. GUALBERTO Butts.  Min   Paging Service [phone] ([phone])

## 2022-08-04 PROBLEM — R53.81 PHYSICAL DEBILITY: Status: ACTIVE | Noted: 2022-01-01

## 2022-08-04 PROBLEM — R54 FRAILTY: Status: ACTIVE | Noted: 2022-01-01

## 2022-08-04 NOTE — PROGRESS NOTES
TRANSFER - IN REPORT:    Verbal report received from [facility]) on Nasir Killer  being received from [employer]) for routine post - op      Report consisted of patients Situation, Background, Assessment and   Recommendations(SBAR). Information from the following report(s) SBAR, Kardex, OR Summary, Procedure Summary, Intake/Output, MAR, Recent Results, and Cardiac Rhythm afib  was reviewed with the receiving nurse. Opportunity for questions and clarification was provided. Assessment completed upon patients arrival to unit and care assumed at 1540.

## 2022-08-04 NOTE — ANESTHESIA POSTPROCEDURE EVALUATION
Procedure(s):  INCISION AND DRAINAGE OF SACRUM  LAPAROSCOPIC COLOSTOMY. general    Anesthesia Post Evaluation      Multimodal analgesia: multimodal analgesia used between 6 hours prior to anesthesia start to PACU discharge  Patient location during evaluation: bedside  Patient participation: complete - patient participated  Level of consciousness: awake  Pain management: adequate  Airway patency: patent  Anesthetic complications: no  Cardiovascular status: acceptable  Respiratory status: acceptable  Hydration status: acceptable  Post anesthesia nausea and vomiting:  controlled  Final Post Anesthesia Temperature Assessment:  Normothermia (36.0-37.5 degrees C)      INITIAL Post-op Vital signs:   Vitals Value Taken Time   /48 08/04/22 1515   Temp 36.4 °C (97.5 °F) 08/04/22 1500   Pulse 86 08/04/22 1521   Resp 16 08/04/22 1521   SpO2 95 % 08/04/22 1521   Vitals shown include unvalidated device data.

## 2022-08-04 NOTE — PROGRESS NOTES
Meropenem renal dose adjustment    Meropenem adjusted to 1 g IV Q24H per protocol for HD patients per extended interval infusion protocol.     Indication: bloodstream infection

## 2022-08-04 NOTE — PROGRESS NOTES
Nephrology Progress Note  [facility] / [address], [address]  [place], [address]  Phone - [phone]  Fax - [phone]                 Patient: Betty Cosby                   YOB: 1934        Date- 8/4/2022                      Admit Date: 8/2/2022  CC: Follow up for ESRD          IMPRESSION & PLAN:    ESRD , HD dependent since 5/23/2022, on MWF schedule, [facility], under Dr. Job Barragan  Sepsis  Chronic sacral decubitus ulcer  hypokalemia  AFIB  DM 2  Anemia  Hypotension    PLAN-  Plans for hemodialysis tomorrow, and will keep him on HD on MWF schedule. Antibiotics per internal medicine team.  Going to the OR today for debridement  Hemoglobin stable no need for Epogen  I have added midodrine for hypotension       Subjective: Interval History:   -Patient was seen and examined today.    -N.p.o. for or today    Objective:   Vitals:    08/04/22 0302 08/04/22 0800 08/04/22 0858 08/04/22 1130   BP: (!) 114/51 135/70  (!) 115/48   Pulse: 86 (!) 103 93 (!) 105   Resp: 16 25 19   Temp: 97.8 °F (36.6 °C) 97.7 °F (36.5 °C)  97.8 °F (36.6 °C)   TempSrc:       SpO2: 98% 98%  98%   Weight:       Height:          08/03 0701 - 08/04 0700  In: 700 [I.V.:700]  Out: 1000   Last 3 Recorded Weights in this Encounter    08/02/22 1959 08/03/22 0100   Weight: 74.8 kg (165 lb) 64.5 kg (142 lb 4.8 oz)      Physical exam:    GEN: NAD  NECK- Supple, no mass  RESP: No wheezing, Clear b/l  CVS: S1,S2  RRR  NEURO: Normal speech, Non focal  EXT: No Edema   HD access: Right chest permacath    Chart reviewed. Pertinent Notes reviewed.      Data Review :  Recent Labs     08/04/22  0616 08/03/22  1842 08/03/22  0013 08/02/22  2340 08/02/22 2033     --  138  --  137   K 5.8* 4.0 2.9*  --  2.9*     --  104  --  100   CO2 20*  --  27  --  27   BUN 15  --  28*  --  29*   CREA 1.80*  --  3.49*  --  3.89*   * --  102*  --  120*   CA 7.9*  --  7.3*  --  7.8*   MG 2.0  --   --  1.8  --    PHOS 2.2*  --   --   --   --        Recent Labs     08/04/22  1004 08/03/22  0013 08/02/22 2033   WBC 13.2* 14.2* 13.0*   HGB 9.8* 10.3* 10.5*   HCT 31.6* 32.9* 33.5*    233 242       No results for input(s): FE, TIBC, PSAT, FERR in the last 72 hours.    Medication list  reviewed  Current Facility-Administered Medications   Medication Dose Route Frequency    lactated Ringers infusion  75 mL/hr IntraVENous CONTINUOUS    0.9% sodium chloride infusion  50 mL/hr IntraVENous CONTINUOUS    sodium chloride (NS) flush 5-40 mL  5-40 mL IntraVENous Q8H    sodium chloride (NS) flush 5-40 mL  5-40 mL IntraVENous PRN    lidocaine (PF) (XYLOCAINE) 10 mg/mL (1 %) injection 0.1 mL  0.1 mL SubCUTAneous PRN    fentaNYL citrate (PF) injection 50 mcg  50 mcg IntraVENous PRN    midazolam (VERSED) injection 1 mg  1 mg IntraVENous PRN    midazolam (VERSED) injection 1 mg  1 mg IntraVENous PRN    meropenem (MERREM) 1 g in 0.9% sodium chloride (MBP/ADV) 50 mL MBP  1 g IntraVENous Q24H    midodrine (PROAMATINE) tablet 10 mg  10 mg Oral Q MON, WED & FRI    albumin human 25% (BUMINATE) solution 25 g  25 g IntraVENous DIALYSIS PRN    VANCOMYCIN INFORMATION NOTE   Other Rx Dosing/Monitoring    heparin (porcine) 1,000 unit/mL injection 1,900 Units  1,900 Units InterCATHeter DIALYSIS PRN    And    heparin (porcine) 1,000 unit/mL injection 1,900 Units  1,900 Units InterCATHeter DIALYSIS PRN    amiodarone (CORDARONE) tablet 200 mg  200 mg Oral DAILY    aspirin delayed-release tablet 81 mg  81 mg Oral DAILY    atorvastatin (LIPITOR) tablet 40 mg  40 mg Oral DAILY    famotidine (PEPCID) tablet 20 mg  20 mg Oral DAILY    sodium chloride (NS) flush 5-40 mL  5-40 mL IntraVENous Q8H    sodium chloride (NS) flush 5-40 mL  5-40 mL IntraVENous PRN    acetaminophen (TYLENOL) tablet 650 mg  650 mg Oral Q6H PRN    Or    acetaminophen (TYLENOL) suppository 650 mg  650 mg Rectal Q6H PRN    polyethylene glycol (MIRALAX) packet 17 g  17 g Oral DAILY PRN    heparin (porcine) injection 5,000 Units  5,000 Units SubCUTAneous Q8H    insulin lispro (HUMALOG) injection   SubCUTAneous AC&HS    glucose chewable tablet 16 g  4 Tablet Oral PRN    glucagon (GLUCAGEN) injection 1 mg  1 mg IntraMUSCular PRN    dextrose 10 % infusion 0-250 mL  0-250 mL IntraVENous PRN     Facility-Administered Medications Ordered in Other Encounters   Medication Dose Route Frequency    fentaNYL citrate (PF) injection   IntraVENous PRN    lidocaine (PF) (XYLOCAINE) 20 mg/mL (2 %) injection   IntraVENous PRN    etomidate (AMIDATE) 2 mg/mL injection   IntraVENous PRN    succinylcholine (ANECTINE) injection   IntraVENous PRN          Radha Saucedo MD  8/4/2022

## 2022-08-04 NOTE — BRIEF OP NOTE
[id number]  Brief Postoperative Note    Patient: Parminder Heller  YOB: 1934  MRN: [id number]    Date of Procedure: 8/4/2022     Pre-Op Diagnosis: SACRAL DECUB    Post-Op Diagnosis: Same as preoperative diagnosis. Procedure(s):  INCISION AND DRAINAGE OF SACRUM  LAPAROSCOPIC COLOSTOMY    Surgeon(s):  Jeremy Schreiber MD    Surgical Assistant: Surg Asst-1: Selena Ortez RN    Anesthesia: General     Estimated Blood Loss (mL): Minimal    Complications: None immediate    Specimens:   ID Type Source Tests Collected by Time Destination   1 : sacral wound Tissue Wound CULTURE, ANAEROBIC AND AEROBIC Jeremy Schreiber MD 8/4/2022 1228 Microbiology        Implants: * No implants in log *    Drains:   [REMOVED] Fecal Management (Removed)       Findings: See the op note.     Electronically Signed by Toni Renee MD on 8/4/2022 at 2:27 PM

## 2022-08-04 NOTE — PROGRESS NOTES
Discussed risks and benefits of diverting colostomy with the POA. She gives consent for this in addition to the debridement of the sacral wound. We will do both today assuming she remains stable in the OR.

## 2022-08-04 NOTE — PROGRESS NOTES
Telephone consent for procedure & anesthesia obtained from [facility], Sanju Joseph.    0845-TRANSFER - IN REPORT:    Verbal report received from [facility], [facility] (name) on Tsering Allen  being received from [phone] (unit) for ordered procedure      Report consisted of patients Situation, Background, Assessment and   Recommendations(SBAR). Information from the following report(s) SBAR, MAR, and Recent Results was reviewed with the receiving nurse. Opportunity for questions and clarification was provided. Assessment completed upon patients arrival to unit and care assumed.

## 2022-08-04 NOTE — PERIOP NOTES
Handoff Report from Operating Room to PACU    Report received from [facility] and Myranda Gonzalez RN regarding Edward Montemayor. Surgeon(s):  Rigo Salcedo MD  And Procedure(s) (LRB):  INCISION AND DRAINAGE OF SACRUM (N/A)  LAPAROSCOPIC COLOSTOMY (N/A)  confirmed   with allergies and dressings discussed. Anesthesia type, drugs, patient history, complications, estimated blood loss, vital signs, intake and output, and last pain medication, lines, reversal medications, and temperature were reviewed. 3:13 PM  TRANSFER - OUT REPORT:    Verbal report given to Torres (name) on Edward Montemayor  being transferred to PCU (unit) for routine progression of care       Report consisted of patients Situation, Background, Assessment and   Recommendations(SBAR). Information from the following report(s) SBAR, Kardex, MAR, and Accordion was reviewed with the receiving nurse. Lines:   Peripheral IV 08/02/22 Right Antecubital (Active)   Site Assessment Clean, dry, & intact 08/04/22 1130   Phlebitis Assessment 0 08/04/22 1130   Infiltration Assessment 0 08/04/22 1130   Dressing Status Clean, dry, & intact 08/04/22 1130   Dressing Type Transparent;Tape 08/04/22 1130   Hub Color/Line Status Pink; Infusing 08/04/22 1130   Action Taken Open ports on tubing capped 08/04/22 0800   Alcohol Cap Used Yes 08/04/22 0800       Peripheral IV 08/02/22 Left Antecubital (Active)   Site Assessment Clean, dry, & intact 08/04/22 1130   Phlebitis Assessment 0 08/04/22 1130   Infiltration Assessment 0 08/04/22 1130   Dressing Status Clean, dry, & intact 08/04/22 1130   Dressing Type Transparent;Tape 08/04/22 1130   Hub Color/Line Status Pink;Flushed 08/04/22 1130   Action Taken Open ports on tubing capped 08/04/22 0800   Alcohol Cap Used Yes 08/04/22 0800        Opportunity for questions and clarification was provided.       Patient transported with:   Monitor  O2 @ 3 liters  Registered Nurse

## 2022-08-04 NOTE — PROGRESS NOTES
1900: Bedside shift change report given to Maximilian Matt (oncoming nurse) by Alysha Licona RN (offgoing nurse). Report included the following information SBAR, Kardex, ED Summary, Intake/Output, MAR, Recent Results, and Cardiac Rhythm NSR .

## 2022-08-04 NOTE — PROGRESS NOTES
SHIFT REPORT    0700 Bedside shift change report given to 26 Jing Gruber (oncoming nurse) by Davis Acosta RN (offgoing nurse). Report included the following information SBAR, Kardex, Intake/Output, MAR, Recent Results, and Cardiac Rhythm NSR .    - In bed, Aox3  - On RA    LINES:  - PIV x2    DRAINS:  - PUREWICK    WOUNDS:   - SACRUM stage 4  - HELLS, Bilateral    0800: BM. Cleaned and CHG bath given. 0830: Report given to OR Nurse for scheduled procedure today. 1100: Transferred from PCU rm [id number] to OO for scheduled procedure. Transported with monitor by OR RN's.     1500: Transferred back to PCU rm 2548 from PACU. See Transfer in Note for details. 1900: Bedside shift change report given to Maximilian Matt (oncoming nurse) by 26 RuLina Gruber (offgoing nurse). Report included the following information SBAR, Kardex, Intake/Output, MAR, Recent Results, and Cardiac Rhythm afib .

## 2022-08-04 NOTE — ANESTHESIA PREPROCEDURE EVALUATION
Relevant Problems   No relevant active problems       Anesthetic History   No history of anesthetic complications            Review of Systems / Medical History  Patient summary reviewed, nursing notes reviewed and pertinent labs reviewed    Pulmonary                   Neuro/Psych         Dementia    Comments: Lethargy Cardiovascular            Dysrhythmias : atrial fibrillation      Exercise tolerance: <4 METS     GI/Hepatic/Renal         Renal disease: CRI       Endo/Other    Diabetes    Arthritis     Other Findings   Comments: Wound infection  Palliative care by specialist           Physical Exam    Airway  Mallampati: II  TM Distance: 4 - 6 cm  Neck ROM: decreased range of motion   Mouth opening: Normal     Cardiovascular    Rhythm: irregular  Rate: normal         Dental      Comments: Multiple missing   Pulmonary      Decreased breath sounds: bilateral           Abdominal  GI exam deferred       Other Findings            Anesthetic Plan    ASA: 4  Anesthesia type: general          Induction: Intravenous  Anesthetic plan and risks discussed with: Patient

## 2022-08-04 NOTE — CONSULTS
Surgery Consult  Consulted by: Dr. Bernard Gutiérrez. Thank you. PCP: Caryn Scales MD    History and data reviewed. Pt not yet interviewed/examined. The patient was off the floor at dialysis but I did have a chance to speak with her granddaughter who is her decision-maker. Impression:  Sacral decubitus wound with necrotic tissues requiring debridement. Frequent stool contamination. Plan:  We discussed taking Ms. Kendra Ferrara to the operating room tomorrow for debridement of the necrotic tissues. I also explained the rationale behind a diverting colostomy. The granddaughter would like to discuss this with her grandmother before giving consent. I will check back with them in the morning. FULL NOTE ADDENDUM WILL BE ADDED BELOW. Thanks. Signed By: Jaimie Mehta MD     August 3, 2022      ------------------------------------------------------------------------       ADDENDUM:     Patient interviewed and examined. Subjective:      Aung Naidu is a 80 y.o. female who lives at home has had a sacral wound for many months but just recently became foul-smelling. The home nurse was concerned for infection and sent her to the emergency department. She has diabetes and is end-stage renal disease. Objective:   See flowsheet for vitals. Physical Exam:  PHYSICAL EXAM:  Gen:  Alert and conversant but confused. HEENT:   [x]     anicteric    []      scleral icterus    []     moist mucosa     [x]     dry mucosa    RESP:   [x]     CTA bilaterally, no wheezing/rhonchi/rales/crackles    []     wheezing     []     rhonchi     []     crackles     []     use of accessory muscles    CARD:  [x]     regular rate and rhythm     [x]     No murmurs/rubs/gallops    []     irregular rhythm     []     Murmur     []     Rubs     []     Gallops    ABD:    [x]     soft  [x]     non distended  [x]     non tender  [x]      NABS    SKIN:   Skin necrosis over approximately 300 cm² of her sacrum.   There is obvious necrotic subcutaneous adipose as well. There is foul-smelling discharge. EXT:  [x]      No CCE     []      2+ pulses throughout    []      Clubbing     []     Cyanosis     []     Edema     []     diminished pulses    NEUR:   []     Strength normal     [x]weakness  []LUE    []RUE    []LLE    []RLE    [x]     follows commands          PSYCH:   insight [x]poor   [x]good                      []     depressed     []     anxious     []     agitated    Past Medical History:   Diagnosis Date    A-fib ([facility] [address]     Chronic kidney disease     [facility]      Past Surgical History:   Procedure Laterality Date    IR INSERT NON TUNL CVC OVER 5 YRS  5/26/2022    IR INSERT NON TUNL CVC OVER 5 YRS  6/3/2022    IR INSERT TUNL CVC W/O PORT OVER 5 YR  6/8/2022      History reviewed. No pertinent family history. Social History     Socioeconomic History    Marital status:    Tobacco Use    Smoking status: Never    Smokeless tobacco: Never   Substance and Sexual Activity    Alcohol use: Not Currently    Drug use: Not Currently    Sexual activity: Not Currently      Prior to Admission medications    Medication Sig Start Date End Date Taking? Authorizing Provider   famotidine (PEPCID) 20 mg tablet  4/30/22  Yes Other, MD Marcia   atorvastatin (LIPITOR) 20 mg tablet Take 2 Tablets by mouth daily. 6/15/22  Yes Forrest Banks MD   amiodarone (CORDARONE) 200 mg tablet Take 1 Tablet by mouth daily. 6/15/22  Yes Forrest Banks MD   insulin lispro protamine/insulin lispro (HUMALOG MIX 75/25) 100 unit/mL (75-25) injection 5 Units by SubCUTAneous route Before breakfast and dinner. 6/15/22  Yes Forrest Banks MD   cloNIDine HCl (CATAPRES) 0.3 mg tablet Take 0.3 mg by mouth two (2) times a day. Yes Provider, Historical   amLODIPine (NORVASC) 10 mg tablet Take 10 mg by mouth daily. Yes Provider, Historical   aspirin delayed-release 81 mg tablet Take 81 mg by mouth daily.    Yes Provider, Historical   cholecalciferol (VITAMIN D3) (1000 Units /25 mcg) tablet Take 1,000 Units by mouth daily. Yes Provider, Historical   furosemide (LASIX) 20 mg tablet  4/30/22   Other, MD Marcia   cefepime 1 gram 1 g IVPB 1 g by IntraVENous route every twenty-four (24) hours. End date 7/15/2022 6/15/22   Kaila Weir MD   pantoprazole (PROTONIX) 20 mg tablet Take 2 Tablets by mouth daily.   Patient not taking: Reported on 8/2/2022 6/15/22   Kaila Weir MD     ALLERGIES:  No Known Allergies    Review of Systems:  (unchecked were asked but negative)          []      Fever/chills     []      Abd Pain   []      Fatique                                   []      Nausea/Vomit   []      Weight loss    []      Diarrhea []      Constipation    []      Headache    []      Blood in stool  []      Visual loss    []      Hematuria   []      Hearing loss    []      Dysuria      []      Heat intolerance    []      Myalgias  []      Cold intolerance    []      Arthralgias  []      Reflux     []      Neuropathy   []      Dysphagia    []      Easy bruising  []      Chest Pain    []      Prolonged bleeding   []      Palpitations    []      Anxiety   []      Cough                                                    []      Depression                                []      Sputum           []      SOB/BEE                                   [x]     Unable to obtain  ROS due to  [x]     mental status change  []     sedated   []     intubated    LABS:  Recent Labs     08/04/22  1004 08/03/22  0013   WBC 13.2* 14.2*   HGB 9.8* 10.3*   HCT 31.6* 32.9*    233     Recent Labs     08/04/22  0616 08/03/22  1842 08/03/22  0013 08/02/22  2340 08/02/22 2033     --  138  --  137   K 5.8* 4.0 2.9*  --  2.9*     --  104  --  100   CO2 20*  --  27  --  27   BUN 15  --  28*  --  29*   CREA 1.80*  --  3.49*  --  3.89*   *  --  102*  --  120*   CA 7.9*  --  7.3*  --  7.8*   MG 2.0  --   --  1.8  --    PHOS 2.2*  --   --   --   --      Recent Labs     08/02/22 2033   AP 136*   TP 7.1   ALB 1.4*   GLOB 5.7*     No results for input(s): INR, PTP, APTT, INREXT in the last 72 hours.       Signed By: Clarissa Frye MD     August 4, 2022

## 2022-08-04 NOTE — PROGRESS NOTES
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 ESBL  General surgery consulted for debridement / ? diverting ostomy       Discussed with family, they are deciding Re debridement  Wound care consulted  ID consults  Consult palliative    Bedbound POA  Moderate protein calorie malnutrition  Patient bed bound  nutrition consultation for dietary supplements     ESRD on IHD MWF  Anemia of chronic disease  Anemia appears stable and not symptomatic  Nephrology consulted, c/w HD as tolerated  Placed on midodrine to increase BP so patient can tolerate HD     DM type 2 with ESRD POA  Accu-Cheks and sliding scale insulin     Essential hypertension POA  Hold PTA antihypertensives given borderline hypotension  Noted patient taking clonidine, watch for rebound    18.5 - 24.9 Normal weight / Body mass index is 24.43 kg/m².     Code status: Full  Prophylaxis: Hep SQ  Recommended Disposition: Home w/Family     Subjective:     Chief Complaint / Reason for Physician Visit   \"I feel a bit drowsy\"  No complaints of pain or SOB  Awaiting OR for debridement and colostomy  Spoke with Dr Romulo Smith with [facility] by phone, all questions answered    Review of Systems:  Symptom Y/N Comments  Symptom Y/N Comments   Fever/Chills nn   Chest Pain n    Poor Appetite    Edema     Cough n   Abdominal Pain n    Sputum    Joint Pain     SOB/BEE n   Pruritis/Rash     Nausea/vomit nn   Tolerating PT/OT     Diarrhea    Tolerating Diet y    Constipation    Other       Could NOT obtain due to:      Objective:     VITALS:   Last 24hrs VS reviewed since prior progress note. Most recent are:  Patient Vitals for the past 24 hrs:   Temp Pulse Resp BP SpO2   08/04/22 0858 -- 93 -- -- --   08/04/22 0800 97.7 °F (36.5 °C) (!) 103 25 135/70 --   08/04/22 0302 97.8 °F (36.6 °C) 86 16 (!) 114/51 98 %   08/04/22 0000 98 °F (36.7 °C) 78 14 117/63 97 %   08/03/22 1927 97.9 °F (36.6 °C) 89 18 (!) 119/40 97 %   08/03/22 1757 98.1 °F (36.7 °C) (!) 122 18 (!) 117/56 --   08/03/22 1745 -- (!) 108 18 (!) 135/95 --   08/03/22 1730 -- (!) 105 18 126/67 --   08/03/22 1715 -- 93 18 (!) 122/53 --   08/03/22 1700 -- 95 18 (!) 97/43 --   08/03/22 1645 -- 95 18 (!) 134/58 --   08/03/22 1630 -- 86 18 (!) 139/51 --   08/03/22 1615 -- 85 18 (!) 136/55 --   08/03/22 1600 -- 85 18 (!) 124/54 --   08/03/22 1545 -- 77 18 (!) 116/48 --   08/03/22 1530 -- 77 18 (!) 103/45 --   08/03/22 1515 -- 72 18 (!) 103/41 --   08/03/22 1455 98.4 °F (36.9 °C) 74 18 (!) 115/38 --   08/03/22 1419 -- -- -- (!) 95/42 --   08/03/22 1115 -- 72 -- (!) 100/40 --   08/03/22 1007 97.1 °F (36.2 °C) 65 18 (!) 96/52 --         Intake/Output Summary (Last 24 hours) at 8/4/2022 1741  Last data filed at 8/4/2022 0302  Gross per 24 hour   Intake 700 ml   Output 1000 ml   Net -300 ml          I had a face to face encounter and independently examined this patient on 8/4/2022, as outlined below:  PHYSICAL EXAM:  General: WD, WN. Alert, cooperative, no acute distress, frail appearing   EENT:  EOMI. Anicteric sclerae. MMM  Resp:  CTA bilaterally, no wheezing or rales. No accessory muscle use  CV:  Regular  rhythm,  No edema  GI:  Soft, Non distended, Non tender. +Bowel sounds  Neurologic:  Alert and oriented X 2  normal speech,   Psych:   Good insight. Not anxious nor agitated  Skin:  No rashes.   No jaundice, permcath right chest wall                         Reviewed most current lab test results and cultures  YES  Reviewed most current radiology test results YES  Review and summation of old records today    NO  Reviewed patient's current orders and MAR    YES  PMH/SH reviewed - no change compared to H&P  ________________________________________________________________________  Care Plan discussed with:    Comments   Patient x    Family      RN x    Care Manager     Consultant                        Multidiciplinary team rounds were held today with , nursing, pharmacist and clinical coordinator. Patient's plan of care was discussed; medications were reviewed and discharge planning was addressed. ________________________________________________________________________        Comments   >50% of visit spent in counseling and coordination of care     ________________________________________________________________________  Laney Blue MD     Procedures: see electronic medical records for all procedures/Xrays and details which were not copied into this note but were reviewed prior to creation of Plan. LABS:  I reviewed today's most current labs and imaging studies.   Pertinent labs include:  Recent Labs     08/03/22  0013 08/02/22  2033   WBC 14.2* 13.0*   HGB 10.3* 10.5*   HCT 32.9* 33.5*    242       Recent Labs     08/04/22  0616 08/03/22  1842 08/03/22  0013 08/02/22  2340 08/02/22  2033     --  138  --  137   K 5.8* 4.0 2.9*  --  2.9*     --  104  --  100   CO2 20*  --  27  --  27   *  --  102*  --  120*   BUN 15  --  28*  --  29*   CREA 1.80*  --  3.49*  --  3.89*   CA 7.9*  --  7.3*  --  7.8*   MG 2.0  --   --  1.8  --    PHOS 2.2*  --   --   --   --    ALB  --   --   --   --  1.4*   TBILI  --   --   --   --  0.4   ALT  --   --   --   --  23         Signed: Laney Blue MD

## 2022-08-04 NOTE — CONSULTS
Palliative Medicine Consult  Miguelito: [phone] ([phone])    Patient Name: Desirae Boateng  YOB: 1934    Date of Initial Consult: 8/4/22  Reason for Consult: Care Decisions  Requesting Provider: Fay Mccarty MD   Primary Care Physician: Bakari Rose MD     SUMMARY:   Desirae Boateng is a 80 y.o. with a past history of DM, and ESRD on dialysis MWF, who was admitted on 8/2/2022 from home with a diagnosis of sepsis 2/2 infected chronic decubitus ulcer. Mrs Sae Jurado is known to me from her admission to the ICU in May of this year. She was hospitalized from 5/23-6/15 and was treated for DKA, acute blood loss anemia, septic shock from bacteremia, acute pancreatitis and GLORIA with Rhabdo- started on HD during that hospitalization    She was fairly independent (but didn't do much during the day) prior to that admission, but her granddaughter who is her primary caregiver was hospitalized and so her oversight and routine was thrown off. She had this sacral wound and DTI's on her heels when she came into the hospital back in May, and went from being ambulatory to essentially bed bound, so it is not unexpected that her wounds would worsen   PALLIATIVE DIAGNOSES:     Frailty  Physical Debility  Palliative Encounter       PLAN:   Mrs Sae Jurado is awake, alert, frail appearing, very sweet. She defers conversations to Miguelito- I am not sure if she completely understands everything that is going on. She does tell me she knows she is going for surgery today- she has no questions for me, and tells me that Farley already \"talked to the doctors\" (Im assuming she means Dr Sharmaine Ochoa)  I will follow up with Miguelito tomorrow to see if she has thought more about CODE status since we met last.  We talked about it quite a few times the last time her grandmother was here.   More to come  Initial consult note routed to primary continuity provider and/or primary health care team members  Communicated plan of care with: Palliative IDT, Hospital Health Care Team     GOALS OF CARE / TREATMENT PREFERENCES:     GOALS OF CARE:  Patient/Health Care Proxy Stated Goals: Prolong life    TREATMENT PREFERENCES:   Code Status: Full Code    Advance Care Planning:  [x] [facility] Interdisciplinary Team has updated the ACP Navigator with Health Care Decision Maker and Patient Capacity      Primary Decision Maker (Active): Gloria Greg Boyd - [phone]    Medical Interventions: Full interventions       Other:    As far as possible, the palliative care team has discussed with patient / health care proxy about goals of care / treatment preferences for patient.      HISTORY:     History obtained from: chart, patient    CHIEF COMPLAINT: NONE- tells me she feels fine, but appears quite frail    HPI/SUBJECTIVE:    The patient is:   [x] Verbal and participatory  [] Non-participatory due to:        Clinical Pain Assessment (nonverbal scale for severity on nonverbal patients):   Clinical Pain Assessment  Severity: 0          Duration: for how long has pt been experiencing pain (e.g., 2 days, 1 month, years)  Frequency: how often pain is an issue (e.g., several times per day, once every few days, constant)     FUNCTIONAL ASSESSMENT:     Palliative Performance Scale (PPS):  PPS: 30       PSYCHOSOCIAL/SPIRITUAL SCREENING:     Palliative IDT has assessed this patient for cultural preferences / practices and a referral made as appropriate to needs (Cultural Services, Patient Advocacy, Ethics, etc.)    Any spiritual / Adventism concerns:  [] Yes /  [x] No   If \"Yes\" to discuss with pastoral care during IDT     Does caregiver feel burdened by caring for their loved one:   [] Yes /  [x] No /  [] No Caregiver Present/Available [] No Caregiver [] Pt [facility] at [facility]  If \"Yes\" to discuss with social work during IDT    Anticipatory grief assessment:   [x] Normal  / [] Maladaptive     If \"Maladaptive\" to discuss with social work during IDT    ESAS Anxiety: Anxiety: 0    ESAS Depression: Depression: 0        REVIEW OF SYSTEMS:     Positive and pertinent negative findings in ROS are noted above in HPI. The following systems were [x] reviewed / [] unable to be reviewed as noted in HPI  Other findings are noted below. Systems: constitutional, ears/nose/mouth/throat, respiratory, gastrointestinal, genitourinary, musculoskeletal, integumentary, neurologic, psychiatric, endocrine. Positive findings noted below. Modified ESAS Completed by: provider   Fatigue: 4     Depression: 0 Pain: 0   Anxiety: 0 Nausea: 0   Anorexia: 0 Dyspnea: 0     Constipation: No     Stool Occurrence(s): 1        PHYSICAL EXAM:     From RN flowsheet:  Wt Readings from Last 3 Encounters:   08/03/22 142 lb 4.8 oz (64.5 kg)   05/26/22 182 lb 1.6 oz (82.6 kg)     Blood pressure (!) 115/48, pulse (!) 105, temperature 97.8 °F (36.6 °C), resp. rate 19, height 5' 4\" (1.626 m), weight 142 lb 4.8 oz (64.5 kg), SpO2 98 %.     Pain Scale 1: Numeric (0 - 10)  Pain Intensity 1: 0                 Last bowel movement, if known:     Constitutional: Awake, alert but very frail  Eyes: pupils equal, anicteric  ENMT: no nasal discharge, moist mucous membranes  Cardiovascular: regular rhythm, distal pulses intact  Respiratory: breathing not labored, symmetric  Gastrointestinal: soft non-tender, +bowel sounds  Musculoskeletal: no deformity, no tenderness to palpation  Skin: warm, dry, documented wounds but I did not assess for myself  Neurologic: following commands, moving all extremities  Psychiatric: full affect, no hallucinations  Other:       HISTORY:     Active Problems:    Wound infection (8/2/2022)    Past Medical History:   Diagnosis Date    A-fib ([facility] [address]     Chronic kidney disease     Diabetes ([facility] [address]       Past Surgical History:   Procedure Laterality Date    IR INSERT NON TUNL CVC OVER 5 YRS  5/26/2022    IR INSERT NON TUNL CVC OVER 5 YRS  6/3/2022    IR INSERT TUNL CVC W/O PORT OVER 5 YR  6/8/2022 History reviewed. No pertinent family history. History reviewed, no pertinent family history.   Social History     Tobacco Use    Smoking status: Never    Smokeless tobacco: Never   Substance Use Topics    Alcohol use: Not Currently     No Known Allergies   Current Facility-Administered Medications   Medication Dose Route Frequency    lactated Ringers infusion  75 mL/hr IntraVENous CONTINUOUS    0.9% sodium chloride infusion  50 mL/hr IntraVENous CONTINUOUS    sodium chloride (NS) flush 5-40 mL  5-40 mL IntraVENous Q8H    sodium chloride (NS) flush 5-40 mL  5-40 mL IntraVENous PRN    lidocaine (PF) (XYLOCAINE) 10 mg/mL (1 %) injection 0.1 mL  0.1 mL SubCUTAneous PRN    fentaNYL citrate (PF) injection 50 mcg  50 mcg IntraVENous PRN    midazolam (VERSED) injection 1 mg  1 mg IntraVENous PRN    midazolam (VERSED) injection 1 mg  1 mg IntraVENous PRN    meropenem (MERREM) 1 g in 0.9% sodium chloride (MBP/ADV) 50 mL MBP  1 g IntraVENous Q24H    midodrine (PROAMATINE) tablet 10 mg  10 mg Oral Q MON, WED & FRI    albumin human 25% (BUMINATE) solution 25 g  25 g IntraVENous DIALYSIS PRN    VANCOMYCIN INFORMATION NOTE   Other Rx Dosing/Monitoring    heparin (porcine) 1,000 unit/mL injection 1,900 Units  1,900 Units InterCATHeter DIALYSIS PRN    And    heparin (porcine) 1,000 unit/mL injection 1,900 Units  1,900 Units InterCATHeter DIALYSIS PRN    amiodarone (CORDARONE) tablet 200 mg  200 mg Oral DAILY    aspirin delayed-release tablet 81 mg  81 mg Oral DAILY    atorvastatin (LIPITOR) tablet 40 mg  40 mg Oral DAILY    famotidine (PEPCID) tablet 20 mg  20 mg Oral DAILY    sodium chloride (NS) flush 5-40 mL  5-40 mL IntraVENous Q8H    sodium chloride (NS) flush 5-40 mL  5-40 mL IntraVENous PRN    acetaminophen (TYLENOL) tablet 650 mg  650 mg Oral Q6H PRN    Or    acetaminophen (TYLENOL) suppository 650 mg  650 mg Rectal Q6H PRN    polyethylene glycol (MIRALAX) packet 17 g  17 g Oral DAILY PRN    heparin (porcine) injection 5,000 Units  5,000 Units SubCUTAneous Q8H    insulin lispro (HUMALOG) injection   SubCUTAneous AC&HS    glucose chewable tablet 16 g  4 Tablet Oral PRN    glucagon (GLUCAGEN) injection 1 mg  1 mg IntraMUSCular PRN    dextrose 10 % infusion 0-250 mL  0-250 mL IntraVENous PRN     Facility-Administered Medications Ordered in Other Encounters   Medication Dose Route Frequency    fentaNYL citrate (PF) injection   IntraVENous PRN    lidocaine (PF) (XYLOCAINE) 20 mg/mL (2 %) injection   IntraVENous PRN    etomidate (AMIDATE) 2 mg/mL injection   IntraVENous PRN    succinylcholine (ANECTINE) injection   IntraVENous PRN    rocuronium injection   IntraVENous PRN    dexamethasone (DECADRON) 4 mg/mL injection   IntraVENous PRN          LAB AND IMAGING FINDINGS:     Lab Results   Component Value Date/Time    WBC 13.2 (H) 08/04/2022 10:04 AM    HGB 9.8 (L) 08/04/2022 10:04 AM    PLATELET 859 26/79/4548 10:04 AM     Lab Results   Component Value Date/Time    Sodium 136 08/04/2022 06:16 AM    Potassium 5.8 (H) 08/04/2022 06:16 AM    Chloride 104 08/04/2022 06:16 AM    CO2 20 (L) 08/04/2022 06:16 AM    BUN 15 08/04/2022 06:16 AM    Creatinine 1.80 (H) 08/04/2022 06:16 AM    Calcium 7.9 (L) 08/04/2022 06:16 AM    Magnesium 2.0 08/04/2022 06:16 AM    Phosphorus 2.2 (L) 08/04/2022 06:16 AM      Lab Results   Component Value Date/Time    Alk.  phosphatase 136 (H) 08/02/2022 08:33 PM    Protein, total 7.1 08/02/2022 08:33 PM    Albumin 1.4 (L) 08/02/2022 08:33 PM    Globulin 5.7 (H) 08/02/2022 08:33 PM    GGT 28 05/30/2022 03:38 AM     Lab Results   Component Value Date/Time    INR 1.2 (H) 05/29/2022 04:59 AM    Prothrombin time 12.7 (H) 05/29/2022 04:59 AM    aPTT 30.1 05/29/2022 04:59 AM      No results found for: IRON, FE, TIBC, IBCT, PSAT, FERR   Lab Results   Component Value Date/Time    pH 7.20 (LL) 05/23/2022 02:59 PM    PCO2 34 (L) 05/23/2022 02:59 PM    PO2 107 (H) 05/23/2022 02:59 PM     No components found for: [place]   Lab Results   Component Value Date/Time     (H) 05/31/2022 03:32 AM                Total time:   Counseling / coordination time, spent as noted above:   > 50% counseling / coordination?:     Prolonged service was provided for  []30 min   []75 min in face to face time in the presence of the patient, spent as noted above. Time Start:   Time End:   Note: this can only be billed with 97432 (initial) or 14615 (follow up). If multiple start / stop times, list each separately.

## 2022-08-05 PROBLEM — Z71.89 DNR (DO NOT RESUSCITATE) DISCUSSION: Status: ACTIVE | Noted: 2022-01-01

## 2022-08-05 NOTE — PROGRESS NOTES
Infectious Disease progress      Impression    Gram-negative sepsis   Blood cultures-8/4+ for Proteus spp 4/4, ESBL   Klebsiella pneumoniae 1/4, E faecalis 1/4-RAC  Repeat cultures 8/4+ for GNR 2/2. 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 ESRD on HD    Diabetes type 2 poorly controlled  A1c>14      Primary hypertension  Management per primary team      Plan  Continue vancomycin , meropenem pending final cultures  Await ID DUY on blood cultures  Repeat blood cultures until negative cultures obtained  Wound care per wound care team  Adequate blood sugar control  Frequent position change, pressure offloading. Please contact ID on call with questions, concerns over the weekend. Tiago Martins is a 80 y.o. female with past medical history significant for diabetes mellitus and ESRD on MWF , sacral decubitus ulcer. Patient presented for wound check. Per H&P family had reported that she has had a sacral decubitus ulcer for many months, but in the past week and has began to become more red with foul-smelling discharge. Home health nurse raised concern for infection and recommended she report to the ED and so she was transported by EMS. Patient reports no other symptoms and has no other complaints or concerns. In the ED, patient had been normothermic, with low blood pressure with lowest 83/40 which improved to 115/40 with 1 L fluid resuscitation then progressively began to decline again prompting repeat bolus with improvement SBP in the 90s. WBC 13.0 ,BUN 29, creatinine 3.89.   Blood cultures were drawn, empiric Zosyn and clindamycin was started patient was admitted to the hospitalist service. Blood cultures done on 8/2 now +for ESBL Proteus 4/4, Gram variable rods 3/4. Patient has been noted to have extensive sacral decubitus with underlying palpable bone, also ongoing fecal contamination  She is status post I&D of sacrum & laparoscopic colostomy by Dr. Mikael Salgado. Of note patient was admitted 5/23-6/15 and treated for septic shock. ID service was seeing her. Patient was treated for Serratia bacteremia and Klebsiella bacteremia. Blood cultures were persistently positive for Serratia 5/23-6/1. Possible mitral valve endocarditis. Patient was treated with cefepime IV with hemodialysis 6/3-7/15. Patient seen today. Denies complaints  D/w RN events of the day  Active Hospital Problems    Diagnosis Date Noted    DNR (do not resuscitate) discussion 08/05/2022    Frailty 08/04/2022    Physical debility 08/04/2022    Wound infection 08/02/2022         Past Medical History:   Diagnosis Date    A-fib ([facility] [address]     Chronic kidney disease     Diabetes ([facility] [address]        Past Surgical History:   Procedure Laterality Date    IR INSERT NON TUNL CVC OVER 5 YRS  5/26/2022    IR INSERT NON TUNL CVC OVER 5 YRS  6/3/2022    IR INSERT TUNL CVC W/O PORT OVER 5 YR  6/8/2022       No Known Allergies    Social Connections: Not on file       No family status information on file. Medication Documentation Review Audit       Reviewed by Jessica Pagan DO (Physician) on 08/02/22 at 2301      Medication Sig Documenting Provider Last Dose Status Taking?   amiodarone (CORDARONE) 200 mg tablet Take 1 Tablet by mouth daily. Jennifer Aden MD 8/2/2022 Active Yes   amLODIPine (NORVASC) 10 mg tablet Take 10 mg by mouth daily. Provider, Historical 8/2/2022 Active Yes   aspirin delayed-release 81 mg tablet Take 81 mg by mouth daily. Provider, Historical 8/2/2022 Active Yes   atorvastatin (LIPITOR) 20 mg tablet Take 2 Tablets by mouth daily.  Jennifer Aden MD 8/2/2022 Active Yes   cefepime 1 gram 1 g IVPB 1 g by IntraVENous route every twenty-four (24) hours. End date 7/15/2022 Ale Sullivan MD  Active    cholecalciferol (VITAMIN D3) (1000 Units /25 mcg) tablet Take 1,000 Units by mouth daily. Provider, Historical 8/2/2022 Active Yes   cloNIDine HCl (CATAPRES) 0.3 mg tablet Take 0.3 mg by mouth two (2) times a day. Provider, Historical 8/2/2022 Active Yes   famotidine (PEPCID) 20 mg tablet  Other, MD Marcia 8/2/2022 Active Yes   furosemide (LASIX) 20 mg tablet    Patient not taking: Reported on 8/2/2022    Other, MD Marcia Unknown Active No   insulin lispro protamine/insulin lispro (HUMALOG MIX 75/25) 100 unit/mL (75-25) injection 5 Units by SubCUTAneous route Before breakfast and dinner. Ale Sullivan MD 8/2/2022 Active Yes   pantoprazole (PROTONIX) 20 mg tablet Take 2 Tablets by mouth daily. Patient not taking: Reported on 8/2/2022    Ale Sullivan MD Not Taking Active No                      Review of Systems -could not be obtained due to patient factors      PHYSICAL EXAM:  General:          Awake, cooperative, no acute distress    EENT:              EOMI. Anicteric sclerae. MMM  Resp:               CTA bilaterally, no wheezing or rales. No accessory muscle use  CV:                  Regular  rhythm,  No edema  GI:                   Soft, Non distended, Non tender. +Bowel sounds  Neurologic:      Awake and oriented X 1, normal speech,   Psych:             Good insight. Not anxious nor agitated  Skin:                No rashes. No jaundice.       MD Romel Roberts

## 2022-08-05 NOTE — PROCEDURES
Hemodialysis / [phone]    Vitals Pre Post Assessment Pre Post   BP BP: (!) 136/58 (08/05/22 0807)   128/59     LOC A&Ox3, sleepy A&Ox3, sleepy   HR Pulse (Heart Rate): 78 (08/05/22 0807) 102   Lungs Clear, diminished bases Clear, diminished bases   Resp Resp Rate: 18 (08/05/22 0807) 18 Cardiac Irregular (Afib per report) Irregular (Afib per report)   Temp Temp: 97.7 °F (36.5 °C) (08/05/22 0807) 97.7 Skin Sacral and B/L heel wounds, new ostomy Sacral and B/L heel wounds, new ostomy   Weight  N/a N/a Edema 2-3 + BLE 1-2+ BLE   Tele status remote remote Pain 0 0     Orders   Duration: Start: 0807 End: 1140 Total: 3.5 Hours   Dialyzer: Dialyzer/Set Up Inspection: [facility] (08/05/22 0807)   K Bath: Dialysate K (mEq/L): 2 (2 K per Dr. Grace Donnelly d/t high K) (08/05/22 0807)   Ca Bath: Dialysate CA (mEq/L): 2.5 (08/05/22 0807)   Na: Dialysate NA (mEq/L): 138 (08/05/22 0807)   Bicarb: Dialysate HCO3 (mEq/L): 35 (08/05/22 0807)   Target Fluid Removal: Goal/Amount of Fluid to Remove (mL): 1000 mL (08/05/22 0807)     Access   Type & Location: R PC   Comments: Dressing CDI and dated 8/3/22. No s/s of infection. Both lumens aspirate & flush well. Running well at .                                      Labs   HBsAg (Antigen) / date: Negative 08/3/22                                              HBsAb (Antibody) / date: Susceptible 8/3/22   Source: Epic   Obtained/Reviewed  Critical Results Called HGB   Date Value Ref Range Status   08/04/2022 9.8 (L) 11.5 - 16.0 g/dL Final     Potassium   Date Value Ref Range Status   08/04/2022 5.8 (H) 3.5 - 5.1 mmol/L Final     Comment:     INVESTIGATED PER DELTA CHECK PROTOCOL     Calcium   Date Value Ref Range Status   08/04/2022 7.9 (L) 8.5 - 10.1 MG/DL Final     BUN   Date Value Ref Range Status   08/04/2022 15 6 - 20 MG/DL Final     Creatinine   Date Value Ref Range Status   08/04/2022 1.80 (H) 0.55 - 1.02 MG/DL Final     Comment:     INVESTIGATED PER DELTA CHECK PROTOCOL        Meds Given   Name Dose Route   none                 Adequacy / Fluid    Total Liters Process: 67.4L   Net Fluid Removed: 1000ml      Comments   Time Out Done:   (Time) 0805   Admitting Diagnosis: Sacral Wound Infection   Consent obtained/signed: Informed Consent Verified: Yes (Chronic dialysis, no consent needed) (08/05/22 1546)   Machine / RO # Machine Number: [id number] (08/05/22 9828)   Primary Nurse Rpt Pre: Griselda Howard RN   Primary Nurse Rpt Post: Mark Currie RN   Pt Education: Procedure   Care Plan: Ongoing   Pts outpatient [facility]: [facility]   Comments:   SBAR received from Primary RN. Pt arrived to HD suite A&Ox4. Consent signed & on file. 5276: Each catheter limb disinfected per p&p, caps removed, hubs disinfected per p&p. Each lumen aspirated for blood return and flushed with Normal Saline per policy. Labs drawn per request/ order. VSS. Dialysis Tx initiated. **Pt tolerated well without complications or complaints. Rested throughout treatment**    1140: Tx ended. VSS. Each dialysis catheter limb disinfected per p&p, all possible blood returned per p&p, and each dialysis hub disinfected per p&p. Each lumen flushed, post dialysis catheter Heparin dwell instilled per order, and caps applied. Bed locked and in the lowest position, call bell and belongings in reach. SBAR given to Primary, RN. Patient is stable at time of their departure. All Dialysis related medications have been reviewed.

## 2022-08-05 NOTE — PROGRESS NOTES
Physician Progress Note      Jairon Feng  CSN #:                  [id number]  :                       1934  ADMIT DATE:       2022 7:33 PM  [address] DATE:  Yuan Gilbert  PROVIDER #:        Shan Love MD          QUERY TEXT:    Patient admitted with sepsis. Per Wound care PN 8/3, noted to also have Bilateral Heel unstageable pressure ulcer. If possible, please document in progress notes and discharge summary the location, present on admission status and stage of the pressure ulcer: The medical record reflects the following:  Risk Factors: 79 y/o female with bilateral pressure ulcer of heels, stage 4 pressure ulcer of sacrum    Clinical Indicators:  8/3/21 Wound care PN: Both heels have unstageable pressure injuries covered with eschar  ( photos are documented in chart with wound care note). Treatment: wound care consult, float heels, mobility services, lift equipment, pad/offload medical devices, minimize layers under pt. daily wound care: cleanse heels with providing-iodine swabs, leave open to air    Stage 1:  Non-blanchable erythema of intact skin  Stage 2:  Abrasion, Blister, Partial-thickness skin loss, with exposed dermis  Stage 3:  Full-thickness skin loss with damage or necrosis of subcutaneous tissue  Stage 4:  Full-thickness skin & soft tissue loss through to underlying muscle, tendon or bone  Unstageable: Obscured full-thickness skin & tissue loss  Options provided:  -- Unstageable Pressure Ulcer Bilateral Heel  present on admission  -- Unstageable Pressure Ulcer of Bilateral Heel  not present on admission  -- Other - I will add my own diagnosis  -- Disagree - Not applicable / Not valid  -- Disagree - Clinically unable to determine / Unknown  -- Refer to Clinical Documentation Reviewer    PROVIDER RESPONSE TEXT:    This patient has a Unstageable pressure ulcer of the Bilateral Heel that was present on admission.     Query created by: Ana Price on 8/5/2022 12:29 PM      Electronically signed by:  Lorri Diallo MD 8/5/2022 6:53 PM

## 2022-08-05 NOTE — WOUND CARE
Wound care follow up for the sacrum wound and the ostomy care:  Chart reviewed. Just spoke with Dr. Diamond Harris about this patient. He wrote orders for wound care through the weekend to continue. The colostomy stoma is pink. No output yet. Bag is intact. Deferred the assessment of the sacrum wound for today as it was just reassessed by the surgeon. Plan: Colostomy education materials / books placed in patient room for caregivers to browse for now. I will talk with them on Monday.    Lelon Siemens, RN, BSN, [facility]

## 2022-08-05 NOTE — PROGRESS NOTES
Nephrology Progress Note  [facility] / [address], [place]  [place], [address]  Phone - [phone]  Fax - [phone]                 Patient: Desirae Boateng                   YOB: 1934        Date- 8/5/2022                      Admit Date: 8/2/2022  CC: Follow up for ESRD          IMPRESSION & PLAN:    ESRD , HD dependent since 5/23/2022, on MWF schedule, [facility]mie Ybarra, under Dr. Joel Peacock  Sepsis  Chronic sacral decubitus ulcer  hypokalemia  AFIB  DM 2  Anemia  Hypotension    PLAN-  Plans for hemodialysis today, and will keep him on HD on MWF schedule. Antibiotics per internal medicine team.  Status post OR yesterday for sacral wound debridement  We will add Epogen for anemia  I have added midodrine for hypotension       Subjective: Interval History:   -Patient was seen and examined today on HD.  -Status post OR yesterday for sacral wound debridement    Objective:   Vitals:    08/05/22 0305 08/05/22 0307 08/05/22 0807 08/05/22 0815   BP: (!) 134/46 118/77 (!) 136/58 130/62   Pulse:  89 78 75   Resp:  25 18 18   Temp:  98 °F (36.7 °C) 97.7 °F (36.5 °C)    TempSrc:   Oral    SpO2:  99%     Weight:       Height:          08/04 0701 - 08/05 0700  In: 650 [I.V.:400]  Out: 135 [Urine:100]  Last 3 Recorded Weights in this Encounter    08/02/22 1959 08/03/22 0100 08/04/22 1830   Weight: 74.8 kg (165 lb) 64.5 kg (142 lb 4.8 oz) 65.5 kg (144 lb 6.4 oz)      Physical exam:    GEN: NAD  NECK- Supple, no mass  RESP: No wheezing, Clear b/l  CVS: S1,S2  RRR  NEURO: Normal speech, Non focal  EXT: No Edema   HD access: Right chest permacath    Chart reviewed. Pertinent Notes reviewed.      Data Review :  Recent Labs     08/04/22  0616 08/03/22  1842 08/03/22  0013 08/02/22 2340 08/02/22 2033     --  138  --  137   K 5.8* 4.0 2.9*  --  2.9*     --  104  --  100   CO2 20*  --  27  --  27   BUN 15  --  28*  --  29*   CREA 1.80*  --  3.49*  --  3.89*   *  --  102*  --  120*   CA 7.9*  --  7.3*  --  7.8*   MG 2.0  --   --  1.8  --    PHOS 2.2*  --   --   --   --        Recent Labs     08/05/22  0813 08/04/22  1004 08/03/22  0013   WBC 12.2* 13.2* 14.2*   HGB 8.2* 9.8* 10.3*   HCT 27.1* 31.6* 32.9*    253 233       No results for input(s): FE, TIBC, PSAT, FERR in the last 72 hours.    Medication list  reviewed  Current Facility-Administered Medications   Medication Dose Route Frequency    meropenem (MERREM) 1 g in 0.9% sodium chloride (MBP/ADV) 50 mL MBP  1 g IntraVENous Q24H    midodrine (PROAMATINE) tablet 10 mg  10 mg Oral Q MON, WED & FRI    albumin human 25% (BUMINATE) solution 25 g  25 g IntraVENous DIALYSIS PRN    VANCOMYCIN INFORMATION NOTE   Other Rx Dosing/Monitoring    heparin (porcine) 1,000 unit/mL injection 1,900 Units  1,900 Units InterCATHeter DIALYSIS PRN    And    heparin (porcine) 1,000 unit/mL injection 1,900 Units  1,900 Units InterCATHeter DIALYSIS PRN    amiodarone (CORDARONE) tablet 200 mg  200 mg Oral DAILY    aspirin delayed-release tablet 81 mg  81 mg Oral DAILY    atorvastatin (LIPITOR) tablet 40 mg  40 mg Oral DAILY    famotidine (PEPCID) tablet 20 mg  20 mg Oral DAILY    sodium chloride (NS) flush 5-40 mL  5-40 mL IntraVENous Q8H    sodium chloride (NS) flush 5-40 mL  5-40 mL IntraVENous PRN    acetaminophen (TYLENOL) tablet 650 mg  650 mg Oral Q6H PRN    Or    acetaminophen (TYLENOL) suppository 650 mg  650 mg Rectal Q6H PRN    polyethylene glycol (MIRALAX) packet 17 g  17 g Oral DAILY PRN    heparin (porcine) injection 5,000 Units  5,000 Units SubCUTAneous Q8H    insulin lispro (HUMALOG) injection   SubCUTAneous AC&HS    glucose chewable tablet 16 g  4 Tablet Oral PRN    glucagon (GLUCAGEN) injection 1 mg  1 mg IntraMUSCular PRN    dextrose 10 % infusion 0-250 mL  0-250 mL IntraVENous PRN          Radha Saucedo MD  8/5/2022

## 2022-08-05 NOTE — OP NOTES
[address]  OPERATIVE REPORT    Name:  Andrez Gates  MR#:  [id number]  :  1934  ACCOUNT #:  [de-identified]  DATE OF SERVICE:  2022      PREOPERATIVE DIAGNOSES:  Sacral decubitus, soft tissue injury and infection. POSTOPERATIVE DIAGNOSES:  Sacral decubitus, soft tissue injury and infection, involving skin, subcutaneous adipose, fascia and muscle. PROCEDURE PERFORMED:  Debridement of skin, subcutaneous adipose, fascia and muscle over the sacrum approximately 300 cm2, laparoscopic diverting end-colostomy. SURGEON:  Iris Gutierrez MD    ASSISTANT:  Staff. ANESTHESIA:  General.    COMPLICATIONS:  None immediate. SPECIMENS REMOVED:  Sacral wound tissues for culture. IMPLANTS:  None. DRAINS:  None. ESTIMATED BLOOD LOSS:  Minimal.    FINDINGS:  See above. DESCRIPTION OF PROCEDURE:  After obtaining informed consent, the patient was taken to operating room and intubated after appropriate time-out. The patient was rolled prone with appropriate padding and securing straps. Preoperative antibiotics were given and the buttocks, sacrum and lower back were prepped and draped in the usual sterile fashion. Using electrocautery, the necrotic skin was incised around its perimeter at the interface with viable skin. This was taken down into the subcutaneous tissues and all of the grossly necrotic skin, subcutaneous adipose, fascia and muscle were removed with electrocautery. Once the grossly necrotic areas were removed, we used the Misonix debriding device to further clean up the wound. The ligaments over the sacrum were intact. There was no bone exposed. We then irrigated the wound with antibiotic solution and checked for hemostasis. Excellent hemostasis was noted. We then packed the wound with Dakin's soaked Kerlix. ABD pads were placed over this and secured with tape.   The patient was then rolled back onto the cot and then brought back on to the operating table supine. Her abdomen was prepped and draped in usual sterile fashion. The abdomen was then entered in the upper midline using a 10-mm optical trocar. The abdomen was insufflated without incident and was visually explored. No gross pathology was noted. Under direct vision two right-sided 5-mm ports were placed and the sigmoid colon was manipulated and a spot for division was chosen. One of the 5-mm ports was upsized to a 12 and a window was created in the mesentery using the LigaSure device. The distal sigmoid colon was then divided using a YENY stapler. The mesentery proximally was divided using the LigaSure device and the white line of Toldt was taken down as well. Once we had adequate mobilization to bring the staple line, the proximal staple line up to the left upper quadrant we desufflated. A silver dollar size piece of skin was removed from the left upper quadrant and we went through the rectus sheath, rectus muscle and posterior rectus sheath on a muscle-splitting fashion. We brought the stapled end of the proximal colon up through here. I then revisualized laparoscopically and ensured there was no twisting and it was in fact the proximal staple end, it was. We then secured it with a Newkirk from the outside and went about closing the fascial defects of the 12-mm ports with 0 Vicryl suture. The skin incisions were closed with Monocryl and dressed with Dermabond. We then matured the colostomy in a Dulce fashion. The appliance was then applied and the patient was recovered from anesthesia and taken to recovery area in satisfactory condition. All instrument, sponge and needle counts were reported as correct.         Sebas Urbina MD [id number]  D:  08/04/2022 14:36  T:  08/05/2022 0:50  JOB #:  [id number]  CC:  Juvenal Newton MD

## 2022-08-05 NOTE — PROGRESS NOTES
Received notification from bedside RN about patient with regards to: for dialysis this morning and requesting to give patient Midodrine prior to treatment  VS: /77, HR 89, RR 25, O2 sat 99% on NC 3 L    Intervention given: Midodrine 10 mg PO x 1 dose ordered

## 2022-08-05 NOTE — PROGRESS NOTES
Palliative Medicine Consult  Miguelito: [phone]    Patient Name: Bebe Padilla  YOB: 1934    Date of Initial Consult: 8/4/22  Reason for Consult: Care Decisions  Requesting Provider: Tae Sanford MD   Primary Care Physician: Ravindra Smith MD     SUMMARY:   Bebe Padilla is a 80 y.o. with a past history of DM, and ESRD on dialysis MWF, who was admitted on 8/2/2022 from home with a diagnosis of sepsis 2/2 infected chronic decubitus ulcer. Mrs Rg Guerrero is known to me from her admission to the ICU in May of this year. She was hospitalized from 5/23-6/15 and was treated for DKA, acute blood loss anemia, septic shock from bacteremia, acute pancreatitis and GLORIA with Rhabdo- started on HD during that hospitalization    She was fairly independent (but didn't do much during the day) prior to that admission, but her granddaughter who is her primary caregiver was hospitalized and so her oversight and routine was thrown off. She had this sacral wound and DTI's on her heels when she came into the hospital back in May, and went from being ambulatory to essentially bed bound, so it is not unexpected that her wounds would worsen   PALLIATIVE DIAGNOSES:     Frailty  Physical Debility  Palliative Encounter  DNR Discussion     PLAN:   Met with Mrs Rg Guerrero again- she looks well, no complaints, but not able to fully engage in conversation about her health. She defers again to Miguelito today  I spoke with Miguelito on the phone- she remembered me from May when her grandmother was here in the ICU  She shared that its going well at home- she is glad that this was an option for them. Her grandmothers mental status is much deteriorated when compared to how it was before her last hospitalization, in that she is very forgetful, and often hallucinates about people (she though Tiffanie Jett had come to check on her other other day and was concerned because he had not been cleared from [facility] yet).   Otherwise she is doing well  I asked Dipak Cardenas if she had thought more about the DNR that we had talked about in May. She said she had, and she had talked to her sister, and the whole family agrees that they do not want to put her through CPR at this stage. I put the DNR order into the computer, but I also left a DDNR with her nurse Kari Cartagena, for her to sign this afternoon when she arrived  Goals are clear, will remain available for support for now, but please call if we need to get actively involved again  Of note she asked to speak with case management as Dipak Cardenas is not sure how to check on the status of her grandmothers Medicaid application that was started during her last hospitalization  Initial consult note routed to primary continuity provider and/or primary health care team members  Communicated plan of care with: Palliative [facility] Team     GOALS OF CARE / TREATMENT PREFERENCES:     GOALS OF CARE:  Patient/Health Care Proxy Stated Goals: Prolong life    TREATMENT PREFERENCES:   Code Status: DNR    Advance Care Planning:  [x] [facility] Interdisciplinary Team has updated the ACP Navigator with [address] and Patient Capacity      Primary Decision Maker (Active): Gloria Boyd  [phone]    Medical Interventions: Full interventions       Other:    As far as possible, the palliative care team has discussed with patient / health care proxy about goals of care / treatment preferences for patient.      HISTORY:     History obtained from: chart, patient    CHIEF COMPLAINT: NONE- tells me she feels fine, but appears quite frail    HPI/SUBJECTIVE:    The patient is:   [x] Verbal and participatory  [] Non-participatory due to:        Clinical Pain Assessment (nonverbal scale for severity on nonverbal patients):   Clinical Pain Assessment  Severity: 0          Duration: for how long has pt been experiencing pain (e.g., 2 days, 1 month, years)  Frequency: how often pain is an issue (e.g., several times per day, once every few days, constant)     FUNCTIONAL ASSESSMENT:     Palliative Performance Scale (PPS):  PPS: 30       PSYCHOSOCIAL/SPIRITUAL SCREENING:     Palliative IDT has assessed this patient for cultural preferences / practices and a referral made as appropriate to needs (Cultural Services, Patient Advocacy, Ethics, etc.)    Any spiritual / Methodist concerns:  [] Yes /  [x] No   If \"Yes\" to discuss with pastoral care during IDT     Does caregiver feel burdened by caring for their loved one:   [] Yes /  [x] No /  [] No Caregiver Present/Available [] No Caregiver [] Pt [facility] at [facility]  If \"Yes\" to discuss with social work during IDT    Anticipatory grief assessment:   [x] Normal  / [] Maladaptive     If \"Maladaptive\" to discuss with social work during IDT    ESAS Anxiety: Anxiety: 0    ESAS Depression: Depression: 0        REVIEW OF SYSTEMS:     Positive and pertinent negative findings in ROS are noted above in HPI. The following systems were [x] reviewed / [] unable to be reviewed as noted in HPI  Other findings are noted below. Systems: constitutional, ears/nose/mouth/throat, respiratory, gastrointestinal, genitourinary, musculoskeletal, integumentary, neurologic, psychiatric, endocrine. Positive findings noted below. Modified ESAS Completed by: provider   Fatigue: 4     Depression: 0 Pain: 0   Anxiety: 0 Nausea: 0   Anorexia: 0 Dyspnea: 0     Constipation: No     Stool Occurrence(s): 1        PHYSICAL EXAM:     From RN flowsheet:  Wt Readings from Last 3 Encounters:   08/04/22 144 lb 6.4 oz (65.5 kg)   05/26/22 182 lb 1.6 oz (82.6 kg)     Blood pressure (!) 128/59, pulse (!) 138, temperature 97.7 °F (36.5 °C), temperature source Oral, resp. rate 18, height 5' 4\" (1.626 m), weight 144 lb 6.4 oz (65.5 kg), SpO2 98 %.     Pain Scale 1: Numeric (0 - 10)  Pain Intensity 1: 0              Pain Intervention(s) 1: Medication (see MAR)  Last bowel movement, if known: Constitutional: Awake, alert but very frail  Eyes: pupils equal, anicteric  ENMT: no nasal discharge, moist mucous membranes  Cardiovascular: regular rhythm, distal pulses intact  Respiratory: breathing not labored, symmetric  Gastrointestinal: soft non-tender, +bowel sounds  Musculoskeletal: no deformity, no tenderness to palpation  Skin: warm, dry, documented wounds but I did not assess for myself  Neurologic: following commands, moving all extremities  Psychiatric: full affect, no hallucinations  Other:       HISTORY:     Active Problems:    Wound infection (8/2/2022)      Frailty (8/4/2022)      Physical debility (8/4/2022)    Past Medical History:   Diagnosis Date    A-fib ([facility] [address]     Chronic kidney disease     Diabetes ([facility] [address]       Past Surgical History:   Procedure Laterality Date    IR INSERT NON TUNL CVC OVER 5 YRS  5/26/2022    IR INSERT NON TUNL CVC OVER 5 YRS  6/3/2022    IR INSERT TUNL CVC W/O PORT OVER 5 YR  6/8/2022      History reviewed. No pertinent family history. History reviewed, no pertinent family history.   Social History     Tobacco Use    Smoking status: Never    Smokeless tobacco: Never   Substance Use Topics    Alcohol use: Not Currently     No Known Allergies   Current Facility-Administered Medications   Medication Dose Route Frequency    vancomycin (VANCOCIN) 1,000 mg in 0.9% sodium chloride 250 mL (VIAL-MATE)  1,000 mg IntraVENous ONCE    meropenem (MERREM) 1 g in 0.9% sodium chloride (MBP/ADV) 50 mL MBP  1 g IntraVENous Q24H    midodrine (PROAMATINE) tablet 10 mg  10 mg Oral Q MON, WED & FRI    albumin human 25% (BUMINATE) solution 25 g  25 g IntraVENous DIALYSIS PRN    VANCOMYCIN INFORMATION NOTE   Other Rx Dosing/Monitoring    heparin (porcine) 1,000 unit/mL injection 1,900 Units  1,900 Units InterCATHeter DIALYSIS PRN    And    heparin (porcine) 1,000 unit/mL injection 1,900 Units  1,900 Units InterCATHeter DIALYSIS PRN    amiodarone (CORDARONE) tablet 200 mg  200 mg Oral DAILY aspirin delayed-release tablet 81 mg  81 mg Oral DAILY    atorvastatin (LIPITOR) tablet 40 mg  40 mg Oral DAILY    famotidine (PEPCID) tablet 20 mg  20 mg Oral DAILY    sodium chloride (NS) flush 5-40 mL  5-40 mL IntraVENous Q8H    sodium chloride (NS) flush 5-40 mL  5-40 mL IntraVENous PRN    acetaminophen (TYLENOL) tablet 650 mg  650 mg Oral Q6H PRN    Or    acetaminophen (TYLENOL) suppository 650 mg  650 mg Rectal Q6H PRN    polyethylene glycol (MIRALAX) packet 17 g  17 g Oral DAILY PRN    heparin (porcine) injection 5,000 Units  5,000 Units SubCUTAneous Q8H    insulin lispro (HUMALOG) injection   SubCUTAneous AC&HS    glucose chewable tablet 16 g  4 Tablet Oral PRN    glucagon (GLUCAGEN) injection 1 mg  1 mg IntraMUSCular PRN    dextrose 10 % infusion 0-250 mL  0-250 mL IntraVENous PRN          LAB AND IMAGING FINDINGS:     Lab Results   Component Value Date/Time    WBC 12.2 (H) 08/05/2022 08:13 AM    HGB 8.2 (L) 08/05/2022 08:13 AM    PLATELET 761 31/03/2547 08:13 AM     Lab Results   Component Value Date/Time    Sodium 139 08/05/2022 08:13 AM    Potassium 4.0 08/05/2022 08:13 AM    Chloride 104 08/05/2022 08:13 AM    CO2 25 08/05/2022 08:13 AM    BUN 21 (H) 08/05/2022 08:13 AM    Creatinine 2.58 (H) 08/05/2022 08:13 AM    Calcium 7.9 (L) 08/05/2022 08:13 AM    Magnesium 2.0 08/04/2022 06:16 AM    Phosphorus 3.2 08/05/2022 08:13 AM      Lab Results   Component Value Date/Time    Alk.  phosphatase 136 (H) 08/02/2022 08:33 PM    Protein, total 7.1 08/02/2022 08:33 PM    Albumin 1.5 (L) 08/05/2022 08:13 AM    Globulin 5.7 (H) 08/02/2022 08:33 PM    GGT 28 05/30/2022 03:38 AM     Lab Results   Component Value Date/Time    INR 1.2 (H) 05/29/2022 04:59 AM    Prothrombin time 12.7 (H) 05/29/2022 04:59 AM    aPTT 30.1 05/29/2022 04:59 AM      No results found for: IRON, FE, TIBC, IBCT, PSAT, FERR   Lab Results   Component Value Date/Time    pH 7.20 (LL) 05/23/2022 02:59 PM    PCO2 34 (L) 05/23/2022 02:59 PM    PO2 107 (H) 05/23/2022 02:59 PM     No components found for: [place]   Lab Results   Component Value Date/Time     (H) 05/31/2022 03:32 AM                Total time:   Counseling / coordination time, spent as noted above:   > 50% counseling / coordination?:     Prolonged service was provided for  []30 min   []75 min in face to face time in the presence of the patient, spent as noted above. Time Start:   Time End:   Note: this can only be billed with 46311 (initial) or 31680 (follow up). If multiple start / stop times, list each separately.

## 2022-08-05 NOTE — CONSULTS
Infectious Disease Consult    Date of Consultation:  8/4/22  Reason for Consultation: Infected sacral decubitus  Referring Physician: Dr. 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 ESRD on HD    Diabetes type 2 poorly controlled  A1c>14      Primary hypertension  Management per primary team      Plan  Continue vancomycin , meropenem pending final cultures  Await ID DUY on blood cultures  Repeat blood cultures until negative cultures obtained  Wound care per wound care team  Adequate blood sugar control  Frequent position change, pressure offloading. Kacie Doyle is a 80 y.o. female with past medical history significant for diabetes mellitus and ESRD on MWF , sacral decubitus ulcer. Patient presented for wound check. Per H&P family had reported that she has had a sacral decubitus ulcer for many months, but in the past week and has began to become more red with foul-smelling discharge. Home health nurse raised concern for infection and recommended she report to the ED and so she was transported by EMS. Patient reports no other symptoms and has no other complaints or concerns. In the ED, patient had been normothermic, with low blood pressure with lowest 83/40 which improved to 115/40 with 1 L fluid resuscitation then progressively began to decline again prompting repeat bolus with improvement SBP in the 90s. WBC 13.0 ,BUN 29, creatinine 3.89.   Blood cultures were drawn, empiric Zosyn and clindamycin was started patient was admitted to the hospitalist service. Blood cultures done on 8/2 now +for ESBL Proteus 4/4, Gram variable rods 3/4. Patient has been noted to have extensive sacral decubitus with underlying palpable bone, also ongoing fecal contamination  She is status post I&D of sacrum & laparoscopic colostomy by Dr. REES. Of note patient was admitted 5/23-6/15 and treated for septic shock. ID service was seeing her. Patient was treated for Serratia bacteremia and Klebsiella bacteremia. Blood cultures were persistently positive for Serratia 5/23-6/1. Possible mitral valve endocarditis. Patient was treated with cefepime IV with hemodialysis 6/3-7/15. Patient seen today. Awake, appears comfortable. Denies pain  Not able to answer any other questions. D/w RN events of the day  Active Hospital Problems    Diagnosis Date Noted    Frailty 08/04/2022    Physical debility 08/04/2022    Wound infection 08/02/2022         Past Medical History:   Diagnosis Date    A-fib ([facility] [address]     Chronic kidney disease     Diabetes ([facility] [address]        Past Surgical History:   Procedure Laterality Date    IR INSERT NON TUNL CVC OVER 5 YRS  5/26/2022    IR INSERT NON TUNL CVC OVER 5 YRS  6/3/2022    IR INSERT TUNL CVC W/O PORT OVER 5 YR  6/8/2022       No Known Allergies    Social Connections: Not on file       No family status information on file. Medication Documentation Review Audit       Reviewed by Segundo Ortez DO (Physician) on 08/02/22 at 2301      Medication Sig Documenting Provider Last Dose Status Taking?   amiodarone (CORDARONE) 200 mg tablet Take 1 Tablet by mouth daily. Forrest Banks MD 8/2/2022 Active Yes   amLODIPine (NORVASC) 10 mg tablet Take 10 mg by mouth daily. Provider, Historical 8/2/2022 Active Yes   aspirin delayed-release 81 mg tablet Take 81 mg by mouth daily. Provider, Historical 8/2/2022 Active Yes   atorvastatin (LIPITOR) 20 mg tablet Take 2 Tablets by mouth daily. Ruby Felton MD 8/2/2022 Active Yes   cefepime 1 gram 1 g IVPB 1 g by IntraVENous route every twenty-four (24) hours. End date 7/15/2022 Ruby Felton MD  Active    cholecalciferol (VITAMIN D3) (1000 Units /25 mcg) tablet Take 1,000 Units by mouth daily. Provider, Historical 8/2/2022 Active Yes   cloNIDine HCl (CATAPRES) 0.3 mg tablet Take 0.3 mg by mouth two (2) times a day. Provider, Historical 8/2/2022 Active Yes   famotidine (PEPCID) 20 mg tablet  Other, MD Marcia 8/2/2022 Active Yes   furosemide (LASIX) 20 mg tablet    Patient not taking: Reported on 8/2/2022    Other, MD Marcia Unknown Active No   insulin lispro protamine/insulin lispro (HUMALOG MIX 75/25) 100 unit/mL (75-25) injection 5 Units by SubCUTAneous route Before breakfast and dinner. Ruby Felton MD 8/2/2022 Active Yes   pantoprazole (PROTONIX) 20 mg tablet Take 2 Tablets by mouth daily. Patient not taking: Reported on 8/2/2022    Ruby Felton MD Not Taking Active No                      Review of Systems -could not be obtained due to patient factors      PHYSICAL EXAM:  General:          Awake, cooperative, no acute distress    EENT:              EOMI. Anicteric sclerae. MMM  Resp:               CTA bilaterally, no wheezing or rales. No accessory muscle use  CV:                  Regular  rhythm,  No edema  GI:                   Soft, Non distended, Non tender. +Bowel sounds  Neurologic:      Alert and oriented X 3, normal speech,   Psych:             Good insight. Not anxious nor agitated  Skin:                No rashes. No jaundice.       MD Erwin Whitfield

## 2022-08-05 NOTE — PROGRESS NOTES
Bedside and Verbal shift change report given to Marisol (oncoming nurse) by Betsy Walter (offgoing nurse). Report included the following information SBAR, OR Summary, Procedure Summary, Intake/Output, MAR, Recent Results, and Cardiac Rhythm Afib, Aflutter-rate controlled . 0931 Dialysis called and asked for a report. Report given to Louie Solorio. Also requested to give Midodrine now in preparation for dialysis, not prescribed. NP Vanda informed. Midodrine given. End of Shift Note    Bedside shift change report given to Flakita Elliott rn (oncoming nurse) by Kian Menezes. Marisol Wise RN (offgoing nurse). Report included the following information SBAR, Kardex, Procedure Summary, Intake/Output, MAR, Med Rec Status, and Cardiac Rhythm AF, Aflutter    Shift worked:  7p-7a     Shift summary and any significant changes:     Colostomy is not yet active. Bowel movement per rectum     Concerns for physician to address:  Ostomy not active     Zone phone for oncoming shift:   0000       Activity:  Activity Level: Bed Rest  Number times ambulated in hallways past shift: 0  Number of times OOB to chair past shift: 0    Cardiac:   Cardiac Monitoring: Yes      Cardiac Rhythm: Atrial Fib, Atrial Flutter    Access:  Current line(s): PIV and HD access     Genitourinary:   Urinary status: anuric    Respiratory:   O2 Device: Nasal cannula  Chronic home O2 use?: NO  Incentive spirometer at bedside: NO       GI:  Last Bowel Movement Date: 08/05/22  Current diet:  ADULT DIET Easy to Chew  ADULT ORAL NUTRITION SUPPLEMENT Lunch; Standard High Calorie/High Protein  ADULT ORAL NUTRITION SUPPLEMENT Breakfast, Dinner;  Low Calorie/High Protein  Passing flatus: YES  Tolerating current diet: YES       Pain Management:   Patient states pain is manageable on current regimen: YES    Skin:  Markos Score: 13  Interventions: turn team, speciality bed, float heels, increase time out of bed, foam dressing, PT/OT consult, limit briefs, internal/external urinary devices, and nutritional support     Patient Safety:  Fall Score: Total Score: 3  Interventions: bed/chair alarm and pt to call before getting OOB  High Fall Risk: Yes    Length of Stay:  Expected LOS: 4d 19h  Actual LOS: 3      Ma.  Astrid Moncada RN

## 2022-08-05 NOTE — PROGRESS NOTES
Pharmacy Automatic Renal Dosing Protocol - Antimicrobials    Indication for Antimicrobials: Infected Sacral Decubitus Ulcer      Current Regimen of Each Antimicrobial:  Vancomycin 1500 mg x 1 then 750 mg post HD (Start Date 8/3; Day # 3/5)  Meropenem 1000mg IV q24h  (Start Date ; Day #2)    Previous Antimicrobial Therapy:  Piperacillin tazobactam 3.375 Q12H (Start 8/3, )    Vancomycin Goal AUC : 400-600 mg*hour/liter per 24 hours Trough ~ 20 mcg/ml    Vancomycin Levels  Date Dose & Interval Measured (mcg/mL) Steady State (mcg/mL)   8 AM random Vanc 1500mg x1 on 8/3 16.7                  Date & time of next level: - not ordered    Significant Cultures:   - Blood  ESBL Klebsiella in 1 bottle ; Proteus in 4 bottles; Enterococcus in 1 bottle   blood-  GNRs in 1 of 2    Radiology / Imaging results: (X-ray, CT scan or MRI):       Labs:  Recent Labs     22  0616 22  0013 22   CREA 1.80* 3.49* 3.89*   BUN 15 28* 29*   WBC  --  14.2* 13.0*     Temp (24hrs), Av.9 °F (36.6 °C), Min:97.1 °F (36.2 °C), Max:98.4 °F (36.9 °C)    Est. Creatinine Clearance: 13.0 mL/min (based on Ideal Body Weight)    Paralysis, amputations, malnutrition:   Creatinine Clearance (mL/min) or Dialysis: HD    Impression/Plan:   Level  AM was > 15 mcg/ml. Hold dose after dialysis today then recheck on  (Not ordered)  Antimicrobial end date:  5 days (Vanc)   TBD for [facility]     Pharmacy will follow daily and adjust medications as appropriate for renal function and/or serum levels.     Thank you,  Diane Ochoa, [facility]

## 2022-08-05 NOTE — PROGRESS NOTES
Pharmacy Automatic Renal Dosing Protocol - Antimicrobials    Indication for Antimicrobials: Infected Sacral Decubitus Ulcer      Current Regimen of Each Antimicrobial:  Vancomycin 1500 mg x 1 then 750 mg post HD (Start Date 8/3; Day # 3/5)  Meropenem 1000mg IV q24h  (Start Date ; Day #2)    Previous Antimicrobial Therapy:  Piperacillin tazobactam 3.375 Q12H (Start 8/3, )    Vancomycin Goal AUC : Goal = 15-20mcg/ml before HD    Vancomycin Levels  Date Dose & Interval Measured (mcg/mL)    8 AM random Vanc 1500mg x1 on 8/3 16.7 Incr. Dose to 1000mg p HD                 Date & time of next level:     Significant Cultures:   - Blood  ESBL Klebsiella in 1 bottle ; Proteus in 4 bottles; Enterococcus in 1 bottle   blood-  GNRs in 1 of 2    Radiology / Imaging results: (X-ray, CT scan or MRI):       Labs:  Recent Labs     22  0616 22  0013 22   CREA 1.80* 3.49* 3.89*   BUN 15 28* 29*   WBC  --  14.2* 13.0*     Temp (24hrs), Av.9 °F (36.6 °C), Min:97.1 °F (36.2 °C), Max:98.4 °F (36.9 °C)    Est. Creatinine Clearance: 13.0 mL/min (based on Ideal Body Weight)    Paralysis, amputations, malnutrition:   Creatinine Clearance (mL/min) or Dialysis: HD    Impression/Plan:   Corrrection to earlier entry:  Level 8/ AM was > 16.7 mcg/ml. Increase Vancomycin to 1000mg IV after HD today  Antimicrobial end date:  5 days (Vanc)   TBD for [facility]     Pharmacy will follow daily and adjust medications as appropriate for renal function and/or serum levels.     Thank you,  Lindsay Hives, [facility]

## 2022-08-05 NOTE — PROGRESS NOTES
Status post debridement of necrotic sacral decubitus soft tissue injury and creation of end colostomy yesterday. Still with rectal output today. Liquid. Nothing out of the colostomy yet. On exam her incisions are well-healed and the colostomy is pink. Assessment and plan: Rectal output should and soon. Would continue Dakin's wet-to-dry's through the weekend. Wound care is on board. Advanced diet as tolerated as colostomy output increases.

## 2022-08-05 NOTE — PROGRESS NOTES
Hospitalist Progress Note    NAME: Alesha Ruiz   :  1934   MRN:  [id number]     Estimated discharge date:  2022    Barriers: Not medically stable, OR for debridement today    Assessment / Plan:  Severe sepsis POA WBC 13.0, SBP 83/40, , lactate 1.53  Sacral wound infection of chronic sacral decubitus ulcer POA  Proteus/ESBL klebsiella/ enterococcus bacteremia POA  Foul-smelling purulent sacral decubitus ulcer with surrounding erythema  Continue vanco and meropenem  Blood culture with proteus, ESBL klebsiella, enterococcus  General surgery did diverting colostomy, wound debridement   Wound care consulted  ID consult appreciated  Consult palliative    Bedbound POA  Moderate protein calorie malnutrition  Patient bed bound  nutrition consultation for dietary supplements     ESRD on IHD MWF  Anemia of chronic disease  Anemia appears stable and not symptomatic  Nephrology consulted, c/w HD as tolerated  Placed on midodrine to increase BP so patient can tolerate HD     DM type 2 with ESRD POA  Accu-Cheks and sliding scale insulin  , 181, 218, 136, 197     Essential hypertension POA  Hold PTA antihypertensives given borderline hypotension  Noted patient taking clonidine, watch for rebound    18.5 - 24.9 Normal weight / Body mass index is 24.79 kg/m².     Code status: Full  Prophylaxis: Hep SQ  Recommended Disposition: Home w/Family     Subjective:     Chief Complaint / Reason for Physician Visit   \"Okay\"  No complaints of pain or SOB  S/p OR for debridement and colostomy on   No ostomy output yet    Review of Systems:  Symptom Y/N Comments  Symptom Y/N Comments   Fever/Chills nn   Chest Pain n    Poor Appetite    Edema     Cough n   Abdominal Pain n    Sputum    Joint Pain     SOB/BEE n   Pruritis/Rash     Nausea/vomit nn   Tolerating PT/OT     Diarrhea    Tolerating Diet y    Constipation    Other       Could NOT obtain due to:      Objective:     VITALS:   Last 24hrs VS reviewed since prior progress note. Most recent are:  Patient Vitals for the past 24 hrs:   Temp Pulse Resp BP SpO2   08/05/22 1600 98 °F (36.7 °C) (!) 103 18 (!) 132/49 96 %   08/05/22 1530 -- 89 -- -- 96 %   08/05/22 1500 -- (!) 104 -- (!) 139/47 96 %   08/05/22 1430 -- (!) 137 -- -- 100 %   08/05/22 1400 -- 98 -- 124/60 --   08/05/22 1330 -- (!) 138 -- -- 98 %   08/05/22 1140 97.7 °F (36.5 °C) (!) 102 18 (!) 128/59 --   08/05/22 1130 -- (!) 115 18 109/66 --   08/05/22 1113 -- 100 18 129/61 --   08/05/22 1100 -- 76 18 (!) 146/64 --   08/05/22 1045 -- 88 18 136/65 --   08/05/22 1030 -- (!) 108 18 119/61 --   08/05/22 1015 -- (!) 103 18 129/67 --   08/05/22 1000 -- (!) 102 18 139/63 --   08/05/22 0945 -- 92 18 (!) 122/51 --   08/05/22 0930 -- (!) 105 18 132/64 --   08/05/22 0915 -- (!) 103 18 (!) 135/59 --   08/05/22 0900 -- 90 18 (!) 137/59 --   08/05/22 0845 -- 76 18 (!) 115/58 --   08/05/22 0830 -- 73 18 (!) 139/58 --   08/05/22 0815 -- 75 18 130/62 --   08/05/22 0807 97.7 °F (36.5 °C) 78 18 (!) 136/58 --   08/05/22 0800 -- 81 -- -- --   08/05/22 0307 98 °F (36.7 °C) 89 25 118/77 99 %   08/05/22 0305 -- -- -- (!) 134/46 --   08/04/22 2243 97.9 °F (36.6 °C) 79 18 (!) 124/45 94 %   08/04/22 2000 -- (!) 105 -- -- --   08/04/22 1925 97.2 °F (36.2 °C) 94 24 120/64 99 %         Intake/Output Summary (Last 24 hours) at 8/5/2022 1854  Last data filed at 8/5/2022 1230  Gross per 24 hour   Intake --   Output 1001 ml   Net -1001 ml          I had a face to face encounter and independently examined this patient on 8/5/2022, as outlined below:  PHYSICAL EXAM:  General: WD, WN. Alert, cooperative, no acute distress, frail appearing   EENT:  EOMI. Anicteric sclerae. MMM  Resp:  CTA bilaterally, no wheezing or rales. No accessory muscle use  CV:  Regular  rhythm,  No edema  GI:  Soft, Non distended, Non tender. +Bowel sounds  Neurologic:  Alert and oriented X 2  normal speech,   Psych:   Good insight.  Not anxious nor agitated  Skin:  No rashes. No jaundice, permcath right chest wall    Pre-op       Pre-op                  Reviewed most current lab test results and cultures  YES  Reviewed most current radiology test results   YES  Review and summation of old records today    NO  Reviewed patient's current orders and MAR    YES  PMH/SH reviewed - no change compared to H&P  ________________________________________________________________________  Care Plan discussed with:    Comments   Patient x    Family      RN x    Care Manager     Consultant                        Multidiciplinary team rounds were held today with , nursing, pharmacist and clinical coordinator. Patient's plan of care was discussed; medications were reviewed and discharge planning was addressed. ________________________________________________________________________        Comments   >50% of visit spent in counseling and coordination of care     ________________________________________________________________________  Valentine Vela MD     Procedures: see electronic medical records for all procedures/Xrays and details which were not copied into this note but were reviewed prior to creation of Plan. LABS:  I reviewed today's most current labs and imaging studies.   Pertinent labs include:  Recent Labs     08/05/22  0813 08/04/22  1004 08/03/22  0013   WBC 12.2* 13.2* 14.2*   HGB 8.2* 9.8* 10.3*   HCT 27.1* 31.6* 32.9*    253 233       Recent Labs     08/05/22  0813 08/04/22  0616 08/03/22  1842 08/03/22  0013 08/02/22 2340 08/02/22 2033    136  --  138  --  137   K 4.0 5.8* 4.0 2.9*  --  2.9*    104  --  104  --  100   CO2 25 20*  --  27  --  27   * 234*  --  102*  --  120*   BUN 21* 15  --  28*  --  29*   CREA 2.58* 1.80*  --  3.49*  --  3.89*   CA 7.9* 7.9*  --  7.3*  --  7.8*   MG  --  2.0  --   --  1.8  --    PHOS 3.2 2.2*  --   --   --   --    ALB 1.5*  --   --   --   --  1.4*   TBILI  --   --   --   --   --  0.4   ALT  -- --   --   --   --  23         Signed: Lina Duarte MD

## 2022-08-05 NOTE — ACP (ADVANCE CARE PLANNING)
Palliative Medicine  [phone]      Primary Decision Maker (Active): Bill Baeza - [phone]    Radames Barrera has agreed to DNR  Left DDNR with nursing for her to sign when she arrives this afternoon  Copy to be left on the chart for scanning- original to be given to Katy Babcock NP

## 2022-08-06 NOTE — PROGRESS NOTES
Hospitalist Progress Note    NAME: Michelle Singh   :  1934   MRN:  [id number]     Estimated discharge date:  2022    Barriers: needs PICC line, SNF bed    Assessment / Plan:  Severe sepsis POA WBC 13.0, SBP 83/40, , lactate 1.53  Sacral wound infection of chronic sacral decubitus ulcer POA  Proteus/ESBL klebsiella/ enterococcus bacteremia POA  Foul-smelling purulent sacral decubitus ulcer with surrounding erythema  Continue vanco and meropenem  Blood culture with proteus, ESBL klebsiella, enterococcus  General surgery did diverting colostomy, wound debridement   Wound care consulted  ID consult appreciated  Start discharge planning    Bedbound POA  Moderate protein calorie malnutrition  Patient bed bound  nutrition consultation for dietary supplements     ESRD on IHD MWF  Anemia of chronic disease  Anemia appears stable and not symptomatic  Nephrology consulted, c/w HD as tolerated  Placed on midodrine to increase BP so patient can tolerate HD     DM type 2 with ESRD POA  Accu-Cheks and sliding scale insulin  , 181, 218, 136, 197     Essential hypertension POA  Hold PTA antihypertensives given borderline hypotension  Noted patient taking clonidine, watch for rebound    18.5 - 24.9 Normal weight / Body mass index is 24.49 kg/m².     Code status: Full  Prophylaxis: Hep SQ  Recommended Disposition: Home w/Family     Subjective:     Chief Complaint / Reason for Physician Visit   \"Okay\"  No complaints of pain or SOB  S/p OR for debridement and colostomy on   No ostomy output yet    Review of Systems:  Symptom Y/N Comments  Symptom Y/N Comments   Fever/Chills nn   Chest Pain n    Poor Appetite    Edema     Cough n   Abdominal Pain n    Sputum    Joint Pain     SOB/BEE n   Pruritis/Rash     Nausea/vomit nn   Tolerating PT/OT     Diarrhea    Tolerating Diet y    Constipation    Other       Could NOT obtain due to:      Objective:     VITALS:   Last 24hrs VS reviewed since prior progress note. Most recent are:  Patient Vitals for the past 24 hrs:   Temp Pulse Resp BP SpO2   08/06/22 1108 97.6 °F (36.4 °C) 72 22 (!) 125/53 95 %   08/06/22 0715 98 °F (36.7 °C) 74 19 (!) 134/47 97 %   08/06/22 0427 97.7 °F (36.5 °C) 72 21 (!) 130/58 96 %   08/06/22 0000 97.9 °F (36.6 °C) 67 28 (!) 113/43 95 %   08/05/22 2308 -- 71 -- (!) 108/45 --   08/05/22 2000 98.2 °F (36.8 °C) 70 13 111/88 95 %         Intake/Output Summary (Last 24 hours) at 8/6/2022 1707  Last data filed at 8/6/2022 1108  Gross per 24 hour   Intake 100 ml   Output 15 ml   Net 85 ml          I had a face to face encounter and independently examined this patient on 8/6/2022, as outlined below:  PHYSICAL EXAM:  General: WD, WN. Alert, cooperative, no acute distress, frail appearing   EENT:  EOMI. Anicteric sclerae. MMM  Resp:  CTA bilaterally, no wheezing or rales. No accessory muscle use  CV:  Regular  rhythm,  No edema  GI:  Soft, Non distended, Non tender. +Bowel sounds  Neurologic:  Alert and oriented X 2  normal speech,   Psych:   Good insight. Not anxious nor agitated  Skin:  No rashes. No jaundice, permcath right chest wall    Pre-op       Pre-op                  Reviewed most current lab test results and cultures  YES  Reviewed most current radiology test results   YES  Review and summation of old records today    NO  Reviewed patient's current orders and MAR    YES  PMH/SH reviewed - no change compared to H&P  ________________________________________________________________________  Care Plan discussed with:    Comments   Patient x    Family      RN x    Care Manager     Consultant                        Multidiciplinary team rounds were held today with , nursing, pharmacist and clinical coordinator. Patient's plan of care was discussed; medications were reviewed and discharge planning was addressed.      ________________________________________________________________________        Comments   >50% of visit spent in counseling and coordination of care     ________________________________________________________________________  Huy Marsh MD     Procedures: see electronic medical records for all procedures/Xrays and details which were not copied into this note but were reviewed prior to creation of Plan. LABS:  I reviewed today's most current labs and imaging studies.   Pertinent labs include:  Recent Labs     08/06/22  1356 08/05/22  0813 08/04/22  1004   WBC 9.5 12.2* 13.2*   HGB 9.4* 8.2* 9.8*   HCT 30.0* 27.1* 31.6*    253 253       Recent Labs     08/06/22  1356 08/05/22  0813 08/04/22  0616    139 136   K 3.8 4.0 5.8*    104 104   CO2 27 25 20*   * 302* 234*   BUN 14 21* 15   CREA 2.12* 2.58* 1.80*   CA 7.8* 7.9* 7.9*   MG  --   --  2.0   PHOS  --  3.2 2.2*   ALB 1.5* 1.5*  --    TBILI 0.5  --   --    ALT 27  --   --          Signed: Huy Marsh MD

## 2022-08-06 NOTE — PROGRESS NOTES
0915: Call received from lab at [facility] stating that patient does not have ESBL and they will update the records.

## 2022-08-06 NOTE — PROGRESS NOTES
Admit Date: 2022    POD 2 Days Post-Op    Procedure:  Procedure(s):  INCISION AND DRAINAGE OF SACRUM  LAPAROSCOPIC COLOSTOMY    Subjective:     Patient has no complaints. Objective:     Blood pressure (!) 134/47, pulse 74, temperature 98 °F (36.7 °C), resp. rate 19, height 5' 4\" (1.626 m), weight 144 lb 6.4 oz (65.5 kg), SpO2 97 %.     Temp (24hrs), Av.9 °F (36.6 °C), Min:97.7 °F (36.5 °C), Max:98.2 °F (36.8 °C)      Physical Exam:  GENERAL: alert, cooperative, no distress, appears stated age, LUNG: clear to auscultation bilaterally, HEART: regular rate and rhythm, ABDOMEN: soft, NT, ostomy pink, scant fecal matter in bag, EXTREMITIES:  extremities normal, atraumatic, no cyanosis or edema    Labs:   Recent Results (from the past 24 hour(s))   GLUCOSE, POC    Collection Time: 22 12:44 PM   Result Value Ref Range    Glucose (POC) 136 (H) 65 - 117 mg/dL    Performed by Dari Feliz (PIPER RN)    GLUCOSE, POC    Collection Time: 22  4:15 PM   Result Value Ref Range    Glucose (POC) 197 (H) 65 - 117 mg/dL    Performed by Ryley Delong PCT    GLUCOSE, POC    Collection Time: 22  9:34 PM   Result Value Ref Range    Glucose (POC) 190 (H) 65 - 117 mg/dL    Performed by Adelfo Hines PCT    GLUCOSE, POC    Collection Time: 22  7:25 AM   Result Value Ref Range    Glucose (POC) 138 (H) 65 - 117 mg/dL    Performed by Minerva Greenberg PCT        Data Review images and reports reviewed    Assessment:     Active Problems:    Wound infection (2022)      Frailty (2022)      Physical debility (2022)      DNR (do not resuscitate) discussion (2022)        Plan/Recommendations/Medical Decision Making:     Continue present treatment  Starting to have ostomy function    Alise Coyle MD  [facility]

## 2022-08-06 NOTE — PROGRESS NOTES
1900: Bedside shift change report given to Maximilian Matt (oncoming nurse) by Destiny Aviles (offgoing nurse). Report included the following information SBAR, Kardex, ED Summary, Procedure Summary, Intake/Output, MAR, Recent Results, and Cardiac Rhythm Afib . End of Shift Note    Bedside shift change report given to [address] (oncoming nurse) by Veronica Celestin RN (offgoing nurse). Report included the following information SBAR, Kardex, ED Summary, Intake/Output, MAR, Recent Results, and Cardiac Rhythm NSR/Afib    Shift worked:  [id number]     Shift summary and any significant changes:     Unable to get lab work and blood cultures; patient is a hard stick, need a endurance cath. Concerns for physician to address:       Zone phone for oncoming shift:          Activity:  Activity Level: Bed Rest  Number times ambulated in hallways past shift: 0  Number of times OOB to chair past shift: 0    Cardiac:   Cardiac Monitoring: Yes      Cardiac Rhythm: Sinus Rhythm, Multiform PVCs    Access:  Current line(s): PIV     Genitourinary:   Urinary status: anuric    Respiratory:   O2 Device: None (Room air)  Chronic home O2 use?: NO  Incentive spirometer at bedside: NO       GI:  Last Bowel Movement Date: 08/05/22  Current diet:  ADULT DIET Easy to Chew  ADULT ORAL NUTRITION SUPPLEMENT Lunch; Standard High Calorie/High Protein  ADULT ORAL NUTRITION SUPPLEMENT Breakfast, Dinner; Low Calorie/High Protein  Passing flatus: YES  Tolerating current diet: YES       Pain Management:   Patient states pain is manageable on current regimen: YES    Skin:  Markos Score: 12  Interventions: turn team, speciality bed, float heels, increase time out of bed, foam dressing, PT/OT consult, internal/external urinary devices, and nutritional support     Patient Safety:  Fall Score:  Total Score: 3  Interventions: bed/chair alarm, assistive device (walker, cane, etc), gripper socks, pt to call before getting OOB, and stay with me (per policy)  High Fall Risk: Yes    Length of Stay:  Expected LOS: 9d 14h  Actual LOS: [address], RN

## 2022-08-06 NOTE — PROGRESS NOTES
1900 Bedside and Verbal shift change report given to Sandee Abdullahi, [facility] (oncoming nurse) by Modesto Jimenez RN (offgoing nurse). Report included the following information SBAR, Kardex, Intake/Output, MAR, Recent Results, and Cardiac Rhythm NSR . End of Shift Note    Bedside shift change report given to Modesto Jimenez RN (oncoming nurse) by Rodger Vázquez RN (offgoing nurse). Report included the following information SBAR, Kardex, Intake/Output, MAR, Recent Results, and Cardiac Rhythm NSR    Shift worked:  1900 - 0700     Shift summary and any significant changes:     No acute changes overnight. Repeat blood cultures sent. Concerns for physician to address:       Zone phone for oncoming shift:          Activity:  Activity Level: Bed Rest  Number times ambulated in hallways past shift: 0  Number of times OOB to chair past shift: 0    Cardiac:   Cardiac Monitoring: Yes      Cardiac Rhythm: Sinus Rhythm    Access:  Current line(s): PIV     Genitourinary:   Urinary status: anuric    Respiratory:   O2 Device: None (Room air)  Chronic home O2 use?: NO  Incentive spirometer at bedside: NO       GI:  Last Bowel Movement Date: 08/06/22  Current diet:  ADULT DIET Easy to Chew  ADULT ORAL NUTRITION SUPPLEMENT Lunch; Standard High Calorie/High Protein  ADULT ORAL NUTRITION SUPPLEMENT Breakfast, Dinner; Low Calorie/High Protein  Passing flatus: YES  Tolerating current diet: YES       Pain Management:   Patient states pain is manageable on current regimen: YES    Skin:  Markos Score: 11  Interventions: turn team, speciality bed, float heels, limit briefs, and nutritional support     Patient Safety:  Fall Score:  Total Score: 5  Interventions: bed/chair alarm, assistive device (walker, cane, etc), gripper socks, and pt to call before getting OOB  High Fall Risk: Yes    Length of Stay:  Expected LOS: 9d 14h  Actual LOS: Iam Johnson RN

## 2022-08-07 NOTE — PROGRESS NOTES
Admit Date: 2022    POD 3 Days Post-Op    Procedure:  Procedure(s):  INCISION AND DRAINAGE OF SACRUM  LAPAROSCOPIC COLOSTOMY    Subjective:     Patient has no complaints. Objective:     Blood pressure (!) 140/73, pulse 80, temperature 97.6 °F (36.4 °C), resp. rate 20, height 5' 4\" (1.626 m), weight 142 lb 11.2 oz (64.7 kg), SpO2 100 %. Temp (24hrs), Av.8 °F (36.6 °C), Min:97.6 °F (36.4 °C), Max:98 °F (36.7 °C)      Physical Exam:  GENERAL: alert, cooperative, no distress, appears stated age, LUNG: clear to auscultation bilaterally, HEART: regular rate and rhythm, ABDOMEN: soft, NT, ostomy pink, scant fecal matter in bag, EXTREMITIES:  extremities normal, atraumatic, no cyanosis or edema    Labs:   Recent Results (from the past 24 hour(s))   CBC WITH AUTOMATED DIFF    Collection Time: 22  1:56 PM   Result Value Ref Range    WBC 9.5 3.6 - 11.0 K/uL    RBC 3.16 (L) 3.80 - 5.20 M/uL    HGB 9.4 (L) 11.5 - 16.0 g/dL    HCT 30.0 (L) 35.0 - 47.0 %    MCV 94.9 80.0 - 99.0 FL    MCH 29.7 26.0 - 34.0 PG    MCHC 31.3 30.0 - 36.5 g/dL    RDW 17.0 (H) 11.5 - 14.5 %    PLATELET 298 807 - 832 K/uL    MPV 10.6 8.9 - 12.9 FL    NRBC 0.0 0  WBC    ABSOLUTE NRBC 0.00 0.00 - 0.01 K/uL    NEUTROPHILS 81 (H) 32 - 75 %    LYMPHOCYTES 11 (L) 12 - 49 %    MONOCYTES 7 5 - 13 %    EOSINOPHILS 0 0 - 7 %    BASOPHILS 0 0 - 1 %    IMMATURE GRANULOCYTES 1 (H) 0.0 - 0.5 %    ABS. NEUTROPHILS 7.7 1.8 - 8.0 K/UL    ABS. LYMPHOCYTES 1.0 0.8 - 3.5 K/UL    ABS. MONOCYTES 0.7 0.0 - 1.0 K/UL    ABS. EOSINOPHILS 0.0 0.0 - 0.4 K/UL    ABS. BASOPHILS 0.0 0.0 - 0.1 K/UL    ABS. IMM.  GRANS. 0.1 (H) 0.00 - 0.04 K/UL    DF AUTOMATED     METABOLIC PANEL, COMPREHENSIVE    Collection Time: 22  1:56 PM   Result Value Ref Range    Sodium 136 136 - 145 mmol/L    Potassium 3.8 3.5 - 5.1 mmol/L    Chloride 101 97 - 108 mmol/L    CO2 27 21 - 32 mmol/L    Anion gap 8 5 - 15 mmol/L    Glucose 262 (H) 65 - 100 mg/dL    BUN 14 6 - 20 MG/DL Creatinine 2.12 (H) 0.55 - 1.02 MG/DL    BUN/Creatinine ratio 7 (L) 12 - 20      GFR est AA 27 (L) >60 ml/min/1.73m2    GFR est non-AA 22 (L) >60 ml/min/1.73m2    Calcium 7.8 (L) 8.5 - 10.1 MG/DL    Bilirubin, total 0.5 0.2 - 1.0 MG/DL    ALT (SGPT) 27 12 - 78 U/L    AST (SGOT) 47 (H) 15 - 37 U/L    Alk. phosphatase 133 (H) 45 - 117 U/L    Protein, total 7.0 6.4 - 8.2 g/dL    Albumin 1.5 (L) 3.5 - 5.0 g/dL    Globulin 5.5 (H) 2.0 - 4.0 g/dL    A-G Ratio 0.3 (L) 1.1 - 2.2     CULTURE, BLOOD, PAIRED    Collection Time: 08/06/22  1:56 PM    Specimen: Blood   Result Value Ref Range    Special Requests: NO SPECIAL REQUESTS      Culture result: NO GROWTH AFTER 19 HOURS     GLUCOSE, POC    Collection Time: 08/06/22  4:34 PM   Result Value Ref Range    Glucose (POC) 267 (H) 65 - 117 mg/dL    Performed by Yamileth REED    GLUCOSE, POC    Collection Time: 08/06/22  9:11 PM   Result Value Ref Range    Glucose (POC) 206 (H) 65 - 117 mg/dL    Performed by Eliu Last    CBC WITH AUTOMATED DIFF    Collection Time: 08/07/22  3:59 AM   Result Value Ref Range    WBC 10.7 3.6 - 11.0 K/uL    RBC 3.16 (L) 3.80 - 5.20 M/uL    HGB 9.4 (L) 11.5 - 16.0 g/dL    HCT 30.7 (L) 35.0 - 47.0 %    MCV 97.2 80.0 - 99.0 FL    MCH 29.7 26.0 - 34.0 PG    MCHC 30.6 30.0 - 36.5 g/dL    RDW 17.0 (H) 11.5 - 14.5 %    PLATELET 710 664 - 444 K/uL    MPV 10.5 8.9 - 12.9 FL    NRBC 0.0 0  WBC    ABSOLUTE NRBC 0.00 0.00 - 0.01 K/uL    NEUTROPHILS 71 32 - 75 %    LYMPHOCYTES 18 12 - 49 %    MONOCYTES 9 5 - 13 %    EOSINOPHILS 1 0 - 7 %    BASOPHILS 0 0 - 1 %    IMMATURE GRANULOCYTES 1 (H) 0.0 - 0.5 %    ABS. NEUTROPHILS 7.7 1.8 - 8.0 K/UL    ABS. LYMPHOCYTES 1.9 0.8 - 3.5 K/UL    ABS. MONOCYTES 1.0 0.0 - 1.0 K/UL    ABS. EOSINOPHILS 0.1 0.0 - 0.4 K/UL    ABS. BASOPHILS 0.0 0.0 - 0.1 K/UL    ABS. IMM.  GRANS. 0.1 (H) 0.00 - 0.04 K/UL    DF AUTOMATED     METABOLIC PANEL, BASIC    Collection Time: 08/07/22  3:59 AM   Result Value Ref Range Sodium 137 136 - 145 mmol/L    Potassium 3.7 3.5 - 5.1 mmol/L    Chloride 103 97 - 108 mmol/L    CO2 28 21 - 32 mmol/L    Anion gap 6 5 - 15 mmol/L    Glucose 138 (H) 65 - 100 mg/dL    BUN 17 6 - 20 MG/DL    Creatinine 2.41 (H) 0.55 - 1.02 MG/DL    BUN/Creatinine ratio 7 (L) 12 - 20      GFR est AA 23 (L) >60 ml/min/1.73m2    GFR est non-AA 19 (L) >60 ml/min/1.73m2    Calcium 8.1 (L) 8.5 - 10.1 MG/DL   CULTURE, BLOOD    Collection Time: 08/07/22  3:59 AM    Specimen: Blood   Result Value Ref Range    Special Requests: NO SPECIAL REQUESTS      Culture result: NO GROWTH AFTER 5 HOURS     GLUCOSE, POC    Collection Time: 08/07/22  7:48 AM   Result Value Ref Range    Glucose (POC) 194 (H) 65 - 117 mg/dL    Performed by Cameron Salazar PCT        Data Review images and reports reviewed    Assessment:     Active Problems:    Wound infection (8/2/2022)      Frailty (8/4/2022)      Physical debility (8/4/2022)      DNR (do not resuscitate) discussion (8/5/2022)      Plan/Recommendations/Medical Decision Making:     Continue present treatment  Still not much ostomy output. Stoma healthy.     Vishnu De Luna MD  [facility]

## 2022-08-07 NOTE — PROGRESS NOTES
Problem: Pressure Injury - Risk of  Goal: *Prevention of pressure injury  Description: Document Markos Scale and appropriate interventions in the flowsheet. Outcome: Progressing Towards Goal  Note: Pressure Injury Interventions:  Sensory Interventions: Assess changes in LOC, Assess need for specialty bed, Check visual cues for pain, Float heels, Keep linens dry and wrinkle-free, Maintain/enhance activity level, Minimize linen layers, Monitor skin under medical devices, Turn and reposition approx. every two hours (pillows and wedges if needed)    Moisture Interventions: Absorbent underpads, Apply protective barrier, creams and emollients, Assess need for specialty bed, Check for incontinence Q2 hours and as needed, Contain wound drainage, Limit adult briefs, Minimize layers    Activity Interventions: Assess need for specialty bed, Increase time out of bed, Pressure redistribution bed/mattress(bed type)    Mobility Interventions: Assess need for specialty bed, Float heels, HOB 30 degrees or less, Pressure redistribution bed/mattress (bed type), Turn and reposition approx. every two hours(pillow and wedges)    Nutrition Interventions: Document food/fluid/supplement intake, Offer support with meals,snacks and hydration, Discuss nutritional consult with provider    Friction and Shear Interventions: Apply protective barrier, creams and emollients, Feet elevated on foot rest, Foam dressings/transparent film/skin sealants, HOB 30 degrees or less, Minimize layers, Transferring/repositioning devices                Problem: Falls - Risk of  Goal: *Absence of Falls  Description: Document Felisa Fall Risk and appropriate interventions in the flowsheet.   Outcome: Progressing Towards Goal  Note: Fall Risk Interventions:  Mobility Interventions: Bed/chair exit alarm, Patient to call before getting OOB, Utilize walker, cane, or other assistive device, Communicate number of staff needed for ambulation/transfer    Mentation Interventions: Bed/chair exit alarm, Adequate sleep, hydration, pain control, Eyeglasses and hearing aids, More frequent rounding, Reorient patient, Room close to nurse's station    Medication Interventions: Assess postural VS orthostatic hypotension, Bed/chair exit alarm, Patient to call before getting OOB, Teach patient to arise slowly    Elimination Interventions: Bed/chair exit alarm, Call light in reach, Toileting schedule/hourly rounds    History of Falls Interventions: Bed/chair exit alarm, Door open when patient unattended, Room close to nurse's station         Problem: Risk for Spread of Infection  Goal: Prevent transmission of infectious organism to others  Description: Prevent the transmission of infectious organisms to other patients, staff members, and visitors.   Outcome: Progressing Towards Goal

## 2022-08-07 NOTE — PROGRESS NOTES
Hospitalist Progress Note    NAME: Zenia Boxer   :  1934   MRN:  [id number]     Estimated discharge date:  2022    Barriers: needs final antibiotic recs, PICC or Low catheter once f/u [facility] negative      SNF bed    Assessment / Plan:    Severe sepsis POA WBC 13.0, SBP 83/40, , lactate 1.53  Sacral wound infection of chronic sacral decubitus ulcer POA  Proteus/ESBL klebsiella/ enterococcus bacteremia POA  Foul-smelling purulent sacral decubitus ulcer with surrounding erythema  Continue vanco and meropenem  Admit BC with proteus, ESBL klebsiella, enterococcus  General surgery did diverting colostomy, wound debridement        No exposed bone per Op note  Wound cultures with multiple GNR  Continue wound care  Will need PICC line or low catheter and IV antibiotics at discharge  ID consult appreciated, need final antibiotic recs  Start discharge planning, planning for SNF at discharge  Spoke with granddaughter by phone this AM    Bedbound POA  Moderate protein calorie malnutrition  Patient bed bound  nutrition consultation for dietary supplements     ESRD on IHD MWF  Anemia of chronic disease  Anemia appears stable and not symptomatic  Nephrology consulted, c/w HD as tolerated  Placed on midodrine to increase BP so patient can tolerate HD     DM type 2 with ESRD POA  Accu-Cheks and sliding scale insulin  , 209, 267, 206, 194     Essential hypertension POA  Hold PTA antihypertensives given borderline hypotension  Noted patient taking clonidine, watch for rebound    18.5 - 24.9 Normal weight / Body mass index is 24.49 kg/m².     Code status: Full  Prophylaxis: Hep SQ  Recommended Disposition: Home w/Family     Subjective:     Chief Complaint / Reason for Physician Visit   \"Okay\"  No complaints of pain or SOB  S/p OR for debridement and colostomy on   No ostomy output yet    Review of Systems:  Symptom Y/N Comments  Symptom Y/N Comments   Fever/Chills nn   Chest Pain n    Poor Appetite Edema     Cough n   Abdominal Pain n    Sputum    Joint Pain     SOB/BEE n   Pruritis/Rash     Nausea/vomit nn   Tolerating PT/OT     Diarrhea    Tolerating Diet y    Constipation    Other       Could NOT obtain due to:      Objective:     VITALS:   Last 24hrs VS reviewed since prior progress note. Most recent are:  Patient Vitals for the past 24 hrs:   Temp Pulse Resp BP SpO2   08/07/22 0716 97.6 °F (36.4 °C) 80 20 (!) 140/73 100 %   08/07/22 0300 97.9 °F (36.6 °C) 80 18 (!) 94/55 96 %   08/07/22 0000 98 °F (36.7 °C) 99 20 (!) 128/59 96 %   08/06/22 1929 97.6 °F (36.4 °C) 79 20 128/61 97 %   08/06/22 1557 97.8 °F (36.6 °C) 92 19 105/66 97 %   08/06/22 1108 97.6 °F (36.4 °C) 72 22 (!) 125/53 95 %         Intake/Output Summary (Last 24 hours) at 8/7/2022 0951  Last data filed at 8/7/2022 0913  Gross per 24 hour   Intake 340 ml   Output 30 ml   Net 310 ml          I had a face to face encounter and independently examined this patient on 8/7/2022, as outlined below:  PHYSICAL EXAM:  General: WD, WN. Alert, cooperative, no acute distress, frail appearing   EENT:  EOMI. Anicteric sclerae. MMM  Resp:  CTA bilaterally, no wheezing or rales. No accessory muscle use  CV:  Regular  rhythm,  No edema  GI:  Soft, Non distended, Non tender. +Bowel sounds  Neurologic:  Alert and oriented X 2  normal speech,   Psych:   Good insight. Not anxious nor agitated  Skin:  No rashes.   No jaundice, permcath right chest wall    Pre-op       Pre-op                  Reviewed most current lab test results and cultures  YES  Reviewed most current radiology test results   YES  Review and summation of old records today    NO  Reviewed patient's current orders and MAR    YES  PMH/SH reviewed - no change compared to H&P  ________________________________________________________________________  Care Plan discussed with:    Comments   Patient x    Family      RN x    Care Manager     Consultant                        Multidiciplinary team rounds were held today with , nursing, pharmacist and clinical coordinator. Patient's plan of care was discussed; medications were reviewed and discharge planning was addressed. ________________________________________________________________________        Comments   >50% of visit spent in counseling and coordination of care     ________________________________________________________________________  Marybeth Quiroga MD     Procedures: see electronic medical records for all procedures/Xrays and details which were not copied into this note but were reviewed prior to creation of Plan. LABS:  I reviewed today's most current labs and imaging studies.   Pertinent labs include:  Recent Labs     08/07/22  0359 08/06/22  1356 08/05/22  0813   WBC 10.7 9.5 12.2*   HGB 9.4* 9.4* 8.2*   HCT 30.7* 30.0* 27.1*    243 253       Recent Labs     08/07/22  0359 08/06/22  1356 08/05/22  0813    136 139   K 3.7 3.8 4.0    101 104   CO2 28 27 25   * 262* 302*   BUN 17 14 21*   CREA 2.41* 2.12* 2.58*   CA 8.1* 7.8* 7.9*   PHOS  --   --  3.2   ALB  --  1.5* 1.5*   TBILI  --  0.5  --    ALT  --  27  --          Signed: Marybeth Quiroga MD

## 2022-08-08 NOTE — PROGRESS NOTES
Bedside shift change report given to kale KENNEDY. Patient turned and repositioned every 2 hours.    Sacral wound dressing changed this morning at 0530 am.

## 2022-08-08 NOTE — PROGRESS NOTES
Pharmacy Note - Cefepime    1000 mg IVPB q 24 h ordered for treatment of Bone & Joint infection/Skin & Soft tissue infection. Per [facility] Renal / Extended Infusion B Lactam Policy, Cefepime will be changed to 2000 mg IVPB x 1 over 30 min to be followed in 24 hours by 1000 mg q 24 h each to infuse over 4 hours. Estimated Creatinine Clearance: Estimated Creatinine Clearance: 13.9 mL/min (A) (based on SCr of 2.41 mg/dL (H)). Dialysis Status, GLORIA, CKD: Dialysis    BMI:  Body mass index is 23.57 kg/m². Rationale for Adjustment:  [facility] extended infusion dosing policy. Pharmacy will continue to monitor and adjust dose as necessary. Please call Inpatient Pharmacy with any questions. Thank you,  Eric Thakur MS R. Ph

## 2022-08-08 NOTE — PROGRESS NOTES
Infectious Disease progress      Impression    Gram-negative sepsis   Blood cultures-8/2+ for P.  Mirabilis 4/4,    Klebsiella pneumoniae 1/4,   E faecalis 1/4-RAC  (Previously reported as ESBL, now corrected)  Repeat cultures 8/4+ for P mirabilis 2/2. Negative cultures 8/6, 8/7. Sacral decubitus with palpable bone, probable osteomyelitis  Ongoing fecal contamination  S/p sacral I&D & Lap colostomy 8/4  Intra-Op cultures 8/4 + for moderate mixed skin althea, enteric GNR. 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 ESRD on HD    Diabetes type 2 poorly controlled  A1c>14      Primary hypertension  Management per primary team      Plan  Vancomycin, cefepime IV-plan is for 6 weeks  Patient may receive antibiotics during hemodialysis once discharged  Wound care per wound care team  Adequate blood sugar control  Frequent position change, pressure offloading. Daily probiotic/yogurt. Antimicrobial orders for discharge  -Vancomycin 500 mg IV with dialysis 3 times weekly & cefepime 2/2/3 gram IV with dialysis  3 times weekly end date 9/15  -Weekly CBC, CMP, vancomycin trough & ESR/CRP every other week-  -Fax reports to [phone], call with critical labs  at [phone]  -Encourage adequate fluids, daily probiotic/yogurt  -ID follow-up -9/13 at 1:30 PM-in person or virtual.            Urbano Farias is a 80 y.o. female with past medical history significant for diabetes mellitus and ESRD on MWF , sacral decubitus ulcer. Patient presented for wound check. Per H&P family had reported that she has had a sacral decubitus ulcer for many months, but in the past week and has began to become more red with foul-smelling discharge.   Home health nurse raised concern for infection and recommended she report to the ED and so she was transported by EMS. Patient reports no other symptoms and has no other complaints or concerns. In the ED, patient had been normothermic, with low blood pressure with lowest 83/40 which improved to 115/40 with 1 L fluid resuscitation then progressively began to decline again prompting repeat bolus with improvement SBP in the 90s. WBC 13.0 ,BUN 29, creatinine 3.89. Blood cultures were drawn, empiric Zosyn and clindamycin was started patient was admitted to the hospitalist service. Blood cultures done on 8/2 now +for ESBL Proteus 4/4, Gram variable rods 3/4. Patient has been noted to have extensive sacral decubitus with underlying palpable bone, also ongoing fecal contamination  She is status post I&D of sacrum & laparoscopic colostomy by Dr. Lawrence Santiago. Of note patient was admitted 5/23-6/15 and treated for septic shock. ID service was seeing her. Patient was treated for Serratia bacteremia and Klebsiella bacteremia. Blood cultures were persistently positive for Serratia 5/23-6/1. Possible mitral valve endocarditis. Patient was treated with cefepime IV with hemodialysis 6/3-7/15. Patient seen today. Denies complaints        Active Hospital Problems    Diagnosis Date Noted    DNR (do not resuscitate) discussion 08/05/2022    Frailty 08/04/2022    Physical debility 08/04/2022    Wound infection 08/02/2022         Past Medical History:   Diagnosis Date    A-fib ([facility] [address]     Chronic kidney disease     Diabetes ([facility] [address]        Past Surgical History:   Procedure Laterality Date    IR INSERT NON TUNL CVC OVER 5 YRS  5/26/2022    IR INSERT NON TUNL CVC OVER 5 YRS  6/3/2022    IR INSERT TUNL CVC W/O PORT OVER 5 YR  6/8/2022       No Known Allergies    Social Connections: Not on file       No family status information on file.        Medication Documentation Review Audit       Reviewed by José Miguel Hunter DO (Physician) on 08/02/22 at [address] [place] [address]      Medication Sig Documenting Provider Last Dose Status Taking?   amiodarone (CORDARONE) 200 mg tablet Take 1 Tablet by mouth daily. Alicia Mccrary MD 8/2/2022 Active Yes   amLODIPine (NORVASC) 10 mg tablet Take 10 mg by mouth daily. Provider, Historical 8/2/2022 Active Yes   aspirin delayed-release 81 mg tablet Take 81 mg by mouth daily. Provider, Historical 8/2/2022 Active Yes   atorvastatin (LIPITOR) 20 mg tablet Take 2 Tablets by mouth daily. Alicia Mccrary MD 8/2/2022 Active Yes   cefepime 1 gram 1 g IVPB 1 g by IntraVENous route every twenty-four (24) hours. End date 7/15/2022 Alicia Mccrary MD  Active    cholecalciferol (VITAMIN D3) (1000 Units /25 mcg) tablet Take 1,000 Units by mouth daily. Provider, Historical 8/2/2022 Active Yes   cloNIDine HCl (CATAPRES) 0.3 mg tablet Take 0.3 mg by mouth two (2) times a day. Provider, Historical 8/2/2022 Active Yes   famotidine (PEPCID) 20 mg tablet  Other, MD Marcia 8/2/2022 Active Yes   furosemide (LASIX) 20 mg tablet    Patient not taking: Reported on 8/2/2022    Other, MD Marcia Unknown Active No   insulin lispro protamine/insulin lispro (HUMALOG MIX 75/25) 100 unit/mL (75-25) injection 5 Units by SubCUTAneous route Before breakfast and dinner. Alicia Mccrary MD 8/2/2022 Active Yes   pantoprazole (PROTONIX) 20 mg tablet Take 2 Tablets by mouth daily. Patient not taking: Reported on 8/2/2022    Alicia Mccrary MD Not Taking Active No                      Review of Systems -could not be obtained due to patient factors      PHYSICAL EXAM:  General:          Awake, cooperative, no acute distress    EENT:              EOMI. Anicteric sclerae. MMM  Resp:               CTA bilaterally, no wheezing or rales. No accessory muscle use  CV:                  Regular  rhythm,  No edema  GI:                   Soft, Non distended, Non tender. +Bowel sounds  Neurologic:      Awake and oriented X 1, normal speech,   Psych:             Good insight.  Not anxious nor agitated  Skin:                No rashes. No jaundice.       David Mcwilliams MD [address]

## 2022-08-08 NOTE — PROGRESS NOTES
Nephrology Progress Note  [facility] / [address], [place]  [place], [address]  Phone - [phone]  Fax - [phone]                 Patient: Noah Saucedo                   YOB: 1934        Date- 8/8/2022                      Admit Date: 8/2/2022  CC: Follow up for ESRD          IMPRESSION & PLAN:    ESRD , HD dependent since 5/23/2022, on MWF schedule, [facility], under Dr. Conrado Chakraborty  Sepsis  Chronic sacral decubitus ulcer  hypokalemia  AFIB  DM 2  Anemia  Hypotension    PLAN-  Plans for hemodialysis today, and will keep her on HD on MWF schedule. Antibiotics per internal medicine team.  Status postsecond wound debridement  Will continue on Epogen for anemia  Continue midodrine for dialysis  IV antibiotics per surgical team       Subjective: Interval History:   -Patient was seen and examined today   -No issues overnight    Objective:   Vitals:    08/07/22 2314 08/08/22 0100 08/08/22 0400 08/08/22 0709   BP: (!) 137/52  117/73 (!) 130/56   Pulse: 79 81 86 95   Resp: 16  16 18   Temp: 98.3 °F (36.8 °C)  98.8 °F (37.1 °C) 98.7 °F (37.1 °C)   TempSrc:       SpO2: 100% 98% 93% 100%   Weight:       Height:          08/07 0701 - 08/08 0700  In: 530 [P.O.:480; I.V.:50]  Out: 10   Last 3 Recorded Weights in this Encounter    08/04/22 1830 08/06/22 1344 08/07/22 1542   Weight: 65.5 kg (144 lb 6.4 oz) 64.7 kg (142 lb 11.2 oz) 62.3 kg (137 lb 4.8 oz)      Physical exam:    GEN: NAD  NECK- Supple, no mass  RESP: No wheezing, Clear b/l  CVS: S1,S2  RRR  NEURO: Normal speech, Non focal  EXT: No Edema   HD access: Right chest permacath    Chart reviewed. Pertinent Notes reviewed.      Data Review :  Recent Labs     08/07/22  0359 08/06/22  1356    136   K 3.7 3.8    101   CO2 28 27   BUN 17 14   CREA 2.41* 2.12*   * 262*   CA 8.1* 7.8*       Recent Labs     08/07/22  0359 08/06/22  1356   WBC 10.7 9.5   HGB 9.4* 9.4*   HCT 30.7* 30.0*    243       No results for input(s): FE, TIBC, PSAT, FERR in the last 72 hours.    Medication list  reviewed  Current Facility-Administered Medications   Medication Dose Route Frequency    insulin glargine (LANTUS) injection 6 Units  6 Units SubCUTAneous QHS    meropenem (MERREM) 1 g in 0.9% sodium chloride (MBP/ADV) 50 mL MBP  1 g IntraVENous Q24H    midodrine (PROAMATINE) tablet 10 mg  10 mg Oral Q MON, WED & FRI    albumin human 25% (BUMINATE) solution 25 g  25 g IntraVENous DIALYSIS PRN    VANCOMYCIN INFORMATION NOTE   Other Rx Dosing/Monitoring    heparin (porcine) 1,000 unit/mL injection 1,900 Units  1,900 Units InterCATHeter DIALYSIS PRN    And    heparin (porcine) 1,000 unit/mL injection 1,900 Units  1,900 Units InterCATHeter DIALYSIS PRN    amiodarone (CORDARONE) tablet 200 mg  200 mg Oral DAILY    aspirin delayed-release tablet 81 mg  81 mg Oral DAILY    atorvastatin (LIPITOR) tablet 40 mg  40 mg Oral DAILY    famotidine (PEPCID) tablet 20 mg  20 mg Oral DAILY    sodium chloride (NS) flush 5-40 mL  5-40 mL IntraVENous Q8H    sodium chloride (NS) flush 5-40 mL  5-40 mL IntraVENous PRN    acetaminophen (TYLENOL) tablet 650 mg  650 mg Oral Q6H PRN    Or    acetaminophen (TYLENOL) suppository 650 mg  650 mg Rectal Q6H PRN    polyethylene glycol (MIRALAX) packet 17 g  17 g Oral DAILY PRN    heparin (porcine) injection 5,000 Units  5,000 Units SubCUTAneous Q8H    insulin lispro (HUMALOG) injection   SubCUTAneous AC&HS    glucose chewable tablet 16 g  4 Tablet Oral PRN    glucagon (GLUCAGEN) injection 1 mg  1 mg IntraMUSCular PRN    dextrose 10 % infusion 0-250 mL  0-250 mL IntraVENous PRN          Karli Conley MD  8/8/2022

## 2022-08-08 NOTE — PROGRESS NOTES
Status post debridement of sacral decubitus soft tissue injury and creation of end ileostomy on 8/4/2022. She denies nausea and vomiting. She says she is eating a little bit. On exam, the stoma has some clotted blood around it but it is pink and nicely budded. There is little stool output. Assessment and plan: Would continue easy to chew diet for now given the lack of nausea and vomiting. Will follow until there is normal colostomy output.

## 2022-08-08 NOTE — PROGRESS NOTES
Bedside shift change report given to Wang Fonseca RN (oncoming nurse) by Horace Rojas RN (offgoing nurse). Report included the following information SBAR, Kardex, ED Summary, Intake/Output, MAR, and Cardiac Rhythm afib . End of Shift Note    Bedside shift change report given to Slava Glass (oncoming nurse) by Ej Bond RN (offgoing nurse). Report included the following information SBAR, Kardex, ED Summary, Intake/Output, MAR, and Cardiac Rhythm afib/ sinus    Shift worked:  7a-7p     Shift summary and any significant changes:     Wound care changed dressing, now 1x daily. Called dialysis will get dialysis during PM shift     Concerns for physician to address:  Discharge planning     Zone phone for oncoming shift:          Activity:  Activity Level: Bed Rest  Number times ambulated in hallways past shift: 0  Number of times OOB to chair past shift: 0    Cardiac:   Cardiac Monitoring: Yes      Cardiac Rhythm: Sinus Rhythm    Access:  Current line(s): PIV     Genitourinary:   Urinary status: anuric    Respiratory:   O2 Device: None (Room air)  Chronic home O2 use?: NO  Incentive spirometer at bedside: NO       GI:  Last Bowel Movement Date: 08/06/22  Current diet:  ADULT DIET Easy to Chew  ADULT ORAL NUTRITION SUPPLEMENT Lunch; Standard High Calorie/High Protein  ADULT ORAL NUTRITION SUPPLEMENT Breakfast, Dinner; Low Calorie/High Protein  Passing flatus: YES  Tolerating current diet: NO       Pain Management:   Patient states pain is manageable on current regimen: YES    Skin:  Markos Score: 11  Interventions: turn team    Patient Safety:  Fall Score:  Total Score: 3  Interventions: pt to call before getting OOB  High Fall Risk: Yes    Length of Stay:  Expected LOS: 9d 14h  Actual LOS: [address], RN

## 2022-08-08 NOTE — PROGRESS NOTES
Comprehensive Nutrition Assessment    Type and Reason for Visit: Reassess    Nutrition Recommendations/Plan:   Continue current diet and supplements      Nutrition Assessment:    Chart reviewed; medically noted for wound infection and s/p diverting colostomy. PMH DM and ESRD on HD. Patient reports a fair appetite at time of visit. She likes the ensure shakes but states she is only drinking 1 per day. Encouraged patient to try and get at least 2 in per day; sipping on one throughout the morning and the other throughout the afternoon which she was agreeable. Will continue liberalized diet for now; can add CCD if BG consistently elevated. Last K+ and phos WNL. Nutrition Related Findings:  -[phone], K+3.7, phos 3.2  BM 8/6  Atorvastatin, Famotidine, Lantus, Humalog, Midodrine    Wound Type: Stage IV    Current Nutrition Intake & Therapies:        ADULT DIET Easy to Chew  ADULT ORAL NUTRITION SUPPLEMENT Lunch; Standard High Calorie/High Protein  ADULT ORAL NUTRITION SUPPLEMENT Breakfast, Dinner; Low Calorie/High Protein    Anthropometric Measures:  Height: 5' 4\" (162.6 cm)  Ideal Body Weight (IBW): 120 lbs (55 kg)     Current Body Wt:  62.3 kg (137 lb 5.6 oz), 118.5 % IBW. Current BMI (kg/m2): 23.6                          BMI Category: Normal weight (BMI 18.5-24. 9)    Estimated Daily Nutrient Needs:  Energy Requirements Based On: Formula  Weight Used for Energy Requirements: Current  Energy (kcal/day): 1846 kcals (BMR 1035 x 1.3AF +500kcal)  Weight Used for Protein Requirements: Current  Protein (g/day): 93g (1.5 g/kg bw)  Method Used for Fluid Requirements: 1 ml/kcal  Fluid (ml/day): 1800 mL    Nutrition Diagnosis:   Increased nutrient needs related to  (wound healing, wt loss) as evidenced by  (stage 4 PI, wt loss)    Nutrition Interventions:   Food and/or Nutrient Delivery: Continue current diet, Continue oral nutrition supplement  Nutrition Education/Counseling: No recommendations at this time  Coordination of Nutrition Care: Continue to monitor while inpatient       Goals:  Previous Goal Met: Progressing toward goal(s)  Goals:  (PO intake >50% of meals/ONS next 3-5 days)       Nutrition Monitoring and Evaluation:   Behavioral-Environmental Outcomes: None identified  Food/Nutrient Intake Outcomes: Food and nutrient intake, Supplement intake  Physical Signs/Symptoms Outcomes: Biochemical data, Skin, Weight, Fluid status or edema    Discharge Planning:    Continue current diet, Continue oral nutrition supplement    Lorenza Maldonado RD  Contact: ext [phone]

## 2022-08-08 NOTE — PROGRESS NOTES
Hospitalist Progress Note    NAME: Ashlie Bucio   :  1934   MRN:  [id number]     Estimated discharge date:  8/10/2022    Barriers:  1. Needs SNF bed  2. awaiting for ostomy function with increased output   Discharge when okay with surgery  3. Received final antibiotic recs from Dr Park Hayes and cefepime with HD   Will not need PICC or Cordell catheter  4. 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 POA  Proteus/ESBL klebsiella/ enterococcus bacteremia POA  Foul-smelling purulent sacral decubitus ulcer with surrounding erythema  Continue vanco and meropenem  Admit BC with proteus,Klebsiella, enterococcus         Initially reported as ESBL, this was corrected  Vanco and meropenem --> vanco and cefepime and Po flagyl  General surgery did diverting colostomy, wound debridement        No exposed bone per Op note  Wound cultures with multiple GNR  Continue wound care  Plan for IV antibiotics at HD  Start discharge planning, planning for SNF at discharge  Spoke with granddaughter by phone this AM    Bedbound POA  Moderate protein calorie malnutrition  Patient bed bound  nutrition consultation for dietary supplements     ESRD on HD MWF  Anemia of chronic disease  Anemia appears stable and not symptomatic  Nephrology consulted, c/w HD as tolerated  Placed on midodrine to increase BP so patient can tolerate HD     DM type 2 with ESRD POA  Accu-Cheks and sliding scale insulin  , 209, 267, 206, 194, 229, 208, 177, 118, 112, 110     Essential hypertension POA  Hold PTA antihypertensives given borderline hypotension  Noted patient taking clonidine, watch for rebound    18.5 - 24.9 Normal weight / Body mass index is 23.57 kg/m².     Code status: Full  Prophylaxis: Hep SQ  Recommended Disposition: Home w/Family     Subjective:     Chief Complaint / Reason for Physician Visit   \"Okay\"  No complaints of pain or SOB  S/p OR for debridement and colostomy on 8/4  Still poor ostomy output still    Review of Systems:  Symptom Y/N Comments  Symptom Y/N Comments   Fever/Chills nn   Chest Pain n    Poor Appetite    Edema     Cough n   Abdominal Pain n    Sputum    Joint Pain     SOB/BEE n   Pruritis/Rash     Nausea/vomit nn   Tolerating PT/OT     Diarrhea    Tolerating Diet y    Constipation    Other       Could NOT obtain due to:      Objective:     VITALS:   Last 24hrs VS reviewed since prior progress note. Most recent are:  Patient Vitals for the past 24 hrs:   Temp Pulse Resp BP SpO2   08/08/22 1544 98.1 °F (36.7 °C) 93 13 (!) 134/53 100 %   08/08/22 1445 -- -- -- -- 97 %   08/08/22 1027 98.7 °F (37.1 °C) 96 18 (!) 136/52 95 %   08/08/22 0709 98.7 °F (37.1 °C) 95 18 (!) 130/56 100 %   08/08/22 0400 98.8 °F (37.1 °C) 86 16 117/73 93 %   08/08/22 0100 -- 81 -- -- 98 %   08/07/22 2314 98.3 °F (36.8 °C) 79 16 (!) 137/52 100 %   08/07/22 2200 -- 88 -- -- 100 %   08/07/22 2000 98.4 °F (36.9 °C) 79 16 (!) 128/51 --       No intake or output data in the 24 hours ending 08/08/22 1908       I had a face to face encounter and independently examined this patient on 8/8/2022, as outlined below:  PHYSICAL EXAM:  General: WD, WN. Alert, cooperative, no acute distress, frail appearing   EENT:  EOMI. Anicteric sclerae. MMM  Resp:  CTA bilaterally, no wheezing or rales. No accessory muscle use  CV:  Regular  rhythm,  No edema  GI:  Soft, Non distended, Non tender. +Bowel sounds  Neurologic:  Alert and oriented X 2  normal speech,   Psych:   Good insight. Not anxious nor agitated  Skin:  No rashes.   No jaundice, permcath right chest wall    Pre-op       Pre-op                  Reviewed most current lab test results and cultures  YES  Reviewed most current radiology test results   YES  Review and summation of old records today    NO  Reviewed patient's current orders and MAR    YES  PMH/SH reviewed - no change compared to H&P  ________________________________________________________________________  Care Plan discussed with:    Comments   Patient x    Family      RN x    Care Manager     Consultant                        Multidiciplinary team rounds were held today with , nursing, pharmacist and clinical coordinator. Patient's plan of care was discussed; medications were reviewed and discharge planning was addressed. ________________________________________________________________________        Comments   >50% of visit spent in counseling and coordination of care     ________________________________________________________________________  Peggy Donaldson MD     Procedures: see electronic medical records for all procedures/Xrays and details which were not copied into this note but were reviewed prior to creation of Plan. LABS:  I reviewed today's most current labs and imaging studies.   Pertinent labs include:  Recent Labs     08/07/22  0359 08/06/22  1356   WBC 10.7 9.5   HGB 9.4* 9.4*   HCT 30.7* 30.0*    243       Recent Labs     08/07/22  0359 08/06/22  1356    136   K 3.7 3.8    101   CO2 28 27   * 262*   BUN 17 14   CREA 2.41* 2.12*   CA 8.1* 7.8*   ALB  --  1.5*   TBILI  --  0.5   ALT  --  27         Signed: Peggy Donaldson MD

## 2022-08-08 NOTE — PROGRESS NOTES
Bedside and Verbal shift change report given to MARCIA Johnson (oncoming nurse) by Cleo Bright RN (offgoing nurse). Report included the following information SBAR and Kardex.

## 2022-08-08 NOTE — WOUND CARE
Wound care reassessment after her surgical debridement in the OR and reassess the new stoma. Chart reviewed. Dr. Ardena Moritz did the sacrum / buttocks debridement. The stoma was created to divert stool away from the wound. Pt. Alexis Hammer is her caregiver and most likely needs some help with the patient to be more consistent with her care. Assessment today:  the wound still has some residual slough (gray) in the wound and pt's glucose is usually chronically high. Back in May the A1C was > 14. This is a barrier to healing this wound along with the moisture management and the pressure. There is some granulation tissue and the Sacrum bone is prominent / exposed with fascia covering it. The colostomy is budded, moist, pink and no real stool output yet. There was blood in the bag today. The bag was changed. The stoma measures 40 mm round and an ostomy ring was used today to protect the skin. Wound Sacrum red/purple/non-blanchable area with skin breakdown and bleeding (Active)   Wound Image   08/08/22 1643   Wound Etiology Pressure Stage 4 08/08/22 1643   Dressing Status New dressing applied 08/08/22 1643   Cleansed Cleansed with saline 08/08/22 1643   Dressing/Treatment Packing;Moist to dry 08/08/22 1643   Offloading for Diabetic Foot Ulcers Offloading ordered 08/07/22 1621   Dressing Change Due 08/09/22 08/08/22 1643   Wound Length (cm) 16.5 cm 08/08/22 1643   Wound Width (cm) 13 cm 08/08/22 1643   Wound Depth (cm) 3.5 cm 08/08/22 1643   Wound Surface Area (cm^2) 195 cm^2 08/08/22 1643   Change in Wound Size % -1850 08/08/22 1643   Wound Volume (cm^3) 682.5 cm^3 08/08/22 1643   Wound Healing % 76 08/08/22 1643   Undermining Starts ___ O'Clock 11 o'clock 08/08/22 1643   Undermining Ends ___ O'Clock 6 o'clock 08/08/22 1643   Undermining Maximum Distance (cm) 2.8 cm 08/08/22 1643   Wound Assessment Devitalized tissue; Exposed Structure Muscle; Exposed Structure Bone;Pale granulation tissue;Pink/red;Slough 08/08/22 1643   Drainage Amount Moderate 08/08/22 1643   Drainage Description Serosanguinous 08/08/22 1643   Wound Odor None 08/08/22 1643   Kaur-Wound/Incision Assessment Denuded 08/08/22 1643   Edges Epibole (rolled edges) 08/08/22 1643   Wound Thickness Description Full thickness 08/08/22 1643   Number of days: 77       Wound Heel Left DTI 05/23/22 (Active)   Wound Image   08/08/22 1643   Wound Etiology Pressure Unstageable 08/08/22 1643   Dressing Status Other (Comment) 08/07/22 2200   Cleansed Betadine/Povidone Iodine 08/07/22 1102   Dressing/Treatment Open to air 08/07/22 1102   Offloading for Diabetic Foot Ulcers Offloading boot 08/07/22 1102   Wound Assessment Eschar dry; Epithelialization 08/08/22 1643   Drainage Amount None 08/08/22 1643   Wound Odor None 08/08/22 1643   Kaur-Wound/Incision Assessment Denuded;Ecchymosis;Edematous 08/08/22 1643   Edges Defined edges 08/06/22 1929   Wound Thickness Description Full thickness 08/08/22 1643   Number of days: 77       Wound Heel Right DTI (Active)   Wound Image   08/03/22 0202   Wound Etiology Pressure Unstageable 08/08/22 1643   Cleansed Betadine/Povidone Iodine 08/07/22 1102   Dressing/Treatment Open to air 08/07/22 1102   Offloading for Diabetic Foot Ulcers Offloading boot 08/07/22 1102   Wound Assessment Eschar dry; Epithelialization 08/08/22 1643   Drainage Amount None 08/06/22 1929   Wound Odor None 08/06/22 1929   Kaur-Wound/Incision Assessment Hyperkeratosis (Callous) 08/06/22 1929   Edges Defined edges 08/06/22 1929   Wound Thickness Description Full thickness 08/06/22 1929   Number of days: 76      Treatment / Plan: the wound was cleansed with NS today and packed with the NS soaked Kerlix gauze to fill in the wound completely. Covered with the high drainage pack ABD. The wound Vac Cannot be placed yet. She needs a few more days to continue the daily wound care with Santyl And Dakin solution.    The wound care orders were modified to include an order for Dakin Solution as well as Santyl. Nursing present during the care. The colostomy bag was changed today and nursing assisted with this.   Melina Thapa RN, BSN, [employer]

## 2022-08-08 NOTE — TELEPHONE ENCOUNTER
Please add patient to ID schedule as follows-  9/13 at 1:30 PM-in person or virtual  Patient notification not required.   Thanks

## 2022-08-09 NOTE — PROGRESS NOTES
Bedside shift change report given to Florence Del Toro RN  (oncoming nurse) by [facility], RN (offgoing nurse). Report included the following information SBAR, Kardex, ED Summary, Intake/Output, MAR, and Cardiac Rhythm sinus/ afib . Informed SOWMYA Yoon that wound care stated yesterday patient unable to get wound vac at this time. 1200: Wound care completed per orders. Patient has maxipime and vanc ordered at same time and patient has one access, call pharm D Rommel Mir- states it is ok to run maxipime over 30 mins and will re-time vanc dose so they won't be scheduled together. Messaged Dr. Phuong Enriquez: Patient's blood sugar is 211, but has only had ensure today and has no appetite currently. Can I hold her insulin? She has 3 units due  MD states give it. End of Shift Note    Bedside shift change report given to [facility], RN (oncoming nurse) by Zakiya Santos RN (offgoing nurse). Report included the following information SBAR, Kardex, ED Summary, Intake/Output, MAR, and Cardiac Rhythm afib/ sinus    Shift worked:  7a-7p     Shift summary and any significant changes:     See above     Concerns for physician to address:  Discharge planning     Zone phone for oncoming shift:          Activity:  Activity Level: Bed Rest  Number times ambulated in hallways past shift: 0  Number of times OOB to chair past shift: 0    Cardiac:   Cardiac Monitoring: Yes      Cardiac Rhythm: Atrial Fib    Access:  Current line(s): PIV     Genitourinary:   Urinary status: anuric    Respiratory:   O2 Device: None (Room air)  Chronic home O2 use?: NO  Incentive spirometer at bedside: NO       GI:  Last Bowel Movement Date: 08/06/22  Current diet:  ADULT DIET Easy to Chew  ADULT ORAL NUTRITION SUPPLEMENT Lunch; Standard High Calorie/High Protein  ADULT ORAL NUTRITION SUPPLEMENT Breakfast, Dinner; Low Calorie/High Protein  Passing flatus: NO  Tolerating current diet: NO- poor appetite.        Pain Management:   Patient states pain is manageable on current regimen: YES    Skin:  Markos Score: 11  Interventions: limit briefs    Patient Safety:  Fall Score:  Total Score: 4  Interventions: pt to call before getting OOB  High Fall Risk: Yes    Length of Stay:  Expected LOS: 9d 14h  Actual LOS: MARCIA Pressley

## 2022-08-09 NOTE — PROGRESS NOTES
Physical Therapy Screening:  Services are not indicated at this time. Rony Denny lift at home. [facility] based on results obtained during the nursing admission assessment. The patients chart was reviewed and the patient is not appropriate for a skilled therapy evaluation at this time. Please consult physical therapy if any therapy needs arise. Thank you.     Donna Rivas, PT, DPT

## 2022-08-09 NOTE — PROCEDURES
Hemodialysis / [phone]    Vitals Pre Post Assessment Pre Post   BP BP: (!) 132/56 (08/09/22 0735)   152/64 LOC Alert, awake, conscious and coherent Alert, awake, conscious and coherent   HR Pulse (Heart Rate): 65 (08/09/22 0735) 92   Lungs Clear, no SOB Clear, no SOB   Resp Resp Rate: 16 (08/09/22 0735) 16   Cardiac Sinus rhythm Sinus rhythm   Temp Temp: 98 °F (36.7 °C) (08/09/22 0735) 98.5   Skin Intact, dry and warm Intact, dry and warm   Weight  NA NA Edema No edema No edema   Tele status Monitor Monitor Pain Pain Intensity 1: 0 (08/08/22 1915) No pain reported     Orders   Duration: Start: 3143 End: 1105 Total: 3.5 hours   Dialyzer: Dialyzer/Set Up Inspection: José Miguel Hector (08/09/22 0735)   Logan Pay Bath: Dialysate K (mEq/L): 2 (08/09/22 0735)   Ca Bath: Dialysate CA (mEq/L): 2.5 (08/09/22 0735)   Na: Dialysate NA (mEq/L): 138 (08/09/22 0735)   Bicarb: Dialysate HCO3 (mEq/L): 35 (08/09/22 0735)   Target Fluid Removal: Goal/Amount of Fluid to Remove (mL): 1000 mL (08/09/22 0735)     Access   Type & Location: Right CVC   Comments:            No signs and symptoms of infection noted, each catheter limb are patent, caps removed and hubs disinfected as per p&p.                                 Labs   HBsAg (Antigen) / date:  Negative 08/03/22                                            HBsAb (Antibody) / date:  Susceptible 08/03/22   Source:  Epic   Obtained/Reviewed  Critical Results Called HGB   Date Value Ref Range Status   08/09/2022 9.3 (L) 11.5 - 16.0 g/dL Final     Potassium   Date Value Ref Range Status   08/09/2022 3.7 3.5 - 5.1 mmol/L Final     Calcium   Date Value Ref Range Status   08/09/2022 7.7 (L) 8.5 - 10.1 MG/DL Final     BUN   Date Value Ref Range Status   08/09/2022 29 (H) 6 - 20 MG/DL Final     Creatinine   Date Value Ref Range Status   08/09/2022 2.11 (H) 0.55 - 1.02 MG/DL Final        Meds Given   Name Dose Route   Heparin A 1.9ml, V 1.9ml CVC lock               Adequacy / Fluid    Total Liters Process: 65.3 L   Net Fluid Removed: 1300 mL      Comments   Time Out Done:   (Time) 0730   Admitting Diagnosis: Wound infection, ESRD   Consent obtained/signed: Informed Consent Verified: Yes (08/09/22 2037)   Machine / RO # Machine Number: [id number] (08/09/22 6262)   Primary Nurse Rpt Pre: Katheryn Lees RN   Primary Nurse Rpt Post: Shayla Meredith RN   Pt Education: Procedure   Care Plan: HD POC   Pts outpatient clinic: [facility] Summary   Comments:      Dialysis order, medication, labs and consent verified pre dialysis. Time Out, ICEBOAT done pre dialysis. 0710: SBAR received from Primary Nurse, patient is on stable condition upon endorsement. CVC clean aseptically as per policy, no signs of infection, dressing not due for change, both lumen are patent. This treatment is for yesterday. 0971: Treatment started, safety checks done, vitals signs are stable upon initiation of treatment. 0915: seen by Dr. Jesse Alvarado, to remove 1 to 1.5 L as tolerated. 1105: Treatment ended. Blood returned as per policy. Vital  signs stable the whole treatment. SBAR given to Primary nurse.

## 2022-08-09 NOTE — PROGRESS NOTES
Status post debridement of sacral decubitus soft tissue injury and creation of end ileostomy on 8/4/2022. Patient with no complaints. Continues to have soft stool output in pouch. Assessment and plan:   - Ok for d/c from surgical standpoint. Clinical decision making made in collaboration with Dr. Jonathan Mendoza who is aware of and in agreement with treatment plan.

## 2022-08-09 NOTE — PROGRESS NOTES
Transition of Care Plan:    RUR: 25%  Disposition: SNF[facility]   Follow up appointments: N/A  DME needed: N/A  Transportation at Discharge: [facility]   Keys or means to access home:   N/A      IM Medicare Letter: Will need at discharge   Caregiver Contact:  Discharge Caregiver contacted prior to discharge? Care Conference needed?:  [phone] [facility] has accepted the pt and will start auth. CM will continue to monitor and assist with additional discharge needs.     Stephania Graham, MSW  [phone]

## 2022-08-09 NOTE — PROGRESS NOTES
Occupational Therapy Screening:  Services are not indicated at this time. [facility] based on results obtained during the nursing admission assessment. The patients chart was reviewed and the patient is not appropriate for a skilled therapy evaluation at this time. Please consult occupational therapy if any therapy needs arise. Thank you.     Mikael Salgado, OTR/L

## 2022-08-09 NOTE — PROGRESS NOTES
End of Shift Note    Bedside shift change report given to Pamela Birch RN(oncoming nurse) by Aiden Isidro (offgoing nurse). Report included the following information SBAR, Kardex, Intake/Output, MAR, and Recent Results    Shift worked:  7p-7a     Shift summary and any significant changes:     No significant changes     Concerns for physician to address: none     Zone phone for oncoming shift:       Activity:  Activity Level: Bed Rest  Number times ambulated in hallways past shift: 0  Number of times OOB to chair past shift: 0    Cardiac:   Cardiac Monitoring: Yes      Cardiac Rhythm: Sinus Rhythm    Access:  Current line(s): PIV     Genitourinary:   Urinary status: anuric    Respiratory:   O2 Device: None (Room air)  Chronic home O2 use?: NO  Incentive spirometer at bedside: NO       GI:  Last Bowel Movement Date: 08/06/22  Current diet:  ADULT DIET Easy to Chew  ADULT ORAL NUTRITION SUPPLEMENT Lunch; Standard High Calorie/High Protein  ADULT ORAL NUTRITION SUPPLEMENT Breakfast, Dinner; Low Calorie/High Protein  Passing flatus: YES  Tolerating current diet: YES       Pain Management:   Patient states pain is manageable on current regimen: YES    Skin:  Markos Score: 13  Interventions: float heels, increase time out of bed, foam dressing, and PT/OT consult    Patient Safety:  Fall Score:  Total Score: 3  Interventions: bed/chair alarm, assistive device (walker, cane, etc), gripper socks, and pt to call before getting OOB  High Fall Risk: Yes    Length of Stay:  Expected LOS: 9d 14h  Actual LOS: [address]

## 2022-08-09 NOTE — PROGRESS NOTES
Problem: Pressure Injury - Risk of  Goal: *Prevention of pressure injury  Description: Document Markos Scale and appropriate interventions in the flowsheet. Outcome: Progressing Towards Goal  Note: Pressure Injury Interventions:  Sensory Interventions: Assess need for specialty bed, Check visual cues for pain, Discuss PT/OT consult with provider, Keep linens dry and wrinkle-free, Minimize linen layers    Moisture Interventions: Apply protective barrier, creams and emollients, Assess need for specialty bed, Check for incontinence Q2 hours and as needed, Minimize layers, Maintain skin hydration (lotion/cream)    Activity Interventions: Assess need for specialty bed, Increase time out of bed, Pressure redistribution bed/mattress(bed type), PT/OT evaluation    Mobility Interventions: Assess need for specialty bed, Chair cushion, HOB 30 degrees or less    Nutrition Interventions: Document food/fluid/supplement intake, Discuss nutritional consult with provider    Friction and Shear Interventions: Feet elevated on foot rest, Foam dressings/transparent film/skin sealants, Lift sheet, Lift team/patient mobility team, Minimize layers                Problem: Falls - Risk of  Goal: *Absence of Falls  Description: Document Robert Henley Fall Risk and appropriate interventions in the flowsheet.   Outcome: Progressing Towards Goal  Note: Fall Risk Interventions:  Mobility Interventions: Bed/chair exit alarm, Communicate number of staff needed for ambulation/transfer, OT consult for ADLs    Mentation Interventions: Bed/chair exit alarm, Door open when patient unattended, Evaluate medications/consider consulting pharmacy    Medication Interventions: Bed/chair exit alarm, Evaluate medications/consider consulting pharmacy, Patient to call before getting OOB, Teach patient to arise slowly    Elimination Interventions: Call light in reach, Bed/chair exit alarm    History of Falls Interventions: Bed/chair exit alarm, Consult care management for discharge planning, Door open when patient unattended, Evaluate medications/consider consulting pharmacy

## 2022-08-09 NOTE — PROGRESS NOTES
Nephrology Progress Note  [facility] / [address], [address], [address]  Phone - [phone]  Fax - [phone]                 Patient: Josh Spear                   YOB: 1934        Date- 8/9/2022                      Admit Date: 8/2/2022  CC: Follow up for ESRD          IMPRESSION & PLAN:    ESRD , HD dependent since 5/23/2022, on MWF schedule, [facility], under Dr. Adalberto Cardenas  Sepsis  Chronic sacral decubitus ulcer  hypokalemia  AFIB  DM 2  Anemia  Hypotension    PLAN-  Plans for hemodialysis today, she was unable to be dialyzed today because of more urgent hemodialysis treatments. We will change dialysis schedule to TTS now. Antibiotics per internal medicine team.  I have called in her antibiotics to her dialysis unit at [facility]. Per ID team's request.  Status post wound debridement  Will continue on Epogen for anemia  Continue midodrine for dialysis         Subjective: Interval History:   -Patient was seen and examined today on HD.  -No issues overnight    Objective:   Vitals:    08/09/22 0930 08/09/22 0945 08/09/22 1001 08/09/22 1018   BP: (!) 142/86 138/66 138/68 124/75   Pulse: 86 95 86 81   Resp: 16 19 14 18   Temp:       TempSrc:       SpO2: 99% 100% 100% 100%   Weight:       Height:          No intake/output data recorded. Last 3 Recorded Weights in this Encounter    08/06/22 1344 08/07/22 1542 08/09/22 0457   Weight: 64.7 kg (142 lb 11.2 oz) 62.3 kg (137 lb 4.8 oz) 73.3 kg (161 lb 8 oz)      Physical exam:    GEN: NAD  NECK- Supple, no mass  RESP: No wheezing, Clear b/l  CVS: S1,S2  RRR  NEURO: Normal speech, Non focal  EXT: No Edema   HD access: Right chest permacath    Chart reviewed. Pertinent Notes reviewed.      Data Review :  Recent Labs     08/09/22  0101 08/07/22  0359 08/06/22  1356    137 136   K 3.7 3.7 3.8    103 101   CO2 28 28 27 BUN 29* 17 14   CREA 2.11* 2.41* 2.12*   * 138* 262*   CA 7.7* 8.1* 7.8*       Recent Labs     08/09/22  0101 08/07/22  0359 08/06/22  1356   WBC 9.7 10.7 9.5   HGB 9.3* 9.4* 9.4*   HCT 30.0* 30.7* 30.0*    216 243       No results for input(s): FE, TIBC, PSAT, FERR in the last 72 hours.    Medication list  reviewed  Current Facility-Administered Medications   Medication Dose Route Frequency    cefepime (MAXIPIME) 1 g in 0.9% sodium chloride (MBP/ADV) 50 mL MBP  1 g IntraVENous Q24H    sodium hypochlorite (QUARTER STRENGTH DAKIN'S) 0.125% irrigation (bottle)   Topical DAILY    collagenase (SANTYL) 250 unit/gram ointment   Topical DAILY    metroNIDAZOLE (FLAGYL) tablet 500 mg  500 mg Oral Q12H    insulin glargine (LANTUS) injection 6 Units  6 Units SubCUTAneous QHS    midodrine (PROAMATINE) tablet 10 mg  10 mg Oral Q MON, WED & FRI    albumin human 25% (BUMINATE) solution 25 g  25 g IntraVENous DIALYSIS PRN    VANCOMYCIN INFORMATION NOTE   Other Rx Dosing/Monitoring    heparin (porcine) 1,000 unit/mL injection 1,900 Units  1,900 Units InterCATHeter DIALYSIS PRN    And    heparin (porcine) 1,000 unit/mL injection 1,900 Units  1,900 Units InterCATHeter DIALYSIS PRN    amiodarone (CORDARONE) tablet 200 mg  200 mg Oral DAILY    aspirin delayed-release tablet 81 mg  81 mg Oral DAILY    atorvastatin (LIPITOR) tablet 40 mg  40 mg Oral DAILY    famotidine (PEPCID) tablet 20 mg  20 mg Oral DAILY    sodium chloride (NS) flush 5-40 mL  5-40 mL IntraVENous Q8H    sodium chloride (NS) flush 5-40 mL  5-40 mL IntraVENous PRN    acetaminophen (TYLENOL) tablet 650 mg  650 mg Oral Q6H PRN    Or    acetaminophen (TYLENOL) suppository 650 mg  650 mg Rectal Q6H PRN    polyethylene glycol (MIRALAX) packet 17 g  17 g Oral DAILY PRN    heparin (porcine) injection 5,000 Units  5,000 Units SubCUTAneous Q8H    insulin lispro (HUMALOG) injection   SubCUTAneous AC&HS    glucose chewable tablet 16 g  4 Tablet Oral PRN    glucagon (GLUCAGEN) injection 1 mg  1 mg IntraMUSCular PRN    dextrose 10 % infusion 0-250 mL  0-250 mL IntraVENous PRN          Mayte Kinney MD  8/9/2022

## 2022-08-09 NOTE — PROGRESS NOTES
Pharmacy Automatic Renal Dosing Protocol - Antimicrobials    Indication for Antimicrobials: Infected Sacral Decubitus Ulcer, Sepsis    Current Regimen of Each Antimicrobial:  Vancomycin 1000 mg post HD (Start Date ; Day 6)  Cefepime 1 gm IV q24h (Start Date ; Day 2)   Metronidazole 500 mg PO q12h (Start Date ; Day 2)     Previous Antimicrobial Therapy:  Piperacillin tazobactam 3.375 Q12H (Start 8/3, )  Meropenem 1000mg IV q24h  (Start Date   Day 5)    Vancomycin Trough Goal: Goal = 20    Vancomycin Levels  Date Dose & Interval Measured trough     AM random Vanc 1500mg x1 on 8/3 16.7 Incr. Dose to 1000mg p HD    04:48  18.3 random            Significant Cultures:    blood: Klebsiella in 1 bottle (previously reported as ESBL but corrected by lab) ; Proteus in 4 bottles; Enterococcus in 1 bottle   blood:  proteus in 2 of 2 bottles   blood: NG x 3 days   blood: NG x 2 days    Labs:  Recent Labs     22  0101 22  0359 22  1356   CREA 2.11* 2.41* 2.12*   BUN 29* 17 14   WBC 9.7 10.7 9.5     Temp (24hrs), Av.1 °F (36.7 °C), Min:97.4 °F (36.3 °C), Max:98.5 °F (36.9 °C)    Paralysis, amputations, malnutrition: moderate protein calorie malnutrition  Creatinine Clearance (mL/min) or Dialysis: ESRD on HD ([facility] schedule outpatient)     Impression/Plan:   Afebrile, WBC wnl  Continue with 1000mg IV after HD. The patient received HD today, so have ordered a 1x dose of vancomycin. Antimicrobial end date:  pending; IV abx to be continued following discharge at HD     Pharmacy will follow daily and adjust medications as appropriate for renal function and/or serum levels.     Thank you,  Namrata Sharp, PHARMD

## 2022-08-10 NOTE — WOUND CARE
Wound Care follow up for discharge planning: Pt. Being discharged today. Conversation occurred with Dr. Antonio Chambers this morning regarding her wound care and other care needs at the SNF. Pt. Is bed-bound and mobile only when someone moves her. She needs to have her heels floated. She has eschar covered - unstageable PI. Sacrum wound care post op Major full thickness debridement: The wound is much  but still needs the Santyl Collagenase ointment to continue the debridement prior to the Wound Vac placement. This may take 3 weeks to complete. A good daily wound cleansing is essential with the use of the Santyl spread on the wound and adding NS moist packing kerlix dressing. Cover with foam dressing and treat the surrounding skin with zinc oxide cream.   Future care in a few weeks: Highly recommend a Wound VAC / negative pressure wound therapy to heal this wound. Just need to get rid of the left over slough in the wound in order to heal it properly and place the wound in a proliferative healing phase. Pt. Will need a wound care physician and / or Wound care nurse to reassess the wound Regularly for improvements. This care provider will decide when the wound can receive the wound vac therapy. The wound should be at leat 85% granulation tissue with only a thin layer of slough in the wound. Sacrum on 8-9-22:                              Stable eschar on heels: Float. She received the colostomy this admission in order to divert he stool away from the sacrum to assist in healing the wound. This will likely be a permanent ostomy due to her age and debility. Family will need to learn the ostomy care as well if she is to come home again. They were given the ostomy education materials to review.    Miguelito Guillen, RN, BSN, [facility]

## 2022-08-10 NOTE — PROGRESS NOTES
Transition of Care Plan:     RUR: 24% high risk for readmission  Disposition: [facility] (accepted, waiting on insurance authorization)  Will need rapid COVID test  Follow up appointments: to be arranged by SNF  DME needed: to be provided by SNF  Transportation at Discharge: [facility] to be scheduled once insurance [address] is obtained  Keys or means to access home:   going to [facility] Medicare Letter: Will need at discharge   Caregiver Contact: GranddaughterOwen [phone]  Discharge Caregiver contacted prior to discharge? Daughter will be informed of discharge plan  Care Conference needed?:  N/A    CM has reviewed chart and received handoff from previous CM. Plan is for patient to go to [facility] once insurance authorization is approved. CM will notify nursing of insurance auth and discharge transport time once obtained. Pt will need 2nd IMM and [facility] arranged for transport at discharge. Care Management Interventions  PCP Verified by CM: Yes  Palliative Care Criteria Met (RRAT>21 & CHF Dx)?: No  Mode of Transport at Discharge:  Other (see comment)  Transition of Care Consult (CM Consult): Discharge Planning, SNF  Partner SNF: Yes  MyChart Signup: No  Discharge Durable Medical Equipment: No  Physical Therapy Consult: No  Occupational Therapy Consult: No  Speech Therapy Consult: No  Support Systems: Child(yasmin), Other Family Member(s)  Confirm Follow Up Transport: Wheelchair [facility]  The Plan for Transition of Care is Related to the Following Treatment Goals : SNF [facility] or [facility]  The Patient and/or Patient Representative was Provided with a Choice of Provider and Agrees with the Discharge Plan?: Yes  Name of the Patient Representative Who was Provided with a Choice of Provider and Agrees with the Discharge Plan: Pt  Freedom of Choice List was Provided with Basic Dialogue that Supports the Patient's Individualized Plan of Care/Goals, Treatment Preferences and Shares the Quality Data Associated with the Providers?: Yes  Discharge Location  Patient Expects to be Discharged to[de-identified] [facility].  Juwan Mendoza, [address] [facility]  [employer] ACP Facilitator  Zone Phone: [phone]

## 2022-08-10 NOTE — PROGRESS NOTES
Hospitalist Progress Note    NAME: Lupe Freeman   :  1934   MRN:  [id number]     Estimated discharge date:  8/10/2022    Barriers:  1. Needs SNF bed    2. Wound vac when wound care feels wound is ready  3. Cleared for discharge by surgery for ostomy  4. Received final antibiotic recs from Dr Tristan [facility] and cefepime with HD. I spoke with Dr Hein Service   Will not need PICC or Cordell catheter    Assessment / Plan:    Severe sepsis POA WBC 13.0, SBP 83/40, , lactate 1.53  Sacral wound infection of chronic sacral decubitus ulcer POA  Proteus/ESBL klebsiella/ enterococcus bacteremia POA  Foul-smelling purulent sacral decubitus ulcer with surrounding erythema  Continue vanco and meropenem  Admit BC with proteus,Klebsiella, enterococcus         Initially reported as ESBL, this was corrected  Vanco and meropenem --> vanco and cefepime and Po flagyl  General surgery did diverting colostomy, wound debridement        No exposed bone per Op note  Wound cultures with multiple GNR  Continue wound care  Plan for IV antibiotics at HD  Start discharge planning, planning for SNF at discharge  Needs wound vac when wound care feels she is ready    Bedbound POA  Moderate protein calorie malnutrition  Patient bed bound  nutrition consultation for dietary supplements     ESRD on HD MWF  Anemia of chronic disease  Anemia appears stable and not symptomatic  Nephrology consulted, c/w HD as tolerated  Placed on midodrine to increase BP so patient can tolerate HD     DM type 2 with ESRD POA  Accu-Cheks and sliding scale insulin  , 109, 87, 124, 211, 137     Essential hypertension POA  Hold PTA antihypertensives given borderline hypotension  Noted patient taking clonidine, watch for rebound    18.5 - 24.9 Normal weight / Body mass index is 27.72 kg/m².     Code status: Full  Prophylaxis: Hep SQ  Recommended Disposition: Home w/Family     Subjective:     Chief Complaint / Reason for Physician Visit   \"Okay\"  No complaints of pain or SOB  S/p OR for debridement and colostomy on 8/4  Awaiting for final wound care recs, will need wound vac    Review of Systems:  Symptom Y/N Comments  Symptom Y/N Comments   Fever/Chills nn   Chest Pain n    Poor Appetite    Edema     Cough n   Abdominal Pain n    Sputum    Joint Pain     SOB/BEE n   Pruritis/Rash     Nausea/vomit nn   Tolerating PT/OT     Diarrhea    Tolerating Diet y    Constipation    Other       Could NOT obtain due to:      Objective:     VITALS:   Last 24hrs VS reviewed since prior progress note. Most recent are:  Patient Vitals for the past 24 hrs:   Temp Pulse Resp BP SpO2   08/09/22 1900 98.4 °F (36.9 °C) 83 18 (!) 110/46 99 %   08/09/22 1500 98 °F (36.7 °C) 92 18 (!) 149/47 99 %   08/09/22 1110 98.5 °F (36.9 °C) 98 14 (!) 152/64 99 %   08/09/22 1100 -- 92 16 134/63 100 %   08/09/22 1046 -- 85 17 118/76 100 %   08/09/22 1030 -- 83 17 117/67 99 %   08/09/22 1018 -- 81 18 124/75 100 %   08/09/22 1001 -- 86 14 138/68 100 %   08/09/22 0945 -- 95 19 138/66 100 %   08/09/22 0930 -- 86 16 (!) 142/86 99 %   08/09/22 0915 -- 76 16 (!) 140/63 98 %   08/09/22 0902 -- 80 18 (!) 125/57 100 %   08/09/22 0845 -- 90 19 125/88 100 %   08/09/22 0831 -- 89 18 (!) 123/57 100 %   08/09/22 0816 -- 82 18 117/70 100 %   08/09/22 0802 -- 76 17 (!) 137/57 100 %   08/09/22 0745 -- 71 14 (!) 137/56 100 %   08/09/22 0735 98 °F (36.7 °C) 65 16 (!) 132/56 100 %   08/09/22 0715 -- 72 18 (!) 139/52 99 %   08/09/22 0400 98.2 °F (36.8 °C) 76 17 138/66 98 %   08/09/22 0347 -- 77 20 (!) 147/71 100 %   08/09/22 0000 -- 76 20 (!) 146/62 99 %   08/08/22 2253 97.4 °F (36.3 °C) 92 20 (!) 137/120 100 %         Intake/Output Summary (Last 24 hours) at 8/9/2022 2224  Last data filed at 8/9/2022 1147  Gross per 24 hour   Intake 100 ml   Output 1300 ml   Net -1200 ml          I had a face to face encounter and independently examined this patient on 8/9/2022, as outlined below:  PHYSICAL EXAM:  General: WD, WN. Alert, cooperative, no acute distress, frail appearing   EENT:  Anicteric sclerae. MMM  Resp:  CTA bilaterally, no wheezing or rales. No accessory muscle use  CV:  Regular  rhythm,  No edema  GI:  Soft, Non distended, Non tender. +Bowel sounds  Neurologic:  Alert and oriented X 2  normal speech,   Psych:   Good insight. Not anxious nor agitated  Skin:  No rashes. No jaundice, permcath right chest wall    Pre-op       Pre-op                  Reviewed most current lab test results and cultures  YES  Reviewed most current radiology test results   YES  Review and summation of old records today    NO  Reviewed patient's current orders and MAR    YES  PMH/SH reviewed - no change compared to H&P  ________________________________________________________________________  Care Plan discussed with:    Comments   Patient x    Family      RN x    Care Manager     Consultant                        Multidiciplinary team rounds were held today with , nursing, pharmacist and clinical coordinator. Patient's plan of care was discussed; medications were reviewed and discharge planning was addressed. ________________________________________________________________________        Comments   >50% of visit spent in counseling and coordination of care     ________________________________________________________________________  Christin Blackmon MD     Procedures: see electronic medical records for all procedures/Xrays and details which were not copied into this note but were reviewed prior to creation of Plan. LABS:  I reviewed today's most current labs and imaging studies.   Pertinent labs include:  Recent Labs     08/09/22  0101 08/07/22  0359   WBC 9.7 10.7   HGB 9.3* 9.4*   HCT 30.0* 30.7*    216       Recent Labs     08/09/22  0101 08/07/22  0359    137   K 3.7 3.7    103   CO2 28 28   * 138*   BUN 29* 17   CREA 2.11* 2.41*   CA 7.7* 8.1*         Signed: Christin Blackmon, MD

## 2022-08-10 NOTE — PROGRESS NOTES
Nephrology Progress Note  [facility] / [address], [place] [facility], [address]  Phone - [phone]  Fax - [phone]                 Patient: Claudette Cowan                   YOB: 1934        Date- 8/10/2022                      Admit Date: 8/2/2022  CC: Follow up for ESRD          IMPRESSION & PLAN:    ESRD , HD dependent since 5/23/2022, on MWF schedule, [facility], under Dr. Nav Herbert  Sepsis  Chronic sacral decubitus ulcer  hypokalemia  AFIB  DM 2  Anemia  Hypotension    PLAN-  Plans for hemodialysis tomorrow. If she gets discharged  then she can go for dialysis on Friday at the unit  Antibiotics per internal medicine team.  I have called in her antibiotics to her dialysis unit at [place]. Per ID team's request.  Status post wound debridement  Will continue on Epogen for anemia  Continue midodrine for dialysis     Subjective: Interval History:   -Patient was seen and examined today   -No issues overnight    Objective:   Vitals:    08/10/22 0353 08/10/22 0701 08/10/22 0800 08/10/22 1042   BP: 138/63 128/73  121/83   Pulse: 81 80  90   Resp: 14 18 22   Temp: 98.1 °F (36.7 °C) 97.6 °F (36.4 °C)  98 °F (36.7 °C)   TempSrc:       SpO2: 99% 100% 99% 100%   Weight:       Height:          08/09 0701 - 08/10 0700  In: 200 [P.O.:200]  Out: 1300   Last 3 Recorded Weights in this Encounter    08/06/22 1344 08/07/22 1542 08/09/22 0457   Weight: 64.7 kg (142 lb 11.2 oz) 62.3 kg (137 lb 4.8 oz) 73.3 kg (161 lb 8 oz)      Physical exam:    GEN: NAD  NECK- Supple, no mass  RESP: No wheezing, Clear b/l  CVS: S1,S2  RRR  NEURO: Normal speech, Non focal  EXT: No Edema   HD access: Right chest permacath    Chart reviewed. Pertinent Notes reviewed.      Data Review :  Recent Labs     08/09/22  0101      K 3.7      CO2 28   BUN 29*   CREA 2.11*   *   CA 7.7*       Recent Labs 08/09/22  0101   WBC 9.7   HGB 9.3*   HCT 30.0*          No results for input(s): FE, TIBC, PSAT, FERR in the last 72 hours.    Medication list  reviewed  Current Facility-Administered Medications   Medication Dose Route Frequency    cefepime (MAXIPIME) 1 g in 0.9% sodium chloride (MBP/ADV) 50 mL MBP  1 g IntraVENous Q24H    sodium hypochlorite (QUARTER STRENGTH DAKIN'S) 0.125% irrigation (bottle)   Topical DAILY    collagenase (SANTYL) 250 unit/gram ointment   Topical DAILY    metroNIDAZOLE (FLAGYL) tablet 500 mg  500 mg Oral Q12H    insulin glargine (LANTUS) injection 6 Units  6 Units SubCUTAneous QHS    midodrine (PROAMATINE) tablet 10 mg  10 mg Oral Q MON, WED & FRI    albumin human 25% (BUMINATE) solution 25 g  25 g IntraVENous DIALYSIS PRN    VANCOMYCIN INFORMATION NOTE   Other Rx Dosing/Monitoring    heparin (porcine) 1,000 unit/mL injection 1,900 Units  1,900 Units InterCATHeter DIALYSIS PRN    And    heparin (porcine) 1,000 unit/mL injection 1,900 Units  1,900 Units InterCATHeter DIALYSIS PRN    amiodarone (CORDARONE) tablet 200 mg  200 mg Oral DAILY    aspirin delayed-release tablet 81 mg  81 mg Oral DAILY    atorvastatin (LIPITOR) tablet 40 mg  40 mg Oral DAILY    famotidine (PEPCID) tablet 20 mg  20 mg Oral DAILY    sodium chloride (NS) flush 5-40 mL  5-40 mL IntraVENous Q8H    sodium chloride (NS) flush 5-40 mL  5-40 mL IntraVENous PRN    acetaminophen (TYLENOL) tablet 650 mg  650 mg Oral Q6H PRN    Or    acetaminophen (TYLENOL) suppository 650 mg  650 mg Rectal Q6H PRN    polyethylene glycol (MIRALAX) packet 17 g  17 g Oral DAILY PRN    heparin (porcine) injection 5,000 Units  5,000 Units SubCUTAneous Q8H    insulin lispro (HUMALOG) injection   SubCUTAneous AC&HS    glucose chewable tablet 16 g  4 Tablet Oral PRN    glucagon (GLUCAGEN) injection 1 mg  1 mg IntraMUSCular PRN    dextrose 10 % infusion 0-250 mL  0-250 mL IntraVENous PRN          Jeri Daly MD  8/10/2022

## 2022-08-10 NOTE — PROGRESS NOTES
0701 Bedside and Verbal shift change report given to Capri Terry (oncoming nurse) by Bandar Bermudez RN (offgoing nurse). Report included the following information SBAR, Kardex, ED Summary, Procedure Summary, Intake/Output, MAR, and Recent Results. 1351 Patient heart rhythm converted from normal sinus rhythm to A Fib and HR is sustaining between 105 and 118 August Espinoza MD notified and aware. Received verbal orders to let Maria Esther Anthony MD know if HR sustains above 120.    1430 Rapid COVID-19 test sent to lab     End of Shift Note    Bedside shift change report given to Kevin Tobar  (oncoming nurse) by Agatha Mcdonald RN (offgoing nurse). Report included the following information SBAR, Kardex, ED Summary, Procedure Summary, Intake/Output, MAR, Recent Results, and Cardiac Rhythm A fib     Shift worked:  7 am - 7 pm      Shift summary and any significant changes:     Patient converted to A Fib and HR sustaining in the low 100's to mid 110's     Wound care completed and sacral wound dressing changed     Planned for discharge tomorrow and rapid covid test sent      Concerns for physician to address:  N/A     Zone phone for oncoming shift:          Activity:  Activity Level: Bed Rest  Number times ambulated in hallways past shift: 0  Number of times OOB to chair past shift: 0    Cardiac:   Cardiac Monitoring: Yes      Cardiac Rhythm: Atrial Fib    Access:  Current line(s): PIV and HD access     Genitourinary:   Urinary status: oliguric    Respiratory:   O2 Device: None (Room air)  Chronic home O2 use?: NO  Incentive spirometer at bedside: NO       GI:  Last Bowel Movement Date:  (ostomy)  Current diet:  ADULT DIET Easy to Chew  ADULT ORAL NUTRITION SUPPLEMENT Lunch; Standard High Calorie/High Protein  ADULT ORAL NUTRITION SUPPLEMENT Breakfast, Dinner;  Low Calorie/High Protein  Passing flatus: yes - gas present in colostomy bag   Tolerating current diet: NO - patient is uninterested in eating even with RN encouragement         Pain Management:   Patient states pain is manageable on current regimen: YES    Skin:  Markos Score: 11  Interventions: turn team, float heels, foam dressing, PT/OT consult, limit briefs, internal/external urinary devices, and nutritional support     Patient Safety:  Fall Score:  Total Score: 3  Interventions: bed/chair alarm, assistive device (walker, cane, etc), gripper socks, pt to call before getting OOB, and stay with me (per policy)  High Fall Risk: Yes    Length of Stay:  Expected LOS: 9d 14h  Actual LOS: 8      Hailee Hodgson RN

## 2022-08-10 NOTE — PROGRESS NOTES
End of Shift Note    Bedside shift change report given to Dominique Santo RN (oncoming nurse) by Kristin Gross (offgoing nurse). Report included the following information SBAR, Kardex, Intake/Output, MAR, and Recent Results    Shift worked:  7p-7a     Shift summary and any significant changes:     No significant changes      Concerns for physician to address: none   Zone phone for oncoming shift:        Activity:  Activity Level: Bed Rest  Number times ambulated in hallways past shift: 0  Number of times OOB to chair past shift: 0    Cardiac:   Cardiac Monitoring: Yes      Cardiac Rhythm: Sinus Rhythm    Access:  Current line(s): PIV     Genitourinary:   Urinary status: voiding    Respiratory:   O2 Device: None (Room air)  Chronic home O2 use?: NO  Incentive spirometer at bedside: NO       GI:  Last Bowel Movement Date: 08/06/22  Current diet:  ADULT DIET Easy to Chew  ADULT ORAL NUTRITION SUPPLEMENT Lunch; Standard High Calorie/High Protein  ADULT ORAL NUTRITION SUPPLEMENT Breakfast, Dinner; Low Calorie/High Protein  Passing flatus: YES  Tolerating current diet: YES       Pain Management:   Patient states pain is manageable on current regimen: YES    Skin:  Markos Score: 13  Interventions: turn team, speciality bed, float heels, increase time out of bed, and foam dressing    Patient Safety:  Fall Score:  Total Score: 4  Interventions: bed/chair alarm, assistive device (walker, cane, etc), gripper socks, and pt to call before getting OOB  High Fall Risk: Yes    Length of Stay:  Expected LOS: 9d 14h  Actual LOS: 7500 [address]

## 2022-08-10 NOTE — PROGRESS NOTES
Problem: Pressure Injury - Risk of  Goal: *Prevention of pressure injury  Description: Document Markos Scale and appropriate interventions in the flowsheet. Outcome: Progressing Towards Goal  Note: Pressure Injury Interventions:  Sensory Interventions: Assess changes in LOC, Assess need for specialty bed, Avoid rigorous massage over bony prominences, Chair cushion, Float heels, Keep linens dry and wrinkle-free, Minimize linen layers    Moisture Interventions: Apply protective barrier, creams and emollients, Assess need for specialty bed, Contain wound drainage, Internal/External fecal devices, Maintain skin hydration (lotion/cream), Minimize layers    Activity Interventions: Chair cushion, Increase time out of bed, Pressure redistribution bed/mattress(bed type), PT/OT evaluation    Mobility Interventions: Chair cushion, Float heels, HOB 30 degrees or less, Pressure redistribution bed/mattress (bed type), PT/OT evaluation, Suspension boots    Nutrition Interventions: Document food/fluid/supplement intake, Discuss nutritional consult with provider    Friction and Shear Interventions: Feet elevated on foot rest, Foam dressings/transparent film/skin sealants, HOB 30 degrees or less, Lift sheet, Minimize layers                Problem: Falls - Risk of  Goal: *Absence of Falls  Description: Document Felisa Fall Risk and appropriate interventions in the flowsheet.   Outcome: Progressing Towards Goal  Note: Fall Risk Interventions:  Mobility Interventions: Bed/chair exit alarm, Communicate number of staff needed for ambulation/transfer, PT Consult for mobility concerns, PT Consult for assist device competence, Strengthening exercises (ROM-active/passive), OT consult for ADLs    Mentation Interventions: Bed/chair exit alarm, Door open when patient unattended    Medication Interventions: Bed/chair exit alarm, Evaluate medications/consider consulting pharmacy, Patient to call before getting OOB, Teach patient to arise slowly    Elimination Interventions: Call light in reach, Elevated toilet seat, Urinal in reach    History of Falls Interventions: Bed/chair exit alarm, Consult care management for discharge planning

## 2022-08-10 NOTE — DISCHARGE INSTRUCTIONS
HOSPITALIST DISCHARGE INSTRUCTIONS    NAME: Eleanor Cope   :  1934   MRN:  [id number]     Date/Time:  8/10/2022 11:11 AM    ADMIT DATE: 2022   DISCHARGE DATE: 8/10/2022     Attending Physician: Gilbert Isidro MD  Severe sepsis POA   Sacral wound infection of chronic sacral decubitus ulcer POA  Proteus/ESBL klebsiella/ enterococcus bacteremia POA  ESRD on HD MWF  Anemia of chronic disease  DM type 2 with ESRD POA    Medications: Per above medication reconciliation. Pain Management: per above medications    Recommended diet: Regular Diet, easy to chew diet    Recommended activity: PT/OT Eval and Treat    Wound care: continue the daily wound care with Santyl And Dakin solution. Assess for wound vac in 1-2 weeks.     Indwelling devices:  None    Supplemental Oxygen: None    Required Lab work: Weekly BMP    Glucose management:  Accucheck ACHS with sliding scale per SNF protocol    Code status: DNR

## 2022-08-10 NOTE — PROGRESS NOTES
Problem: Falls - Risk of  Goal: *Absence of Falls  Description: Document Delvin Ruth Fall Risk and appropriate interventions in the flowsheet.   Outcome: Progressing Towards Goal  Note: Fall Risk Interventions:  Mobility Interventions: Bed/chair exit alarm, Patient to call before getting OOB, Communicate number of staff needed for ambulation/transfer    Mentation Interventions: Bed/chair exit alarm, More frequent rounding, Update white board    Medication Interventions: Bed/chair exit alarm, Patient to call before getting OOB, Teach patient to arise slowly    Elimination Interventions: Bed/chair exit alarm, Call light in reach, Patient to call for help with toileting needs, Toileting schedule/hourly rounds, Stay With Me (per policy)    History of Falls Interventions: Bed/chair exit alarm, Investigate reason for fall, Room close to nurse's station

## 2022-08-11 NOTE — PROGRESS NOTES
3073 Bedside shift change report given to Cole Marvin / Miguel Ángel Shepherd, RN  (oncoming nurse) by  Melisa dupree. Report included the following information SBAR, Kardex, Intake/Output, MAR, Recent Results, and Cardiac Rhythm NSR to A FIB  .      1119 Patient BP is 176/67, scheduled clonidine PO given     1200 After clonidine PO given, recheck BP is 146/59    1245 Dialysis at bedside with patient    1642 During dialysis patient's HR went into the 150's - 160's and sustained. Dialysis was stopped and Kirstie Whitley MD notified. [phone] Per Kirstie Whitley MD patient to stay in hospital another night and to inform case management to d/c [facility]    End of Shift Note    Bedside shift change report given to Pricilla Troy RN  (oncoming nurse) by Toño Diez RN (offgoing nurse). Report included the following information SBAR, Kardex, ED Summary, Procedure Summary, Intake/Output, MAR, Recent Results, and Cardiac Rhythm A fib     Shift worked:  7 am - 7 pm      Shift summary and any significant changes:     Read note above      Concerns for physician to address:  N/a      Zone phone for oncoming shift:          Activity:  Activity Level: Bed Rest  Number times ambulated in hallways past shift: 0  Number of times OOB to chair past shift: 0    Cardiac:   Cardiac Monitoring: Yes      Cardiac Rhythm: Atrial Fib    Access:  Current line(s): PIV and HD access     Genitourinary:   Urinary status: oliguric    Respiratory:   O2 Device: None (Room air)  Chronic home O2 use?: NO  Incentive spirometer at bedside: N/A       GI:  Last Bowel Movement Date:  (ostomy)  Current diet:  ADULT DIET Easy to Chew  ADULT ORAL NUTRITION SUPPLEMENT Lunch; Standard High Calorie/High Protein  ADULT ORAL NUTRITION SUPPLEMENT Breakfast, Dinner;  Low Calorie/High Protein  Passing flatus: YES via colostomy   Tolerating current diet: NO       Pain Management:   Patient states pain is manageable on current regimen: YES    Skin:  Markos Score: 11  Interventions: turn team, float heels, foam dressing, PT/OT consult, limit briefs, internal/external urinary devices, and nutritional support     Patient Safety:  Fall Score:  Total Score: 3  Interventions: bed/chair alarm, assistive device (walker, cane, etc), gripper socks, pt to call before getting OOB, and stay with me (per policy)  High Fall Risk: Yes    Length of Stay:  Expected LOS: 9d 14h  Actual LOS: 9      Alejandra Portillo RN

## 2022-08-11 NOTE — PROGRESS NOTES
Nephrology Progress Note  [facility] / [address], [place], [address]  Phone - [phone]  Fax - [phone]                 Patient: Edward Montemayor                   YOB: 1934        Date- 8/11/2022                      Admit Date: 8/2/2022  CC: Follow up for ESRD          IMPRESSION & PLAN:    ESRD , HD dependent since 5/23/2022, on MWF schedule, [facility], under Dr. Bere Curran  Sepsis  Chronic sacral decubitus ulcer  hypokalemia  AFIB  DM 2  Anemia  Hypotension    PLAN-  Plans for hemodialysis tomorrow if still inpatient. I have called the dialysis unit and change antibiotic vancomycin dose to 1 g instead of 500 mg q. HD  Status post wound debridement  Will continue on Epogen for anemia  Continue midodrine for dialysis     Subjective: Interval History:   -Patient was seen and examined today   -No issues overnight    Objective:   Vitals:    08/10/22 2216 08/10/22 2305 08/11/22 1037 08/11/22 1119   BP: 136/68 139/89 (!) 168/70 (!) 176/67   Pulse: 89 90 91    Resp:  15 17    Temp:  97.3 °F (36.3 °C) 97.4 °F (36.3 °C)    TempSrc:       SpO2:  96% 100%    Weight:       Height:          08/10 0701 - 08/11 0700  In: 120 [P.O.:120]  Out: 125   Last 3 Recorded Weights in this Encounter    08/06/22 1344 08/07/22 1542 08/09/22 0457   Weight: 64.7 kg (142 lb 11.2 oz) 62.3 kg (137 lb 4.8 oz) 73.3 kg (161 lb 8 oz)      Physical exam:    GEN: NAD  NECK- Supple, no mass  RESP: No wheezing, Clear b/l  CVS: S1,S2  RRR  NEURO: Normal speech, Non focal  EXT: No Edema   HD access: Right chest permacath    Chart reviewed. Pertinent Notes reviewed.      Data Review :  Recent Labs     08/09/22  0101      K 3.7      CO2 28   BUN 29*   CREA 2.11*   *   CA 7.7*       Recent Labs     08/09/22  0101   WBC 9.7   HGB 9.3*   HCT 30.0*          No results for input(s): FE, TIBC, PSAT, FERR in the last 72 hours.    Medication list  reviewed  Current Facility-Administered Medications   Medication Dose Route Frequency    cloNIDine HCL (CATAPRES) tablet 0.1 mg  0.1 mg Oral BID    cefepime (MAXIPIME) 1 g in 0.9% sodium chloride (MBP/ADV) 50 mL MBP  1 g IntraVENous Q24H    sodium hypochlorite (QUARTER STRENGTH DAKIN'S) 0.125% irrigation (bottle)   Topical DAILY    collagenase (SANTYL) 250 unit/gram ointment   Topical DAILY    metroNIDAZOLE (FLAGYL) tablet 500 mg  500 mg Oral Q12H    insulin glargine (LANTUS) injection 6 Units  6 Units SubCUTAneous QHS    midodrine (PROAMATINE) tablet 10 mg  10 mg Oral Q MON, WED & FRI    albumin human 25% (BUMINATE) solution 25 g  25 g IntraVENous DIALYSIS PRN    VANCOMYCIN INFORMATION NOTE   Other Rx Dosing/Monitoring    heparin (porcine) 1,000 unit/mL injection 1,900 Units  1,900 Units InterCATHeter DIALYSIS PRN    And    heparin (porcine) 1,000 unit/mL injection 1,900 Units  1,900 Units InterCATHeter DIALYSIS PRN    amiodarone (CORDARONE) tablet 200 mg  200 mg Oral DAILY    aspirin delayed-release tablet 81 mg  81 mg Oral DAILY    atorvastatin (LIPITOR) tablet 40 mg  40 mg Oral DAILY    famotidine (PEPCID) tablet 20 mg  20 mg Oral DAILY    sodium chloride (NS) flush 5-40 mL  5-40 mL IntraVENous Q8H    sodium chloride (NS) flush 5-40 mL  5-40 mL IntraVENous PRN    acetaminophen (TYLENOL) tablet 650 mg  650 mg Oral Q6H PRN    Or    acetaminophen (TYLENOL) suppository 650 mg  650 mg Rectal Q6H PRN    polyethylene glycol (MIRALAX) packet 17 g  17 g Oral DAILY PRN    heparin (porcine) injection 5,000 Units  5,000 Units SubCUTAneous Q8H    insulin lispro (HUMALOG) injection   SubCUTAneous AC&HS    glucose chewable tablet 16 g  4 Tablet Oral PRN    glucagon (GLUCAGEN) injection 1 mg  1 mg IntraMUSCular PRN    dextrose 10 % infusion 0-250 mL  0-250 mL IntraVENous PRN          Evens Spears MD  8/11/2022

## 2022-08-11 NOTE — PROGRESS NOTES
CM received telephone call from nurse, Cass Swain, stating that patient's discharge had been cancelled. CM contacted [facility] to cancel transport and placed patient on will call for tomorrow. CM also contacted [facility] to inform of patient's cancelled discharge. CM spoke with patient's granddaughter, Erich Severin to inform of cancelled discharge. Bernice Hernandezund, [address] [place] - ED [facility]  [employer] ACP Facilitator  Zone Phone: [phone]

## 2022-08-11 NOTE — PROCEDURES
Hemodialysis / [phone]    Vitals Pre Post Assessment Pre Post   BP BP: (!) 149/58 (08/11/22 1319)   137/99   LOC A&Ox4 same   HR Pulse (Heart Rate): 84 (08/11/22 1319) 122 Lungs diminished same   Resp Resp Rate: 16 (08/11/22 1319) 16 Cardiac NRR   Tachycardic   Temp Temp: 98.4 °F (36.9 °C) (08/11/22 1306) 97.4 Skin Warm and dry   Warm and dry     Weight  N/a N/a Edema none none   Tele status monitor monitor Pain 0 0     Orders   Duration: Start: 5883 End: 1535 Total: 2.1hrs   Dialyzer: Dialyzer/Set Up Inspection: Revaclear (08/11/22 1319)   K Bath: Dialysate K (mEq/L): 2 (08/11/22 1319)   Ca Bath: Dialysate CA (mEq/L): 2.5 (08/11/22 1319)   Na: Dialysate NA (mEq/L): 138 (08/11/22 1319)   Bicarb: Dialysate HCO3 (mEq/L): 35 (08/11/22 1319)   Target Fluid Removal: Goal/Amount of Fluid to Remove (mL): 1000 mL (08/11/22 1319)     Access   Type & Location:    Comments:                                   Isaias Stuart / PC : Dressing CDI. No s/s of infection. Both lumens aspirate & flush well. Running well at .       Labs   HBsAg (Antigen) / date:                  Neg 8/3/22                             HBsAb (Antibody) / date: Sarah 8/3/22   Source: Epic    Obtained/Reviewed  Critical Results Called HGB   Date Value Ref Range Status   08/09/2022 9.3 (L) 11.5 - 16.0 g/dL Final     Potassium   Date Value Ref Range Status   08/09/2022 3.7 3.5 - 5.1 mmol/L Final     Calcium   Date Value Ref Range Status   08/09/2022 7.7 (L) 8.5 - 10.1 MG/DL Final     BUN   Date Value Ref Range Status   08/09/2022 29 (H) 6 - 20 MG/DL Final     Creatinine   Date Value Ref Range Status   08/09/2022 2.11 (H) 0.55 - 1.02 MG/DL Final        Meds Given   Name Dose Route   Heparin  1:1000  1.9 ml Red port  1.9 ml Blue port cvc               Adequacy / Fluid    Total Liters Process: 42.2L   Net Fluid Removed: 787 ml      Comments   Time Out Done:   (Time) 1315   Admitting Diagnosis: Wound infection, ESRD   Consent obtained/signed: Informed Consent Verified: Yes (08/11/22 1319)   Machine / RO # Machine Number: Fiona Lanier (08/11/22 1319)   Primary Nurse Rpt Pre: Cecilio Niño RN   Primary Nurse Rpt Post: Cecilio Niño RN   Pt Education: Access care   Care Plan: To continue hd    Pts outpatient clinic: [facility] Summary   Comments:                           SBAR received from Primary RN. Pt arrived to HD suite A&Ox4. Consent signed & on file. 1319: Each catheter limb disinfected per p&p, caps removed, hubs disinfected per p&p. Each lumen aspirated for blood return and flushed with Normal Saline per policy. Labs drawn per request/ order. VSS. Dialysis Tx initiated. 1348: Dr. Delfina Clifton ordered to decreased hd time to 2.5hrs since patient will have an hd tomorrow as outpatient. 1533: called Dr. Delfina Clifton regarding patients HR being high at 120-150's. Ordered to terminate hd with 23 minutes left in the machine; total uf removed 787 ml.    1535: Tx ended early due to tachycardia. VSS. Each dialysis catheter limb disinfected per p&p, all possible blood returned per p&p, and each dialysis hub disinfected per p&p. Each lumen flushed, post dialysis catheter Heparin dwell instilled per order, and caps applied. Bed locked and in the lowest position, call bell and belongings in reach. SBAR given to Primary, RN. Patient is stable at time of their/ my departure. All Dialysis related medications have been reviewed.

## 2022-08-11 NOTE — PROGRESS NOTES
Problem: Pressure Injury - Risk of  Goal: *Prevention of pressure injury  Description: Document Markos Scale and appropriate interventions in the flowsheet. Outcome: Progressing Towards Goal  Note: Pressure Injury Interventions:  Sensory Interventions: Assess changes in LOC, Assess need for specialty bed, Check visual cues for pain, Keep linens dry and wrinkle-free, Minimize linen layers    Moisture Interventions: Absorbent underpads, Apply protective barrier, creams and emollients, Internal/External fecal devices, Internal/External urinary devices, Minimize layers    Activity Interventions: PT/OT evaluation    Mobility Interventions: Assess need for specialty bed, HOB 30 degrees or less    Nutrition Interventions: Document food/fluid/supplement intake, Offer support with meals,snacks and hydration, Discuss nutritional consult with provider    Friction and Shear Interventions: Apply protective barrier, creams and emollients, Feet elevated on foot rest, Lift team/patient mobility team, Sit at 90-degree angle, HOB 30 degrees or less                Problem: Falls - Risk of  Goal: *Absence of Falls  Description: Document Felisa Fall Risk and appropriate interventions in the flowsheet. Outcome: Progressing Towards Goal  Note: Fall Risk Interventions:  Mobility Interventions: Bed/chair exit alarm    Mentation Interventions: Bed/chair exit alarm, Adequate sleep, hydration, pain control    Medication Interventions: Bed/chair exit alarm, Patient to call before getting OOB    Elimination Interventions: Call light in reach, Bed/chair exit alarm    History of Falls Interventions: Bed/chair exit alarm, Investigate reason for fall         Problem: Risk for Spread of Infection  Goal: Prevent transmission of infectious organism to others  Description: Prevent the transmission of infectious organisms to other patients, staff members, and visitors.   Outcome: Progressing Towards Goal     Problem: Patient Education:  Go to Education Activity  Goal: Patient/Family Education  Outcome: Progressing Towards Goal

## 2022-08-11 NOTE — PROGRESS NOTES
Pt ready for discharge from a CM standpoint. Nurse [facility] at [phone]. Pt to go to room 410. [facility] to transport at 6:00pm after HD. Transition of Care Plan:    RUR: 24% high risk for readmission  Disposition: [facility] room 410  Follow up appointments: ID follow up 9/13 at 1:30pm Virtual or in person  DME needed: To be supplied by SNF  Transportation at Discharge: [facility] transport scheduled for 6:00pm (after HD)  Keys or means to access home:  going to SNF      IM Medicare Letter: 2nd [facility] letter given to patient, signed and copy placed on medical record  Caregiver Contact: GranddaughterDominique ([phone])  Discharge Caregiver contacted prior to discharge? Granddaughter present in room and aware of discharge [address] needed?:   not needed    Transition of Care Plan to SNF/Rehab    SNF/Rehab Transition:  Patient has been accepted to [facility] and meets criteria for admission. [facility] will transported by [facility] and expected to leave at 6:00pm.    Communication to Patient/Family:  Met with patient and Granddaughter (identified care giver) and they are agreeable to the transition plan. Communication to SNF/Rehab:  Bedside RN, Valarie Rubinstein, has been notified to update the transition plan to the facility and call report (phone number [phone]). Discharge information has been updated on the AVS.     Discharge instructions to be fax'd to facility through [facility] Please include all hard scripts for controlled substances, med rec and dc summary, and AVS in packet.      Reviewed and confirmed with facility, Leeann with [facility], can manage the patient care needs for the following:     Brody Del Cid with (X) only those applicable:    Medication:  [x]  Medications will be available at the facility  [x]  IV Antibiotics to be given during HD  [x]  Controlled Substance - hard copy to be sent with patient   [x]  Weekly Labs Documents:  [x] Hard RX  [x] MAR  [x] Kardex  [x] AVS  [x]Transfer Summary  [x]Discharge   Equipment:  []  CPAP/BiPAP  []  Wound Vacuum  []  Gee or Urinary Device  []  PICC/Central Line  []  Nebulizer  []  Ventilator   Treatment:  []Isolation (for MRSA, VRE, etc.)  []Surgical Drain Management  []Tracheostomy Care  []Dressing Changes  [x]Dialysis with transportation and chair time 6:30am Patricia Khan. []PEG Care  []Oxygen  []Daily Weights for Heart Failure   Dietary:  []Any diet limitations  []Tube Feedings   []Total Parenteral Management (TPN)   Eligible for Medicaid Long Term Services and Supports  Yes:  [] Eligible for medical assistance or will become eligible within 180 days and UAI completed. [] Provider/Patient and/or support system has requested screening. [] UAI copy provided to patient or responsible party,.  [] UAI unavailable at discharge will send once processed to SNF provider. [] UAI unavailable at discharged mailed to patient  No:   [] Private pay and is not financially eligible for Medicaid within the next 180 days. [] Reside out-of-state. [] A residents of a state owned/operated facility that is licensed  by [place] [address] [employer] or [facility]  [] Enrollment in [facility] hospice services  [] [address]  [] Patient /Family declines to have screening completed or provide financial information for screening     Financial Resources:  Medicaid    [] Initiated and application pending   [] Full coverage     Advanced Care Plan:  [x]Surrogate Decision Maker of Care-Granddaughter, Dipak Cardenas  []POA  []Communicated Code Status   DNR    Other        Care Management Interventions  PCP Verified by CM: Yes  Palliative Care Criteria Met (RRAT>21 & CHF Dx)?: No  Mode of Transport at Discharge:  Other (see comment)  Transition of Care Consult (CM Consult): Discharge Planning, SNF  Partner SNF: Yes  MyChart Signup: No  Discharge Durable Medical Equipment: No  Physical Therapy Consult: No  Occupational Therapy Consult: No  Speech Therapy Consult: No  Support Systems: Child(yasmin), Other Family Member(s)  Confirm Follow Up Transport: Wheelchair [employer] for Transition of Care is Related to the Following Treatment Goals : SNF [address] [facility] or [place]  The Patient and/or Patient Representative was Provided with a Choice of Provider and Agrees with the Discharge Plan?: Yes  Name of the Patient Representative Who was Provided with a Choice of Provider and Agrees with the Discharge Plan: Pt  Freedom of Choice List was Provided with Basic Dialogue that Supports the Patient's Individualized Plan of Care/Goals, Treatment Preferences and Shares the Quality Data Associated with the Providers?: Yes  Discharge Location  Patient Expects to be Discharged to[de-identified] Skilled nursing facility    Denys.  Tyrese Christopher, [address] [place] - ED [facility]  [employer] ACP Facilitator  Zone Phone: [phone]

## 2022-08-11 NOTE — PROGRESS NOTES
Hospitalist Progress Note    NAME: Edward Montemayor   :  1934   MRN:  [id number]     Estimated discharge date:  22    Barriers:  Better rate control  Assessment / Plan:    Severe sepsis POA WBC 13.0, SBP 83/40, , lactate 1.53  Sacral wound infection of chronic sacral decubitus ulcer POA  Proteus/ESBL klebsiella/ enterococcus bacteremia POA  Foul-smelling purulent sacral decubitus ulcer with surrounding erythema  Continue vanco and meropenem  Admit BC with proteus,Klebsiella, enterococcus         Initially reported as ESBL, this was corrected  Vanco and meropenem --> vanco and cefepime and Po flagyl  General surgery did diverting colostomy, wound debridement        No exposed bone per Op note  Wound cultures with multiple GNR  Continue wound care  Plan for IV antibiotics at HD  Start discharge planning, planning for SNF at discharge  Needs wound vac when wound care feels she is ready    Bedbound POA  Moderate protein calorie malnutrition  Patient bed bound  nutrition consultation for dietary supplements     ESRD on HD MWF  Anemia of chronic disease  Anemia appears stable and not symptomatic  Nephrology consulted, c/w HD as tolerated  Placed on midodrine to increase BP so patient can tolerate HD     DM type 2 with ESRD POA  Accu-Cheks and sliding scale insulin  , 109, 87, 124, 211, 137     Essential hypertension POA  Hold PTA antihypertensives given borderline hypotension  Resumed clonidine    Afib with RVR- episode of RVR, during HD, upto 140s, now around 100s. Increase PO amiodarone. Watch overnight. Avoiding BB and CCB due to h/o significant sinus pauses/kacey on last admission      18.5 - 24.9 Normal weight / Body mass index is 27.72 kg/m².     Code status: Full  Prophylaxis: Hep SQ  Recommended Disposition: Home w/Family     Subjective:     Chief Complaint / Reason for Physician Visit  No complains    Review of Systems:  Symptom Y/N Comments  Symptom Y/N Comments   Fever/Chills nn Chest Pain n    Poor Appetite    Edema     Cough n   Abdominal Pain n    Sputum    Joint Pain     SOB/BEE n   Pruritis/Rash     Nausea/vomit nn   Tolerating PT/OT     Diarrhea    Tolerating Diet y    Constipation    Other       Could NOT obtain due to:      Objective:     VITALS:   Last 24hrs VS reviewed since prior progress note. Most recent are:  Patient Vitals for the past 24 hrs:   Temp Pulse Resp BP SpO2   08/11/22 1535 97.4 °F (36.3 °C) (!) 122 16 (!) 137/99 100 %   08/11/22 1515 -- 100 23 (!) 128/52 99 %   08/11/22 1500 97.3 °F (36.3 °C) 96 19 124/70 99 %   08/11/22 1444 -- 94 16 (!) 145/86 96 %   08/11/22 1431 -- 94 14 (!) 130/98 100 %   08/11/22 1415 -- 95 16 130/73 99 %   08/11/22 1400 -- 91 19 (!) 141/74 100 %   08/11/22 1345 -- 90 18 (!) 144/63 100 %   08/11/22 1331 -- 87 24 136/79 100 %   08/11/22 1319 -- 84 16 (!) 149/58 99 %   08/11/22 1306 98.4 °F (36.9 °C) 89 17 138/64 100 %   08/11/22 1200 -- 96 16 (!) 146/59 94 %   08/11/22 1119 -- -- -- (!) 176/67 --   08/11/22 1037 97.4 °F (36.3 °C) 91 17 (!) 168/70 100 %   08/11/22 0705 97.6 °F (36.4 °C) 77 9 (!) 147/62 99 %   08/10/22 2305 97.3 °F (36.3 °C) 90 15 139/89 96 %   08/10/22 2216 -- 89 -- 136/68 --   08/10/22 2000 -- 95 -- 130/79 96 %         Intake/Output Summary (Last 24 hours) at 8/11/2022 1639  Last data filed at 8/11/2022 1535  Gross per 24 hour   Intake --   Output 787 ml   Net -787 ml          I had a face to face encounter and independently examined this patient on 8/11/2022, as outlined below:  PHYSICAL EXAM:  General: WD, WN. Alert, cooperative, no acute distress, frail appearing   EENT:  Anicteric sclerae. MMM  Resp:  CTA bilaterally, no wheezing or rales. No accessory muscle use  CV:  Regular  rhythm,  No edema  GI:  Soft, Non distended, Non tender. +Bowel sounds  Neurologic:  Alert and oriented X 2  normal speech,   Psych:   Good insight. Not anxious nor agitated  Skin:  No rashes.   No jaundice, permcath right chest wall    Pre-op Pre-op                  Reviewed most current lab test results and cultures  YES  Reviewed most current radiology test results   YES  Review and summation of old records today    NO  Reviewed patient's current orders and MAR    YES  PMH/SH reviewed - no change compared to H&P  ________________________________________________________________________  Care Plan discussed with:    Comments   Patient x    Family      RN x    Care Manager     Consultant                        Multidiciplinary team rounds were held today with , nursing, pharmacist and clinical coordinator. Patient's plan of care was discussed; medications were reviewed and discharge planning was addressed. ________________________________________________________________________        Comments   >50% of visit spent in counseling and coordination of care     ________________________________________________________________________  Wilton Marie MD     Procedures: see electronic medical records for all procedures/Xrays and details which were not copied into this note but were reviewed prior to creation of Plan. LABS:  I reviewed today's most current labs and imaging studies.   Pertinent labs include:  Recent Labs     08/09/22  0101   WBC 9.7   HGB 9.3*   HCT 30.0*          Recent Labs     08/09/22  0101      K 3.7      CO2 28   *   BUN 29*   CREA 2.11*   CA 7.7*         Signed: Wilton Marie MD

## 2022-08-11 NOTE — DISCHARGE SUMMARY
Hospitalist Discharge Summary     Patient ID:  Nanci Closs  [id number]  8 y.o.  1934 8/2/2022    PCP on record: Marco Antonio Pitts MD    Admit date: 8/2/2022  Discharge date and time: 8/11/2022    DISCHARGE DIAGNOSIS:    Severe sepsis POA   Sacral wound infection of chronic sacral decubitus ulcer POA  Proteus/ESBL klebsiella/ enterococcus bacteremia POA  ESRD on HD MWF  Anemia of chronic disease  DM type 2 with ESRD POA      CONSULTATIONS:  IP CONSULT TO NEPHROLOGY  IP CONSULT TO INFECTIOUS DISEASES  IP CONSULT TO PALLIATIVE CARE - PROVIDER  IP CONSULT TO GENERAL SURGERY    Excerpted HPI from H&P of Jomar Collazo DO:  Nanci Closs is a 80 y.o.  female with pertinent past medical history of diabetes mellitus and ESRD on MWF IHD who presents with from home for wound check. Family reports that she has had a sacral decubitus ulcer for many months, but in the past week and has began to become more red with foul-smelling discharge. Home health nurse raise concern for infection and recommended she report to the ED. Patient transported by EMS. Patient reports no other symptoms and has no other complaints or concerns. In the ED, patient normothermic, not tachycardic, with unlabored breathing and saturation in the mid 90s on room air, she was noted to have low blood pressure with lowest 83/40 which improved to 115/40 with 1 L fluid resuscitation then progressively began to decline again prompting repeat bolus with improvement SBP in the 90s. Labs notable for WBC 13.0, hemoglobin 10.5 (near baseline), sodium 137, potassium 2.9 (dialysis yesterday), glucose 120, BUN 29, creatinine 3.89. ECG demonstrates normal sinus rhythm with short VT, PAC noted-QTC prolonged at 563. Given appearance of ulcer concern for early sepsis was raised and sepsis alert was called. Patient started on Zosyn and clindamycin. ______________________________________________________________________  DISCHARGE SUMMARY/HOSPITAL COURSE:  for full details see H&P, daily progress notes, labs, consult notes. Severe sepsis POA WBC 13.0, SBP 83/40, , lactate 1.53  Sacral wound infection of chronic sacral decubitus ulcer POA  Proteus/ESBL klebsiella/ enterococcus bacteremia POA  Foul-smelling purulent sacral decubitus ulcer with surrounding erythema  Continue vanco and meropenem  Admit BC with proteus,Klebsiella, enterococcus         Initially reported as ESBL, this was corrected  Vanco and meropenem --> vanco and cefepime and Po flagyl  General surgery did diverting colostomy, wound debridement 8/4       No exposed bone per Op note  Wound cultures with multiple GNR  Continue wound care  Plan for IV antibiotics at HD. Earlier 500 mg vanc, but later changed to 1 g after discussed with ID and pharmacist. Will communicate with nephro so it can be changed at HD center  Start discharge planning, planning for SNF at discharge      Bedbound POA  Moderate protein calorie malnutrition  Patient bed bound  nutrition consultation for dietary supplements     ESRD on HD MWF  Anemia of chronic disease  Anemia appears stable and not symptomatic  Nephrology consulted, c/w HD as tolerated  Placed on midodrine to increase BP so patient can tolerate HD     DM type 2 with ESRD POA  Accu-Cheks and sliding scale insulin  , 109, 87, 124, 211, 137     Essential hypertension POA  Hold PTA antihypertensives given borderline hypotension  Noted patient taking clonidine, watch for rebound    -PAF, episodes of A. fib  during this hospitalization, will continue amiodarone, awaiting beta-blocker due to sinus pauses last admission.    Resumed low-dose clonidine which may help  -       -Vancomycin 1GM IV with dialysis 3 times weekly & cefepime 2/2/3 gram IV with dialysis  3 times weekly end date 9/15  -Weekly CBC, CMP, vancomycin trough & ESR/CRP every other week-  -Fax reports to [phone], call with critical labs  at [phone]  -Encourage adequate fluids, daily probiotic/yogurt  -ID follow-up -9/13 at 1:30 PM-in person or virtual.     _______________________________________________________________________  Patient seen and examined by me on discharge day. Pertinent Findings:  Gen:    Not in distress  Chest: Clear lungs  CVS:   Regular rhythm. No edema  Abd:  Soft, not distended, not tender  Neuro:  Alert,   _______________________________________________________________________  DISCHARGE MEDICATIONS:   Current Discharge Medication List        START taking these medications    Details   collagenase (SANTYL) 250 unit/gram ointment Apply  to affected area daily. Qty: 15 g, Refills: 0  Start date: 8/11/2022      midodrine (PROAMATINE) 10 mg tablet Take 1 Tablet by mouth every Monday, Wednesday, Friday for 30 days. Qty: 12 Tablet, Refills: 0  Start date: 8/10/2022, End date: 9/9/2022      metroNIDAZOLE (FLAGYL) 500 mg tablet Take 1 Tablet by mouth every twelve (12) hours for 10 doses. Qty: 10 Tablet, Refills: 0  Start date: 8/10/2022, End date: 8/15/2022      sodium hypochlorite (QUARTER STRENGTH DAKIN'S) 0.125 % soln external solution Apply 473 mL to affected area in the morning. Qty: 1 Each, Refills: 0  Start date: 8/11/2022           CONTINUE these medications which have CHANGED    Details   cloNIDine HCL (CATAPRES) 0.1 mg tablet Take 1 Tablet by mouth two (2) times a day. Qty: 15 Tablet, Refills: 0  Start date: 8/11/2022      cefepime 1 gram IVPB efepime 2/2/3 gram IV with dialysis  3 times weekly end date 9/15  Qty: 30 Dose, Refills: 0  Start date: 8/10/2022           CONTINUE these medications which have NOT CHANGED    Details   famotidine (PEPCID) 20 mg tablet       atorvastatin (LIPITOR) 20 mg tablet Take 2 Tablets by mouth daily. Qty: 30 Tablet, Refills: 1      amiodarone (CORDARONE) 200 mg tablet Take 1 Tablet by mouth daily.   Qty: 60 Tablet, Refills: 1 insulin lispro protamine/insulin lispro (HUMALOG MIX 75/25) 100 unit/mL (75-25) injection 5 Units by SubCUTAneous route Before breakfast and dinner. Qty: 1 mL, Refills: 0      aspirin delayed-release 81 mg tablet Take 81 mg by mouth daily. cholecalciferol (VITAMIN D3) (1000 Units /25 mcg) tablet Take 1,000 Units by mouth daily. pantoprazole (PROTONIX) 20 mg tablet Take 2 Tablets by mouth daily. Qty: 60 Tablet, Refills: 0           STOP taking these medications       amLODIPine (NORVASC) 10 mg tablet Comments:   Reason for Stopping:         furosemide (LASIX) 20 mg tablet Comments:   Reason for Stopping:                 Patient Follow Up Instructions: Activity: PT/OT Eval and Treat  Diet: Regular Diet, easy to chew diet  Wound Care: continue the daily wound care with Santyl And Dakin solution. Assess for wound vac in 1-2 weeks.         Follow-up Information       Follow up With Specialties Details Why Contact Info    Yassine Trinidad MD Internal Medicine Physician Follow up in 2 week(s)  [address]  [phone]          ________________________________________________________________    Risk of deterioration: Moderate    Condition at Discharge:  Stable  __________________________________________________________________    Disposition  SNF/LTC    ____________________________________________________________________    Code Status: DNR/DNI  ___________________________________________________________________      Total time in minutes spent coordinating this discharge (includes going over instructions, follow-up, prescriptions, and preparing report for sign off to her PCP) :  >30 minutes    Signed:  Vivienne Doyle MD

## 2022-08-12 NOTE — PROGRESS NOTES
Transition of Care Plan:     RUR: 17% high risk for readmission  Disposition: [facility] (no bed available until Monday, August 15, 2022)  Follow up appointments: ID follow up 9/13 at 1:30pm Virtual or in person  DME needed: To be supplied by SNF  Transportation at Discharge: [facility] will be arranged when ready to discharge on Monday  [place] or means to access home:  going to SNF       Medicare Letter: Will need another IMM letter prior to discharge  Caregiver Contact: Simonughter, Bo Burn ([phone])  Discharge Caregiver contacted prior to discharge? Granddaughter has been contacted  Care Conference needed?:   not needed    CM contacted [facility] today to see if they would be able to accept patient today for discharge. Susy reports that they had to give patient's bed away after she was not able to come the day prior and would not have bed availability until Monday. Pt will need updated insurance auth and this was requested today for Susy to obtain an updated insurance auth for discharge on Monday. CM contacted granddaughter and discussed discharge plan. Asked granddaughter about having patient go to another SNF location, but due to patient's dialysis in [place], she would not have available transportation to get her to and from dialysis. Granddaughter prefers patient wait and go to [facility] and rehab when bed is available as dialysis transport would not be in place. Care Management Interventions  PCP Verified by CM: Yes  Palliative Care Criteria Met (RRAT>21 & CHF Dx)?: No  Mode of Transport at Discharge:  Other (see comment)  Transition of Care Consult (CM Consult): Discharge Planning, SNF  Partner SNF: Yes  MyChart Signup: No  Discharge Durable Medical Equipment: No  Physical Therapy Consult: No  Occupational Therapy Consult: No  Speech Therapy Consult: No  Support Systems: Child(yasmin), Other Family Member(s)  Confirm Follow Up Transport: Wheelchair [facility]  The Plan for Transition of Care is Related to the Following Treatment Goals : SNF [facility] or [place]  The Patient and/or Patient Representative was Provided with a Choice of Provider and Agrees with the Discharge Plan?: Yes  Name of the Patient Representative Who was Provided with a Choice of Provider and Agrees with the Discharge Plan: Pt  Freedom of Choice List was Provided with Basic Dialogue that Supports the Patient's Individualized Plan of Care/Goals, Treatment Preferences and Shares the Quality Data Associated with the Providers?: Yes  Discharge Location  Patient Expects to be Discharged to[de-identified] Skilled nursing facility    Lacie Saucedo, [address] [place] - ED [facility]  [employer] ACP Facilitator  Zone Phone: [phone]

## 2022-08-12 NOTE — PROGRESS NOTES
Nephrology Progress Note  [facility] / [address], Anuradha Kirby  [place], [address]  Phone - [phone]  Fax - [phone]                 Patient: Alesha Ruiz                   YOB: 1934        Date- 8/12/2022                      Admit Date: 8/2/2022  CC: Follow up for ESRD          IMPRESSION & PLAN:    ESRD , HD dependent since 5/23/2022, on MWF schedule, [facility], under Dr. Gala Irby  Sepsis  Chronic sacral decubitus ulcer  hypokalemia  AFIB  DM 2  Anemia  Hypotension    PLAN-  Patient got dialyzed yesterday. She can be discharged and can go to the unit for dialysis on Monday. She is not hypoxemic and labs are within stable range. Subjective: Interval History:   -Patient was seen and examined today   -No issues overnight    Objective:   Vitals:    08/12/22 0300 08/12/22 0535 08/12/22 0800 08/12/22 1158   BP: 129/62  (!) 128/55 120/64   Pulse: 71  69 71   Resp: 16  17 12   Temp: 97.1 °F (36.2 °C)   97.8 °F (36.6 °C)   TempSrc:       SpO2: 100%  98% 100%   Weight:  68.9 kg (152 lb)     Height:          08/11 0701 - 08/12 0700  In: -   Out: 812   Last 3 Recorded Weights in this Encounter    08/07/22 1542 08/09/22 0457 08/12/22 0535   Weight: 62.3 kg (137 lb 4.8 oz) 73.3 kg (161 lb 8 oz) 68.9 kg (152 lb)      Physical exam:    GEN: NAD  NECK- Supple, no mass  RESP: No wheezing, Clear b/l  CVS: S1,S2  RRR  NEURO: Normal speech, Non focal  EXT: No Edema   HD access: Right chest permacath    Chart reviewed. Pertinent Notes reviewed. Data Review :  Recent Labs     08/12/22  0200      K 3.2*      CO2 27   BUN 17   CREA 1.41*   *   CA 8.0*   MG 2.0       No results for input(s): WBC, HGB, HCT, PLT, HGBEXT, HCTEXT, PLTEXT, HGBEXT, HCTEXT, PLTEXT in the last 72 hours. No results for input(s): FE, TIBC, PSAT, FERR in the last 72 hours.    Medication list reviewed  Current Facility-Administered Medications   Medication Dose Route Frequency    amiodarone (CORDARONE) tablet 400 mg  400 mg Oral BID    cloNIDine HCL (CATAPRES) tablet 0.1 mg  0.1 mg Oral TID    cefepime (MAXIPIME) 1 g in 0.9% sodium chloride (MBP/ADV) 50 mL MBP  1 g IntraVENous Q24H    sodium hypochlorite (QUARTER STRENGTH DAKIN'S) 0.125% irrigation (bottle)   Topical DAILY    collagenase (SANTYL) 250 unit/gram ointment   Topical DAILY    metroNIDAZOLE (FLAGYL) tablet 500 mg  500 mg Oral Q12H    insulin glargine (LANTUS) injection 6 Units  6 Units SubCUTAneous QHS    midodrine (PROAMATINE) tablet 10 mg  10 mg Oral Q MON, WED & FRI    albumin human 25% (BUMINATE) solution 25 g  25 g IntraVENous DIALYSIS PRN    VANCOMYCIN INFORMATION NOTE   Other Rx Dosing/Monitoring    heparin (porcine) 1,000 unit/mL injection 1,900 Units  1,900 Units InterCATHeter DIALYSIS PRN    And    heparin (porcine) 1,000 unit/mL injection 1,900 Units  1,900 Units InterCATHeter DIALYSIS PRN    aspirin delayed-release tablet 81 mg  81 mg Oral DAILY    atorvastatin (LIPITOR) tablet 40 mg  40 mg Oral DAILY    famotidine (PEPCID) tablet 20 mg  20 mg Oral DAILY    sodium chloride (NS) flush 5-40 mL  5-40 mL IntraVENous Q8H    sodium chloride (NS) flush 5-40 mL  5-40 mL IntraVENous PRN    acetaminophen (TYLENOL) tablet 650 mg  650 mg Oral Q6H PRN    Or    acetaminophen (TYLENOL) suppository 650 mg  650 mg Rectal Q6H PRN    polyethylene glycol (MIRALAX) packet 17 g  17 g Oral DAILY PRN    heparin (porcine) injection 5,000 Units  5,000 Units SubCUTAneous Q8H    insulin lispro (HUMALOG) injection   SubCUTAneous AC&HS    glucose chewable tablet 16 g  4 Tablet Oral PRN    glucagon (GLUCAGEN) injection 1 mg  1 mg IntraMUSCular PRN    dextrose 10 % infusion 0-250 mL  0-250 mL IntraVENous PRN          Orion Parrish MD  8/12/2022

## 2022-08-12 NOTE — DISCHARGE SUMMARY
Hospitalist Discharge Summary     Patient ID:  Nanci Closs  [id number]  54 y.o.  1934 8/2/2022    PCP on record: Marco Antonio Pitts MD    Admit date: 8/2/2022  Discharge date and time: 8/12/2022    DISCHARGE DIAGNOSIS:    Severe sepsis POA   Sacral wound infection of chronic sacral decubitus ulcer POA  Proteus/ESBL klebsiella/ enterococcus bacteremia POA  ESRD on HD MWF  Anemia of chronic disease  DM type 2 with ESRD POA      CONSULTATIONS:  IP CONSULT TO NEPHROLOGY  IP CONSULT TO INFECTIOUS DISEASES  IP CONSULT TO PALLIATIVE CARE - PROVIDER  IP CONSULT TO GENERAL SURGERY    Excerpted HPI from H&P of Jomar Collazo DO:  Nanci Closs is a 80 y.o.  female with pertinent past medical history of diabetes mellitus and ESRD on MWF IHD who presents with from home for wound check. Family reports that she has had a sacral decubitus ulcer for many months, but in the past week and has began to become more red with foul-smelling discharge. Home health nurse raise concern for infection and recommended she report to the ED. Patient transported by EMS. Patient reports no other symptoms and has no other complaints or concerns. In the ED, patient normothermic, not tachycardic, with unlabored breathing and saturation in the mid 90s on room air, she was noted to have low blood pressure with lowest 83/40 which improved to 115/40 with 1 L fluid resuscitation then progressively began to decline again prompting repeat bolus with improvement SBP in the 90s. Labs notable for WBC 13.0, hemoglobin 10.5 (near baseline), sodium 137, potassium 2.9 (dialysis yesterday), glucose 120, BUN 29, creatinine 3.89. ECG demonstrates normal sinus rhythm with short MA, PAC noted-QTC prolonged at 563. Given appearance of ulcer concern for early sepsis was raised and sepsis alert was called. Patient started on Zosyn and clindamycin. ______________________________________________________________________  DISCHARGE SUMMARY/HOSPITAL COURSE:  for full details see H&P, daily progress notes, labs, consult notes. Severe sepsis POA WBC 13.0, SBP 83/40, , lactate 1.53  Sacral wound infection of chronic sacral decubitus ulcer POA  Proteus/ESBL klebsiella/ enterococcus bacteremia POA  Foul-smelling purulent sacral decubitus ulcer with surrounding erythema  Continue vanco and meropenem  Admit BC with proteus,Klebsiella, enterococcus         Initially reported as ESBL, this was corrected  Vanco and meropenem --> vanco and cefepime and Po flagyl  General surgery did diverting colostomy, wound debridement 8/4       No exposed bone per Op note  Wound cultures with multiple GNR  Continue wound care  Plan for IV antibiotics at HD. Earlier 500 mg vanc, but later changed to 1 g after discussed with ID and pharmacist. Will communicate with nephro so it can be changed at HD center  Start discharge planning, planning for SNF at discharge      Bedbound POA  Moderate protein calorie malnutrition  Patient bed bound  nutrition consultation for dietary supplements     ESRD on HD MWF  Anemia of chronic disease  Anemia appears stable and not symptomatic  Nephrology consulted, c/w HD as tolerated  Placed on midodrine to increase BP so patient can tolerate HD     DM type 2 with ESRD POA  Accu-Cheks and sliding scale insulin  , 109, 87, 124, 211, 137     Essential hypertension POA  Hold PTA antihypertensives given borderline hypotension  Noted patient taking clonidine, watch for rebound      PAF with mild RVR with HD, 8/11-amiodarone was increased to 400 mg twice daily, overnight she converted back to NSR and most of the times he had sustained sinus rhythm. She can be discharged today, change dose of amiodarone to 200 mg twice daily for now.        -Vancomycin 1GM IV with dialysis 3 times weekly & cefepime 2/2/3 gram IV with dialysis  3 times weekly end date 9/15  -Weekly CBC, CMP, vancomycin trough & ESR/CRP every other week-  -Fax reports to [phone], call with critical labs  at [phone]  -Encourage adequate fluids, daily probiotic/yogurt  -ID follow-up -9/13 at 1:30 PM-in person or virtual.     _______________________________________________________________________  Patient seen and examined by me on discharge day. Pertinent Findings:  Gen:    Not in distress  Chest: Clear lungs  CVS:   Regular rhythm. No edema  Abd:  Soft, not distended, not tender  Neuro:  Alert,   _______________________________________________________________________  DISCHARGE MEDICATIONS:   Current Discharge Medication List        START taking these medications    Details   collagenase (SANTYL) 250 unit/gram ointment Apply  to affected area daily. Qty: 15 g, Refills: 0  Start date: 8/11/2022      midodrine (PROAMATINE) 10 mg tablet Take 1 Tablet by mouth every Monday, Wednesday, Friday for 30 days. Qty: 12 Tablet, Refills: 0  Start date: 8/10/2022, End date: 9/9/2022      metroNIDAZOLE (FLAGYL) 500 mg tablet Take 1 Tablet by mouth every twelve (12) hours for 10 doses. Qty: 10 Tablet, Refills: 0  Start date: 8/10/2022, End date: 8/15/2022      sodium hypochlorite (QUARTER STRENGTH DAKIN'S) 0.125 % soln external solution Apply 473 mL to affected area in the morning. Qty: 1 Each, Refills: 0  Start date: 8/11/2022           CONTINUE these medications which have CHANGED    Details   amiodarone (CORDARONE) 200 mg tablet Take 1 Tablet by mouth two (2) times a day. Qty: 60 Tablet, Refills: 1  Start date: 8/12/2022      cloNIDine HCL (CATAPRES) 0.1 mg tablet Take 1 Tablet by mouth three (3) times daily.   Qty: 15 Tablet, Refills: 0  Start date: 8/12/2022      cefepime 1 gram IVPB efepime 2/2/3 gram IV with dialysis  3 times weekly end date 9/15  Qty: 30 Dose, Refills: 0  Start date: 8/10/2022           CONTINUE these medications which have NOT CHANGED    Details   famotidine (PEPCID) 20 mg tablet       atorvastatin (LIPITOR) 20 mg tablet Take 2 Tablets by mouth daily. Qty: 30 Tablet, Refills: 1      insulin lispro protamine/insulin lispro (HUMALOG MIX 75/25) 100 unit/mL (75-25) injection 5 Units by SubCUTAneous route Before breakfast and dinner. Qty: 1 mL, Refills: 0      aspirin delayed-release 81 mg tablet Take 81 mg by mouth daily. cholecalciferol (VITAMIN D3) (1000 Units /25 mcg) tablet Take 1,000 Units by mouth daily. pantoprazole (PROTONIX) 20 mg tablet Take 2 Tablets by mouth daily. Qty: 60 Tablet, Refills: 0           STOP taking these medications       amLODIPine (NORVASC) 10 mg tablet Comments:   Reason for Stopping:         furosemide (LASIX) 20 mg tablet Comments:   Reason for Stopping:                 Patient Follow Up Instructions: Activity: PT/OT Eval and Treat  Diet: Regular Diet, easy to chew diet  Wound Care: continue the daily wound care with Santyl And Dakin solution. Assess for wound vac in 1-2 weeks.         Follow-up Information       Follow up With Specialties Details Why Contact Info    Nayla Bateman MD Internal Medicine Physician Follow up in 2 week(s)  [address] [place] [address] Way      [address] [place] [address]  [phone]          ________________________________________________________________    Risk of deterioration: Moderate    Condition at Discharge:  Stable  __________________________________________________________________    Disposition  SNF/LTC    ____________________________________________________________________    Code Status: DNR/DNI  ___________________________________________________________________      Total time in minutes spent coordinating this discharge (includes going over instructions, follow-up, prescriptions, and preparing report for sign off to her PCP) :  >30 minutes    Signed:  Samira Menard MD

## 2022-08-12 NOTE — WOUND CARE
Ostomy nurse: ostomy pouch courtesy change. Left abdomen end colostomy producing thick, brown stool. Emptied 350 ml's from pouch before change. Stoma: pink, moist, slightly protuberant. Measured 35 mm round today  One piece cut to fit pouch applied with Protective stoma ring. Plan: Patient for possible discharge to SNF Monday.     Karan Birmingham RN, [employer]

## 2022-08-12 NOTE — PROGRESS NOTES
Problem: Pressure Injury - Risk of  Goal: *Prevention of pressure injury  Description: Document Markos Scale and appropriate interventions in the flowsheet. Note: Pressure Injury Interventions:  Sensory Interventions: Assess changes in LOC, Check visual cues for pain, Float heels, Keep linens dry and wrinkle-free, Minimize linen layers    Moisture Interventions: Absorbent underpads, Internal/External urinary devices, Assess need for specialty bed, Check for incontinence Q2 hours and as needed    Activity Interventions: Pressure redistribution bed/mattress(bed type), PT/OT evaluation    Mobility Interventions: Assess need for specialty bed, HOB 30 degrees or less, Turn and reposition approx. every two hours(pillow and wedges)    Nutrition Interventions: Document food/fluid/supplement intake    Friction and Shear Interventions: Apply protective barrier, creams and emollients, Feet elevated on foot rest, HOB 30 degrees or less, Lift team/patient mobility team                Problem: Patient Education: Go to Patient Education Activity  Goal: Patient/Family Education  Outcome: Progressing Towards Goal     Problem: Falls - Risk of  Goal: *Absence of Falls  Description: Document Felisa Fall Risk and appropriate interventions in the flowsheet.   Outcome: Progressing Towards Goal  Note: Fall Risk Interventions:  Mobility Interventions: Bed/chair exit alarm    Mentation Interventions: Adequate sleep, hydration, pain control, Bed/chair exit alarm, Door open when patient unattended    Medication Interventions: Bed/chair exit alarm    Elimination Interventions: Bed/chair exit alarm, Call light in reach    History of Falls Interventions: Bed/chair exit alarm         Problem: Patient Education: Go to Patient Education Activity  Goal: Patient/Family Education  Outcome: Progressing Towards Goal     Problem: Risk for Spread of Infection  Goal: Prevent transmission of infectious organism to others  Description: Prevent the transmission of infectious organisms to other patients, staff members, and visitors.   Outcome: Progressing Towards Goal     Problem: Patient Education:  Go to Education Activity  Goal: Patient/Family Education  Outcome: Progressing Towards Goal

## 2022-08-12 NOTE — PROGRESS NOTES
End of Shift Note    Bedside shift change report given to Otoniel Manzo RN (oncoming nurse) by Mykel Alexander RN (offgoing nurse). Report included the following information SBAR, Kardex, MAR, and Recent Results    Shift worked:  [id number]     Shift summary and any significant changes:     Pt went in and out of afib at 0300. Concerns for physician to address:       Zone phone for oncoming shift:          Activity:  Activity Level: Bed Rest  Number times ambulated in hallways past shift: 0  Number of times OOB to chair past shift: 0    Cardiac:   Cardiac Monitoring: Yes      Cardiac Rhythm: Atrial Fib, Sinus Rhythm    Access:  Current line(s): PIV and HD access     Genitourinary:   Urinary status: oliguric    Respiratory:   O2 Device: None (Room air)  Chronic home O2 use?: NO  Incentive spirometer at bedside: N/A       GI:  Last Bowel Movement Date:  (ostomy)  Current diet:  ADULT DIET Easy to Chew  ADULT ORAL NUTRITION SUPPLEMENT Lunch; Standard High Calorie/High Protein  ADULT ORAL NUTRITION SUPPLEMENT Breakfast, Dinner; Low Calorie/High Protein  Passing flatus: Tolerating current diet: NO       Pain Management:   Patient states pain is manageable on current regimen: YES    Skin:  Markos Score: 12  Interventions: turn team, float heels, internal/external urinary devices, and nutritional support     Patient Safety:  Fall Score:  Total Score: 4  Interventions: bed/chair alarm  High Fall Risk: Yes    Length of Stay:  Expected LOS: 9d 14h  Actual LOS: 10      Mykel Alexander RN

## 2022-08-12 NOTE — PROGRESS NOTES
Comprehensive Nutrition Assessment    Type and Reason for Visit: Reassess    Nutrition Recommendations/Plan:   Continue current diet and supplements     Nutrition Assessment:    Chart reviewed; patient was supposed to be discharged today but bed no longer available at SNF. Continue liberalized diet to help encourage PO intake as well as supplements TID to promote wound healing. Encourage intake of meals/ONS. Malnutrition Assessment:  Malnutrition Status: Moderate malnutrition (08/03/22 1114)    Context:  Acute illness     Findings of the 6 clinical characteristics of malnutrition:   Energy Intake:  75% or less of est energy req for 7 or more days  Weight Loss:  Greater than 5% over 1 month     Body Fat Loss:  Mild body fat loss,     Muscle Mass Loss:  Mild muscle mass loss,    Fluid Accumulation:  No significant fluid accumulation,     Strength:  Not performed     Nutrition Related Findings:    K+ 3.2, -[phone]  BM 8/12  Atorvastatin, Famotidine, Lantus, Humalog, Midodrine  Wound Type: Stage IV, Unstageable    Current Nutrition Intake & Therapies:        ADULT DIET Easy to Chew  ADULT ORAL NUTRITION SUPPLEMENT Lunch; Standard High Calorie/High Protein  ADULT ORAL NUTRITION SUPPLEMENT Breakfast, Dinner; Low Calorie/High Protein    Anthropometric Measures:  Height: 5' 4\" (162.6 cm)  Ideal Body Weight (IBW): 120 lbs (55 kg)     Current Body Wt:  68.9 kg (151 lb 14.4 oz), 118.5 % IBW. Current BMI (kg/m2): 26.1                          BMI Category: Overweight (BMI 25.0-29. 9)    Estimated Daily Nutrient Needs:  Energy Requirements Based On: Kcal/kg  Weight Used for Energy Requirements: Current  Energy (kcal/day): 6654-7076 kcals (25-30 kcal/kg)  Weight Used for Protein Requirements: Current  Protein (g/day): 104g (1.5 g/kg bw)  Method Used for Fluid Requirements: 1 ml/kcal  Fluid (ml/day): 1800 mL    Nutrition Diagnosis:   Increased nutrient needs related to  (wound healing, wt loss) as evidenced by (stage 4 PI, wt loss)    Nutrition Interventions:   Food and/or Nutrient Delivery: Continue current diet, Continue oral nutrition supplement  Nutrition Education/Counseling: No recommendations at this time  Coordination of Nutrition Care: Continue to monitor while inpatient       Goals:  Previous Goal Met: Progressing toward goal(s)  Goals:  (PO intake >50% of meals/ONS next 3-5 days)       Nutrition Monitoring and Evaluation:   Behavioral-Environmental Outcomes: None identified  Food/Nutrient Intake Outcomes: Food and nutrient intake, Supplement intake  Physical Signs/Symptoms Outcomes: Biochemical data, Skin, Weight, Fluid status or edema, Hemodynamic status    Discharge Planning:    Continue current diet, Continue oral nutrition supplement    Yvette Cheung RD  Contact: ext [phone]

## 2022-08-13 NOTE — PROGRESS NOTES
Problem: Pressure Injury - Risk of  Goal: *Prevention of pressure injury  Description: Document Markos Scale and appropriate interventions in the flowsheet. Note: Pressure Injury Interventions:  Sensory Interventions: Assess changes in LOC, Assess need for specialty bed, Avoid rigorous massage over bony prominences, Check visual cues for pain, Float heels, Keep linens dry and wrinkle-free    Moisture Interventions: Minimize layers    Activity Interventions: Pressure redistribution bed/mattress(bed type)    Mobility Interventions: Pressure redistribution bed/mattress (bed type), Turn and reposition approx. every two hours(pillow and wedges)    Nutrition Interventions: Document food/fluid/supplement intake    Friction and Shear Interventions: Apply protective barrier, creams and emollients, Feet elevated on foot rest, Foam dressings/transparent film/skin sealants, Lift team/patient mobility team                Problem: Patient Education: Go to Patient Education Activity  Goal: Patient/Family Education  Outcome: Progressing Towards Goal     Problem: Patient Education: Go to Patient Education Activity  Goal: Patient/Family Education  Outcome: Progressing Towards Goal     Problem: Risk for Spread of Infection  Goal: Prevent transmission of infectious organism to others  Description: Prevent the transmission of infectious organisms to other patients, staff members, and visitors.   Outcome: Progressing Towards Goal     Problem: Patient Education:  Go to Education Activity  Goal: Patient/Family Education  Outcome: Progressing Towards Goal

## 2022-08-13 NOTE — DIALYSIS
Hemodialysis / [phone]    Vitals Pre Post Assessment Pre Post   BP BP: (!) 126/58 (08/13/22 1530)   118/56 LOC AOX2 AOX2   HR Pulse (Heart Rate): 67 (08/13/22 1530) 68 Lungs CTA CTA   Resp Resp Rate: 18 (08/13/22 1530) 15 Cardiac REGULAR REGULAR   Temp Temp: 97.8 °F (36.6 °C) (08/13/22 1500) 97.8 Skin WARM DRY INTACT WARM DRY INTACT   Weight    Edema NONE NONE   Tele status   Pain Pain Intensity 1: 0 (08/13/22 0301)      Orders   Duration: Start: Hemodialysis Start Time: 1137 (08/13/22 1137) End: 1515   Total: 3.5HR   Dialyzer: Dialyzer/Set Up Inspection: Karen Wagner (08/13/22 1137)   K Bath: Dialysate K (mEq/L): 3 (08/13/22 1137)   Ca Bath: Dialysate CA (mEq/L): 2.5 (08/13/22 1137)   Na: Dialysate NA (mEq/L): 138 (08/13/22 1137)   Bicarb: Dialysate HCO3 (mEq/L): 35 (08/13/22 1137)   Target Fluid Removal: Goal/Amount of Fluid to Remove (mL): 1000 mL (08/13/22 1137)     Access   Type & Location: [facility] TUNNELED HD CVC-DRESSING CDI, DATED 8/11/    Each catheter limb disinfected per p&p, caps removed, hubs disinfected per p&p. Each dialysis catheter limb disinfected per p&p, blood returned per p&p, each dialysis hub disinfected per p&p, post dialysis catheter dwell instilled per order, and caps applied. Comments:                                        Labs   HBsAg (Antigen) / date: Hepatitis B surface Ag   Date Value Ref Range Status   08/03/2022 <0.10 Index Final                                       HBsAb (Antibody) / date: Hep B surface Ab Interp. Date Value Ref Range Status   08/03/2022 NONREACTIVE NR   Final     Comment:     (NOTE)  The [employer] Anti-HBs2 assay is traceable to the [address] [facility]) Hepatitis B Immunoglobulin 1st International   Reference Preparation (9946). Samples with a calculated value of 10   mIU/mL or greater are considered reactive (protective) in accordance   with the CDC guidelines.  The accepted criteria for immunity to HBV is   anti-HBs activity greater than or equal to 10 mIU/mL, as defined by   the [facility] International Reference Preparation. Assay performance has not been established in pregnant women,   patients who are immunosuppressed or immunocompromised, nor have   performance characteristics been established in conjunction with   other 's assays for specific HBV serologic markers. This   assay does not differentiate between vaccine induced immune response   and a response due to infection with HBV. Passively acquired anti-HBs   may be identified following patient transfusion, receipt of   immunoglobulin products, etc.        Source:    Obtained/Reviewed  Critical Results Called HGB   Date Value Ref Range Status   08/09/2022 9.3 (L) 11.5 - 16.0 g/dL Final     Potassium   Date Value Ref Range Status   08/12/2022 3.2 (L) 3.5 - 5.1 mmol/L Final     Calcium   Date Value Ref Range Status   08/12/2022 8.0 (L) 8.5 - 10.1 MG/DL Final     BUN   Date Value Ref Range Status   08/12/2022 17 6 - 20 MG/DL Final     Creatinine   Date Value Ref Range Status   08/12/2022 1.41 (H) 0.55 - 1.02 MG/DL Final        Meds Given   Name Dose Route   none                 Adequacy / Fluid    Total Liters Process: 59l   Net Fluid Removed: 800ml      Comments   Time Out Done:   (Time) 1100   Admitting Diagnosis: shine   Consent obtained/signed: Informed Consent Verified: Yes (08/13/22 1137)   Machine / RO # Machine Number: [id number] (08/13/22 1137)   Primary Nurse Rpt Pre: Michaelle Distad   Primary Nurse Rpt Post: Michaelle Distad    Pt Education: procedural   Care Plan: hd   Pts outpatient [facility]: [facility]   Comments:    Some hypotension at the end of HD resulting in reduction of uf goal.  Otherwise HD completed per md order with out incident.

## 2022-08-13 NOTE — PROGRESS NOTES
End of Shift Note    Bedside shift change report given to Luis Felipe Miner RN (oncoming nurse) by Greg Schmitz RN (offgoing nurse). Report included the following information SBAR and MAR    Shift worked:  2035-2090     Shift summary and any significant changes:     Wound care was done for sacrum and heels. Concerns for physician to address:       Zone phone for oncoming shift:          Activity:  Activity Level: Bed Rest  Number times ambulated in hallways past shift: 0  Number of times OOB to chair past shift: 0    Cardiac:   Cardiac Monitoring: Yes      Cardiac Rhythm: Sinus Kenton    Access:  Current line(s): PIV and HD access     Genitourinary:   Urinary status: oliguric    Respiratory:   O2 Device: None (Room air)  Chronic home O2 use?: NO  Incentive spirometer at bedside: NO       GI:  Last Bowel Movement Date: 08/12/22  Current diet:  ADULT DIET Easy to Chew  ADULT ORAL NUTRITION SUPPLEMENT Lunch; Standard High Calorie/High Protein  ADULT ORAL NUTRITION SUPPLEMENT Breakfast, Dinner; Low Calorie/High Protein  Passing flatus: Tolerating current diet:        Pain Management:   Patient states pain is manageable on current regimen: YES    Skin:  Markos Score: 12  Interventions: turn team, float heels, foam dressing, and internal/external urinary devices    Patient Safety:  Fall Score:  Total Score: 4  Interventions: bed/chair alarm  High Fall Risk: Yes    Length of Stay:  Expected LOS: 9d 14h  Actual LOS: 11      Greg Schmitz RN

## 2022-08-13 NOTE — PROGRESS NOTES
End of Shift Note    Bedside shift change report given to Fallon Hinojosa  (oncoming nurse) by Katie Woodward RN (offgoing nurse). Report included the following information SBAR, Kardex, and MAR    Shift worked:  7a-7p     Shift summary and any significant changes:          Concerns for physician to address:       Zone phone for oncoming shift:          Activity:  Activity Level: Bed Rest  Number times ambulated in hallways past shift: 0  Number of times OOB to chair past shift: 0    Cardiac:   Cardiac Monitoring: Yes      Cardiac Rhythm: Sinus Rhythm    Access:  Current line(s): PIV     Genitourinary:   Urinary status: oliguric    Respiratory:   O2 Device: None (Room air)  Chronic home O2 use?: NO  Incentive spirometer at bedside: NO       GI:  Last Bowel Movement Date: 08/12/22  Current diet:  ADULT DIET Easy to Chew  ADULT ORAL NUTRITION SUPPLEMENT Lunch; Standard High Calorie/High Protein  ADULT ORAL NUTRITION SUPPLEMENT Breakfast, Dinner; Low Calorie/High Protein  Passing flatus: YES  Tolerating current diet: YES       Pain Management:   Patient states pain is manageable on current regimen: N/A    Skin:  Markos Score: 12  Interventions: turn team and float heels    Patient Safety:  Fall Score:  Total Score: 4  Interventions: bed/chair alarm, assistive device (walker, cane, etc), and pt to call before getting OOB  High Fall Risk: Yes    Length of Stay:  Expected LOS: 9d 14h  Actual LOS: [address], MARCIA

## 2022-08-14 NOTE — PROGRESS NOTES
Problem: Pressure Injury - Risk of  Goal: *Prevention of pressure injury  Description: Document Markos Scale and appropriate interventions in the flowsheet. Outcome: Progressing Towards Goal  Note: Pressure Injury Interventions:  Sensory Interventions: Assess changes in LOC, Assess need for specialty bed, Float heels, Keep linens dry and wrinkle-free    Moisture Interventions: Absorbent underpads, Apply protective barrier, creams and emollients, Assess need for specialty bed, Check for incontinence Q2 hours and as needed, Internal/External urinary devices, Limit adult briefs, Maintain skin hydration (lotion/cream), Contain wound drainage    Activity Interventions: Assess need for specialty bed, Pressure redistribution bed/mattress(bed type)    Mobility Interventions: Assess need for specialty bed, Float heels, HOB 30 degrees or less, Pressure redistribution bed/mattress (bed type), Turn and reposition approx. every two hours(pillow and wedges)    Nutrition Interventions: Offer support with meals,snacks and hydration    Friction and Shear Interventions: Apply protective barrier, creams and emollients, Feet elevated on foot rest, Foam dressings/transparent film/skin sealants, HOB 30 degrees or less, Lift sheet, Lift team/patient mobility team, Minimize layers                Problem: Patient Education: Go to Patient Education Activity  Goal: Patient/Family Education  Outcome: Progressing Towards Goal     Problem: Falls - Risk of  Goal: *Absence of Falls  Description: Document Felisa Fall Risk and appropriate interventions in the flowsheet.   Outcome: Progressing Towards Goal  Note: Fall Risk Interventions:  Mobility Interventions: Bed/chair exit alarm, Communicate number of staff needed for ambulation/transfer    Mentation Interventions: Bed/chair exit alarm, Adequate sleep, hydration, pain control, Door open when patient unattended, Familiar objects from home    Medication Interventions: Bed/chair exit alarm    Elimination Interventions: Bed/chair exit alarm, Call light in reach, Patient to call for help with toileting needs, Toileting schedule/hourly rounds    History of Falls Interventions: Bed/chair exit alarm, Room close to nurse's station, Door open when patient unattended         Problem: Patient Education: Go to Patient Education Activity  Goal: Patient/Family Education  Outcome: Progressing Towards Goal     Problem: Risk for Spread of Infection  Goal: Prevent transmission of infectious organism to others  Description: Prevent the transmission of infectious organisms to other patients, staff members, and visitors.   Outcome: Progressing Towards Goal     Problem: Patient Education:  Go to Education Activity  Goal: Patient/Family Education  Outcome: Progressing Towards Goal

## 2022-08-14 NOTE — PROGRESS NOTES
Hospitalist Progress Note    NAME: Ravindra Jacobo   :  1934   MRN:  [id number]     Estimated discharge date:  Medically stable for DC, awaiting placement    Barriers:  NONE  Assessment / Plan:    Severe sepsis POA WBC 13.0, SBP 83/40, , lactate 1.53  Sacral wound infection of chronic sacral decubitus ulcer POA  Proteus/ESBL klebsiella/ enterococcus bacteremia POA  Foul-smelling purulent sacral decubitus ulcer with surrounding erythema  Continue vanco and meropenem  Admit BC with proteus,Klebsiella, enterococcus         Initially reported as ESBL, this was corrected  Vanco and meropenem --> vanco and cefepime and Po flagyl  General surgery did diverting colostomy, wound debridement        No exposed bone per Op note  Wound cultures with multiple GNR  Continue wound care  Plan for IV antibiotics at HD  -Will need to continue wound care daily, and likely placement of wound VAC in 7-10 days    Bedbound POA  Moderate protein calorie malnutrition  Patient bed bound  nutrition consultation for dietary supplements     ESRD on HD MWF  Anemia of chronic disease  Anemia appears stable and not symptomatic  Nephrology consulted, c/w HD as tolerated  Placed on midodrine to increase BP so patient can tolerate HD. Consider tapering off as outpatient     DM type 2 with ESRD POA  Accu-Cheks and sliding scale insulin       Essential hypertension POA    Blood pressure well controlled, currently on clonidine, with hold parameters    PAF with mild RVR with HD,Now NSR -amiodarone was increased to 400 mg twice daily, overnight she converted back to NSR and most of the times he had sustained sinus rhythm. -Decrease amiodarone to 400 mg daily         18.5 - 24.9 Normal weight / Body mass index is 25.8 kg/m². Code status: Full  Prophylaxis: Hep SQ  Recommended Disposition: Home w/Family    -Patient medically stable for discharge, but facility cannot take her until Monday.   Will plan for discharge on Monday Subjective:     Chief Complaint / Reason for Physician Visit  Had dialysis yesterday. No significant t events overnight. Discussed with nurse    Review of Systems:  Symptom Y/N Comments  Symptom Y/N Comments   Fever/Chills nn   Chest Pain n    Poor Appetite    Edema     Cough n   Abdominal Pain n    Sputum    Joint Pain     SOB/BEE n   Pruritis/Rash     Nausea/vomit nn   Tolerating PT/OT     Diarrhea    Tolerating Diet y    Constipation    Other       Could NOT obtain due to:      Objective:     VITALS:   Last 24hrs VS reviewed since prior progress note. Most recent are:  Patient Vitals for the past 24 hrs:   Temp Pulse Resp BP SpO2   08/14/22 0400 -- (!) 58 -- (!) 102/49 99 %   08/14/22 0300 97.9 °F (36.6 °C) 60 16 (!) 102/49 100 %   08/14/22 0255 -- (!) 57 -- (!) 96/49 100 %   08/13/22 2300 -- 62 20 (!) 119/50 100 %   08/13/22 2258 98 °F (36.7 °C) 61 15 (!) 116/54 100 %   08/1934 97.4 °F (36.3 °C) 62 18 (!) 125/51 100 %   08/13/22 1530 -- 67 18 (!) 126/58 100 %   08/13/22 1500 97.8 °F (36.6 °C) 68 16 118/62 100 %   08/13/22 1445 -- 74 19 (!) 74/48 100 %   08/13/22 1430 -- 83 15 104/62 --   08/13/22 1415 -- 77 15 (!) 112/57 --   08/13/22 1400 -- 73 15 118/65 --   08/13/22 1345 -- 91 15 124/68 --   08/13/22 1330 -- 85 15 120/61 --   08/13/22 1315 -- 68 15 122/63 100 %   08/13/22 1300 -- 83 15 113/67 100 %   08/13/22 1245 -- 62 17 108/60 97 %   08/13/22 1230 -- 63 18 124/62 100 %   08/13/22 1215 -- 61 17 (!) 116/57 100 %   08/13/22 1200 -- 63 17 (!) 108/57 100 %   08/13/22 1145 -- (!) 49 17 (!) 131/52 100 %   08/13/22 1137 97.6 °F (36.4 °C) (!) 55 17 (!) 124/54 100 %         Intake/Output Summary (Last 24 hours) at 8/14/2022 0845  Last data filed at 8/14/2022 0300  Gross per 24 hour   Intake --   Output 925 ml   Net -925 ml          I had a face to face encounter and independently examined this patient on 8/14/2022, as outlined below:  PHYSICAL EXAM:  General: WD, WN.  Alert, cooperative, no acute distress, frail appearing   EENT:  Anicteric sclerae. MMM  Resp:  CTA bilaterally, no wheezing or rales. No accessory muscle use  CV:  Regular  rhythm,  No edema  GI:  Soft, Non distended, Non tender. +Bowel sounds  Neurologic:  Alert and oriented X 2  normal speech,   Psych:   Good insight. Not anxious nor agitated  Skin:  No rashes. No jaundice, permcath right chest wall      Reviewed most current lab test results and cultures  YES  Reviewed most current radiology test results   YES  Review and summation of old records today    NO  Reviewed patient's current orders and MAR    YES  PMH/SH reviewed - no change compared to H&P  ________________________________________________________________________  Care Plan discussed with:    Comments   Patient x    Family      RN x    Care Manager     Consultant                        Multidiciplinary team rounds were held today with , nursing, pharmacist and clinical coordinator. Patient's plan of care was discussed; medications were reviewed and discharge planning was addressed. ________________________________________________________________________        Comments   >50% of visit spent in counseling and coordination of care     ________________________________________________________________________  Pedro Mcclelland MD     Procedures: see electronic medical records for all procedures/Xrays and details which were not copied into this note but were reviewed prior to creation of Plan. LABS:  I reviewed today's most current labs and imaging studies. Pertinent labs include:  No results for input(s): WBC, HGB, HCT, PLT, HGBEXT, HCTEXT, PLTEXT, HGBEXT, HCTEXT, PLTEXT in the last 72 hours.     Recent Labs     08/12/22  0200      K 3.2*      CO2 27   *   BUN 17   CREA 1.41*   CA 8.0*   MG 2.0         Signed: Pedro Mcclelland MD

## 2022-08-14 NOTE — PROGRESS NOTES
TRANSFER - OUT REPORT:    Verbal report given to Tia (name) on Kacie Getting  being transferred to surg tele (unit) for routine progression of care       Report consisted of patients Situation, Background, Assessment and   Recommendations(SBAR). Information from the following report(s) SBAR, Kardex, and MAR was reviewed with the receiving nurse. Lines:   Peripheral IV 08/02/22 Left Antecubital (Active)   Site Assessment Clean, dry, & intact 08/14/22 1600   Phlebitis Assessment 0 08/14/22 1600   Infiltration Assessment 0 08/14/22 1600   Dressing Status Clean, dry, & intact 08/14/22 1600   Dressing Type Tape;Transparent 08/14/22 1600   Hub Color/Line Status Pink;Capped 08/14/22 1600   Action Taken Open ports on tubing capped 08/14/22 1600   Alcohol Cap Used Yes 08/14/22 1600        Opportunity for questions and clarification was provided.       Patient transported with:   Patient's medications from home  Tech

## 2022-08-14 NOTE — PROGRESS NOTES
End of Shift Note    Bedside shift change report given to Chas Munguia RN (oncoming nurse) by Vince Urbina RN (offgoing nurse). Report included the following information SBAR, Kardex, and Recent Results    Shift worked:  7131-1672     Shift summary and any significant changes:     When changing the dressing, nurse noted that clots started to form in the sacral wound. Concerns for physician to address:       Zone phone for oncoming shift:          Activity:  Activity Level: Bed Rest  Number times ambulated in hallways past shift: 0  Number of times OOB to chair past shift: 0    Cardiac:   Cardiac Monitoring: Yes      Cardiac Rhythm: Sinus Rhythm    Access:  Current line(s): PIV and HD access     Genitourinary:   Urinary status: oliguric    Respiratory:   O2 Device: None (Room air)  Chronic home O2 use?: NO  Incentive spirometer at bedside: NO       GI:  Last Bowel Movement Date: 08/13/22  Current diet:  ADULT DIET Easy to Chew  ADULT ORAL NUTRITION SUPPLEMENT Lunch; Standard High Calorie/High Protein  ADULT ORAL NUTRITION SUPPLEMENT Breakfast, Dinner; Low Calorie/High Protein  Passing flatus: Tolerating current diet:       Pain Management:   Patient states pain is manageable on current regimen: YES    Skin:  Markos Score: 11  Interventions: turn team, float heels, foam dressing, PT/OT consult, and internal/external urinary devices    Patient Safety:  Fall Score:  Total Score: 4  Interventions: bed/chair alarm  High Fall Risk: Yes    Length of Stay:  Expected LOS: 9d 14h  Actual LOS: 12      Vince Urbina RN

## 2022-08-14 NOTE — PROGRESS NOTES
End of Shift Note    Bedside shift change report given to Allison Singer (oncoming nurse) by Zoie Fowler (offgoing nurse). Report included the following information SBAR, Kardex, and MAR    Shift worked:  7a-7p     Shift summary and any significant changes:     New admission from PCU   Patient is pending discharge to SNF tomorrow possibly  Wound care completed before transfer   Room air   Repostioning   Turning   Offloading   [place]   Tolerating medication   Having to remind patient to eat    Concerns for physician to address: none     Zone phone for oncoming shift:  None        Activity:  Activity Level: Bed Rest  Number times ambulated in hallways past shift: 0  Number of times OOB to chair past shift: 0    Cardiac:   Cardiac Monitoring: Yes      Cardiac Rhythm: Sinus Rhythm    Access:  Current line(s): PIV     Genitourinary:   Urinary status: oliguric    Respiratory:   O2 Device: None (Room air)  Chronic home O2 use?: NO  Incentive spirometer at bedside: NO       GI:  Last Bowel Movement Date: 08/14/22  Current diet:  ADULT DIET Easy to Chew  ADULT ORAL NUTRITION SUPPLEMENT Lunch; Standard High Calorie/High Protein  ADULT ORAL NUTRITION SUPPLEMENT Breakfast, Dinner; Low Calorie/High Protein  Passing flatus: YES  Tolerating current diet: YES       Pain Management:   Patient states pain is manageable on current regimen: N/A    Skin:  Markos Score: 11  Interventions: turn team and float heels    Patient Safety:  Fall Score:  Total Score: 4  Interventions: bed/chair alarm, assistive device (walker, cane, etc), and pt to call before getting OOB  High Fall Risk: Yes    Length of Stay:  Expected LOS: 9d 14h  Actual LOS:   [address]

## 2022-08-15 NOTE — DISCHARGE SUMMARY
Hospitalist Discharge Summary     Patient ID:  Michelle Singh  [id number]  2 y.o.  1934 8/2/2022    PCP on record: Beverly Cordero MD    Admit date: 8/2/2022  Discharge date and time: 8/15/2022    DISCHARGE DIAGNOSIS:    Severe sepsis POA   Sacral wound infection of chronic sacral decubitus ulcer POA  Proteus/ESBL klebsiella/ enterococcus bacteremia POA  ESRD on HD MWF  Anemia of chronic disease  DM type 2 with ESRD POA      CONSULTATIONS:  IP CONSULT TO NEPHROLOGY  IP CONSULT TO INFECTIOUS DISEASES  IP CONSULT TO PALLIATIVE CARE - PROVIDER  IP CONSULT TO GENERAL SURGERY    Excerpted HPI from H&P of Rebecca Rich DO:  Michelle Singh is a 80 y.o.  female with pertinent past medical history of diabetes mellitus and ESRD on MWF IHD who presents with from home for wound check. Family reports that she has had a sacral decubitus ulcer for many months, but in the past week and has began to become more red with foul-smelling discharge. Home health nurse raise concern for infection and recommended she report to the ED. Patient transported by EMS. Patient reports no other symptoms and has no other complaints or concerns. In the ED, patient normothermic, not tachycardic, with unlabored breathing and saturation in the mid 90s on room air, she was noted to have low blood pressure with lowest 83/40 which improved to 115/40 with 1 L fluid resuscitation then progressively began to decline again prompting repeat bolus with improvement SBP in the 90s. Labs notable for WBC 13.0, hemoglobin 10.5 (near baseline), sodium 137, potassium 2.9 (dialysis yesterday), glucose 120, BUN 29, creatinine 3.89. ECG demonstrates normal sinus rhythm with short UT, PAC noted-QTC prolonged at 563. Given appearance of ulcer concern for early sepsis was raised and sepsis alert was called. Patient started on Zosyn and clindamycin. ______________________________________________________________________  DISCHARGE SUMMARY/HOSPITAL COURSE:  for full details see H&P, daily progress notes, labs, consult notes. Severe sepsis POA WBC 13.0, SBP 83/40, , lactate 1.53  Sacral wound infection of chronic sacral decubitus ulcer POA  Proteus/ESBL klebsiella/ enterococcus bacteremia POA  Foul-smelling purulent sacral decubitus ulcer with surrounding erythema  Continue vanco and meropenem  Admit BC with proteus,Klebsiella, enterococcus         Initially reported as ESBL, this was corrected  Vanco and meropenem --> vanco and cefepime and Po flagyl  General surgery did diverting colostomy, wound debridement 8/4       No exposed bone per Op note  Wound cultures with multiple GNR  Continue wound care  Plan for IV antibiotics at HD. Earlier 500 mg vanc, but later changed to 1 g after discussed with ID and pharmacist.   Start discharge planning, planning for SNF at discharge      Bedbound POA  Moderate protein calorie malnutrition  Patient bed bound  nutrition consultation for dietary supplements     ESRD on HD MWF  Anemia of chronic disease  Anemia appears stable and not symptomatic  Nephrology consulted, c/w HD as tolerated  Placed on midodrine to increase BP so patient can tolerate HD     DM type 2 with ESRD POA  Accu-Cheks and sliding scale insulin  , 109, 87, 124, 211, 137     Essential hypertension POA  Hold PTA antihypertensives given borderline hypotension  Noted patient taking clonidine, watch for rebound      PAF with mild RVR with HD, 8/11-amiodarone was increased to 400 mg twice daily, overnight she converted back to NSR and most of the times he had sustained sinus rhythm. She can be discharged today, change dose of amiodarone to 200 mg BID for now.        -Vancomycin 1GM IV with dialysis 3 times weekly & cefepime 2/2/3 gram IV with dialysis  3 times weekly end date 9/15  -Weekly CBC, CMP, vancomycin trough & ESR/CRP every other week-  -Fax reports to [phone], call with critical labs  at [phone]  -Encourage adequate fluids, daily probiotic/yogurt  -ID follow-up -9/13 at 1:30 PM-in person or virtual.     _______________________________________________________________________  Patient seen and examined by me on discharge day. Pertinent Findings:  Gen:    Not in distress  Chest: Clear lungs  CVS:   Regular rhythm. No edema  Abd:  Soft, not distended, not tender  Neuro:  Alert,   _______________________________________________________________________  DISCHARGE MEDICATIONS:   Current Discharge Medication List        START taking these medications    Details   collagenase (SANTYL) 250 unit/gram ointment Apply  to affected area daily. Qty: 15 g, Refills: 0  Start date: 8/11/2022      midodrine (PROAMATINE) 10 mg tablet Take 1 Tablet by mouth every Monday, Wednesday, Friday for 30 days. Qty: 12 Tablet, Refills: 0  Start date: 8/10/2022, End date: 9/9/2022      metroNIDAZOLE (FLAGYL) 500 mg tablet Take 1 Tablet by mouth every twelve (12) hours for 10 doses. Qty: 10 Tablet, Refills: 0  Start date: 8/10/2022, End date: 8/15/2022      sodium hypochlorite (QUARTER STRENGTH DAKIN'S) 0.125 % soln external solution Apply 473 mL to affected area in the morning. Qty: 1 Each, Refills: 0  Start date: 8/11/2022           CONTINUE these medications which have CHANGED    Details   amiodarone (CORDARONE) 200 mg tablet Take 1 Tablet by mouth two (2) times a day. Qty: 60 Tablet, Refills: 1  Start date: 8/12/2022      cloNIDine HCL (CATAPRES) 0.1 mg tablet Take 1 Tablet by mouth three (3) times daily.   Qty: 15 Tablet, Refills: 0  Start date: 8/12/2022      cefepime 1 gram IVPB efepime 2/2/3 gram IV with dialysis  3 times weekly end date 9/15  Qty: 30 Dose, Refills: 0  Start date: 8/10/2022           CONTINUE these medications which have NOT CHANGED    Details   famotidine (PEPCID) 20 mg tablet       atorvastatin (LIPITOR) 20 mg tablet Take 2 Tablets by mouth daily. Qty: 30 Tablet, Refills: 1      insulin lispro protamine/insulin lispro (HUMALOG MIX 75/25) 100 unit/mL (75-25) injection 5 Units by SubCUTAneous route Before breakfast and dinner. Qty: 1 mL, Refills: 0      aspirin delayed-release 81 mg tablet Take 81 mg by mouth daily. cholecalciferol (VITAMIN D3) (1000 Units /25 mcg) tablet Take 1,000 Units by mouth daily. pantoprazole (PROTONIX) 20 mg tablet Take 2 Tablets by mouth daily. Qty: 60 Tablet, Refills: 0           STOP taking these medications       amLODIPine (NORVASC) 10 mg tablet Comments:   Reason for Stopping:         furosemide (LASIX) 20 mg tablet Comments:   Reason for Stopping:                 Patient Follow Up Instructions: Activity: PT/OT Eval and Treat  Diet: Regular Diet, easy to chew diet  Wound Care: continue the daily wound care with Santyl And Dakin solution. Assess for wound vac in 1-2 weeks.         Follow-up Information       Follow up With Specialties Details Why Contact Info    Steff Alvarado MD Internal Medicine Physician Follow up in 2 week(s)  [address]  [phone]          ________________________________________________________________    Risk of deterioration: Moderate    Condition at Discharge:  Stable  __________________________________________________________________    Disposition  SNF/LTC    ____________________________________________________________________    Code Status: DNR/DNI  ___________________________________________________________________      Total time in minutes spent coordinating this discharge (includes going over instructions, follow-up, prescriptions, and preparing report for sign off to her PCP) :  >30 minutes    Signed:  Wilton Marie MD

## 2022-08-15 NOTE — PROGRESS NOTES
End of Shift Note    Bedside shift change report given to [facility] L[facility] (oncoming nurse) by Jess Akins RN (offgoing nurse). Report included the following information SBAR, Kardex, Procedure Summary, Intake/Output, MAR, and Recent Results    Shift worked:  7 pm - 7am     Shift summary and any significant changes:     No changes     Concerns for physician to address:  No concerns     Zone phone for oncoming shift:   [phone]       Activity:  Activity Level: Bed Rest  Number times ambulated in hallways past shift: 0  Number of times OOB to chair past shift: 0    Cardiac:   Cardiac Monitoring: No      Cardiac Rhythm: Sinus Rhythm    Access:  Current line(s): PIV     Genitourinary:   Urinary status: oliguric    Respiratory:   O2 Device: None (Room air)  Chronic home O2 use?: NO  Incentive spirometer at bedside: NO       GI:  Last Bowel Movement Date: 08/14/22  Current diet:  ADULT DIET Easy to Chew  ADULT ORAL NUTRITION SUPPLEMENT Lunch; Standard High Calorie/High Protein  ADULT ORAL NUTRITION SUPPLEMENT Breakfast, Dinner; Low Calorie/High Protein  Passing flatus: YES  Tolerating current diet: YES       Pain Management:   Patient states pain is manageable on current regimen: YES    Skin:  Markos Score: 12  Interventions: turn team, speciality bed, float heels, foam dressing, and nutritional support     Patient Safety:  Fall Score:  Total Score: 4  Interventions: pt to call before getting OOB  High Fall Risk: Yes    Length of Stay:  Expected LOS: 9d 14h  Actual LOS: [address] RN

## 2022-08-15 NOTE — PROGRESS NOTES
Problem: Pressure Injury - Risk of  Goal: *Prevention of pressure injury  Description: Document Markos Scale and appropriate interventions in the flowsheet.   Outcome: Progressing Towards Goal  Note: Pressure Injury Interventions:  Sensory Interventions: Keep linens dry and wrinkle-free, Maintain/enhance activity level, Minimize linen layers    Moisture Interventions: Absorbent underpads, Minimize layers    Activity Interventions: Pressure redistribution bed/mattress(bed type)    Mobility Interventions: HOB 30 degrees or less, Float heels    Nutrition Interventions: Document food/fluid/supplement intake    Friction and Shear Interventions: Apply protective barrier, creams and emollients, Feet elevated on foot rest, Foam dressings/transparent film/skin sealants, Minimize layers

## 2022-08-15 NOTE — PROGRESS NOTES
Called report to MARCIA Santana at [facility]. Patient still in dialysis, awaiting [facility] back to [facility]. [facility] called at 1846 for ETA update, patient to be picked up at 2015.

## 2022-08-15 NOTE — PROGRESS NOTES
Transition of Care Plan to SNF/Rehab    Communication to Patient/Family:  Met with patient and family and they are agreeable to the transition plan. The Plan for Transition of Care is related to the following treatment goals: The Patient and/or patient representative was provided with a choice of provider and agrees  with the discharge plan. Yes [x] No []    A Freedom of choice list was provided with basic dialogue that supports the patient's individualized plan of care/goals and shares the quality data associated with the providers. Yes [x] No []    SNF/Rehab Transition:  Patient has been accepted to [facility] SNF/Rehab and meets criteria for admission. Patient will transported by [facility] and expected to leave at 6:30pm.    Communication to SNF/Rehab:  Bedside RN,  has been notified to update the transition plan to the facility and call report ([phone]). Discharge information has been updated on the AVS. And communicated to facility via SNOBSWAP/All Scripts, or CC link. Discharge instructions to be fax'd to facility at [facility] #). Nursing Please include all hard scripts for controlled substances, med rec and dc summary, and AVS in packet. Reviewed and confirmed with facility,  can manage the patient care needs for the following:     Aicha Rivera with (X) only those applicable:  Medication:  []Medications are available at the facility  []IV Antibiotics    []Controlled Substance - hard copies available sent. []Weekly Labs    Equipment:  []CPAP/BiPAP  []Wound Vacuum  []Gee or Urinary Device  []PICC/Central Line  []Nebulizer  []Ventilator    Treatment:  []Isolation (for MRSA, VRE, etc.)  []Surgical Drain Management  []Tracheostomy Care  [x]Dressing Changes  []Dialysis with transportation  []PEG Care  []Oxygen  []Daily Weights for Heart Failure Sacral wound: daily, cleanse by wiping wound firmly clean with NS moist 4x4's.  Apply Santyl ointment to wound base and pack wound with NS moist Kerlix/Bulkee rolled gauze. Cover with dry 4x4's/ABD/heavy drainage pack and secure with tape. Dietary:  [x]Any diet limitations  []Tube Feedings   []Total Parenteral Management (TPN) ADULT DIET Easy to Chew  ADULT ORAL NUTRITION SUPPLEMENT Lunch; Standard High Calorie/High Protein  ADULT ORAL NUTRITION SUPPLEMENT Breakfast, Dinner; Low Calorie/High Protein   Financial Resources:  []Medicaid Application Completed    []UAI Completed and copy given to pt/family  and copy given to pt/family  []A screening has previously been completed. []Level II Completed    [] Private pay individual who will not become   financially eligible for Medicaid within 6 months from admission to a [address] [place] [address] facility. [] Individual refused to have screening conducted. []Medicaid Application Completed    []The screening denied because it was determined individual did not need/did not qualify for nursing facility level of care. [] Out of state residents seeking direct admission to a [address] facility. [] Individuals who are inpatients of an out of state hospital, or in state or out of state veterans/ hospital and seek direct admission to a [address] facility  [] Individuals who are pateints or residents of a state owned/operated facility that is licensed by Department of [employer] [facility] and seek direct admission to [address] [place] [address]  [] A screening not required for enrollment in [address] [address] [address] services as set out in [address] [place] [address] 64-  [] [facility]) staff shall perform screenings of the [facility] clients. Advanced Care Plan:  [x]Surrogate Decision Maker of Care  []POA  []Communicated Code Status and copy sent.  Obi Omalley, [phone]   Other:

## 2022-08-15 NOTE — PROGRESS NOTES
Nephrology Progress Note  [facility] / [address], Gigi Ryan  [place], [address]  Phone - [phone]  Fax - [phone]                 Patient: Nano Stroud                   YOB: 1934        Date- 8/15/2022                      Admit Date: 8/2/2022  CC: Follow up for ESRD          IMPRESSION & PLAN:    ESRD , HD dependent since 5/23/2022, on MWF schedule, [facility], under Dr. Mccartney Limb  Sepsis  Chronic sacral decubitus ulcer  hypokalemia  AFIB  DM 2  Anemia  Hypotension    PLAN-  Patient for hemodialysis treatment today. She can be discharged from renal standpoint after her dialysis today. She will present to her unit on Wednesday for her regular HD treatment. Subjective: Interval History:   -Patient was seen and examined today   -No issues overnight    Objective:   Vitals:    08/14/22 1754 08/14/22 2050 08/15/22 0344 08/15/22 0818   BP: 135/74 (!) 127/57 128/60 113/67   Pulse: 74 60 62 67   Resp: 16 16 17 17   Temp: 97.5 °F (36.4 °C) 98.2 °F (36.8 °C) 98 °F (36.7 °C) 98.2 °F (36.8 °C)   TempSrc:       SpO2: 100% 100% 99% 99%   Weight:       Height:          08/14 0701 - 08/15 0700  In: -   Out: 200 [Urine:200]  Last 3 Recorded Weights in this Encounter    08/12/22 0535 08/13/22 0301 08/14/22 0300   Weight: 68.9 kg (152 lb) 68.8 kg (151 lb 9.6 oz) 68.2 kg (150 lb 4.8 oz)      Physical exam:    GEN: NAD  NECK- Supple, no mass  RESP: No wheezing, Clear b/l  CVS: S1,S2  RRR  NEURO: Normal speech, Non focal  EXT: No Edema   HD access: Right chest permacath    Chart reviewed. Pertinent Notes reviewed. Data Review :  Recent Labs     08/15/22  0448   *   K 4.6      CO2 22   BUN 26*   CREA 2.34*   *   CA 8.6       No results for input(s): WBC, HGB, HCT, PLT, HGBEXT, HCTEXT, PLTEXT, HGBEXT, HCTEXT, PLTEXT in the last 72 hours.     No results for input(s): FE, TIBC, PSAT, FERR in the last 72 hours.    Medication list  reviewed  Current Facility-Administered Medications   Medication Dose Route Frequency    amiodarone (CORDARONE) tablet 400 mg  400 mg Oral DAILY    cloNIDine HCL (CATAPRES) tablet 0.1 mg  0.1 mg Oral BID    cefepime (MAXIPIME) 1 g in 0.9% sodium chloride (MBP/ADV) 50 mL MBP  1 g IntraVENous Q24H    sodium hypochlorite (QUARTER STRENGTH DAKIN'S) 0.125% irrigation (bottle)   Topical DAILY    collagenase (SANTYL) 250 unit/gram ointment   Topical DAILY    insulin glargine (LANTUS) injection 6 Units  6 Units SubCUTAneous QHS    midodrine (PROAMATINE) tablet 10 mg  10 mg Oral Q MON, WED & FRI    albumin human 25% (BUMINATE) solution 25 g  25 g IntraVENous DIALYSIS PRN    VANCOMYCIN INFORMATION NOTE   Other Rx Dosing/Monitoring    heparin (porcine) 1,000 unit/mL injection 1,900 Units  1,900 Units InterCATHeter DIALYSIS PRN    And    heparin (porcine) 1,000 unit/mL injection 1,900 Units  1,900 Units InterCATHeter DIALYSIS PRN    aspirin delayed-release tablet 81 mg  81 mg Oral DAILY    atorvastatin (LIPITOR) tablet 40 mg  40 mg Oral DAILY    famotidine (PEPCID) tablet 20 mg  20 mg Oral DAILY    sodium chloride (NS) flush 5-40 mL  5-40 mL IntraVENous Q8H    sodium chloride (NS) flush 5-40 mL  5-40 mL IntraVENous PRN    acetaminophen (TYLENOL) tablet 650 mg  650 mg Oral Q6H PRN    Or    acetaminophen (TYLENOL) suppository 650 mg  650 mg Rectal Q6H PRN    polyethylene glycol (MIRALAX) packet 17 g  17 g Oral DAILY PRN    heparin (porcine) injection 5,000 Units  5,000 Units SubCUTAneous Q8H    insulin lispro (HUMALOG) injection   SubCUTAneous AC&HS    glucose chewable tablet 16 g  4 Tablet Oral PRN    glucagon (GLUCAGEN) injection 1 mg  1 mg IntraMUSCular PRN    dextrose 10 % infusion 0-250 mL  0-250 mL IntraVENous PRN          Everton Baeza MD  8/15/2022

## 2022-08-15 NOTE — PROCEDURES
Hemodialysis / [phone]    Vitals Pre Post Assessment Pre Post   BP BP: (!) 126/54 (08/15/22 1600)   109/50 LOC A & O x  3 No change   HR Pulse (Heart Rate): 62 (08/15/22 1600) 78 Lungs Clear NO change   Resp Resp Rate: 16 (08/15/22 1600) 16 Cardiac S1 S2 No change   Temp Temp: 97.9 °F (36.6 °C) (08/15/22 1600) 98.1 ax Skin Warm dry and Intact No change   Weight  N/a N/a Edema None noted No change   Tele status yes yes Pain Pain Intensity 1: 0 (08/14/22 2050) No change     Orders   Duration: Start: 1600 End: 1930 Total: 2.5   Dialyzer: Dialyzer/Set Up Inspection: Revaclear (08/15/22 1600)   K Bath: Dialysate K (mEq/L): 2 (08/15/22 1600)   Ca Bath: Dialysate CA (mEq/L): 2.5 (08/15/22 1600)   Na: Dialysate NA (mEq/L): 138 (08/15/22 1600)   Bicarb: Dialysate HCO3 (mEq/L): 35 (08/15/22 1600)   Target Fluid Removal: Goal/Amount of Fluid to Remove (mL): 1000 mL (08/15/22 1600)     Access CVC   Type & Location: Right sub clav pc   Comments:          Lines reversed, poor arterial aspiration. No redness/drainage to site.                            Labs   HBsAg (Antigen) / date: Neg 8/3/22                                              HBsAb (Antibody) / date: Susc 8/3/22   Source: epic   Obtained/Reviewed  Critical Results Called HGB   Date Value Ref Range Status   08/09/2022 9.3 (L) 11.5 - 16.0 g/dL Final     Potassium   Date Value Ref Range Status   08/15/2022 4.6 3.5 - 5.1 mmol/L Final     Calcium   Date Value Ref Range Status   08/15/2022 8.6 8.5 - 10.1 MG/DL Final     BUN   Date Value Ref Range Status   08/15/2022 26 (H) 6 - 20 MG/DL Final     Creatinine   Date Value Ref Range Status   08/15/2022 2.34 (H) 0.55 - 1.02 MG/DL Final     Comment:     INVESTIGATED PER DELTA CHECK PROTOCOL        Meds Given   Name Dose Route   Albumin 25g IV   Heparin arterial  1.6ml iv   Heparin venous 1.6ml iv     Adequacy / Fluid    Total Liters Process: 32.5   Net Fluid Removed: 1000ml      Comments   Time Out Done:   (Time) ESRD , Chronic sacral decubitus ulcer  hypokalemia  AFIB  DM 2  Anemia  Hypotension   Admitting Diagnosis:    Consent obtained/signed: Informed Consent Verified: Yes (08/15/22 1600)   Machine / RO # Machine Number: [id number] (08/15/22 1600)   Primary Nurse Rpt Pre: Monie Ernst   Primary Nurse Rpt Post: Kalpana RANGEL    Pt Education: Access care   Care Plan: Continue HD   Pts outpatient clinic: HD dependent since 5/23/2022, on MWF schedule, [facility], under Dr. Conrado Chakraborty  Sepsis     Tx Summary Right sub clav / PC : Dressing CDI. No s/s of infection. Both lumens aspirate & flush well. Running well at . SBAR received from Primary RN. Pt arrived to HD suite A&Ox3. Consent signed & on file. 1600: Each catheter limb disinfected per p&p, caps removed, hubs disinfected per p&p. Each lumen aspirated for blood return and flushed with Normal Saline per policy. Labs drawn per request/ order. VSS. Dialysis Tx initiated. 1630: pt. Stable, lines secure and visible  1700: pt. Resting well.  1730: pt. Stable, line secure  1800: pt. Stable,   1830: Tx ended. VSS. Each dialysis catheter limb disinfected per p&p, all possible blood returned per p&p, and each dialysis hub disinfected per p&p. Each lumen flushed, post dialysis catheter Heparin dwell instilled per order, and caps applied. Bed locked and in the lowest position, call bell and belongings in reach. SBAR given to Primary, RN. Patient is stable at time of their/ my departure. All Dialysis related medications have been reviewed. Comments:  RN reviewed LPN assessment and completed RN assessment. RN completed patient assessment. RN reviewed technicians vital signs and procedure note. Tx completed.  Reviewed by MARCIA Mccoy

## 2022-08-15 NOTE — PROGRESS NOTES
Patient is ready for d/c from CM standpoint    Transition of Care Plan:     RUR: 19% high risk for readmission  Disposition: [address] and Rehab   Follow up appointments: ID follow up 9/13 at 1:30pm Virtual or in person  DME needed: To be supplied by SNF  Transportation at Discharge:  [email or web address]  [place] or means to access home:  going to [facility] Medicare Letter:   Caregiver Contact: Granddaughter, Carmell Romberg ([phone])  Discharge Caregiver contacted prior to discharge? Granddaughter has been contacted  Care Conference needed?:   not needed    2:08  SNF verified pt's HD needs have been arranged. CM verified SNF has a bed & ready to accept pt. [facility] is scheduled [facility] today. Due to HD, transport has been scheduled for 6:30 today. RN to call report to [phone]    [facility] regarding bed availability. CM will continue to follow. Care Management Interventions  PCP Verified by CM: Yes  Palliative Care Criteria Met (RRAT>21 & CHF Dx)?: No  Mode of Transport at Discharge:  Other (see comment)  Transition of Care Consult (CM Consult): Discharge Planning, SNF  Partner SNF: Yes  MyChart Signup: No  Discharge Durable Medical Equipment: No  Physical Therapy Consult: No  Occupational Therapy Consult: No  Speech Therapy Consult: No  Support Systems: Child(yasmin), Other Family Member(s)  Confirm Follow Up Transport: Wheelchair Medardo Poser  The Plan for Transition of Care is Related to the Following Treatment Goals : [facility]  The Patient and/or Patient Representative was Provided with a Choice of Provider and Agrees with the Discharge Plan?: Yes  Name of the Patient Representative Who was Provided with a Choice of Provider and Agrees with the Discharge Plan: Pt  Freedom of Choice List was Provided with Basic Dialogue that Supports the Patient's Individualized Plan of Care/Goals, Treatment Preferences and Shares the Quality Data Associated with the Providers?: Yes  Discharge Location  Patient Expects to be Discharged to[de-identified] [facility]

## 2022-08-15 NOTE — PROGRESS NOTES
TRANSFER - IN REPORT:    Verbal report received from [facility] [employer]  being received from [facility](unit) for ordered procedure      Report consisted of patients Situation, Background, Assessment and   Recommendations(SBAR). Information from the following report(s) Kardex was reviewed with the receiving nurse. Opportunity for questions and clarification was provided. Assessment completed upon patients arrival to unit and care assumed.

## 2022-08-16 NOTE — PROGRESS NOTES
Problem: Pressure Injury - Risk of  Goal: *Prevention of pressure injury  Description: Document Markos Scale and appropriate interventions in the flowsheet. Outcome: Resolved/Not Met  Note: Pressure Injury Interventions:  Sensory Interventions: Assess need for specialty bed, Avoid rigorous massage over bony prominences    Moisture Interventions: Absorbent underpads, Apply protective barrier, creams and emollients, Internal/External urinary devices    Activity Interventions: Pressure redistribution bed/mattress(bed type)    Mobility Interventions: HOB 30 degrees or less    Nutrition Interventions: Document food/fluid/supplement intake    Friction and Shear Interventions: Apply protective barrier, creams and emollients, Feet elevated on foot rest, Foam dressings/transparent film/skin sealants                Problem: Patient Education: Go to Patient Education Activity  Goal: Patient/Family Education  Outcome: Resolved/Not Met     Problem: Falls - Risk of  Goal: *Absence of Falls  Description: Document Felisa Fall Risk and appropriate interventions in the flowsheet. Outcome: Resolved/Not Met  Note: Fall Risk Interventions:  Mobility Interventions: Communicate number of staff needed for ambulation/transfer    Mentation Interventions: Adequate sleep, hydration, pain control, Door open when patient unattended    Medication Interventions: Evaluate medications/consider consulting pharmacy    Elimination Interventions: Call light in reach, Toileting schedule/hourly rounds    History of Falls Interventions: Evaluate medications/consider consulting pharmacy         Problem: Patient Education: Go to Patient Education Activity  Goal: Patient/Family Education  Outcome: Resolved/Not Met     Problem: Risk for Spread of Infection  Goal: Prevent transmission of infectious organism to others  Description: Prevent the transmission of infectious organisms to other patients, staff members, and visitors.   Outcome: Resolved/Not Met Problem: Patient Education:  Go to Education Activity  Goal: Patient/Family Education  Outcome: Resolved/Not Met

## 2022-08-16 NOTE — PROGRESS NOTES
Patient discharged to [facility] transported by [facility] at [address] [place] [address]. Vital signs WDL no complaints of pain.

## 2022-09-16 PROBLEM — R65.20 SEVERE SEPSIS (HCC): Status: ACTIVE | Noted: 2022-01-01

## 2022-09-16 PROBLEM — A41.9 SEVERE SEPSIS (HCC): Status: ACTIVE | Noted: 2022-01-01

## 2022-09-16 NOTE — PROGRESS NOTES
1910: Bedside and Verbal shift change report given to Isis Boyd RN (oncoming nurse) by Tash Schafer RN (offgoing nurse). Report included the following information SBAR, Kardex, Intake/Output, MAR, Recent Results, Med Rec Status, and Cardiac Rhythm NSR .     1919: Candie Gillis MD at bedside discussing with patient's granddaugher, Theresa Burns, about updated plan of care. 1947: Assessment completed at bedside. See flowsheet for details. Bed alarm on at this time. 1949Lesley Alegre MD notified in regards to the following:    \"Patient admitted for sepsis, hypotension, and AMS. Has hx of dementia, and family decided on palliative consult for hospice, but will see palliative am. Current BP is 93/29 and albumin recently given. \"    Pending recommendations at this time. 2005: Butch Alexander MD placed orders for STAT LR 250mL bolus and albumin. 2200: Dave Wyman MD at bedside to assess sacral woudn. WTD dressing completed by Dave Wyman MD and covered with foam dressing. 2315: BP updated in flowsheet. Butch Alexander MD notified. 2317Lesley Alegre MD placed orders for LR bolus 500 mL.    2347: LR Bolus given at this time. 0010 09/17/2022: Reassessment completed at bedside. See flowsheet for details. 0013: Humalog held at this time. .     0230: Made rounds on patient. Patient sleeping at this time. 0500: Reassessment completed at bedside. Patient more alert, still confused at baseline. CHG and pericare completed at bedside. Bed alarm on at this time. See flowsheet for details. 8350: Humalog held at this time. .     0711: End of Shift Note    Bedside shift change report given to Tash Schafer RN (oncoming nurse) by Isis Boyd (offgoing nurse). Report included the following information SBAR, Kardex, Intake/Output, MAR, Recent Results, Med Rec Status, and Cardiac Rhythm Afib     Shift worked:  8266-0683     Shift summary and any significant changes:     Acute changes documented above.  Patient's family supposed to having meeting regarding hospice. Concerns for physician to address:  Palliative? Zone phone for oncoming shift:   [phone]       Activity:  Activity Level: Bed Rest  Number times ambulated in hallways past shift: 0  Number of times OOB to chair past shift: 0    Cardiac:   Cardiac Monitoring: Yes      Cardiac Rhythm: Atrial Fib    Access:  Current line(s): PIV     Genitourinary:   Urinary status: oliguric    Respiratory:   O2 Device: Nasal cannula  Chronic home O2 use?: NO  Incentive spirometer at bedside: NO       GI:  Last Bowel Movement Date: 09/16/22  Current diet:  DIET NPO  Passing flatus: YES  Tolerating current diet: YES       Pain Management:   Patient states pain is manageable on current regimen: YES    Skin:  Markos Score: 12  Interventions: turn team, float heels, and nutritional support     Patient Safety:  Fall Score:  Total Score: 4  Interventions: bed/chair alarm, gripper socks, and stay with me (per policy)  High Fall Risk: Yes    Length of Stay:  Expected LOS: - - -  Actual LOS: [address]

## 2022-09-16 NOTE — REMOTE MONITORING
Spoke with primary RN Luda Kessler regarding 3 and 6 hour Sepsis bundle.       Kamran Ball MSN, [address] [facility]  [phone]

## 2022-09-16 NOTE — CONSULTS
Full consult note to follow. I have reviewed the records including the ED notes from this admission. 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 This is a recurrent problem. She is 80 yrs old with dialysis dependent renal failure which developed after her bout of severe sepsis in June. She is a SNF resident with dementia. In short, all of her chronic medical problems will not be fixed by anything that we do and the decubitus ulcer cannot be fixed with antibiotics and wound care. This woman is at the end of her life and that will not be changed by aggressive interventions that can only be provided in the ICU. Rather than ICU admission for vasopressors, I would recommend that we take a fully palliative approach her. Appropriately, the patient is DNR in the event of cardiopulmonary arrest. I have tried to reach her only listed contact (Granddaughter, Salud Ha) but both of my calls have gone to . For now, I have ordered a bottle of albumin and will re-assess. I will again try to reach her granddaughter later this afternoon.  After this, will reconsider transfer to ICU    Shanel Crespo, [address] [facility]  [phone]  9/16/2022 4:30 PM

## 2022-09-16 NOTE — ED PROVIDER NOTES
EMERGENCY DEPARTMENT HISTORY AND PHYSICAL EXAM      Date: 2022  Patient Name: Esme Gomez    History of Presenting Illness     Chief Complaint   Patient presents with    Altered mental status         HPI: Esme Gomez, 80 y.o. female with history of end-stage renal disease on dialysis, known bilateral heel and sacral decubitus ulcers, atrial fibrillation, presenting to ED with chief complaint of altered mental status. Patient was sent from NH after being returned from dialysis and not acting like herself. Nurse at [place] tells me pt is normally conversational w some dementia (and not ambulatory). He could not pin down a last known normal as he does not normally take care of her. He states she was hypotensive and not responding to them normally. For EMS, she was mildly hypotensive and tachycardic. Old records reviewed and indicate recent admission with sepsis from sacral decubitus ulcer. Patient is a DNR. There are no other complaints, changes, or physical findings at this time. PCP: Kleber Cervantes MD    No current facility-administered medications on file prior to encounter. Current Outpatient Medications on File Prior to Encounter   Medication Sig Dispense Refill    cloNIDine HCL (CATAPRES) 0.1 mg tablet Take 1 Tablet by mouth two (2) times a day. 15 Tablet 0    [] vancomycin (VANCOCIN) 1,000 mg injection 1,000 mg by IntraVENous route every Monday, Wednesday, Friday for 31 days. 1 gm after HD, 3 times weekly, end date 9/15 12 Each 1    amiodarone (CORDARONE) 200 mg tablet Take 1 Tablet by mouth two (2) times a day. 60 Tablet 1    cefepime 1 gram IVPB efepime 2/2/3 gram IV with dialysis  3 times weekly end date 9/15 30 Dose 0    collagenase (SANTYL) 250 unit/gram ointment Apply  to affected area daily. 15 g 0    sodium hypochlorite (QUARTER STRENGTH DAKIN'S) 0.125 % soln external solution Apply 473 mL to affected area in the morning.  1 Each 0    famotidine (PEPCID) 20 mg tablet       atorvastatin (LIPITOR) 20 mg tablet Take 2 Tablets by mouth daily. 30 Tablet 1    pantoprazole (PROTONIX) 20 mg tablet Take 2 Tablets by mouth daily. 60 Tablet 0    insulin lispro protamine/insulin lispro (HUMALOG MIX 75/25) 100 unit/mL (75-25) injection 5 Units by SubCUTAneous route Before breakfast and dinner. 1 mL 0    aspirin delayed-release 81 mg tablet Take 81 mg by mouth daily. cholecalciferol (VITAMIN D3) (1000 Units /25 mcg) tablet Take 1,000 Units by mouth daily. Past History     Past Medical History:  Past Medical History:   Diagnosis Date    A-fib ([facility] [address]     Chronic kidney disease     Diabetes ([facility] [address]        Past Surgical History:  Past Surgical History:   Procedure Laterality Date    IR INSERT NON TUNL CVC OVER 5 YRS  5/26/2022    IR INSERT NON TUNL CVC OVER 5 YRS  6/3/2022    IR INSERT TUNL CVC W/O PORT OVER 5 YR  6/8/2022       Family History:  No family history on file. Social History:  Social History     Tobacco Use    Smoking status: Never    Smokeless tobacco: Never   Substance Use Topics    Alcohol use: Not Currently    Drug use: Not Currently       Allergies:  No Known Allergies      Review of Systems   Review of Systems   Unable to perform ROS: Mental status change     Physical Exam   Physical Exam  Vitals and nursing note reviewed. Constitutional:       Appearance: Normal appearance. Comments: Frail   HENT:      Head: Normocephalic and atraumatic. Mouth/Throat:      Mouth: Mucous membranes are moist.   Eyes:      Extraocular Movements: Extraocular movements intact. Cardiovascular:      Rate and Rhythm: Regular rhythm. Tachycardia present. Pulses: Normal pulses. Pulmonary:      Effort: Pulmonary effort is normal.      Breath sounds: Normal breath sounds. Abdominal:      Palpations: Abdomen is soft. Tenderness: There is no abdominal tenderness. Musculoskeletal:         General: No deformity. Cervical back: Neck supple.       Comments: Large unstageable sacral decubitus ulcer w/o surrounding warm/erythema, dressings are CDI w/o purulence. Bilat heel decubitus ulcers. Skin:     General: Skin is warm and dry. Neurological:      General: No focal deficit present. Mental Status: She is alert. Comments: Oriented to name only. No facial droop. No gaze preference or deviation. Diagnostic Study Results     Labs -     Recent Results (from the past 24 hour(s))   CBC WITH AUTOMATED DIFF    Collection Time: 09/16/22  1:06 PM   Result Value Ref Range    WBC 12.8 (H) 3.6 - 11.0 K/uL    RBC 3.57 (L) 3.80 - 5.20 M/uL    HGB 10.5 (L) 11.5 - 16.0 g/dL    HCT 34.0 (L) 35.0 - 47.0 %    MCV 95.2 80.0 - 99.0 FL    MCH 29.4 26.0 - 34.0 PG    MCHC 30.9 30.0 - 36.5 g/dL    RDW 21.9 (H) 11.5 - 14.5 %    PLATELET 833 587 - 400 K/uL    MPV 10.5 8.9 - 12.9 FL    NRBC 2.0 (H) 0  WBC    ABSOLUTE NRBC 0.25 (H) 0.00 - 0.01 K/uL    NEUTROPHILS 85 (H) 32 - 75 %    LYMPHOCYTES 6 (L) 12 - 49 %    MONOCYTES 6 5 - 13 %    EOSINOPHILS 1 0 - 7 %    BASOPHILS 1 0 - 1 %    IMMATURE GRANULOCYTES 1 (H) 0.0 - 0.5 %    ABS. NEUTROPHILS 10.9 (H) 1.8 - 8.0 K/UL    ABS. LYMPHOCYTES 0.8 0.8 - 3.5 K/UL    ABS. MONOCYTES 0.8 0.0 - 1.0 K/UL    ABS. EOSINOPHILS 0.1 0.0 - 0.4 K/UL    ABS. BASOPHILS 0.1 0.0 - 0.1 K/UL    ABS. IMM.  GRANS. 0.1 (H) 0.00 - 0.04 K/UL    DF SMEAR SCANNED      RBC COMMENTS ANISOCYTOSIS  2+        RBC COMMENTS POLYCHROMASIA  PRESENT        RBC COMMENTS MACROCYTOSIS  1+        RBC COMMENTS LORNA CELLS  PRESENT        WBC COMMENTS REACTIVE LYMPHS     METABOLIC PANEL, COMPREHENSIVE    Collection Time: 09/16/22  1:06 PM   Result Value Ref Range    Sodium 134 (L) 136 - 145 mmol/L    Potassium 4.1 3.5 - 5.1 mmol/L    Chloride 98 97 - 108 mmol/L    CO2 19 (L) 21 - 32 mmol/L    Anion gap 17 (H) 5 - 15 mmol/L    Glucose 110 (H) 65 - 100 mg/dL    BUN 9 6 - 20 MG/DL    Creatinine 1.43 (H) 0.55 - 1.02 MG/DL    BUN/Creatinine ratio 6 (L) 12 - 20      GFR est AA 42 (L) >60 ml/min/1.73m2    GFR est non-AA 35 (L) >60 ml/min/1.73m2    Calcium 7.9 (L) 8.5 - 10.1 MG/DL    Bilirubin, total 0.7 0.2 - 1.0 MG/DL    ALT (SGPT) 19 12 - 78 U/L    AST (SGOT) 49 (H) 15 - 37 U/L    Alk. phosphatase 235 (H) 45 - 117 U/L    Protein, total 6.3 (L) 6.4 - 8.2 g/dL    Albumin 1.2 (L) 3.5 - 5.0 g/dL    Globulin 5.1 (H) 2.0 - 4.0 g/dL    A-G Ratio 0.2 (L) 1.1 - 2.2     LACTIC ACID    Collection Time: 09/16/22  1:06 PM   Result Value Ref Range    Lactic acid 10.4 (HH) 0.4 - 2.0 MMOL/L   TROPONIN-HIGH SENSITIVITY    Collection Time: 09/16/22  1:06 PM   Result Value Ref Range    Troponin-High Sensitivity 22 0 - 51 ng/L   SAMPLES BEING HELD    Collection Time: 09/16/22  1:06 PM   Result Value Ref Range    SAMPLES BEING HELD BLUE     COMMENT        Add-on orders for these samples will be processed based on acceptable specimen integrity and analyte stability, which may vary by analyte. EKG, 12 LEAD, INITIAL    Collection Time: 09/16/22  1:48 PM   Result Value Ref Range    Ventricular Rate 100 BPM    Atrial Rate 100 BPM    P-R Interval 188 ms    QRS Duration 120 ms    Q-T Interval 422 ms    QTC Calculation (Bezet) 544 ms    Calculated P Axis 62 degrees    Calculated R Axis 64 degrees    Calculated T Axis 65 degrees    Diagnosis       Normal sinus rhythm  Right bundle branch block  ST elevation, consider inferior injury or acute infarct  ** ** ACUTE MI / STEMI ** **  When compared with ECG of 02-AUG-2022 20:21,  premature atrial complexes are no longer present  QRS duration has decreased         Radiologic Studies -   CT ABD PELV W CONT   Final Result   1. Ulceration overlying the sacrum/coccyx with obvious lysis of bone. 2. Soft tissue attenuation in the anterior abdominal wall, right lower quadrant   and above the pubic symphysis with associated stranding. Recommend clinical   correlation for possible injection sites.    3. There is a filling defect in the right atrium which could represent thrombus, bland or septic, acute or chronic versus a mass lesion such as myxoma. 4. Incidental findings as above. XR CHEST PORT   Final Result   1. No definite acute cardiopulmonary disease. 2. Right IJ approach catheter projects to terminate in the right atrium            CT HEAD WO CONT   Final Result   1. No evidence of acute intracranial abnormality by this modality. CT Results  (Last 48 hours)                 09/16/22 1515  CT ABD PELV W CONT Final result    Impression:  1. Ulceration overlying the sacrum/coccyx with obvious lysis of bone. 2. Soft tissue attenuation in the anterior abdominal wall, right lower quadrant   and above the pubic symphysis with associated stranding. Recommend clinical   correlation for possible injection sites. 3. There is a filling defect in the right atrium which could represent thrombus,   bland or septic, acute or chronic versus a mass lesion such as myxoma. 4. Incidental findings as above. Narrative:  EXAM:  CT ABDOMEN PELVIS WITH CONTRAST   INDICATION:  sepsis. On dialysis   Additional history:   COMPARISON: CT of the abdomen and pelvis, 6/1/2022. CT of the chest, abdomen and   pelvis, 5/24/2022. .   TECHNIQUE:    Multislice helical CT was performed from the diaphragm to the symphysis pubis   with oral and intravenous contrast administration. Contiguous 5 mm axial images   were reconstructed and lung and soft tissue windows were generated. Coronal and   sagittal reformations were generated. CT dose reduction was achieved through use of a standardized protocol tailored   for this examination and automatic exposure control for dose modulation. Meliton Hawthorne FINDINGS:   INCIDENTALLY IMAGED CHEST:   Heart/vessels: Small area of filling defect in the right atrium at the wall   laterally. Likely central venous catheter terminating in the right atrium seen   on the most superior image. Mitral annulus calcification. Lungs/Pleura:  Within   normal limits. .   ABDOMEN:   Liver: Wedge-shaped area of diminished attenuation in the liver adjacent to the   falciform ligament, likely unchanged and likely of no clinical significance. Gallbladder/Biliary: High attenuation material in the dependent portion of the   gallbladder   Spleen: Within normal limits. Pancreas: Within normal limits. Adrenals: Within normal limits. Kidneys: Within normal limits. Peritoneum/Mesenteries: Within normal limits. Extraperitoneum: Within normal limits. Gastrointestinal tract: Left midabdomen colostomy. Vascular: Calcifications in the aorta. Jed Hilt PELVIS:   Extraperitoneum: Within normal limits. Ureters: Within normal limits. Bladder: There is a diverticulum posterior and laterally in the bladder. Reproductive System: Coarse calcifications uterus suggesting fibroids. .   MSK:    Ulceration overlying the sacrum. There is lysis of the sacrum/coccyx. Tortuous,   linear, fluid attenuation extending from the skin surface to the anterior   abdominal wall musculature in the right lower quadrant and degenerative changes   in the spine. There is an area of stranding/inflammatory change in the   subcutaneous fat, superior to the pubic symphysis in the anterior abdominal   wall. .       09/16/22 1338  CT HEAD WO CONT Final result    Impression:  1. No evidence of acute intracranial abnormality by this modality. Narrative:  EXAM:  CT HEAD WO CONT   INDICATION:   AMS   Additional history:   COMPARISON: CT of the head, 5/23/2010. Jed Hilt TECHNIQUE:    Unenhanced CT of the head was performed using 5 mm images. Coronal and sagittal   reformats were produced. Brain and bone windows were generated. CT dose reduction was achieved through use of a standardized protocol tailored   for this examination and automatic exposure control for dose modulation. [place] Hilt FINDINGS:   Global atrophy. Ventricular white matter hypoattenuation.  There is no intracranial hemorrhage,   extra-axial collection, mass, mass effect or midline shift. The basilar   cisterns are open. No acute infarct is identified. The bone windows demonstrate no abnormalities. The visualized portions of the   paranasal sinuses and mastoid air cells are clear. .             CXR Results  (Last 48 hours)                 09/16/22 1407  XR CHEST PORT Final result    Impression:  1. No definite acute cardiopulmonary disease. 2. Right IJ approach catheter projects to terminate in the right atrium               Narrative:  INDICATION: . AMS   Additional history:   COMPARISON: Previous chest xray, 6/3/2022 and 5/26/2022. LIMITATIONS: Portable technique. Makayla Graven FINDINGS: Single frontal view of the chest.    .   Lines/tubes/surgical: A right IJ approach catheter projects to terminate in the   right atrium, not definitely changed. Heart/mediastinum: Calcifications in the aortic arch. Lungs/pleura: Chronic appearing lung changes without definite acute process. No   visualized pleural effusion or pneumothorax. Additional Comments: Degenerative changes in both shoulders. .                 Medical Decision Making   I am the first provider for this patient. I reviewed the vital signs, available nursing notes, past medical history, past surgical history, family history and social history. Vital Signs-Reviewed the patient's vital signs. Patient Vitals for the past 24 hrs:   Temp Pulse Resp BP   09/16/22 1530 -- 85 14 (!) 90/48   09/16/22 1347 (!) 95.7 °F (35.4 °C) -- -- --   09/16/22 1345 -- (!) 102 23 (!) 114/90   09/16/22 1335 -- -- -- (!) 129/54   09/16/22 1256 -- (!) 117 22 (!) 83/50         Provider Notes (Medical Decision Making):   80year-old arriving to ED with altered mental status. Unable to pin down a last known normal after talking with nursing home. She is noted to be hypotensive, tachycardic, hypothermic on arrival.  Lactic acid is significantly elevated.   Concern for sepsis though no clear source identified. Given 1 L of IV fluids with immediate improvement in her blood pressure, now with MAP greater than 65. Give 500 cc additional to temporize though with holding further fluid in setting of dialysis since blood pressure already improved significantly. Repeat lactic ordered. Empiric antibiotics ordered. Discussed with Dr. Colten Orellana who agrees to admission. EKG sinus, raet normal, nonspecific ST T changes, normal QT, unchanged from 8/22    ED Course:     Initial assessment performed. The patients presenting problems have been discussed, and they are in agreement with the care plan formulated and outlined with them. I have encouraged them to ask questions as they arise throughout their visit. Critical Care Time:     45    Disposition:  admit    PLAN:  1. Current Discharge Medication List        2.    Follow-up Information    None       Return to ED if worse     Diagnosis     Clinical Impression: sepsis

## 2022-09-16 NOTE — PROGRESS NOTES
TRANSFER - IN REPORT:    Verbal report received from Flores Olivarez RN(name) on Ravindra Jacobo  being received from ED (unit) for routine progression of care      Report consisted of patients Situation, Background, Assessment and   Recommendations(SBAR). Information from the following report(s) SBAR, Kardex, Intake/Output, MAR, Recent Results, and Cardiac Rhythm SR/ST  was reviewed with the receiving nurse. Opportunity for questions and clarification was provided. O2 saturation not obtained from the ED. Assessment completed upon patients arrival to unit and care assumed. End of Shift Note    Bedside shift change report given to Savannah Cedeno RN (oncoming nurse) by Traci Aguilar RN (offgoing nurse). Report included the following information SBAR, Kardex, Intake/Output, MAR, Recent Results, and Cardiac Rhythm SR/ ST    Shift worked:  5:03PM-7PM     Shift summary and any significant changes:    Patient on lita hugger. Dr. Hollis Emery met with [facility], who is leaning towards palliative at this time. Dressing changed on sacral area. Concerns for physician to address:       Zone phone for oncoming shift:          Activity:  Activity Level: Bed Rest  Number times ambulated in hallways past shift: 0  Number of times OOB to chair past shift: 0    Cardiac:   Cardiac Monitoring: Yes      Cardiac Rhythm: Sinus Tachy    Access:  Current line(s): PIV -endurance    Genitourinary:   Urinary status: oliguric -per granddaughter     Respiratory:   O2 Device: Nasal cannula  Chronic home O2 use?: NO  Incentive spirometer at bedside: NO       GI:  Last Bowel Movement Date: 09/16/22  Current diet:  DIET NPO  Passing flatus: YES  Tolerating current diet: YES       Pain Management:   Patient states pain is manageable on current regimen: YES    Skin:  Markos Score: 11  Interventions: turn team, float heels, and foam dressing    Patient Safety:  Fall Score:  Total Score: 4  Interventions: bed/chair alarm  High Fall Risk: Yes    Length of Stay:  Expected LOS: - - -  Actual LOS: 0      Parveen Soliz RN

## 2022-09-16 NOTE — ACP (ADVANCE CARE PLANNING)
I had the opportunity to meet with Patricia Young, Ms Nadir De La Vega granddaughter and [facility]. I confirmed the plan as discussed in my previous notes. Our goal is full palliation. No vasopressors, no dialysis, no surgery, etc. I think that if she is comfortable come tomorrow morning, it would be reasonable to consider transfer to the Hospice floor. I also think it would be reasonable to forgo unhelpful diagnostics such as echocardiogram and therapeutics that won't alter the ultimate outcome such as antibiotics.  I will leave it to Dr Asa Celestin and the primary team to decide which extant orders to discontinue    Najma Haro, [facility],[address] [place] [address]  [phone]  9/16/2022 6:21 PM

## 2022-09-16 NOTE — CONSULTS
[facility]      Name: Esme Gomez   : 1934   MRN: [id number]   Date: 2022      Brief patient summary:  80 F SNF resident with PMH of dementia, ESRD, recurrent severe sepsis due to large sacral decubitus ulcer adm via ED with recurrence (yet again) of severe sepsis with hypotension likely due to infected sacral decub ulcer        COMPREHENSIVE CCM ASSESSMENT/PLAN     Advanced age  Dementia  Chronic debilitation/bedbound status  Chronic nonhealing large sacral decubitus ulcer  Chronic hypotension  Chronic atrial fibrillation  Protein-calorie malnutrition  DNR already established    CT abd/pelvis 2022: IMPRESSION  1. Ulceration overlying the sacrum/coccyx with obvious lysis of bone. 2. Soft tissue attenuation in the anterior abdominal wall, right lower quadrant  and above the pubic symphysis with associated stranding. Recommend clinical  correlation for possible injection sites. 3. There is a filling defect in the right atrium which could represent thrombus,  bland or septic, acute or chronic versus a mass lesion such as myxoma. 4. Incidental findings as above. CT head : global atrophy. No acute findings    After reviewing records including those from her recent hospitalizations, it is clear that there is no meaningful chance of resolution of any of the problems above and she will continue to have repeated bouts of sepsis due to this horrible pressure ulcer on her backside. Likewise, it seems clear that there is no realistic hope for her to recover to a quality of life that would be deemed a favorable outcome. Therefore, I spoke with the patient's granddaughter and Baron Yen to consider options and offered the option of focusing fully on palliation/CMO. I explained that bringing her to the ICU for vasopressors/possible CRRT/antibiotics would not change the above reality.  I suggested that we forgo vasopressors and stop dialysis and other diagnostic tests that won't change the ultimate outcome. We talked about the eventually option of transferring Ms Vj Mclean to our Hospice floor. Latrell Nguyen seemed to accept this option. Latrell Held is to come in for a visit this evening and I will speak further with her when she arrives        HPI/Consult/Subjective:   HPI:  Patient is unable to provide any history. RASS 0. Speech is unintelligible. She does not appear to be in distress      Past Medical History:      has a past medical history of A-fib ([address] Chronic kidney disease, and Diabetes ([address] Past Surgical History:      has a past surgical history that includes ir insert non tunl cvc over 5 yrs (5/26/2022); ir insert non tunl cvc over 5 yrs (6/3/2022); and ir insert tunl cvc w/o port over 5 yr (6/8/2022). Home Medications:     Prior to Admission medications    Medication Sig Start Date End Date Taking? Authorizing Provider   cloNIDine HCL (CATAPRES) 0.1 mg tablet Take 1 Tablet by mouth two (2) times a day. 8/15/22   Richard Rivera MD   vancomycin [facility]) 1,000 mg injection 1,000 mg by IntraVENous route every Monday, Wednesday, Friday for 31 days. 1 gm after HD, 3 times weekly, end date 9/15 8/15/22 9/15/22  Richard Rivera MD   amiodarone (CORDARONE) 200 mg tablet Take 1 Tablet by mouth two (2) times a day. 8/12/22   Richard Rivera MD   cefepime 1 gram IVPB efepime 2/2/3 gram IV with dialysis  3 times weekly end date 9/15 8/10/22   Richard Rivera MD   collagenase (SANTYL) 250 unit/gram ointment Apply  to affected area daily. 8/11/22   Richard Rivera MD   sodium hypochlorite (QUARTER STRENGTH DAKIN'S) 0.125 % soln external solution Apply 473 mL to affected area in the morning. 8/11/22   Richard Rivera MD   famotidine (PEPCID) 20 mg tablet  4/30/22   Marcia Mahmood MD   atorvastatin (LIPITOR) 20 mg tablet Take 2 Tablets by mouth daily. 6/15/22   Kaila Weir MD   pantoprazole (PROTONIX) 20 mg tablet Take 2 Tablets by mouth daily.  6/15/22   Kaila Weir MD insulin lispro protamine/insulin lispro (HUMALOG MIX 75/25) 100 unit/mL (75-25) injection 5 Units by SubCUTAneous route Before breakfast and dinner. 6/15/22   Abner Albarran MD   aspirin delayed-release 81 mg tablet Take 81 mg by mouth daily. Provider, Historical   cholecalciferol (VITAMIN D3) (1000 Units /25 mcg) tablet Take 1,000 Units by mouth daily. Provider, Historical       Allergies/Social/Family History:     No Known Allergies   Social History     Tobacco Use    Smoking status: Never    Smokeless tobacco: Never   Substance Use Topics    Alcohol use: Not Currently      No family history on file. Objective:   Vital Signs:  Visit Vitals  BP (!) 129/98   Pulse 99   Temp (!) 95.4 °F (35.2 °C)   Resp 25   Ht 5' 4\" (1.626 m)   Wt 68 kg (150 lb)   BMI 25.75 kg/m²      O2 Device: None (Room air) Temp (24hrs), Av.6 °F (35.3 °C), Min:95.4 °F (35.2 °C), Max:95.7 °F (35.4 °C)           Intake/Output:   No intake or output data in the 24 hours ending 22 1704    Physical Exam:  GEN: Elderly, RASS 0, NAD on NC O2  HEENT: NCAT, sclerae white  NECK: No JVD noted  CHEST: Clear to auscultation and percussion  CARDIAC: sinus rhythm, regular, no murmur noted  ABD: Soft, NT, +BS  EXT: Warm, no edema, normal capillary refill  NEURO: Cranial nerves intact, symmetric strength, no focal deficits noted  DERM: Very deep and large sacral decubitus pressure ulcer       LABS AND  DATA: Personally reviewed  Recent Labs     22  1306   WBC 12.8*   HGB 10.5*   HCT 34.0*        Recent Labs     22  1306   *   K 4.1   CL 98   CO2 19*   BUN 9   CREA 1.43*   *   CA 7.9*     Recent Labs     22  1306   *   TP 6.3*   ALB 1.2*   GLOB 5.1*     No results for input(s): INR, PTP, APTT, INREXT in the last 72 hours. No results for input(s): PHI, PCO2I, PO2I, FIO2I in the last 72 hours. No results for input(s): CPK, CKMB, TROIQ, BNPP in the last 72 hours.     Hemodynamics:   PAP: CO:     Wedge:   CI:     CVP:    SVR:       PVR:       Ventilator Settings:  Mode Rate Tidal Volume Pressure FiO2 PEEP                    Peak airway pressure:      Minute ventilation:          MEDS: Reviewed    Chest X-Ray:  CXR Results  (Last 48 hours)                 09/16/22 1407  XR CHEST PORT Final result    Impression:  1. No definite acute cardiopulmonary disease. 2. Right IJ approach catheter projects to terminate in the right atrium               Narrative:  INDICATION: . AMS   Additional history:   COMPARISON: Previous chest xray, 6/3/2022 and 5/26/2022. LIMITATIONS: Portable technique. Yamileth Singer FINDINGS: Single frontal view of the chest.    .   Lines/tubes/surgical: A right IJ approach catheter projects to terminate in the   right atrium, not definitely changed. Heart/mediastinum: Calcifications in the aortic arch. Lungs/pleura: Chronic appearing lung changes without definite acute process. No   visualized pleural effusion or pneumothorax. Additional Comments: Degenerative changes in both shoulders. .               I have reviewed the above films and agree with official interpretation       CRITICAL CARE CONSULTANT NOTE  I had a in-person encounter with Ashlie Bucio, reviewed and interpreted patient data including events, labs, images, vital signs, I/O's, and examined patient. I have discussed the case and the plan and management of the patient's care with the consulting services, the bedside nurses and the respiratory therapist.      NOTE OF PERSONAL INVOLVEMENT IN CARE   This patient is at high risk for sudden and clinically significant deterioration, which requires the highest level of preparedness to intervene urgently. I participated in the decision-making and personally managed or directed the management of the following life and organ supporting interventions that required my frequent assessment to treat or prevent imminent deterioration.     I personally spent -- minutes of critical care time. This is time spent at patient's bedside actively involved in patient care as well as the coordination of care and discussions with the patient's family. This does not include any procedural time which has been billed separately.     Jyoti Ricardo MD  Pulmonary/[facility]  [phone]  9/16/2022

## 2022-09-16 NOTE — PROGRESS NOTES
Family is now interested in palliative measures and will consider hospice. Will check with david regarding specifics.

## 2022-09-16 NOTE — ED NOTES
TRANSITION OF CARE - SBAR OUT    Patient is being transferred to [facility], Room# 2240. Report GIVEN TO MARCIA rivera on Leveda Speaker for routine progression of care. Report is consisted of the following information Kardex. Patient transferred to receiving unit by: RN (RN or Tech Name)     Called outstanding consults: Yes   Collected routine labs: Yes     All current orders reviewed with accepting nurse: No     The following personal items will be sent with the patient during transfer to the floor:   All valuables:                          CARDIAC MONITORING ORDERED: Yes     The following CURRENT information were reported to the receiving RN:    CODE STATUS: DNR    NIH SCORE:    VICENTE SCREENING:      NEURO ASSESSMENT: Neuro  Neurologic State: Alert (09/16/22 1313)  Orientation Level: Oriented to person, Disoriented to place, Disoriented to time, Disoriented to situation (09/16/22 1313)  Cognition: Decreased command following, Impaired decision making, Decreased attention/concentration, Poor safety awareness (09/16/22 1313)  Speech:  (moaning) (09/16/22 1313)      RESTRAINTS IN USE: No      IS DOCUMENTATION COMPLETE: Yes      Vital Signs  Level of Consciousness: Alert (0) (09/16/22 1256)  Temp: (!) 95.4 °F (35.2 °C) (09/16/22 1627)  Temp Source: Rectal (09/16/22 1627)  Pulse (Heart Rate): 99 (09/16/22 1650)  Resp Rate: 25 (09/16/22 1650)  BP: (!) 129/98 (09/16/22 1650)  MAP (Monitor): 109 (09/16/22 1650)  MAP (Calculated): 108 (09/16/22 1650)  BP 1 Location: Right upper arm (09/16/22 1256)  BP 1 Method: Automatic (09/16/22 1256)  BP Patient Position: At rest (09/16/22 1256)  Pain 1  Pain Scale 1: Visual (09/16/22 1452)  Pain Intensity 1: 7 (09/16/22 1452)  Patient Stated Pain Goal: 0 (09/16/22 1452)      OXYGEN: Oxygen Therapy  O2 Device: None (Room air) (09/16/22 1256)    KINDER FALL ASSESSMENT:  No data recorded    WOUNDS: Yes bilateral heels, sacral area, and left arm     URINARY CATHETER: due to void    LINE ACCESS:   Peripheral IV 55/32/40 Right Basilic (Active)   Site Assessment Clean, dry, & intact 09/16/22 1645   Phlebitis Assessment 0 09/16/22 1645   Infiltration Assessment 0 09/16/22 1645   Dressing Status Clean, dry, & intact 09/16/22 1645        Opportunity for questions and clarification were provided.   Betzy Munoz RN

## 2022-09-16 NOTE — H&P
Hospitalist Admission Note    NAME: Noah Saucedo   :  1934   MRN:  [id number]     Date/Time:  2022 4:15 PM    Patient PCP: Steff Alvarado MD  ______________________________________________________________________  Given the patient's current clinical presentation, I have a high level of concern for decompensation if discharged from the emergency department. Complex decision making was performed, which includes reviewing the patient's available past medical records, laboratory results, and x-ray films. My assessment of this patient's clinical condition and my plan of care is as follows. Assessment / Plan:  Septic shock  Suspected infected sacral ulcer  History of recent admission for infected sacral ulcer s/p debridement and also colostomy creation  Recent admission to the hospital for Proteus, ESBL Klebsiella and Enterococcus bacteremia  -Patient recently was in the hospital due to bacteremia and also infected sacral decubitus ulcer and underwent colostomy creation and also debridement and was prescribed IV vancomycin and cefepime until 9/15  -Patient unfortunately did not follow-up with her ID on   -Currently noted to have lactic acid of 10 and there is lysis of bone on CT  -Start empiric antibiotics with vancomycin and meropenem. Check blood cultures. We will consult ID and also general surgery. Notified general surgery. No IV fluids due to ESRD status. We will start midodrine due to hypotension. Consider starting on vasopressors if blood pressure remains low  -Consulted intensivist as well. Discussed with Dr. Tex Alaniz and notified him about patient's hypothermia, hypotension and possible need for pressors    Incidental right atrial thrombus on CT  -We will check a echocardiogram to rule out right atrial thrombus.     ESRD on hemodialysis MWF  Anemia of chronic disease  -Renal consulted for hemodialysis needs    Diabetes mellitus type 2  -Start insulin sliding scale with blood sugar checks    Hypertension  Atrial fibrillation  Dyslipidemia  GERD  Protein calorie malnutrition  Bedbound status  -Holding all home antihypertensives as currently blood pressure is borderline low  -Continue home amiodarone, Lipitor, Protonix  -Consult wound care    I called patient's granddaughter Miguelito but she did not  so please consider reaching out in a.m. tomorrow        Code Status: DNR  Surrogate Decision Maker: Granddaughter Miguelito    DVT Prophylaxis: Lovenox  d    Baseline: From [address] [place] [address], has dementia at baseline, has chronic sacral decubitus ulcer, dependent on others for activities of daily living      Subjective:   CHIEF COMPLAINT: Altered mental status    HISTORY OF PRESENT ILLNESS:     aMyra Ureña is a 80 y.o.   female who presents with past medical history of infected sacral ulcer, ESRD is coming the hospital from [place] [address] and rehab secondary to altered mental status while at dialysis center. Patient went to her dialysis center today and was noted to be altered and did not complete her dialysis session. Patient has dementia at baseline but can participate in some conversations but now is more confused and was also noted to have low blood pressure for which she was brought to the emergency department. She is currently alert awake, confused and is not able to follow commands. Further information regarding history of presenting illness is limited secondary to confusion. On arrival to ED, she was noted to be hyper tachypneic, tachycardic and blood pressure was 83/50. On labs white blood cell count is 12.8, hemoglobin 10.5 and platelet count is 419. BMP shows sodium of 134, CO2 of 19, anion gap of 17, creatinine 1.43 and lactic acid of 10.4. CRP is 15. CT abdomen shows ulceration of the sacrum with lysis of the bone.   There is a filling defect in the right atrium which could represent a thrombus and stranding in the anterior abdominal wall. We were asked to admit for work up and evaluation of the above problems. Past Medical History:   Diagnosis Date    A-fib ([facility] [address]     Chronic kidney disease     Diabetes ([facility] [address]         Past Surgical History:   Procedure Laterality Date    IR INSERT NON TUNL CVC OVER 5 YRS  5/26/2022    IR INSERT NON TUNL CVC OVER 5 YRS  6/3/2022    IR INSERT TUNL CVC W/O PORT OVER 5 YR  6/8/2022       Social History     Tobacco Use    Smoking status: Never    Smokeless tobacco: Never   Substance Use Topics    Alcohol use: Not Currently        Family history  No CAD in the family  No Known Allergies     Prior to Admission medications    Medication Sig Start Date End Date Taking? Authorizing Provider   cloNIDine HCL (CATAPRES) 0.1 mg tablet Take 1 Tablet by mouth two (2) times a day. 8/15/22   Emily Mcallister MD   vancomycin [facility]) 1,000 mg injection 1,000 mg by IntraVENous route every Monday, Wednesday, Friday for 31 days. 1 gm after HD, 3 times weekly, end date 9/15 8/15/22 9/15/22  Emily Mcallister MD   amiodarone (CORDARONE) 200 mg tablet Take 1 Tablet by mouth two (2) times a day. 8/12/22   Emily Mcallister MD   cefepime 1 gram IVPB efepime 2/2/3 gram IV with dialysis  3 times weekly end date 9/15 8/10/22   Emily Mcallister MD   collagenase (SANTYL) 250 unit/gram ointment Apply  to affected area daily. 8/11/22   Emliy Mcallister MD   sodium hypochlorite (QUARTER STRENGTH DAKIN'S) 0.125 % soln external solution Apply 473 mL to affected area in the morning. 8/11/22   Emily Mcallister MD   famotidine (PEPCID) 20 mg tablet  4/30/22   Marcia Mahmood MD   atorvastatin (LIPITOR) 20 mg tablet Take 2 Tablets by mouth daily. 6/15/22   Maliha Giraldo MD   pantoprazole (PROTONIX) 20 mg tablet Take 2 Tablets by mouth daily. 6/15/22   Maliha Giraldo MD   insulin lispro protamine/insulin lispro (HUMALOG MIX 75/25) 100 unit/mL (75-25) injection 5 Units by SubCUTAneous route Before breakfast and dinner.  6/15/22 Ruby Felton MD   aspirin delayed-release 81 mg tablet Take 81 mg by mouth daily. Provider, Historical   cholecalciferol (VITAMIN D3) (1000 Units /25 mcg) tablet Take 1,000 Units by mouth daily. Provider, Historical       REVIEW OF SYSTEMS:     I am not able to complete the review of systems because: The patient is intubated and sedated   y The patient has altered mental status due to his acute medical problems    The patient has baseline aphasia from prior stroke(s)    The patient has baseline dementia and is not reliable historian    The patient is in acute medical distress and unable to provide information           Total of 12 systems reviewed as follows:       POSITIVE= underlined text  Negative = text not underlined  General:  fever, chills, sweats, generalized weakness, weight loss/gain,      loss of appetite   Eyes:    blurred vision, eye pain, loss of vision, double vision  ENT:    rhinorrhea, pharyngitis   Respiratory:   cough, sputum production, SOB, BEE, wheezing, pleuritic pain   Cardiology:   chest pain, palpitations, orthopnea, PND, edema, syncope   Gastrointestinal:  abdominal pain , N/V, diarrhea, dysphagia, constipation, bleeding   Genitourinary:  frequency, urgency, dysuria, hematuria, incontinence   Muskuloskeletal :  arthralgia, myalgia, back pain  Hematology:  easy bruising, nose or gum bleeding, lymphadenopathy   Dermatological: rash, ulceration, pruritis, color change / jaundice  Endocrine:   hot flashes or polydipsia   Neurological:  headache, dizziness, confusion, focal weakness, paresthesia,     Speech difficulties, memory loss, gait difficulty  Psychological: Feelings of anxiety, depression, agitation    Objective:   VITALS:    Visit Vitals  BP (!) 94/47   Pulse 80   Temp (!) 95.7 °F (35.4 °C)   Resp 21   Ht 5' 4\" (1.626 m)   Wt 68 kg (150 lb)   BMI 25.75 kg/m²       PHYSICAL EXAM:    General:    no distress, appears stated age.      HEENT: Atraumatic, anicteric sclerae, pink conjunctivae     No oral ulcers, mucosa dry  Neck:  Supple, symmetrical,  thyroid: non tender  Lungs:   Clear to auscultation bilaterally. No Wheezing or Rhonchi. No rales. Chest wall:  No tenderness  No Accessory muscle use. Heart:   Regular  rhythm,  No  murmur   No edema  Abdomen:   Soft, colostomy with greenish stool present, nontender  Extremities: Chronic sacral ulcer present  Skin:     Not pale. Not Jaundiced  No rashes   Psych:  Confused  Neurologic: Alert, awake, confused    _______________________________________________________________________  Care Plan discussed with:    Comments   Patient y    [facility]:      _______________________________________________________________________  Expected  Disposition:   Home with Family    HH/PT/OT/RN    SNF/LTC y   [de-identified]    ________________________________________________________________________  TOTAL TIME:  61  Minutes    Critical Care Provided     Minutes non procedure based      Comments    y Reviewed previous records   >50% of visit spent in counseling and coordination of care y Discussion with patient and/or family and questions answered       ________________________________________________________________________  Signed: Leticia Bee MD    Procedures: see electronic medical records for all procedures/Xrays and details which were not copied into this note but were reviewed prior to creation of Plan.     LAB DATA REVIEWED:    Recent Results (from the past 24 hour(s))   CBC WITH AUTOMATED DIFF    Collection Time: 09/16/22  1:06 PM   Result Value Ref Range    WBC 12.8 (H) 3.6 - 11.0 K/uL    RBC 3.57 (L) 3.80 - 5.20 M/uL    HGB 10.5 (L) 11.5 - 16.0 g/dL    HCT 34.0 (L) 35.0 - 47.0 %    MCV 95.2 80.0 - 99.0 FL    MCH 29.4 26.0 - 34.0 PG    MCHC 30.9 30.0 - 36.5 g/dL    RDW 21.9 (H) 11.5 - 14.5 %    PLATELET 999 697 - 919 K/uL    MPV 10.5 8.9 - 12.9 FL    NRBC 2.0 (H) 0  WBC    ABSOLUTE NRBC 0.25 (H) 0.00 - 0.01 K/uL    NEUTROPHILS 85 (H) 32 - 75 %    LYMPHOCYTES 6 (L) 12 - 49 %    MONOCYTES 6 5 - 13 %    EOSINOPHILS 1 0 - 7 %    BASOPHILS 1 0 - 1 %    IMMATURE GRANULOCYTES 1 (H) 0.0 - 0.5 %    ABS. NEUTROPHILS 10.9 (H) 1.8 - 8.0 K/UL    ABS. LYMPHOCYTES 0.8 0.8 - 3.5 K/UL    ABS. MONOCYTES 0.8 0.0 - 1.0 K/UL    ABS. EOSINOPHILS 0.1 0.0 - 0.4 K/UL    ABS. BASOPHILS 0.1 0.0 - 0.1 K/UL    ABS. IMM. GRANS. 0.1 (H) 0.00 - 0.04 K/UL    DF SMEAR SCANNED      RBC COMMENTS ANISOCYTOSIS  2+        RBC COMMENTS POLYCHROMASIA  PRESENT        RBC COMMENTS MACROCYTOSIS  1+        RBC COMMENTS LORNA CELLS  PRESENT        WBC COMMENTS REACTIVE LYMPHS     METABOLIC PANEL, COMPREHENSIVE    Collection Time: 09/16/22  1:06 PM   Result Value Ref Range    Sodium 134 (L) 136 - 145 mmol/L    Potassium 4.1 3.5 - 5.1 mmol/L    Chloride 98 97 - 108 mmol/L    CO2 19 (L) 21 - 32 mmol/L    Anion gap 17 (H) 5 - 15 mmol/L    Glucose 110 (H) 65 - 100 mg/dL    BUN 9 6 - 20 MG/DL    Creatinine 1.43 (H) 0.55 - 1.02 MG/DL    BUN/Creatinine ratio 6 (L) 12 - 20      GFR est AA 42 (L) >60 ml/min/1.73m2    GFR est non-AA 35 (L) >60 ml/min/1.73m2    Calcium 7.9 (L) 8.5 - 10.1 MG/DL    Bilirubin, total 0.7 0.2 - 1.0 MG/DL    ALT (SGPT) 19 12 - 78 U/L    AST (SGOT) 49 (H) 15 - 37 U/L    Alk.  phosphatase 235 (H) 45 - 117 U/L    Protein, total 6.3 (L) 6.4 - 8.2 g/dL    Albumin 1.2 (L) 3.5 - 5.0 g/dL    Globulin 5.1 (H) 2.0 - 4.0 g/dL    A-G Ratio 0.2 (L) 1.1 - 2.2     LACTIC ACID    Collection Time: 09/16/22  1:06 PM   Result Value Ref Range    Lactic acid 10.4 (HH) 0.4 - 2.0 MMOL/L   TROPONIN-HIGH SENSITIVITY    Collection Time: 09/16/22  1:06 PM   Result Value Ref Range    Troponin-High Sensitivity 22 0 - 51 ng/L   SAMPLES BEING HELD    Collection Time: 09/16/22  1:06 PM   Result Value Ref Range    SAMPLES BEING HELD BLUE     COMMENT        Add-on orders for these samples will be processed based on acceptable specimen integrity and analyte stability, which may vary by analyte.    EKG, 12 LEAD, INITIAL    Collection Time: 09/16/22  1:48 PM   Result Value Ref Range    Ventricular Rate 100 BPM    Atrial Rate 100 BPM    P-R Interval 188 ms    QRS Duration 120 ms    Q-T Interval 422 ms    QTC Calculation (Bezet) 544 ms    Calculated P Axis 62 degrees    Calculated R Axis 64 degrees    Calculated T Axis 65 degrees    Diagnosis       Normal sinus rhythm  Right bundle branch block  ST elevation, consider inferior injury or acute infarct  ** ** ACUTE MI / STEMI ** **  When compared with ECG of 02-AUG-2022 20:21,  premature atrial complexes are no longer present  QRS duration has decreased

## 2022-09-16 NOTE — PROGRESS NOTES
Pharmacy Antimicrobial Kinetic Dosing    Indication for Antimicrobials: Sepsis     PMH:  ESRD  Recent admission to the hospital for Proteus, ESBL Klebsiella and Enterococcus bacteremia AND recent infected sacral decubitus ulcer and underwent colostomy creation and also debridement  Current Regimen of Each Antimicrobial:  Vancomycin consult - started  day 1  Merrem 500mg IV q25h    Previous Antimicrobial Therapy:  Vanc + Cefe regimens until 9/15    Vancomycin Goal Level: AUC: 400-600 mg/hr/Liter/day    Date Dose & Interval Measured (mcg/mL) Predicted AUC/DUY    13:30                      Dosing calculator used: [employer] calculator    Significant Positive Cultures:    PBcx - pending    Conditions for Dosing Consideration: ESRD non HD    Labs:  Recent Labs     22  1306   CREA 1.43*   BUN 9     Recent Labs     22  1306   WBC 12.8*     Temp (24hrs), Av.7 °F (35.4 °C), Min:95.7 °F (35.4 °C), Max:95.7 °F (35.4 °C)    Creatinine Clearance (mL/min):   CrCl (Ideal Body Weight): 23.5   If actual weight < IBW: CrCl (Actual Body Weight) 29.2    Impression/Plan:   ID and Gen Surg consulted  Afebrile, baseline SCr 1.4-2.3 Aug 2022, lactic 10.4, CRP 15.2  Recent admission to the hospital for Proteus, ESBL Klebsiella and Enterococcus bacteremia AND recent infected sacral decubitus ulcer and underwent colostomy creation and also debridement  Vancomycin 1000mg IV loading dose then 750mg IV q24h for a projected AUC at Css of 483  No IV fluids per admission note for ESRD; Midodrine added for hypotension with possible pressors to be added (ICU provider notified for eval)  Continue Merrem regimen  Monitor closely for abx regimen adjustment  Plan Vanc level   Antimicrobial stop date - pending     Pharmacy will follow daily and adjust medications as appropriate for renal function and/or serum levels.     Thank you,  Desirae White, [facility]

## 2022-09-16 NOTE — ED NOTES
MD spoke to pt's granddaughter who is POA and states that pt will be placed on palliative care. Pt is a DNR. Per MD order, levo turned off at this time.  Still no )2 sat at this time

## 2022-09-16 NOTE — ED TRIAGE NOTES
Patient arrives via EMS from [facility] - went to dialysis today and about 1 hour after dialysis she became altered. Per EMS facility stated dialysis did not remove fluid.  en route. Patient is alert and oriented to self.

## 2022-09-17 NOTE — H&P
Consult    Subjective:     Nanci Closs is a 80 y.o.  female who is admitted with severe sepsis. She has a history of dementia, ESRD and pressure injuries. She had 300 cm^2 debrided from a sacral pressure injury 6 weeks ago. Surgery is consulted to assess the wound. Past Medical History:   Diagnosis Date    A-fib ([facility] [address]     Chronic kidney disease     Diabetes ([facility] [address]       Past Surgical History:   Procedure Laterality Date    IR INSERT NON TUNL CVC OVER 5 YRS  5/26/2022    IR INSERT NON TUNL CVC OVER 5 YRS  6/3/2022    IR INSERT TUNL CVC W/O PORT OVER 5 YR  6/8/2022     No family history on file.    Social History     Tobacco Use    Smoking status: Never    Smokeless tobacco: Never   Substance Use Topics    Alcohol use: Not Currently       Current Facility-Administered Medications   Medication Dose Route Frequency Provider Last Rate Last Admin    amiodarone (CORDARONE) tablet 200 mg  200 mg Oral BID Kwasi Isidro MD        [START ON 9/17/2022] aspirin delayed-release tablet 81 mg  81 mg Oral DAILY Kwasi Isidro MD        [START ON 9/17/2022] atorvastatin (LIPITOR) tablet 40 mg  40 mg Oral DAILY Kwasi Isidro MD        [START ON 9/17/2022] pantoprazole (PROTONIX) tablet 40 mg  40 mg Oral DAILY Jeferson Gu MD        sodium chloride (NS) flush 5-40 mL  5-40 mL IntraVENous Q8H Khoa WARD MD   10 mL at 09/16/22 2131    sodium chloride (NS) flush 5-40 mL  5-40 mL IntraVENous PRN Kwasi Isidro MD        acetaminophen (TYLENOL) tablet 650 mg  650 mg Oral Q6H PRN Kwasi Isidro MD        Or    acetaminophen (TYLENOL) suppository 650 mg  650 mg Rectal Q6H PRN Kwasi Isidro MD        polyethylene glycol (MIRALAX) packet 17 g  17 g Oral DAILY PRN Kwasi Isidro MD        ondansetron (ZOFRAN ODT) tablet 4 mg  4 mg Oral Q8H PRN Kwasi Isidro MD        Or    ondansetron (ZOFRAN) injection 4 mg  4 mg IntraVENous Q6H PRN Kwasi Isidro MD        [Held by provider] heparin (porcine) injection 5,000 Units  5,000 Units SubCUTAneous Q8H Carmen Gu MD        meropenem (MERREM) 500 mg in 0.9% sodium chloride (MBP/ADV) 50 mL MBP  0.5 g IntraVENous Q24H Shauna Gonzalez  mL/hr at 09/16/22 1734 500 mg at 09/16/22 1734    midodrine (PROAMATINE) tablet 10 mg  10 mg Oral TID WITH MEALS Carmen Gu MD        insulin lispro (HUMALOG) injection   SubCUTAneous Q6H Carmen Gu MD        glucose chewable tablet 16 g  4 Tablet Oral PRN CloCarmen Mac MD        glucagon (GLUCAGEN) injection 1 mg  1 mg IntraMUSCular PRN Shauna Gonzalez MD        dextrose 10% infusion 0-250 mL  0-250 mL IntraVENous PRN Shauna Gonzalez MD        [START ON 9/17/2022] vancomycin (VANCOCIN) 750 mg in 0.9% sodium chloride 250 mL ([id number])  750 mg IntraVENous Q24H Carmen Gu MD        midodrine (PROAMATINE) tablet 10 mg  10 mg Oral NOW Luis Burrell MD            No Known Allergies     Review of Systems:  Review of systems not obtained due to patient factors. Objective: Intake and Output:    No intake/output data recorded. 09/15 0701 - 09/16 1900  In: -   Out: 70     Physical Exam:   General appearance: alert, cooperative, no distress, appears stated age, confused  Lungs: clear to auscultation bilaterally  Heart: regular rate and rhythm, S1, S2 normal, no murmur, click, rub or gallop  Abdomen: soft, non-tender. Bowel sounds normal. No masses,  no organomegaly  Skin:        Large sacral pressure injury. Some slough but clean with no signs infection.     Data Review:   Recent Results (from the past 24 hour(s))   CBC WITH AUTOMATED DIFF    Collection Time: 09/16/22  1:06 PM   Result Value Ref Range    WBC 12.8 (H) 3.6 - 11.0 K/uL    RBC 3.57 (L) 3.80 - 5.20 M/uL    HGB 10.5 (L) 11.5 - 16.0 g/dL    HCT 34.0 (L) 35.0 - 47.0 %    MCV 95.2 80.0 - 99.0 FL    MCH 29.4 26.0 - 34.0 PG    MCHC 30.9 30.0 - 36.5 g/dL    RDW 21.9 (H) 11.5 - 14.5 %    PLATELET 941 562 - 155 K/uL    MPV 10.5 8.9 - 12.9 FL    NRBC 2.0 (H) 0  WBC    ABSOLUTE NRBC 0.25 (H) 0.00 - 0.01 K/uL    NEUTROPHILS 85 (H) 32 - 75 %    LYMPHOCYTES 6 (L) 12 - 49 %    MONOCYTES 6 5 - 13 %    EOSINOPHILS 1 0 - 7 %    BASOPHILS 1 0 - 1 %    IMMATURE GRANULOCYTES 1 (H) 0.0 - 0.5 %    ABS. NEUTROPHILS 10.9 (H) 1.8 - 8.0 K/UL    ABS. LYMPHOCYTES 0.8 0.8 - 3.5 K/UL    ABS. MONOCYTES 0.8 0.0 - 1.0 K/UL    ABS. EOSINOPHILS 0.1 0.0 - 0.4 K/UL    ABS. BASOPHILS 0.1 0.0 - 0.1 K/UL    ABS. IMM. GRANS. 0.1 (H) 0.00 - 0.04 K/UL    DF SMEAR SCANNED      RBC COMMENTS ANISOCYTOSIS  2+        RBC COMMENTS POLYCHROMASIA  PRESENT        RBC COMMENTS MACROCYTOSIS  1+        RBC COMMENTS LORNA CELLS  PRESENT        WBC COMMENTS REACTIVE LYMPHS     METABOLIC PANEL, COMPREHENSIVE    Collection Time: 09/16/22  1:06 PM   Result Value Ref Range    Sodium 134 (L) 136 - 145 mmol/L    Potassium 4.1 3.5 - 5.1 mmol/L    Chloride 98 97 - 108 mmol/L    CO2 19 (L) 21 - 32 mmol/L    Anion gap 17 (H) 5 - 15 mmol/L    Glucose 110 (H) 65 - 100 mg/dL    BUN 9 6 - 20 MG/DL    Creatinine 1.43 (H) 0.55 - 1.02 MG/DL    BUN/Creatinine ratio 6 (L) 12 - 20      GFR est AA 42 (L) >60 ml/min/1.73m2    GFR est non-AA 35 (L) >60 ml/min/1.73m2    Calcium 7.9 (L) 8.5 - 10.1 MG/DL    Bilirubin, total 0.7 0.2 - 1.0 MG/DL    ALT (SGPT) 19 12 - 78 U/L    AST (SGOT) 49 (H) 15 - 37 U/L    Alk.  phosphatase 235 (H) 45 - 117 U/L    Protein, total 6.3 (L) 6.4 - 8.2 g/dL    Albumin 1.2 (L) 3.5 - 5.0 g/dL    Globulin 5.1 (H) 2.0 - 4.0 g/dL    A-G Ratio 0.2 (L) 1.1 - 2.2     LACTIC ACID    Collection Time: 09/16/22  1:06 PM   Result Value Ref Range    Lactic acid 10.4 (HH) 0.4 - 2.0 MMOL/L   TROPONIN-HIGH SENSITIVITY    Collection Time: 09/16/22  1:06 PM   Result Value Ref Range    Troponin-High Sensitivity 22 0 - 51 ng/L   SAMPLES BEING HELD    Collection Time: 09/16/22  1:06 PM   Result Value Ref Range    SAMPLES BEING HELD BLUE     COMMENT        Add-on orders for these samples will be processed based on acceptable specimen integrity and analyte stability, which may vary by analyte. EKG, 12 LEAD, INITIAL    Collection Time: 09/16/22  1:48 PM   Result Value Ref Range    Ventricular Rate 100 BPM    Atrial Rate 100 BPM    P-R Interval 188 ms    QRS Duration 120 ms    Q-T Interval 422 ms    QTC Calculation (Bezet) 544 ms    Calculated P Axis 62 degrees    Calculated R Axis 64 degrees    Calculated T Axis 65 degrees    Diagnosis       Normal sinus rhythm  Right bundle branch block  ST elevation, consider inferior injury or acute infarct  ** ** ACUTE MI / STEMI ** **  When compared with ECG of 02-AUG-2022 20:21,  premature atrial complexes are no longer present  QRS duration has decreased     POC LACTIC ACID    Collection Time: 09/16/22  4:48 PM   Result Value Ref Range    Lactic Acid (POC) 9.12 (HH) 0.40 - 2.00 mmol/L   GLUCOSE, POC    Collection Time: 09/16/22  5:43 PM   Result Value Ref Range    Glucose (POC) 123 (H) 65 - 117 mg/dL    Performed by González Hampton (PIPER RN)        Assessment:     Active Problems:    Severe sepsis ([address] (9/16/2022)      80year old demented female with sepsis. I don not think the sacal pressure injury is the source. Plan:     Pack sacral wound WTD and cover with dry dressing. Will sign off.

## 2022-09-17 NOTE — PROGRESS NOTES
Hospitalist Progress Note    NAME: Mera Benz   :  1934   MRN:  [id number]       Assessment / Plan:  Septic shock  Suspected infected sacral ulcer  History of recent admission for infected sacral ulcer s/p debridement and also colostomy creation  Recent admission to the hospital for Proteus, ESBL Klebsiella and Enterococcus bacteremia  ?? right atrial thrombus on CT  -Patient recently was in the hospital due to bacteremia and also infected sacral decubitus ulcer and underwent colostomy creation and also debridement and was prescribed IV vancomycin and cefepime until 9/15  -Patient unfortunately did not follow-up with her ID on   -Currently noted to have lactic acid of 10 and there is lysis of bone on CT  -Start empiric antibiotics with vancomycin and meropenem. Check blood cultures. We will consult ID and also general surgery. Notified general surgery. No IV fluids due to ESRD status. We will start midodrine due to hypotension. Consider starting on vasopressors if blood pressure remains low  -Consulted intensivist as well. Discussed with Dr. Enrique Elkins and notified him about patient's hypothermia, hypotension and possible need for pressors  -We will check a echocardiogram to rule out right atrial thrombus.     Family leaning towards hospice and CMO  Awaiting hospice consult  Holding off further diagnostic w/up    ESRD on hemodialysis MWF  Anemia of chronic disease  -Renal consulted for hemodialysis needs    Diabetes mellitus type 2  -Start insulin sliding scale with blood sugar checks    Hypertension  Atrial fibrillation  Dyslipidemia  GERD  Protein calorie malnutrition  Bedbound status  -Holding all home antihypertensives   -Continue home amiodarone, Lipitor, Protonix  -Consult wound care     Code Status: DNR  Surrogate Decision Maker: Granddaughter Jena  DVT Prophylaxis: Lovenox Baseline: From [facility], has dementia at baseline, has chronic sacral decubitus ulcer, dependent on others for activities of daily living  Recommended Disposition:  TBD  Anticipated Discharge Date:  >48 hours        Subjective:     Discussed with RN events overnight. Looks comfortable  Pt did not offer any complaints   Afebrile     Review of Systems:  Symptom Y/N Comments  Symptom Y/N Comments   Fever/Chills    Chest Pain     Poor Appetite    Edema     Cough    Abdominal Pain     Sputum    Joint Pain     SOB/BEE    Pruritis/Rash     Nausea/vomit    Tolerating PT/OT     Diarrhea    Tolerating Diet     Constipation    Other       PO intake: No data found. Wt Readings from Last 10 Encounters:   09/16/22 57.3 kg (126 lb 5.2 oz)   08/14/22 68.2 kg (150 lb 4.8 oz)   05/26/22 82.6 kg (182 lb 1.6 oz)       Objective:     VITALS:   Last 24hrs VS reviewed since prior progress note.  Most recent are:  Patient Vitals for the past 24 hrs:   Temp Pulse Resp BP SpO2   09/17/22 0724 97.6 °F (36.4 °C) 77 16 (!) 104/34 100 %   09/17/22 0500 98.1 °F (36.7 °C) 69 20 (!) 81/27 100 %   09/17/22 0400 -- 66 17 (!) 72/24 100 %   09/17/22 0155 -- 72 18 (!) 80/25 --   09/17/22 0140 -- 83 17 (!) 84/30 100 %   09/17/22 0125 -- 69 23 (!) 76/34 100 %   09/17/22 0110 -- 70 21 (!) 79/23 100 %   09/17/22 0100 -- 69 21 (!) 82/25 100 %   09/17/22 0055 -- 71 16 (!) 76/24 100 %   09/17/22 0040 -- 90 27 (!) 83/29 100 %   09/17/22 0025 -- 92 17 (!) 83/30 100 %   09/17/22 0010 98.6 °F (37 °C) 79 19 (!) 83/29 100 %   09/16/22 2355 -- 83 13 (!) 82/29 100 %   09/16/22 2340 -- 89 21 (!) 87/27 100 %   09/16/22 2325 -- 92 22 (!) 84/25 100 %   09/16/22 2310 -- 93 26 (!) 79/32 100 %   09/16/22 2255 -- 88 18 (!) 71/36 100 %   09/16/22 2240 -- 87 22 (!) 74/41 100 %   09/16/22 2225 -- 92 19 (!) 85/29 100 %   09/16/22 2210 -- 93 25 (!) 81/42 100 %   09/16/22 2155 -- 95 18 (!) 86/25 100 %   09/16/22 2140 -- 95 22 (!) 95/40 100 %   09/16/22 2125 -- 92 26 (!) 98/36 100 %   09/16/22 2110 -- 95 21 (!) 89/53 100 %   09/16/22 2055 -- 90 20 (!) 98/26 100 %   09/16/22 2040 -- 89 24 (!) 98/25 100 %   09/16/22 2025 -- 86 25 (!) 90/39 98 %   09/16/22 2010 -- 90 16 (!) 81/34 --   09/16/22 1955 -- 90 16 (!) 98/42 --   09/16/22 1947 97.1 °F (36.2 °C) 85 17 (!) 93/29 100 %   09/16/22 1940 -- 90 19 (!) 92/25 --   09/16/22 1926 -- 94 14 (!) 93/30 --   09/16/22 1925 -- 93 18 (!) 84/29 --   09/16/22 1923 -- 92 17 (!) 71/48 --   09/16/22 1921 -- 90 15 (!) 86/30 --   09/16/22 1904 (!) 96.6 °F (35.9 °C) -- -- -- 100 %   09/16/22 1804 -- -- -- -- 92 %   09/16/22 1742 (!) 95.1 °F (35.1 °C) (!) 107 25 91/62 --   09/16/22 1650 -- 99 25 (!) 129/98 --   09/16/22 1630 -- 90 16 (!) 87/63 --   09/16/22 1627 (!) 95.4 °F (35.2 °C) -- -- -- --   09/16/22 1622 -- 78 18 (!) 74/43 --   09/16/22 1615 -- 97 22 (!) 88/42 --   09/16/22 1600 -- 80 21 (!) 94/47 --   09/16/22 1530 -- 85 14 (!) 90/48 --   09/16/22 1347 (!) 95.7 °F (35.4 °C) -- -- -- --   09/16/22 1345 -- (!) 102 23 (!) 114/90 --   09/16/22 1335 -- -- -- (!) 129/54 --   09/16/22 1256 -- (!) 117 22 (!) 83/50 --       Intake/Output Summary (Last 24 hours) at 9/17/2022 0942  Last data filed at 9/17/2022 0500  Gross per 24 hour   Intake --   Output 120 ml   Net -120 ml        I had a face to face encounter, and independently examined this patient as outlined below:    PHYSICAL EXAM:  General:    No distress     HEENT: Atraumatic, anicteric sclerae, pink conjunctivae, MMM  Neck:  Supple, symmetrical  Lungs:   CTA. No Wheezing/Rhonchi. No rales. No tenderness. No Accessory muscle use. CVS:   Regular rhythm. No murmur. No JVD   GI/:   Soft. NT. ND. BS normal  Extremities: No edema. No cyanosis. No clubbing. Skin:     Not pale. Not Jaundiced. No rashes   Psych:  Poor insight. Not depressed. Not anxious or agitated. Neurologic: Alert and awake. EOMs intact. No facial asymmetry. No slurred speech.  Symmetrical strength, Sensation grossly intact. Labs     I reviewed today's most current labs and imaging studies. Pertinent labs include:  Recent Labs     09/17/22  0557 09/16/22  1306   WBC 12.7* 12.8*   HGB 7.5* 10.5*   HCT 24.1* 34.0*    199     Recent Labs     09/17/22  0557 09/16/22  1306    134*   K 3.5 4.1    98   CO2 19* 19*   * 110*   BUN 12 9   CREA 1.25* 1.43*   CA 7.3* 7.9*   ALB 1.8* 1.2*   TBILI 0.5 0.7   ALT 16 19     CT HEAD WO CONT    Result Date: 9/16/2022  1. No evidence of acute intracranial abnormality by this modality. CT ABD PELV W CONT    Result Date: 9/16/2022  1. Ulceration overlying the sacrum/coccyx with obvious lysis of bone. 2. Soft tissue attenuation in the anterior abdominal wall, right lower quadrant and above the pubic symphysis with associated stranding. Recommend clinical correlation for possible injection sites. 3. There is a filling defect in the right atrium which could represent thrombus, bland or septic, acute or chronic versus a mass lesion such as myxoma. 4. Incidental findings as above. XR CHEST PORT    Result Date: 9/16/2022  1. No definite acute cardiopulmonary disease. 2. Right IJ approach catheter projects to terminate in the right atrium     CT HEAD WO CONT    Result Date: 9/16/2022  1. No evidence of acute intracranial abnormality by this modality. CT ABD PELV W CONT    Result Date: 9/16/2022  1. Ulceration overlying the sacrum/coccyx with obvious lysis of bone. 2. Soft tissue attenuation in the anterior abdominal wall, right lower quadrant and above the pubic symphysis with associated stranding. Recommend clinical correlation for possible injection sites. 3. There is a filling defect in the right atrium which could represent thrombus, bland or septic, acute or chronic versus a mass lesion such as myxoma. 4. Incidental findings as above. XR CHEST PORT    Result Date: 9/16/2022  1. No definite acute cardiopulmonary disease.  2. Right IJ approach catheter projects to terminate in the right atrium    05/23/22    ECHO NAI W OR WO CONTRAST 06/04/2022 6/4/2022    Interpretation Summary  Formatting of this result is different from the original.      Left Ventricle: Normal left ventricular systolic function with a visually estimated EF of 55 - 60%. Left ventricle size is normal. Normal wall thickness. Normal wall motion. Mitral Valve: Mild regurgitation with a centrally directed jet. There is a small possible vegetation on the aortic side. Left Atrium: Left atrium is moderately dilated. Normal sized appendage. Decreased appendage flow velocity. No left atrial appendage thrombus noted. No left atrial appendage mass noted. Right Atrium: Right atrium is moderately dilated. Pericardium: Trivial pericardial effusion present. No indication of cardiac tamponade. Possible small mitral valve vegetation consistent with bacterial endocarditis. Signed by: Siri Nowak MD on 6/4/2022  8:15 PM     All Micro Results       Procedure Component Value Units Date/Time    CULTURE, BLOOD, PAIRED [[id number]] Collected: 09/16/22 1306    Order Status: Completed Specimen: Blood Updated: 09/17/22 0700     Special Requests: NO SPECIAL REQUESTS        Culture result:       ONE OF FOUR BOTTLES HAS BEEN FLAGGED POSITIVE BY INSTRUMENT. BOTTLE HAS BEEN SENT TO [facility] TO ASSESS FOR POSSIBLE GROWTH.                   REMAINING BOTTLE(S) HAS/HAVE NO GROWTH SO FAR          MICRO TRACKING [[id number]] Collected: 09/16/22 1306    Order Status: No result Updated: 09/17/22 0657              Current Medications:     Current Facility-Administered Medications:     amiodarone (CORDARONE) tablet 200 mg, 200 mg, Oral, BID, Gerald Gu MD, 200 mg at 09/17/22 9820    aspirin delayed-release tablet 81 mg, 81 mg, Oral, DAILY, Gerald Gu MD, 81 mg at 09/17/22 0815    atorvastatin (LIPITOR) tablet 40 mg, 40 mg, Oral, DAILY, Sandoval Pradhan MD, 40 mg at 09/17/22 9702 pantoprazole (PROTONIX) tablet 40 mg, 40 mg, Oral, DAILY, Vishnu Gu MD, 40 mg at 09/17/22 0816    sodium chloride (NS) flush 5-40 mL, 5-40 mL, IntraVENous, Q8H, Gerald Gu MD, 10 mL at 09/17/22 0559    sodium chloride (NS) flush 5-40 mL, 5-40 mL, IntraVENous, PRN, Vishnu Shoemaker MD    acetaminophen (TYLENOL) tablet 650 mg, 650 mg, Oral, Q6H PRN **OR** acetaminophen (TYLENOL) suppository 650 mg, 650 mg, Rectal, Q6H PRN, Vishnu Shoemaker MD    polyethylene glycol (MIRALAX) packet 17 g, 17 g, Oral, DAILY PRN, Vishnu Gu MD    ondansetron (ZOFRAN ODT) tablet 4 mg, 4 mg, Oral, Q8H PRN **OR** ondansetron (ZOFRAN) injection 4 mg, 4 mg, IntraVENous, Q6H PRN, Vishnu Shoemaker MD    [Held by provider] heparin (porcine) injection 5,000 Units, 5,000 Units, SubCUTAneous, Q8H, Gerald Gu MD    meropenem (MERREM) 500 mg in 0.9% sodium chloride (MBP/ADV) 50 mL MBP, 0.5 g, IntraVENous, Q24H, Gerald Gu MD, Last Rate: 100 mL/hr at 09/16/22 1734, 500 mg at 09/16/22 1734    midodrine (PROAMATINE) tablet 10 mg, 10 mg, Oral, TID WITH MEALS, Gerald Shoemaker MD, 10 mg at 09/17/22 9602    insulin lispro (HUMALOG) injection, , SubCUTAneous, Q6H, Gerald Gu MD    glucose chewable tablet 16 g, 4 Tablet, Oral, PRN, Vishnu Gu MD    glucagon (GLUCAGEN) injection 1 mg, 1 mg, IntraMUSCular, PRN, Gerald Gu MD    dextrose 10% infusion 0-250 mL, 0-250 mL, IntraVENous, PRN, Vishnu Shoemaker MD    vancomycin (VANCOCIN) 750 mg in 0.9% sodium chloride 250 mL ([id number]), 750 mg, IntraVENous, Q24H, Vishnu Gu MD     Procedures: see electronic medical records for all procedures/Xrays and details which were not copied into this note but were reviewed prior to creation of Plan.     Reviewed most current lab test results and cultures  YES  Reviewed most current radiology test results   YES  Review and summation of old records today    NO  Reviewed patient's current orders and MAR    YES  PMH/SH reviewed - no change compared to H&P  ________________________________________________________________________  Care Plan discussed with:    Comments   Patient x    Family      RN     Care Manager     Consultant                        Multidiciplinary team rounds were held today with , nursing, pharmacist and clinical coordinator. Patient's plan of care was discussed; medications were reviewed and discharge planning was addressed.      ________________________________________________________________________  Total NON critical care TIME:  33   Minutes    Total CRITICAL CARE TIME Spent:   Minutes non procedure based      Comments   >50% of visit spent in counseling and coordination of care x     This includes time during multidisciplinary rounds if indicated above   ________________________________________________________________________  Huy Fowler MD

## 2022-09-17 NOTE — PROGRESS NOTES
Bedside and Verbal shift change report given to Tawnya Correa RN (oncoming nurse) by Marychuy Palencia RN (offgoing nurse). Report included the following information SBAR, Kardex, Intake/Output, MAR, Recent Results, and Cardiac Rhythm SR . Problem: Risk for Spread of Infection  Goal: Prevent transmission of infectious organism to others  Description: Prevent the transmission of infectious organisms to other patients, staff members, and visitors. Outcome: Progressing Towards Goal     Problem: Patient Education:  Go to Education Activity  Goal: Patient/Family Education  Outcome: Progressing Towards Goal     Problem: Pressure Injury - Risk of  Goal: *Prevention of pressure injury  Description: Document Markos Scale and appropriate interventions in the flowsheet. Outcome: Progressing Towards Goal  Note: Pressure Injury Interventions:  Sensory Interventions: Assess need for specialty bed, Assess changes in LOC, Float heels, Turn and reposition approx. every two hours (pillows and wedges if needed), Maintain/enhance activity level  Moisture Interventions: Absorbent underpads, Limit adult briefs, Internal/External fecal devices, Check for incontinence Q2 hours and as needed, Apply protective barrier, creams and emollients  Activity Interventions: Assess need for specialty bed  Mobility Interventions: Float heels, HOB 30 degrees or less, Turn and reposition approx.  every two hours(pillow and wedges), Assess need for specialty bed  Nutrition Interventions: Document food/fluid/supplement intake, Discuss nutritional consult with provider, Offer support with meals,snacks and hydration  Friction and Shear Interventions: HOB 30 degrees or less, Foam dressings/transparent film/skin sealants, Apply protective barrier, creams and emollients, Lift team/patient mobility team    Problem: Patient Education: Go to Patient Education Activity  Goal: Patient/Family Education  Outcome: Progressing Towards Goal     Problem: Falls - Risk of  Goal: *Absence of Falls  Description: Document Danne Lobe Fall Risk and appropriate interventions in the flowsheet. Outcome: Progressing Towards Goal  Note: Fall Risk Interventions:  Mentation Interventions: Bed/chair exit alarm, Room close to nurse's station, More frequent rounding  Medication Interventions: Bed/chair exit alarm  Elimination Interventions: Bed/chair exit alarm, Call light in reach, Patient to call for help with toileting needs, Toileting schedule/hourly rounds  History of Falls Interventions: Bed/chair exit alarm, Door open when patient unattended, Room close to nurse's station, Vital signs minimum Q4HRs X 24 hrs (comment for end date)    Problem: Patient Education: Go to Patient Education Activity  Goal: Patient/Family Education  Outcome: Progressing Towards Goal     Problem: Aspiration - Risk of  Goal: *Absence of aspiration  Outcome: Progressing Towards Goal     Problem: Patient Education: Go to Patient Education Activity  Goal: Patient/Family Education  Outcome: Progressing Towards Goal     End of Shift Note    Bedside shift change report given to MARCIA Rudd (oncoming nurse) by Kaitlin Tompkins RN (offgoing nurse). Report included the following information SBAR, Kardex, Intake/Output, MAR, Recent Results, and Cardiac Rhythm SR    Shift worked:  7A-7P     Shift summary and any significant changes:     Started on diet, tolerating well, but with poor appetite. Patient more alert and conversant during the shift. No acute changes. Still to be seen by hospice.       Concerns for physician to address:       Zone phone for oncoming shift:          Activity:  Activity Level: Bed Rest  Number times ambulated in hallways past shift: 0  Number of times OOB to chair past shift: 0    Cardiac:   Cardiac Monitoring: Yes      Cardiac Rhythm: Sinus Rhythm    Access:  Current line(s): PIV -endurance     Genitourinary:   Urinary status: oliguric    Respiratory:   O2 Device: Nasal cannula  Chronic home O2 use?: NO  Incentive spirometer at bedside: NO       GI:  Last Bowel Movement Date: 09/17/22  Current diet:  ADULT DIET Easy to Chew  Passing flatus: YES  Tolerating current diet: YES       Pain Management:   Patient states pain is manageable on current regimen: YES    Skin:  Markos Score: 11  Interventions: turn team, float heels, foam dressing, and limit briefs    Patient Safety:  Fall Score:  Total Score: 4  Interventions: bed/chair alarm  High Fall Risk: Yes    Length of Stay:  Expected LOS: - - -  Actual LOS: 1      Brad Palmer RN

## 2022-09-17 NOTE — PROGRESS NOTES
Problem: Risk for Spread of Infection  Goal: Prevent transmission of infectious organism to others  Description: Prevent the transmission of infectious organisms to other patients, staff members, and visitors. Outcome: Progressing Towards Goal     Problem: Pressure Injury - Risk of  Goal: *Prevention of pressure injury  Description: Document Markos Scale and appropriate interventions in the flowsheet. Outcome: Progressing Towards Goal  Note: Pressure Injury Interventions:  Sensory Interventions: Assess changes in LOC, Assess need for specialty bed, Check visual cues for pain, Float heels, Keep linens dry and wrinkle-free, Maintain/enhance activity level, Minimize linen layers, Monitor skin under medical devices, Turn and reposition approx. every two hours (pillows and wedges if needed)    Moisture Interventions: Absorbent underpads, Apply protective barrier, creams and emollients, Check for incontinence Q2 hours and as needed, Maintain skin hydration (lotion/cream), Minimize layers    Activity Interventions: Assess need for specialty bed    Mobility Interventions: Assess need for specialty bed, Float heels, HOB 30 degrees or less, Turn and reposition approx. every two hours(pillow and wedges)    Nutrition Interventions: Document food/fluid/supplement intake, Offer support with meals,snacks and hydration    Friction and Shear Interventions: Apply protective barrier, creams and emollients, Foam dressings/transparent film/skin sealants, HOB 30 degrees or less, Lift sheet, Lift team/patient mobility team, Minimize layers                Problem: Falls - Risk of  Goal: *Absence of Falls  Description: Document Felisa Fall Risk and appropriate interventions in the flowsheet.   Outcome: Progressing Towards Goal  Note: Fall Risk Interventions:       Mentation Interventions: Adequate sleep, hydration, pain control, Bed/chair exit alarm, Door open when patient unattended, Evaluate medications/consider consulting pharmacy, More frequent rounding, Reorient patient, Room close to nurse's station, Toileting rounds    Medication Interventions: Assess postural VS orthostatic hypotension, Bed/chair exit alarm, Evaluate medications/consider consulting pharmacy    Elimination Interventions: Bed/chair exit alarm, Stay With Me (per policy)    History of Falls Interventions: Bed/chair exit alarm, Evaluate medications/consider consulting pharmacy, Investigate reason for fall, Room close to nurse's station, Assess for delayed presentation/identification of injury for 48 hrs (comment for end date), Vital signs minimum Q4HRs X 24 hrs (comment for end date)         Problem: Hypotension  Goal: *Blood pressure within specified parameters  Outcome: Progressing Towards Goal  Goal: *Fluid volume balance  Outcome: Progressing Towards Goal  Goal: *Labs within defined limits  Outcome: Progressing Towards Goal     Problem: Sepsis: Day of Diagnosis  Goal: Off Pathway (Use only if patient is Off Pathway)  Outcome: Progressing Towards Goal  Goal: *Fluid resuscitation  Outcome: Progressing Towards Goal  Goal: *Paired blood cultures prior to first dose of antibiotic  Outcome: Progressing Towards Goal  Goal: *First dose of  appropriate antibiotic within 3 hours of arrival to ED, within 1 hour of arrival to ICU  Outcome: Progressing Towards Goal  Goal: *Lactic acid with first set of blood cultures  Outcome: Progressing Towards Goal  Goal: *Pneumococcal immunization (if eligible)  Outcome: Progressing Towards Goal  Goal: *Influenza immunization (if eligible)  Outcome: Progressing Towards Goal  Goal: Activity/Safety  Outcome: Progressing Towards Goal  Goal: Consults, if ordered  Outcome: Progressing Towards Goal  Goal: Diagnostic Test/Procedures  Outcome: Progressing Towards Goal  Goal: Nutrition/Diet  Outcome: Progressing Towards Goal  Goal: Discharge Planning  Outcome: Progressing Towards Goal  Goal: Medications  Outcome: Progressing Towards Goal  Goal: Respiratory  Outcome: Progressing Towards Goal  Goal: Treatments/Interventions/Procedures  Outcome: Progressing Towards Goal  Goal: Psychosocial  Outcome: Progressing Towards Goal

## 2022-09-18 NOTE — PROGRESS NOTES
CM was alerted to consult hospice for this patient. CM has placed a consult to [facility] for an information session. CM will continue to follow the patient to discuss dispo planning.      [place], [address] [place] [address]

## 2022-09-18 NOTE — PROGRESS NOTES
Hospitalist Progress Note    NAME: Noah Saucedo   :  1934   MRN:  [id number]       Assessment / Plan:  Septic shock  Suspected infected sacral ulcer  History of recent admission for infected sacral ulcer s/p debridement and also colostomy creation  Recent admission to the hospital for Proteus, ESBL Klebsiella and Enterococcus bacteremia  ?? right atrial thrombus on CT  Proteus bacteremia  -Patient recently was in the hospital due to bacteremia and also infected sacral decubitus ulcer and underwent colostomy creation and also debridement and was prescribed IV vancomycin and cefepime until 9/15  -Patient unfortunately did not follow-up with her ID on   -Currently noted to have lactic acid of 10 and there is lysis of bone on CT   On empiric antibiotics with vancomycin and meropenem. Blood culture positive for Proteus species  ID and general surgery consulted. General surgery input appreciated and noted. General surgery for sacral decubitus ulcer does not seem to be the  source  of sepsis. Surg  Have signed off   no IV fluids due to ESRD status. c/w midodrine due to hypotension. Consider starting on vasopressors if blood pressure remains low. Will blood pressure in the 120s currently   was evaluated by intensivist, does not meet ICU level of care  -echocardiogram to rule out right atrial thrombus.   Family is leaning towards hospice now  Hospice consulted    ESRD on hemodialysis MWF  Anemia of chronic disease  -Renal consulted for hemodialysis needs   Creat 1.25  Diabetes mellitus type 2  insulin sliding scale with blood sugar checks    Hypertension  Atrial fibrillation  Dyslipidemia  GERD  Protein calorie malnutrition  Bedbound status  -Holding all home antihypertensives   -Continue home amiodarone, Lipitor, Protonix  -Consult wound care     Code Status: DNR  Surrogate Decision Maker: Granddaughter Alexander Mcclelland  DVT Prophylaxis: Lovenox   Baseline: From [facility], has dementia at baseline, has chronic sacral decubitus ulcer, dependent on others for activities of daily living  Recommended Disposition:  TBD  Anticipated Discharge Date:  >48 hours   Dispo : home hospice once set up      Subjective: Follow-up sepsis  No acute issues  Awake alert    Review of Systems:  Symptom Y/N Comments  Symptom Y/N Comments   Fever/Chills    Chest Pain     Poor Appetite    Edema     Cough    Abdominal Pain     Sputum    Joint Pain     SOB/BEE    Pruritis/Rash     Nausea/vomit    Tolerating PT/OT     Diarrhea    Tolerating Diet     Constipation    Other       PO intake: No data found. Wt Readings from Last 10 Encounters:   09/16/22 57.3 kg (126 lb 5.2 oz)   08/14/22 68.2 kg (150 lb 4.8 oz)   05/26/22 82.6 kg (182 lb 1.6 oz)       Objective:     VITALS:   Last 24hrs VS reviewed since prior progress note. Most recent are:  Patient Vitals for the past 24 hrs:   Temp Pulse Resp BP SpO2   09/18/22 0400 -- (!) 57 15 (!) 94/33 100 %   09/18/22 0200 -- 63 14 93/60 100 %   09/18/22 0000 97.6 °F (36.4 °C) (!) 59 12 (!) 108/22 100 %   09/17/22 2347 -- (!) 59 -- -- --   09/17/22 2000 -- 63 -- -- --   09/17/22 1945 97 °F (36.1 °C) 61 15 (!) 101/39 100 %   09/17/22 1522 97.4 °F (36.3 °C) 71 14 (!) 115/39 100 %   09/17/22 1205 -- 77 -- -- --   09/17/22 1126 98.6 °F (37 °C) 63 18 (!) 111/34 100 %         Intake/Output Summary (Last 24 hours) at 9/18/2022 0809  Last data filed at 9/17/2022 1847  Gross per 24 hour   Intake 660 ml   Output 50 ml   Net 610 ml          I had a face to face encounter, and independently examined this patient as outlined below:    PHYSICAL EXAM:  General:    No distress     HEENT: Atraumatic, anicteric sclerae, pink conjunctivae, MMM  Neck:  Supple, symmetrical  Lungs:   CTA. No Wheezing/Rhonchi. No rales. No tenderness. No Accessory muscle use. CVS:   Regular rhythm. No murmur.  No JVD   GI/:   Soft. NT. ND. BS normal  Extremities: No edema. No cyanosis. No clubbing. Skin:     Not pale. Not Jaundiced. No rashes   Psych:  Poor insight. Not depressed. Not anxious or agitated. Neurologic: Alert and awake. EOMs intact. No facial asymmetry. No slurred speech. Symmetrical strength, Sensation grossly intact. Labs     I reviewed today's most current labs and imaging studies. Pertinent labs include:  Recent Labs     09/17/22  0557 09/16/22  1306   WBC 12.7* 12.8*   HGB 7.5* 10.5*   HCT 24.1* 34.0*    199       Recent Labs     09/17/22  0557 09/16/22  1306    134*   K 3.5 4.1    98   CO2 19* 19*   * 110*   BUN 12 9   CREA 1.25* 1.43*   CA 7.3* 7.9*   ALB 1.8* 1.2*   TBILI 0.5 0.7   ALT 16 19       CT HEAD WO CONT    Result Date: 9/16/2022  1. No evidence of acute intracranial abnormality by this modality. CT ABD PELV W CONT    Result Date: 9/16/2022  1. Ulceration overlying the sacrum/coccyx with obvious lysis of bone. 2. Soft tissue attenuation in the anterior abdominal wall, right lower quadrant and above the pubic symphysis with associated stranding. Recommend clinical correlation for possible injection sites. 3. There is a filling defect in the right atrium which could represent thrombus, bland or septic, acute or chronic versus a mass lesion such as myxoma. 4. Incidental findings as above. XR CHEST PORT    Result Date: 9/16/2022  1. No definite acute cardiopulmonary disease. 2. Right IJ approach catheter projects to terminate in the right atrium     No results found. 05/23/22    ECHO NAI W OR WO CONTRAST 06/04/2022 6/4/2022    Interpretation Summary  Formatting of this result is different from the original.      Left Ventricle: Normal left ventricular systolic function with a visually estimated EF of 55 - 60%. Left ventricle size is normal. Normal wall thickness. Normal wall motion. Mitral Valve: Mild regurgitation with a centrally directed jet. There is a small possible vegetation on the aortic side. Left Atrium: Left atrium is moderately dilated. Normal sized appendage. Decreased appendage flow velocity. No left atrial appendage thrombus noted. No left atrial appendage mass noted. Right Atrium: Right atrium is moderately dilated. Pericardium: Trivial pericardial effusion present. No indication of cardiac tamponade. Possible small mitral valve vegetation consistent with bacterial endocarditis.     Signed by: Bobby Lopez MD on 6/4/2022  8:15 PM     All Micro Results       Procedure Component Value Units Date/Time    CULTURE, BLOOD, PAIRED [[id number]]  (Abnormal) Collected: 09/16/22 1306    Order Status: Completed Specimen: Blood Updated: 09/18/22 0728     Special Requests: NO SPECIAL REQUESTS        Culture result:       PROBABLE PROTEUS SPECIES GROWING IN 1 OF 4 BOTTLES DRAWN ( SITE = L UA 2) IDENTIFICATION AND SUSCEPTIBILITY TO FOLLOW                  REMAINING BOTTLE(S) HAS/HAVE NO GROWTH SO FAR          BLOOD CULTURE ID PANEL [[id number]]  (Abnormal) Collected: 09/16/22 1306    Order Status: Completed Specimen: Blood Updated: 09/17/22 1646     Acc. no. from Micro Order [id number]     Enterococcus faecalis Not detected        Enterococcus faecium Not detected        Listeria monocytogenes Not detected        Staphylococcus Not detected        Staphylococcus aureus Not detected        Staph epidermidis Not detected        Staph lugdunensis Not detected        Streptococcus Not detected        Streptococcus agalactiae (Group B) Not detected        Streptococcus pneumoniae Not detected        Streptococcus pyogenes (Group A) Not detected        Acinetobacter calcoaceticus-baumanii complex Not detected        Bacteroides fragilis Not detected        Enterobacterales species Detected        Enterobacter cloacae complex Not detected        Escherichia coli Not detected        Klebsiella aerogenes Not detected        Klebsiella oxytoca Not detected        Klebsiella pneumoniae group Not detected        Proteus Detected        Salmonella Not detected        Serratia marcescens Not detected        Haemophilus influenzae Not detected        Neisseria meningitidis Not detected        Pseudomonas aeruginosa Not detected        Steno maltophilia Not detected        Candida albicans Not detected        Candida auris Not detected        Candida glabrata Not detected        Candida krusei Not detected        Candida parapsilosis Not detected        Candida tropicalis Not detected        Crypto neoformans/gattii Not detected        RESISTANT GENES:            CTX-M (ESBL resistant gene) Detected        IMP (Carbapenemase resistant gene) Not detected        KPC (Carbapenem Resistance Gene) Not detected        NDM (Carbapenemase resistant gene) Not detected        OXA-48-like (Carbapenemase resistant gene) Not detected        VIM (Carbapenemase resistant gene) Not detected        Comment       False positive results may rarely occur.  Correlate with clinical,epidemiologic, and other laboratory findings           Comment: Please see BCID Interpretation Guide in EPIC Links       MICRO TRACKING [[id number]] Collected: 09/16/22 1306    Order Status: No result Updated: 09/17/22 0657              Current Medications:     Current Facility-Administered Medications:     Vancomycin Trough Draw Reminder Note, 1 Each, Other, ONCE, Marlo Dixon MD    amiodarone (CORDARONE) tablet 200 mg, 200 mg, Oral, BID, Gerald Gu MD, 200 mg at 09/17/22 1704    aspirin delayed-release tablet 81 mg, 81 mg, Oral, DAILY, Gerald Gu MD, 81 mg at 09/17/22 0815    atorvastatin (LIPITOR) tablet 40 mg, 40 mg, Oral, DAILY, Gerald Gu MD, 40 mg at 09/17/22 0816    pantoprazole (PROTONIX) tablet 40 mg, 40 mg, Oral, DAILY, Gerald Gu MD, 40 mg at 09/17/22 0816    sodium chloride (NS) flush 5-40 mL, 5-40 mL, IntraVENous, Q8H, Gerald Gu MD, 10 mL at 09/17/22 8346 sodium chloride (NS) flush 5-40 mL, 5-40 mL, IntraVENous, PRN, Anuja Andrade MD    acetaminophen (TYLENOL) tablet 650 mg, 650 mg, Oral, Q6H PRN **OR** acetaminophen (TYLENOL) suppository 650 mg, 650 mg, Rectal, Q6H PRN, Anuja Gu MD    polyethylene glycol (MIRALAX) packet 17 g, 17 g, Oral, DAILY PRN, Anuja Gu MD    ondansetron (ZOFRAN ODT) tablet 4 mg, 4 mg, Oral, Q8H PRN **OR** ondansetron (ZOFRAN) injection 4 mg, 4 mg, IntraVENous, Q6H PRN, Anuja Andrade MD    [Held by provider] heparin (porcine) injection 5,000 Units, 5,000 Units, SubCUTAneous, Q8H, Gerald Gu MD    meropenem (MERREM) 500 mg in 0.9% sodium chloride (MBP/ADV) 50 mL MBP, 0.5 g, IntraVENous, Q24H, Gerald Gu MD, Last Rate: 100 mL/hr at 09/17/22 1752, 500 mg at 09/17/22 1752    midodrine (PROAMATINE) tablet 10 mg, 10 mg, Oral, TID WITH MEALS, Gerald Andrade MD, 10 mg at 09/17/22 1703    insulin lispro (HUMALOG) injection, , SubCUTAneous, Q6H, Gerald Gu MD, 2 Units at 09/17/22 1751    glucose chewable tablet 16 g, 4 Tablet, Oral, PRN, Anuja Gu MD    glucagon (GLUCAGEN) injection 1 mg, 1 mg, IntraMUSCular, PRN, Gerald Gu MD    dextrose 10% infusion 0-250 mL, 0-250 mL, IntraVENous, PRN, Anuja Andrade MD    vancomycin (VANCOCIN) 750 mg in 0.9% sodium chloride 250 mL ([id number]), 750 mg, IntraVENous, Q24H, Gerald Gu MD, Last Rate: 250 mL/hr at 09/17/22 1358, 750 mg at 09/17/22 1358     Procedures: see electronic medical records for all procedures/Xrays and details which were not copied into this note but were reviewed prior to creation of Plan.     Reviewed most current lab test results and cultures  YES  Reviewed most current radiology test results   YES  Review and summation of old records today    NO  Reviewed patient's current orders and MAR    YES  PMH/SH reviewed - no change compared to H&P  ________________________________________________________________________  Care Plan discussed with: Comments   Patient x    Family      RN     Care Manager     Consultant                        Multidiciplinary team rounds were held today with , nursing, pharmacist and clinical coordinator. Patient's plan of care was discussed; medications were reviewed and discharge planning was addressed.      ________________________________________________________________________  Total NON critical care TIME:  35   Minutes    Total CRITICAL CARE TIME Spent:   Minutes non procedure based      Comments   >50% of visit spent in counseling and coordination of care x     This includes time during multidisciplinary rounds if indicated above   ________________________________________________________________________  Kristan Bui MD

## 2022-09-18 NOTE — PROGRESS NOTES
Pt refused half of hre turns and her would care today, po was minimal, nothing from her trays and only consumed ice cream I offered her.  Jena updated at bedside

## 2022-09-18 NOTE — HOSPICE
[facility] Help to Those in Need  [phone]    Nursing Note   Patient Name: Edward Montemayor  YOB: 1934  Age: 80 y.o. [facility] RN Note:      Hospice consult noted. Chart reviewed. Will assess patient and contact family if they are not at bedside. If there are any questions, please call the main [employer] number at [phone]. Thank you for the opportunity to be of service to this patient and her family. 1000: Patient seen and assessed: does not meet GIP hospice level of care at this time but does meets Routine hospice level of care at home/facility. Called Jena Que/grandchild/MPOA and left message on her voicemail requesting return call. Looking forward to Jena's return call.    1600: Received return call from Miguelito. Reviewed hospice services and levels of care. Miguelito shared that she is familiar with hospice and is in agreement to discharge patient back to [address] [place] [address] with hospice services. Explained that the [id number] [address] team will need to verify tomorrow, 9/19, to see if we have availability at this zip [facility]. Hospice liaison will follow-up and verify tomorrow.

## 2022-09-18 NOTE — PROGRESS NOTES
Problem: Risk for Spread of Infection  Goal: Prevent transmission of infectious organism to others  Description: Prevent the transmission of infectious organisms to other patients, staff members, and visitors. Outcome: Progressing Towards Goal     Problem: Patient Education:  Go to Education Activity  Goal: Patient/Family Education  Outcome: Progressing Towards Goal     Problem: Pressure Injury - Risk of  Goal: *Prevention of pressure injury  Description: Document Markos Scale and appropriate interventions in the flowsheet. Outcome: Progressing Towards Goal  Note: Pressure Injury Interventions:  Sensory Interventions: Assess changes in LOC, Assess need for specialty bed, Float heels, Keep linens dry and wrinkle-free, Minimize linen layers, Turn and reposition approx. every two hours (pillows and wedges if needed)    Moisture Interventions: Apply protective barrier, creams and emollients, Minimize layers    Activity Interventions: Assess need for specialty bed    Mobility Interventions: Float heels, Turn and reposition approx. every two hours(pillow and wedges)    Nutrition Interventions: Document food/fluid/supplement intake, Discuss nutritional consult with provider, Offer support with meals,snacks and hydration    Friction and Shear Interventions: Apply protective barrier, creams and emollients, Feet elevated on foot rest, Minimize layers                Problem: Patient Education: Go to Patient Education Activity  Goal: Patient/Family Education  Outcome: Progressing Towards Goal     Problem: Falls - Risk of  Goal: *Absence of Falls  Description: Document Felisa Fall Risk and appropriate interventions in the flowsheet.   Outcome: Progressing Towards Goal  Note: Fall Risk Interventions:       Mentation Interventions: Adequate sleep, hydration, pain control, Bed/chair exit alarm, Door open when patient unattended, More frequent rounding, Reorient patient, Room close to nurse's station, Update white board    Medication Interventions: Bed/chair exit alarm    Elimination Interventions: Bed/chair exit alarm, Call light in reach    History of Falls Interventions: Bed/chair exit alarm, Room close to nurse's station         Problem: Patient Education: Go to Patient Education Activity  Goal: Patient/Family Education  Outcome: Progressing Towards Goal     Problem: Hypotension  Goal: *Blood pressure within specified parameters  Outcome: Progressing Towards Goal  Goal: *Fluid volume balance  Outcome: Progressing Towards Goal  Goal: *Labs within defined limits  Outcome: Progressing Towards Goal     Problem: Patient Education: Go to Patient Education Activity  Goal: Patient/Family Education  Outcome: Progressing Towards Goal     Problem: Patient Education: Go to Patient Education Activity  Goal: Patient/Family Education  Outcome: Progressing Towards Goal     Problem: Sepsis: Day of Diagnosis  Goal: Off Pathway (Use only if patient is Off Pathway)  Outcome: Progressing Towards Goal  Goal: *Fluid resuscitation  Outcome: Progressing Towards Goal  Goal: *Paired blood cultures prior to first dose of antibiotic  Outcome: Progressing Towards Goal  Goal: *First dose of  appropriate antibiotic within 3 hours of arrival to ED, within 1 hour of arrival to ICU  Outcome: Progressing Towards Goal  Goal: *Lactic acid with first set of blood cultures  Outcome: Progressing Towards Goal  Goal: *Pneumococcal immunization (if eligible)  Outcome: Progressing Towards Goal  Goal: *Influenza immunization (if eligible)  Outcome: Progressing Towards Goal  Goal: Activity/Safety  Outcome: Progressing Towards Goal  Goal: Consults, if ordered  Outcome: Progressing Towards Goal  Goal: Diagnostic Test/Procedures  Outcome: Progressing Towards Goal  Goal: Nutrition/Diet  Outcome: Progressing Towards Goal  Goal: Discharge Planning  Outcome: Progressing Towards Goal  Goal: Medications  Outcome: Progressing Towards Goal  Goal: Respiratory  Outcome: Progressing Towards Goal  Goal: Treatments/Interventions/Procedures  Outcome: Progressing Towards Goal  Goal: Psychosocial  Outcome: Progressing Towards Goal     Problem: Sepsis: Day 2  Goal: Off Pathway (Use only if patient is Off Pathway)  Outcome: Progressing Towards Goal  Goal: *Central Venous Pressure maintained at 8-12 mm Hg  Outcome: Progressing Towards Goal  Goal: *Hemodynamically stable  Outcome: Progressing Towards Goal  Goal: *Tolerating diet  Outcome: Progressing Towards Goal  Goal: Activity/Safety  Outcome: Progressing Towards Goal  Goal: Consults, if ordered  Outcome: Progressing Towards Goal  Goal: Diagnostic Test/Procedures  Outcome: Progressing Towards Goal  Goal: Nutrition/Diet  Outcome: Progressing Towards Goal  Goal: Discharge Planning  Outcome: Progressing Towards Goal  Goal: Medications  Outcome: Progressing Towards Goal  Goal: Respiratory  Outcome: Progressing Towards Goal  Goal: Treatments/Interventions/Procedures  Outcome: Progressing Towards Goal  Goal: Psychosocial  Outcome: Progressing Towards Goal     Problem: Sepsis: Day 3  Goal: Off Pathway (Use only if patient is Off Pathway)  Outcome: Progressing Towards Goal  Goal: *Central Venous Pressure maintained at 8-12 mm Hg  Outcome: Progressing Towards Goal  Goal: *Oxygen saturation within defined limits  Outcome: Progressing Towards Goal  Goal: *Vital sign stability  Outcome: Progressing Towards Goal  Goal: *Tolerating diet  Outcome: Progressing Towards Goal  Goal: *Demonstrates progressive activity  Outcome: Progressing Towards Goal  Goal: Activity/Safety  Outcome: Progressing Towards Goal  Goal: Consults, if ordered  Outcome: Progressing Towards Goal  Goal: Diagnostic Test/Procedures  Outcome: Progressing Towards Goal  Goal: Nutrition/Diet  Outcome: Progressing Towards Goal  Goal: Discharge Planning  Outcome: Progressing Towards Goal  Goal: Medications  Outcome: Progressing Towards Goal  Goal: Respiratory  Outcome: Progressing Towards Goal  Goal: Treatments/Interventions/Procedures  Outcome: Progressing Towards Goal  Goal: Psychosocial  Outcome: Progressing Towards Goal     Problem: Sepsis: Day 4  Goal: Off Pathway (Use only if patient is Off Pathway)  Outcome: Progressing Towards Goal  Goal: Activity/Safety  Outcome: Progressing Towards Goal  Goal: Consults, if ordered  Outcome: Progressing Towards Goal  Goal: Diagnostic Test/Procedures  Outcome: Progressing Towards Goal  Goal: Nutrition/Diet  Outcome: Progressing Towards Goal  Goal: Discharge Planning  Outcome: Progressing Towards Goal  Goal: Medications  Outcome: Progressing Towards Goal  Goal: Respiratory  Outcome: Progressing Towards Goal  Goal: Treatments/Interventions/Procedures  Outcome: Progressing Towards Goal  Goal: Psychosocial  Outcome: Progressing Towards Goal  Goal: *Oxygen saturation within defined limits  Outcome: Progressing Towards Goal  Goal: *Hemodynamically stable  Outcome: Progressing Towards Goal  Goal: *Vital signs within defined limits  Outcome: Progressing Towards Goal  Goal: *Tolerating diet  Outcome: Progressing Towards Goal  Goal: *Demonstrates progressive activity  Outcome: Progressing Towards Goal  Goal: *Fluid volume maintenance  Outcome: Progressing Towards Goal

## 2022-09-18 NOTE — PROGRESS NOTES
Unable to get vanc trough, endurance has sluggish/minimal blood return and pt has no accesable veins, lea in pharmacy made aware

## 2022-09-19 NOTE — HOSPICE
[facility] RN note:  T/C to grand daughter Aniceto Cuevas (ULNQ) [phone] to confirm plans for hospice at [facility]. [address] can accept pt at [facility]. According to [facility] is pending to cover room and board cost at facility. Will collaborate with  to determine when facility will accept pt with pending medicaid. 24hr contact information given to Aniceto Cuevas. Thank you for the opportunity to care for this pt and family. Please contact hospice at [phone] with any questions or concerns.

## 2022-09-19 NOTE — PROGRESS NOTES
0715- Bedside and Verbal shift change report given to [address] (oncoming nurse) by David Jesus RN (offgoing nurse). Report included the following information SBAR, Kardex, Intake/Output, Recent Results, and Cardiac Rhythm NSR    1000- Interdisciplinary team rounds were held 9/19/2022 with the following team members:Cardiac Rehab and Wellness, Care Management, Home Health, Nursing, Nurse Practitioner, Nutrition, Pharmacy, Physical Therapy, and Physician. Plan of care discussed. See clinical pathway and/or care plan for interventions and desired outcomes. Goals of the Day: Discharge home or facility on Hospice. 1700- Granddaughter at bedside updated. 1800- Dressing on sacrum stage 4 changed tunnelling noted at 3 oclock and 7 oclock positions. Terie Savant used and packed in placed with saline wet to dry. Dressing applied to marcella heels and open areas at 1 oclock and 7 oclock outside of sacral wound. Tolerated well. 1900- Bedside and Verbal shift change report given to [address] (oncoming nurse) by [address] (offgoing nurse). Report included the following information SBAR, Kardex, Intake/Output, Recent Results, Med Rec Status, and Cardiac Rhythm NSR .

## 2022-09-19 NOTE — PROGRESS NOTES
Case Management:    CM spoke with Admission at [facility] to update on d/c plan. CM shared that pt has pending medicaid and questioned if they could take pt on hospice with pending medicaid . They will follow- up and contact CM. [facility] will provide Hospice services. CM called and spoke with BENEDICT Angie Segundo who shared pt was screened about 30 days at [facility] for [facility]. CM explained that facility may not take pt on hospice with pending medicaid. Family or pt will have to make payment arrangements with facility. She verbalized understanding. Susy will follow up with CM on admission status with pending medicaid.     [address] [facility]  Phone: [phone]

## 2022-09-19 NOTE — DISCHARGE INSTRUCTIONS
DISCHARGE DIAGNOSIS:  Septic shock  Suspected infected sacral ulcer  History of recent admission for infected sacral ulcer s/p debridement and also colostomy creation  Recent admission to the hospital for Proteus, ESBL Klebsiella and Enterococcus bacteremia  ?? right atrial thrombus on CT  Proteus bacteremia  ESRD was on hemodialysis MWF as OP prior to this admission  Anemia of chronic disease  Diabetes mellitus type 2  Hypertension  Atrial fibrillation  Dyslipidemia  GERD  Protein calorie malnutrition  Bedbound status  DNR  patient waiting to be discharged back to  [facility] with hospice when possible- for now DC to [facility] on Swing bed with Hospice as opted by [facility] daughter, NO more HD, No IV Abx, no further aggressive care or workup    Bedbound status  MEDICATIONS:  It is important that you take the medication exactly as they are prescribed. Keep your medication in the bottles provided by the pharmacist and keep a list of the medication names, dosages, and times to be taken in your wallet. Do not take other medications without consulting your doctor. Pain Management: per above medications    What to do at [address]:  Comfort feeding    Recommended activity: Activity as tolerated    If you have questions regarding the hospital related prescriptions or hospital related issues please call [address] [place] [address] 190 at . You can always direct your questions to your primary care doctor if you are unable to reach your hospital physician; your PCP works as an extension of your hospital doctor just like your hospital doctor is an extension of your PCP for your time at the hospital [facility]).     If you experience any of the following symptoms then please call your primary care physician or return to the emergency room if you cannot get hold of your doctor:  Fever, chills, nausea, vomiting, diarrhea, change in mentation, falling, bleeding, shortness of breath,

## 2022-09-19 NOTE — PROGRESS NOTES
Hospitalist Progress Note    NAME: Esme Gomez   :  1934   MRN:  [id number]       Assessment / Plan:  Septic shock  Suspected infected sacral ulcer  History of recent admission for infected sacral ulcer s/p debridement and also colostomy creation  Recent admission to the hospital for Proteus, ESBL Klebsiella and Enterococcus bacteremia  ?? right atrial thrombus on CT  Proteus bacteremia  -Patient recently was in the hospital due to bacteremia and also infected sacral decubitus ulcer and underwent colostomy creation and also debridement and was prescribed IV vancomycin and cefepime until 9/15  -Patient unfortunately did not follow-up with her ID on   -Currently noted to have lactic acid of 10 and there is lysis of bone on CT   On empiric antibiotics with vancomycin and meropenem. Blood culture positive for Proteus species  ID and general surgery consulted. General surgery input appreciated and noted. General surgery for sacral decubitus ulcer does not seem to be the  source  of sepsis. Surg  Have signed off   no IV fluids due to ESRD status. c/w midodrine due to hypotension. Consider starting on vasopressors if blood pressure remains low. Will blood pressure in the 120s currently   was evaluated by intensivist, does not meet ICU level of care  -echocardiogram to rule out right atrial thrombus.   Family is leaning towards hospice now  Hospice consulted, patient waiting to be discharged back to  [facility] with hospice  Will hold off on further work-up, hold off on blood draws    ESRD on hemodialysis MWF  Anemia of chronic disease  -Renal consulted for hemodialysis needs   Creat 1.25  Diabetes mellitus type 2  insulin sliding scale with blood sugar checks    Hypertension  Atrial fibrillation  Dyslipidemia  GERD  Protein calorie malnutrition  Bedbound status  -Holding all home antihypertensives   -Continue home amiodarone, Lipitor, Protonix  -Consult wound care     Code Status: DNR  Surrogate Decision Maker: Granddaughter Meera Vazquez  DVT Prophylaxis: Lovenox   Baseline: From [facility], has dementia at baseline, has chronic sacral decubitus ulcer, dependent on others for activities of daily living  Recommended Disposition: Back to [facility] with hospice  Anticipated Discharge Date: 09/20  Dispo : home hospice once set up      Subjective: Follow-up sepsis  Awaiting to be discharged back to [facility] with hospice   awake alert    Review of Systems:  Symptom Y/N Comments  Symptom Y/N Comments   Fever/Chills    Chest Pain     Poor Appetite    Edema     Cough    Abdominal Pain     Sputum    Joint Pain     SOB/BEE    Pruritis/Rash     Nausea/vomit    Tolerating PT/OT     Diarrhea    Tolerating Diet     Constipation    Other       PO intake: No data found. Wt Readings from Last 10 Encounters:   09/19/22 57.2 kg (126 lb)   08/14/22 68.2 kg (150 lb 4.8 oz)   05/26/22 82.6 kg (182 lb 1.6 oz)       Objective:     VITALS:   Last 24hrs VS reviewed since prior progress note.  Most recent are:  Patient Vitals for the past 24 hrs:   Temp Pulse Resp BP SpO2   09/19/22 1200 -- 81 -- -- --   09/19/22 1059 98 °F (36.7 °C) 78 11 121/63 100 %   09/19/22 0841 -- -- -- (!) 125/46 --   09/19/22 0701 97.9 °F (36.6 °C) -- -- -- --   09/19/22 0258 97.7 °F (36.5 °C) 80 15 (!) 125/46 100 %   09/18/22 2258 97.7 °F (36.5 °C) 60 13 (!) 119/53 100 %   09/18/22 2000 97.8 °F (36.6 °C) 68 15 124/61 100 %   09/18/22 1900 -- 67 12 -- --   09/18/22 1800 -- 70 15 (!) 109/96 100 %   09/18/22 1700 -- 68 21 -- 100 %   09/18/22 1600 -- 67 15 118/70 --       No intake or output data in the 24 hours ending 09/19/22 1535       I had a face to face encounter, and independently examined this patient as outlined below:    PHYSICAL EXAM:  General:    No distress     HEENT: Atraumatic, anicteric sclerae, pink conjunctivae, MMM  Neck:  Supple, symmetrical  Lungs:   CTA. No Wheezing/Rhonchi. No rales. No tenderness. No Accessory muscle use. CVS:   Regular rhythm. No murmur. No JVD   GI/:   Soft. NT. ND. BS normal  Extremities: No edema. No cyanosis. No clubbing. Skin:     Not pale. Not Jaundiced. No rashes   Psych:  Poor insight. Not depressed. Not anxious or agitated. Neurologic: Alert and awake. EOMs intact. No facial asymmetry. No slurred speech. Symmetrical strength, Sensation grossly intact. Labs     I reviewed today's most current labs and imaging studies. Pertinent labs include:  Recent Labs     09/17/22  0557   WBC 12.7*   HGB 7.5*   HCT 24.1*          Recent Labs     09/17/22  0557      K 3.5      CO2 19*   *   BUN 12   CREA 1.25*   CA 7.3*   ALB 1.8*   TBILI 0.5   ALT 16       CT HEAD WO CONT    Result Date: 9/16/2022  1. No evidence of acute intracranial abnormality by this modality. CT ABD PELV W CONT    Result Date: 9/16/2022  1. Ulceration overlying the sacrum/coccyx with obvious lysis of bone. 2. Soft tissue attenuation in the anterior abdominal wall, right lower quadrant and above the pubic symphysis with associated stranding. Recommend clinical correlation for possible injection sites. 3. There is a filling defect in the right atrium which could represent thrombus, bland or septic, acute or chronic versus a mass lesion such as myxoma. 4. Incidental findings as above. XR CHEST PORT    Result Date: 9/16/2022  1. No definite acute cardiopulmonary disease. 2. Right IJ approach catheter projects to terminate in the right atrium     No results found. 05/23/22    ECHO NAI W OR WO CONTRAST 06/04/2022 6/4/2022    Interpretation Summary  Formatting of this result is different from the original.      Left Ventricle: Normal left ventricular systolic function with a visually estimated EF of 55 - 60%. Left ventricle size is normal. Normal wall thickness. Normal wall motion.     Mitral Valve: Mild regurgitation with a centrally directed jet. There is a small possible vegetation on the aortic side. Left Atrium: Left atrium is moderately dilated. Normal sized appendage. Decreased appendage flow velocity. No left atrial appendage thrombus noted. No left atrial appendage mass noted. Right Atrium: Right atrium is moderately dilated. Pericardium: Trivial pericardial effusion present. No indication of cardiac tamponade. Possible small mitral valve vegetation consistent with bacterial endocarditis.     Signed by: Kevin Bauman MD on 6/4/2022  8:15 PM     All Micro Results       Procedure Component Value Units Date/Time    CULTURE, BLOOD, PAIRED [[id number]]  (Abnormal) Collected: 09/16/22 1306    Order Status: Completed Specimen: Blood Updated: 09/19/22 0854     Special Requests: NO SPECIAL REQUESTS        Culture result:       PROBABLE PROTEUS SPECIES GROWING IN 1 OF 4 BOTTLES DRAWN SITE = CLEOPATRA 2 IDENTIFICATION AND SUSCEPTIBILITY TO FOLLOW                  REMAINING BOTTLE(S) HAS/HAVE NO GROWTH SO FAR          BLOOD CULTURE ID PANEL [[id number]]  (Abnormal) Collected: 09/16/22 1306    Order Status: Completed Specimen: Blood Updated: 09/17/22 1646     Acc. no. from Micro Order [id number]     Enterococcus faecalis Not detected        Enterococcus faecium Not detected        Listeria monocytogenes Not detected        Staphylococcus Not detected        Staphylococcus aureus Not detected        Staph epidermidis Not detected        Staph lugdunensis Not detected        Streptococcus Not detected        Streptococcus agalactiae (Group B) Not detected        Streptococcus pneumoniae Not detected        Streptococcus pyogenes (Group A) Not detected        Acinetobacter calcoaceticus-baumanii complex Not detected        Bacteroides fragilis Not detected        Enterobacterales species Detected        Enterobacter cloacae complex Not detected        Escherichia coli Not detected        Klebsiella aerogenes Not detected        Klebsiella oxytoca Not detected        Klebsiella pneumoniae group Not detected        Proteus Detected        Salmonella Not detected        Serratia marcescens Not detected        Haemophilus influenzae Not detected        Neisseria meningitidis Not detected        Pseudomonas aeruginosa Not detected        Steno maltophilia Not detected        Candida albicans Not detected        Candida auris Not detected        Candida glabrata Not detected        Candida krusei Not detected        Candida parapsilosis Not detected        Candida tropicalis Not detected        Crypto neoformans/gattii Not detected        RESISTANT GENES:            CTX-M (ESBL resistant gene) Detected        IMP (Carbapenemase resistant gene) Not detected        KPC (Carbapenem Resistance Gene) Not detected        NDM (Carbapenemase resistant gene) Not detected        OXA-48-like (Carbapenemase resistant gene) Not detected        VIM (Carbapenemase resistant gene) Not detected        Comment       False positive results may rarely occur.  Correlate with clinical,epidemiologic, and other laboratory findings           Comment: Please see BCID Interpretation Guide in EPIC Links       MICRO TRACKING [[id number]] Collected: 09/16/22 1306    Order Status: No result Updated: 09/17/22 0657              Current Medications:     Current Facility-Administered Medications:     amiodarone (CORDARONE) tablet 200 mg, 200 mg, Oral, BID, Gerald Gu MD, 200 mg at 09/19/22 0932    aspirin delayed-release tablet 81 mg, 81 mg, Oral, DAILY, Gerald Gu MD, 81 mg at 09/19/22 0932    atorvastatin (LIPITOR) tablet 40 mg, 40 mg, Oral, DAILY, Gerald Gu MD, 40 mg at 09/19/22 0932    pantoprazole (PROTONIX) tablet 40 mg, 40 mg, Oral, DAILY, Gerald Gu MD, 40 mg at 09/19/22 0932    sodium chloride (NS) flush 5-40 mL, 5-40 mL, IntraVENous, Q8H, Gerald Gu MD, 10 mL at 09/19/22 1444    sodium chloride (NS) flush 5-40 mL, 5-40 mL, IntraVENous, PRN, Lala Aldana MD    acetaminophen (TYLENOL) tablet 650 mg, 650 mg, Oral, Q6H PRN **OR** acetaminophen (TYLENOL) suppository 650 mg, 650 mg, Rectal, Q6H PRN, Lala Gu MD    polyethylene glycol (MIRALAX) packet 17 g, 17 g, Oral, DAILY PRN, Lala Gu MD    ondansetron (ZOFRAN ODT) tablet 4 mg, 4 mg, Oral, Q8H PRN **OR** ondansetron (ZOFRAN) injection 4 mg, 4 mg, IntraVENous, Q6H PRN, Gerald Aldana MD, 4 mg at 09/18/22 1006    [Held by provider] heparin (porcine) injection 5,000 Units, 5,000 Units, SubCUTAneous, Q8H, Gerald Gu MD    meropenem (MERREM) 500 mg in 0.9% sodium chloride (MBP/ADV) 50 mL MBP, 0.5 g, IntraVENous, Q24H, Gerald Gu MD, Last Rate: 100 mL/hr at 09/18/22 1802, 500 mg at 09/18/22 1802    midodrine (PROAMATINE) tablet 10 mg, 10 mg, Oral, TID WITH MEALS, Gerald Aldana MD, 10 mg at 09/19/22 1227    insulin lispro (HUMALOG) injection, , SubCUTAneous, Q6H, Gerald Gu MD, 2 Units at 09/19/22 1227    glucose chewable tablet 16 g, 4 Tablet, Oral, PRN, Gerald Gu MD    glucagon (GLUCAGEN) injection 1 mg, 1 mg, IntraMUSCular, PRN, Gerald Gu MD    dextrose 10% infusion 0-250 mL, 0-250 mL, IntraVENous, PRN, Lala Aldana MD     Procedures: see electronic medical records for all procedures/Xrays and details which were not copied into this note but were reviewed prior to creation of Plan.     Reviewed most current lab test results and cultures  YES  Reviewed most current radiology test results   YES  Review and summation of old records today    NO  Reviewed patient's current orders and MAR    YES  PMH/SH reviewed - no change compared to H&P  ________________________________________________________________________  Care Plan discussed with:    Comments   Patient x    Family      RN     Care Manager     Consultant                        Multidiciplinary team rounds were held today with , nursing, pharmacist and clinical coordinator. Patient's plan of care was discussed; medications were reviewed and discharge planning was addressed.      ________________________________________________________________________  Total NON critical care TIME:  35   Minutes    Total CRITICAL CARE TIME Spent:   Minutes non procedure based      Comments   >50% of visit spent in counseling and coordination of care x     This includes time during multidisciplinary rounds if indicated above   ________________________________________________________________________  Kirby Pedroza MD

## 2022-09-20 NOTE — WOUND CARE
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 Is well known to this wound care nurse from past care provided. Within the last few months she has had the stage 4 pressure injury debrided in the OR and a colostomy created just for the healing of the wound. This is most likely permanent for her given her age and functioning. Assessment: Pt. Is alert when awake but she has slept most of the day. She is refusing all PO intake to include drinks and sweets (very unlike her). She said she has no appetite and she is not thirsty. Wounds:    Bilateral unstageable pressure injuries to the heels with dry eschar on them. These are stable and do not need to be dressed except for applying Betadine to them and leaving them open to air. Sacrum / Buttocks wounds:  Stage 4 with bone exposed and undermining. There is some granulation tissue. Some of the slough was removed from the edges today. WOUND POA CONDITIONS    Wound Sacrum red/purple/non-blanchable area with skin breakdown and bleeding (Active)   Wound Etiology Pressure Stage 4 09/20/22 1514   Dressing Status New dressing applied 09/20/22 1514   Cleansed Cleansed with saline 09/20/22 1514   Dressing/Treatment Moist to dry;Packing 09/20/22 1514   Offloading for Diabetic Foot Ulcers Offloading ordered 09/20/22 1514   Undermining Starts ___ O'Clock 3 o'clock 09/19/22 1832   Undermining Ends ___ O'Clock 5 o'clock 09/19/22 1832   Wound Assessment Slough;Eschar moist;Exposed Structure Bone;Exposed Structure Fascia; Exposed Structure Muscle 09/20/22 1514   Drainage Amount Moderate 09/20/22 1514   Drainage Description Serosanguinous 09/20/22 1514   Wound Odor Moderate 09/20/22 1514   Kaur-Wound/Incision Assessment Fragile; Maceration 09/20/22 1514   Edges Unattached edges 09/19/22 1832   Wound Thickness Description Full thickness 09/20/22 1514   Number of days: 120       Wound Heel Left DTI 05/23/22 (Active)   Wound Etiology Pressure Unstageable 09/20/22 1514   Dressing Status Clean;Dry; Intact 09/19/22 1948   Cleansed Betadine/Povidone Iodine 09/19/22 1832   Dressing/Treatment Foam 09/19/22 1948   Offloading for Diabetic Foot Ulcers Ortho wedge/inserts 09/19/22 1948   Dressing Change Due 09/20/22 09/20/22 1514   Wound Assessment Eschar dry 09/20/22 1514   Drainage Amount None 09/20/22 1514   Drainage Description Brown 09/19/22 1832   Wound Odor None 09/20/22 1514   Kaur-Wound/Incision Assessment Denuded;Dry/flaky 09/20/22 1514   Wound Thickness Description Full thickness 09/20/22 1514   Number of days: 120       Wound Heel Right DTI (Active)   Wound Etiology Pressure Unstageable 09/20/22 1514   Dressing Status Clean;Dry; Intact 09/19/22 1948   Cleansed Cleansed with saline;Betadine/Povidone Iodine 09/19/22 1832   Dressing/Treatment Foam 09/19/22 1948   Offloading for Diabetic Foot Ulcers Ortho wedge/inserts 09/19/22 1948   Wound Assessment Eschar dry 09/20/22 1514   Drainage Amount None 09/20/22 1514   Wound Odor None 09/20/22 1514   Kaur-Wound/Incision Assessment Dry/flaky 09/20/22 1514   Edges Defined edges 09/20/22 1514   Wound Thickness Description Full thickness 09/20/22 1514   Number of days: 119       Treatment:  the heel dressings were removed and left open to air. Nursing to apply Betadine and let it dry on the eschar to keep it antimicrobial. This will manage the drainage and the odor / keep it dry. Off loading is essential .   The sacrum wound was cleansed with NS and wiped with gauze. After the removal of the edge slough, the wound was packed with NS moist kerlix gauze and covered with a high drainage pack. Linens under the patient were changed. She was readjusted to float her heels and a Pure wick was hooked up for her urinary incontinence. All care discussed with nursing at the bedside.    Aj Lawrence RN, BSN, [employer]

## 2022-09-20 NOTE — PROGRESS NOTES
Transition of Care Plan:    RUR: 12%  Disposition: SNF with Hospice vs SNF with Skilled  ([facility]) vs LTC  Pt has medicaid pending- screened at [address] spoke with Hussein Montgomery to update on d/c plan back to [facility] on Hospice. She verbalized understanding and shared pt is no able to return home due to lack of support in the home for pt care needs. Plan was for LTC once medicaid was approved. Per granddaughter, pt  complains of weakness post dialysis, funny taste in mouth and pt is declining. She feels pt is depressed about current medical status. 0900  CM spoke with admission liaison at [facility]. Per Bree Marcial with , they can not accept back on Hospice with pending medicaid. They can accept tp back as skilled. Kylah Jain has a waiting list for LTC and that was the plan for the pt   Bree Marcial suggested the admission liaison. Marnie noland can assist with placement with medicaid pending. OP HD:  MWF    Follow up appointments: PCP, Specialist  DME needed: facility to provide  Transportation at Discharge: [facility]- S to provide transportation   [address] or means to access home:    n/a at facility  IM Medicare Letter: 2nd IM Letter at d/c  Caregiver Contact: MPOA/ Ulises Mckeon- [phone]  Discharge Caregiver contacted prior to discharge? CM to discuss with  Granddaughter    Care Conference needed?:     n/a    CM introduced self  and role to Jena Montgomery, verified pt demographics, insurance info and emergency contact   Pt return to hospital from [facility] H/R, placed on last admission. Prior to last admission, pt was independent with ADL's, ambulating  and family provided transportation. Pt has dementia, sacral ulcer,and colostomy. Pt currently is on HD and requiring therapy for PT,OT,has generalized weakness- could be from dialysis. Pt has been screened for medicaid for LTC at [facility] for pt is unable to return home at this time.  Granddaughter works at this time ,pt can not be left alone.        Reason for Admission:  Septic shock  Suspected infected sacral ulcer                     RUR Score:   12%                  Plan for utilizing home health:    none      PCP: First and Last name:  Lynnwood Sacks, MD     Name of Practice:    Are you a current patient: Yes/No: Yes   Approximate date of last visit:    Can you participate in a virtual visit with your PCP:                     Current Advanced Directive/Advance Care Plan: DNR    Pt has ACP on file, pt is [address]:   Click here to complete [address] including selection of the [address] Relationship (ie \"Primary\")             Primary Decision Maker (Active): Pete Muse - [phone]                  Transition of Care Plan:                              SNF with Hospice vs SNF with Skilled  [facility]) vs [address]  Phone: [phone]

## 2022-09-20 NOTE — PROGRESS NOTES
Nutrition: Chart reviewed due to PI referral. Noted plans for hospice. Possible discharge today if facility able to accept. RD added ensure plus daily. Continue easy to chew diet without therapeutic restrictions. Available by consult if needs arise. Thanks.   Melanie Read RD

## 2022-09-20 NOTE — PROGRESS NOTES
Hospitalist Progress Note    NAME: Xuan Curtis   :  1934   MRN:  [id number]       Assessment / Plan:  Septic shock  Suspected infected sacral ulcer  History of recent admission for infected sacral ulcer s/p debridement and also colostomy creation  Recent admission to the hospital for Proteus, ESBL Klebsiella and Enterococcus bacteremia  ?? right atrial thrombus on CT  Proteus bacteremia  -Patient recently was in the hospital due to bacteremia and also infected sacral decubitus ulcer and underwent colostomy creation and also debridement and was prescribed IV vancomycin and cefepime until 9/15  -Patient unfortunately did not follow-up with her ID on   -Currently noted to have lactic acid of 10 and there is lysis of bone on CT   On empiric antibiotics with vancomycin and meropenem. Blood culture positive for Proteus species  ID and general surgery consulted. General surgery input appreciated and noted. General surgery for sacral decubitus ulcer does not seem to be the  source  of sepsis. Surg  Have signed off   no IV fluids due to ESRD status. c/w midodrine due to hypotension. Consider starting on vasopressors if blood pressure remains low. Will blood pressure in the 120s currently   was evaluated by intensivist, does not meet ICU level of care  -echocardiogram to rule out right atrial thrombus.   Family has opted for Hospice  Hospice consulted-  patient waiting to be discharged back to  [facility] with hospice  Will hold off on further work-up, hold off on blood draws    ESRD on hemodialysis MWF  Anemia of chronic disease  -Renal consulted for hemodialysis needs   Creat 1.25  Diabetes mellitus type 2  insulin sliding scale with blood sugar checks    Hypertension  Atrial fibrillation  Dyslipidemia  GERD  Protein calorie malnutrition  Bedbound status  -Holding all home antihypertensives -Continue home amiodarone, Lipitor, Protonix  -Consult wound care     Code Status: DNR  Surrogate Decision Maker: Granddaughter Liz Noe  DVT Prophylaxis: Lovenox   Baseline: From [facility], has dementia at baseline, has chronic sacral decubitus ulcer, dependent on others for activities of daily living    Recommended Disposition: Back to LTC facility with hospice/Comfort care when arranged/confirmed  Anticipated Discharge Date: 09/20?- 21  Dispo : Hospice at [facility] arranged     Subjective: Follow-up sepsis  Awaiting to be discharged back to [facility] with hospice   awake alert    Review of Systems:  Symptom Y/N Comments  Symptom Y/N Comments   Fever/Chills    Chest Pain     Poor Appetite    Edema     Cough    Abdominal Pain     Sputum    Joint Pain     SOB/BEE    Pruritis/Rash     Nausea/vomit    Tolerating PT/OT n    Diarrhea    Tolerating Diet y    Constipation    Other       PO intake: No data found. Wt Readings from Last 10 Encounters:   09/19/22 57.2 kg (126 lb)   08/14/22 68.2 kg (150 lb 4.8 oz)   05/26/22 82.6 kg (182 lb 1.6 oz)       Objective:     VITALS:   Last 24hrs VS reviewed since prior progress note.  Most recent are:  Patient Vitals for the past 24 hrs:   Temp Pulse Resp BP SpO2   09/20/22 1515 97.3 °F (36.3 °C) 99 25 (!) 140/83 100 %   09/20/22 1106 97 °F (36.1 °C) -- 14 (!) 134/46 100 %   09/20/22 0718 97.8 °F (36.6 °C) 72 15 113/85 100 %   09/20/22 0334 97.7 °F (36.5 °C) 68 12 (!) 147/55 97 %   09/19/22 2251 97.5 °F (36.4 °C) 79 19 (!) 128/51 --   09/19/22 1948 97.9 °F (36.6 °C) 81 20 131/86 99 %   09/19/22 1800 -- 77 16 (!) 119/90 --         Intake/Output Summary (Last 24 hours) at 9/20/2022 1638  Last data filed at 9/20/2022 0134  Gross per 24 hour   Intake --   Output 80 ml   Net -80 ml          I had a face to face encounter, and independently examined this patient as outlined below:    PHYSICAL EXAM:  General:    No distress     HEENT: Atraumatic, anicteric sclerae, pink conjunctivae, MMM  Neck:  Supple, symmetrical  Lungs:   CTA. No Wheezing/Rhonchi. No rales. No tenderness. No Accessory muscle use. CVS:   Regular rhythm. No murmur. No JVD   GI/:   Soft. NT. ND. BS normal  Extremities: No edema. No cyanosis. No clubbing. Skin:     Not pale. Not Jaundiced. No rashes   Psych:  Poor insight. Not depressed. Not anxious or agitated. Neurologic: Alert and awake. EOMs intact. No facial asymmetry. No slurred speech. Symmetrical strength, Sensation grossly intact. Labs     I reviewed today's most current labs and imaging studies. Pertinent labs include:  No results for input(s): WBC, HGB, HCT, PLT, HGBEXT, HCTEXT, PLTEXT, HGBEXT, HCTEXT, PLTEXT in the last 72 hours. No results for input(s): NA, K, CL, CO2, GLU, BUN, CREA, CA, MG, PHOS, ALB, TBIL, TBILI, ALT, INR, INREXT, INREXT in the last 72 hours. No lab exists for component: SGOT    CT HEAD WO CONT    Result Date: 9/16/2022  1. No evidence of acute intracranial abnormality by this modality. CT ABD PELV W CONT    Result Date: 9/16/2022  1. Ulceration overlying the sacrum/coccyx with obvious lysis of bone. 2. Soft tissue attenuation in the anterior abdominal wall, right lower quadrant and above the pubic symphysis with associated stranding. Recommend clinical correlation for possible injection sites. 3. There is a filling defect in the right atrium which could represent thrombus, bland or septic, acute or chronic versus a mass lesion such as myxoma. 4. Incidental findings as above. XR CHEST PORT    Result Date: 9/16/2022  1. No definite acute cardiopulmonary disease. 2. Right IJ approach catheter projects to terminate in the right atrium     No results found.   05/23/22    ECHO NAI W OR WO CONTRAST 06/04/2022 6/4/2022    Interpretation Summary  Formatting of this result is different from the original.      Left Ventricle: Normal left ventricular systolic function with a visually estimated EF of 55 - 60%. Left ventricle size is normal. Normal wall thickness. Normal wall motion. Mitral Valve: Mild regurgitation with a centrally directed jet. There is a small possible vegetation on the aortic side. Left Atrium: Left atrium is moderately dilated. Normal sized appendage. Decreased appendage flow velocity. No left atrial appendage thrombus noted. No left atrial appendage mass noted. Right Atrium: Right atrium is moderately dilated. Pericardium: Trivial pericardial effusion present. No indication of cardiac tamponade. Possible small mitral valve vegetation consistent with bacterial endocarditis.     Signed by: Kenyetta Ordonez MD on 6/4/2022  8:15 PM     All Micro Results       Procedure Component Value Units Date/Time    CULTURE, BLOOD, PAIRED [[id number]]  (Abnormal)  (Susceptibility) Collected: 09/16/22 1306    Order Status: Completed Specimen: Blood Updated: 09/20/22 0969     Special Requests: NO SPECIAL REQUESTS        Culture result:       PROTEUS MIRABILIS GROWING IN 1 OF 4 BOTTLES DRAWN SITE = HALL 2                  REMAINING BOTTLE(S) HAS/HAVE NO GROWTH SO FAR          BLOOD CULTURE ID PANEL [[id number]]  (Abnormal) Collected: 09/16/22 1306    Order Status: Completed Specimen: Blood Updated: 09/17/22 1646     Acc. no. from Micro Order [id number]     Enterococcus faecalis Not detected        Enterococcus faecium Not detected        Listeria monocytogenes Not detected        Staphylococcus Not detected        Staphylococcus aureus Not detected        Staph epidermidis Not detected        Staph lugdunensis Not detected        Streptococcus Not detected        Streptococcus agalactiae (Group B) Not detected        Streptococcus pneumoniae Not detected        Streptococcus pyogenes (Group A) Not detected        Acinetobacter calcoaceticus-baumanii complex Not detected        Bacteroides fragilis Not detected        Enterobacterales species Detected        Enterobacter cloacae complex Not detected Escherichia coli Not detected        Klebsiella aerogenes Not detected        Klebsiella oxytoca Not detected        Klebsiella pneumoniae group Not detected        Proteus Detected        Salmonella Not detected        Serratia marcescens Not detected        Haemophilus influenzae Not detected        Neisseria meningitidis Not detected        Pseudomonas aeruginosa Not detected        Steno maltophilia Not detected        Candida albicans Not detected        Candida auris Not detected        Candida glabrata Not detected        Candida krusei Not detected        Candida parapsilosis Not detected        Candida tropicalis Not detected        Crypto neoformans/gattii Not detected        RESISTANT GENES:            CTX-M (ESBL resistant gene) Detected        IMP (Carbapenemase resistant gene) Not detected        KPC (Carbapenem Resistance Gene) Not detected        NDM (Carbapenemase resistant gene) Not detected        OXA-48-like (Carbapenemase resistant gene) Not detected        VIM (Carbapenemase resistant gene) Not detected        Comment       False positive results may rarely occur.  Correlate with clinical,epidemiologic, and other laboratory findings           Comment: Please see BCID Interpretation Guide in EPIC Links       MICRO TRACKING [[id number]] Collected: 09/16/22 1306    Order Status: No result Updated: 09/17/22 0657              Current Medications:     Current Facility-Administered Medications:     amiodarone (CORDARONE) tablet 200 mg, 200 mg, Oral, BID, Gerald Gu MD, 200 mg at 09/20/22 0901    aspirin delayed-release tablet 81 mg, 81 mg, Oral, DAILY, Gerald Gu MD, 81 mg at 09/20/22 0901    atorvastatin (LIPITOR) tablet 40 mg, 40 mg, Oral, DAILY, Gerald Gu MD, 40 mg at 09/20/22 0901    pantoprazole (PROTONIX) tablet 40 mg, 40 mg, Oral, DAILY, Gerald Gu MD, 40 mg at 09/20/22 0900    sodium chloride (NS) flush 5-40 mL, 5-40 mL, IntraVENous, Q8H, Gerald Gu MD, 10 mL at 09/20/22 0430    sodium chloride (NS) flush 5-40 mL, 5-40 mL, IntraVENous, PRN, Carmen Medley MD    acetaminophen (TYLENOL) tablet 650 mg, 650 mg, Oral, Q6H PRN **OR** acetaminophen (TYLENOL) suppository 650 mg, 650 mg, Rectal, Q6H PRN, Carmen Gu MD    polyethylene glycol (MIRALAX) packet 17 g, 17 g, Oral, DAILY PRN, Carmen Gu MD    ondansetron (ZOFRAN ODT) tablet 4 mg, 4 mg, Oral, Q8H PRN **OR** ondansetron (ZOFRAN) injection 4 mg, 4 mg, IntraVENous, Q6H PRN, Gerald Medley MD, 4 mg at 09/18/22 1006    [Held by provider] heparin (porcine) injection 5,000 Units, 5,000 Units, SubCUTAneous, Q8H, Gerald Gu MD    meropenem (MERREM) 500 mg in 0.9% sodium chloride (MBP/ADV) 50 mL MBP, 0.5 g, IntraVENous, Q24H, Gerald Gu MD, Last Rate: 100 mL/hr at 09/19/22 1713, 500 mg at 09/19/22 1713    midodrine (PROAMATINE) tablet 10 mg, 10 mg, Oral, TID WITH MEALS, Gerald Medley MD, 10 mg at 09/20/22 1219    insulin lispro (HUMALOG) injection, , SubCUTAneous, Q6H, Gerald Gu MD, 2 Units at 09/19/22 1227    glucose chewable tablet 16 g, 4 Tablet, Oral, PRN, Gerald Gu MD    glucagon (GLUCAGEN) injection 1 mg, 1 mg, IntraMUSCular, PRN, Gerald Gu MD    dextrose 10% infusion 0-250 mL, 0-250 mL, IntraVENous, PRN, Carmen Medley MD     Procedures: see electronic medical records for all procedures/Xrays and details which were not copied into this note but were reviewed prior to creation of Plan.     Reviewed most current lab test results and cultures  YES  Reviewed most current radiology test results   YES  Review and summation of old records today    NO  Reviewed patient's current orders and MAR    YES  PMH/SH reviewed - no change compared to H&P  ________________________________________________________________________  Care Plan discussed with:    Comments   Patient x    Family      RN x    Care Manager x    Consultant                       x Multidiciplinary team rounds were held today with , nursing, pharmacist and clinical coordinator. Patient's plan of care was discussed; medications were reviewed and discharge planning was addressed.      ________________________________________________________________________  Total NON critical care TIME:  16   Minutes    Total CRITICAL CARE TIME Spent:   Minutes non procedure based      Comments   >50% of visit spent in counseling and coordination of care x     This includes time during multidisciplinary rounds if indicated above   ________________________________________________________________________  Sb Andrea MD

## 2022-09-20 NOTE — PROGRESS NOTES
1900 Bedside and Verbal shift change report given to [facility] (oncoming nurse) by Mason Pedro RN (offgoing nurse). Report included the following information SBAR, Kardex, Intake/Output, MAR, Recent Results, and Cardiac Rhythm NSR . MARCIA Quintero will be charting on this pt. I have reviewed and agree with all charting by Elois Gaucher, RN.

## 2022-09-21 NOTE — PROGRESS NOTES
Problem: Pressure Injury - Risk of  Goal: *Prevention of pressure injury  Description: Document Markos Scale and appropriate interventions in the flowsheet. Outcome: Progressing Towards Goal  Note: Pressure Injury Interventions:  Sensory Interventions: Assess changes in LOC, Assess need for specialty bed, Check visual cues for pain, Float heels, Keep linens dry and wrinkle-free, Minimize linen layers, Monitor skin under medical devices, Pressure redistribution bed/mattress (bed type), Turn and reposition approx. every two hours (pillows and wedges if needed)    Moisture Interventions: Absorbent underpads, Apply protective barrier, creams and emollients, Assess need for specialty bed, Check for incontinence Q2 hours and as needed, Contain wound drainage, Internal/External fecal devices, Internal/External urinary devices, Maintain skin hydration (lotion/cream), Minimize layers, Moisture barrier    Activity Interventions: Pressure redistribution bed/mattress(bed type), Assess need for specialty bed    Mobility Interventions: Assess need for specialty bed, Float heels, HOB 30 degrees or less, Pressure redistribution bed/mattress (bed type), Turn and reposition approx.  every two hours(pillow and wedges)    Nutrition Interventions: Document food/fluid/supplement intake, Offer support with meals,snacks and hydration, Discuss nutritional consult with provider    Friction and Shear Interventions: Apply protective barrier, creams and emollients, Feet elevated on foot rest, HOB 30 degrees or less, Lift sheet, Transferring/repositioning devices                Problem: Patient Education: Go to Patient Education Activity  Goal: Patient/Family Education  Outcome: Progressing Towards Goal     Problem: Patient Education: Go to Patient Education Activity  Goal: Patient/Family Education  Outcome: Progressing Towards Goal     Problem: Hypotension  Goal: *Blood pressure within specified parameters  Outcome: Progressing Towards Goal  Goal: *Labs within defined limits  Outcome: Progressing Towards Goal

## 2022-09-21 NOTE — PROGRESS NOTES
Spiritual Care Assessment/Progress Note  [facility]      NAME: Eleanor Cope      MRN: [id number]  AGE: 80 y.o. SEX: female  Mandaeism Affiliation: Ramesh Mehta   Language: English     9/21/2022     Total Time (in minutes): 6     Spiritual Assessment begun in [facility] PROGRESSIVE CARE through conversation with:         [x]Patient        [] Family    [] Friend(s)        Reason for Consult: Initial/Spiritual assessment, patient floor     Spiritual beliefs: (Please include comment if needed)     [] Identifies with a greta tradition:         [] Supported by a greta community:            [] Claims no spiritual orientation:           [] Seeking spiritual identity:                [] Adheres to an individual form of spirituality:           [x] Not able to assess:                           Identified resources for coping:      [] Prayer                               [] Music                  [] Guided Imagery     [] Family/friends                 [] Pet visits     [] Devotional reading                         [x] Unknown     [] Other:                                               Interventions offered during this visit: (See comments for more details)    Patient Interventions: Initial visit           Plan of Care:     [x] Support spiritual and/or cultural needs    [] Support AMD and/or advance care planning process      [] Support grieving process   [] Coordinate Rites and/or Rituals    [] Coordination with community clergy   [] No spiritual needs identified at this time   [] Detailed Plan of Care below (See Comments)  [] Make referral to Music Therapy  [] Make referral to Pet Therapy     [] Make referral to Addiction services  [] Make referral to [facility]  [] Make referral to Spiritual Care Partner  [] No future visits requested        [x] Contact Spiritual Care for further referrals     Comments:  reviewed the patient's chart prior to the visit.  Ms. Miki Gonzalez was asleep when the [address] [place] [address] Road entered her room.  prayed silently.  services are available 24 hours a day as requested. Rev. GUALBERTO Butts.  Min   Paging Service [phone])

## 2022-09-21 NOTE — PROGRESS NOTES
End of Shift Note    Bedside shift change report given to [facility], [address] [place] [address] (oncoming nurse) by Patricia Harmon RN (offgoing nurse). Report included the following information SBAR, Kardex, Cardiac Rhythm NSR, ST, and Quality Measures    Shift worked:  [id number]     Shift summary and any significant changes:     Patient cannot tolerate current diet. No PRN med given   V/S stayed stable   Concerns for physician to address:  Diet plan/ Nutrition       Activity:  Activity Level: Bed Rest  Number times ambulated in hallways past shift: 0  Number of times OOB to chair past shift: 0    Cardiac:   Cardiac Monitoring: Yes      Cardiac Rhythm: Sinus Tachy    Access:  Current line(s): PIV     Genitourinary:   Urinary status: anuric    Respiratory:   O2 Device: None (Room air)  Chronic home O2 use?: NO  Incentive spirometer at bedside: NO       GI:  Last Bowel Movement Date: 09/20/22  Current diet:  ADULT DIET Easy to Chew  ADULT ORAL NUTRITION SUPPLEMENT Lunch; Standard High Calorie/High Protein  Passing flatus: YES  Tolerating current diet: NO       Pain Management:   Patient states pain is manageable on current regimen: YES    Skin:  Markos Score: 12  Interventions: Nutritional support, turn team, speciality bed,  and float heels    Patient Safety:  Fall Score:  Total Score: 4  Interventions: bed/chair alarm  High Fall Risk: Yes    Length of Stay:  Expected LOS: 4d 19h  Actual LOS: [address]

## 2022-09-21 NOTE — PROGRESS NOTES
Received notification from bedside RN about patient with regards to: episode of hypoglycemia this AM, received Insulin coverage last night  VS: /77, HR 87, RR 17, o2 sat 100% on RA    Intervention given: Lispro sliding scale changed from normal sensitivity to high sensitivity

## 2022-09-21 NOTE — PROGRESS NOTES
0014: reviewed chart, nephro consult to be placed per Hospitalist note on pt admission. Nephro consult not ordered. Pt has +2,+3 pitting edema. perfect served Juan A Gómez NP    0022Emmette SOWMYA Owens wants AM provider to make decision due to pt and family actively discussing hospice    0600 BG 62 rechecked 66,  D10 hypoglycemia protocol followed. 6549:      0647: [place] notified of hypoglycemic episode and D10 protocol that was followed. End of Shift Note    Bedside shift change report given to MARCIA Nunez  (oncoming nurse) by Ellis To RN (offgoing nurse). Report included the following information SBAR, Kardex, ED Summary, Intake/Output, MAR, and Cardiac Rhythm SR    Shift worked:  [id number]     Shift summary and any significant changes:     See above. Output 175mL. CHG completed and repositioned. No complaints of pain. Concerns for physician to address:  Nephro consult? Zone phone for oncoming shift:          Activity:  Activity Level: Bed Rest  Number times ambulated in hallways past shift: 0  Number of times OOB to chair past shift: 0    Cardiac:   Cardiac Monitoring: Yes      Cardiac Rhythm: Sinus Rhythm    Access:  Current line(s): PIV and HD access     Genitourinary:   Urinary status: anuric    Respiratory:   O2 Device: None (Room air)  Chronic home O2 use?: NO  Incentive spirometer at bedside: NO       GI:  Last Bowel Movement Date: 09/20/22  Current diet:  ADULT DIET Easy to Chew  ADULT ORAL NUTRITION SUPPLEMENT Lunch; Standard High Calorie/High Protein  Passing flatus: YES  Tolerating current diet: NO       Pain Management:   Patient states pain is manageable on current regimen: YES    Skin:  Markos Score: 12  Interventions: turn team, speciality bed, float heels, internal/external urinary devices, and nutritional support     Patient Safety:  Fall Score:  Total Score: 4  Interventions: bed/chair alarm and gripper socks  High Fall Risk: Yes    Length of Stay:  Expected LOS: 4d 19h  Actual LOS: [facility], RN

## 2022-09-21 NOTE — PROGRESS NOTES
Hospitalist Progress Note    NAME: Mayte Mueller   :  1934   MRN:  [id number]       Assessment / Plan:  Septic shock  Suspected infected sacral ulcer  History of recent admission for infected sacral ulcer s/p debridement and also colostomy creation  Recent admission to the hospital for Proteus, ESBL Klebsiella and Enterococcus bacteremia  ?? right atrial thrombus on CT  Proteus bacteremia  -Patient recently was in the hospital due to bacteremia and also infected sacral decubitus ulcer and underwent colostomy creation and also debridement and was prescribed IV vancomycin and cefepime until 9/15  -Patient unfortunately did not follow-up with her ID on   -Currently noted to have lactic acid of 10 and there is lysis of bone on CT   On empiric antibiotics with vancomycin and meropenem. Blood culture positive for Proteus species  ID and general surgery consulted. General surgery input appreciated and noted. General surgery for sacral decubitus ulcer does not seem to be the  source  of sepsis. Surg  Have signed off   no IV fluids due to ESRD status. c/w midodrine due to hypotension. Consider starting on vasopressors if blood pressure remains low. Will blood pressure in the 120s currently   was evaluated by intensivist, does not meet ICU level of care  -echocardiogram to rule out right atrial thrombus.   Family has opted for Hospice  Hospice consulted-  patient waiting to be discharged back to  [facility] with hospice  Will hold off on further work-up, hold off on blood draws    ESRD on hemodialysis MWF  Anemia of chronic disease  Off HD now- plan for hospice at [address] [place] [address] with no plans of resuming dialysis   Creat 1.25  Diabetes mellitus type 2  insulin sliding scale with blood sugar checks    Hypertension  Atrial fibrillation  Dyslipidemia  GERD  Protein calorie malnutrition  Bedbound status  -Holding all home antihypertensives   -Continue home amiodarone, Lipitor, Protonix  -Consult wound care     Code Status: DNR  Surrogate Decision Maker: Granddaughter [facility]  DVT Prophylaxis: Lovenox   Baseline: From [facility], has dementia at baseline, has chronic sacral decubitus ulcer, dependent on others for activities of daily living    Recommended Disposition: Back to LTC facility with hospice/Comfort care when arranged/confirmed  Anticipated Discharge Date: 09/20?- 21  Dispo : Hospice at [facility] arranged     Subjective: Follow-up sepsis  Awaiting to be discharged back to [facility] with hospice   awake alert    Review of Systems:  Symptom Y/N Comments  Symptom Y/N Comments   Fever/Chills    Chest Pain     Poor Appetite    Edema     Cough    Abdominal Pain     Sputum    Joint Pain     SOB/BEE    Pruritis/Rash     Nausea/vomit    Tolerating PT/OT n    Diarrhea    Tolerating Diet y    Constipation    Other       PO intake: No data found. Wt Readings from Last 10 Encounters:   09/19/22 57.2 kg (126 lb)   08/14/22 68.2 kg (150 lb 4.8 oz)   05/26/22 82.6 kg (182 lb 1.6 oz)       Objective:     VITALS:   Last 24hrs VS reviewed since prior progress note.  Most recent are:  Patient Vitals for the past 24 hrs:   Temp Pulse Resp BP SpO2   09/21/22 1456 98.2 °F (36.8 °C) (!) 101 18 (!) 125/50 100 %   09/21/22 1230 -- 96 16 (!) 145/73 100 %   09/21/22 1102 97.7 °F (36.5 °C) 97 18 (!) 125/59 100 %   09/21/22 0843 -- -- -- (!) 130/53 --   09/21/22 0756 97.5 °F (36.4 °C) 94 18 (!) 130/54 100 %   09/21/22 0318 97.1 °F (36.2 °C) 87 17 127/77 100 %   09/20/22 2340 97.3 °F (36.3 °C) 86 19 (!) 140/63 100 %   09/20/22 1923 97.1 °F (36.2 °C) 82 23 136/80 100 %         Intake/Output Summary (Last 24 hours) at 9/21/2022 1530  Last data filed at 9/21/2022 0914  Gross per 24 hour   Intake 60 ml   Output 175 ml   Net -115 ml          I had a face to face encounter, and independently examined this patient as outlined below:    PHYSICAL EXAM:  General:    No distress     HEENT: Atraumatic, anicteric sclerae, pink conjunctivae, MMM  Neck:  Supple, symmetrical  Lungs:   CTA. No Wheezing/Rhonchi. No rales. No tenderness. No Accessory muscle use. CVS:   Regular rhythm. No murmur. No JVD   GI/:   Soft. NT. ND. BS normal  Extremities: No edema. No cyanosis. No clubbing. Skin:     Not pale. Not Jaundiced. No rashes   Psych:  Poor insight. Not depressed. Not anxious or agitated. Neurologic: Alert and awake. EOMs intact. No facial asymmetry. No slurred speech. Symmetrical strength, Sensation grossly intact. Labs     I reviewed today's most current labs and imaging studies. Pertinent labs include:  No results for input(s): WBC, HGB, HCT, PLT, HGBEXT, HCTEXT, PLTEXT, HGBEXT, HCTEXT, PLTEXT in the last 72 hours. No results for input(s): NA, K, CL, CO2, GLU, BUN, CREA, CA, MG, PHOS, ALB, TBIL, TBILI, ALT, INR, INREXT, INREXT in the last 72 hours. No lab exists for component: SGOT    CT HEAD WO CONT    Result Date: 9/16/2022  1. No evidence of acute intracranial abnormality by this modality. CT ABD PELV W CONT    Result Date: 9/16/2022  1. Ulceration overlying the sacrum/coccyx with obvious lysis of bone. 2. Soft tissue attenuation in the anterior abdominal wall, right lower quadrant and above the pubic symphysis with associated stranding. Recommend clinical correlation for possible injection sites. 3. There is a filling defect in the right atrium which could represent thrombus, bland or septic, acute or chronic versus a mass lesion such as myxoma. 4. Incidental findings as above. XR CHEST PORT    Result Date: 9/16/2022  1. No definite acute cardiopulmonary disease. 2. Right IJ approach catheter projects to terminate in the right atrium     No results found.   05/23/22    ECHO NAI W OR WO CONTRAST 06/04/2022 6/4/2022    Interpretation Summary  Formatting of this result is different from the original.      Left Ventricle: Normal left ventricular systolic function with a visually estimated EF of 55 - 60%. Left ventricle size is normal. Normal wall thickness. Normal wall motion. Mitral Valve: Mild regurgitation with a centrally directed jet. There is a small possible vegetation on the aortic side. Left Atrium: Left atrium is moderately dilated. Normal sized appendage. Decreased appendage flow velocity. No left atrial appendage thrombus noted. No left atrial appendage mass noted. Right Atrium: Right atrium is moderately dilated. Pericardium: Trivial pericardial effusion present. No indication of cardiac tamponade. Possible small mitral valve vegetation consistent with bacterial endocarditis.     Signed by: iLdya Carter MD on 6/4/2022  8:15 PM     All Micro Results       Procedure Component Value Units Date/Time    CULTURE, BLOOD, PAIRED [[id number]]  (Abnormal)  (Susceptibility) Collected: 09/16/22 1306    Order Status: Completed Specimen: Blood Updated: 09/20/22 0914     Special Requests: NO SPECIAL REQUESTS        Culture result:       PROTEUS MIRABILIS GROWING IN 1 OF 4 BOTTLES DRAWN SITE = HALL 2                  REMAINING BOTTLE(S) HAS/HAVE NO GROWTH SO FAR          BLOOD CULTURE ID PANEL [[id number]]  (Abnormal) Collected: 09/16/22 1306    Order Status: Completed Specimen: Blood Updated: 09/17/22 1646     Acc. no. from Micro Order [id number]     Enterococcus faecalis Not detected        Enterococcus faecium Not detected        Listeria monocytogenes Not detected        Staphylococcus Not detected        Staphylococcus aureus Not detected        Staph epidermidis Not detected        Staph lugdunensis Not detected        Streptococcus Not detected        Streptococcus agalactiae (Group B) Not detected        Streptococcus pneumoniae Not detected        Streptococcus pyogenes (Group A) Not detected        Acinetobacter calcoaceticus-baumanii complex Not detected        Bacteroides fragilis Not detected        Enterobacterales species Detected        Enterobacter cloacae complex Not detected        Escherichia coli Not detected        Klebsiella aerogenes Not detected        Klebsiella oxytoca Not detected        Klebsiella pneumoniae group Not detected        Proteus Detected        Salmonella Not detected        Serratia marcescens Not detected        Haemophilus influenzae Not detected        Neisseria meningitidis Not detected        Pseudomonas aeruginosa Not detected        Steno maltophilia Not detected        Candida albicans Not detected        Candida auris Not detected        Candida glabrata Not detected        Candida krusei Not detected        Candida parapsilosis Not detected        Candida tropicalis Not detected        Crypto neoformans/gattii Not detected        RESISTANT GENES:            CTX-M (ESBL resistant gene) Detected        IMP (Carbapenemase resistant gene) Not detected        KPC (Carbapenem Resistance Gene) Not detected        NDM (Carbapenemase resistant gene) Not detected        OXA-48-like (Carbapenemase resistant gene) Not detected        VIM (Carbapenemase resistant gene) Not detected        Comment       False positive results may rarely occur.  Correlate with clinical,epidemiologic, and other laboratory findings           Comment: Please see BCID Interpretation Guide in EPIC Links       MICRO TRACKING [[id number]] Collected: 09/16/22 1306    Order Status: No result Updated: 09/17/22 0657              Current Medications:     Current Facility-Administered Medications:     insulin lispro (HUMALOG) injection, , SubCUTAneous, Q6H, Vanda Wilkins, SOWMYA    amiodarone (CORDARONE) tablet 200 mg, 200 mg, Oral, BID, Gerald Gu MD, 200 mg at 09/21/22 9129    aspirin delayed-release tablet 81 mg, 81 mg, Oral, DAILY, Gerald Gu MD, 81 mg at 09/21/22 0842    atorvastatin (LIPITOR) tablet 40 mg, 40 mg, Oral, DAILY, Tommy Viera MD, 40 mg at 09/21/22 1274 pantoprazole (PROTONIX) tablet 40 mg, 40 mg, Oral, DAILY, Sisi Gu MD, 40 mg at 09/21/22 0842    sodium chloride (NS) flush 5-40 mL, 5-40 mL, IntraVENous, Q8H, Gerald Gu MD, 10 mL at 09/21/22 0601    sodium chloride (NS) flush 5-40 mL, 5-40 mL, IntraVENous, PRN, Sisi Miranda MD    acetaminophen (TYLENOL) tablet 650 mg, 650 mg, Oral, Q6H PRN **OR** acetaminophen (TYLENOL) suppository 650 mg, 650 mg, Rectal, Q6H PRN, Sisi Miranda MD    polyethylene glycol (MIRALAX) packet 17 g, 17 g, Oral, DAILY PRN, Sisi Gu MD    ondansetron (ZOFRAN ODT) tablet 4 mg, 4 mg, Oral, Q8H PRN **OR** ondansetron (ZOFRAN) injection 4 mg, 4 mg, IntraVENous, Q6H PRN, Gerald Miranda MD, 4 mg at 09/18/22 1006    [Held by provider] heparin (porcine) injection 5,000 Units, 5,000 Units, SubCUTAneous, Q8H, Gerald Gu MD    meropenem (MERREM) 500 mg in 0.9% sodium chloride (MBP/ADV) 50 mL MBP, 0.5 g, IntraVENous, Q24H, Gerald Gu MD, Last Rate: 100 mL/hr at 09/20/22 1722, 500 mg at 09/20/22 1722    midodrine (PROAMATINE) tablet 10 mg, 10 mg, Oral, TID WITH MEALS, Gerald Gu MD, 10 mg at 09/21/22 1230    glucose chewable tablet 16 g, 4 Tablet, Oral, PRN, Gerald Gu MD    glucagon (GLUCAGEN) injection 1 mg, 1 mg, IntraMUSCular, PRN, Gerald Gu MD    dextrose 10% infusion 0-250 mL, 0-250 mL, IntraVENous, PRN, Vani Thacker MD, Last Rate: 750 mL/hr at 09/21/22 0600, 125 mL at 09/21/22 0600     Procedures: see electronic medical records for all procedures/Xrays and details which were not copied into this note but were reviewed prior to creation of Plan.     Reviewed most current lab test results and cultures  YES  Reviewed most current radiology test results   YES  Review and summation of old records today    NO  Reviewed patient's current orders and MAR    YES  PMH/ reviewed - no change compared to H&P  ________________________________________________________________________  Care Plan discussed with:    Comments   Patient x    Family      RN x    Care Manager x    Consultant                       x Multidiciplinary team rounds were held today with , nursing, pharmacist and clinical coordinator. Patient's plan of care was discussed; medications were reviewed and discharge planning was addressed.      ________________________________________________________________________  Total NON critical care TIME:  16   Minutes    Total CRITICAL CARE TIME Spent:   Minutes non procedure based      Comments   >50% of visit spent in counseling and coordination of care x     This includes time during multidisciplinary rounds if indicated above   ________________________________________________________________________  Vandana Morrow MD

## 2022-09-21 NOTE — PROGRESS NOTES
1900 Bedside and Verbal shift change [facility] ([facility] by Noam Alcaraz RN (offgoing nurse). Report included the following information SBAR, Kardex, Intake/Output, MAR, Recent Results, and Cardiac Rhythm NSR/ST George Quintero RN will be charting on this pt.    2002 Per dayshit RN pt poorly tolerating PO intake. Will keep NPO and place speech evaluation. 2022 Pt scoring 5 on PAINAD scale. Only PRN coverage PO or suppository (neither appropriate d/t pt condition). Yariel Gillette NP notified by Roena Oppenheim, RN. Orders received for PRN morphine. I have reviewed and agree with all charting by Roena Oppenheim, RN.

## 2022-09-21 NOTE — PROGRESS NOTES
Physician Progress Note      Ric Mary  CSN #:                  [id number]  :                       1934  ADMIT DATE:       2022 12:51 PM  [address] [place] DATE:  RESPONDING  PROVIDER #:        George Dickson MD          QUERY TEXT:    Patient admitted with AMS. Per H&P dated , noted to also have Suspected infected sacral ulcer. If possible, please document in progress notes and discharge summary the location, present on admission status and stage of the pressure ulcer: The medical record reflects the following:  Risk Factors: Hx: Dementia; Bedbound;  Clinical Indicators: Noted: Large unstageable sacral decubitus ulcer w/o surrounding warm/erythema, dressings are CDI w/o purulence. Bilat heel decubitus ulcers. ...... Shaggy Mullen Noted per IM:  Chronic sacral ulcer; Very deep and large sacral decubitus pressure ulcer. ..CT Abd/Pelvis: ct abd/pelvis: Ulceration overlying the sacrum/coccyx with obvious lysis of bone  ? Treatment: Pillows; Turning/ Repositioning approximately q2hrs;  Wound cleaning and dressing per RN; Specialty bed; CT Abd/Pelvis    Thank you,    Ivelisse Betts  CDI    Stage 1:  Non-blanchable erythema of intact skin  Stage 2:  Abrasion, Blister, Partial-thickness skin loss, with exposed dermis  Stage 3:  Full-thickness skin loss with damage or necrosis of subcutaneous tissue  Stage 4:  Full-thickness skin & soft tissue loss through to underlying muscle, tendon or bone  Unstageable: Obscured full-thickness skin & tissue loss  Options provided:  -- Stage 3 Suspected infected sacral ulcer present on admission  -- Stage 4 Suspected infected sacral ulcer present on admission  -- Unstageable Suspected infected sacral ulcer present on admission  -- Other - I will add my own diagnosis  -- Disagree - Not applicable / Not valid  -- Disagree - Clinically unable to determine / Unknown  -- Refer to Clinical Documentation Reviewer    PROVIDER RESPONSE TEXT:    This patient has a Unstageable pressure ulcer of the Sacrum that was present on admission. Query created by: Mini Briones on 2022 8:48 PM      QUERY TEXT:    Pt admitted with AMS and has Protein calorie malnutrition documented in H&P dated . Please further specify type of malnutrition with documentation in the medical record. The medical record reflects the following:  Risk Factors: Hx: Dementia; Bedbound  Clinical Indicators: t: 95.7; hr: ; rr: 21-25; bp: 83/50 ^ 90/48 ^ 88/42 ^ 74/43 ^ 87/63; MAP: 50-62    wbc: 12.8; bands: 10.9; 10.5/34.0 ^ 7.5/24.1; Na+: 134; K+: 4.1; CO2: 19; A: gluc: 110; 9/1.43 ^ 1.26; alb: 1.2 ^ 1.8; alk phos: 235; Lac acis: 10.4; trop. hs: 22. ...... ED NOTED: Large unstageable sacral decubitus ulcer w/o surrounding warm/erythema, dressings are CDI w/o purulence. Bilat heel decubitus ulcers. Burton Castillo Noted: Protein calorie malnutrition    Treatment: Labs; IVF; Albumin; Wound Care consult; RN to document food/fluid/supplement intake; RN to offer support with meals/snacks/hydration; Thank you,    Lucrecia Joshua  CDI    ASPEN Criteria:  https://[email or web address] onlinelibrary. jaramillo. com/doi/full/10.1177/7201425640398138  Options provided:  -- Mild Protein calorie malnutrition  -- Moderate Protein calorie malnutrition  -- Severe Protein calorie malnutrition  -- Other - I will add my own diagnosis  -- Disagree - Not applicable / Not valid  -- Disagree - Clinically unable to determine / Unknown  -- Refer to Clinical Documentation Reviewer    PROVIDER RESPONSE TEXT:    This patient has severe protein calorie malnutrition.     Query created by: Mini Briones on 2022 9:03 PM      Electronically signed by:  Justin Cyr MD 2022 7:47 AM

## 2022-09-22 PROBLEM — G93.40 ACUTE ENCEPHALOPATHY: Status: ACTIVE | Noted: 2022-01-01

## 2022-09-22 NOTE — PROGRESS NOTES
Problem: Risk for Spread of Infection  Goal: Prevent transmission of infectious organism to others  Description: Prevent the transmission of infectious organisms to other patients, staff members, and visitors. Outcome: Progressing Towards Goal     Problem: Pressure Injury - Risk of  Goal: *Prevention of pressure injury  Description: Document Markos Scale and appropriate interventions in the flowsheet. Outcome: Progressing Towards Goal  Note: Pressure Injury Interventions:  Sensory Interventions: Assess changes in LOC, Assess need for specialty bed, Check visual cues for pain, Float heels, Keep linens dry and wrinkle-free, Maintain/enhance activity level, Monitor skin under medical devices, Pressure redistribution bed/mattress (bed type), Turn and reposition approx. every two hours (pillows and wedges if needed)    Moisture Interventions: Absorbent underpads, Apply protective barrier, creams and emollients, Assess need for specialty bed, Check for incontinence Q2 hours and as needed, Internal/External fecal devices, Internal/External urinary devices, Maintain skin hydration (lotion/cream), Minimize layers, Moisture barrier    Activity Interventions: Assess need for specialty bed, Increase time out of bed, Pressure redistribution bed/mattress(bed type)    Mobility Interventions: Assess need for specialty bed, Float heels, HOB 30 degrees or less, Pressure redistribution bed/mattress (bed type), Turn and reposition approx.  every two hours(pillow and wedges)    Nutrition Interventions: Document food/fluid/supplement intake, Discuss nutritional consult with provider, Offer support with meals,snacks and hydration    Friction and Shear Interventions: Apply protective barrier, creams and emollients, Feet elevated on foot rest, Foam dressings/transparent film/skin sealants, HOB 30 degrees or less, Lift sheet, Lift team/patient mobility team, Minimize layers, Transferring/repositioning devices                Problem: Falls - Risk of  Goal: *Absence of Falls  Description: Document Nicholas Sandoval Fall Risk and appropriate interventions in the flowsheet.   Outcome: Progressing Towards Goal  Note: Fall Risk Interventions:       Mentation Interventions: Adequate sleep, hydration, pain control, Bed/chair exit alarm, Door open when patient unattended, Evaluate medications/consider consulting pharmacy, More frequent rounding, Reorient patient, Room close to nurse's station, Update white board    Medication Interventions: Bed/chair exit alarm, Evaluate medications/consider consulting pharmacy    Elimination Interventions: Bed/chair exit alarm, Call light in reach, Patient to call for help with toileting needs, Toileting schedule/hourly rounds    History of Falls Interventions: Bed/chair exit alarm, Door open when patient unattended, Room close to nurse's station, Assess for delayed presentation/identification of injury for 48 hrs (comment for end date), Vital signs minimum Q4HRs X 24 hrs (comment for end date)

## 2022-09-22 NOTE — PROGRESS NOTES
Speech Pathology Note    Initial bedside swallow evaluation complete. Full note to follow. Recommend Puree/Thin Liquids with general aspiration precautions outlined below. RN educated re: SLP evaluation. Thank you.     Martínez Marquez M.S., CCC-SLP

## 2022-09-22 NOTE — PROGRESS NOTES
End of Shift Note    Bedside shift change report given to Erin Lynch (oncoming nurse) by Cole Batres RN (offgoing nurse). Report included the following information SBAR, Kardex, ED Summary, Intake/Output, MAR, Recent Results, and Cardiac Rhythm SR    Shift worked:  1900-0730     Shift summary and any significant changes:     Speech consult and NPO order placed per protocol. PAINAD scale of 5, Morphine order received. Wound care and CHG completed. No coverage needed for Q6 bs checks. Concerns for physician to address:       Zone phone for oncoming shift:          Activity:  Activity Level: Bed Rest  Number times ambulated in hallways past shift: 0  Number of times OOB to chair past shift: 0    Cardiac:   Cardiac Monitoring: Yes      Cardiac Rhythm: Sinus Rhythm    Access:  Current line(s): PIV     Genitourinary:   Urinary status: oliguric    Respiratory:   O2 Device: None (Room air)  Chronic home O2 use?: NO  Incentive spirometer at bedside: NO       GI:  Last Bowel Movement Date: 09/21/22  Current diet:  ADULT ORAL NUTRITION SUPPLEMENT Lunch; Standard High Calorie/High Protein  DIET NPO  Passing flatus: YES  Tolerating current diet: NO       Pain Management:   Patient states pain is manageable on current regimen: YES    Skin:  Markos Score: 12  Interventions: turn team, speciality bed, float heels, foam dressing, and internal/external urinary devices    Patient Safety:  Fall Score:  Total Score: 4  Interventions: bed/chair alarm  High Fall Risk: Yes    Length of Stay:  Expected LOS: 4d 19h  Actual LOS: [address], RN

## 2022-09-22 NOTE — PROGRESS NOTES
Music Therapy Assessment  [address]    Nasir Thomas [id number]     1934  80 y.o.  female    Patient Telephone Number: [phone] (home)   Baptism Affiliation: Giovanna Dumont   Language: English   Patient Active Problem List    Diagnosis Date Noted    Severe sepsis ([facility] 09/16/2022    DNR (do not resuscitate) discussion 08/05/2022    Frailty 08/04/2022    Physical debility 08/04/2022    Wound infection 08/02/2022    Goals of care, counseling/discussion     Lethargy     Advanced care planning/counseling discussion     Palliative care by specialist     DKA (diabetic ketoacidosis) ([facility] 05/23/2022        Date: 9/22/2022            Total Time (in minutes): 10          MRM 2 PROGRESSIVE CARE    Mental Status:   [x] Alert [  ] Nancye Diogenesstanley [  ]  Confused  [  ] Minimally responsive  [  ] Sleeping    Communication Status: [  ] Impaired Speech [  ] Nonverbal -N/A    Physical Status:   [  ] Oxygen in use  [  ] Hard of Hearing [  ] Vision Impaired  [  ] Ambulatory  [  ] Ambulatory with assistance [  ] Non-ambulatory -N/A    Music Preferences, Background: N/A: Please see Session Observations below. Clinical Problem addressed: N/A: Please see Session Observations below. Goal(s) met in session: N/A: Please see Session Observations below.    Physical/Pain management (Scale of 1-10):    Pre-session rating ___________    Post-session rating __________  [  ] Increased relaxation   [  ] Affected breathing patterns  [  ] Decreased muscle tension   [  ] Decreased agitation  [  ] Affected heart rate    [  ] Increased alertness     Emotional/Psychological:  [  ] Increased self-expression   [  ] Decreased aggressive behavior   [  ] Decreased feelings of stress  [  ] Discussed healthy coping skills     [  ] Improved mood    [  ] Decreased withdrawn behavior     Social:  [  ] Decreased feelings of isolation/loneliness [  ] Positive social interaction   [  ] Provided support and/or comfort for family/friends    Spiritual:  [  ] Spiritual support    [  ] Expressed peace  [  ] Expressed greta    [  ] Discussed beliefs    Techniques Utilized (Check all that apply): N/A: Please see Session Observations below. [  ] Procedural support MT [  ] Music for relaxation [  ] Patient preferred music  [  ] Shaista analysis  [  ] Song choice  [  ] Music for validation  [  ] Entrainment  [  ] Movement to music [  ] Guided visualization  [  ] Mallissa [place]  [  ] Patient instrument playing [  ] Annabella Messier writing  [  ] Guerita Segundo along   [  ] Linus Drafts  [  ] Sensory stimulation  [  ] Active Listening  [  ] Music for spiritual support [  ] Making of CDs as gifts    Session Observations:  F/up visit; Patient (pt) was lying in bed with her eyes closed when this music therapist (MT) entered the room. MT lightly knocked on the door and spoke the pt's name. Pt appeared to move her mouth in response to this. MT spoke her name again and she opened her eyes at this time. MT greeted her, introducing self and role, and asking how she was feeling. She appeared to wince but denied pain when asked. Pt then mouthed \"water\". MT shared they would contact her nurse to confirm this was ok. Pt expressed understanding in response to this. MT called the pt's nurse and she shared she would be in the pt's room in a few moments to assist with this. MT re-entered the pt's room, shared this information and asked if she would be open to a music therapy session while she waited. Pt shook her head \"no\" in response to this. MT then asked if she would like company or to be alone while she waited. She mouthed \"I'm ok\". MT expressed further understanding and exited as her RN entered to assist the pt.      Srikanth Sanchez MT-BC (Music Therapist, Board Certified)  [id number] [facility]

## 2022-09-22 NOTE — PROGRESS NOTES
Bedside and Verbal shift change report given to [address] (oncoming nurse) by Amie Duke (offgoing nurse). Report included the following information SBAR, Kardex, Intake/Output, MAR, and Quality Measures. 1800 Tried to call report to [facility] nurse in a room will call back.     Erendira Daugherty. report to Guillen Tracichester at [facility]

## 2022-09-22 NOTE — PROGRESS NOTES
Received notification from bedside RN about patient with regards to: appears uncomfortable and in pain, unable to verbalize but scoring 5 on nonverbal pain scale, no PRN except Tylenol.  Currently NPO  VS: /48, HR 91, RR 20, O2 sat 96% on RA    Intervention given: Morphine IV PRN ordered

## 2022-09-22 NOTE — PROGRESS NOTES
MICHAEL DUNN    [address] [place] [address] for Care Management     Review of the chart due to request for transfer to [address] [place] [address] for continue care and transition. The [facility] and team has approved the patient for transfer. Hospitalist to Hospitalist call has been completed. Nursing Report to  [phone]. Will assign room shortly. [employer] report to me  [phone]   Transportation to arrive onsite before 7PM if possible. If problems  with transportation let me know as soon as possible. Please give family the # to the [address] [place] [address]  [phone]  Case Management - [place] [phone].     Thanks     Party Over Here MSW RN   [phone]

## 2022-09-22 NOTE — PROGRESS NOTES
SPEECH PATHOLOGY BEDSIDE SWALLOW EVALUATION/DISCHARGE  Patient: Ravindra Jacobo [de-identified]80 y.o. female)  Date: 9/22/2022  Primary Diagnosis: Severe sepsis ([facility] [A41.9, R65.20]       Precautions:        ASSESSMENT :  Based on the objective data described below, the patient presents with moderate-moderately severe oral dysphagia and a grossly functional pharyngeal phase. Per chart review, patient not tolerating PO intake. On this date, patient Ox2 and agreeable to limited PO trials. Patient's vocal quality observed to be breathy and significantly low volume. Patient slow to initiate straw sip, but tolerated thin liquids via straw without overt difficulty or signs of aspiration. Patient with significantly prolonged oral holding with puree, leading to significantly delayed posterior propulsion, requiring verbal and tactile cues to initiate swallow to clear oral cavity. No signs of aspiration observed with any consistency tried. At this juncture, recommend Puree/Thin Liquids with 1:1 assistance/supervision with PO and aspiration precautions. Suspect this will be the most appropriate diet going forward, given overall debility and dental status. Recommend medication crushed in puree. Note plans to discharge to LTC with hospice. Recommend comfort diet/pleasure feeds given goals for comfort at the time of discharge. RN educated re: SLP evaluation. Further skilled acute therapy provided by a speech-language pathologist is not indicated at this time. Please re-consult if needs arise. PLAN :  Recommendations:  -- Puree/Thin Liquids  -- Medication Crushed in Puree  -- Small Bites/Sips  -- 1:1 Assistance/Supervision with PO  -- Strict Oral Care (check for pocketing after meals)  -- Note patient to discharge to LTC with Hospice;  Recommend comfort diet/pleasure feeds given goals for comfort at the time of discharge    Discharge Recommendations: LTC with Hospice, per chart     SUBJECTIVE:   Patient stated hospital when asked where she was. OBJECTIVE:     Past Medical History:   Diagnosis Date    A-fib ([facility]     Chronic kidney disease     Diabetes ([facility]      Past Surgical History:   Procedure Laterality Date    IR INSERT NON TUNL CVC OVER 5 YRS  5/26/2022    IR INSERT NON TUNL CVC OVER 5 YRS  6/3/2022    IR INSERT TUNL CVC W/O PORT OVER 5 YR  6/8/2022     Prior Level of Function/Home Situation:   Home Situation  Patient Expects to be Discharged to[de-identified] [address] prior to admission: Unknown as patient is a poor historian  Current Diet: NPO     Cognitive and Communication Status:  Neurologic State: Alert  Orientation Level: Oriented to person, Oriented to place  Cognition: Decreased command following     Perseveration: No perseveration noted  Safety/Judgement: Awareness of environment    Oral Assessment:  Oral Assessment  Labial: No impairment  Dentition: Limited;Natural  Oral Hygiene: Moist oral mucosa  Lingual: Decreased rate  Velum: Unable to visualize  Mandible: No impairment    P.O. Trials:  Patient Position: Upright in bed  Vocal quality prior to P.O.: Low volume  Consistency Presented: Thin liquid;Puree  How Presented: SLP-fed/presented;Spoon;Straw     Bolus Acceptance: No impairment  Bolus Formation/Control: Impaired  Type of Impairment: Delayed  Propulsion: Delayed (# of seconds)  Oral Residue: None        Aspiration Signs/Symptoms: None                      NOMS:   The NOMS functional outcome measure was used to quantify this patient's level of swallowing impairment. Based on the NOMS, the patient was determined to be at level 4 for swallow function. NOMS Swallowing Levels:  Level 1 (CN): NPO  Level 2 (CM): NPO but takes consistency in therapy  Level 3 (CL): Takes less than 50% of nutrition p.o. and continues with nonoral feedings; and/or safe with mod cues; and/or max diet restriction  Level 4 (CK):  Safe swallow but needs mod cues; and/or mod diet restriction; and/or still requires some nonoral feeding/supplements  Level 5 (CJ): Safe swallow with min diet restriction; and/or needs min cues  Level 6 (CI): Independent with p.o.; rare cues; usually self cues; may need to avoid some foods or needs extra time  Level 7 ([address] [place] [address]): Independent for all p.o.  MAG. (2003). National Outcomes Measurement System (NOMS): Adult Speech-Language Pathology User's Guide. Pain:             After treatment:   Patient left in no apparent distress in bed, Call bell within reach, and Nursing notified    COMMUNICATION/EDUCATION:   The patient's plan of care including recommendations, planned interventions, and recommended diet changes were discussed with: Registered nurse.      Thank you for this referral.  Lucas Sams, SLP  Time Calculation: 15 mins

## 2022-09-22 NOTE — DISCHARGE SUMMARY
Hospitalist Discharge Summary     Patient ID:  Ree Mcgovern  [id number]  11 y.o.  1934 9/16/2022    PCP on record: Gurjit Miller MD    Admit date: 9/16/2022  Discharge date and time: 9/22/2022    DISCHARGE DIAGNOSIS:    Septic shock  Suspected infected sacral ulcer  History of recent admission for infected sacral ulcer s/p debridement and also colostomy creation  Recent admission to the hospital for Proteus, ESBL Klebsiella and Enterococcus bacteremia  ?? right atrial thrombus on CT  Proteus bacteremia  ESRD was on hemodialysis MWF as OP prior to this admission  Anemia of chronic disease  Diabetes mellitus type 2  Hypertension  Atrial fibrillation  Dyslipidemia  GERD  Protein calorie malnutrition  Bedbound status  DNR  patient waiting to be discharged back to  [facility] with hospice when possible- for now DC to [facility] on Swing bed with Hospice as opted by [facility] daughter, NO more HD, No IV Abx, no further aggressive care or workup      CONSULTATIONS:  IP CONSULT TO HOSPITALIST  IP CONSULT TO INTENSIVIST    Excerpted HPI from H&P of Paola Mendoza MD:  Mitch.Boga y.o.   female who presents with past medical history of infected sacral ulcer, ESRD is coming the hospital from [facility] and rehab secondary to altered mental status while at dialysis center. Patient went to her dialysis center today and was noted to be altered and did not complete her dialysis session. Patient has dementia at baseline but can participate in some conversations but now is more confused and was also noted to have low blood pressure for which she was brought to the emergency department. She is currently alert awake, confused and is not able to follow commands. Further information regarding history of presenting illness is limited secondary to confusion. On arrival to ED, she was noted to be hyper tachypneic, tachycardic and blood pressure was 83/50.   On labs white blood cell count is 12.8, hemoglobin 10.5 and platelet count is 875. BMP shows sodium of 134, CO2 of 19, anion gap of 17, creatinine 1.43 and lactic acid of 10.4. CRP is 15. CT abdomen shows ulceration of the sacrum with lysis of the bone. There is a filling defect in the right atrium which could represent a thrombus and stranding in the anterior abdominal wall. We were asked to admit for work up and evaluation of the above problems. \"    ______________________________________________________________________  DISCHARGE SUMMARY/HOSPITAL COURSE:  for full details see H&P, daily progress notes, labs, consult notes. Septic shock  Suspected infected sacral ulcer  History of recent admission for infected sacral ulcer s/p debridement and also colostomy creation  Recent admission to the hospital for Proteus, ESBL Klebsiella and Enterococcus bacteremia  ?? right atrial thrombus on CT  Proteus bacteremia  -Patient recently was in the hospital due to bacteremia and also infected sacral decubitus ulcer and underwent colostomy creation and also debridement and was prescribed IV vancomycin and cefepime until 9/15  -Patient unfortunately did not follow-up with her ID on 9/13  -Currently noted to have lactic acid of 10 and there is lysis of bone on CT   On empiric antibiotics with vancomycin and meropenem. Blood culture positive for Proteus species  ID and general surgery consulted. General surgery input appreciated and noted. General surgery for sacral decubitus ulcer does not seem to be the  source  of sepsis. Surg  Have signed off   no IV fluids due to ESRD status. c/w midodrine due to hypotension. Consider starting on vasopressors if blood pressure remains low. Will blood pressure in the 120s currently   was evaluated by intensivist, does not meet ICU level of care  -echocardiogram to rule out right atrial thrombus.   Family has opted for Hospice  Hospice consulted-  patient waiting to be discharged back to  [facility] with hospice when possible- for now DC to [facility] on Swing bed with Hospice as opted by [place] daughter, NO more HD, No IV Abx, no further aggressive care or workup  Will hold off on further work-up, hold off on blood draws     ESRD was on hemodialysis MWF as OP prior to this admission  Anemia of chronic disease  Off HD since admission now- plan for hospice at [address] [place] [address] with no plans of resuming dialysis-- in the interim pt to go to [facility] swing bed   Creat 1.25  Diabetes mellitus type 2  insulin sliding scale with blood sugar checks    Hypertension  Atrial fibrillation  Dyslipidemia  GERD  Protein calorie malnutrition  Bedbound status  -Holding all home antihypertensives   -Continue home amiodarone, Lipitor, Protonix  -Consult wound care     Code Status: DNR  Surrogate Decision Maker: Granddaughter Aniceto Cuevas        _______________________________________________________________________  Patient seen and examined by me on discharge day. Pertinent Findings:  Gen:    Not in distress  Chest: Clear lungs  CVS:   Regular rhythm. No edema  Abd:  Soft, not distended, not tender  Neuro:  Alert,   _______________________________________________________________________  DISCHARGE MEDICATIONS:   Current Discharge Medication List        START taking these medications    Details   cefdinir (OMNICEF) 300 mg capsule Take 1 Capsule by mouth two (2) times a day for 5 days. Qty: 10 Capsule, Refills: 0  Start date: 9/19/2022, End date: 9/24/2022           CONTINUE these medications which have NOT CHANGED    Details   cloNIDine HCL (CATAPRES) 0.1 mg tablet Take 1 Tablet by mouth two (2) times a day. Qty: 15 Tablet, Refills: 0      amiodarone (CORDARONE) 200 mg tablet Take 1 Tablet by mouth two (2) times a day. Qty: 60 Tablet, Refills: 1      collagenase (SANTYL) 250 unit/gram ointment Apply  to affected area daily.   Qty: 15 g, Refills: 0      sodium hypochlorite (QUARTER STRENGTH DAKIN'S) 0.125 % soln external solution Apply 473 mL to affected area in the morning. Qty: 1 Each, Refills: 0      famotidine (PEPCID) 20 mg tablet       atorvastatin (LIPITOR) 20 mg tablet Take 2 Tablets by mouth daily. Qty: 30 Tablet, Refills: 1      pantoprazole (PROTONIX) 20 mg tablet Take 2 Tablets by mouth daily. Qty: 60 Tablet, Refills: 0      insulin lispro protamine/insulin lispro (HUMALOG MIX 75/25) 100 unit/mL (75-25) injection 5 Units by SubCUTAneous route Before breakfast and dinner. Qty: 1 mL, Refills: 0      aspirin delayed-release 81 mg tablet Take 81 mg by mouth daily. cholecalciferol (VITAMIN D3) (1000 Units /25 mcg) tablet Take 1,000 Units by mouth daily. STOP taking these medications       vancomycin (VANCOCIN) 1,000 mg injection Comments:   Reason for Stopping:         cefepime 1 gram IVPB Comments:   Reason for Stopping:                 Patient Follow Up Instructions:    Activity: Activity as tolerated  Diet: Comfort feeding  Wound Care: Keep wound clean and dry      Follow-up Information       Follow up With Specialties Details Why Contact Steven Alvarado MD Internal Medicine Physician   [facility]  [phone]            ________________________________________________________________    Risk of deterioration: Low    Condition at Discharge:  Stable  __________________________________________________________________    Disposition  IP Hospice at [facility] bed    ____________________________________________________________________    Code Status: DNR/DNI  ___________________________________________________________________      Total time in minutes spent coordinating this discharge (includes going over instructions, follow-up, prescriptions, and preparing report for sign off to her PCP) :  >30 minutes    Signed:  Brett Mason MD

## 2022-09-22 NOTE — PROGRESS NOTES
Transition of Care Plan:     RUR: 12%  Disposition: [employer]    Nursing Report to [phone]. Will assign room shortly. [address]  Accepting Physician: Dr. Jane Thornton      Updated Note 66 [phone] 21:  CM obtained call from Dr. Kirstie Whitley that pt had been accepted. CM received an detailed email with that pt has been accepted with [facility] and progress has been completed with details by Mrs. Jose Womack, [address] at [facility]. CM called to follow up on medicaid screening date and county. Medicaid Screening was completed on 8/30/22 and filed with [facility]. Information was provided to Mrs. Jose Womack-  at [facility]     Initial note 1500 :    CM asked to by Mic Kraus reach out to [facility] to see if they can accept pt for swing bed. CM spoke with GrandgaleughterSimoneNiraj Tracey to update on d/c plan for [facility] for Hospice. She agreed. She verbalized understanding and shared pt is no able to return home due to lack of support in the home for pt care needs. CM contact - [facility] via e-mail to review pt for transfer to [facility]. Follow up appointments: PCP, Specialist  DME needed: facility to provide  Transportation at Discharge:   Hospital to [address] [place] [address] [phone] , to provide transportation  at 6:00pm  Keys or means to access home:    n/a at facility  IM Medicare Letter: 2nd IM Letter at d/c  Caregiver Contact: SUSSY/ Ulises Hernandez- [phone]  Discharge Caregiver contacted prior to discharge? CM to discuss with  Granddafuad     Care Conference needed?:     n/a    Pt cleared for d/c from CM standpoint. Care Management Interventions  Mode of Transport at Discharge: [facility] to Home)  Transition of Care Consult (CM Consult):  Other [facility] Swing Bed)  Discharge Location  Patient Expects to be Discharged to[de-identified] Other:      [address] [place] [address] Dr MENENDEZ [facility]  Phone: [phone]

## 2022-09-22 NOTE — HOSPICE
[facility] RN note:  Note transfer to [facility]. [address] to continue to follow as pt awaits placement with medicaid pending. Please notify [address] should pt decline and require hospice involvement for acute symptom management. Thank you for the opportunity to care for this pt and family. Please contact hospice at [phone] with any questions or concerns.

## 2022-09-22 NOTE — PROGRESS NOTES
Hospitalist Progress Note    NAME: Nano Stroud   :  1934   MRN:  [id number]       Assessment / Plan:  Septic shock  Suspected infected sacral ulcer  History of recent admission for infected sacral ulcer s/p debridement and also colostomy creation  Recent admission to the hospital for Proteus, ESBL Klebsiella and Enterococcus bacteremia  ?? right atrial thrombus on CT  Proteus bacteremia  -Patient recently was in the hospital due to bacteremia and also infected sacral decubitus ulcer and underwent colostomy creation and also debridement and was prescribed IV vancomycin and cefepime until 9/15  -Patient unfortunately did not follow-up with her ID on   -Currently noted to have lactic acid of 10 and there is lysis of bone on CT   On empiric antibiotics with vancomycin and meropenem. Blood culture positive for Proteus species  ID and general surgery consulted. General surgery input appreciated and noted. General surgery for sacral decubitus ulcer does not seem to be the  source  of sepsis. Surg  Have signed off   no IV fluids due to ESRD status. c/w midodrine due to hypotension. Consider starting on vasopressors if blood pressure remains low. Will blood pressure in the 120s currently   was evaluated by intensivist, does not meet ICU level of care  -echocardiogram to rule out right atrial thrombus.   Family has opted for Hospice  Hospice consulted-  patient waiting to be discharged back to  [facility] with hospice  Will hold off on further work-up, hold off on blood draws    ESRD was on hemodialysis MWF as OP prior to this admission  Anemia of chronic disease  Off HD since admission now- plan for hospice at [address] [place] [address] with no plans of resuming dialysis   Creat 1.25  Diabetes mellitus type 2  insulin sliding scale with blood sugar checks    Hypertension  Atrial fibrillation  Dyslipidemia  GERD  Protein calorie malnutrition  Bedbound status  -Holding all home antihypertensives   -Continue home amiodarone, Lipitor, Protonix  -Consult wound care     Code Status: DNR  Surrogate Decision Maker: Granddaughter Miguelito  DVT Prophylaxis: Lovenox   Baseline: From [facility] nursing [facility], has dementia at baseline, has chronic sacral decubitus ulcer, dependent on others for activities of daily living    Recommended Disposition: Back to LTC facility with hospice/Comfort care when arranged/confirmed  Anticipated Discharge Date: cleared for DC to LTC with hospice  Dispo : Hospice at [facility] arranged     Subjective: Follow-up sepsis  Awaiting to be discharged back to [facility] with hospice   awake alert    Review of Systems:  Symptom Y/N Comments  Symptom Y/N Comments   Fever/Chills    Chest Pain     Poor Appetite    Edema     Cough    Abdominal Pain     Sputum    Joint Pain     SOB/BEE    Pruritis/Rash     Nausea/vomit    Tolerating PT/OT n    Diarrhea    Tolerating Diet y    Constipation    Other       PO intake: No data found. Wt Readings from Last 10 Encounters:   09/19/22 57.2 kg (126 lb)   08/14/22 68.2 kg (150 lb 4.8 oz)   05/26/22 82.6 kg (182 lb 1.6 oz)       Objective:     VITALS:   Last 24hrs VS reviewed since prior progress note.  Most recent are:  Patient Vitals for the past 24 hrs:   Temp Pulse Resp BP SpO2   09/22/22 1046 97.4 °F (36.3 °C) 83 16 (!) 130/44 100 %   09/22/22 0737 97.8 °F (36.6 °C) 88 15 (!) 130/39 100 %   09/22/22 0327 97.4 °F (36.3 °C) 95 13 (!) 129/53 100 %   09/22/22 0016 97.3 °F (36.3 °C) 96 17 (!) 92/55 100 %   09/21/22 1920 97.5 °F (36.4 °C) 91 20 (!) 133/48 96 %   09/21/22 1456 98.2 °F (36.8 °C) (!) 101 18 (!) 125/50 100 %   09/21/22 1230 -- 96 16 (!) 145/73 100 %         Intake/Output Summary (Last 24 hours) at 9/22/2022 1154  Last data filed at 9/21/2022 2215  Gross per 24 hour   Intake --   Output 25 ml   Net -25 ml          I had a face to face encounter, and independently examined this patient as outlined below:    PHYSICAL EXAM:  General:    No distress     HEENT: Atraumatic, anicteric sclerae, pink conjunctivae, MMM  Neck:  Supple, symmetrical  Lungs:   CTA. No Wheezing/Rhonchi. No rales. No tenderness. No Accessory muscle use. CVS:   Regular rhythm. No murmur. No JVD   GI/:   Soft. NT. ND. BS normal  Extremities: No edema. No cyanosis. No clubbing. Skin:     Not pale. Not Jaundiced. No rashes   Psych:  Poor insight. Not depressed. Not anxious or agitated. Neurologic: Alert and awake. EOMs intact. No facial asymmetry. No slurred speech. Symmetrical strength, Sensation grossly intact. Labs     I reviewed today's most current labs and imaging studies. Pertinent labs include:  No results for input(s): WBC, HGB, HCT, PLT, HGBEXT, HCTEXT, PLTEXT, HGBEXT, HCTEXT, PLTEXT in the last 72 hours. No results for input(s): NA, K, CL, CO2, GLU, BUN, CREA, CA, MG, PHOS, ALB, TBIL, TBILI, ALT, INR, INREXT, INREXT in the last 72 hours. No lab exists for component: SGOT    CT HEAD WO CONT    Result Date: 9/16/2022  1. No evidence of acute intracranial abnormality by this modality. CT ABD PELV W CONT    Result Date: 9/16/2022  1. Ulceration overlying the sacrum/coccyx with obvious lysis of bone. 2. Soft tissue attenuation in the anterior abdominal wall, right lower quadrant and above the pubic symphysis with associated stranding. Recommend clinical correlation for possible injection sites. 3. There is a filling defect in the right atrium which could represent thrombus, bland or septic, acute or chronic versus a mass lesion such as myxoma. 4. Incidental findings as above. XR CHEST PORT    Result Date: 9/16/2022  1. No definite acute cardiopulmonary disease. 2. Right IJ approach catheter projects to terminate in the right atrium     No results found.   05/23/22    ECHO NAI W OR WO CONTRAST 06/04/2022 6/4/2022    Interpretation Summary  Formatting of this result is different from the original.      Left Ventricle: Normal left ventricular systolic function with a visually estimated EF of 55 - 60%. Left ventricle size is normal. Normal wall thickness. Normal wall motion. Mitral Valve: Mild regurgitation with a centrally directed jet. There is a small possible vegetation on the aortic side. Left Atrium: Left atrium is moderately dilated. Normal sized appendage. Decreased appendage flow velocity. No left atrial appendage thrombus noted. No left atrial appendage mass noted. Right Atrium: Right atrium is moderately dilated. Pericardium: Trivial pericardial effusion present. No indication of cardiac tamponade. Possible small mitral valve vegetation consistent with bacterial endocarditis.     Signed by: Gus Dawn MD on 6/4/2022  8:15 PM     All Micro Results       Procedure Component Value Units Date/Time    CULTURE, BLOOD, PAIRED [[id number]]  (Abnormal)  (Susceptibility) Collected: 09/16/22 1306    Order Status: Completed Specimen: Blood Updated: 09/22/22 0800     Special Requests: NO SPECIAL REQUESTS        Culture result:       PROTEUS MIRABILIS GROWING IN 1 OF 4 BOTTLES DRAWN SITE = HALL 2                  REMAINING BOTTLE(S) HAS/HAVE NO GROWTH IN 5 DAYS          BLOOD CULTURE ID PANEL [[id number]]  (Abnormal) Collected: 09/16/22 1306    Order Status: Completed Specimen: Blood Updated: 09/17/22 1646     Acc. no. from Micro Order [id number]     Enterococcus faecalis Not detected        Enterococcus faecium Not detected        Listeria monocytogenes Not detected        Staphylococcus Not detected        Staphylococcus aureus Not detected        Staph epidermidis Not detected        Staph lugdunensis Not detected        Streptococcus Not detected        Streptococcus agalactiae (Group B) Not detected        Streptococcus pneumoniae Not detected        Streptococcus pyogenes (Group A) Not detected        Acinetobacter calcoaceticus-baumanii complex Not detected        Bacteroides fragilis Not detected        Enterobacterales species Detected        Enterobacter cloacae complex Not detected        Escherichia coli Not detected        Klebsiella aerogenes Not detected        Klebsiella oxytoca Not detected        Klebsiella pneumoniae group Not detected        Proteus Detected        Salmonella Not detected        Serratia marcescens Not detected        Haemophilus influenzae Not detected        Neisseria meningitidis Not detected        Pseudomonas aeruginosa Not detected        Steno maltophilia Not detected        Candida albicans Not detected        Candida auris Not detected        Candida glabrata Not detected        Candida krusei Not detected        Candida parapsilosis Not detected        Candida tropicalis Not detected        Crypto neoformans/gattii Not detected        RESISTANT GENES:            CTX-M (ESBL resistant gene) Detected        IMP (Carbapenemase resistant gene) Not detected        KPC (Carbapenem Resistance Gene) Not detected        NDM (Carbapenemase resistant gene) Not detected        OXA-48-like (Carbapenemase resistant gene) Not detected        VIM (Carbapenemase resistant gene) Not detected        Comment       False positive results may rarely occur.  Correlate with clinical,epidemiologic, and other laboratory findings           Comment: Please see BCID Interpretation Guide in EPIC Links       MICRO TRACKING [[id number]] Collected: 09/16/22 1306    Order Status: No result Updated: 09/17/22 0657              Current Medications:     Current Facility-Administered Medications:     insulin lispro (HUMALOG) injection, , SubCUTAneous, Q6H, Vanda Wilkins, NP    morphine injection 1 mg, 1 mg, IntraVENous, Q6H PRN, Vanda Wilkins NP, 1 mg at 09/21/22 2052    amiodarone (CORDARONE) tablet 200 mg, 200 mg, Oral, BID, Gerald Gu MD, 200 mg at 09/22/22 8834    aspirin delayed-release tablet 81 mg, 81 mg, Oral, DAILY, Trixie Andersen MD, 81 mg at 09/22/22 2050    atorvastatin (LIPITOR) tablet 40 mg, 40 mg, Oral, DAILY, Fabien Zhou MD, 40 mg at 09/22/22 0929    pantoprazole (PROTONIX) tablet 40 mg, 40 mg, Oral, DAILY, Fabien Zhou MD, 40 mg at 09/21/22 0842    sodium chloride (NS) flush 5-40 mL, 5-40 mL, IntraVENous, Q8H, Gerald Gu MD, 10 mL at 09/22/22 0615    sodium chloride (NS) flush 5-40 mL, 5-40 mL, IntraVENous, PRN, Fabien Zhou MD    acetaminophen (TYLENOL) tablet 650 mg, 650 mg, Oral, Q6H PRN **OR** acetaminophen (TYLENOL) suppository 650 mg, 650 mg, Rectal, Q6H PRN, Fabien Zhou MD    polyethylene glycol (MIRALAX) packet 17 g, 17 g, Oral, DAILY PRN, Fabien Gu MD    ondansetron (ZOFRAN ODT) tablet 4 mg, 4 mg, Oral, Q8H PRN **OR** ondansetron (ZOFRAN) injection 4 mg, 4 mg, IntraVENous, Q6H PRN, Trixie Andersen MD, 4 mg at 09/22/22 0117    [Held by provider] heparin (porcine) injection 5,000 Units, 5,000 Units, SubCUTAneous, Q8H, Gerald Gu MD    meropenem (MERREM) 500 mg in 0.9% sodium chloride (MBP/ADV) 50 mL MBP, 0.5 g, IntraVENous, Q24H, Gerald Gu MD, Last Rate: 100 mL/hr at 09/21/22 1733, 500 mg at 09/21/22 1733    midodrine (PROAMATINE) tablet 10 mg, 10 mg, Oral, TID WITH MEALS, Gerald Zhou MD, 10 mg at 09/22/22 0929    glucose chewable tablet 16 g, 4 Tablet, Oral, PRN, Gerald Gu MD    glucagon (GLUCAGEN) injection 1 mg, 1 mg, IntraMUSCular, PRN, Gerald Gu MD    dextrose 10% infusion 0-250 mL, 0-250 mL, IntraVENous, PRN, Trixie Andersen MD, Last Rate: 750 mL/hr at 09/21/22 0600, 125 mL at 09/21/22 0600     Procedures: see electronic medical records for all procedures/Xrays and details which were not copied into this note but were reviewed prior to creation of Plan.     Reviewed most current lab test results and cultures  YES  Reviewed most current radiology test results   YES  Review and summation of old records today    NO  Reviewed patient's current orders and MAR    YES  PMH/SH reviewed - no change compared to H&P  ________________________________________________________________________  Care Plan discussed with:    Comments   Patient x    Family      RN x    Care Manager x    Consultant                       x Multidiciplinary team rounds were held today with , nursing, pharmacist and clinical coordinator. Patient's plan of care was discussed; medications were reviewed and discharge planning was addressed.      ________________________________________________________________________  Total NON critical care TIME:  16   Minutes    Total CRITICAL CARE TIME Spent:   Minutes non procedure based      Comments   >50% of visit spent in counseling and coordination of care x     This includes time during multidisciplinary rounds if indicated above   ________________________________________________________________________  Darrell Burgess MD

## 2022-09-23 NOTE — PROGRESS NOTES
0700: Report from [facility]. Pt resting comfortably in bed. Orders reviewed and skin assessed at bedside. Pt grimacing when moving. 1030: IDR complete. Comfort measures ordered including PRN pain meds. Will insert Gee. 1245: Morphine given and Gee inserted. 1430: Sleeping.     1600: Sleeping. 1800: Lurdes at bedside to assess wounds.

## 2022-09-23 NOTE — PROGRESS NOTES
Comprehensive Nutrition Assessment     Type and Reason for Visit: Initial, consult       Nutrition Recommendations/Plan:   Continue current diet and supplements. SLP  has recommended puree with thin liquids  Supplements ordered for meal trays. Nutrition Assessment:    Chart reviewed; patient tx'd to [facility] from [address], was admitted there 9/16-9/22. Pt gained 6# wuile at [address]. Continue liberalized diet to help encourage PO intake as well as supplements BID to promote wound healing. Encourage intake of meals/ONS. Asp precautions. Hospice planned, family doesn't want HD or abx. Hx notable for DM, dementia. Malnutrition Assessment:  Malnutrition Status: Moderate malnutrition (08/03/22 1114)    Context:  Acute illness     Findings of the 6 clinical characteristics of malnutrition:   Energy Intake:  75% or less of est energy req for 7 or more days  Weight Loss:  Greater than 5% over 1 month     Body Fat Loss:  Mild body fat loss,     Muscle Mass Loss:  Mild muscle mass loss,    Fluid Accumulation:  No significant fluid accumulation,     Strength:  Not performed      Nutrition Related Findings:    K+ 3.2, -[phone]  BM 8/12  Atorvastatin, Famotidine, Lantus, Humalog, Midodrine  Wound Type: sacral Stage IV, Unstageable bilat heels     Current Nutrition Intake & Therapies:  ADULT DIET Puree  ADULT ORAL NUTRITION SUPPLEMENT Lunch dinner; 4 oz  ADULT ORAL NUTRITION SUPPLEMENT Breakfast, pudding     Anthropometric Measures:  Height: 5' 4\" (162.6 cm)  Ideal Body Weight (IBW): 126 lbs (57.2 kg)  Current Body Wt:  68.9 kg (151 lb 14.4 oz), 118.5 % IBW. Current BMI (kg/m2): 26.1  BMI Category: Overweight (BMI 25.0-29. 9)     Estimated Daily Nutrient Needs:  Energy Requirements Based On: Kcal/kg  Weight Used for Energy Requirements: Current  Energy (kcal/day): 6849-5920 kcals (25-30 kcal/kg)  Weight Used for Protein Requirements: Current  Protein (g/day): 104g (1.5 g/kg bw)  Method Used for Fluid Requirements: 1 ml/kcal  Fluid (ml/day): 1800 mL     Nutrition Diagnosis:   Increased nutrient needs related to  (wound healing, wt loss) as evidenced by  (stage 4 PI, wt loss)     Nutrition Interventions:   Food and/or Nutrient Delivery: Continue current diet, Continue oral nutrition supplement  Nutrition Education/Counseling: No recommendations at this time  Coordination of Nutrition Care: Continue to monitor while inpatient     Goals:  Previous Goal Met: Progressing toward goal(s)  Goals:  (PO intake >50% of meals/ONS next 3-5 days)     Nutrition Monitoring and Evaluation:   Behavioral-Environmental Outcomes: None identified  Food/Nutrient Intake Outcomes: Food and nutrient intake, Supplement intake  Physical Signs/Symptoms Outcomes: Biochemical data, Skin, Weight, Fluid status or edema, Hemodynamic status     Discharge Planning:    Continue current diet, Continue oral nutrition supplement     Dale Canela RD Sept 23, 2022 7:33 AM

## 2022-09-23 NOTE — HOSPICE
Hospice Liaison Note:    Chart reviewed for updates in plan of care. Plan: Hospice support available ongoing. Please call Hospice team to offer support for patient, family or staff. Thank you for your coordination with the hospice plan of care. 12:53: New Consult Referral received by [address] [place] [address] team from [facility], [address] Call and left voicemail for Grayson Juarez.       Liss Torres RN, [place] [address] Nurse Liaison  [phone] [place]  [phone] Office

## 2022-09-23 NOTE — H&P
History and Physical  NAME: Nasir Thomas  MRN: [id number]  YOB: 1934  AGE: 80 y.o.  female  SOCIAL SECURITY NUMBER: [id number]  Primary Care Provider: Alissa Gordon MD  CODE STATUS: DNR      ASSESSMENT/PLAN:    1. End stage renal disease. Family has opted for hospice and does not want any further dialysis. 2. Stage IV sacral ulcer, larger than a fist. Patient is to be in hospice, and no further aggressive treatment is designed by family. 3. Dementia. 4.  Nonstageable bilateral heel ulcers. 5.  Paroxysmal atrial fibrillation. Continue amiodarone for now. Will defer to hospice team regarding management of medications. 6.  Essential hypertension. Continue home antihypertensive medications for now. Again, will defer to hospice team regarding further management of medications. History of Presenting Illness:       REVIEW OF OLD RECORDS:  Patient is an 58-year-old female who is being admitted on hospice has opted by granddaughter. They do not want any more hemodialysis, IV antibiotics or further aggressive care or work up. She was hospitalized at [facility] from 9/16 until today, 9/22. As per documentation from [facility], patient went to her dialysis center on 9/16 and was found to be altered, and could not complete her dialysis session. She has dementia and baseline, but can communicate verbally, being able to make her needs known to a certain extent. Upon arrival to [facility] on 9/16, patient was noted to have low blood pressure. She was tachypnea, tachycardic with blood pressure 83/50. WBC was 12.8, hemoglobin 10.5, platelet count 904,078. BMP showed sodium 134, CO2 19, and I get 17, creatinine 1.43, lactic acid 10.4, CRP 15. CT of the abdomen/pelvis showed alteration of the sacrum with lysis of the bone.  There was feeling defect in the right atrium which could represent a thrombus and stranding in the anterior abdominal wall. Patient was diagnosed with septic shock with suspected infected sacral ulcer. She had recently gotten a diverting Colostomy for infected sacral ulcer, s/p debridement of the ulcer on 8/4/22 during hospitalization in August at [facility]. During this hospitalization at [facility] from 8/2/22 until 8/15/22, patient was found to have bacteremia. This was with Proteus, ESBL Klebsiella and enterococcus. Family reported that she had had the chronic sacral decubitus ulcer for many months. There was a question of right atrial thrombus per CT scan. An echocardiogram that was performed at [facility] on 9/19/22 which does not show any thrombu. Patient was on antibiotics with IV vancomycin and IV cefepime until 9/15, which was just prior to this admission on 9/16. She had not followed up with infectious disease specialist on 9/13/22. Patient was noted to have lactic acid levels of 10 with lysis of bone per CT scan. They started her on empiric antibiotics with IV vancomycin and Ivy meropenem at [facility] upon arrival on 9/16. Blood cultures were positive for Proteus species. Infectious disease and general surgery consultations were requested. Sacral decubitus ulcer did not seem to be the source of sepsis. General surgery signed off. Patient was not given IV fluids due to her in stage renal disease. She was being given midodrine  due to the hypotension. She ended up not getting any vasopressors, and did not meet criteria for ICU level of care. Subjective: On exam now, patient is alert, oriented to person, month. She knew that she was in a Nursing home prior to being hospitalized recently. She did not know the year. Patient denied any pain anywhere including the sacrum. However, she did demonstrate some memory issues. For example, she stated that her mother was still living. Her sacral decubitus ulcer is larger than a fist, going down to the bone.   She also has an unstageable heel ulcers bilaterally, with the left heel having eschar in the entirety of its posterior aspect, and the right heel having eschar in the posteromedial aspect with otherwise pink tissue in the rest of the posterior aspect of the heel. Patient states that she was not walking even before her hospitalization, and has been wheelchair-bound for quite some time. She was able to lift up her upper extremities, but could not lift up her lower extremities. Review of Systems:  REVIEW OF SYSTEMS:     I am not able to complete the review of systems because: The patient is intubated and sedated     The patient has altered mental status due to his acute medical problems     The patient has baseline aphasia from prior stroke(s)    x The patient has baseline dementia and is not reliable historian     The patient is in acute medical distress and unable to provide information               Past Medical History:   Diagnosis Date    Diabetes ([facility]     End-stage renal disease on hemodialysis ([facility] [address]     Essential hypertension     GERD (gastroesophageal reflux disease)     Paroxysmal atrial fibrillation ([facility] [address]         PAST SURGICAL HISTORY:   Past Surgical History:   Procedure Laterality Date    IR INSERT NON TUNL CVC OVER 5 YRS  5/26/2022    IR INSERT NON TUNL CVC OVER 5 YRS  6/3/2022    IR INSERT TUNL CVC W/O PORT OVER 5 YR  6/8/2022       Prior to Admission medications    Medication Sig Start Date End Date Taking? Authorizing Provider   cefdinir (OMNICEF) 300 mg capsule Take 1 Capsule by mouth two (2) times a day for 5 days. 9/19/22 9/24/22  Ana Laura Ramos MD   cloNIDine HCL (CATAPRES) 0.1 mg tablet Take 1 Tablet by mouth two (2) times a day. 8/15/22   Elvis Nowak MD   amiodarone (CORDARONE) 200 mg tablet Take 1 Tablet by mouth two (2) times a day. 8/12/22   Elvis Nowak MD   collagenase (SANTYL) 250 unit/gram ointment Apply  to affected area daily.  8/11/22   Elvis Nowak MD   sodium hypochlorite (QUARTER STRENGTH DAKIN'S) 0.125 % soln external solution Apply 473 mL to affected area in the morning. 8/11/22   Tiffanie Mike MD   famotidine (PEPCID) 20 mg tablet  4/30/22   Marcia Mahmood MD   atorvastatin (LIPITOR) 20 mg tablet Take 2 Tablets by mouth daily. 6/15/22   Venice Langford MD   pantoprazole (PROTONIX) 20 mg tablet Take 2 Tablets by mouth daily. 6/15/22   Venice Langford MD   insulin lispro protamine/insulin lispro (HUMALOG MIX 75/25) 100 unit/mL (75-25) injection 5 Units by SubCUTAneous route Before breakfast and dinner. 6/15/22   Venice Langford MD   aspirin delayed-release 81 mg tablet Take 81 mg by mouth daily. Provider, Historical   cholecalciferol (VITAMIN D3) (1000 Units /25 mcg) tablet Take 1,000 Units by mouth daily. Provider, Historical       No Known Allergies     History reviewed. No pertinent family history. Social History     Tobacco Use    Smoking status: Former     Types: Cigarettes    Smokeless tobacco: Never   Substance Use Topics    Alcohol use: Not Currently    Drug use: Not Currently       Physical exam  Patient Vitals for the past 24 hrs:   BP Temp Pulse Resp SpO2 Oxygen therapy   09/22/22 2053 -- -- -- -- 100 % Room air   09/22/22 2051 125/69 98.3 °F (36.8 °C) 100 16 100 % Room air     Estimated body mass index is 21.63 kg/m² as calculated from the following:    Height as of 9/19/22: 5' 4\" (1.626 m). Weight as of 9/19/22: 57.2 kg (126 lb). General: In no acute distress. Well developed, well nourished. Head: Normocephalic, atraumatic. Eyes: Anicteric sclera. PERRL. Extraocular muscles intact. ENT: External ears and nose appear normal.    Neck: Supple. No jugular venous distention. Heart: Regular rate and rhythm. No murmurs appreciated. Chest: Symmetrical excursion. Clear to auscultation bilaterally. Abdomen: Soft, nontender. No abnormal distention. Bowel sounds are present throughout. Extremities:   2-3+ pitting edema in the lower extremities, no cyanosis.     Neurological: Patient demonstrates antigravity strength in bilateral upper extremities, but could not lift up her lower extremities, even a little bit off the bed. Alert, oriented to person, month but not to year. Patient knew that she had been in a nursing home (prior to being admitted to [facility])  Skin:  No rashes. sacral decubitus ulcer is larger than a fist, going down to the bone. She also has an unstageable heel ulcers bilaterally, with the left heel having eschar in the entirety of its posterior aspect, and the right heel having eschar in the posteromedial aspect with otherwise pink tissue in the rest of the posterior aspect of the heel. Recent Results (from the past 24 hour(s))   GLUCOSE, POC    Collection Time: 09/22/22 12:51 PM   Result Value Ref Range    Glucose (POC) 185 (H) 65 - 117 mg/dL    Performed by Manuel Rene, POC    Collection Time: 09/22/22  6:32 PM   Result Value Ref Range    Glucose (POC) 174 (H) 65 - 117 mg/dL    Performed by Manuel Rene, POC    Collection Time: 09/22/22 10:58 PM   Result Value Ref Range    Glucose (POC) 197 (H) 65 - 117 mg/dL    Performed by Jai Mckeon RN    COVID-19 WITH INFLUENZA A/B    Collection Time: 09/23/22  5:48 AM   Result Value Ref Range    SARS-CoV-2 by PCR Not detected NOTD      Influenza A by PCR Not detected      Influenza B by PCR Not detected         ECHO ADULT COMPLETE  Formatting of this result is different from the original.      Left Ventricle: Normal left ventricular systolic function with a   visually estimated EF of 55 - 60%. Left ventricle size is normal. Normal   wall thickness. Normal wall motion. Abnormal diastolic function. Right Ventricle: Right ventricle is mildly dilated. Tricuspid Valve: Moderate regurgitation. 09/16/22    ECHO ADULT COMPLETE 09/19/2022 9/19/2022    Interpretation Summary    Left Ventricle: Normal left ventricular systolic function with a visually estimated EF of 55 - 60%.  Left ventricle size is normal. Normal wall thickness. Normal wall motion. Abnormal diastolic function. Right Ventricle: Right ventricle is mildly dilated. Tricuspid Valve: Moderate regurgitation.     Signed by: Ford Oh III, DO on 9/19/2022  1:26 PM

## 2022-09-23 NOTE — PROGRESS NOTES
BROCK    RUR- 12%        Plan  Disposition-hospice LTC  PINA-completed  Transportation- to be arranged    [facility] for continue care and transition. [employer] following up on Medicaid [place] that  completed on 8/30/22 and filed with [facility]--application denied    Cassia Schwartz [phone]            Ongoing assessment, CM to call granddaughter Michel Valerio. Addendum 3:18 pm    CM called "[facility] and rehab [phone] and spoke with [facility] in Admissions. [facility] stated Medicaid application was faxed, will inquire about T# and call CM back. Reason for Admission:                       RUR Score:   12%                  Plan for utilizing home health:  N/A        PCP: First and Last name:  Maria C Benítez MD     Name of Practice: N/A   Are you a current patient: Yes/No: N/A   Approximate date of last visit: N/A   Can you participate in a virtual visit with your PCP: N/A                    Current Advanced Directive/Advance Care Plan: DNR      Healthcare Decision Maker:   Click here to complete [address] including selection of the Healthcare Decision Maker Relationship (ie \"Primary\")             Primary Decision Maker (Active): Cami Morton - [phone]                  Transition of Care Plan:                      CM called pt's granddaughter Michel Valerio and introduced self, role and conducted initial assessment. Michel Valerio provided pt's demographic information. CM verbally explained PINA letter with granddaughter, she gave verbal okay and asked for CM to leave copy in pt's room. Copy to pt chart and in pt's room. Michel Valerio stated "[facility] and rehab completed Medicaid Application, unsure of T#.       Care Management Interventions  PCP Verified by CM: Yes  Mode of Transport at Discharge:  Other (see comment)  Transition of Care Consult (CM Consult): Discharge Planning, Other, [address] (Hospice)  MyChart Signup: Yes  Support Systems: Other Family Member(s)  Confirm Follow Up Transport: Other (see comment)  The Plan for Transition of Care is Related to the Following Treatment Goals : LTC hospice  Discharge Location  Patient Expects to be Discharged to[de-identified] [address]      Readmission Assessment  Previous disposition:  (N/A)  Who is being interviewed?:  (N/A)  What was the patient's/caregiver's perception as to why they think they needed to return back to the hospital?:  (N/A)  Did you visit your Primary Care Physician after you left the hospital, before you returned this time?:  (N/A)  Did you see a specialist, such as Cardiac, Pulmonary, Orthopedic Physician, etc. after you left the hospital?:  (N/A)  Who advised the patient to return to the hospital?:  (N/A)  Does the patient report anything that got in the way of taking their medications?:  (N/A)  In our efforts to provide the best possible care to you and others like you, can you think of anything that we could have done to help you after you left the hospital the first time, so that you might not have needed to return so soon?:  (N/A)    Diamond Crimes, [facility]

## 2022-09-23 NOTE — PROGRESS NOTES
Hospitalist Progress Note    NAME: Beau Grijalva   :  1934   MRN:  [id number]   Room Number:  171/03  @ [facility]     Please note that this dictation was completed with Veena Moulton, the computer voice recognition software. Quite often unanticipated grammatical, syntax, homophones, and other interpretive errors are inadvertently transcribed by the computer software. Please disregard these errors. Please excuse any errors that have escaped final proofreading. Interim Hospital Summary: 80 y.o. female whom presented on 2022 with      Assessment / Plan:  Anticipated discharge date :   Anticipated disposition :   Barriers to discharge :     End stage renal disease. Family has opted for hospice and does not want any further dialysis. Stage IV sacral ulcer  Dementia. Nonstageable bilateral heel ulcers. Paroxysmal atrial fibrillation. Essential hypertension.    - comfort measures  - discontinue insulin, statin, aspirin, protonix  - Medications for comfort           Code status: DNR  Prophylaxis:  comfort measures   Recommended Disposition:  Hospice      Subjective:     Chief Complaint / Reason for Physician Visit  Resting comfortably in bed. Discussed with RN events overnight. Review of Systems:  No fevers, chills, appetite change, cough, sputum production, shortness of breath, dyspnea on exertion, nausea, vomitting, diarrhea, constipation, chest pain, leg edema, abdominal pain, joint pain, rash, itching. Objective:     VITALS:   Last 24hrs VS reviewed since prior progress note. Most recent are:  Patient Vitals for the past 24 hrs:   Temp Pulse Resp BP SpO2   22 0729 97.4 °F (36.3 °C) 94 18 120/63 100 %   22 -- -- -- -- 100 %   22 98.3 °F (36.8 °C) 100 16 125/69 100 %     No intake or output data in the 24 hours ending 22 0944     PHYSICAL EXAM:  General: Alert, cooperative, no acute distress    EENT:  EOMI. Anicteric sclerae. MMM  Resp:  CTA bilaterally, no wheezing or rales. No accessory muscle use  CV:  Regular  rhythm,  normal S1/S2, no murmurs rubs gallops, No edema  GI:  Soft, Non distended, Non tender. +Bowel sounds  Neurologic:  Sleepy but arousable, normal speech,   Psych:   Fair insight. Not anxious nor agitated  Skin:  No rashes. No jaundice    Reviewed most current lab test results and cultures  YES  Reviewed most current radiology test results   YES  Review and summation of old records today    NO  Reviewed patient's current orders and MAR    YES  PMH/SH reviewed - no change compared to H&P  ________________________________________________________________________  Care Plan discussed with:    Comments   Patient x    Family      RN x    Care Manager x    Consultant                       x Multidiciplinary team rounds were held today with , nursing, pharmacist and clinical coordinator. Patient's plan of care was discussed; medications were reviewed and discharge planning was addressed. ________________________________________________________________________  Total NON critical care TIME:  25   Minutes    Total CRITICAL CARE TIME Spent:   Minutes non procedure based      Comments   >50% of visit spent in counseling and coordination of care x    ________________________________________________________________________  Leonie Reeves MD     Procedures: see electronic medical records for all procedures/Xrays and details which were not copied into this note but were reviewed prior to creation of Plan. LABS:  I reviewed today's most current labs and imaging studies. Pertinent labs include:  No results for input(s): WBC, HGB, HCT, PLT, HGBEXT, HCTEXT, PLTEXT in the last 72 hours. No results for input(s): NA, K, CL, CO2, GLU, BUN, CREA, CA, MG, PHOS, ALB, TBIL, TBILI, ALT, INR, INREXT in the last 72 hours.     No lab exists for component: SGOT    Signed: Leonie Reeves MD Name band;

## 2022-09-23 NOTE — PROGRESS NOTES
2019) Patient arrived at the unit at this time via stretcher. 2053) Admission assessment done. Patient is Aox1 (self). Patient was unable to answer some admission question. VS taken. Dual skin check done with Chandana Valentin, Nursing Supervisor. Patient has multiple redness, swollen and discharging  clear on upper extremities -foam dressing applied, Right scapula area redness- foam dressing applied, Blister on left leg - foam dressing applied,Groin excoriation, Bilateral heel DTI - heels elevated on pillows, BLE pitting edema, teri, Unstageable sacral wound with gauze packing applied, Colostomy in place. Generalized bodily edema, BUE pitting edema. Patient is on room air with saturation of 100%. 20 G on right AC, Patient has double lumen right Permacath for dialysis. Dysphagia swallow screen was done. Patient passed with 3 oz of water. Patient is made comfortable in bed. Call light within reach. MD to be notified of patient being admitted. No distress/discomfort at this time. 2133) Good evening  Patient was admitted at 2022 from [facility] with Altered Mental Status that occurred while having a Dialysis. Please patient is ready for physician' assessment. Writer has the Tele-robot in the patient's room for the assessment and also ordered for her. Thank you.    2247) Please Bill Del Toro is still waiting regarding the earlier message sent for new orders for this patient. Thank you.    2258) Accu-check done. BG level is 197 mg/dl. 2247) Please Bill Del Toro is still waiting regarding the earlier message sent for new orders for this patient. Thank you.    2307) Please Ashley Del Toro saw in the new orders that, you ordered for full code for this patient. Patient is a DNR with copy in the Chart and not Full code. 0025) Patient was rounded on. Patient is resting comfortably in bed. No sign of distress at this time. 0100) Wound care was done. Wound had multiple gauze packing, bleeding, serosanguinous drainage, tunneled. New gauze dressings were packed in the wound with abdominal pad applied and taped to secure. Patient will needs a new mattress, intensive wound care. 0126) Please Dr, can I get an order to test patient for Covid-19 as a rule out on first day of admission. Thank you.    0200) Patient was rounded on. Patient is sleeping. No distress noted. 4577)  Patient was rounded on. Patient is sleeping comfortably in bed. Vitals not taken due to being on extended stay status. No additional needs at this time. [id number]) MD placed the order for rapid Covid -19 test at this time. 0530) MD called via Tele-robot to assess patient. Patient was able to some questions. MD stated that, patient may need to be transferred to [facility] for proper wound intervention for the sacral wound. 6510) Morning labs results is in with potassium of 2.9. Writer [facility] due to MD not available. 0715) Bedside and Verbal shift change report given to [address] [place] [address] (oncoming nurse) by Ervin Allred (offgoing nurse). Report included the following information SBAR, Kardex, Intake/Output, MAR and Recent Results. 0715) AM nurse was notified. Writer tried to Whois AM doctor. Physician on duty today is Dr Zoe Gambino. Writer keep seeing AM Dr as Dr Trevor Khan instead of Dr Zoe Gambino on the [employer]. Writer went to the physician's office to talk to AM , but no Dr available in the office. AM nurse notified of it. but message will be sent to the     [id number]) Miguel gomez Dr, Patient was admitted couple of days ago with physical deconditioning. Patient's potassium level is 2.9. Writer tried to notify Dr Zoe Gambino who is the Physician on duty but to no avail. Please advice. Thank you.    1369) Im working today not Dr Zoe Gambino. Let me make sure perfect serve is updated with my info, Inwill order repletion by Dr Modesto Aguila. AM nurse notified.

## 2022-09-24 NOTE — PROGRESS NOTES
WOUND Note:     New consult for Sacral wound and Ostomy care    Chart shows:  Admitted for Acute encephalopathy on 9/22/22   History of DKA; DM; Wound infection; Sever sepsis; debility    Patient is a DNR and Hospice Patient. Colostomy placed on 8/04/22 and Gee cath placed on 9/23/22. Patient has large Stage 4 on sacral/coccygeal area. Colostomy bud not visualized; wafer and bag intact; small amt of brown liquid stool. Assessment:   A&O x 2 and grimaced at times during wound care, but tolerated well  Colostomy/Gee  Surface:  Foam mattress    1. POA Stable Eschar to Left Heel    3.5 x 5.5  x 0.0 cm  No drainage; periwound dry/flacky; defined edges. Tx: Applied betadine and left open to air     2. POA Stable Eschar to Right Heel   3.9 x 3.5 x 0.0 cm  No drainage; periwound dry/flacky; defined edges. Tx: Applied betadine and left open to air    3. POA Stage 4 to Sacral/coccygeal area w/ bone exposed  12.0 x 11.5 x 5.9 cm; tunnel 6.5 cm at 5 oclock    70% granulation tissue and 30%  slough. Unattached edges; copious gray/serosanguinous exudate; mild malodor- no gross S&S of infection  Periwound denuded. Tx: Cleansed with NSS and packed with moistened gauze with hydrogel; covered with dry gauze and ABD pads; secured with cloth tape. 4. POA Unstageable PI to R upper buttocks   2.2 x  1.2 x  0.1 cm  70% epithelial tissue; 30% slough tissue with defined, open margins  Scant serous exudate; no malodor or purulence - no gross S&S of infection  Periwound intact & without erythema    Tx: Cleansed with NSS; applied Honey alginate; covered with dry gauze; secured with soft cloth tape    5. POA Unstageable PI to R lower buttocks  3.3 x 1.9 x 0.2 cm  100% slough tissue with undefined and denuded margins  Small serousanguinous exudate; no malodor or purulence - no gross S&S of infection  Periwound denuded  Tx: Cleansed with NSS; applied Honey alginate; covered with dry gauze; secured with soft cloth tape    6. POA Unstageable PI to L lower buttocks   2.9  x 2.9  x 0.2 cm  100% slough tissue with undefined and denuded margins  Small serous/gray exudate; no malodor or purulence - no gross S&S of infection  Periwound denuded  Tx: Cleansed with NSS; applied Honey alginate; covered with dry gauze; secured with soft cloth tape    Wound Recommendations:    Daily R/L Heel: Apply betadine to Stable Eschar and leave Open to Air. Daily to R/L buttocks: Clean with NSS; apply Honey Alginate; Cover with Dry gauze and secure with cloth tape. Daily to Sacral/Coccygeal wound: Pack with Kerlix gauze moistened with Dakins 0.125% solution; cover with dry gauze and ABD pads; secure with cloth tape. PI Prevention:  Turn/reposition approximately every 2 hours  Offload heels with heels hanging off end of pillow at all times while in bed. Sacral/coccygeal wound care daily and as needed. Low Air Loss mattress ordered today. Use only flat sheet and one incontinence pad.    Markos Score - 8    Discussed with Ander Lares RN    Transition of Care: Plan to follow weekly and as needed while admitted to hospital.

## 2022-09-24 NOTE — PROGRESS NOTES
0710) Bedside shift change report given to [facility], RN and Garret Perdue LPN (oncoming nurse) by Cruz Damon RN (offgoing nurse). Report included the following information SBAR, Kardex, Intake/Output, MAR, and Recent Results. Pt asleep in bed at this time, rise and fall of chest noted. Call bell in reach, bed in lowest position, Bed alarm on.     0855) Pt assessed and vital signs stable. Pt has no c/o pain at this time. Scheduled meds given by Garret Perdue LPN. Gee care completed. Draining garima urine and patent. Colostomy CDI with minimal output. IV flushed and patent but positional. Pt resting in bed at this time and stated she was not hungry. Pt stated no additional needs and left resting in bed at this time. 0950) Pt asleep in bed at this time. Rise and fall of chest noted. 21 ) Interdisciplinary team rounds were held 9/24/2022 with the following team members:Nursing and Physician. Plan of care discussed. See clinical pathway and/or care plan for interventions and desired outcomes. 1035) Pt asleep in bed at this time. Rise and fall of chest noted. 1145) Pt resting in bed at this time. Pt refused food. Drinks accepted. 200 ml emptied from urinal    1240) Pt repositioned in bed for lunch. Family present at the bedside. Pt refusing food at this time. Cranberry provided per request. Family at the bedside assisting with encouraging pt to eat. Pt made aware of wound care and bed bath after visitors leave. 1350) Iv flushed and patent. Wound care completed, pt tolerated fair    1420) Pt moved to new air pressure redistribution mattress and left resting in bed at this time. 1505) Pt reassessed and no changes to note. Vital signs not obtained e/t pt's hospice status. Pt asleep in bed at this time. Rise and fall of chest noted. 1620) Pt repositioned supine. Drinks provided per request. Pt stated no additional needs. 1715) Pt resting in bed at this time.  Pt did not want dinner but ate 50% of ice cream. Pt stated no additional needs at this time. 1800) Pt resting in bed at this time and declined repositioning. 150 ml emptied from ramos. 1910) Bedside shift change report given to Ag Pozo RN (oncoming nurse) by Ana Alves and Brooke Eastman LPN (offgoing nurse). Report included the following information SBAR, Kardex, Intake/Output, MAR, and Recent Results.

## 2022-09-24 NOTE — PROGRESS NOTES
Hospitalist Progress Note    NAME: Eleanor Cope   :  1934   MRN:  [id number]   Room Number:  221/71  @ [facility]     Please note that this dictation was completed with Johnny Herman, the computer voice recognition software. Quite often unanticipated grammatical, syntax, homophones, and other interpretive errors are inadvertently transcribed by the computer software. Please disregard these errors. Please excuse any errors that have escaped final proofreading. Interim Hospital Summary: 80 y.o. female whom presented on 2022 with      Assessment / Plan:      Anticipated discharge date : TBD   Anticipated disposition : LTC  Barriers to discharge : insurance      End stage renal disease. Family has opted for hospice and does not want any further dialysis. Stage IV sacral ulcer  Dementia. Nonstageable bilateral heel ulcers. Paroxysmal atrial fibrillation. Essential hypertension.     - comfort measures  - discontinue insulin, statin, aspirin, protonix  - Medications for comfort               Code status: DNR  Prophylaxis:  comfort measures   Recommended Disposition:  Hospice            Subjective:     Chief Complaint / Reason for Physician Visit  Comfortable, calm ,cooperative   Discussed with RN events overnight. Review of Systems:  No fevers, chills, appetite change, cough, sputum production, shortness of breath, dyspnea on exertion, nausea, vomitting, diarrhea, constipation, chest pain, leg edema, abdominal pain, joint pain, rash, itching. Tolerating PT/OT. Tolerating diet. Objective:     VITALS:   Last 24hrs VS reviewed since prior progress note.  Most recent are:  Patient Vitals for the past 24 hrs:   Temp Pulse Resp BP SpO2   22 0820 (!) 96.3 °F (35.7 °C) 80 16 (!) 143/74 100 %   22 2046 97.4 °F (36.3 °C) 75 16 120/65 99 %       Intake/Output Summary (Last 24 hours) at 2022 1045  Last data filed at 2022 0444  Gross per 24 hour   Intake 60 ml Output 270 ml   Net -210 ml        PHYSICAL EXAM:  General: Alert, cooperative, no acute distress    EENT:  EOMI. Anicteric sclerae. MMM  Resp:  CTA bilaterally, no wheezing or rales. No accessory muscle use  CV:  Regular  rhythm,  normal S1/S2, no murmurs rubs gallops, No edema  GI:  Soft, Non distended, Non tender. +Bowel sounds  Neurologic:  Alert and oriented X 3, normal speech,   Psych:   Good insight. Not anxious nor agitated  Skin:  No rashes. No jaundice    Reviewed most current lab test results and cultures  YES  Reviewed most current radiology test results   YES  Review and summation of old records today    NO  Reviewed patient's current orders and MAR    YES  PMH/SH reviewed - no change compared to H&P  ________________________________________________________________________  Care Plan discussed with:    Comments   Patient x    Family      RN x    Care Manager     Consultant                        Multidiciplinary team rounds were held today with , nursing, pharmacist and clinical coordinator. Patient's plan of care was discussed; medications were reviewed and discharge planning was addressed. ________________________________________________________________________  Total NON critical care TIME:  25   Minutes    Total CRITICAL CARE TIME Spent:   Minutes non procedure based      Comments   >50% of visit spent in counseling and coordination of care     ________________________________________________________________________  Lisandra Peng MD     Procedures: see electronic medical records for all procedures/Xrays and details which were not copied into this note but were reviewed prior to creation of Plan. LABS:  I reviewed today's most current labs and imaging studies. Pertinent labs include:  No results for input(s): WBC, HGB, HCT, PLT, HGBEXT, HCTEXT, PLTEXT in the last 72 hours.   No results for input(s): NA, K, CL, CO2, GLU, BUN, CREA, CA, MG, PHOS, ALB, TBIL, TBILI, ALT, INR, INREXT in the last 72 hours.     No lab exists for component: SGOT    Signed: Shanthi Adams MD

## 2022-09-24 NOTE — PROGRESS NOTES
Problem: Risk for Spread of Infection  Goal: Prevent transmission of infectious organism to others  Description: Prevent the transmission of infectious organisms to other patients, staff members, and visitors. Outcome: Progressing Towards Goal     Problem: Falls - Risk of  Goal: *Absence of Falls  Description: Document Leafy Orantes Fall Risk and appropriate interventions in the flowsheet. Outcome: Progressing Towards Goal  Note: Fall Risk Interventions:       Mentation Interventions: Door open when patient unattended, Reorient patient, Room close to nurse's station    Medication Interventions: Teach patient to arise slowly    Elimination Interventions: Call light in reach, Toileting schedule/hourly rounds    History of Falls Interventions: Door open when patient unattended, Room close to nurse's station         Problem: Pressure Injury - Risk of  Goal: *Prevention of pressure injury  Description: Document Markos Scale and appropriate interventions in the flowsheet.   Outcome: Progressing Towards Goal  Note: Pressure Injury Interventions:  Sensory Interventions: Assess changes in LOC, Assess need for specialty bed, Monitor skin under medical devices    Moisture Interventions: Apply protective barrier, creams and emollients, Maintain skin hydration (lotion/cream)    Activity Interventions: Pressure redistribution bed/mattress(bed type)    Mobility Interventions: Pressure redistribution bed/mattress (bed type)    Nutrition Interventions: Offer support with meals,snacks and hydration    Friction and Shear Interventions: Apply protective barrier, creams and emollients, Transfer aides:transfer board/Thalia lift/ceiling lift

## 2022-09-24 NOTE — PROGRESS NOTES
Bedside and Verbal shift change report given to MARCIA Oliva/JUAN CARLOS Girard (oncoming nurse) by Francisco Aguila (offgoing nurse). Report included the following information SBAR, Kardex, Intake/Output, MAR, and Recent Results.

## 2022-09-25 NOTE — PROGRESS NOTES
Bedside and Verbal shift change report given to [address] (oncoming nurse) by Gail Candelaria (offgoing nurse). Report included the following information SBAR, Kardex, Intake/Output, MAR, and Recent Results.

## 2022-09-25 NOTE — PROGRESS NOTES
0730: BSR from [facility], [facility]. Pt sleeping comfortably semi-Fowlers in bed.     0900: Declined breakfast.     1030: Water provided. Offered repositioning but pt requested to rest.    1230: Refused repositioning. 1500: Wound care complete on DALE heels. Small skin tears noticed on pts DALE calves. Wounds added to flowsheets. Dressing on pts sacrum and gluteal C/D/I at this time. 1600: Repositioned to R lying then pt stated she was uncomfortable and changed back to supine. 1800: Sleeping.

## 2022-09-25 NOTE — PROGRESS NOTES
Hospitalist Progress Note    NAME: Urbano Farias   :  1934   MRN:  [id number]   Room Number:  336/45  @ [facility]     Please note that this dictation was completed with Reza Pyle, the computer voice recognition software. Quite often unanticipated grammatical, syntax, homophones, and other interpretive errors are inadvertently transcribed by the computer software. Please disregard these errors. Please excuse any errors that have escaped final proofreading. Interim Hospital Summary: 80 y.o. female whom presented on 2022 with      Assessment / Plan:      Anticipated discharge date : TBD   Anticipated disposition : LTC  Barriers to discharge : insurance      End stage renal disease. Family has opted for hospice and does not want any further dialysis. Stage IV sacral ulcer  Dementia. Nonstageable bilateral heel ulcers. Paroxysmal atrial fibrillation. Essential hypertension.     - comfort measures  - discontinue insulin, statin, aspirin, protonix  - Medications for comfort               Code status: DNR  Prophylaxis:  comfort measures   Recommended Disposition:  Hospice            Subjective:     Chief Complaint / Reason for Physician Visit  Comfortable, calm ,cooperative   Discussed with RN events overnight. Review of Systems:  No fevers, chills, appetite change, cough, sputum production, shortness of breath, dyspnea on exertion, nausea, vomitting, diarrhea, constipation, chest pain, leg edema, abdominal pain, joint pain, rash, itching. Tolerating PT/OT. Tolerating diet. Objective:     VITALS:   Last 24hrs VS reviewed since prior progress note.  Most recent are:  Patient Vitals for the past 24 hrs:   Temp Pulse Resp BP SpO2   22 0759 97.3 °F (36.3 °C) 86 16 116/60 100 %   22 1948 97.1 °F (36.2 °C) 87 16 118/61 97 %       Intake/Output Summary (Last 24 hours) at 2022 0954  Last data filed at 2022 0759  Gross per 24 hour   Intake 410 ml   Output 610 ml   Net -200 ml        PHYSICAL EXAM:  General: Alert, cooperative, no acute distress    EENT:  EOMI. Anicteric sclerae. MMM  Resp:  CTA bilaterally, no wheezing or rales. No accessory muscle use  CV:  Regular  rhythm,  normal S1/S2, no murmurs rubs gallops, No edema  GI:  Soft, Non distended, Non tender. +Bowel sounds  Neurologic:  Alert and oriented X 3, normal speech,   Psych:   Good insight. Not anxious nor agitated  Skin:  No rashes. No jaundice    Reviewed most current lab test results and cultures  YES  Reviewed most current radiology test results   YES  Review and summation of old records today    NO  Reviewed patient's current orders and MAR    YES  PMH/SH reviewed - no change compared to H&P  ________________________________________________________________________  Care Plan discussed with:    Comments   Patient x    Family      RN x    Care Manager     Consultant                        Multidiciplinary team rounds were held today with , nursing, pharmacist and clinical coordinator. Patient's plan of care was discussed; medications were reviewed and discharge planning was addressed. ________________________________________________________________________  Total NON critical care TIME:  25   Minutes    Total CRITICAL CARE TIME Spent:   Minutes non procedure based      Comments   >50% of visit spent in counseling and coordination of care     ________________________________________________________________________  Amber Ackermna MD     Procedures: see electronic medical records for all procedures/Xrays and details which were not copied into this note but were reviewed prior to creation of Plan. LABS:  I reviewed today's most current labs and imaging studies. Pertinent labs include:  No results for input(s): WBC, HGB, HCT, PLT, HGBEXT, HCTEXT, PLTEXT, HGBEXT, HCTEXT, PLTEXT in the last 72 hours.   No results for input(s): NA, K, CL, CO2, GLU, BUN, CREA, CA, MG, PHOS, ALB, TBIL, TBILI, ALT, INR, INREXT, INREXT in the last 72 hours.     No lab exists for component: SGOT    Signed: Sherrill Noe MD

## 2022-09-26 NOTE — PROGRESS NOTES
Problem: Falls - Risk of  Goal: *Absence of Falls  Description: Document Lutz Reason Fall Risk and appropriate interventions in the flowsheet.   Outcome: Progressing Towards Goal  Note: Fall Risk Interventions:       Mentation Interventions: Reorient patient, More frequent rounding    Medication Interventions: Bed/chair exit alarm    Elimination Interventions: Call light in reach    History of Falls Interventions: Bed/chair exit alarm, Room close to nurse's station

## 2022-09-26 NOTE — PROGRESS NOTES
Bedside and Verbal shift change report given to Staci Guillen (oncoming nurse) by Roxana Wilson (offgoing nurse). Report included the following information SBAR, Kardex, Intake/Output, MAR, and Recent Results.

## 2022-09-26 NOTE — PROGRESS NOTES
8062 Shift report given to Sindy Ventura RN and MARCIA Hughes (oncoming) from MARCIA Lamb (offgoing). Report included the following: SBAR, MAR, Kardex, and Intake/Output. Pt appears to be sleeping comfortably. 40-45-11-94 At bedside to assist w/ repositioning     6721 Pt appears to be sleeping comfortably     0925 Pt appears to be sleeping comfortably     1109 Cheikh, RN at bedside     [address] and [address] completing wound care at this time     [address] w/ tavo care at this time     1840 Pt refused repositioning at this time     0710 Shift report given to MARCIA Lamb  (oncoming) from [address] and MARCIA Hughes (offgoing). Report included the following: SBAR, MAR, Kardex, and Intake/Output. ** Assessment & documentation completed by MARCIA Hughes.  Reviewed & verified by this RN **

## 2022-09-26 NOTE — PROGRESS NOTES
Comprehensive Nutrition Assessment     Type and Reason for Visit: Follow Up        Nutrition Recommendations/Plan:   Continue current diet and supplements. SLP  has recommended puree with thin liquids  Supplements ordered for meal trays. Nutrition Assessment:    Chart reviewed; patient tx'd to [facility] from [facility], was admitted there 9/16-9/22. Pt gained 6# wuile at [facility]. Continue liberalized diet to help encourage PO intake as well as supplements BID to promote wound healing. Encourage intake of meals/ONS. Asp precautions. Hospice planned, family doesn't want HD or abx. Hx notable for DM, dementia. Comfort care only now, no further interventions per family decision. RD will sign off but available for any nutritional needs. Malnutrition Assessment:  Malnutrition Status: Moderate malnutrition (08/03/22 1114)    Context:  Acute illness     Findings of the 6 clinical characteristics of malnutrition:   Energy Intake:  75% or less of est energy req for 7 or more days  Weight Loss:  Greater than 5% over 1 month     Body Fat Loss:  Mild body fat loss,     Muscle Mass Loss:  Mild muscle mass loss,    Fluid Accumulation:  No significant fluid accumulation,     Strength:  Not performed      Nutrition Related Findings:    K+ 3.2, -[phone]  BM 9/24  Atorvastatin, Famotidine, Lantus, Humalog, Midodrine  Wound Type: sacral Stage IV, Unstageable bilat heels     Current Nutrition Intake & Therapies:  ADULT DIET Puree  ADULT ORAL NUTRITION SUPPLEMENT Lunch dinner; 4 oz  ADULT ORAL NUTRITION SUPPLEMENT Breakfast, pudding     Anthropometric Measures:  Height: 5' 4\" (162.6 cm)  Ideal Body Weight (IBW): 126 lbs (57.2 kg)  Current Body Wt:  68.9 kg (151 lb 14.4 oz), 118.5 % IBW. Current BMI (kg/m2): 26.1  BMI Category: Overweight (BMI 25.0-29. 9)     Estimated Daily Nutrient Needs:  Energy Requirements Based On: Kcal/kg  Weight Used for Energy Requirements: Current  Energy (kcal/day): 2427-3422 kcals (25-30 kcal/kg)  Weight Used for Protein Requirements: Current  Protein (g/day): 104g (1.5 g/kg bw)  Method Used for Fluid Requirements: 1 ml/kcal  Fluid (ml/day): 1800 mL     Nutrition Diagnosis:   Increased nutrient needs related to  (wound healing, wt loss) as evidenced by  (stage 4 PI, wt loss)     Nutrition Interventions:   Food and/or Nutrient Delivery: Continue current diet, Continue oral nutrition supplement  Nutrition Education/Counseling: No recommendations at this time  Coordination of Nutrition Care: Continue to monitor while inpatient     Goals:  Previous Goal Met: Progressing toward goal(s)  Goals:  (PO intake >50% of meals/ONS next 3-5 days). Previous goal not met.      Nutrition Monitoring and Evaluation:   Behavioral-Environmental Outcomes: None identified  Food/Nutrient Intake Outcomes: Food and nutrient intake, Supplement intake  Physical Signs/Symptoms Outcomes: Biochemical data, Skin, Weight, Fluid status or edema, Hemodynamic status     Discharge Planning:    Continue current diet, Continue oral nutrition supplement     Baljinder Ga RD Sept 26, 2022 8:45 AM

## 2022-09-26 NOTE — PROGRESS NOTES
[place]     RUR- 11%           Plan  Disposition-hospice LTC  PINA-completed  Transportation- to be arranged    Sebastián Rudd [phone]         Transfer from [facility] for continue care and transition. Enriquedaliajosiah at [facility] completed Medicaid [place] in July 8428 --application denied    [employer] reapplied for Medicaid on Aug 30 2022     CM spoke with [employer] in [facility] admissions, she provided DSS worker name for [facility] Ms. Guera Walter [phone] who stated to Jorge Sample that they had reached out to granddaughter for verifications 9/7. CM LVM for DSS worker to return call. CM LVM for pt's granddaughter Oswald Lo to call in regards to the [facility] application. Granddaughter called CM back, CM inquired if she had received any documentation regarding Medicaid application that was submitted by [facility]. Granddaughter stated no and asked, the reason for the first Medicaid application being denied. CM informed her that [address] stated it was denied due to not receiving bank statements/documentation. Granddaughter informed CM that there is POA paperwork, which she will try to locate.  provided her with [employer] worker number [phone].      Rolla, [address] [place] [address]

## 2022-09-26 NOTE — PROGRESS NOTES
WOUND Note:      Follow Up: 9/26/22  Sacral wound and Ostomy care     Chart shows:  Admitted for Acute encephalopathy on 9/22/22   History of DKA; DM; Wound infection; Sever sepsis; debility     Patient is a DNR and Hospice Patient. Colostomy placed on 8/04/22 and Gee cath placed on 9/23/22. Patient has large Stage 4 on sacral/coccygeal area. Colostomy wafer and bag changed. Ostomy bud pink/healthy. Small amt of liquid brown stool in old bag. Assessment:   A&O x 2 and grimaced at times during wound care, premedicated for pain; tolerated well  Colostomy/Gee  Surface:  Pro + Surface mattress, Markos 8     1. POA Stable Eschar to Left Heel   No drainage; periwound dry/flacky; defined edges. Tx: Applied betadine and left open to air     2. POA Stable Eschar to Right Heel  No drainage; periwound dry/flacky; defined edges. Tx: Applied betadine and left open to air     3. POA Stage 4 to Sacral/coccygeal area w/ bone exposed  12.0 x 11.5 x 5.9 cm; tunnel 6.5 cm at 5 oclock    100%  slough. Unattached edges; copious gray/greenish serosanguinous exudate; also malodorous. Periwound denuded. Tx:   Irrigated with NSS and packed with moistened gauze with Dakin's 0.125% strength covered with dry gauze and ABD pads; secured with cloth tape. 4. POA Unstageable PI to R upper buttocks  70% epithelial tissue; 30% slough tissue with defined, open margins  Scant serous exudate; no malodor or purulence - no gross S&S of infection  Periwound intact & without erythema    Tx: Cleansed with NSS; applied Honey alginate; covered with dry gauze; secured with soft cloth tape     5. POA Unstageable PI to R lower buttocks  100% slough tissue with undefined and denuded margins  Small serousanguinous exudate; no malodor or purulence - no gross S&S of infection  Periwound denuded  Tx: Cleansed with NSS; applied Honey alginate; covered with dry gauze; secured with soft cloth tape     6.  POA Unstageable PI to L lower buttocks 100% slough tissue with undefined and denuded margins  Small serous/gray exudate; no malodor or purulence - no gross S&S of infection  Periwound denuded  Tx: Cleansed with NSS; applied Honey alginate; covered with dry gauze; secured with soft cloth tape    7. New Skin tear to RLE pretibial distal end  Skin tear measuring 2.5 x 0.5 x 0.1 cm. Small serous exudate, edges approximating. TX: cleansed with NSS, applied Honey Alginate, covered with dry gauze and secured with soft cloth tape. Wound Recommendations:    Daily R/L Heel: Apply betadine to Stable Eschar and leave Open to Air. Daily to R/L buttocks: Clean with NSS; apply Honey Alginate; Cover with Dry gauze and secure with cloth tape. Daily to Sacral/Coccygeal wound: Pack with Kerlix gauze moistened with Dakins 0.125% solution; cover with dry gauze and ABD pads; secure with cloth tape. Daily to right RLE skin tear: Cleanse wound with NSS, apply Honey Alginate, cover with dry gauze and secure with soft cloth tape. PI Prevention:  Turn/reposition approximately every 2 hours  Offload heels with heels hanging off end of pillow at all times while in bed. Sacral/coccygeal wound care daily and as needed. Low Air Loss mattres Use only flat sheet and one incontinence pad.    Markos Score - 8     Discussed with Robert Murdock RN and Ellen RN     Transition of Care: Plan to follow weekly and as needed while admitted to hospital.

## 2022-09-26 NOTE — PROGRESS NOTES
Hospitalist Progress Note    NAME: Esme Gomez   :  1934   MRN:  [id number]   Room Number:  043/98  @ [facility]     Please note that this dictation was completed with Venus Garber, the computer voice recognition software. Quite often unanticipated grammatical, syntax, homophones, and other interpretive errors are inadvertently transcribed by the computer software. Please disregard these errors. Please excuse any errors that have escaped final proofreading. Interim Hospital Summary: 80 y.o. female whom presented on 2022 with      Assessment / Plan:      Anticipated discharge date : TBD   Anticipated disposition : LTC  Barriers to discharge : insurance      End stage renal disease. Family has opted for hospice and does not want any further dialysis. Stage IV sacral ulcer  Dementia. Nonstageable bilateral heel ulcers. Paroxysmal atrial fibrillation. Essential hypertension.     - comfort measures  - discontinue insulin, statin, aspirin, protonix  - Medications for comfort               Code status: DNR  Prophylaxis:  comfort measures   Recommended Disposition:  Hospice            Subjective:     Chief Complaint / Reason for Physician Visit  Comfortable, calm ,cooperative   Discussed with RN events overnight. Review of Systems:  No fevers, chills, appetite change, cough, sputum production, shortness of breath, dyspnea on exertion, nausea, vomitting, diarrhea, constipation, chest pain, leg edema, abdominal pain, joint pain, rash, itching. Tolerating PT/OT. Tolerating diet. Objective:     VITALS:   Last 24hrs VS reviewed since prior progress note.  Most recent are:  Patient Vitals for the past 24 hrs:   Temp Pulse Resp BP SpO2   22 0736 (!) 96.7 °F (35.9 °C) 77 16 102/60 100 %   22 1941 97.7 °F (36.5 °C) 84 16 (!) 103/59 100 %       Intake/Output Summary (Last 24 hours) at 2022 0945  Last data filed at 2022 9380  Gross per 24 hour   Intake 120 ml Output 125 ml   Net -5 ml        PHYSICAL EXAM:  General: Alert, cooperative, no acute distress    EENT:  EOMI. Anicteric sclerae. MMM  Resp:  CTA bilaterally, no wheezing or rales. No accessory muscle use  CV:  Regular  rhythm,  normal S1/S2, no murmurs rubs gallops, No edema  GI:  Soft, Non distended, Non tender. +Bowel sounds  Neurologic:  Alert and oriented X 3, normal speech,   Psych:   Good insight. Not anxious nor agitated  Skin:  No rashes. No jaundice    Reviewed most current lab test results and cultures  YES  Reviewed most current radiology test results   YES  Review and summation of old records today    NO  Reviewed patient's current orders and MAR    YES  PMH/SH reviewed - no change compared to H&P  ________________________________________________________________________  Care Plan discussed with:    Comments   Patient x    Family      RN x    Care Manager     Consultant                        Multidiciplinary team rounds were held today with , nursing, pharmacist and clinical coordinator. Patient's plan of care was discussed; medications were reviewed and discharge planning was addressed. ________________________________________________________________________  Total NON critical care TIME:  25   Minutes    Total CRITICAL CARE TIME Spent:   Minutes non procedure based      Comments   >50% of visit spent in counseling and coordination of care     ________________________________________________________________________  Marianne Chavez MD     Procedures: see electronic medical records for all procedures/Xrays and details which were not copied into this note but were reviewed prior to creation of Plan. LABS:  I reviewed today's most current labs and imaging studies. Pertinent labs include:  No results for input(s): WBC, HGB, HCT, PLT, HGBEXT, HCTEXT, PLTEXT, HGBEXT, HCTEXT, PLTEXT in the last 72 hours.   No results for input(s): NA, K, CL, CO2, GLU, BUN, CREA, CA, MG, PHOS, ALB, TBIL, TBILI, ALT, INR, INREXT, INREXT in the last 72 hours.     No lab exists for component: SGOT    Signed: Celeste Benton MD

## 2022-09-26 NOTE — HOSPICE
[facility] Help to Those in Need  [phone]    Nursing Note   Patient Name: Josh Spear  YOB: 1934  Age: 80 y.o. [address] [facility] RN Note:      Visited and assessed patient: AxOx2, denies pain nor SOB, LS clear, BS active, extremities edematous (anasarca), skin warm to touch. Patient does not meet GIP (General Inpatient) hospice level of care at this time but does meet Routine hospice level of care. After leaving room, spoke with MARYLOU/Amos Chávez and was updated on progress of Medicaid application. If there are any questions/concerns related to hospice, please call our main number at [phone]. Thank you for the opportunity to be of service to this patient and her family.

## 2022-09-26 NOTE — PROGRESS NOTES
0715  Bedside and Verbal shift change report given to Ellen RN and 50 Carmine Hong, RN (oncoming nurse) by Claudette Eubanks RN (offgoing nurse). Report included the following information SBAR, Kardex, Intake/Output, MAR, and Recent Results. 7962  Patient turned to her right side. 4651  Patient resting quietly. 1015  Patient positioned supine. 1110  Patient is alert, responds to her name being called. Will administer pain medication at this time prior to wound care. [facility] wound care nurse Giuliana Damian RN with wound care. Patient repositioned to right side. 1258  Patient resting quietly in bed with eyes closed. 1438  Patient repositioned supine per patient's request.     9834  Bathed patient. Provided ramos care. Patient does not want to be turned at this time, stating \"I want to lay on my back. \"    [id number]  At bedside to reposition patient. Patient does not want to be turned, refusing at this time. 1910  Bedside and Verbal shift change report given to Jaiden Daugherty (oncoming nurse) by MARCIA Hughes and 50 Beech Hal RN (offgoing nurse). Report included the following information SBAR, Kardex, Intake/Output, MAR, and Recent Results.

## 2022-09-27 NOTE — PROGRESS NOTES
Problem: Falls - Risk of  Goal: *Absence of Falls  Description: Document [facility] Fall Risk and appropriate interventions in the flowsheet.   Outcome: Progressing Towards Goal  Note: Fall Risk Interventions:       Mentation Interventions: Bed/chair exit alarm    Medication Interventions: Bed/chair exit alarm    Elimination Interventions: Bed/chair exit alarm    History of Falls Interventions: Bed/chair exit alarm

## 2022-09-27 NOTE — HOSPICE
Hospice Liaison Note:    Chart reviewed for updates in plan of care. Bedside patient. Pt eyes open, fixed without movement. Pt moaning increases during assessment. Pt on oxygen NC 2 liters for comfort. Respirations 20. Request Bedside nursing provide PRN for symptom management now. Visit Vitals  BP (!) 67/32   Pulse 68   Temp 98.6 °F (37 °C)   Resp 18   SpO2 95%        Plan: Review patient with Dr. Rafa Gu Director. Please call Hospice team to offer support for patient, family or staff. Thank you for your coordination with the hospice plan of care. 11:50: Reviewed with Dr. Muriel Milligan. Verbal CTI received for the Hospice diagnosis of End Stage Renal.  Call and spoke to patient's granddaughter, Francesca Guerin. Reviewed Hospice plan of care and location. Deanna Mathias states she is in agreement to admit [employer] into [employer] today. Discussed GIP admission at the [facility] vs at [facility]. Plan to admit patient at the [facility] if she is stable to transport. Hospice House is holding a bed for pt.    12:13: Hospice consents sent to patient's MPOA granddaughter, Francesca Guerin at [employer]".    12:25: Hospice consents have been completed. Plan to request transportation arranged for hospice house. [address] [place] [address] [phone]      12:36: Hospice nurse called to update [facility] nursing staff; Dori Leon RN. Case Management updated and arranging transportation through [facility] now. 12:43: Call to patient's granddaughter, Francesca Guerin. Updated as to [facility] to begin hospice services. Transportation has been confirmed for 14:00.  Home is undetermined, however, Francesca Guerin states that patient has arranged this, and is asking another family member as to  home. Plan to update Hospice with this information later today.     Updated all the above to the [facility] Director, Bee Ruff, RN      Gail Tom RN, [place] [address] Nurse Liaison  [phone] [place]  [phone] Office

## 2022-09-27 NOTE — PROGRESS NOTES
0710  Bedside and Verbal shift change report given to Eleln, RN, and MARCIA Stanford (oncoming nurse) by Giovanna Knapp RN (offgoing nurse). Report included the following information SBAR, Kardex, Intake/Output, Recent Results, and Med Rec Status. 2026  Patient is resting.     0905  Manual BP checked 67/32. Verified with Josefa Hernandez low BP. Hospice notified of patient's change in status. 1858  Patient repositioned in bed. [address] at bedside. 1322  Report given to Jd Carlson RN at the [facility]. [id number]  Transport arrived. Patient being discharged to the [facility].

## 2022-09-27 NOTE — H&P
[place] Help to Those in Need  [phone]    Patient Name: Urbano Farias  YOB: 1934    Date of Provider Hospice Visit: 09/27/22    Level of Care:   [x] General Inpatient (GIP)    [] Routine   [] Respite    Current Location of Care:  [] [facility] [] [facility] [] [facility] [] [facility] [x] Hospice House [facility], patient referred from:  [] [facility] [] [facility] [] [facility] [x] [facility] [] Home [] Other:     Date of Original Hospice Admission: 9/27/2022  Hospice Medical Director at time of admission: Maria Teresa Mujica Diagnosis: End-stage renal disease  Diagnoses RELATED to the terminal prognosis: Stage IV sacral wound, dementia, unstageable bilateral heel ulcers  Other Diagnoses: History of diverting colostomy     HOSPICE SUMMARY     Urbano Farias is a 80y.o. year old who was admitted to [address] [place] [address]. Is an 80-year-old female with a complicated hospitalization. She had been at [facility] from 9/16 until 9/22. She had gone to dialysis on 9/16 and was found to be altered, could not complete her dialysis session. Patient with underlying dementia. Upon arrival to [facility], patient was noted to have hypotension, tachypnea, tachycardia with a white count of 12.8, hemoglobin of 10.5. Lactic acid of 10.4. CT abdomen pelvis showed alteration of the sacrum with lysis of the bone. Patient diagnosed with septic shock with suspected infected sacral ulcer. She had recently had a diverting colostomy for infected sacral wound with debridement of the ulcer in August 2022. She is in the hospital for almost 2 weeks at that time. Patient started on broad-spectrum antibiotics and IV fluids. Continue to show evidence of hypotension, further decline.   Patient has been transferred to [facility] as they were working on [facility] application but in the midst of waiting for this to be completed, patient continues show evidence of decline and appear to be approaching end-of-life care. Patient having significant symptom burden requiring IV medication and transferred to the [facility] level care. Family has elected to stop further dialysis and focus on comfort    The patient's principle diagnosis has resulted in pain, restlessness  Refer to LCD     Functionally, the patient's Karnofsky and/or Palliative Performance Scale has declined over a period of days to weeks and is estimated at 10. The patient is dependent on the following ADLs: All    Objective information that support this patients limited prognosis includes: Stage IV/unstageable sacral wound. The patient/family chose comfort measures with the support of Hospice. HOSPICE DIAGNOSES   Active Symptoms:  1. Nonverbal signs of pain  2. Restlessness  3. Sacral wound  4. End-stage renal disease  5. Hospice care     PLAN   Patient admitted [facility] care she needs frequent nursing assessments, IV medication management, not safe to attempt to transition home as she appears to be in the midst of dying. Comfort meds ordered on a as needed basis to start. Anticipate may need scheduled medication by the end of the the evening. Plan reviewed bedside nursing team  Plan had been reviewed with the family via the hospice liaison prior to transfer to the [facility]  Anticipate very short prognosis     and SW to support family needs  Disposition: Death at the [facility]  Hospice Plan of care was reviewed in detail and agree with current plan of care    Prognosis estimated based on 09/27/22 clinical assessment is:   [x] Hours to Days    [] Days to Weeks    [] Other:    Communicated plan of care with: Hospice Case Manager; Hospice IDT; Care Team     GOALS OF CARE     Patient/Medical POA stated Goal of Care: Hospice care    [x] I have reviewed and/or updated ACP information in the Advance Care Planning Navigator.  This information is available in the [address] [place] [address] Drive link in the patient's chart header.     Primary Decision Maker (Health Care Agent):   Primary Decision Maker (Active): [facility] [phone]    Resuscitation Status: DNR  If DNR is there a Durable DNR on file? : [x] Yes [] No (If no, complete Durable DNR)    HISTORY     History obtained from: Chart, hospice liaison, bedside nursing team    CHIEF COMPLAINT: Uncomfortable  The patient is:   [] Verbal  [] Nonverbal  [x] Unresponsive    HPI/SUBJECTIVE: Patient is unresponsive, ashen, cool extremities       REVIEW OF SYSTEMS     The following systems were: [] reviewed  [x] unable to be reviewed    Positive ROS include:  Constitutional: fatigue, weakness, in pain, short of breath  Ears/nose/mouth/throat: increased airway secretions  Respiratory:shortness of breath, wheezing  Gastrointestinal:poor appetite, nausea, vomiting, abdominal pain, constipation, diarrhea  Musculoskeletal:pain, deformities, swelling legs  Neurologic:confusion, hallucinations, weakness  Psychiatric:anxiety, feeling depressed, poor sleep  Endocrine:     Adult Non-Verbal Pain Assessment Score: 5    Face  [] 0   No particular expression or smile  [] 1   Occasional grimace, tearing, frowning, wrinkled forehead  [x] 2   Frequent grimace, tearing, frowning, wrinkled forehead    Activity (movement)  [] 0   Lying quietly, normal position  [] 1   Seeking attention through movement or slow, cautious movement  [x] 2   Restless, excessive activity and/or withdrawal reflexes    Guarding  [x] 0   Lying quietly, no positioning of hands over areas of body  [] 1   Splinting areas of the body, tense  [] 2   Rigid, stiff    Physiology (vital signs)  [x] 0   Stable vital signs  [] 1   Change in any of the following: SBP > 20mm Hg; HR > 20/minute  [] 2   Change in any of the following: SBP > 30mm Hg; HR > 25/minute    Respiratory  [] 0   Baseline RR/SpO2, compliant with ventilator  [x] 1   RR > 10 above baseline, or 5% drop SpO2, mild asynchrony with ventilator  [] 2   RR > 20 above baseline, or 10% drop SpO2, asynchrony with ventilator     FUNCTIONAL ASSESSMENT     Palliative Performance Scale (PPS): 10       PSYCHOSOCIAL/SPIRITUAL ASSESSMENT     Active Problems:    * No active hospital problems. *    Past Medical History:   Diagnosis Date    Diabetes ([facility]     End-stage renal disease on hemodialysis ([facility]     Essential hypertension     GERD (gastroesophageal reflux disease)     Paroxysmal atrial fibrillation ([facility])       Past Surgical History:   Procedure Laterality Date    IR INSERT NON TUNL CVC OVER 5 YRS  5/26/2022    IR INSERT NON TUNL CVC OVER 5 YRS  6/3/2022    IR INSERT TUNL CVC W/O PORT OVER 5 YR  6/8/2022      Social History     Tobacco Use    Smoking status: Former     Types: Cigarettes    Smokeless tobacco: Never   Substance Use Topics    Alcohol use: Not Currently     No family history on file. No Known Allergies   Current Facility-Administered Medications   Medication Dose Route Frequency    bisacodyL (DULCOLAX) suppository 10 mg  10 mg Rectal DAILY PRN    prochlorperazine (COMPAZINE) injection 10 mg  10 mg IntraVENous Q4H PRN    acetaminophen (TYLENOL) suppository 650 mg  650 mg Rectal Q4H PRN    ketorolac (TORADOL) injection 30 mg  30 mg IntraVENous Q8H PRN    glycopyrrolate (ROBINUL) injection 0.2 mg  0.2 mg IntraVENous Q4H PRN    midazolam (VERSED) injection 2 mg  2 mg IntraVENous Q15MIN PRN    HYDROmorphone (DILAUDID) injection 1 mg  1 mg IntraVENous Q15MIN PRN        PHYSICAL EXAM     Wt Readings from Last 3 Encounters:   09/19/22 57.2 kg (126 lb)   08/14/22 68.2 kg (150 lb 4.8 oz)   05/26/22 82.6 kg (182 lb 1.6 oz)       There were no vitals taken for this visit.     Supplemental O2  [] Yes  [x] NO  Last bowel movement:     Currently this patient has:  [x] Peripheral IV [] PICC  [] PORT [] ICD    [x] Gee Catheter [] NG Tube   [] PEG Tube    [] Rectal Tube [] Drain  [] Other:     Constitutional: Ill-appearing, ashen, unresponsive  Eyes: Closed  ENMT: Dry  Cardiovascular: Distant heart sounds  Respiratory: Slight increased work of breathing  Gastrointestinal: Soft, colostomy in place  Musculoskeletal: Unremarkable  Skin: Large sacral wound with obvious bone noted, bilateral unstageable heel ulcers  Neurologic: Unresponsive  Psychiatric: Restless  Other:       Pertinent Lab and or Imaging Tests:  Lab Results   Component Value Date/Time    Sodium 137 09/17/2022 05:57 AM    Potassium 3.5 09/17/2022 05:57 AM    Chloride 103 09/17/2022 05:57 AM    CO2 19 (L) 09/17/2022 05:57 AM    Anion gap 15 09/17/2022 05:57 AM    Glucose 102 (H) 09/17/2022 05:57 AM    BUN 12 09/17/2022 05:57 AM    Creatinine 1.25 (H) 09/17/2022 05:57 AM    BUN/Creatinine ratio 10 (L) 09/17/2022 05:57 AM    GFR est AA 49 (L) 09/17/2022 05:57 AM    GFR est non-AA 40 (L) 09/17/2022 05:57 AM    Calcium 7.3 (L) 09/17/2022 05:57 AM     Lab Results   Component Value Date/Time    Protein, total 5.4 (L) 09/17/2022 05:57 AM    Albumin 1.8 (L) 09/17/2022 05:57 AM           Total time:   Counseling / coordination time:   > 50% counseling / coordination?:

## 2022-09-27 NOTE — PROGRESS NOTES
BROCK     RUR  11 %     IDR Rounds again with MD and team   Nursing report significant changes in patient condition   MD ask Hospice to be notified to reassess patient again for possible GIP or transition to the [facility]. Patient was seen by them while at [facility] and transfer here for continue follow-up. They were here yesterday and assessed patient. See their note. Request called to  [facility] with them yesterday about the Medicaid Application. The SW at [facility] [address] sent a new Application to Toya Cheung on August 30, 20222. The denial application was the one that was done by [facility] when patient was at [facility] in July. Please see Amor's note from yesterday for details. Waiting for call back from the [facility] Worker. Asked [employer] to get a ALVARO signed by the Granddaughter so we can help with follow through getting this approved. [address] MSW  RN   [phone] Cell [phone]       Addendum: 17\"45AK  Freedom of choice and agent discussed- verbal ok. Patient has been approved to go to the [facility]. Talked to Granddaughter all in agreement for transfer. CHANTEMARLON FRAUSTO [facility] to Home - Ambulance co- to be billed to the hospital.  Patient needs to go by Ambulance -on oxygen. They will be here at [facility] to transport patient. Nursing to call report. COVID -19 rapid not detected. [facility] [phone]    Care Management Interventions  PCP Verified by CM:  Yes  Mode of Transport at Discharge: [address]  [address] Time of Discharge: 1400  Transition of Care Consult (CM Consult): [facility], Discharge Planning, Transportation Assistance  MyChart Signup: Yes  Support Systems: Other Family Member(s), /  Confirm Follow Up Transport: Other (see comment)  The Plan for Transition of Care is Related to the Following Treatment Goals : Inpatient Hospice  The Patient and/or Patient Representative was Provided with a Choice of Provider and Agrees with the Discharge Plan?: Yes  Name of the Patient Representative Who was Provided with a Choice of Provider and Agrees with the Discharge Plan: MD, Hospice. Nursing , family.  Community Provider  Freedom of Choice List was Provided with Basic Dialogue that Supports the Patient's Individualized Plan of Care/Goals, Treatment Preferences and Shares the Quality Data Associated with the Providers?: Yes  Discharge Location  Patient Expects to be Discharged to[de-identified] Other: ([facility] )    Bryan HARMON RN

## 2022-09-27 NOTE — DISCHARGE SUMMARY
Hospitalist Discharge Summary     Patient ID:  Mayra Ureña  [id number]  47 y.o.  1934    PCP on record: Timi Frazier MD    Admit date: 9/22/2022  Discharge date and time: 9/27/2022    Please note that this dictation was completed with [employer]STROMO I[employer], the linkedFA voice recognition software. Quite often unanticipated grammatical, syntax, homophones, and other interpretive errors are inadvertently transcribed by the computer software. Please disregard these errors. Please excuse any errors that have escaped final proofreading. Admission Diagnoses: Acute encephalopathy [G93.40]    Discharge Diagnoses: Active Problems:    Acute encephalopathy (9/22/2022)           Hospital Course:     End stage renal disease. Family has opted for hospice and does not want any further dialysis. Stage IV sacral ulcer  Dementia. Nonstageable bilateral heel ulcers. Paroxysmal atrial fibrillation. Essential hypertension.     - comfort measures  -Hospice consulted as patient is decompensating with shallow breathing and disorientation  -Patient to be transferred to hospice house further care            CONSULTATIONS:  None    Excerpted HPI from H&P of Bonilla Randall DO:    REVIEW OF OLD RECORDS:  Patient is an 70-year-old female who is being admitted on hospice has opted by granddaughter. They do not want any more hemodialysis, IV antibiotics or further aggressive care or work up. She was hospitalized at [facility] from 9/16 until today, 9/22. As per documentation from [facility], patient went to her dialysis center on 9/16 and was found to be altered, and could not complete her dialysis session. She has dementia and baseline, but can communicate verbally, being able to make her needs known to a certain extent. Upon arrival to [facility] on 9/16, patient was noted to have low blood pressure. She was tachypnea, tachycardic with blood pressure 83/50.  WBC was 12.8, hemoglobin 10.5, platelet count 443,196. BMP showed sodium 134, CO2 19, and I get 17, creatinine 1.43, lactic acid 10.4, CRP 15. CT of the abdomen/pelvis showed alteration of the sacrum with lysis of the bone. There was feeling defect in the right atrium which could represent a thrombus and stranding in the anterior abdominal wall. Patient was diagnosed with septic shock with suspected infected sacral ulcer. She had recently gotten a diverting Colostomy for infected sacral ulcer, s/p debridement of the ulcer on 8/4/22 during hospitalization in August at [facility]. During this hospitalization at [facility] from 8/2/22 until 8/15/22, patient was found to have bacteremia. This was with Proteus, ESBL Klebsiella and enterococcus. Family reported that she had had the chronic sacral decubitus ulcer for many months. There was a question of right atrial thrombus per CT scan. An echocardiogram that was performed at [facility] on 9/19/22 which does not show any thrombu. Patient was on antibiotics with IV vancomycin and IV cefepime until 9/15, which was just prior to this admission on 9/16. She had not followed up with infectious disease specialist on 9/13/22. Patient was noted to have lactic acid levels of 10 with lysis of bone per CT scan. They started her on empiric antibiotics with IV vancomycin and Ivy meropenem at [facility] upon arrival on 9/16. Blood cultures were positive for Proteus species. Infectious disease and general surgery consultations were requested. Sacral decubitus ulcer did not seem to be the source of sepsis. General surgery signed off. Patient was not given IV fluids due to her in stage renal disease. She was being given midodrine  due to the hypotension. She ended up not getting any vasopressors, and did not meet criteria for ICU level of care. Subjective: On exam now, patient is alert, oriented to person, month. She knew that she was in a Nursing home prior to being hospitalized recently.  She did not know the year. Patient denied any pain anywhere including the sacrum. However, she did demonstrate some memory issues. For example, she stated that her mother was still living. Her sacral decubitus ulcer is larger than a fist, going down to the bone. She also has an unstageable heel ulcers bilaterally, with the left heel having eschar in the entirety of its posterior aspect, and the right heel having eschar in the posteromedial aspect with otherwise pink tissue in the rest of the posterior aspect of the heel. Patient states that she was not walking even before her hospitalization, and has been wheelchair-bound for quite some time. She was able to lift up her upper extremities, but could not lift up her lower extremities. ______________________________________________________________________  DISCHARGE SUMMARY/HOSPITAL COURSE:  for full details see H&P, daily progress notes, labs, consult notes. _______________________________________________________________________  Patient seen and examined by me on discharge day. Pertinent Findings:  _PHYSICAL EXAM:  General:          Disoriented  EENT:              Unable to assess  Resp:               CTA bilaterally, no wheezing or rales. Shallow breathing  CV:                  Muffled heart sound  GI:                   Soft, Non distended, Non tender. +Bowel sounds  Neurologic:       AO x1  Psych:   Unable to assess  Skin:                No rashes.   No jaundice_________  DISCHARGE MEDICATIONS:   Current Discharge Medication List        STOP taking these medications       cefdinir (OMNICEF) 300 mg capsule Comments:   Reason for Stopping:         cloNIDine HCL (CATAPRES) 0.1 mg tablet Comments:   Reason for Stopping:         vancomycin (VANCOCIN) 1,000 mg injection Comments:   Reason for Stopping:         amiodarone (CORDARONE) 200 mg tablet Comments:   Reason for Stopping:         collagenase (SANTYL) 250 unit/gram ointment Comments:   Reason for Stopping:         sodium hypochlorite (QUARTER STRENGTH DAKIN'S) 0.125 % soln external solution Comments:   Reason for Stopping:         cefepime 1 gram IVPB Comments:   Reason for Stopping:         famotidine (PEPCID) 20 mg tablet Comments:   Reason for Stopping:         atorvastatin (LIPITOR) 20 mg tablet Comments:   Reason for Stopping:         pantoprazole (PROTONIX) 20 mg tablet Comments:   Reason for Stopping:         insulin lispro protamine/insulin lispro (HUMALOG MIX 75/25) 100 unit/mL (75-25) injection Comments:   Reason for Stopping:         aspirin delayed-release 81 mg tablet Comments:   Reason for Stopping:         cholecalciferol (VITAMIN D3) (1000 Units /25 mcg) tablet Comments:   Reason for Stopping:               My Recommended Diet, Activity, Wound Care, and follow-up labs are listed in the patient's Discharge Insturctions which I have personally completed and reviewed.     _______________________________________________________________________  DISPOSITION:     Home with Family:    Home with HH/PT/OT/RN:    SNF/LTC:    NINI:    OTHER:        Condition at Discharge: Hospice house  _______________________________________________________________________  Follow up with:   PCP : Marco Antonio Pitts MD  Follow-up Information       Follow up With Specialties Details Why Nadia Rojas MD Internal Medicine Physician   [facility]  [phone]                  Total time in minutes spent coordinating this discharge (includes going over instructions, follow-up, prescriptions, and preparing report for sign off to her PCP) :  35 minutes    Signed:  Akanksha Amado MD

## 2022-09-27 NOTE — PROGRESS NOTES
Hospitalist Progress Note    NAME: Mayra Ureña   :  1934   MRN:  [id number]   Room Number:  728/13  @ [facility]     Please note that this dictation was completed with Lin Fountain, the computer voice recognition software. Quite often unanticipated grammatical, syntax, homophones, and other interpretive errors are inadvertently transcribed by the computer software. Please disregard these errors. Please excuse any errors that have escaped final proofreading. Interim Hospital Summary: 80 y.o. female whom presented on 2022 with      Assessment / Plan:      Anticipated discharge date : TBD   Anticipated disposition : LTC  Barriers to discharge : insurance      End stage renal disease. Family has opted for hospice and does not want any further dialysis. Stage IV sacral ulcer  Dementia. Nonstageable bilateral heel ulcers. Paroxysmal atrial fibrillation. Essential hypertension.     - comfort measures  - discontinue insulin, statin, aspirin, protonix  - Medications for comfort   -Hospice consulted as patient is decompensating with shallow breathing and disorientation              Code status: DNR  Prophylaxis:  comfort measures   Recommended Disposition:  Hospice            Subjective:     Chief Complaint / Reason for Physician Visit  Shallow breathing and disoriented discussed with RN events overnight. Review of Systems:  No fevers, chills, appetite change, cough, sputum production, shortness of breath, dyspnea on exertion, nausea, vomitting, diarrhea, constipation, chest pain, leg edema, abdominal pain, joint pain, rash, itching. Tolerating PT/OT. Tolerating diet. Objective:     VITALS:   Last 24hrs VS reviewed since prior progress note.  Most recent are:  Patient Vitals for the past 24 hrs:   Temp Pulse Resp BP SpO2   22 0910 -- -- -- (!) 67/32 --   22 0906 -- -- -- (!) 62/32 --   22 0725 98.6 °F (37 °C) 68 18 (!) 60/33 95 %   22 97.9 °F (36.6 °C) 80 12 (!) 92/44 92 %   09/26/22 2005 -- -- -- -- (!) 86 %       Intake/Output Summary (Last 24 hours) at 9/27/2022 0914  Last data filed at 9/27/2022 6378  Gross per 24 hour   Intake 60 ml   Output 110 ml   Net -50 ml        PHYSICAL EXAM:  General: Disoriented  EENT:  Unable to assess  Resp:  CTA bilaterally, no wheezing or rales. Shallow breathing  CV:  Muffled heart sound  GI:  Soft, Non distended, Non tender. +Bowel sounds  Neurologic:  AO x1  Psych:   Unable to assess  Skin:  No rashes. No jaundice    Reviewed most current lab test results and cultures  YES  Reviewed most current radiology test results   YES  Review and summation of old records today    NO  Reviewed patient's current orders and MAR    YES  PMH/SH reviewed - no change compared to H&P  ________________________________________________________________________  Care Plan discussed with:    Comments   Patient x    Family      RN x    Care Manager     Consultant                        Multidiciplinary team rounds were held today with , nursing, pharmacist and clinical coordinator. Patient's plan of care was discussed; medications were reviewed and discharge planning was addressed. ________________________________________________________________________  Total NON critical care TIME:  25   Minutes    Total CRITICAL CARE TIME Spent:   Minutes non procedure based      Comments   >50% of visit spent in counseling and coordination of care     ________________________________________________________________________  Olga Brown MD     Procedures: see electronic medical records for all procedures/Xrays and details which were not copied into this note but were reviewed prior to creation of Plan. LABS:  I reviewed today's most current labs and imaging studies. Pertinent labs include:  No results for input(s): WBC, HGB, HCT, PLT, HGBEXT, HCTEXT, PLTEXT, HGBEXT, HCTEXT, PLTEXT in the last 72 hours.   No results for input(s): NA, K, CL, CO2, GLU, BUN, CREA, CA, MG, PHOS, ALB, TBIL, TBILI, ALT, INR, INREXT, INREXT in the last 72 hours.     No lab exists for component: SGOT    Signed: Jason Jessica MD

## 2022-09-27 NOTE — PROGRESS NOTES
3674 Shift report given to Lon Vasquez, RN and Ellen RN (oncoming) from MARCIA Lamb (offgoing). Report included the following: SBAR, MAR, Kardex, Intake/Output and Recent Results. 0905 Manual BP checked. 67/32.  Verified w/ Carlos Zafar RN low BP.     1112 Hospice, RN at bedside COVID-19 screening completed. No concerns for COVID-19 at this time. Compliant with unit COVID-19 mitigation procedures.    Psychotropic medication compliance: yes     Psychotropic medication side effects: no     Psychotropic PRNs given:  no     Appetite and food intake: good    Affect: guarded  Mood: \"sad\" / depressed   Thought Process: linear  Perceptual Disturbances:denies  Suicidal Ideation/Homicidal Ideation: denies    Pt assessed using Dominican speaking . Reports intermittent anxiety but denies needing PRN medications. Reports constipation, medicated with MOM with BMx1 today. Calm and cooperative. Compliant with medication changes.

## 2022-09-27 NOTE — PROGRESS NOTES
1440: pt arrived via Hospital to Home transport. Pt transferred from stretcher to bed. Pt moaning in pain. Eyes open but not interactive. 1453: PRN versed and dilaudid given per MAR. Pt rolled to side with max assist for dual skin assessment. Pt has stage 4 sacral wound and unstageable eschar wounds to BL heels. Wound care and new dressing applied to sacral wound and new foam dressings placed on BL heels. Dr Jorden Spatz at bedside. 1530: PRN dilaudid and versed given per [facility]. Pt having fecal smelling drainage from mouth when turned on side. Suction set up at bedside. 1630: placed call to granddaughter, updated her on patient arrival, pt status, meds. Candance March will be to bedside around 5pm. Pt resting quietly with eyes closed, respirations are shallow and unlabored. 1700: granddaughter to bedside. Pt resting quietly with eyes closed, respirations shallow and unlabored. Some periods of apnea 5-10 seconds noted. 6353: Dr Jorden Spatz to bedside to see patient and granddaughter. 1840: pt resting with eyes closed, slight moaning heard with patient breaths, brow furrowed. PRN lorazepam and dilaudid given per [facility].   1850: granddaughter leaving for the night.   1900: report given to oncoming nurse.

## 2022-09-27 NOTE — PROGRESS NOTES
Pharmacist Discharge Medication Reconciliation    Discharge Provider:  Dr Sandie Gonzales        Discharge Medications:      My Medications        STOP taking these medications      amiodarone 200 mg tablet  Commonly known as: CORDARONE        aspirin delayed-release 81 mg tablet        atorvastatin 20 mg tablet  Commonly known as: LIPITOR        cefdinir 300 mg capsule  Commonly known as: OMNICEF        cefepime 1 gram IVPB        cholecalciferol (1000 Units /25 mcg) tablet  Commonly known as: VITAMIN D3        cloNIDine HCL 0.1 mg tablet  Commonly known as: CATAPRES        collagenase 250 unit/gram ointment  Commonly known as: SANTYL        famotidine 20 mg tablet  Commonly known as: PEPCID        insulin lispro protamine/insulin lispro 100 unit/mL (75-25) injection  Commonly known as: HUMALOG MIX 75/25        pantoprazole 20 mg tablet  Commonly known as: PROTONIX        sodium hypochlorite 0.125 % Soln external solution  Commonly known as: QUARTER STRENGTH DAKIN'S        vancomycin 1,000 mg injection  Commonly known as: VANCOCIN                      The patient's chart, MAR, and AVS were reviewed by   [place],   Contact: [phone]

## 2022-09-27 NOTE — PROGRESS NOTES
Bedside/verbal shift change report given to Wanda Marcus (oncoming nurse) by Tyrone Bocanegra (offgoing nurse). Report included the following information SBAR, Kardex, Intake/Output, MAR, and Recent Results.

## 2022-09-28 NOTE — PROGRESS NOTES
Problem: Pressure Injury - Risk of  Goal: *Prevention of pressure injury  Description: Document Markos Scale and appropriate interventions in the flowsheet. Outcome: Progressing Towards Goal  Note: Pressure Injury Interventions:  Sensory Interventions: Check visual cues for pain, Float heels, Keep linens dry and wrinkle-free, Maintain/enhance activity level, Minimize linen layers, Pressure redistribution bed/mattress (bed type), Turn and reposition approx. every two hours (pillows and wedges if needed)    Moisture Interventions: Contain wound drainage, Internal/External fecal devices, Internal/External urinary devices, Minimize layers, Moisture barrier    Activity Interventions: Pressure redistribution bed/mattress(bed type)    Mobility Interventions: Float heels, HOB 30 degrees or less, Pressure redistribution bed/mattress (bed type), Turn and reposition approx.  every two hours(pillow and wedges)    Nutrition Interventions:  (NPO at EOL)    Friction and Shear Interventions: Foam dressings/transparent film/skin sealants, HOB 30 degrees or less, Lift sheet, Minimize layers

## 2022-09-28 NOTE — PROGRESS NOTES
[facility]  [phone]    Inpatient Nursing Admission   Patient Name: Kimberlee Singletary  YOB: 1934  Age: 80 y.o. Date of Hospice Admission: 9/27/2022  Hospice Attending Elected by Patient: Nate Jeffries MD  Primary Care Physician: Maren Bobo MD  Admitting RN: Yuliana Wallace RN  : Wendy Melchor     Level of Care (GIP/Routine/Respite): GIP  Facility of Care: [facility]  Patient Room: 09/01     HOSPICE SUMMARY   ER Visits/ Hospitalizations in past year: 4  Hospice Diagnosis: ESRD  Onset Date of Hospice Diagnosis: 09/27/2022  Summary of Disease Progression Leading to Hospice Diagnosis: Kimberlee Singletary is a 80y.o. year old who was admitted to [address] [place] [address]. Is an 80-year-old female with a complicated hospitalization. She had been at [facility] from 9/16 until 9/22. She had gone to dialysis on 9/16 and was found to be altered, could not complete her dialysis session. Patient with underlying dementia. Upon arrival to [facility], patient was noted to have hypotension, tachypnea, tachycardia with a white count of 12.8, hemoglobin of 10.5. Lactic acid of 10.4. CT abdomen pelvis showed alteration of the sacrum with lysis of the bone. Patient diagnosed with septic shock with suspected infected sacral ulcer. She had recently had a diverting colostomy for infected sacral wound with debridement of the ulcer in August 2022. She is in the hospital for almost 2 weeks at that time. Patient started on broad-spectrum antibiotics and IV fluids. Continue to show evidence of hypotension, further decline. Patient has been transferred to [facility] as they were working on [place] application but in the midst of waiting for this to be completed, patient continues show evidence of decline and appear to be approaching end-of-life care.   Patient having significant symptom burden requiring IV medication and transferred to the community hospice house for [facility] level care. Family has elected to stop further dialysis and focus on comfort         Diagnoses RELATED to the terminal prognosis: End Stage Renal Disease. Other Diagnoses:   Patient Active Problem List   Diagnosis Code    DKA (diabetic ketoacidosis) ([facility] [address] E11.10    Goals of care, counseling/discussion Z71.89    Lethargy R53.83    Advanced care planning/counseling discussion Z71.89    Palliative care by specialist Z51.5    Wound infection T14. 8XXA, L08.9    Frailty R54    Physical debility R53.81    DNR (do not resuscitate) discussion Z71.89    Severe sepsis ([facility] [address] A41.9, R65.20    Acute encephalopathy G93.40       Rationale for a prognosis of life expectancy of 6 months or less if the disease follows its normal course (Disease Specific History):     Sarahi Oconnor is a 80 y.o. who was admitted to [facility]. The patient's principle diagnosis of ESRD has resulted in pain and restlessness. Functionally, the patient's Palliative Performance Scale has declined over a period of 1 - 2 weeks. and is estimated at 10.  Objective information that support this patients limited prognosis includes:    Pertinent Lab and or Imaging Tests:        Lab Results   Component Value Date/Time     Sodium 137 09/17/2022 05:57 AM     Potassium 3.5 09/17/2022 05:57 AM     Chloride 103 09/17/2022 05:57 AM     CO2 19 (L) 09/17/2022 05:57 AM     Anion gap 15 09/17/2022 05:57 AM     Glucose 102 (H) 09/17/2022 05:57 AM     BUN 12 09/17/2022 05:57 AM     Creatinine 1.25 (H) 09/17/2022 05:57 AM     BUN/Creatinine ratio 10 (L) 09/17/2022 05:57 AM     GFR est AA 49 (L) 09/17/2022 05:57 AM     GFR est non-AA 40 (L) 09/17/2022 05:57 AM     Calcium 7.3 (L) 09/17/2022 05:57 AM            Lab Results   Component Value Date/Time     Protein, total 5.4 (L) 09/17/2022 05:57 AM     Albumin 1.8 (L) 09/17/2022 05:57 AM      The patient/family chose comfort measures with the support of Hospice. Patient meets for GIP LOC as evidenced by Frequent nurse assessment and medication adjustments. IV medications required. Extensive wound care. Prognosis estimated based on 09/27/22 clinical assessment is:   [] Few to Many Hours  [x] Hours to Days   [] Few to Many Days   [] Days to Weeks   [] Few to Many Weeks   [] Weeks to Months   [] Few to Many Months    ASSESSMENT    Patient self-reports:  []  Yes    [x] No    SYMPTOMS: pain, restlessness    SIGNS/PHYSICAL FINDINGS: moaning, restlessness, periods of apnea, vs below norm or unable to obtain, extensive wounds. FAST for all dementia:      Learning Assessment:  Patient  Is patient willing/able to learn? Unresponsive  What is the highest level of education completed? Unresponsive  Learning preference (written material, demonstration, visual)? Unresponsive  Learning barriers (ESOL, [place], poor vision)? Unresponsive    Caregiver  Is caregiver willing to learn care for patient? Not present  What is the highest level of education completed? Not present, unknown  Learning preference (written material, demonstration, visual)? Not present  Learning barriers (ESOL, [place], poor vision)? Not present    CLINICAL INFORMATION     Wt Readings from Last 3 Encounters:   09/19/22 57.2 kg (126 lb)   08/14/22 68.2 kg (150 lb 4.8 oz)   05/26/22 82.6 kg (182 lb 1.6 oz)     Ht Readings from Last 3 Encounters:   09/19/22 5' 4\" (1.626 m)   08/03/22 5' 4\" (1.626 m)   05/25/22 5' 6\" (1.676 m)     There is no height or weight on file to calculate BMI. Visit Vitals  Temp (!) 93.3 °F (34.1 °C)   Resp 8       LAB VALUES  No results found for this visit on 09/27/22 (from the past 12 hour(s)). No results found for this visit on 09/27/22 (from the past 6 hour(s)).   Lab Results   Component Value Date/Time    Protein, total 5.4 (L) 09/17/2022 05:57 AM    Albumin 1.8 (L) 09/17/2022 05:57 AM       Currently this patient has:  [x] Supplemental O2 [x] Peripheral IV  [] PICC    [] PORT   [x] Gee Catheter [] NG Tube   [] PEG Tube [x] Ostomy    [] AICD: Has ICD been deactivated? [] Yes [] No:______    PLAN     Admit to [facility] level of care for symptom management of pain and restlessness and extensive wound care. 2. Dilaudid 1 mg IV every 15 min as needed for pain  Versed 2 mg every 15 min as needed for restlessness. 3. Robinul 0.2 mg IV for secretions. 4. PRN medications in place for breakthrough symptom management  5. Infection control and prevention as needed  Gee care per protocol  Wound care prn.  6. Provide support/education to caregiver/family: Daughter Carolina Randall  7. Monitor closely for changes in symptoms  8. Provide support and frequent rounds for patient comfort and safety   9. Maintain skin integrity as tolerated for hospice patient, turning and repositioning for comfort  10. Provide  and  for patient and family support  6. Continue to discuss discharge plan for any changes    Hospice Team Frequency Orders:  Skilled Nurse - Daily x 14 days with 5 PRN visits for symptom control. MSW - 1 x weekly with 5 visits PRN family support and need for volunteer services.  - 1 x weekly with 5 visits PRN spiritual support. CNA - daily x 14 days    ADVANCE CARE PLANNING (Complete in ACP Flow Sheet)   Code Status: DNR  Durable DNR: [x]  Yes  []  No  Code Status Discussed/Confirmed: Yes  Preference for Other Life Sustaining Treatment Discussed/Confirmed: Yes  Hospitalization Preference:    Advance Care Planning 2022   Patient's Healthcare Decision Maker is: -   Confirm Advance Directive Yes, on file   Patient Would Like to Complete Advance Directive -   Does the patient have other document types Do Not Resuscitate        Service: [] Yes  []  No      [x] Unknown  Appropriate for Pinning Ceremony:  [] Yes     [] No  Sabianist: Latter-day   Home: [facility] in [address]     1.  Discharge Plan: Remain GIP at [facility] until symptoms are managed. 2. Patient/Family teaching: Plan of care for comfort and symptom management. 3. Response to patient/family teaching: Reported understanding. SOCIAL/EMOTIONAL/SPIRITUAL NEEDS     Spiritual Issues Identified: No    Psych/ Social/ Emotional Issues Identified: Anticipated grief. Caregiver Support:  [x] Provided information on End of Life Care   [] Material Provided: Gone From My Sight or Rudine Askew   Dr. Joseph Ureña contacted, discharge to hospice order received  Dr. Joseph Ureña contacted, agrees to serve as attending provider for hospice and provided verbal certification of terminal illness with life expectancy of 6 months or less. Orders for hospice admission, medications and plan of treatment received. Medication reconciliation completed.   MEDS: See medication list below  DME: Per [facility]  Supplies: Per [facility]  IDT communication to include MD, SN, SW, CH and support team    ALLERGIES AND MEDICATIONS     Allergies: No Known Allergies  Current Facility-Administered Medications   Medication Dose Route Frequency    bisacodyL (DULCOLAX) suppository 10 mg  10 mg Rectal DAILY PRN    prochlorperazine (COMPAZINE) injection 10 mg  10 mg IntraVENous Q4H PRN    acetaminophen (TYLENOL) suppository 650 mg  650 mg Rectal Q4H PRN    ketorolac (TORADOL) injection 30 mg  30 mg IntraVENous Q8H PRN    glycopyrrolate (ROBINUL) injection 0.2 mg  0.2 mg IntraVENous Q4H PRN    midazolam (VERSED) injection 2 mg  2 mg IntraVENous Q15MIN PRN    HYDROmorphone (DILAUDID) injection 1 mg  1 mg IntraVENous Q15MIN PRN

## 2022-09-28 NOTE — PROGRESS NOTES
1900 Report received from [place], [facility]. Pt is [facility] level of care for management of pain and extensive wound care. Dx ESRD. 2000 Resting quietly with eyes closed. Respirations irregular with short periods of apnea. Audible coarse breath sounds. Unresponsive to verbal or tactile stimulation. 2010 Scheduled IV Dilaudid and Versed given. PRN Robinul given. 2045 Secretions less audible. Respirations shallow, non labored. 2130 Continue to monitor and medicate as needed for symptoms. 2230 Resting quietly, no facial grimace. 2330 Respirations shallow, fingers are cold and tips are blue. 0030 Shallow respirations, chest barely rising. Rate 2-3 per minute. 0040 Pt without apical heart beat or respirations after 2 full minutes auscultation. Pt pronounced at [address] [place] Phone call to pts grand daughter Carolina Randall. No answer. Message left to call [facility]. [phone]  Return call to Carolina Randall, no answer. No other contact number listed. NAME OF PATIENT:  Nasir Thomas    LEVEL OF CARE:  [facility]    REASON FOR GIP:   Pain, despite numerous changes in medications, Unmanageable respiratory distress, Medication adjustment that must be monitored 24/7, and Stabilizing treatment that cannot take place at home    *PATIENT REMAINS ELIGIBLE FOR [facility] LEVEL OF CARE AS EVIDENCED BY: Frequent nurse assessment and medication adjustments required. IV medications required.       REASON FOR RESPITE:  na    O2 SAFETY:  Concentrator positioning (6\" from furniture/drapes), No petroleum based products on face while oxygen in use, and Oxygen sign on the door    FALL INTERVENTIONS PROVIDED:   Implemented/recommended use of fall risk identification flag to all team members, Implemented/recommended resources for alarm system (personal alarm, bed alarm, call bell, etc.) , Implemented/recommended environmental changes (remove hazards, lower bed, improve lighting, etc.), and Implemented/recommended increased supervision/assistance    INTERDISPLINARY COMMUNICATION/COLLABORATION:  Physician, MSW, Rachael, and RN, CNA    NEW MEDICATION INITIATION DOCUMENTATION:  na    Reason medication is being initiated:  no medication changes at this time. MD / Provider name consulted re: change in status / initiation of new medication:  na    New Symptom(s):  na    New Order(s):  na    Name of the person notified of the changes:  na    Name of person being taught:  na    Instructions given:  na    Side Effects taught:  na    Response to teaching:  na      COMFORTABLE DYING MEASURE:  Is Patient/family satisfied with symptom level?  yes    DISCHARGE PLAN:  Remain GIP until symptoms are managed.

## 2022-09-29 ENCOUNTER — HOME CARE VISIT (OUTPATIENT)
Dept: HOSPICE | Facility: HOSPICE | Age: 87
End: 2022-09-29
Payer: MEDICARE

## (undated) DEVICE — SUTURE MCRYL SZ 4-0 L27IN ABSRB UD L19MM PS-2 1/2 CIR PRIM Y426H

## (undated) DEVICE — KIT,1200CC CANISTER,3/16"X6' TUBING: Brand: MEDLINE INDUSTRIES, INC.

## (undated) DEVICE — GLOVE SURG SZ 8 CRM LTX FREE POLYISOPRENE POLYMER BEAD ANTI

## (undated) DEVICE — MAJOR LAPAROTOMY-MRMC: Brand: MEDLINE INDUSTRIES, INC.

## (undated) DEVICE — STAPLER INT L34CM 60MM LNG ENDOSCP ARTC PWR + ECHELON FLX

## (undated) DEVICE — GLOVE SURG SZ 85 CRM LTX FREE POLYISOPRENE POLYMER BEAD ANTI

## (undated) DEVICE — TUBING, SUCTION, 1/4" X 10', STRAIGHT: Brand: MEDLINE

## (undated) DEVICE — HYPODERMIC SAFETY NEEDLE: Brand: MAGELLAN

## (undated) DEVICE — GOWN,SIRUS,NONRNF,SETINSLV,2XL,18/CS: Brand: MEDLINE

## (undated) DEVICE — Z DISCONTINUED NO SUB IDED INSTRUMENT REPROC SEAL/DIVIDE LAP LIGASURE MARYLAND JAW NANO-COAT 37CM

## (undated) DEVICE — TROCAR: Brand: KII® OPTICAL ACCESS SYSTEM

## (undated) DEVICE — SUTURE VCRL SZ 3-0 L18IN ABSRB UD L26MM SH 1/2 CIR J864D

## (undated) DEVICE — PAD,ABDOMINAL,5"X9",ST,LF,25/BX: Brand: MEDLINE INDUSTRIES, INC.

## (undated) DEVICE — RELOAD STPL L60MM H1-2.6MM MESENTERY THN TISS WHT 6 ROW

## (undated) DEVICE — PREMIUM WET SKIN PREP TRAY: Brand: MEDLINE INDUSTRIES, INC.

## (undated) DEVICE — SUTURE SZ 0 27IN 5/8 CIR UR-6  TAPER PT VIOLET ABSRB VICRYL J603H

## (undated) DEVICE — BANDAGE,GAUZE,CONFORMING,3"X75",STRL,LF: Brand: MEDLINE

## (undated) DEVICE — SOLUTION IRRIG 1000ML 0.9% SOD CHL USP POUR PLAS BTL